# Patient Record
Sex: FEMALE | Race: WHITE | HISPANIC OR LATINO | Employment: OTHER | URBAN - METROPOLITAN AREA
[De-identification: names, ages, dates, MRNs, and addresses within clinical notes are randomized per-mention and may not be internally consistent; named-entity substitution may affect disease eponyms.]

---

## 2019-03-14 DIAGNOSIS — I35.0 NODULAR CALCIFIC AORTIC VALVE STENOSIS: Primary | ICD-10-CM

## 2019-04-08 ENCOUNTER — HOSPITAL ENCOUNTER (OUTPATIENT)
Dept: CARDIOLOGY | Facility: CLINIC | Age: 84
Discharge: HOME | End: 2019-04-08
Attending: INTERNAL MEDICINE
Payer: MEDICARE

## 2019-04-08 ENCOUNTER — HOSPITAL ENCOUNTER (OUTPATIENT)
Dept: RADIOLOGY | Facility: HOSPITAL | Age: 84
Discharge: HOME | End: 2019-04-08
Attending: INTERNAL MEDICINE
Payer: MEDICARE

## 2019-04-08 ENCOUNTER — HOSPITAL ENCOUNTER (OUTPATIENT)
Dept: PULMONOLOGY | Facility: CLINIC | Age: 84
Discharge: HOME | End: 2019-04-08
Payer: MEDICARE

## 2019-04-08 ENCOUNTER — HOSPITAL ENCOUNTER (OUTPATIENT)
Dept: CARDIOLOGY | Facility: CLINIC | Age: 84
Discharge: HOME | End: 2019-04-08
Payer: MEDICARE

## 2019-04-08 ENCOUNTER — OFFICE VISIT (OUTPATIENT)
Dept: CARDIOLOGY | Facility: CLINIC | Age: 84
End: 2019-04-08
Payer: MEDICARE

## 2019-04-08 ENCOUNTER — EDUCATION (OUTPATIENT)
Dept: CARDIOLOGY | Facility: CLINIC | Age: 84
End: 2019-04-08

## 2019-04-08 VITALS
DIASTOLIC BLOOD PRESSURE: 78 MMHG | WEIGHT: 190 LBS | HEART RATE: 85 BPM | HEIGHT: 62 IN | SYSTOLIC BLOOD PRESSURE: 138 MMHG | BODY MASS INDEX: 34.96 KG/M2

## 2019-04-08 VITALS — HEIGHT: 62 IN | BODY MASS INDEX: 36.44 KG/M2 | WEIGHT: 198 LBS

## 2019-04-08 VITALS
WEIGHT: 198.44 LBS | DIASTOLIC BLOOD PRESSURE: 79 MMHG | SYSTOLIC BLOOD PRESSURE: 171 MMHG | HEART RATE: 88 BPM | OXYGEN SATURATION: 97 % | BODY MASS INDEX: 36.52 KG/M2 | HEIGHT: 62 IN

## 2019-04-08 DIAGNOSIS — I35.0 NODULAR CALCIFIC AORTIC VALVE STENOSIS: ICD-10-CM

## 2019-04-08 DIAGNOSIS — I35.0 NODULAR CALCIFIC AORTIC VALVE STENOSIS: Primary | ICD-10-CM

## 2019-04-08 DIAGNOSIS — E03.9 ACQUIRED HYPOTHYROIDISM: ICD-10-CM

## 2019-04-08 DIAGNOSIS — I10 ESSENTIAL HYPERTENSION: ICD-10-CM

## 2019-04-08 LAB
ASCENDING AORTA: 3.06 CM
AV INDEX (PROSTH): 0.13
AV MEAN GRADIENT: 55.91 MMHG
AV PEAK GRADIENT: 76.04 MMHG
AV VALVE AREA: 0.45 CM2
AV VELOCITY RATIO: 0.16
BSA FOR ECHO PROCEDURE: 1.94 M2
CV ECHO LV RWT: 0.51 CM
DOP CALC AO PEAK VEL: 4.36 M/S
DOP CALC AO VTI: 123.13 CM
DOP CALC LVOT AREA: 3.4 CM2
DOP CALC LVOT DIAMETER: 2.08 CM
DOP CALC LVOT PEAK VEL: 0.7 M/S
DOP CALC LVOT STROKE VOLUME: 55.46 CM3
DOP CALCLVOT PEAK VEL VTI: 16.33 CM
E WAVE DECELERATION TIME: 173.16 MSEC
E/A RATIO: 0.79
E/E' RATIO: 30.4
ECHO LV POSTERIOR WALL: 1 CM (ref 0.6–1.1)
FRACTIONAL SHORTENING: 21 % (ref 28–44)
INTERVENTRICULAR SEPTUM: 0.97 CM (ref 0.6–1.1)
LA MAJOR: 4.68 CM
LA MINOR: 4.75 CM
LA WIDTH: 4.07 CM
LEFT ATRIUM SIZE: 4.26 CM
LEFT ATRIUM VOLUME INDEX: 37.2 ML/M2
LEFT ATRIUM VOLUME: 69.48 CM3
LEFT INTERNAL DIMENSION IN SYSTOLE: 3.12 CM (ref 2.1–4)
LEFT VENTRICLE DIASTOLIC VOLUME INDEX: 36.42 ML/M2
LEFT VENTRICLE DIASTOLIC VOLUME: 68.11 ML
LEFT VENTRICLE MASS INDEX: 65.2 G/M2
LEFT VENTRICLE SYSTOLIC VOLUME INDEX: 20.6 ML/M2
LEFT VENTRICLE SYSTOLIC VOLUME: 38.55 ML
LEFT VENTRICULAR INTERNAL DIMENSION IN DIASTOLE: 3.95 CM (ref 3.5–6)
LEFT VENTRICULAR MASS: 121.94 G
LV LATERAL E/E' RATIO: 25.33
LV SEPTAL E/E' RATIO: 38
MV MEAN GRADIENT: 7 MMHG
MV PEAK A VEL: 1.92 M/S
MV PEAK E VEL: 1.52 M/S
MV PEAK GRADIENT: 15 MMHG
PISA TR MAX VEL: 1.81 M/S
PRE FEV1 FVC: 64
PRE FEV1: 1.32
PRE FVC: 2.05
PREDICTED FEV1 FVC: 76
PREDICTED FEV1: 1.74
PREDICTED FVC: 2.36
PULM VEIN S/D RATIO: 1.28
PV PEAK D VEL: 0.32 M/S
PV PEAK S VEL: 0.41 M/S
RA MAJOR: 4.29 CM
RA PRESSURE: 3 MMHG
RA WIDTH: 3.35 CM
RIGHT VENTRICULAR END-DIASTOLIC DIMENSION: 3.31 CM
RV TISSUE DOPPLER FREE WALL SYSTOLIC VELOCITY 1 (APICAL 4 CHAMBER VIEW): 8.58 M/S
SINUS: 2.74 CM
STJ: 2.97 CM
TDI LATERAL: 0.06
TDI SEPTAL: 0.04
TDI: 0.05
TR MAX PG: 13.1 MMHG
TRICUSPID ANNULAR PLANE SYSTOLIC EXCURSION: 1.99 CM
TV REST PULMONARY ARTERY PRESSURE: 16 MMHG

## 2019-04-08 PROCEDURE — 74174 CTA ABD&PLVS W/CONTRAST: CPT | Mod: 26,,, | Performed by: RADIOLOGY

## 2019-04-08 PROCEDURE — 71275 CT ANGIOGRAPHY CHEST: CPT | Mod: TC

## 2019-04-08 PROCEDURE — 71275 CT ANGIOGRAPHY CHEST: CPT | Mod: 26,,, | Performed by: RADIOLOGY

## 2019-04-08 PROCEDURE — 99999 PR PBB SHADOW E&M-EST. PATIENT-LVL III: ICD-10-PCS | Mod: PBBFAC,,,

## 2019-04-08 PROCEDURE — 93306 TRANSTHORACIC ECHO (TTE) COMPLETE (CUPID ONLY): ICD-10-PCS | Mod: S$GLB,,, | Performed by: INTERNAL MEDICINE

## 2019-04-08 PROCEDURE — 71275 CTA CARDIAC TAVR_PARTNERS (XPD): ICD-10-PCS | Mod: 26,,, | Performed by: RADIOLOGY

## 2019-04-08 PROCEDURE — 99999 PR PBB SHADOW E&M-EST. PATIENT-LVL III: CPT | Mod: PBBFAC,,,

## 2019-04-08 PROCEDURE — 93306 TTE W/DOPPLER COMPLETE: CPT | Mod: S$GLB,,, | Performed by: INTERNAL MEDICINE

## 2019-04-08 PROCEDURE — 99204 OFFICE O/P NEW MOD 45 MIN: CPT | Mod: S$GLB,,, | Performed by: INTERNAL MEDICINE

## 2019-04-08 PROCEDURE — 93000 EKG 12-LEAD: ICD-10-PCS | Mod: S$GLB,,, | Performed by: INTERNAL MEDICINE

## 2019-04-08 PROCEDURE — 94618 PULMONARY STRESS TESTING: CPT | Mod: S$GLB,,, | Performed by: INTERNAL MEDICINE

## 2019-04-08 PROCEDURE — 25500020 PHARM REV CODE 255: Performed by: INTERNAL MEDICINE

## 2019-04-08 PROCEDURE — 94618 PULMONARY STRESS TESTING: ICD-10-PCS | Mod: S$GLB,,, | Performed by: INTERNAL MEDICINE

## 2019-04-08 PROCEDURE — 99204 PR OFFICE/OUTPT VISIT, NEW, LEVL IV, 45-59 MIN: ICD-10-PCS | Mod: S$GLB,,, | Performed by: INTERNAL MEDICINE

## 2019-04-08 PROCEDURE — 93000 ELECTROCARDIOGRAM COMPLETE: CPT | Mod: S$GLB,,, | Performed by: INTERNAL MEDICINE

## 2019-04-08 PROCEDURE — 74174 CTA CARDIAC TAVR_PARTNERS (XPD): ICD-10-PCS | Mod: 26,,, | Performed by: RADIOLOGY

## 2019-04-08 RX ORDER — SODIUM CHLORIDE 9 MG/ML
3 INJECTION, SOLUTION INTRAVENOUS CONTINUOUS
Status: CANCELLED | OUTPATIENT
Start: 2019-04-08 | End: 2019-04-08

## 2019-04-08 RX ORDER — AMLODIPINE AND OLMESARTAN MEDOXOMIL 5; 20 MG/1; MG/1
1 TABLET ORAL DAILY
Refills: 3 | COMMUNITY
Start: 2019-02-05 | End: 2020-07-07

## 2019-04-08 RX ORDER — SPIRONOLACTONE 25 MG/1
25 TABLET ORAL DAILY
Refills: 3 | COMMUNITY
Start: 2019-02-05 | End: 2022-07-03

## 2019-04-08 RX ORDER — CLOPIDOGREL BISULFATE 75 MG/1
75 TABLET ORAL DAILY
Qty: 30 TABLET | Refills: 11 | Status: SHIPPED | OUTPATIENT
Start: 2019-04-08 | End: 2020-05-04 | Stop reason: SDUPTHER

## 2019-04-08 RX ORDER — DEXTROSE MONOHYDRATE AND SODIUM CHLORIDE 5; .45 G/100ML; G/100ML
INJECTION, SOLUTION INTRAVENOUS CONTINUOUS
Status: CANCELLED | OUTPATIENT
Start: 2019-04-08

## 2019-04-08 RX ORDER — DIPHENHYDRAMINE HCL 25 MG
50 CAPSULE ORAL ONCE
Status: CANCELLED | OUTPATIENT
Start: 2019-04-08 | End: 2019-04-08

## 2019-04-08 RX ORDER — TIMOLOL MALEATE 6.8 MG/ML
1 SOLUTION OPHTHALMIC 2 TIMES DAILY
Refills: 6 | COMMUNITY
Start: 2019-02-21 | End: 2022-11-27 | Stop reason: DRUGHIGH

## 2019-04-08 RX ORDER — FUROSEMIDE 20 MG/1
TABLET ORAL
Refills: 0 | Status: ON HOLD | COMMUNITY
Start: 2019-03-08 | End: 2019-05-08 | Stop reason: HOSPADM

## 2019-04-08 RX ORDER — LEVOTHYROXINE SODIUM 125 UG/1
125 TABLET ORAL
Refills: 3 | Status: ON HOLD | COMMUNITY
Start: 2019-02-09 | End: 2023-03-30 | Stop reason: HOSPADM

## 2019-04-08 RX ORDER — TRAMADOL HYDROCHLORIDE 50 MG/1
TABLET ORAL
Status: ON HOLD | COMMUNITY
Start: 2018-07-01 | End: 2021-11-18 | Stop reason: HOSPADM

## 2019-04-08 RX ADMIN — IOHEXOL 100 ML: 350 INJECTION, SOLUTION INTRAVENOUS at 01:04

## 2019-04-08 NOTE — H&P (VIEW-ONLY)
"Subjective:    Patient ID:  Pilar Michael is a 86 y.o. female who presents for evaluation of Aortic Stenosis      Referring Physician: Too Dietrich MD    HPI  87 yo pleasant female - breast cancer survivor referred by Dr. Gaston for evaluation of aortic stenosis. She reports NYHA FC III symptoms and a recent hospitalization in March, 2019 for ADHF (3 night stay at [facility]).    She has PMHx of  Infiltrative ductal carcinoma of left breast s/p surgery and left chest wall radiation Tx in 2003 ([facility]), HTN, left arm lymphedema, arthritis of lumbar spine, Thyroid cancer s/p removal 1992 now with residual hypothyroidism, PAD and OA. She get annual follow up with her oncologist (Dr. Foster at [facility]) every June and was told "everything is ok" at last visit.       Pilar Michael is a 86 y.o. female referred by Dr Gaston  for evaluation of severe AS (NYHA Class III sx).    The patient has undergone the following TAVR work-up:   ECHO (Date 4/8/2019): MARIBEL= 0.45 cm2, MG= 56 mmHg, Peak Haresh= 4.4 m/s, EF= 65%.   LHC (Date ): Pending   STS: 6 %   Frailty: 1/4 (handgrip)  Iliacs are > 6.6  on R and > 6.1 on L   LVOT area by CTA is 3.75 cm2 (26.1 mm X 19.6mm) and Avg Diameter is 21.8 per Dr VILLANUEVA  Incidental findings on CT: LAD moderate to severe calcification.  CT Surgery risk assessment: high risk per Dr. Mcginnis  Rhythm issues: LBBB  PFTs: FEV1  77% predicted, DLCO 86% predicted.  Comorbidities: breast and thyroid cancer Hx, Chest wall radiation therapy.      Pilar Michael is a 26 mm Evolut R valve candidate via RTF access.    Review of Systems   Constitution: Negative for malaise/fatigue, weight gain and weight loss.   HENT: Negative for congestion, nosebleeds and sore throat.    Eyes: Negative for blurred vision, double vision, pain and visual disturbance.   Cardiovascular: Negative for chest pain, claudication, cyanosis, dyspnea on exertion, irregular heartbeat, leg swelling, near-syncope, orthopnea, palpitations, paroxysmal " "nocturnal dyspnea and syncope.   Respiratory: Negative for cough, hemoptysis, shortness of breath, snoring, sputum production and wheezing.    Endocrine: Negative for polydipsia and polyuria.   Hematologic/Lymphatic: Negative for bleeding problem. Does not bruise/bleed easily.   Skin: Negative for color change, poor wound healing and rash.   Musculoskeletal: Negative for arthritis, back pain, joint pain, muscle weakness, myalgias and neck pain.   Gastrointestinal: Negative for abdominal pain, anorexia, constipation, diarrhea, heartburn, hematemesis, hematochezia, hemorrhoids, jaundice, melena, nausea and vomiting.   Genitourinary: Negative for flank pain, hematuria, hesitancy, incomplete emptying, menorrhagia and nocturia.   Neurological: Negative for excessive daytime sleepiness, dizziness, focal weakness, headaches, light-headedness, loss of balance, numbness, paresthesias, seizures, sensory change, tremors, vertigo and weakness.   Psychiatric/Behavioral: Negative for altered mental status, depression, hallucinations and memory loss. The patient does not have insomnia and is not nervous/anxious.      History reviewed. No pertinent past medical history.  No current outpatient medications on file prior to visit.     No current facility-administered medications on file prior to visit.      Vitals:    04/08/19 1424   BP: (!) 171/79   BP Location: Right arm   Patient Position: Sitting   BP Method: Large (Automatic)   Pulse: 88   SpO2: 97%   Weight: 90 kg (198 lb 6.6 oz)   Height: 5' 1.81" (1.57 m)     Body mass index is 36.51 kg/m².      Objective:    Physical Exam   Constitutional: She appears well-developed and well-nourished.   HENT:   Head: Normocephalic.   Eyes: Pupils are equal, round, and reactive to light.   Neck: Normal range of motion. Neck supple. Normal carotid pulses, no hepatojugular reflux and no JVD present. No spinous process tenderness and no muscular tenderness present. Carotid bruit is not present. " No neck rigidity. No tracheal deviation, no edema, no erythema and normal range of motion present. No thyromegaly present.   Cardiovascular: Normal rate, regular rhythm, S1 normal, S2 normal and intact distal pulses. PMI is not displaced. Exam reveals no gallop, no distant heart sounds, no friction rub, no midsystolic click, no opening snap and no decreased pulses.   Murmur heard.  Pulses:       Carotid pulses are 2+ on the right side, and 2+ on the left side.       Radial pulses are 2+ on the right side, and 2+ on the left side.        Femoral pulses are 2+ on the right side, and 2+ on the left side.       Popliteal pulses are 2+ on the right side, and 2+ on the left side.        Dorsalis pedis pulses are 2+ on the right side, and 2+ on the left side.        Posterior tibial pulses are 2+ on the right side, and 2+ on the left side.   Crescendo decrescendo mid peaking murmur   Pulmonary/Chest: No accessory muscle usage. No tachypnea. No respiratory distress. She has no decreased breath sounds. She has no wheezes. She has no rhonchi. She has no rales. She exhibits no tenderness. No breast swelling, tenderness, discharge or bleeding.   Abdominal: Normal appearance. She exhibits no distension, no abdominal bruit, no ascites and no mass. There is no hepatosplenomegaly. There is no tenderness. There is no rebound, no guarding and no CVA tenderness. No hernia.   Genitourinary: No breast swelling, tenderness, discharge or bleeding.   Musculoskeletal: She exhibits no edema or tenderness.        Right shoulder: She exhibits normal range of motion, no tenderness, no bony tenderness, no swelling, no effusion, no deformity, no laceration, no pain, normal pulse and normal strength.   Lymphadenopathy:     She has no cervical adenopathy.   Neurological: She is alert. She has normal reflexes. She displays no atrophy and no tremor. No cranial nerve deficit or sensory deficit. She exhibits normal muscle tone. She displays a negative  Romberg sign. She displays no seizure activity. Coordination and gait normal.   Reflex Scores:       Tricep reflexes are 2+ on the right side and 2+ on the left side.       Bicep reflexes are 2+ on the right side and 2+ on the left side.       Brachioradialis reflexes are 2+ on the right side and 2+ on the left side.       Patellar reflexes are 2+ on the right side and 2+ on the left side.       Achilles reflexes are 2+ on the right side and 2+ on the left side.  Skin: Skin is warm, dry and intact. No abrasion, no bruising, no ecchymosis, no lesion and no rash noted. She is not diaphoretic. No cyanosis or erythema. No pallor. Nails show no clubbing.   Psychiatric: She has a normal mood and affect. Her speech is normal and behavior is normal. Judgment and thought content normal. Cognition and memory are normal.         Assessment:   1- Severe Aortic Stenosis - Symptomatic with NYHA FC III symptoms    The patient has undergone the following TAVR work-up:   ECHO (Date 4/8/2019): MARIBEL= 0.45 cm2, MG= 56mmHg, Peak Haresh= 4.4 m/s, EF= 65%.   LHC (Date ): Pending   STS: 6 %   Frailty: 1/4 (handgrip)  Iliacs are > 6.6  on R and > 6.1 on L   LVOT area by CTA is 3.75 cm2 (26.1 mm X 19.6mm) and Avg Diameter is 21.8 per Dr VILLANUEVA  Incidental findings on CT: LAD moderate to severe calcification.  CT Surgery risk assessment: high risk per Dr. Mcginnis  Rhythm issues: LBBB  PFTs: FEV1  77% predicted, DLCO 86% predicted.  Comorbidities: breast and thyroid cancer Hx, Chest wall radiation therapy, porcelain aorta    She is a 26 mm Evolut R valve candidate via RTF access.    Transcatheter Aortic valve replacement Plan:  1. Valve:  26 mm Evolut R  2. TAVR access: RTF  3. Valvuloplasty balloon: 20 mm True  4. Viabahn size if needed:  7 x 50  5. Antithrombotic therapy:  ASA and plavix  6. Pacemaker risk factors:  LBBB  7.   Pathophysiology, natural course of disease, symptoms and available management options included SAVR and TAVR discussed in  detail with patient. She needs coronary angiogram to complete work up for TAVR.     2- Essential HTN - controlled.   3- Hypothyroidism -on synthroid  4- PAD - asymptomatic - Risk factor control  Plan:     - Plan for coronary angiogram (BMS candidate)  - L CFA 6 Fr. JL 5 and JR 4 catheters  - DAPT - PRU on day of procedure  - Keep NPO   - Pre-cath IVF ordered  -The risks, benefits and alternatives of the procedure were explained to the patient.   -The risks of coronary angiography include but are not limited to: bleeding, infection, heart rhythm abnormalities, allergic reactions, kidney injury, stroke and death.   -The risks of moderate sedation include hypotension, respiratory depression, arrhythmias, bronchospasm, and death.   - Informed consent was obtained and the patient is agreeable to proceed with the procedure.    Jacobo Nelson MD  Interventional Cardiology/Structural Fellow    Staff:  I have personally taken the history and examined this patient and agree with the fellow's note as stated above and amended it accordingly :-)

## 2019-04-08 NOTE — PROCEDURES
Pilar Michael is a 86 y.o.  female patient, who presents for a 6 minute walk test ordered by MD Gemini.  The diagnosis is Aortic Valve Disorder.  The patient's BMI is 36.3 kg/m2.  Predicted distance (lower limit of normal) is 150.11 meters.      Test Results:    The test was completed without stopping.    The total time walked was 360 seconds.  During walking, the patient reported:  Dyspnea. The patient used no assistive devices  during testing.     04/08/2019---------Distance: 243.84 meters (800 feet)     O2 Sat % Supplemental Oxygen Heart Rate Blood Pressure Jeanette Scale   Pre-exercise  (Resting) 97 % Room Air 77 bpm 126/83 mmHg 1   During Exercise 96 % Room Air 128 bpm 144/67 mmHg 4   Post-exercise  (Recovery) 96 % Room Air  86 bpm   mmHg       Recovery Time: 80 seconds    Performing nurse/tech: Michael BLAKELY      PREVIOUS STUDY:   The patient has not had a previous study.      CLINICAL INTERPRETATION:  Six minute walk distance is 243.84 meters (800 feet) with somewhat heavy dyspnea.  During exercise, there was no significant desaturation while breathing room air.  Blood pressure remained stable and Heart rate increased significantly with walking.  This may represent a tachycardic response to exercise.  The patient did not report non-pulmonary symptoms during exercise.  No previous study performed.  Based upon age and body mass index, exercise capacity is normal.

## 2019-04-08 NOTE — H&P (VIEW-ONLY)
"Subjective:    Patient ID:  Pilar Michael is a 86 y.o. female who presents for evaluation of Aortic Stenosis      Referring Physician: Too Dietrich MD    HPI  85 yo pleasant female - breast cancer survivor referred by Dr. Gaston for evaluation of aortic stenosis. She reports NYHA FC III symptoms and a recent hospitalization in March, 2019 for ADHF (3 night stay at [facility]).    She has PMHx of  Infiltrative ductal carcinoma of left breast s/p surgery and left chest wall radiation Tx in 2003 ([facility]), HTN, left arm lymphedema, arthritis of lumbar spine, Thyroid cancer s/p removal 1992 now with residual hypothyroidism, PAD and OA. She get annual follow up with her oncologist (Dr. Foster at [facility]) every June and was told "everything is ok" at last visit.       Pilar Michael is a 86 y.o. female referred by Dr Gaston  for evaluation of severe AS (NYHA Class III sx).    The patient has undergone the following TAVR work-up:   ECHO (Date 4/8/2019): MARIBEL= 0.45 cm2, MG= 56 mmHg, Peak Haresh= 4.4 m/s, EF= 65%.   LHC (Date ): Pending   STS: 6 %   Frailty: 1/4 (handgrip)  Iliacs are > 6.6  on R and > 6.1 on L   LVOT area by CTA is 3.75 cm2 (26.1 mm X 19.6mm) and Avg Diameter is 21.8 per Dr VILLANUEVA  Incidental findings on CT: LAD moderate to severe calcification.  CT Surgery risk assessment: high risk per Dr. Mcginnis  Rhythm issues: LBBB  PFTs: FEV1  77% predicted, DLCO 86% predicted.  Comorbidities: breast and thyroid cancer Hx, Chest wall radiation therapy.      Pilar Michael is a 26 mm Evolut R valve candidate via RTF access.    Review of Systems   Constitution: Negative for malaise/fatigue, weight gain and weight loss.   HENT: Negative for congestion, nosebleeds and sore throat.    Eyes: Negative for blurred vision, double vision, pain and visual disturbance.   Cardiovascular: Negative for chest pain, claudication, cyanosis, dyspnea on exertion, irregular heartbeat, leg swelling, near-syncope, orthopnea, palpitations, paroxysmal " "nocturnal dyspnea and syncope.   Respiratory: Negative for cough, hemoptysis, shortness of breath, snoring, sputum production and wheezing.    Endocrine: Negative for polydipsia and polyuria.   Hematologic/Lymphatic: Negative for bleeding problem. Does not bruise/bleed easily.   Skin: Negative for color change, poor wound healing and rash.   Musculoskeletal: Negative for arthritis, back pain, joint pain, muscle weakness, myalgias and neck pain.   Gastrointestinal: Negative for abdominal pain, anorexia, constipation, diarrhea, heartburn, hematemesis, hematochezia, hemorrhoids, jaundice, melena, nausea and vomiting.   Genitourinary: Negative for flank pain, hematuria, hesitancy, incomplete emptying, menorrhagia and nocturia.   Neurological: Negative for excessive daytime sleepiness, dizziness, focal weakness, headaches, light-headedness, loss of balance, numbness, paresthesias, seizures, sensory change, tremors, vertigo and weakness.   Psychiatric/Behavioral: Negative for altered mental status, depression, hallucinations and memory loss. The patient does not have insomnia and is not nervous/anxious.      History reviewed. No pertinent past medical history.  No current outpatient medications on file prior to visit.     No current facility-administered medications on file prior to visit.      Vitals:    04/08/19 1424   BP: (!) 171/79   BP Location: Right arm   Patient Position: Sitting   BP Method: Large (Automatic)   Pulse: 88   SpO2: 97%   Weight: 90 kg (198 lb 6.6 oz)   Height: 5' 1.81" (1.57 m)     Body mass index is 36.51 kg/m².      Objective:    Physical Exam   Constitutional: She appears well-developed and well-nourished.   HENT:   Head: Normocephalic.   Eyes: Pupils are equal, round, and reactive to light.   Neck: Normal range of motion. Neck supple. Normal carotid pulses, no hepatojugular reflux and no JVD present. No spinous process tenderness and no muscular tenderness present. Carotid bruit is not present. " No neck rigidity. No tracheal deviation, no edema, no erythema and normal range of motion present. No thyromegaly present.   Cardiovascular: Normal rate, regular rhythm, S1 normal, S2 normal and intact distal pulses. PMI is not displaced. Exam reveals no gallop, no distant heart sounds, no friction rub, no midsystolic click, no opening snap and no decreased pulses.   Murmur heard.  Pulses:       Carotid pulses are 2+ on the right side, and 2+ on the left side.       Radial pulses are 2+ on the right side, and 2+ on the left side.        Femoral pulses are 2+ on the right side, and 2+ on the left side.       Popliteal pulses are 2+ on the right side, and 2+ on the left side.        Dorsalis pedis pulses are 2+ on the right side, and 2+ on the left side.        Posterior tibial pulses are 2+ on the right side, and 2+ on the left side.   Crescendo decrescendo mid peaking murmur   Pulmonary/Chest: No accessory muscle usage. No tachypnea. No respiratory distress. She has no decreased breath sounds. She has no wheezes. She has no rhonchi. She has no rales. She exhibits no tenderness. No breast swelling, tenderness, discharge or bleeding.   Abdominal: Normal appearance. She exhibits no distension, no abdominal bruit, no ascites and no mass. There is no hepatosplenomegaly. There is no tenderness. There is no rebound, no guarding and no CVA tenderness. No hernia.   Genitourinary: No breast swelling, tenderness, discharge or bleeding.   Musculoskeletal: She exhibits no edema or tenderness.        Right shoulder: She exhibits normal range of motion, no tenderness, no bony tenderness, no swelling, no effusion, no deformity, no laceration, no pain, normal pulse and normal strength.   Lymphadenopathy:     She has no cervical adenopathy.   Neurological: She is alert. She has normal reflexes. She displays no atrophy and no tremor. No cranial nerve deficit or sensory deficit. She exhibits normal muscle tone. She displays a negative  Romberg sign. She displays no seizure activity. Coordination and gait normal.   Reflex Scores:       Tricep reflexes are 2+ on the right side and 2+ on the left side.       Bicep reflexes are 2+ on the right side and 2+ on the left side.       Brachioradialis reflexes are 2+ on the right side and 2+ on the left side.       Patellar reflexes are 2+ on the right side and 2+ on the left side.       Achilles reflexes are 2+ on the right side and 2+ on the left side.  Skin: Skin is warm, dry and intact. No abrasion, no bruising, no ecchymosis, no lesion and no rash noted. She is not diaphoretic. No cyanosis or erythema. No pallor. Nails show no clubbing.   Psychiatric: She has a normal mood and affect. Her speech is normal and behavior is normal. Judgment and thought content normal. Cognition and memory are normal.         Assessment:   1- Severe Aortic Stenosis - Symptomatic with NYHA FC III symptoms    The patient has undergone the following TAVR work-up:   ECHO (Date 4/8/2019): MARIBEL= 0.45 cm2, MG= 56mmHg, Peak Haresh= 4.4 m/s, EF= 65%.   LHC (Date ): Pending   STS: 6 %   Frailty: 1/4 (handgrip)  Iliacs are > 6.6  on R and > 6.1 on L   LVOT area by CTA is 3.75 cm2 (26.1 mm X 19.6mm) and Avg Diameter is 21.8 per Dr VILLANUEVA  Incidental findings on CT: LAD moderate to severe calcification.  CT Surgery risk assessment: high risk per Dr. Mcginnis  Rhythm issues: LBBB  PFTs: FEV1  77% predicted, DLCO 86% predicted.  Comorbidities: breast and thyroid cancer Hx, Chest wall radiation therapy, porcelain aorta    She is a 26 mm Evolut R valve candidate via RTF access.    Transcatheter Aortic valve replacement Plan:  1. Valve:  26 mm Evolut R  2. TAVR access: RTF  3. Valvuloplasty balloon: 20 mm True  4. Viabahn size if needed:  7 x 50  5. Antithrombotic therapy:  ASA and plavix  6. Pacemaker risk factors:  LBBB  7.   Pathophysiology, natural course of disease, symptoms and available management options included SAVR and TAVR discussed in  detail with patient. She needs coronary angiogram to complete work up for TAVR.     2- Essential HTN - controlled.   3- Hypothyroidism -on synthroid  4- PAD - asymptomatic - Risk factor control  Plan:     - Plan for coronary angiogram (BMS candidate)  - L CFA 6 Fr. JL 5 and JR 4 catheters  - DAPT - PRU on day of procedure  - Keep NPO   - Pre-cath IVF ordered  -The risks, benefits and alternatives of the procedure were explained to the patient.   -The risks of coronary angiography include but are not limited to: bleeding, infection, heart rhythm abnormalities, allergic reactions, kidney injury, stroke and death.   -The risks of moderate sedation include hypotension, respiratory depression, arrhythmias, bronchospasm, and death.   - Informed consent was obtained and the patient is agreeable to proceed with the procedure.    Jacobo Nelson MD  Interventional Cardiology/Structural Fellow    Staff:  I have personally taken the history and examined this patient and agree with the fellow's note as stated above and amended it accordingly :-)

## 2019-04-08 NOTE — PROGRESS NOTES
OUTPATIENT CATHETERIZATION INSTRUCTIONS    You have been scheduled for a procedure in the catheterization lab on Tuesday, April 23, 2019.     Please report to the Cardiology Waiting Area on the Third floor of the hospital and check in at 8 AM.   You will then be taken to the SSCU (Short Stay Cardiac Unit) and prepared for your procedure. Please be aware that this is not the time of your procedure but the time you are to arrive. The procedures are scheduled on an hourly basis; however, emergency cases take precedence over all other cases.       You may not have anything to eat or drink after midnight the night before your test. You may take your regular morning medications with water. If there are any medications that you should not take you will be instructed to hold them that morning. If you are diabetic and on Metformin (Glucophage) do not take it the day before, the day of, and for 2 days after your procedure.      The procedure will take 1-2 hours to perform. After the procedure, you will return to SSCU on the third floor of the hospital. You will need to lie still (or keep your arm still) for the next 4 to 6 hours to minimize bleeding from the puncture site. Your family may remain in the room with you during this time.       You may be able to be discharged home that same afternoon if there is someone to drive you home and there were no complications. If you have one of the balloon, stent, or device procedures you may spend the night in the hospital. Your doctor will determine, based on your progress, the date and time of your discharge. The results of your procedure will be discussed with you before you are discharged. Any further testing or procedures will be scheduled for you either before you leave or you will be called with these appointments.       If you should have any questions, concerns, or need to change the date of your procedure, please call FARAZ Monae @ [phone]    Special  Instructions:  Plavix 75m tablets night before procedure and 1 tablet morning of procedure        THE ABOVE INSTRUCTIONS WERE GIVEN TO THE PATIENT VERBALLY AND THEY VERBALIZED UNDERSTANDING.  THEY DO NOT REQUIRE ANY SPECIAL NEEDS AND DO NOT HAVE ANY LEARNING BARRIERS.          Directions for Reporting to Cardiology Waiting Area in the Hospital  If you park in the Parking Garage:  Take elevators to the1st floor of the parking garage.  Continue past the gift shop, coffee shop, and piano.  Take a right and go to the gold elevators. (Elevator B)  Take the elevator to the 3rd floor.  Follow the arrow on the sign on the wall that says Cath Lab Registration/EP Lab Registration.  Follow the long hallway all the way around until you come to a big open area.  This is the registration area.  Check in at Reception Desk.    OR    If family is dropping you off:  Have them drop you off at the front of the Hospital under the green overhang.  Enter through the doors and take a right.  Take the E elevators to the 3rd floor Cardiology Waiting Area.  Check in at the Reception Desk in the waiting room.

## 2019-04-08 NOTE — PROGRESS NOTES
"Subjective:    Patient ID:  Pilar Michael is a 86 y.o. female who presents for evaluation of Aortic Stenosis      Referring Physician: Too Dietrich MD    HPI  87 yo [place] female - breast cancer survivor referred by Dr. Gaston for evaluation of aortic stenosis. She reports NYHA FC III symptoms and a recent hospitalization in March, 2019 for ADHF (3 night stay at [facility]).    She has PMHx of  Infiltrative ductal carcinoma of left breast s/p surgery and left chest wall radiation Tx in 2003 ([facility]), HTN, left arm lymphedema, arthritis of lumbar spine, Thyroid cancer s/p removal 1992 now with residual hypothyroidism, PAD and OA. She get annual follow up with her oncologist (Dr. Foster at [facility]) every June and was told "everything is ok" at last visit.       Pilar Michael is a 86 y.o. female referred by Dr Gaston  for evaluation of severe AS (NYHA Class III sx).    The patient has undergone the following TAVR work-up:   ECHO (Date 4/8/2019): MARIBEL= 0.45 cm2, MG= 56 mmHg, Peak Haresh= 4.4 m/s, EF= 65%.   LHC (Date ): Pending   STS: 6 %   Frailty: 1/4 (handgrip)  Iliacs are > 6.6  on R and > 6.1 on L   LVOT area by CTA is 3.75 cm2 (26.1 mm X 19.6mm) and Avg Diameter is 21.8 per Dr VILLANUEVA  Incidental findings on CT: LAD moderate to severe calcification.  CT Surgery risk assessment: high risk per Dr. Mcginnis  Rhythm issues: LBBB  PFTs: FEV1  77% predicted, DLCO 86% predicted.  Comorbidities: breast and thyroid cancer Hx, Chest wall radiation therapy.      Pilar Michael is a 26 mm Evolut R valve candidate via RTF access.    Review of Systems   Constitution: Negative for malaise/fatigue, weight gain and weight loss.   HENT: Negative for congestion, nosebleeds and sore throat.    Eyes: Negative for blurred vision, double vision, pain and visual disturbance.   Cardiovascular: Negative for chest pain, claudication, cyanosis, dyspnea on exertion, irregular heartbeat, leg swelling, near-syncope, orthopnea, palpitations, paroxysmal " "nocturnal dyspnea and syncope.   Respiratory: Negative for cough, hemoptysis, shortness of breath, snoring, sputum production and wheezing.    Endocrine: Negative for polydipsia and polyuria.   Hematologic/Lymphatic: Negative for bleeding problem. Does not bruise/bleed easily.   Skin: Negative for color change, poor wound healing and rash.   Musculoskeletal: Negative for arthritis, back pain, joint pain, muscle weakness, myalgias and neck pain.   Gastrointestinal: Negative for abdominal pain, anorexia, constipation, diarrhea, heartburn, hematemesis, hematochezia, hemorrhoids, jaundice, melena, nausea and vomiting.   Genitourinary: Negative for flank pain, hematuria, hesitancy, incomplete emptying, menorrhagia and nocturia.   Neurological: Negative for excessive daytime sleepiness, dizziness, focal weakness, headaches, light-headedness, loss of balance, numbness, paresthesias, seizures, sensory change, tremors, vertigo and weakness.   Psychiatric/Behavioral: Negative for altered mental status, depression, hallucinations and memory loss. The patient does not have insomnia and is not nervous/anxious.      History reviewed. No pertinent past medical history.  No current outpatient medications on file prior to visit.     No current facility-administered medications on file prior to visit.      Vitals:    04/08/19 1424   BP: (!) 171/79   BP Location: Right arm   Patient Position: Sitting   BP Method: Large (Automatic)   Pulse: 88   SpO2: 97%   Weight: 90 kg (198 lb 6.6 oz)   Height: 5' 1.81" (1.57 m)     Body mass index is 36.51 kg/m².      Objective:    Physical Exam   Constitutional: She appears well-developed and well-nourished.   HENT:   Head: Normocephalic.   Eyes: Pupils are equal, round, and reactive to light.   Neck: Normal range of motion. Neck supple. Normal carotid pulses, no hepatojugular reflux and no JVD present. No spinous process tenderness and no muscular tenderness present. Carotid bruit is not present. " No neck rigidity. No tracheal deviation, no edema, no erythema and normal range of motion present. No thyromegaly present.   Cardiovascular: Normal rate, regular rhythm, S1 normal, S2 normal and intact distal pulses. PMI is not displaced. Exam reveals no gallop, no distant heart sounds, no friction rub, no midsystolic click, no opening snap and no decreased pulses.   Murmur heard.  Pulses:       Carotid pulses are 2+ on the right side, and 2+ on the left side.       Radial pulses are 2+ on the right side, and 2+ on the left side.        Femoral pulses are 2+ on the right side, and 2+ on the left side.       Popliteal pulses are 2+ on the right side, and 2+ on the left side.        Dorsalis pedis pulses are 2+ on the right side, and 2+ on the left side.        Posterior tibial pulses are 2+ on the right side, and 2+ on the left side.   Crescendo decrescendo mid peaking murmur   Pulmonary/Chest: No accessory muscle usage. No tachypnea. No respiratory distress. She has no decreased breath sounds. She has no wheezes. She has no rhonchi. She has no rales. She exhibits no tenderness. No breast swelling, tenderness, discharge or bleeding.   Abdominal: Normal appearance. She exhibits no distension, no abdominal bruit, no ascites and no mass. There is no hepatosplenomegaly. There is no tenderness. There is no rebound, no guarding and no CVA tenderness. No hernia.   Genitourinary: No breast swelling, tenderness, discharge or bleeding.   Musculoskeletal: She exhibits no edema or tenderness.        Right shoulder: She exhibits normal range of motion, no tenderness, no bony tenderness, no swelling, no effusion, no deformity, no laceration, no pain, normal pulse and normal strength.   Lymphadenopathy:     She has no cervical adenopathy.   Neurological: She is alert. She has normal reflexes. She displays no atrophy and no tremor. No cranial nerve deficit or sensory deficit. She exhibits normal muscle tone. She displays a negative  Romberg sign. She displays no seizure activity. Coordination and gait normal.   Reflex Scores:       Tricep reflexes are 2+ on the right side and 2+ on the left side.       Bicep reflexes are 2+ on the right side and 2+ on the left side.       Brachioradialis reflexes are 2+ on the right side and 2+ on the left side.       Patellar reflexes are 2+ on the right side and 2+ on the left side.       Achilles reflexes are 2+ on the right side and 2+ on the left side.  Skin: Skin is warm, dry and intact. No abrasion, no bruising, no ecchymosis, no lesion and no rash noted. She is not diaphoretic. No cyanosis or erythema. No pallor. Nails show no clubbing.   Psychiatric: She has a normal mood and affect. Her speech is normal and behavior is normal. Judgment and thought content normal. Cognition and memory are normal.         Assessment:   1- Severe Aortic Stenosis - Symptomatic with NYHA FC III symptoms    The patient has undergone the following TAVR work-up:   ECHO (Date 4/8/2019): MARIBEL= 0.45 cm2, MG= 56mmHg, Peak Haresh= 4.4 m/s, EF= 65%.   LHC (Date ): Pending   STS: 6 %   Frailty: 1/4 (handgrip)  Iliacs are > 6.6  on R and > 6.1 on L   LVOT area by CTA is 3.75 cm2 (26.1 mm X 19.6mm) and Avg Diameter is 21.8 per Dr VILLANUEVA  Incidental findings on CT: LAD moderate to severe calcification.  CT Surgery risk assessment: high risk per Dr. Mcginnis  Rhythm issues: LBBB  PFTs: FEV1  77% predicted, DLCO 86% predicted.  Comorbidities: breast and thyroid cancer Hx, Chest wall radiation therapy, porcelain aorta    She is a 26 mm Evolut R valve candidate via RTF access.    Transcatheter Aortic valve replacement Plan:  1. Valve:  26 mm Evolut R  2. TAVR access: RTF  3. Valvuloplasty balloon: 20 mm True  4. Viabahn size if needed:  7 x 50  5. Antithrombotic therapy:  ASA and plavix  6. Pacemaker risk factors:  LBBB  7.   Pathophysiology, natural course of disease, symptoms and available management options included SAVR and TAVR discussed in  detail with patient. She needs coronary angiogram to complete work up for TAVR.     2- Essential HTN - controlled.   3- Hypothyroidism -on synthroid  4- PAD - asymptomatic - Risk factor control  Plan:     - Plan for coronary angiogram (BMS candidate)  - L CFA 6 Fr. JL 5 and JR 4 catheters  - DAPT - PRU on day of procedure  - Keep NPO   - Pre-cath IVF ordered  -The risks, benefits and alternatives of the procedure were explained to the patient.   -The risks of coronary angiography include but are not limited to: bleeding, infection, heart rhythm abnormalities, allergic reactions, kidney injury, stroke and death.   -The risks of moderate sedation include hypotension, respiratory depression, arrhythmias, bronchospasm, and death.   - Informed consent was obtained and the patient is agreeable to proceed with the procedure.    Jacobo Nelson MD  Interventional Cardiology/Structural Fellow    Staff:  I have personally taken the history and examined this patient and agree with the fellow's note as stated above and amended it accordingly :-)

## 2019-04-23 ENCOUNTER — HOSPITAL ENCOUNTER (OUTPATIENT)
Facility: HOSPITAL | Age: 84
Discharge: HOME | End: 2019-04-23
Attending: INTERNAL MEDICINE | Admitting: INTERNAL MEDICINE
Payer: MEDICARE

## 2019-04-23 VITALS
OXYGEN SATURATION: 95 % | BODY MASS INDEX: 36.44 KG/M2 | SYSTOLIC BLOOD PRESSURE: 125 MMHG | WEIGHT: 198 LBS | DIASTOLIC BLOOD PRESSURE: 56 MMHG | TEMPERATURE: 96 F | HEART RATE: 67 BPM | RESPIRATION RATE: 16 BRPM | HEIGHT: 62 IN

## 2019-04-23 DIAGNOSIS — I35.0 NODULAR CALCIFIC AORTIC VALVE STENOSIS: ICD-10-CM

## 2019-04-23 DIAGNOSIS — I25.118 CORONARY ARTERY DISEASE OF NATIVE ARTERY OF NATIVE HEART WITH STABLE ANGINA PECTORIS: Primary | ICD-10-CM

## 2019-04-23 DIAGNOSIS — I50.42 CHRONIC COMBINED SYSTOLIC AND DIASTOLIC HEART FAILURE: ICD-10-CM

## 2019-04-23 LAB
ABO + RH BLD: NORMAL
ANION GAP SERPL CALC-SCNC: 10 MMOL/L (ref 8–16)
BASOPHILS # BLD AUTO: 0.02 K/UL (ref 0–0.2)
BASOPHILS NFR BLD: 0.5 % (ref 0–1.9)
BLD GP AB SCN CELLS X3 SERPL QL: NORMAL
BUN SERPL-MCNC: 26 MG/DL (ref 8–23)
CALCIUM SERPL-MCNC: 10.2 MG/DL (ref 8.7–10.5)
CHLORIDE SERPL-SCNC: 102 MMOL/L (ref 95–110)
CO2 SERPL-SCNC: 22 MMOL/L (ref 23–29)
CREAT SERPL-MCNC: 1 MG/DL (ref 0.5–1.4)
DIFFERENTIAL METHOD: NORMAL
EOSINOPHIL # BLD AUTO: 0.2 K/UL (ref 0–0.5)
EOSINOPHIL NFR BLD: 3.7 % (ref 0–8)
ERYTHROCYTE [DISTWIDTH] IN BLOOD BY AUTOMATED COUNT: 13.2 % (ref 11.5–14.5)
EST. GFR  (AFRICAN AMERICAN): 58.5 ML/MIN/1.73 M^2
EST. GFR  (NON AFRICAN AMERICAN): 50.8 ML/MIN/1.73 M^2
GLUCOSE SERPL-MCNC: 104 MG/DL (ref 70–110)
HCT VFR BLD AUTO: 39.4 % (ref 37–48.5)
HGB BLD-MCNC: 12.9 G/DL (ref 12–16)
IMM GRANULOCYTES # BLD AUTO: 0.01 K/UL (ref 0–0.04)
IMM GRANULOCYTES NFR BLD AUTO: 0.2 % (ref 0–0.5)
LYMPHOCYTES # BLD AUTO: 1.3 K/UL (ref 1–4.8)
LYMPHOCYTES NFR BLD: 30.5 % (ref 18–48)
MCH RBC QN AUTO: 28.4 PG (ref 27–31)
MCHC RBC AUTO-ENTMCNC: 32.7 G/DL (ref 32–36)
MCV RBC AUTO: 87 FL (ref 82–98)
MONOCYTES # BLD AUTO: 0.5 K/UL (ref 0.3–1)
MONOCYTES NFR BLD: 11.8 % (ref 4–15)
NEUTROPHILS # BLD AUTO: 2.3 K/UL (ref 1.8–7.7)
NEUTROPHILS NFR BLD: 53.3 % (ref 38–73)
NRBC BLD-RTO: 0 /100 WBC
PLATELET # BLD AUTO: 199 K/UL (ref 150–350)
PLATELET RESPONSE PLAVIX: 234 PRU (ref 194–418)
PMV BLD AUTO: 10.6 FL (ref 9.2–12.9)
POTASSIUM SERPL-SCNC: 5 MMOL/L (ref 3.5–5.1)
RBC # BLD AUTO: 4.55 M/UL (ref 4–5.4)
SODIUM SERPL-SCNC: 134 MMOL/L (ref 136–145)
WBC # BLD AUTO: 4.33 K/UL (ref 3.9–12.7)

## 2019-04-23 PROCEDURE — 93010 ELECTROCARDIOGRAM REPORT: CPT | Mod: ,,, | Performed by: INTERNAL MEDICINE

## 2019-04-23 PROCEDURE — 92928 PRQ TCAT PLMT NTRAC ST 1 LES: CPT | Mod: RC,,, | Performed by: INTERNAL MEDICINE

## 2019-04-23 PROCEDURE — C1725 CATH, TRANSLUMIN NON-LASER: HCPCS | Performed by: INTERNAL MEDICINE

## 2019-04-23 PROCEDURE — 25500020 PHARM REV CODE 255: Performed by: INTERNAL MEDICINE

## 2019-04-23 PROCEDURE — 99152 MOD SED SAME PHYS/QHP 5/>YRS: CPT | Mod: ,,, | Performed by: INTERNAL MEDICINE

## 2019-04-23 PROCEDURE — 99152 MOD SED SAME PHYS/QHP 5/>YRS: CPT | Performed by: INTERNAL MEDICINE

## 2019-04-23 PROCEDURE — 85576 BLOOD PLATELET AGGREGATION: CPT

## 2019-04-23 PROCEDURE — 93010 EKG 12-LEAD: ICD-10-PCS | Mod: ,,, | Performed by: INTERNAL MEDICINE

## 2019-04-23 PROCEDURE — 63600175 PHARM REV CODE 636 W HCPCS: Performed by: INTERNAL MEDICINE

## 2019-04-23 PROCEDURE — C1769 GUIDE WIRE: HCPCS | Performed by: INTERNAL MEDICINE

## 2019-04-23 PROCEDURE — 92928 PRQ TCAT PLMT NTRAC ST 1 LES: CPT | Mod: RC | Performed by: INTERNAL MEDICINE

## 2019-04-23 PROCEDURE — 27201423 OPTIME MED/SURG SUP & DEVICES STERILE SUPPLY: Performed by: INTERNAL MEDICINE

## 2019-04-23 PROCEDURE — 93454 PR CATH PLACE/CORONARY ANGIO, IMG SUPER/INTERP: ICD-10-PCS | Mod: 26,59,, | Performed by: INTERNAL MEDICINE

## 2019-04-23 PROCEDURE — 25000003 PHARM REV CODE 250: Performed by: INTERNAL MEDICINE

## 2019-04-23 PROCEDURE — 99153 MOD SED SAME PHYS/QHP EA: CPT | Performed by: INTERNAL MEDICINE

## 2019-04-23 PROCEDURE — 85025 COMPLETE CBC W/AUTO DIFF WBC: CPT

## 2019-04-23 PROCEDURE — C1760 CLOSURE DEV, VASC: HCPCS | Performed by: INTERNAL MEDICINE

## 2019-04-23 PROCEDURE — 80048 BASIC METABOLIC PNL TOTAL CA: CPT

## 2019-04-23 PROCEDURE — 93454 CORONARY ARTERY ANGIO S&I: CPT | Mod: 59 | Performed by: INTERNAL MEDICINE

## 2019-04-23 PROCEDURE — 92928 PR STENT: ICD-10-PCS | Mod: RC,,, | Performed by: INTERNAL MEDICINE

## 2019-04-23 PROCEDURE — 86850 RBC ANTIBODY SCREEN: CPT

## 2019-04-23 PROCEDURE — C1894 INTRO/SHEATH, NON-LASER: HCPCS | Performed by: INTERNAL MEDICINE

## 2019-04-23 PROCEDURE — 93454 CORONARY ARTERY ANGIO S&I: CPT | Mod: 26,59,, | Performed by: INTERNAL MEDICINE

## 2019-04-23 PROCEDURE — C1876 STENT, NON-COA/NON-COV W/DEL: HCPCS | Performed by: INTERNAL MEDICINE

## 2019-04-23 PROCEDURE — S0077 INJECTION, CLINDAMYCIN PHOSP: HCPCS | Performed by: INTERNAL MEDICINE

## 2019-04-23 PROCEDURE — 36415 COLL VENOUS BLD VENIPUNCTURE: CPT

## 2019-04-23 PROCEDURE — C1887 CATHETER, GUIDING: HCPCS | Performed by: INTERNAL MEDICINE

## 2019-04-23 PROCEDURE — 93005 ELECTROCARDIOGRAM TRACING: CPT | Mod: 59

## 2019-04-23 PROCEDURE — 99152 PR MOD CONSCIOUS SEDATION, SAME PHYS, 5+ YRS, FIRST 15 MIN: ICD-10-PCS | Mod: ,,, | Performed by: INTERNAL MEDICINE

## 2019-04-23 DEVICE — MULTI-LINK RX VISION CORONARY STENT SYSTEM 3.00 MM X 12 MM
Type: IMPLANTABLE DEVICE | Site: HEART | Status: FUNCTIONAL
Brand: MULTI-LINK VISION

## 2019-04-23 RX ORDER — HEPARIN SODIUM 1000 [USP'U]/ML
INJECTION, SOLUTION INTRAVENOUS; SUBCUTANEOUS
Status: DISCONTINUED | OUTPATIENT
Start: 2019-04-23 | End: 2019-04-23 | Stop reason: HOSPADM

## 2019-04-23 RX ORDER — LIDOCAINE HYDROCHLORIDE 20 MG/ML
INJECTION, SOLUTION EPIDURAL; INFILTRATION; INTRACAUDAL; PERINEURAL
Status: DISCONTINUED | OUTPATIENT
Start: 2019-04-23 | End: 2019-04-23 | Stop reason: HOSPADM

## 2019-04-23 RX ORDER — CLINDAMYCIN PHOSPHATE 900 MG/50ML
INJECTION, SOLUTION INTRAVENOUS CONTINUOUS PRN
Status: DISCONTINUED | OUTPATIENT
Start: 2019-04-23 | End: 2019-04-23 | Stop reason: HOSPADM

## 2019-04-23 RX ORDER — PROTAMINE SULFATE 10 MG/ML
INJECTION, SOLUTION INTRAVENOUS
Status: DISCONTINUED | OUTPATIENT
Start: 2019-04-23 | End: 2019-04-23 | Stop reason: HOSPADM

## 2019-04-23 RX ORDER — FENTANYL CITRATE 50 UG/ML
INJECTION, SOLUTION INTRAMUSCULAR; INTRAVENOUS
Status: DISCONTINUED | OUTPATIENT
Start: 2019-04-23 | End: 2019-04-23 | Stop reason: HOSPADM

## 2019-04-23 RX ORDER — NAPROXEN SODIUM 220 MG/1
81 TABLET, FILM COATED ORAL DAILY
Refills: 0 | COMMUNITY
Start: 2019-04-23 | End: 2021-11-17 | Stop reason: SDUPTHER

## 2019-04-23 RX ORDER — DIPHENHYDRAMINE HCL 50 MG
50 CAPSULE ORAL ONCE
Status: COMPLETED | OUTPATIENT
Start: 2019-04-23 | End: 2019-04-23

## 2019-04-23 RX ORDER — HEPARIN SODIUM 200 [USP'U]/100ML
INJECTION, SOLUTION INTRAVENOUS CONTINUOUS PRN
Status: DISCONTINUED | OUTPATIENT
Start: 2019-04-23 | End: 2019-04-23 | Stop reason: HOSPADM

## 2019-04-23 RX ORDER — SODIUM CHLORIDE 9 MG/ML
3 INJECTION, SOLUTION INTRAVENOUS CONTINUOUS
Status: ACTIVE | OUTPATIENT
Start: 2019-04-23 | End: 2019-04-23

## 2019-04-23 RX ADMIN — SODIUM CHLORIDE 3 ML/KG/HR: 0.9 INJECTION, SOLUTION INTRAVENOUS at 09:04

## 2019-04-23 RX ADMIN — DIPHENHYDRAMINE HYDROCHLORIDE 50 MG: 50 CAPSULE ORAL at 09:04

## 2019-04-23 NOTE — DISCHARGE SUMMARY
[facility]  Discharge Note  Short Stay    Admit Date: 4/23/2019    Discharge Date and Time: 4/23/2019  3:31 PM     Attending Physician: No att. 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       Discharged Condition: good    Hospital Course: Patient was admitted for an outpatient procedure and tolerated the procedure well with no complications.    Pilar Michael is a 86 y.o. female referred by Dr Gaston  for evaluation of severe AS (NYHA Class III sx).     The patient has undergone the following TAVR work-up:   · ECHO (Date 4/8/2019): MARIBEL= 0.45 cm2, MG= 56 mmHg, Peak Haresh= 4.4 m/s, EF= 65%.   · LHC (Date 4.23.19): PCI to pRCA    · STS: 6 %   · Frailty: 1/4 (handgrip)  · Iliacs are > 6.6  on R and > 6.1 on L   · LVOT area by CTA is 3.75 cm2 (26.1 mm X 19.6mm) and Avg Diameter is 21.8 per Dr VILLANUEVA  · Incidental findings on CT: LAD moderate to severe calcification.  · CT Surgery risk assessment: high risk per Dr. Mcginnis  · Rhythm issues: LBBB  · PFTs: FEV1  77% predicted, DLCO 86% predicted.  · Comorbidities: breast and thyroid cancer Hx, Chest wall radiation therapy.        Pilar Michael is a 26 mm Evolut R valve candidate via RTF access.       Consents obtained by Dr Singletary prior to DC      Final Diagnoses: Same as principal problem.    Disposition: Home or Self Care    Follow up/Patient Instructions:    Medications:  Reconciled Home Medications:      Medication List      START taking these medications    aspirin 81 MG Chew  Take 1 tablet (81 mg total) by mouth once daily.        CONTINUE taking these medications    amlodipine-olmesartan 5-20 mg per tablet  Commonly known as:  KARYN  Take 1 tablet by mouth once daily.     clopidogrel 75 mg tablet  Commonly known as:  PLAVIX  Take 1 tablet (75 mg total) by mouth once  daily.     furosemide 20 MG tablet  Commonly known as:  LASIX  PLEASE SEE ATTACHED FOR DETAILED DIRECTIONS     spironolactone 25 MG tablet  Commonly known as:  ALDACTONE  Take 25 mg by mouth once daily.     SYNTHROID 125 MCG tablet  Generic drug:  levothyroxine  Take 125 mcg by mouth once daily.     TIMOPTIC OCUDOSE (PF) 0.5 % Dpet  Generic drug:  timolol maleate (PF)  1 UNIT IN BOTH EYES TWICE A DAY     traMADol 50 mg tablet  Commonly known as:  ULTRAM  TAKE 1 TABLET BY MOUTH EVERY 12 HOURS AS NEEDED FOR SEVERE PAIN          Discharge Procedure Orders   Diet general     Nursing communication   Order Comments: Please notify Dr Singletary of PRU result before DC         Discharge Procedure Orders (must include Diet, Follow-up, Activity):   Discharge Procedure Orders (must include Diet, Follow-up, Activity)   Diet general     Nursing communication   Order Comments: Please notify Dr Singletary of PRU result before DC

## 2019-04-23 NOTE — PLAN OF CARE
Problem: Adult Inpatient Plan of Care  Goal: Plan of Care Review  Pt transferred from cath lab via stretcher.  Vss.  Post op orders and assessment initiated.  Pt aao, tolerating po.  Family called to bs.  Pt in no acute distress and verbalizes no complaints. Call bell within reach.  Will continue to monitor.

## 2019-04-23 NOTE — Clinical Note
60 ml injected throughout the case. 40 mL total wasted during the case. 100 mL total used in the case.

## 2019-04-23 NOTE — Clinical Note
mid   right coronary artery. Angiography performed post intervention of the right coronary arteries.

## 2019-04-23 NOTE — NURSING
Pt is AAOx3 and in no apparent distress.  L groin site c/d/i without redness or swelling.   Provided a copy of discharge instructions.  Teaching performed.  Pt verbalized understanding and denied any questions.  Provided pt with prescription information.PIV d/c catheter tip intact.  2x2 applied and no active bleeding noted.  Pt waiting for wheelchair to escort to garage.  Family at the bedside.

## 2019-05-06 ENCOUNTER — ANESTHESIA EVENT (OUTPATIENT)
Dept: MEDSURG UNIT | Facility: HOSPITAL | Age: 84
DRG: 266 | End: 2019-05-06
Payer: MEDICARE

## 2019-05-06 NOTE — ANESTHESIA PREPROCEDURE EVALUATION
05/06/2019  [facility]  Anesthesia Pre-Operative Evaluation         Patient Name: Pilar Michael  YOB: 1932  MRN: [id number]    SUBJECTIVE:     Pre-operative evaluation for Procedure(s) (LRB):  REPLACEMENT, AORTIC VALVE, TRANSCATHETER (TAVR) (N/A)     05/06/2019    Pilar Michael is a 87 y.o. female w/ a significant PMHx of HTN, diastolic CHF, CAD s/p stent 4/23/19, aortic stenosis who presents for the above procedure.    LDA: None documented.    Prev airway: None documented.    Drips: None documented.      Patient Active Problem List   Diagnosis    Nodular calcific aortic valve stenosis    Acquired hypothyroidism    Essential hypertension       Review of patient's allergies indicates:   Allergen Reactions    Penicillins Swelling    Sulfa (sulfonamide antibiotics) Nausea Only       Current Inpatient Medications:      No current facility-administered medications on file prior to encounter.      Current Outpatient Medications on File Prior to Encounter   Medication Sig Dispense Refill    amlodipine-olmesartan (KARYN) 5-20 mg per tablet Take 1 tablet by mouth once daily.  3    aspirin 81 MG Chew Take 1 tablet (81 mg total) by mouth once daily.  0    clopidogrel (PLAVIX) 75 mg tablet Take 1 tablet (75 mg total) by mouth once daily. 30 tablet 11    furosemide (LASIX) 20 MG tablet PLEASE SEE ATTACHED FOR DETAILED DIRECTIONS  0    spironolactone (ALDACTONE) 25 MG tablet Take 25 mg by mouth once daily.  3    SYNTHROID 125 mcg tablet Take 125 mcg by mouth once daily.  3    TIMOPTIC OCUDOSE, PF, 0.5 % Dpet 1 UNIT IN BOTH EYES TWICE A DAY  6    traMADol (ULTRAM) 50 mg tablet TAKE 1 TABLET BY MOUTH EVERY 12 HOURS AS NEEDED FOR SEVERE PAIN         Past Surgical History:   Procedure Laterality Date    ANGIOGRAM, CORONARY ARTERY N/A 4/23/2019    Performed by Bakari RODRIGUEZ  [facility] CATH LAB    CHOLECYSTECTOMY      HYSTERECTOMY      lymph nodes removal      Stent BMS coronary N/A 4/23/2019    Performed by [facility]letary [facility] at [facility] CATH LAB    THYROIDECTOMY         Social History     Socioeconomic History    Marital status:      Spouse name: Not on file    Number of children: Not on file    Years of education: Not on file    Highest education level: Not on file   Occupational History    Not on file   Social Needs    Financial resource strain: Not on file    Food insecurity:     Worry: Not on file     Inability: Not on file    Transportation needs:     Medical: Not on file     Non-medical: Not on file   Tobacco Use    Smoking status: Never Smoker    Smokeless tobacco: Never Used   Substance and Sexual Activity    Alcohol use: Not Currently    Drug use: Never    Sexual activity: Not on file   Lifestyle    Physical activity:     Days per week: Not on file     Minutes per session: Not on file    Stress: Not on file   Relationships    Social connections:     Talks on phone: Not on file     Gets together: Not on file     Attends Zoroastrian service: Not on file     Active member of club or organization: Not on file     Attends meetings of clubs or organizations: Not on file     Relationship status: Not on file   Other Topics Concern    Not on file   Social History Narrative    Not on file       OBJECTIVE:     Vital Signs Range (Last 24H):  BP: ()/()   Arterial Line BP: ()/()       CBC:   No results for input(s): WBC, RBC, HGB, HCT, PLT, MCV, MCH, MCHC in the last 72 hours.    CMP: No results for input(s): NA, K, CL, CO2, BUN, CREATININE, GLU, MG, PHOS, CALCIUM, ALBUMIN, PROT, ALKPHOS, ALT, AST, BILITOT in the last 72 hours.    INR:  No results for input(s): PT, INR, PROTIME, APTT in the last 72 hours.    Diagnostic Studies:   [facility]  · 88 yo lady with severe AS referred by Dr. Too Gaston for TAVR.  · Coronary angiogram today revealed severe ostial RCA  stenosis which was successfully stented today.  · A STENT VISION RX [id number] stent was successfully placed at 20 JIM  · EBL: 50cc  · Contrast: 20cc  · Complications: none  · PRU: 224  · Ost RCA lesion , 99% stenosed reduced to 0%     EKG:   Normal sinus rhythm  Left bundle branch block  Abnormal ECG  When compared with ECG of 08-APR-2019 09:32,  No significant change was found  Confirmed by Buck Huitron MD ([id number]) on 4/24/2019 1:56:11 PM    2D ECHO:  · Concentric left ventricular remodeling.  · Normal left ventricular systolic function. The estimated ejection fraction is 65%  · Normal right ventricular systolic function.  · Grade I (mild) left ventricular diastolic dysfunction consistent with impaired relaxation.  · Mild left atrial enlargement.  · Severe aortic valve stenosis. Aortic valve area is 0.45 cm2; peak velocity is 4.4 m/s; mean gradient is 56 mmHg.  · Mild mitral regurgitation.  · Mild to moderate calcific mitral stenosis.  · Normal central venous pressure (3 mm Hg).         ASSESSMENT/PLAN:         Anesthesia Evaluation    I have reviewed the Patient Summary Reports.    I have reviewed the Nursing Notes.   I have reviewed the Medications.     Review of Systems  Anesthesia Hx:  Denies Hx of Anesthetic complications  Denies Family Hx of Anesthesia complications.   Denies Personal Hx of Anesthesia complications.   Hematology/Oncology:         -- Anemia:   EENT/Dental:EENT/Dental Normal   Cardiovascular:   Hypertension CAD  CABG/stent  CHF    Pulmonary:  Pulmonary Normal    Renal/:  Renal/ Normal     Hepatic/GI:  Hepatic/GI Normal    Musculoskeletal:  Musculoskeletal Normal    Neurological:  Neurology Normal  Denies CVA. Denies Seizures.    Endocrine:   Hypothyroidism    Psych:  Psychiatric Normal           Physical Exam  General:  Well nourished, Obesity    Airway/Jaw/Neck:  Airway Findings: Mouth Opening: Normal Tongue: Normal  General Airway Assessment: Adult  Mallampati: II  TM Distance: Normal, at  least 6 cm  Jaw/Neck Findings:  Neck ROM: Extension Decreased, Mild      Dental:  Dental Findings: In tact        Mental Status:  Mental Status Findings:  Cooperative, Alert and Oriented         Anesthesia Plan  Type of Anesthesia, risks & benefits discussed:  Anesthesia Type:  general, MAC  Patient's Preference:   Intra-op Monitoring Plan: standard ASA monitors, arterial line and central line  Intra-op Monitoring Plan Comments:   Post Op Pain Control Plan: multimodal analgesia  Post Op Pain Control Plan Comments:   Induction:   IV  Beta Blocker:  Patient is not currently on a Beta-Blocker (No further documentation required).       Informed Consent: Patient understands risks and agrees with Anesthesia plan.  Questions answered. Anesthesia consent signed with patient.  ASA Score: 4     Day of Surgery Review of History & Physical:    H&P update referred to the surgeon.         Ready For Surgery From Anesthesia Perspective.

## 2019-05-07 ENCOUNTER — HOSPITAL ENCOUNTER (INPATIENT)
Facility: HOSPITAL | Age: 84
LOS: 1 days | Discharge: HOME | DRG: 266 | End: 2019-05-08
Attending: INTERNAL MEDICINE | Admitting: INTERNAL MEDICINE
Payer: MEDICARE

## 2019-05-07 ENCOUNTER — ANESTHESIA (OUTPATIENT)
Dept: MEDSURG UNIT | Facility: HOSPITAL | Age: 84
DRG: 266 | End: 2019-05-07
Payer: MEDICARE

## 2019-05-07 DIAGNOSIS — Z95.2 S/P TAVR (TRANSCATHETER AORTIC VALVE REPLACEMENT): ICD-10-CM

## 2019-05-07 DIAGNOSIS — I35.0 NODULAR CALCIFIC AORTIC VALVE STENOSIS: ICD-10-CM

## 2019-05-07 LAB
ABO + RH BLD: NORMAL
ANION GAP SERPL CALC-SCNC: 10 MMOL/L (ref 8–16)
APTT BLDCRRT: 22 SEC (ref 21–32)
BLD GP AB SCN CELLS X3 SERPL QL: NORMAL
BUN SERPL-MCNC: 25 MG/DL (ref 8–23)
CALCIUM SERPL-MCNC: 10.2 MG/DL (ref 8.7–10.5)
CHLORIDE SERPL-SCNC: 99 MMOL/L (ref 95–110)
CO2 SERPL-SCNC: 26 MMOL/L (ref 23–29)
CREAT SERPL-MCNC: 0.9 MG/DL (ref 0.5–1.4)
ERYTHROCYTE [DISTWIDTH] IN BLOOD BY AUTOMATED COUNT: 13.3 % (ref 11.5–14.5)
EST. GFR  (AFRICAN AMERICAN): >60 ML/MIN/1.73 M^2
EST. GFR  (NON AFRICAN AMERICAN): 57.7 ML/MIN/1.73 M^2
GLUCOSE SERPL-MCNC: 93 MG/DL (ref 70–110)
HCT VFR BLD AUTO: 40.9 % (ref 37–48.5)
HGB BLD-MCNC: 13.1 G/DL (ref 12–16)
MCH RBC QN AUTO: 28.2 PG (ref 27–31)
MCHC RBC AUTO-ENTMCNC: 32 G/DL (ref 32–36)
MCV RBC AUTO: 88 FL (ref 82–98)
PLATELET # BLD AUTO: 209 K/UL (ref 150–350)
PMV BLD AUTO: 10.3 FL (ref 9.2–12.9)
POTASSIUM SERPL-SCNC: 4.5 MMOL/L (ref 3.5–5.1)
RBC # BLD AUTO: 4.64 M/UL (ref 4–5.4)
SODIUM SERPL-SCNC: 135 MMOL/L (ref 136–145)
WBC # BLD AUTO: 5.77 K/UL (ref 3.9–12.7)

## 2019-05-07 PROCEDURE — 20000000 HC ICU ROOM

## 2019-05-07 PROCEDURE — 93010 ELECTROCARDIOGRAM REPORT: CPT | Mod: 77,,, | Performed by: INTERNAL MEDICINE

## 2019-05-07 PROCEDURE — 94761 N-INVAS EAR/PLS OXIMETRY MLT: CPT

## 2019-05-07 PROCEDURE — 36620 PR INSERT CATH,ART,PERCUT,SHORTTERM: ICD-10-PCS | Mod: 59,,, | Performed by: ANESTHESIOLOGY

## 2019-05-07 PROCEDURE — 25000003 PHARM REV CODE 250: Performed by: INTERNAL MEDICINE

## 2019-05-07 PROCEDURE — 99900035 HC TECH TIME PER 15 MIN (STAT)

## 2019-05-07 PROCEDURE — 33361 REPLACE AORTIC VALVE PERQ: CPT | Mod: 62,Q0 | Performed by: THORACIC SURGERY (CARDIOTHORACIC VASCULAR SURGERY)

## 2019-05-07 PROCEDURE — 85730 THROMBOPLASTIN TIME PARTIAL: CPT

## 2019-05-07 PROCEDURE — 76937 US GUIDE VASCULAR ACCESS: CPT | Mod: 26,,, | Performed by: ANESTHESIOLOGY

## 2019-05-07 PROCEDURE — 85027 COMPLETE CBC AUTOMATED: CPT

## 2019-05-07 PROCEDURE — 33361 REPLACE AORTIC VALVE PERQ: CPT | Mod: 62,,, | Performed by: INTERNAL MEDICINE

## 2019-05-07 PROCEDURE — 93010 EKG 12-LEAD: ICD-10-PCS | Mod: 77,,, | Performed by: INTERNAL MEDICINE

## 2019-05-07 PROCEDURE — C1760 CLOSURE DEV, VASC: HCPCS | Performed by: INTERNAL MEDICINE

## 2019-05-07 PROCEDURE — 36556 INSERT NON-TUNNEL CV CATH: CPT | Mod: 59,,, | Performed by: ANESTHESIOLOGY

## 2019-05-07 PROCEDURE — 93005 ELECTROCARDIOGRAM TRACING: CPT

## 2019-05-07 PROCEDURE — 27100021 HC MULTIPORT INFUSION MANIFOLD: Performed by: STUDENT IN AN ORGANIZED HEALTH CARE EDUCATION/TRAINING PROGRAM

## 2019-05-07 PROCEDURE — 33361 PR TAVR, PERCUTANEOUS FEMORAL: ICD-10-PCS | Mod: 62,,, | Performed by: INTERNAL MEDICINE

## 2019-05-07 PROCEDURE — D9220A PRA ANESTHESIA: ICD-10-PCS | Mod: ,,, | Performed by: ANESTHESIOLOGY

## 2019-05-07 PROCEDURE — C1769 GUIDE WIRE: HCPCS | Performed by: INTERNAL MEDICINE

## 2019-05-07 PROCEDURE — C1751 CATH, INF, PER/CENT/MIDLINE: HCPCS | Performed by: STUDENT IN AN ORGANIZED HEALTH CARE EDUCATION/TRAINING PROGRAM

## 2019-05-07 PROCEDURE — 93010 EKG 12-LEAD: ICD-10-PCS | Mod: ,,, | Performed by: INTERNAL MEDICINE

## 2019-05-07 PROCEDURE — 86850 RBC ANTIBODY SCREEN: CPT

## 2019-05-07 PROCEDURE — 63600175 PHARM REV CODE 636 W HCPCS: Performed by: STUDENT IN AN ORGANIZED HEALTH CARE EDUCATION/TRAINING PROGRAM

## 2019-05-07 PROCEDURE — 27800903 OPTIME MED/SURG SUP & DEVICES OTHER IMPLANTS: Performed by: INTERNAL MEDICINE

## 2019-05-07 PROCEDURE — C1894 INTRO/SHEATH, NON-LASER: HCPCS | Performed by: INTERNAL MEDICINE

## 2019-05-07 PROCEDURE — 27201423 OPTIME MED/SURG SUP & DEVICES STERILE SUPPLY: Performed by: INTERNAL MEDICINE

## 2019-05-07 PROCEDURE — 27000239 HC STAND-BY BYPASS PUMP

## 2019-05-07 PROCEDURE — 33361 REPLACE AORTIC VALVE PERQ: CPT | Mod: 62,Q0,, | Performed by: THORACIC SURGERY (CARDIOTHORACIC VASCULAR SURGERY)

## 2019-05-07 PROCEDURE — 37000008 HC ANESTHESIA 1ST 15 MINUTES: Performed by: INTERNAL MEDICINE

## 2019-05-07 PROCEDURE — D9220A PRA ANESTHESIA: Mod: ,,, | Performed by: ANESTHESIOLOGY

## 2019-05-07 PROCEDURE — 37000009 HC ANESTHESIA EA ADD 15 MINS: Performed by: INTERNAL MEDICINE

## 2019-05-07 PROCEDURE — 27201037 HC PRESSURE MONITORING SET UP

## 2019-05-07 PROCEDURE — C1725 CATH, TRANSLUMIN NON-LASER: HCPCS | Performed by: INTERNAL MEDICINE

## 2019-05-07 PROCEDURE — 33361 REPLACE AORTIC VALVE PERQ: CPT | Mod: 62,Q0 | Performed by: INTERNAL MEDICINE

## 2019-05-07 PROCEDURE — 25000003 PHARM REV CODE 250: Performed by: STUDENT IN AN ORGANIZED HEALTH CARE EDUCATION/TRAINING PROGRAM

## 2019-05-07 PROCEDURE — 76937 PR  US GUIDE, VASCULAR ACCESS: ICD-10-PCS | Mod: 26,,, | Performed by: ANESTHESIOLOGY

## 2019-05-07 PROCEDURE — 33361 PR TAVR, PERCUTANEOUS FEMORAL: ICD-10-PCS | Mod: 62,Q0,, | Performed by: THORACIC SURGERY (CARDIOTHORACIC VASCULAR SURGERY)

## 2019-05-07 PROCEDURE — 36620 INSERTION CATHETER ARTERY: CPT | Mod: 59,,, | Performed by: ANESTHESIOLOGY

## 2019-05-07 PROCEDURE — 99233 PR SUBSEQUENT HOSPITAL CARE,LEVL III: ICD-10-PCS | Mod: ,,, | Performed by: INTERNAL MEDICINE

## 2019-05-07 PROCEDURE — 36556 PR INSERT NON-TUNNEL CV CATH 5+ YRS OLD: ICD-10-PCS | Mod: 59,,, | Performed by: ANESTHESIOLOGY

## 2019-05-07 PROCEDURE — 99233 SBSQ HOSP IP/OBS HIGH 50: CPT | Mod: ,,, | Performed by: INTERNAL MEDICINE

## 2019-05-07 PROCEDURE — 80048 BASIC METABOLIC PNL TOTAL CA: CPT

## 2019-05-07 PROCEDURE — 93010 ELECTROCARDIOGRAM REPORT: CPT | Mod: ,,, | Performed by: INTERNAL MEDICINE

## 2019-05-07 PROCEDURE — 27200676 HC TRANSDUCER MONITOR KIT DOUBLE: Performed by: STUDENT IN AN ORGANIZED HEALTH CARE EDUCATION/TRAINING PROGRAM

## 2019-05-07 PROCEDURE — 94799 UNLISTED PULMONARY SVC/PX: CPT

## 2019-05-07 DEVICE — VALVE COREVALVE TAV 26MM: Type: IMPLANTABLE DEVICE | Site: HEART | Status: FUNCTIONAL

## 2019-05-07 RX ORDER — LEVOTHYROXINE SODIUM 125 UG/1
125 TABLET ORAL DAILY
Status: DISCONTINUED | OUTPATIENT
Start: 2019-05-07 | End: 2019-05-08 | Stop reason: HOSPADM

## 2019-05-07 RX ORDER — CEFAZOLIN SODIUM 1 G/3ML
INJECTION, POWDER, FOR SOLUTION INTRAMUSCULAR; INTRAVENOUS
Status: DISCONTINUED | OUTPATIENT
Start: 2019-05-07 | End: 2019-05-07

## 2019-05-07 RX ORDER — HEPARIN SODIUM 1000 [USP'U]/ML
INJECTION, SOLUTION INTRAVENOUS; SUBCUTANEOUS
Status: DISCONTINUED | OUTPATIENT
Start: 2019-05-07 | End: 2019-05-07

## 2019-05-07 RX ORDER — PROTAMINE SULFATE 10 MG/ML
INJECTION, SOLUTION INTRAVENOUS
Status: DISCONTINUED | OUTPATIENT
Start: 2019-05-07 | End: 2019-05-07

## 2019-05-07 RX ORDER — POTASSIUM CHLORIDE 20 MEQ/15ML
40 SOLUTION ORAL
Status: DISCONTINUED | OUTPATIENT
Start: 2019-05-07 | End: 2019-05-08 | Stop reason: HOSPADM

## 2019-05-07 RX ORDER — CLOPIDOGREL BISULFATE 75 MG/1
75 TABLET ORAL DAILY
Status: DISCONTINUED | OUTPATIENT
Start: 2019-05-07 | End: 2019-05-08 | Stop reason: HOSPADM

## 2019-05-07 RX ORDER — NAPROXEN SODIUM 220 MG/1
81 TABLET, FILM COATED ORAL DAILY
Status: DISCONTINUED | OUTPATIENT
Start: 2019-05-07 | End: 2019-05-08 | Stop reason: HOSPADM

## 2019-05-07 RX ORDER — DEXTROSE MONOHYDRATE AND SODIUM CHLORIDE 5; .45 G/100ML; G/100ML
INJECTION, SOLUTION INTRAVENOUS CONTINUOUS
Status: DISCONTINUED | OUTPATIENT
Start: 2019-05-07 | End: 2019-05-08 | Stop reason: HOSPADM

## 2019-05-07 RX ORDER — SODIUM,POTASSIUM PHOSPHATES 280-250MG
2 POWDER IN PACKET (EA) ORAL
Status: DISCONTINUED | OUTPATIENT
Start: 2019-05-07 | End: 2019-05-08 | Stop reason: HOSPADM

## 2019-05-07 RX ORDER — ONDANSETRON 2 MG/ML
INJECTION INTRAMUSCULAR; INTRAVENOUS
Status: DISCONTINUED | OUTPATIENT
Start: 2019-05-07 | End: 2019-05-07

## 2019-05-07 RX ORDER — SODIUM CHLORIDE 9 MG/ML
INJECTION, SOLUTION INTRAVENOUS CONTINUOUS
Status: ACTIVE | OUTPATIENT
Start: 2019-05-07 | End: 2019-05-07

## 2019-05-07 RX ORDER — PROPOFOL 10 MG/ML
VIAL (ML) INTRAVENOUS
Status: DISCONTINUED | OUTPATIENT
Start: 2019-05-07 | End: 2019-05-07

## 2019-05-07 RX ORDER — DIPHENHYDRAMINE HCL 25 MG
50 CAPSULE ORAL ONCE
Status: COMPLETED | OUTPATIENT
Start: 2019-05-07 | End: 2019-05-07

## 2019-05-07 RX ORDER — LIDOCAINE HCL/PF 100 MG/5ML
SYRINGE (ML) INTRAVENOUS
Status: DISCONTINUED | OUTPATIENT
Start: 2019-05-07 | End: 2019-05-07

## 2019-05-07 RX ORDER — FENTANYL CITRATE 50 UG/ML
INJECTION, SOLUTION INTRAMUSCULAR; INTRAVENOUS
Status: DISCONTINUED | OUTPATIENT
Start: 2019-05-07 | End: 2019-05-07

## 2019-05-07 RX ORDER — LANOLIN ALCOHOL/MO/W.PET/CERES
800 CREAM (GRAM) TOPICAL
Status: DISCONTINUED | OUTPATIENT
Start: 2019-05-07 | End: 2019-05-08 | Stop reason: HOSPADM

## 2019-05-07 RX ORDER — NOREPINEPHRINE BITARTRATE/D5W 4MG/250ML
0.02 PLASTIC BAG, INJECTION (ML) INTRAVENOUS CONTINUOUS
Status: DISCONTINUED | OUTPATIENT
Start: 2019-05-07 | End: 2019-05-08 | Stop reason: HOSPADM

## 2019-05-07 RX ORDER — GLYCOPYRROLATE 0.2 MG/ML
INJECTION INTRAMUSCULAR; INTRAVENOUS
Status: DISCONTINUED | OUTPATIENT
Start: 2019-05-07 | End: 2019-05-07

## 2019-05-07 RX ORDER — ACETAMINOPHEN 325 MG/1
650 TABLET ORAL EVERY 4 HOURS PRN
Status: DISCONTINUED | OUTPATIENT
Start: 2019-05-07 | End: 2019-05-08 | Stop reason: HOSPADM

## 2019-05-07 RX ADMIN — LIDOCAINE HYDROCHLORIDE 100 MG: 20 INJECTION, SOLUTION INTRAVENOUS at 08:05

## 2019-05-07 RX ADMIN — SODIUM CHLORIDE: 0.9 INJECTION, SOLUTION INTRAVENOUS at 10:05

## 2019-05-07 RX ADMIN — FLUCONAZOLE 200 MG: 2 INJECTION, SOLUTION INTRAVENOUS at 09:05

## 2019-05-07 RX ADMIN — FENTANYL CITRATE 50 MCG: 50 INJECTION, SOLUTION INTRAMUSCULAR; INTRAVENOUS at 08:05

## 2019-05-07 RX ADMIN — ACETAMINOPHEN 650 MG: 325 TABLET ORAL at 04:05

## 2019-05-07 RX ADMIN — FENTANYL CITRATE 50 MCG: 50 INJECTION, SOLUTION INTRAMUSCULAR; INTRAVENOUS at 07:05

## 2019-05-07 RX ADMIN — HEPARIN SODIUM 11000 UNITS: 1000 INJECTION INTRAVENOUS; SUBCUTANEOUS at 08:05

## 2019-05-07 RX ADMIN — DIPHENHYDRAMINE HYDROCHLORIDE 50 MG: 25 CAPSULE ORAL at 06:05

## 2019-05-07 RX ADMIN — PROTAMINE SULFATE 100 MG: 10 INJECTION, SOLUTION INTRAVENOUS at 09:05

## 2019-05-07 RX ADMIN — NOREPINEPHRINE BITARTRATE 0.02 MCG/KG/MIN: 1 INJECTION, SOLUTION, CONCENTRATE INTRAVENOUS at 08:05

## 2019-05-07 RX ADMIN — GLYCOPYRROLATE 0.2 MG: 0.2 INJECTION INTRAMUSCULAR; INTRAVENOUS at 07:05

## 2019-05-07 RX ADMIN — GLYCOPYRROLATE 0.2 MG: 0.2 INJECTION INTRAMUSCULAR; INTRAVENOUS at 08:05

## 2019-05-07 RX ADMIN — CEFAZOLIN 2 G: 330 INJECTION, POWDER, FOR SOLUTION INTRAMUSCULAR; INTRAVENOUS at 08:05

## 2019-05-07 RX ADMIN — PROPOFOL 10 MG: 10 INJECTION, EMULSION INTRAVENOUS at 08:05

## 2019-05-07 RX ADMIN — ONDANSETRON 4 MG: 2 INJECTION INTRAMUSCULAR; INTRAVENOUS at 09:05

## 2019-05-07 NOTE — SUBJECTIVE & OBJECTIVE
Past Medical History:   Diagnosis Date    Arthritis     Cancer     CHF (congestive heart failure)     Coronary artery disease     Hypertension     Thyroid disease        Past Surgical History:   Procedure Laterality Date    ANGIOGRAM, CORONARY ARTERY N/A 4/23/2019    Performed by Bakari Singletary MD at [facility] CATH LAB    CHOLECYSTECTOMY      HYSTERECTOMY      lymph nodes removal      Stent BMS coronary N/A 4/23/2019    Performed by Bakari Singletary MD at [facility] CATH LAB    THYROIDECTOMY         Review of patient's allergies indicates:   Allergen Reactions    Penicillins Swelling    Sulfa (sulfonamide antibiotics) Nausea Only       No current facility-administered medications on file prior to encounter.      Current Outpatient Medications on File Prior to Encounter   Medication Sig    amlodipine-olmesartan (KARYN) 5-20 mg per tablet Take 1 tablet by mouth once daily.    aspirin 81 MG Chew Take 1 tablet (81 mg total) by mouth once daily.    clopidogrel (PLAVIX) 75 mg tablet Take 1 tablet (75 mg total) by mouth once daily.    furosemide (LASIX) 20 MG tablet PLEASE SEE ATTACHED FOR DETAILED DIRECTIONS    spironolactone (ALDACTONE) 25 MG tablet Take 25 mg by mouth once daily.    SYNTHROID 125 mcg tablet Take 125 mcg by mouth once daily.    TIMOPTIC OCUDOSE, PF, 0.5 % Dpet 1 UNIT IN BOTH EYES TWICE A DAY    traMADol (ULTRAM) 50 mg tablet TAKE 1 TABLET BY MOUTH EVERY 12 HOURS AS NEEDED FOR SEVERE PAIN     Family History     None        Tobacco Use    Smoking status: Never Smoker    Smokeless tobacco: Never Used   Substance and Sexual Activity    Alcohol use: Not Currently    Drug use: Never    Sexual activity: Not on file     Review of Systems   Constitution: Positive for malaise/fatigue.   HENT: Negative.    Eyes: Negative.    Cardiovascular: Negative.    Respiratory: Positive for shortness of breath.    Endocrine: Negative.    Hematologic/Lymphatic: Negative.    Skin: Negative.    Musculoskeletal:  Positive for joint pain.   Gastrointestinal: Negative.    Genitourinary: Negative.    Neurological: Negative.    Psychiatric/Behavioral: Negative.      Objective:     Vital Signs (Most Recent):  Temp: 97.1 °F (36.2 °C) (05/07/19 0700)  Pulse: 62 (05/07/19 1300)  Resp: 20 (05/07/19 1300)  BP: (!) 155/70 (05/07/19 1200)  SpO2: 99 % (05/07/19 1300) Vital Signs (24h Range):  Temp:  [97.1 °F (36.2 °C)] 97.1 °F (36.2 °C)  Pulse:  [58-71] 62  Resp:  [16-20] 20  SpO2:  [97 %-100 %] 99 %  BP: (141-155)/(68-71) 155/70  Arterial Line BP: (122-150)/(51-57) 122/51       Weight: 93.1 kg (205 lb 4 oz)  Body mass index is 37.54 kg/m².    SpO2: 99 %  O2 Device (Oxygen Therapy): nasal cannula    Physical Exam   Constitutional: She is oriented to person, place, and time. She appears well-developed and well-nourished. No distress.   HENT:   Head: Normocephalic and atraumatic.   Neck: No JVD present.   Right neck TVP   Cardiovascular: Normal rate, regular rhythm, normal heart sounds and intact distal pulses. Exam reveals no gallop and no friction rub.   No murmur heard.  Pulmonary/Chest: Effort normal and breath sounds normal. No respiratory distress. She has no wheezes. She has no rales.   Abdominal: Soft. Bowel sounds are normal. She exhibits no distension. There is no tenderness.   Musculoskeletal: She exhibits no edema.   Neurological: She is alert and oriented to person, place, and time.   Skin: She is not diaphoretic.       Significant Labs:     Recent Results (from the past 24 hour(s))   APTT    Collection Time: 05/07/19  6:28 AM   Result Value Ref Range    aPTT 22.0 21.0 - 32.0 sec   Basic metabolic panel    Collection Time: 05/07/19  6:28 AM   Result Value Ref Range    Sodium 135 (L) 136 - 145 mmol/L    Potassium 4.5 3.5 - 5.1 mmol/L    Chloride 99 95 - 110 mmol/L    CO2 26 23 - 29 mmol/L    Glucose 93 70 - 110 mg/dL    BUN, Bld 25 (H) 8 - 23 mg/dL    Creatinine 0.9 0.5 - 1.4 mg/dL    Calcium 10.2 8.7 - 10.5 mg/dL    Anion Gap  10 8 - 16 mmol/L    eGFR if African American >60.0 >60 mL/min/1.73 m^2    eGFR if non  57.7 (A) >60 mL/min/1.73 m^2   CBC Without Differential    Collection Time: 05/07/19  6:28 AM   Result Value Ref Range    WBC 5.77 3.90 - 12.70 K/uL    RBC 4.64 4.00 - 5.40 M/uL    Hemoglobin 13.1 12.0 - 16.0 g/dL    Hematocrit 40.9 37.0 - 48.5 %    Mean Corpuscular Volume 88 82 - 98 fL    Mean Corpuscular Hemoglobin 28.2 27.0 - 31.0 pg    Mean Corpuscular Hemoglobin Conc 32.0 32.0 - 36.0 g/dL    RDW 13.3 11.5 - 14.5 %    Platelets 209 150 - 350 K/uL    MPV 10.3 9.2 - 12.9 fL   Type & Screen    Collection Time: 05/07/19  6:28 AM   Result Value Ref Range    Group & Rh O POS     Indirect Jer NEG          Significant Imaging:   I have reviewed all pertinent imaging studies from the last 24 hours

## 2019-05-07 NOTE — HPI
87 year old female with PMH severe AS s/p TAVR (5/7/19), CAD s/p ostial RCA stent placement (4/19), IDBCA s/p resection and radiation (2003), thyroid cancer s/p resection (1992), HTN, PAD consulted to EP post-TAVR. The patient developed symptoms just before eh gras in 3/19, feeling fatigued and short of breath with little exertion. She had NYHA III symptoms and was hospitalized for these symptoms at [facility] which lead to her diagnosis of severe AS. She has never experienced syncope in her lifetime. She denies symptoms of angina, orthopnea or PND. At this time, her only complaint is of musculoskeletal hip pain.

## 2019-05-07 NOTE — ASSESSMENT & PLAN NOTE
S/p TAVR, TVP in place, threshold 0.4 mV  Baseline  ms,  ms  Post-op  ms,  ms  Will repeat EKG in AM and monitor for heart block

## 2019-05-07 NOTE — ANESTHESIA POSTPROCEDURE EVALUATION
Anesthesia Post Evaluation    Patient: Pilar Michael    Procedure(s) Performed: Procedure(s) (LRB):  REPLACEMENT, AORTIC VALVE, TRANSCATHETER (TAVR) (N/A)    Final Anesthesia Type: general (natural airway)  Patient location during evaluation: ICU  Patient participation: Yes- Able to Participate  Level of consciousness: awake and alert  Post-procedure vital signs: reviewed and stable  Pain management: adequate  Airway patency: patent  PONV status at discharge: No PONV  Anesthetic complications: no      Cardiovascular status: hemodynamically stable  Respiratory status: spontaneous ventilation  Hydration status: euvolemic  Follow-up not needed.          Vitals Value Taken Time   /68 5/7/2019 11:30 AM   Temp 36.2 °C (97.1 °F) 5/7/2019  7:00 AM   Pulse 61 5/7/2019 11:31 AM   Resp 18 5/7/2019 10:50 AM   SpO2 100 % 5/7/2019 11:31 AM   Vitals shown include unvalidated device data.      No case tracking events are documented in the log.      Pain/Geri Score: No data recorded

## 2019-05-07 NOTE — ANESTHESIA PROCEDURE NOTES
Arterial    Diagnosis: aortic stenosis    Patient location during procedure: done in OR  Procedure start time: 5/7/2019 7:30 AM  Timeout: 5/7/2019 7:30 AM  Procedure end time: 5/7/2019 7:35 AM  Staffing  Anesthesiologist: Hao Mahmood MD  Resident/CRNA: Urban Sharma MD  Anesthesiologist was present at the time of the procedure.  Preanesthetic Checklist  Completed: patient identified, site marked, surgical consent, pre-op evaluation, timeout performed, IV checked, risks and benefits discussed, monitors and equipment checked and anesthesia consent givenArterial  Skin Prep: chlorhexidine gluconate  Local Infiltration: lidocaine  Orientation: right  Location: radial  Catheter Size: 20 G  Catheter placement by Ultrasound guidance. Heme positive aspiration all ports.  Vessel Caliber: small, patent, compressibility normal  Vascular Doppler:  not done  Needle advanced into vessel with real time Ultrasound guidance.  Guidewire confirmed in vessel.  Sterile sheath used.  Image recorded and saved.Insertion Attempts: 1  Assessment  Dressing: secured with tape and tegaderm

## 2019-05-07 NOTE — PLAN OF CARE
Problem: Adult Inpatient Plan of Care  Goal: Plan of Care Review  Outcome: Ongoing (interventions implemented as appropriate)    No acute events throughout day. See vital signs and assessments in flowsheets. See below for updates on today's progress.     Pulmonary: Patient weaned off NC to room air. Oxygenating well. No c/o SOB.     Cardiovascular: NSR. TVP. No c/o chest pain. Radial pulses palpable. Doppler pedal pulses. LUE lymphedema.    Neurological: AAO x 4. PERRLA. Afebrile. Drowsy post-procedure. Up in chair by 1700.    Gastrointestinal: No BM. Cardiac diet. Active bowel sounds in all quadrants.    Genitourinary: Purewick. Adequate UO.    Skin/Bath: partial bath given   Date of last CHG bath given: 5/7/2019    Infusions: 1L NS infusion.    Patient progressing towards goals as tolerated, plan of care communicated and reviewed with Pilar Michael and family. All concerns addressed. Will continue to monitor.

## 2019-05-07 NOTE — TRANSFER OF CARE
"Anesthesia Transfer of Care Note    Patient: Pilar Michael    Procedure(s) Performed: Procedure(s) (LRB):  REPLACEMENT, AORTIC VALVE, TRANSCATHETER (TAVR) (N/A)    Patient location: ICU    Anesthesia Type: general    Transport from OR: Transported from OR on 6-10 L/min O2 by face mask with adequate spontaneous ventilation. Continuous ECG monitoring in transport. Continuous SpO2 monitoring in transport. Continuos invasive BP monitoring in transport    Post pain: adequate analgesia    Post assessment: no apparent anesthetic complications    Post vital signs: stable    Level of consciousness: awake    Nausea/Vomiting: no nausea/vomiting    Complications: none    Transfer of care protocol was followed      Last vitals:   Visit Vitals  BP (!) 154/69 (BP Location: Right arm, Patient Position: Lying)   Pulse 69   Temp 36.2 °C (97.1 °F) (Oral)   Resp 20   Ht 5' 2" (1.575 m)   Wt 89.8 kg (198 lb)   SpO2 97%   Breastfeeding? No   BMI 36.21 kg/m²     "

## 2019-05-07 NOTE — CONSULTS
[facility]  Cardiac Electrophysiology  Consult Note    Admission Date: 5/7/2019  Code Status: No Order   Attending Provider: Bakari Singletary MD  Consulting Provider: Cristi Santamaria MD  Principal Problem:<principal problem not specified>    Inpatient consult to Electrophysiology  Consult performed by: Cristi Santamaria MD  Consult ordered by: Jacobo Nelson MD        Subjective:     Chief Complaint:  TAVR    HPI:   87 year old female with PMH severe AS s/p TAVR (5/7/19), CAD s/p ostial RCA stent placement (4/19), IDBCA s/p resection and radiation (2003), thyroid cancer s/p resection (1992), HTN, PAD consulted to EP post-TAVR. The patient developed symptoms just before ehdi gras in 3/19, feeling fatigued and short of breath with little exertion. She had NYHA III symptoms and was hospitalized for these symptoms at [facility] which lead to her diagnosis of severe AS. She has never experienced syncope in her lifetime. She denies symptoms of angina, orthopnea or PND. At this time, her only complaint is of musculoskeletal hip pain.      Past Medical History:   Diagnosis Date    Arthritis     Cancer     CHF (congestive heart failure)     Coronary artery disease     Hypertension     Thyroid disease        Past Surgical History:   Procedure Laterality Date    ANGIOGRAM, CORONARY ARTERY N/A 4/23/2019    Performed by Bakari Singletary MD at [facility] CATH LAB    CHOLECYSTECTOMY      HYSTERECTOMY      lymph nodes removal      Stent BMS coronary N/A 4/23/2019    Performed by Bakari Singletary MD at [facility] CATH LAB    THYROIDECTOMY         Review of patient's allergies indicates:   Allergen Reactions    Penicillins Swelling    Sulfa (sulfonamide antibiotics) Nausea Only       No current facility-administered medications on file prior to encounter.      Current Outpatient Medications on File Prior to Encounter   Medication Sig    amlodipine-olmesartan (KARYN) 5-20 mg per tablet Take 1 tablet by mouth once  daily.    aspirin 81 MG Chew Take 1 tablet (81 mg total) by mouth once daily.    clopidogrel (PLAVIX) 75 mg tablet Take 1 tablet (75 mg total) by mouth once daily.    furosemide (LASIX) 20 MG tablet PLEASE SEE ATTACHED FOR DETAILED DIRECTIONS    spironolactone (ALDACTONE) 25 MG tablet Take 25 mg by mouth once daily.    SYNTHROID 125 mcg tablet Take 125 mcg by mouth once daily.    TIMOPTIC OCUDOSE, PF, 0.5 % Dpet 1 UNIT IN BOTH EYES TWICE A DAY    traMADol (ULTRAM) 50 mg tablet TAKE 1 TABLET BY MOUTH EVERY 12 HOURS AS NEEDED FOR SEVERE PAIN     Family History     None        Tobacco Use    Smoking status: Never Smoker    Smokeless tobacco: Never Used   Substance and Sexual Activity    Alcohol use: Not Currently    Drug use: Never    Sexual activity: Not on file     Review of Systems   Constitution: Positive for malaise/fatigue.   HENT: Negative.    Eyes: Negative.    Cardiovascular: Negative.    Respiratory: Positive for shortness of breath.    Endocrine: Negative.    Hematologic/Lymphatic: Negative.    Skin: Negative.    Musculoskeletal: Positive for joint pain.   Gastrointestinal: Negative.    Genitourinary: Negative.    Neurological: Negative.    Psychiatric/Behavioral: Negative.      Objective:     Vital Signs (Most Recent):  Temp: 97.1 °F (36.2 °C) (05/07/19 0700)  Pulse: 62 (05/07/19 1300)  Resp: 20 (05/07/19 1300)  BP: (!) 155/70 (05/07/19 1200)  SpO2: 99 % (05/07/19 1300) Vital Signs (24h Range):  Temp:  [97.1 °F (36.2 °C)] 97.1 °F (36.2 °C)  Pulse:  [58-71] 62  Resp:  [16-20] 20  SpO2:  [97 %-100 %] 99 %  BP: (141-155)/(68-71) 155/70  Arterial Line BP: (122-150)/(51-57) 122/51       Weight: 93.1 kg (205 lb 4 oz)  Body mass index is 37.54 kg/m².    SpO2: 99 %  O2 Device (Oxygen Therapy): nasal cannula    Physical Exam   Constitutional: She is oriented to person, place, and time. She appears well-developed and well-nourished. No distress.   HENT:   Head: Normocephalic and atraumatic.   Neck: No JVD  present.   Right neck TVP   Cardiovascular: Normal rate, regular rhythm, normal heart sounds and intact distal pulses. Exam reveals no gallop and no friction rub.   No murmur heard.  Pulmonary/Chest: Effort normal and breath sounds normal. No respiratory distress. She has no wheezes. She has no rales.   Abdominal: Soft. Bowel sounds are normal. She exhibits no distension. There is no tenderness.   Musculoskeletal: She exhibits no edema.   Neurological: She is alert and oriented to person, place, and time.   Skin: She is not diaphoretic.       Significant Labs:     Recent Results (from the past 24 hour(s))   APTT    Collection Time: 05/07/19  6:28 AM   Result Value Ref Range    aPTT 22.0 21.0 - 32.0 sec   Basic metabolic panel    Collection Time: 05/07/19  6:28 AM   Result Value Ref Range    Sodium 135 (L) 136 - 145 mmol/L    Potassium 4.5 3.5 - 5.1 mmol/L    Chloride 99 95 - 110 mmol/L    CO2 26 23 - 29 mmol/L    Glucose 93 70 - 110 mg/dL    BUN, Bld 25 (H) 8 - 23 mg/dL    Creatinine 0.9 0.5 - 1.4 mg/dL    Calcium 10.2 8.7 - 10.5 mg/dL    Anion Gap 10 8 - 16 mmol/L    eGFR if African American >60.0 >60 mL/min/1.73 m^2    eGFR if non  57.7 (A) >60 mL/min/1.73 m^2   CBC Without Differential    Collection Time: 05/07/19  6:28 AM   Result Value Ref Range    WBC 5.77 3.90 - 12.70 K/uL    RBC 4.64 4.00 - 5.40 M/uL    Hemoglobin 13.1 12.0 - 16.0 g/dL    Hematocrit 40.9 37.0 - 48.5 %    Mean Corpuscular Volume 88 82 - 98 fL    Mean Corpuscular Hemoglobin 28.2 27.0 - 31.0 pg    Mean Corpuscular Hemoglobin Conc 32.0 32.0 - 36.0 g/dL    RDW 13.3 11.5 - 14.5 %    Platelets 209 150 - 350 K/uL    MPV 10.3 9.2 - 12.9 fL   Type & Screen    Collection Time: 05/07/19  6:28 AM   Result Value Ref Range    Group & Rh O POS     Indirect Jer NEG          Significant Imaging:   I have reviewed all pertinent imaging studies from the last 24 hours                Assessment and Plan:     S/P TAVR (transcatheter aortic valve  replacement)  S/p TAVR, TVP in place, threshold 0.4 mV  Baseline  ms,  ms  Post-op  ms,  ms  Will repeat EKG in AM and monitor for heart block        Thank you for your consult. I will follow-up with patient. Please contact us if you have any additional questions.    Cristi Santamaria MD  Cardiac Electrophysiology  [facility]

## 2019-05-07 NOTE — INTERVAL H&P NOTE
The patient has been examined and the H&P has been reviewed:    Pilar Michael is a 26 mm Evolut R valve candidate via RTF access      Anesthesia/Surgery risks, benefits and alternative options discussed and understood by patient/family.          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

## 2019-05-07 NOTE — NURSING
[facility]L/min [facility]2 by simple face mask. O2 sat 100%. HR 60s to 70s. A-line rezeroed with pressures matching BP cuff. Bilateral groin sites without drainage, swelling, redness, or hematoma. All pulses present. Fluconazole infusing. Purewick placed. Patient oriented to room. Call bell within reach. Team notified of patient's arrival.

## 2019-05-07 NOTE — ANESTHESIA PROCEDURE NOTES
Central Line    Diagnosis: aortic stenosis  Patient location during procedure: done in OR  Procedure start time: 5/7/2019 7:35 AM  Timeout: 5/7/2019 7:35 AM  Procedure end time: 5/7/2019 7:45 AM  Staffing  Anesthesiologist: Hao Mahmood MD  Resident/CRNA: Urban Sharma MD  Performed: resident/CRNA   Anesthesiologist was present at the time of the procedure.  Preanesthetic Checklist  Completed: patient identified, site marked, surgical consent, pre-op evaluation, timeout performed, IV checked, risks and benefits discussed, monitors and equipment checked and anesthesia consent given  Indication   Indication: hemodynamic monitoring, vascular access     Anesthesia   local infiltration    Central Line   Skin Prep: skin prepped with ChloraPrep, skin prep agent completely dried prior to procedure  maximum sterile barriers used during central venous catheter insertion  hand hygiene performed prior to central venous catheter insertion  Location: right, internal jugular.   Catheter type: introducer  Catheter Size: 6 Fr  Ultrasound: vascular probe with ultrasound  Vessel Caliber: medium, patent  Vascular Doppler:  not done, compressibility normal  Needle advanced into vessel with real time Ultrasound guidance.  Guidewire confirmed in vessel.  Image recorded and saved.   Manometry: Venous cannualation confirmed by visual estimation of blood vessel pressure using manometry.  Insertion Attempts: 1   Securement:line sutured, sterile dressing applied, blood return through all ports and chlorhexidine patch    Post-Procedure     Guidewire Guidewire removed intact.

## 2019-05-08 VITALS
RESPIRATION RATE: 20 BRPM | SYSTOLIC BLOOD PRESSURE: 138 MMHG | BODY MASS INDEX: 37.77 KG/M2 | WEIGHT: 205.25 LBS | TEMPERATURE: 99 F | OXYGEN SATURATION: 98 % | HEIGHT: 62 IN | HEART RATE: 72 BPM | DIASTOLIC BLOOD PRESSURE: 63 MMHG

## 2019-05-08 DIAGNOSIS — Z95.2 S/P TAVR (TRANSCATHETER AORTIC VALVE REPLACEMENT): Primary | ICD-10-CM

## 2019-05-08 PROBLEM — I25.10 CORONARY ARTERY DISEASE INVOLVING NATIVE CORONARY ARTERY OF NATIVE HEART WITHOUT ANGINA PECTORIS: Status: ACTIVE | Noted: 2019-05-08

## 2019-05-08 PROBLEM — I50.43 ACUTE ON CHRONIC COMBINED SYSTOLIC AND DIASTOLIC CHF (CONGESTIVE HEART FAILURE): Status: ACTIVE | Noted: 2019-05-08

## 2019-05-08 LAB
ANION GAP SERPL CALC-SCNC: 7 MMOL/L (ref 8–16)
BASOPHILS # BLD AUTO: 0.01 K/UL (ref 0–0.2)
BASOPHILS NFR BLD: 0.2 % (ref 0–1.9)
BUN SERPL-MCNC: 21 MG/DL (ref 8–23)
CALCIUM SERPL-MCNC: 8.4 MG/DL (ref 8.7–10.5)
CHLORIDE SERPL-SCNC: 102 MMOL/L (ref 95–110)
CO2 SERPL-SCNC: 23 MMOL/L (ref 23–29)
CREAT SERPL-MCNC: 0.8 MG/DL (ref 0.5–1.4)
DIFFERENTIAL METHOD: ABNORMAL
EOSINOPHIL # BLD AUTO: 0.1 K/UL (ref 0–0.5)
EOSINOPHIL NFR BLD: 2.6 % (ref 0–8)
ERYTHROCYTE [DISTWIDTH] IN BLOOD BY AUTOMATED COUNT: 13.2 % (ref 11.5–14.5)
EST. GFR  (AFRICAN AMERICAN): >60 ML/MIN/1.73 M^2
EST. GFR  (NON AFRICAN AMERICAN): >60 ML/MIN/1.73 M^2
GLUCOSE SERPL-MCNC: 92 MG/DL (ref 70–110)
HCT VFR BLD AUTO: 33.1 % (ref 37–48.5)
HGB BLD-MCNC: 10.9 G/DL (ref 12–16)
IMM GRANULOCYTES # BLD AUTO: 0.02 K/UL (ref 0–0.04)
IMM GRANULOCYTES NFR BLD AUTO: 0.5 % (ref 0–0.5)
LYMPHOCYTES # BLD AUTO: 0.9 K/UL (ref 1–4.8)
LYMPHOCYTES NFR BLD: 20 % (ref 18–48)
MCH RBC QN AUTO: 28.4 PG (ref 27–31)
MCHC RBC AUTO-ENTMCNC: 32.9 G/DL (ref 32–36)
MCV RBC AUTO: 86 FL (ref 82–98)
MONOCYTES # BLD AUTO: 0.5 K/UL (ref 0.3–1)
MONOCYTES NFR BLD: 10.8 % (ref 4–15)
NEUTROPHILS # BLD AUTO: 2.8 K/UL (ref 1.8–7.7)
NEUTROPHILS NFR BLD: 65.9 % (ref 38–73)
NRBC BLD-RTO: 0 /100 WBC
PLATELET # BLD AUTO: 143 K/UL (ref 150–350)
PMV BLD AUTO: 10.5 FL (ref 9.2–12.9)
POC ACTIVATED CLOTTING TIME K: 125 SEC (ref 74–137)
POC ACTIVATED CLOTTING TIME K: 290 SEC (ref 74–137)
POTASSIUM SERPL-SCNC: 4.2 MMOL/L (ref 3.5–5.1)
RBC # BLD AUTO: 3.84 M/UL (ref 4–5.4)
SAMPLE: ABNORMAL
SAMPLE: NORMAL
SODIUM SERPL-SCNC: 132 MMOL/L (ref 136–145)
WBC # BLD AUTO: 4.24 K/UL (ref 3.9–12.7)

## 2019-05-08 PROCEDURE — 93005 ELECTROCARDIOGRAM TRACING: CPT

## 2019-05-08 PROCEDURE — 93010 ELECTROCARDIOGRAM REPORT: CPT | Mod: ,,, | Performed by: INTERNAL MEDICINE

## 2019-05-08 PROCEDURE — 93010 EKG 12-LEAD: ICD-10-PCS | Mod: ,,, | Performed by: INTERNAL MEDICINE

## 2019-05-08 PROCEDURE — 99232 SBSQ HOSP IP/OBS MODERATE 35: CPT | Mod: ,,, | Performed by: INTERNAL MEDICINE

## 2019-05-08 PROCEDURE — 97161 PT EVAL LOW COMPLEX 20 MIN: CPT

## 2019-05-08 PROCEDURE — 99232 PR SUBSEQUENT HOSPITAL CARE,LEVL II: ICD-10-PCS | Mod: ,,, | Performed by: INTERNAL MEDICINE

## 2019-05-08 PROCEDURE — 63600175 PHARM REV CODE 636 W HCPCS: Performed by: INTERNAL MEDICINE

## 2019-05-08 PROCEDURE — 80048 BASIC METABOLIC PNL TOTAL CA: CPT

## 2019-05-08 PROCEDURE — 94799 UNLISTED PULMONARY SVC/PX: CPT

## 2019-05-08 PROCEDURE — 85025 COMPLETE CBC W/AUTO DIFF WBC: CPT

## 2019-05-08 PROCEDURE — 25000003 PHARM REV CODE 250: Performed by: INTERNAL MEDICINE

## 2019-05-08 PROCEDURE — 94761 N-INVAS EAR/PLS OXIMETRY MLT: CPT

## 2019-05-08 RX ORDER — FUROSEMIDE 10 MG/ML
20 INJECTION INTRAMUSCULAR; INTRAVENOUS ONCE
Status: COMPLETED | OUTPATIENT
Start: 2019-05-08 | End: 2019-05-08

## 2019-05-08 RX ORDER — FLUCONAZOLE 2 MG/ML
INJECTION, SOLUTION INTRAVENOUS
Status: DISCONTINUED | OUTPATIENT
Start: 2019-05-07 | End: 2019-05-08

## 2019-05-08 RX ADMIN — CLOPIDOGREL 75 MG: 75 TABLET, FILM COATED ORAL at 08:05

## 2019-05-08 RX ADMIN — LEVOTHYROXINE SODIUM 125 MCG: 125 TABLET ORAL at 08:05

## 2019-05-08 RX ADMIN — FUROSEMIDE 20 MG: 10 INJECTION, SOLUTION INTRAMUSCULAR; INTRAVENOUS at 09:05

## 2019-05-08 RX ADMIN — ASPIRIN 81 MG CHEWABLE TABLET 81 MG: 81 TABLET CHEWABLE at 08:05

## 2019-05-08 NOTE — ASSESSMENT & PLAN NOTE
S/p TAVR, TVP in place, threshold 0.4 mV  Baseline   ms,  ms  Post-op   ms,  ms  Today    ms,  ms  No evidence of advancing heart block, therefore no EPS/PPM is indicated at this time. Can discontinue TVP.

## 2019-05-08 NOTE — PROGRESS NOTES
[facility]  Cardiac Electrophysiology  Progress Note    Admission Date: 5/7/2019  Code Status: No Order   Attending Physician: Bakari Singletary MD   Expected Discharge Date: 5/8/2019  Principal Problem:Nodular calcific aortic valve stenosis    Subjective:     Interval History: No acute events overnight. Patient feels her dyspnea is already improved post-TAVR. Denies chest pain, palpitations or peripheral edema. She reports no new symptoms or concerns at this time.    Review of Systems   Constitution: Negative.   HENT: Negative.    Eyes: Negative.    Cardiovascular: Negative.    Respiratory: Negative.    Endocrine: Negative.    Hematologic/Lymphatic: Negative.    Skin: Negative.    Musculoskeletal: Negative.    Gastrointestinal: Negative.    Genitourinary: Negative.    Neurological: Negative.    Psychiatric/Behavioral: Negative.      Objective:     Vital Signs (Most Recent):  Temp: 98.7 °F (37.1 °C) (05/08/19 0700)  Pulse: 72 (05/08/19 1000)  Resp: 20 (05/08/19 1000)  BP: 138/63 (05/08/19 1000)  SpO2: 98 % (05/08/19 1000) Vital Signs (24h Range):  Temp:  [97.6 °F (36.4 °C)-98.7 °F (37.1 °C)] 98.7 °F (37.1 °C)  Pulse:  [58-84] 72  Resp:  [16-20] 20  SpO2:  [91 %-100 %] 98 %  BP: (117-157)/(57-70) 138/63  Arterial Line BP: (112-158)/(44-78) 128/58     Weight: 93.1 kg (205 lb 4 oz)  Body mass index is 37.54 kg/m².     SpO2: 98 %  O2 Device (Oxygen Therapy): room air    Physical Exam   Constitutional: She is oriented to person, place, and time. She appears well-developed and well-nourished. No distress.   HENT:   Head: Normocephalic and atraumatic.   Neck: No JVD present.   Right neck TVP   Cardiovascular: Normal rate, regular rhythm and intact distal pulses. Exam reveals no gallop and no friction rub.   No murmur heard.  Pulmonary/Chest: Effort normal and breath sounds normal. No respiratory distress. She has no wheezes. She has no rales.   Abdominal: Soft. Bowel sounds are normal. She exhibits no  distension. There is no tenderness.   Musculoskeletal: She exhibits no edema.   Neurological: She is alert and oriented to person, place, and time.   Skin: She is not diaphoretic.       Significant Labs:     Recent Results (from the past 24 hour(s))   CBC auto differential    Collection Time: 05/08/19  3:28 AM   Result Value Ref Range    WBC 4.24 3.90 - 12.70 K/uL    RBC 3.84 (L) 4.00 - 5.40 M/uL    Hemoglobin 10.9 (L) 12.0 - 16.0 g/dL    Hematocrit 33.1 (L) 37.0 - 48.5 %    Mean Corpuscular Volume 86 82 - 98 fL    Mean Corpuscular Hemoglobin 28.4 27.0 - 31.0 pg    Mean Corpuscular Hemoglobin Conc 32.9 32.0 - 36.0 g/dL    RDW 13.2 11.5 - 14.5 %    Platelets 143 (L) 150 - 350 K/uL    MPV 10.5 9.2 - 12.9 fL    Immature Granulocytes 0.5 0.0 - 0.5 %    Gran # (ANC) 2.8 1.8 - 7.7 K/uL    Immature Grans (Abs) 0.02 0.00 - 0.04 K/uL    Lymph # 0.9 (L) 1.0 - 4.8 K/uL    Mono # 0.5 0.3 - 1.0 K/uL    Eos # 0.1 0.0 - 0.5 K/uL    Baso # 0.01 0.00 - 0.20 K/uL    nRBC 0 0 /100 WBC    Gran% 65.9 38.0 - 73.0 %    Lymph% 20.0 18.0 - 48.0 %    Mono% 10.8 4.0 - 15.0 %    Eosinophil% 2.6 0.0 - 8.0 %    Basophil% 0.2 0.0 - 1.9 %    Differential Method Automated    Basic metabolic panel    Collection Time: 05/08/19  3:28 AM   Result Value Ref Range    Sodium 132 (L) 136 - 145 mmol/L    Potassium 4.2 3.5 - 5.1 mmol/L    Chloride 102 95 - 110 mmol/L    CO2 23 23 - 29 mmol/L    Glucose 92 70 - 110 mg/dL    BUN, Bld 21 8 - 23 mg/dL    Creatinine 0.8 0.5 - 1.4 mg/dL    Calcium 8.4 (L) 8.7 - 10.5 mg/dL    Anion Gap 7 (L) 8 - 16 mmol/L    eGFR if African American >60.0 >60 mL/min/1.73 m^2    eGFR if non African American >60.0 >60 mL/min/1.73 m^2         Significant Imaging:     I have reviewed all pertinent imaging studies from the last 24 hours      Assessment and Plan:     S/P TAVR (transcatheter aortic valve replacement)  S/p TAVR, TVP in place, threshold 0.4 mV  Baseline   ms,  ms  Post-op   ms,  ms  Today    ms,  ms  No evidence of advancing heart block, therefore no EPS/PPM is indicated at this time. Can discontinue TVP.        Cristi Santamaria MD  Cardiac Electrophysiology  [facility]

## 2019-05-08 NOTE — DISCHARGE SUMMARY
Patient discharged to home with family at bedside. AVS reviewed and confirmed with Pilar Michael and family. All questions and concerns addressed. Invasive line removed before discharge, family and patient informed of home care instructions. Patient walked in halls with PT without complication. TAVR card given to patient, copy made and placed in patient's records.     Education and care plan completed.

## 2019-05-08 NOTE — DISCHARGE SUMMARY
"Discharge Summary  Interventional Cardiology      Admit Date: 5/7/2019    Discharge Date:  5/8/2019    Attending Physician: Bakari Singletary MD    Principal Diagnoses: Severe Aortic Stenosis with NYHA FC III symptoms  Indication for Admission: REPLACEMENT, AORTIC VALVE, TRANSCATHETER (TAVR) (N/A)   Patient Active Problem List   Diagnosis    Nodular calcific aortic valve stenosis    Acquired hypothyroidism    Essential hypertension    S/P TAVR (transcatheter aortic valve replacement)   -    LBBB - old with  m/s  -   Diastolic Heart Failure - Acute on chronic    Physical Exam   Constitutional: She is oriented to person, place, and time. She appears well-developed and well-nourished.   HENT:   Head: Normocephalic and atraumatic.   Eyes: Pupils are equal, round, and reactive to light. Conjunctivae and EOM are normal.   Neck: Full passive range of motion without pain. Neck supple. JVD present. No thyroid mass present.   Cardiovascular: Normal rate, regular rhythm and intact distal pulses. Exam reveals no gallop and no friction rub.   Murmur heard.  Pulmonary/Chest: Effort normal and breath sounds normal. No respiratory distress. She has no wheezes. She has no rales. She exhibits no tenderness.   Musculoskeletal: Normal range of motion. She exhibits no edema or tenderness.   Neurological: She is alert and oriented to person, place, and time. No cranial nerve deficit.   Skin: Skin is warm and intact. No erythema.     /66 (BP Location: Right arm, Patient Position: Lying)   Pulse 81   Temp 98.7 °F (37.1 °C) (Oral)   Resp 19   Ht 5' 2" (1.575 m)   Wt 93.1 kg (205 lb 4 oz)   SpO2 (!) 94%   Breastfeeding? No   BMI 37.54 kg/m²       Discharged Condition: Good    Hospital Course:     Referring Physician: Too Dietrich MD     85 yo pleasant female - breast cancer survivor referred by Dr. Gaston for evaluation of aortic stenosis. She reports NYHA FC III symptoms and a recent hospitalization in March, 2019 " "for ADHF (3 night stay at [facility]).     She has PMHx of  Infiltrative ductal carcinoma of left breast s/p surgery and left chest wall radiation Tx in 2003 ([facility]), HTN, left arm lymphedema, arthritis of lumbar spine, Thyroid cancer s/p removal 1992 now with residual hypothyroidism, PAD and OA. She get annual follow up with her oncologist (Dr. Foster at [facility]) every June and was told "everything is ok" at last visit.         Pilar Michael is a 86 y.o. female referred by Dr Gaston  for evaluation of severe AS (NYHA Class III sx).     The patient has undergone the following TAVR work-up:   · ECHO (Date 4/8/2019): MARIBEL= 0.45 cm2, MG= 56 mmHg, Peak Haresh= 4.4 m/s, EF= 65%.   · LHC (Date ): oRCA PCI with  BMS 4/23/19  · STS: 6 %   · Frailty: 1/4 (handgrip)  · Iliacs are > 6.6  on R and > 6.1 on L   · LVOT area by CTA is 3.75 cm2 (26.1 mm X 19.6mm) and Avg Diameter is 21.8 per Dr VILLANUEVA  · Incidental findings on CT: LAD moderate to severe calcification.  · CT Surgery risk assessment: high risk per Dr. Mcginnis  · Rhythm issues: LBBB  · PFTs: FEV1  77% predicted, DLCO 86% predicted.  · Comorbidities: breast and thyroid cancer Hx, Chest wall radiation therapy.      She underwent successful implantation of 26 mm Evolut R valve candidate via RTF access. Overnight telemetry did not show any prolongation of her QRS from baseline and cleared by EP for discharge.  Found to have Fluid overload - acute on ch CHF so given IV Lasix with improvement.       Outpatient Plan:  - DaPT with ASA and Plavix for at least next 6 months  - Follow up in Valve Clinic in 1 month and a year with echo  - Lifelong Antibiotic PPx pre procedure prone to bacteremia.    Diet: Cardiac diet    Activity: Ad michelle    Disposition: Home or Self Care    Discharge Medications:      Medication List      CONTINUE taking these medications    amlodipine-olmesartan 5-20 mg per tablet  Commonly known as:  KARYN     aspirin 81 MG Chew  Take 1 tablet (81 mg total) by mouth once daily.   "   clopidogrel 75 mg tablet  Commonly known as:  PLAVIX  Take 1 tablet (75 mg total) by mouth once daily.     spironolactone 25 MG tablet  Commonly known as:  ALDACTONE     SYNTHROID 125 MCG tablet  Generic drug:  levothyroxine        STOP taking these medications    furosemide 20 MG tablet  Commonly known as:  LASIX  Take Lasix if notice a weight gain of > 3 lbs within 48 hrs.         ASK your doctor about these medications    TIMOPTIC OCUDOSE (PF) 0.5 % Dpet  Generic drug:  timolol maleate (PF)     traMADol 50 mg tablet  Commonly known as:  ULTRAM          Follow Up: Follow up in Valve Clinic in 1 month and a year with echo

## 2019-05-08 NOTE — ASSESSMENT & PLAN NOTE
She underwent successful implantation of 26 mm Evolut R valve candidate via RTF access. Overnight telemetry did not show any prolongation of her QRS from baseline and cleared by EP for discharge.  - ASA and plavix for 6 months

## 2019-05-08 NOTE — PLAN OF CARE
Problem: Physical Therapy Goal  Goal: Physical Therapy Goal  Outcome: Outcome(s) achieved Date Met: 05/08/19  Eval completed. Pt safe with functional mobility.

## 2019-05-08 NOTE — PLAN OF CARE
Problem: Adult Inpatient Plan of Care  Goal: Plan of Care Review  Outcome: Ongoing (interventions implemented as appropriate)    No acute events throughout day. See vital signs and assessments in flowsheets. See below for updates on today's progress.     Pulmonary: sats WNL RA    Cardiovascular: SR w/ LBBB ; BP stable; afebrile     Neurological: AAO x4    Gastrointestinal: no BM; regular diet     Genitourinary: purewick in place     Endocrine: WNL    Skin/Bath: groin sites CDI  Date of last CHG bath given: 5/7    Infusions: cordis    Patient progressing towards goals as tolerated, plan of care communicated and reviewed with Pilar Michael and family. All concerns addressed. Will continue to monitor.

## 2019-05-08 NOTE — SUBJECTIVE & OBJECTIVE
Interval History: Did well overnight. IV lasix given due to ADHF    Objective:     Vital Signs (Most Recent):  Temp: 98.7 °F (37.1 °C) (05/08/19 0700)  Pulse: 81 (05/08/19 0746)  Resp: 19 (05/08/19 0700)  BP: 127/66 (05/07/19 2000)  SpO2: (!) 94 % (05/08/19 0746) Vital Signs (24h Range):  Temp:  [97.6 °F (36.4 °C)-98.7 °F (37.1 °C)] 98.7 °F (37.1 °C)  Pulse:  [58-81] 81  Resp:  [16-20] 19  SpO2:  [91 %-100 %] 94 %  BP: (117-157)/(57-71) 127/66  Arterial Line BP: (112-158)/(44-78) 115/48     Weight: 93.1 kg (205 lb 4 oz)  Body mass index is 37.54 kg/m².    SpO2: (!) 94 %  O2 Device (Oxygen Therapy): room air      Intake/Output Summary (Last 24 hours) at 5/8/2019 0901  Last data filed at 5/8/2019 0700  Gross per 24 hour   Intake 1532.08 ml   Output 2350 ml   Net -817.92 ml       Lines/Drains/Airways     Central Venous Catheter Line                 Introducer 05/07/19 0735 1 day          Drain            Female External Urinary Catheter 05/07/19 0945 less than 1 day          Arterial Line                 Arterial Line 05/07/19 0730 Right Radial 1 day          Peripheral Intravenous Line                 Peripheral IV - Single Lumen 05/07/19 0656 18 G Right Antecubital 1 day                Physical Exam   Constitutional: She appears well-developed and well-nourished.   HENT:   Head: Normocephalic and atraumatic.   Right Ear: External ear normal.   Left Ear: External ear normal.   Eyes: Pupils are equal, round, and reactive to light. Conjunctivae and EOM are normal.   Neck: Normal range of motion. Neck supple. No JVD present. No thyromegaly present.   Cardiovascular: Normal rate, regular rhythm, S1 normal, S2 normal and intact distal pulses. Exam reveals no gallop, no S3 and no friction rub.   Murmur heard.  Pulses:       Radial pulses are 2+ on the right side, and 2+ on the left side.        Femoral pulses are 2+ on the right side, and 2+ on the left side.       Dorsalis pedis pulses are 2+ on the right side, and 2+ on  the left side.        Posterior tibial pulses are 2+ on the right side, and 2+ on the left side.   Pulmonary/Chest: Effort normal. No stridor. She has no wheezes. She has no rales. She exhibits no tenderness.   Abdominal: Soft. Bowel sounds are normal. She exhibits no distension. There is no tenderness. There is no rebound and no guarding.   Musculoskeletal: Normal range of motion. She exhibits no edema or tenderness.   Psychiatric: She has a normal mood and affect.       Significant Labs: All pertinent lab results from the last 24 hours have been reviewed.

## 2019-05-08 NOTE — PROGRESS NOTES
Dr. Robledo at bedside. Removed patient's right IJ TVP while patient in bedside chair. Cordis still intact with 10cc infusing. MD ordered to removed central line when patient returns to bed and to walk with PT/OT after breakfast. WCTM and update team appropriately.

## 2019-05-08 NOTE — PLAN OF CARE
Extended Emergency Contact Information  Primary Emergency Contact: Avril Nickerson  Mobile Phone: [phone]  Relation: Daughter  Preferred language: English    Kayce Chew MD  [address] [facility] INTERNAL MEDICINE *    Payor: [place] [employer] MANAGED MCARE / Plan: [facility] MEDICARE ADVANTAGE / Product Type: Medicare Advantage /       [id number] - [place] - [address] [facility]  [address]  [place] [address]  Phone: [phone] Fax: [phone]       05/08/19 1408   Discharge Assessment   Assessment information obtained from? Medical Record   Prior to hospitilization cognitive status: Alert/Oriented   Prior to hospitalization functional status: Independent   Current cognitive status: Alert/Oriented   Current Functional Status: Independent   Lives With child(alejandro), adult   Able to Return to Prior Arrangements yes   Is patient able to care for self after discharge? Yes   Readmission Within the Last 30 Days no previous admission in last 30 days   Patient currently being followed by outpatient case management? No   Patient currently receives any other outside agency services? No   Equipment Currently Used at Home shower chair   Do you have any problems affording any of your prescribed medications? No   Is the patient taking medications as prescribed? yes   Does the patient have transportation home? Yes   Transportation Anticipated family or friend will provide   Does the patient receive services at the Coumadin Clinic? No   Discharge Plan A Home   Discharge Plan B Home   DME Needed Upon Discharge  none   Patient/Family in Agreement with Plan unable to assess

## 2019-05-08 NOTE — PROGRESS NOTES
[facility]  Interventional Cardiology  Progress Note    Patient Name: Pilar Michael  MRN: [id number]  Admission Date: 5/7/2019  Hospital Length of Stay: 1 days  Code Status: No Order   Attending Physician: Bakari Singletary MD   Primary Care Physician: Kayce Chew MD  Principal Problem:Nodular calcific aortic valve stenosis    Subjective:     Interval History: Did well overnight. IV lasix given due to ADHF    Objective:     Vital Signs (Most Recent):  Temp: 98.7 °F (37.1 °C) (05/08/19 0700)  Pulse: 81 (05/08/19 0746)  Resp: 19 (05/08/19 0700)  BP: 127/66 (05/07/19 2000)  SpO2: (!) 94 % (05/08/19 0746) Vital Signs (24h Range):  Temp:  [97.6 °F (36.4 °C)-98.7 °F (37.1 °C)] 98.7 °F (37.1 °C)  Pulse:  [58-81] 81  Resp:  [16-20] 19  SpO2:  [91 %-100 %] 94 %  BP: (117-157)/(57-71) 127/66  Arterial Line BP: (112-158)/(44-78) 115/48     Weight: 93.1 kg (205 lb 4 oz)  Body mass index is 37.54 kg/m².    SpO2: (!) 94 %  O2 Device (Oxygen Therapy): room air      Intake/Output Summary (Last 24 hours) at 5/8/2019 0901  Last data filed at 5/8/2019 0700  Gross per 24 hour   Intake 1532.08 ml   Output 2350 ml   Net -817.92 ml       Lines/Drains/Airways     Central Venous Catheter Line                 Introducer 05/07/19 0735 1 day          Drain            Female External Urinary Catheter 05/07/19 0945 less than 1 day          Arterial Line                 Arterial Line 05/07/19 0730 Right Radial 1 day          Peripheral Intravenous Line                 Peripheral IV - Single Lumen 05/07/19 0656 18 G Right Antecubital 1 day                Physical Exam   Constitutional: She appears well-developed and well-nourished.   HENT:   Head: Normocephalic and atraumatic.   Right Ear: External ear normal.   Left Ear: External ear normal.   Eyes: Pupils are equal, round, and reactive to light. Conjunctivae and EOM are normal.   Neck: Normal range of motion. Neck supple. No JVD present. No thyromegaly present.    Cardiovascular: Normal rate, regular rhythm, S1 normal, S2 normal and intact distal pulses. Exam reveals no gallop, no S3 and no friction rub.   Murmur heard.  Pulses:       Radial pulses are 2+ on the right side, and 2+ on the left side.        Femoral pulses are 2+ on the right side, and 2+ on the left side.       Dorsalis pedis pulses are 2+ on the right side, and 2+ on the left side.        Posterior tibial pulses are 2+ on the right side, and 2+ on the left side.   Pulmonary/Chest: Effort normal. No stridor. She has no wheezes. She has no rales. She exhibits no tenderness.   Abdominal: Soft. Bowel sounds are normal. She exhibits no distension. There is no tenderness. There is no rebound and no guarding.   Musculoskeletal: Normal range of motion. She exhibits no edema or tenderness.   Psychiatric: She has a normal mood and affect.       Significant Labs: All pertinent lab results from the last 24 hours have been reviewed.      Assessment and Plan:     Patient is a 87 y.o. female presenting with:    Coronary artery disease involving native coronary artery of native heart without angina pectoris  S/p oRCA PCI with BMS on 4/23/19. On DaPT for 6 months. - tolerating well.     Acute on chronic combined systolic and diastolic CHF (congestive heart failure)  IV lasix 20 mg given. Lasix 20 mg po home PRN weight gain > 3 lbs.     S/P TAVR (transcatheter aortic valve replacement)  She underwent successful implantation of 26 mm Evolut R valve candidate via RTF access. Overnight telemetry did not show any prolongation of her QRS from baseline and cleared by EP for discharge.  - ASA and plavix for 6 months        Essential hypertension  Controlled on current meds - no change    Acquired hypothyroidism  Continue home synthroid dose.         VTE Risk Mitigation (From admission, onward)    None          Jacobo Nelson MD  Interventional Cardiology  [facility]

## 2019-05-08 NOTE — SUBJECTIVE & OBJECTIVE
Interval History: No acute events overnight. Patient feels her dyspnea is already improved post-TAVR. Denies chest pain, palpitations or peripheral edema. She reports no new symptoms or concerns at this time.    Review of Systems   Constitution: Negative.   HENT: Negative.    Eyes: Negative.    Cardiovascular: Negative.    Respiratory: Negative.    Endocrine: Negative.    Hematologic/Lymphatic: Negative.    Skin: Negative.    Musculoskeletal: Negative.    Gastrointestinal: Negative.    Genitourinary: Negative.    Neurological: Negative.    Psychiatric/Behavioral: Negative.      Objective:     Vital Signs (Most Recent):  Temp: 98.7 °F (37.1 °C) (05/08/19 0700)  Pulse: 72 (05/08/19 1000)  Resp: 20 (05/08/19 1000)  BP: 138/63 (05/08/19 1000)  SpO2: 98 % (05/08/19 1000) Vital Signs (24h Range):  Temp:  [97.6 °F (36.4 °C)-98.7 °F (37.1 °C)] 98.7 °F (37.1 °C)  Pulse:  [58-84] 72  Resp:  [16-20] 20  SpO2:  [91 %-100 %] 98 %  BP: (117-157)/(57-70) 138/63  Arterial Line BP: (112-158)/(44-78) 128/58     Weight: 93.1 kg (205 lb 4 oz)  Body mass index is 37.54 kg/m².     SpO2: 98 %  O2 Device (Oxygen Therapy): room air    Physical Exam   Constitutional: She is oriented to person, place, and time. She appears well-developed and well-nourished. No distress.   HENT:   Head: Normocephalic and atraumatic.   Neck: No JVD present.   Right neck TVP   Cardiovascular: Normal rate, regular rhythm and intact distal pulses. Exam reveals no gallop and no friction rub.   No murmur heard.  Pulmonary/Chest: Effort normal and breath sounds normal. No respiratory distress. She has no wheezes. She has no rales.   Abdominal: Soft. Bowel sounds are normal. She exhibits no distension. There is no tenderness.   Musculoskeletal: She exhibits no edema.   Neurological: She is alert and oriented to person, place, and time.   Skin: She is not diaphoretic.       Significant Labs:     Recent Results (from the past 24 hour(s))   CBC auto differential     Collection Time: 05/08/19  3:28 AM   Result Value Ref Range    WBC 4.24 3.90 - 12.70 K/uL    RBC 3.84 (L) 4.00 - 5.40 M/uL    Hemoglobin 10.9 (L) 12.0 - 16.0 g/dL    Hematocrit 33.1 (L) 37.0 - 48.5 %    Mean Corpuscular Volume 86 82 - 98 fL    Mean Corpuscular Hemoglobin 28.4 27.0 - 31.0 pg    Mean Corpuscular Hemoglobin Conc 32.9 32.0 - 36.0 g/dL    RDW 13.2 11.5 - 14.5 %    Platelets 143 (L) 150 - 350 K/uL    MPV 10.5 9.2 - 12.9 fL    Immature Granulocytes 0.5 0.0 - 0.5 %    Gran # (ANC) 2.8 1.8 - 7.7 K/uL    Immature Grans (Abs) 0.02 0.00 - 0.04 K/uL    Lymph # 0.9 (L) 1.0 - 4.8 K/uL    Mono # 0.5 0.3 - 1.0 K/uL    Eos # 0.1 0.0 - 0.5 K/uL    Baso # 0.01 0.00 - 0.20 K/uL    nRBC 0 0 /100 WBC    Gran% 65.9 38.0 - 73.0 %    Lymph% 20.0 18.0 - 48.0 %    Mono% 10.8 4.0 - 15.0 %    Eosinophil% 2.6 0.0 - 8.0 %    Basophil% 0.2 0.0 - 1.9 %    Differential Method Automated    Basic metabolic panel    Collection Time: 05/08/19  3:28 AM   Result Value Ref Range    Sodium 132 (L) 136 - 145 mmol/L    Potassium 4.2 3.5 - 5.1 mmol/L    Chloride 102 95 - 110 mmol/L    CO2 23 23 - 29 mmol/L    Glucose 92 70 - 110 mg/dL    BUN, Bld 21 8 - 23 mg/dL    Creatinine 0.8 0.5 - 1.4 mg/dL    Calcium 8.4 (L) 8.7 - 10.5 mg/dL    Anion Gap 7 (L) 8 - 16 mmol/L    eGFR if African American >60.0 >60 mL/min/1.73 m^2    eGFR if non African American >60.0 >60 mL/min/1.73 m^2         Significant Imaging:     I have reviewed all pertinent imaging studies from the last 24 hours

## 2019-05-08 NOTE — PT/OT/SLP EVAL
Physical Therapy Evaluation/Discharge    Patient Name:  Pilar Michael   MRN:  [id number]    Recommendations:     Discharge Recommendations:  home   Discharge Equipment Recommendations: none   Barriers to discharge: None    Assessment:     Pilar Michael is a 87 y.o. female admitted with a medical diagnosis of Nodular calcific aortic valve stenosis.  She presents with the following impairments/functional limitations:  impaired endurance. PT evaluation complete and no goals established. Pt is functioning at high level and does not required acute care physical therapy services. Education provided to ambulate in hallway 3x/day with RN in order to maintain strength and endurance. Pt verbalized understanding. Please re-consult if functional mobility changes. Anticipate d/c to home; no needs.     Rehab Prognosis: Good       Recent Surgery: Procedure(s) (LRB):  REPLACEMENT, AORTIC VALVE, TRANSCATHETER (TAVR) (N/A) 1 Day Post-Op    Plan:     · D/c from acute PT    Subjective     Chief Complaint: none reported   Patient/Family Comments/goals: to get better and return home   Pain/Comfort:  · Pain Rating 1: 0/10  · Pain Rating Post-Intervention 1: 0/10    Patients cultural, spiritual, Sikhism conflicts given the current situation: no    Living Environment:  Pt lives with daughter in a [facility] with 3 STEPHEN and B HR  Prior to admission, patients level of function was mod indep with ambulation and ADLs.  Equipment used at home: shower chair.  DME owned (not currently used): none.  Upon discharge, patient will have assistance from daughter.    Objective:     Communicated with RN prior to session and daughter present in room.  Patient found HOB elevated with telemetry, pulse ox (continuous), blood pressure cuff  upon PT entry to room.    General Precautions: Standard,     Orthopedic Precautions:N/A   Braces: N/A     Exams:  · Cognitive Exam:    · AAOx4  · Follows multistep commands  · Communication clear/fluent   · RLE ROM:  WFL  · RLE Strength: WFL  · LLE ROM: WFL  · LLE Strength: WFL    Functional Mobility:  · Bed Mobility:     · Scooting: independence  · Supine to Sit: independence  · Transfers:     · Sit to Stand:  independence with no AD from EOB x 2 trials   · Gait: 150ft with supervision; no LOB with horizontal head turns, change of speed, or change in direction; slight SOB (improved from before procedure)       Therapeutic Activities and Exercises:  Educated pt on PT role/POC  Educated pt on importance of OOB activity and daily ambulation   Pt verbalized understanding    Daughter donned briefs on pt before ambulating in hallway 2nd to urinary incontinence     T/f to chair to increase tolerance to OOB activity and daily ambulation     AM-PAC 6 CLICK MOBILITY  Total Score:24     Patient left up in chair with all lines intact, call button in reach, RN notified and daughter present.    GOALS:   Multidisciplinary Problems     Physical Therapy Goals     Not on file          Multidisciplinary Problems (Resolved)        Problem: Physical Therapy Goal    Goal Priority Disciplines Outcome Goal Variances Interventions   Physical Therapy Goal   (Resolved)     PT, PT/OT Outcome(s) achieved                     History:     Past Medical History:   Diagnosis Date    Arthritis     Cancer     CHF (congestive heart failure)     Coronary artery disease     Hypertension     Thyroid disease        Past Surgical History:   Procedure Laterality Date    ANGIOGRAM, CORONARY ARTERY N/A 4/23/2019    Performed by Bakari [facility]letary MD at [facility] CATH LAB    CHOLECYSTECTOMY      HYSTERECTOMY      lymph nodes removal      Stent BMS coronary N/A 4/23/2019    Performed by Bakari [facility]letary MD at [facility] CATH LAB    THYROIDECTOMY         Time Tracking:     PT Received On: 05/08/19  PT Start Time: 1022     PT Stop Time: 1037  PT Total Time (min): 15 min     Billable Minutes: Evaluation 15      Alida Jones, PT, DPT  5/8/2019  [phone]

## 2019-05-10 NOTE — OP NOTE
DATE OF PROCEDURE:  5/7/2019     PREOPERATIVE DIAGNOSIS:  Severe aortic stenosis     POSTOPERATIVE DIAGNOSIS:  Severe aortic stenosis.     PROCEDURE:  right transfemoral transcatheter aortic valve replacement with a 26   mm evolut valve.     SURGEON:  Kevin Mcginnis M.D.     CO-SURGEON:  ESPERANZA zamudio     ANESTHESIA:  Moderate sedation and local anesthetic.     INDICATIONS FOR PROCEDURE:  A 88 yo patient high risk for surgical AVR. The patient was evaluated for transcatheter valve by the valve team and found to be an acceptable transfemoral candidate. The patient understood the risks of the procedure and was able to give informed consent.     OPERATIVE FINDINGS:  Severe aortic stenosis, severely calcified valve,   well-seated prosthesis post-implant with no paravalvular leak.     BLOOD LOSS:  100 mL     PROCEDURE IN DETAIL:  The patient was taken electively to the Cath Lab, placed   supine upon the table.  Moderate sedation and local anesthetic was used. The patient was prepped and draped.  Both femoral arteries were accessed with a micropuncture technique.  We performed fluoroscopy, heparinized the patient, upsized to delivery sheaths. Catheters were positioned in the root of the aorta and we obtained our implant angle. We crossed the aortic valve with a soft wire and exchanged this for a stiff wire over the catheter.     We then performed balloon valvuloplasty under rapid pacing. The patient tolerated this well hemodynamically.     The valve was brought in the field sterilely.  We crossed the aortic arch with carotid occlusion, positioned at the aortic valve annulus and delivered it under rapid pacing. The valve was well seated with no paravalvular leak.  Cannulas were removed.  Groins were closed percutaneously.      Excellent hemostasis was achieved after administration of protamine. Dr zamudio and QUIQUE performed the procedure together as cosurgeons and were present for the procedure.

## 2019-06-14 ENCOUNTER — HOSPITAL ENCOUNTER (OUTPATIENT)
Dept: CARDIOLOGY | Facility: CLINIC | Age: 84
Discharge: HOME | End: 2019-06-14
Payer: MEDICARE

## 2019-06-14 ENCOUNTER — OFFICE VISIT (OUTPATIENT)
Dept: CARDIOLOGY | Facility: CLINIC | Age: 84
End: 2019-06-14
Payer: MEDICARE

## 2019-06-14 VITALS
BODY MASS INDEX: 36.44 KG/M2 | OXYGEN SATURATION: 96 % | HEIGHT: 62 IN | HEIGHT: 62 IN | DIASTOLIC BLOOD PRESSURE: 74 MMHG | SYSTOLIC BLOOD PRESSURE: 134 MMHG | WEIGHT: 198 LBS | SYSTOLIC BLOOD PRESSURE: 165 MMHG | BODY MASS INDEX: 36.6 KG/M2 | DIASTOLIC BLOOD PRESSURE: 68 MMHG | WEIGHT: 198.88 LBS | HEART RATE: 70 BPM | HEART RATE: 80 BPM

## 2019-06-14 DIAGNOSIS — Z95.2 S/P TAVR (TRANSCATHETER AORTIC VALVE REPLACEMENT): ICD-10-CM

## 2019-06-14 LAB
ASCENDING AORTA: 2.72 CM
AV INDEX (PROSTH): 0.53
AV MEAN GRADIENT: 7.67 MMHG
AV PEAK GRADIENT: 14.44 MMHG
AV VALVE AREA: 1.63 CM2
AV VELOCITY RATIO: 0.48
BSA FOR ECHO PROCEDURE: 1.98 M2
CV ECHO LV RWT: 0.46 CM
DOP CALC AO PEAK VEL: 1.9 M/S
DOP CALC AO VTI: 40.82 CM
DOP CALC LVOT AREA: 3.08 CM2
DOP CALC LVOT DIAMETER: 1.98 CM
DOP CALC LVOT PEAK VEL: 0.9 M/S
DOP CALC LVOT STROKE VOLUME: 66.51 CM3
DOP CALCLVOT PEAK VEL VTI: 21.61 CM
E WAVE DECELERATION TIME: 277.56 MSEC
E/A RATIO: 0.7
E/E' RATIO: 21.83
ECHO LV POSTERIOR WALL: 0.92 CM (ref 0.6–1.1)
FRACTIONAL SHORTENING: 28 % (ref 28–44)
INTERVENTRICULAR SEPTUM: 0.78 CM (ref 0.6–1.1)
LA MAJOR: 5.03 CM
LA MINOR: 5.08 CM
LA WIDTH: 3.53 CM
LEFT ATRIUM SIZE: 4.44 CM
LEFT ATRIUM VOLUME INDEX: 35.4 ML/M2
LEFT ATRIUM VOLUME: 67.34 CM3
LEFT INTERNAL DIMENSION IN SYSTOLE: 2.87 CM (ref 2.1–4)
LEFT VENTRICLE DIASTOLIC VOLUME INDEX: 36.64 ML/M2
LEFT VENTRICLE DIASTOLIC VOLUME: 69.73 ML
LEFT VENTRICLE MASS INDEX: 53.1 G/M2
LEFT VENTRICLE SYSTOLIC VOLUME INDEX: 16.5 ML/M2
LEFT VENTRICLE SYSTOLIC VOLUME: 31.4 ML
LEFT VENTRICULAR INTERNAL DIMENSION IN DIASTOLE: 3.99 CM (ref 3.5–6)
LEFT VENTRICULAR MASS: 101.02 G
LV LATERAL E/E' RATIO: 21.83
LV SEPTAL E/E' RATIO: 21.83
MV MEAN GRADIENT: 7 MMHG
MV PEAK A VEL: 1.88 M/S
MV PEAK E VEL: 1.31 M/S
MV PEAK GRADIENT: 18 MMHG
RA MAJOR: 4.74 CM
RA PRESSURE: 3 MMHG
RA WIDTH: 3.35 CM
RIGHT VENTRICULAR END-DIASTOLIC DIMENSION: 3.07 CM
SINUS: 2 CM
STJ: 1.53 CM
TDI LATERAL: 0.06
TDI SEPTAL: 0.06
TDI: 0.06
TRICUSPID ANNULAR PLANE SYSTOLIC EXCURSION: 1.97 CM

## 2019-06-14 PROCEDURE — 99999 PR PBB SHADOW E&M-EST. PATIENT-LVL III: ICD-10-PCS | Mod: PBBFAC,,, | Performed by: INTERNAL MEDICINE

## 2019-06-14 PROCEDURE — 93306 TTE W/DOPPLER COMPLETE: CPT | Mod: S$GLB,,, | Performed by: INTERNAL MEDICINE

## 2019-06-14 PROCEDURE — 93306 TRANSTHORACIC ECHO (TTE) COMPLETE (CUPID ONLY): ICD-10-PCS | Mod: S$GLB,,, | Performed by: INTERNAL MEDICINE

## 2019-06-14 PROCEDURE — 99213 PR OFFICE/OUTPT VISIT, EST, LEVL III, 20-29 MIN: ICD-10-PCS | Mod: S$GLB,,, | Performed by: INTERNAL MEDICINE

## 2019-06-14 PROCEDURE — 99213 OFFICE O/P EST LOW 20 MIN: CPT | Mod: S$GLB,,, | Performed by: INTERNAL MEDICINE

## 2019-06-14 PROCEDURE — 99999 PR PBB SHADOW E&M-EST. PATIENT-LVL III: CPT | Mod: PBBFAC,,, | Performed by: INTERNAL MEDICINE

## 2019-06-14 NOTE — PROGRESS NOTES
Subjective:    Patient ID:  Pilar Michael is a 87 y.o. female who presents for follow-up after TAVR.    HPI    Pilar Michael is a 87 y.o. female here for 1 month follow up post TAVR. She underwent TAVR with 26 mm EvolutR valve via TF access. She did not require a PPM. She did great post procedure. She is currently on ASA and plavix. She reports NYHA class I and CCS class 0 symptoms.       Review of Systems   Constitution: Negative for fever.   HENT: Negative for congestion and hoarse voice.    Eyes: Negative for blurred vision and double vision.   Cardiovascular: Negative for chest pain, claudication, cyanosis, dyspnea on exertion, irregular heartbeat, leg swelling, near-syncope, orthopnea, palpitations and paroxysmal nocturnal dyspnea.   Respiratory: Negative for cough, hemoptysis and shortness of breath.    Endocrine: Negative for cold intolerance and heat intolerance.   Hematologic/Lymphatic: Negative for bleeding problem. Does not bruise/bleed easily.   Skin: Negative for dry skin and nail changes.   Musculoskeletal: Negative for back pain and falls.   Gastrointestinal: Negative for abdominal pain and anorexia.   Neurological: Negative for brief paralysis, dizziness and weakness.        Objective:    Physical Exam   Constitutional: She is oriented to person, place, and time. She appears well-developed and well-nourished.   Eyes: Pupils are equal, round, and reactive to light.   Neck: No JVD present. No thyromegaly present.   Cardiovascular: Normal rate, regular rhythm, normal heart sounds and intact distal pulses.   Pulmonary/Chest: No respiratory distress. She has no wheezes. She exhibits no tenderness.   Abdominal: She exhibits no distension. There is no tenderness. There is no rebound.   Musculoskeletal: She exhibits no edema or tenderness.   Neurological: She is alert and oriented to person, place, and time.   Skin: Skin is warm and dry.   Psychiatric: She has a normal mood and affect.          Assessment:       1. S/P TAVR (transcatheter aortic valve replacement)         Plan:         S/P TAVR (transcatheter aortic valve replacement)  Doing well, valve function looks good on echo. No PVL. Continue DAPT, follow up in 1 year    NYHA class I, CCS Class 0 symptoms    Fabio Pearce MD [facility]  Interventional Cardiology  Structural/Valvular heart disease  [phone]

## 2019-08-19 ENCOUNTER — TELEPHONE (OUTPATIENT)
Dept: FAMILY MEDICINE | Facility: CLINIC | Age: 84
End: 2019-08-19

## 2019-08-19 NOTE — TELEPHONE ENCOUNTER
Spoke to pts daughter they are going to be switching to another pcp at . They have not scheduled yet with anyone. [facility]  sent a request for a PT luisa will have dr yao sign while dr jensen is on leave.

## 2019-10-04 ENCOUNTER — TELEPHONE (OUTPATIENT)
Dept: ORTHOPEDICS | Facility: CLINIC | Age: 84
End: 2019-10-04

## 2019-10-04 NOTE — TELEPHONE ENCOUNTER
----- Message from Brandie Arriaga sent at 10/4/2019  3:03 PM CDT -----  Contact: Verenice Vines  (niece)  [phone]    Patient was treated at Urgent Care on 10/3 and 10/1 and was referred to Orthopedics. She had  xrays and has a disc they will bring to the appointment. Patient is having severe pain in her right hip and back and needs to be seen as soon as possible. Patient is taking Tramadol ACL 50 mg tablets 2 every 6 hours, but it is not helping and she does not have enough for the weekend.

## 2019-10-04 NOTE — TELEPHONE ENCOUNTER
----- Message from Brnadie Arriaga sent at 10/4/2019  3:27 PM CDT -----  Contact: Verenice   [phone]    Patient's niece is returning a missed call. She asked for you to please call her again.

## 2020-04-02 DIAGNOSIS — Z95.2 S/P TAVR (TRANSCATHETER AORTIC VALVE REPLACEMENT): Primary | ICD-10-CM

## 2020-05-04 RX ORDER — CLOPIDOGREL BISULFATE 75 MG/1
75 TABLET ORAL DAILY
Qty: 30 TABLET | Refills: 11 | Status: SHIPPED | OUTPATIENT
Start: 2020-05-04 | End: 2021-03-01

## 2020-06-16 ENCOUNTER — OFFICE VISIT (OUTPATIENT)
Dept: URGENT CARE | Facility: CLINIC | Age: 85
End: 2020-06-16
Payer: MEDICARE

## 2020-06-16 VITALS
HEART RATE: 69 BPM | OXYGEN SATURATION: 97 % | BODY MASS INDEX: 36.8 KG/M2 | TEMPERATURE: 99 F | RESPIRATION RATE: 16 BRPM | HEIGHT: 62 IN | WEIGHT: 200 LBS

## 2020-06-16 DIAGNOSIS — L30.9 DERMATITIS: ICD-10-CM

## 2020-06-16 DIAGNOSIS — Z01.89 PATIENT REQUEST FOR DIAGNOSTIC TESTING: Primary | ICD-10-CM

## 2020-06-16 PROCEDURE — U0003 INFECTIOUS AGENT DETECTION BY NUCLEIC ACID (DNA OR RNA); SEVERE ACUTE RESPIRATORY SYNDROME CORONAVIRUS 2 (SARS-COV-2) (CORONAVIRUS DISEASE [COVID-19]), AMPLIFIED PROBE TECHNIQUE, MAKING USE OF HIGH THROUGHPUT TECHNOLOGIES AS DESCRIBED BY CMS-2020-01-R: HCPCS

## 2020-06-16 PROCEDURE — 99203 OFFICE O/P NEW LOW 30 MIN: CPT | Mod: S$GLB,,, | Performed by: NURSE PRACTITIONER

## 2020-06-16 PROCEDURE — 99203 PR OFFICE/OUTPT VISIT, NEW, LEVL III, 30-44 MIN: ICD-10-PCS | Mod: S$GLB,,, | Performed by: NURSE PRACTITIONER

## 2020-06-16 RX ORDER — FUROSEMIDE 20 MG/1
20 TABLET ORAL DAILY
COMMUNITY
End: 2021-11-29 | Stop reason: SDUPTHER

## 2020-06-17 ENCOUNTER — OFFICE VISIT (OUTPATIENT)
Dept: DERMATOLOGY | Facility: CLINIC | Age: 85
End: 2020-06-17
Payer: MEDICARE

## 2020-06-17 DIAGNOSIS — L29.9 PRURITUS: ICD-10-CM

## 2020-06-17 DIAGNOSIS — B86 SCABIES: Primary | ICD-10-CM

## 2020-06-17 PROCEDURE — 1101F PR PT FALLS ASSESS DOC 0-1 FALLS W/OUT INJ PAST YR: ICD-10-PCS | Mod: CPTII,S$GLB,, | Performed by: DERMATOLOGY

## 2020-06-17 PROCEDURE — 99202 OFFICE O/P NEW SF 15 MIN: CPT | Mod: S$GLB,,, | Performed by: DERMATOLOGY

## 2020-06-17 PROCEDURE — 99202 PR OFFICE/OUTPT VISIT, NEW, LEVL II, 15-29 MIN: ICD-10-PCS | Mod: S$GLB,,, | Performed by: DERMATOLOGY

## 2020-06-17 PROCEDURE — 1159F MED LIST DOCD IN RCRD: CPT | Mod: S$GLB,,, | Performed by: DERMATOLOGY

## 2020-06-17 PROCEDURE — 1101F PT FALLS ASSESS-DOCD LE1/YR: CPT | Mod: CPTII,S$GLB,, | Performed by: DERMATOLOGY

## 2020-06-17 PROCEDURE — 1126F PR PAIN SEVERITY QUANTIFIED, NO PAIN PRESENT: ICD-10-PCS | Mod: S$GLB,,, | Performed by: DERMATOLOGY

## 2020-06-17 PROCEDURE — 1159F PR MEDICATION LIST DOCUMENTED IN MEDICAL RECORD: ICD-10-PCS | Mod: S$GLB,,, | Performed by: DERMATOLOGY

## 2020-06-17 PROCEDURE — 99999 PR PBB SHADOW E&M-EST. PATIENT-LVL III: ICD-10-PCS | Mod: PBBFAC,,,

## 2020-06-17 PROCEDURE — 99999 PR PBB SHADOW E&M-EST. PATIENT-LVL III: CPT | Mod: PBBFAC,,,

## 2020-06-17 PROCEDURE — 1126F AMNT PAIN NOTED NONE PRSNT: CPT | Mod: S$GLB,,, | Performed by: DERMATOLOGY

## 2020-06-17 RX ORDER — PERMETHRIN 50 MG/G
CREAM TOPICAL
Qty: 120 G | Refills: 1 | Status: SHIPPED | OUTPATIENT
Start: 2020-06-17 | End: 2021-11-17

## 2020-06-17 RX ORDER — TRIAMCINOLONE ACETONIDE 1 MG/G
CREAM TOPICAL
Qty: 454 G | Refills: 0 | Status: SHIPPED | OUTPATIENT
Start: 2020-06-17 | End: 2021-11-17

## 2020-06-17 NOTE — PROGRESS NOTES
"Subjective:       Patient ID: Pilar Michael is a 88 y.o. female.    Vitals:  height is 5' 2" (1.575 m) and weight is 90.7 kg (200 lb). Her oral temperature is 98.7 °F (37.1 °C). Her pulse is 69. Her respiration is 16 and oxygen saturation is 97%.     Chief Complaint: Rash    Was treated at [facility] 1 week ago.  Denies any trouble breathing, speaking or swallowing.     Rash  This is a new problem. Episode onset: 1.5 weeks. The problem has been gradually worsening since onset. The rash is diffuse. The rash is characterized by redness and itchiness. She was exposed to nothing. Pertinent negatives include no cough, fever or sore throat. Past treatments include topical steroids (steroid shot). The treatment provided no relief.       Constitution: Negative for chills and fever.   HENT: Negative for facial swelling and sore throat.    Neck: Negative for painful lymph nodes.   Eyes: Negative for eye itching and eyelid swelling.   Respiratory: Negative for cough.    Musculoskeletal: Negative for joint pain and joint swelling.   Skin: Positive for rash and erythema. Negative for color change, pale, wound, abrasion, laceration, lesion, skin thickening/induration, puncture wound, bruising, abscess, avulsion and hives.   Allergic/Immunologic: Negative for environmental allergies, immunocompromised state and hives.   Hematologic/Lymphatic: Negative for swollen lymph nodes.       Objective:         Physical Exam   HENT:   Head: Normocephalic and atraumatic.   Right Ear: External ear normal.   Left Ear: External ear normal.   Eyes: Pupils are equal, round, and reactive to light. Conjunctivae are normal.   Neck: Normal range of motion. Neck supple.   Cardiovascular: Normal rate, regular rhythm, normal heart sounds and normal pulses.   Pulmonary/Chest: Effort normal and breath sounds normal.   Abdominal: Normal appearance.   Neurological: She is alert.   Skin: Skin is rash and maculopapular. Lesions:  erythema     "   Nursing note and vitals reviewed.                    Assessment:       1. Patient request for diagnostic testing    2. Dermatitis        Plan:         Patient request for diagnostic testing  -     COVID-19 Routine Screening    Dermatitis  -     Ambulatory referral/consult to Dermatology    -  Aveeno OTC Oatmeal Bath and Moisturizer  -  OTC Claritin or Zyrtec for itching  -  Follow-up with Dermatology as discussed  -  Will call with COVID Results      Patient Instructions       General Referral to [facility]   You were referred to [facility] for Follow-up Care.    Please call [phone] to schedule your appointment.    Please go to the Emergency Department for any concerns or worsening of condition.  Please follow up with your primary care doctor or specialist in the next 48-72hrs as needed.    If you  smoke, please stop smoking.    Instructions for Patients with Confirmed or Suspected COVID-19    If you are awaiting your test result, you will either be called or it will be released to the patient portal.  If you have any questions about your test, please visit [email or web address] or call our COVID-19 information line at [phone].      Preventing the Spread of Coronavirus Disease 2019 (COVID-19) in Homes and Residential Communities -- Patients     Prevention steps for people with confirmed or suspected COVID-19 (including persons under investigation) who do not need to be hospitalized and people with confirmed COVID-19 who were hospitalized and determined to be medically stable to go home.    Stay home except to get medical care.    Separate yourself from other people and animals in your home.    Call ahead before visiting your doctor.    Wear a face mask.    Cover your coughs and sneezes.    Clean your hands often.    Avoid sharing personal household items.    Clean all high-touch surfaces every day.    Monitor your symptoms. Seek prompt medical attention if your  illness is worsening (e.g., difficulty breathing). Before seeking care, call your healthcare provider.    If you have a medical emergency and must call 911, notify the dispatcher that you have or are being evaluated for COVID-19. If possible, put on a face mask before emergency medical services arrive.    Use the following symptom-based strategy to return to normal activity following a suspected or confirmed case of COVID-19. Continue isolation until:   o At least 3 days (72 hours) have passed since recovery defined as resolution of fever without the use of fever-reducing medications and improvement in respiratory symptoms (e.g. cough, shortness of breath), and   o At least 10 days have passed since symptoms first appeared.     Precautions for household members, intimate partners and caregivers in a non-healthcare setting of a patient with symptomatic laboratory-confirmed COVID-19 or a patient under investigation.     Household members, intimate partners and caregivers in a non-healthcare setting may have close contact with a person with symptomatic, laboratory-confirmed COVID-19 or a person under investigation. Close contacts should monitor their health; they should call their healthcare provider right away if they develop symptoms suggestive of COVID-19 (e.g., fever, cough, shortness of breath). Close contacts should also follow these recommendations:      Make sure that you understand and can help the patient follow their healthcare providers instructions for medication(s) and care. You should help the patient with basic needs in the home and provide support for getting groceries, prescriptions, and other personal needs.    Monitor the patients symptoms. If the patient is getting sicker, call his or her healthcare provider and tell them that the patient has laboratory-confirmed COVID-19. This will help the healthcare providers office take steps to keep people in the office or waiting room from getting  infected. Ask the healthcare provider to call the local or [facility] health department for additional guidance. If the patient has a medical emergency and you need to call 911, notify the dispatch personnel that the patient has or is being evaluated for COVID-19.    Household members should stay in another room or be  from the patient as much as possible. Household members should use a separate bedroom and bathroom, if available.    Prohibit visitors who do not have an essential need to be in the home.    Household members should care for any pets. Do not handle pets or other animals while sick.    Make sure that shared spaces in the home have good air flow, such as by an air conditioner.    Perform hand hygiene frequently. Wash your hands often with soap and water for at least 20 seconds or use an alcohol-based hand  that contains 60 to 95% alcohol, covering all surfaces of your hands and rubbing them together until they feel dry. Soap and water are preferred if hands are visibly dirty.    Avoid touching your eyes, nose and mouth with unwashed hands.    The patient should wear a face mask when you are around other people. If the patient is not able to wear a face mask (for example, because it causes trouble breathing), you, as the caregiver, should wear a mask when you are in the same room as the patient.    Wear a disposable face mask and gloves when you touch or have contact with the patients blood, stool or body fluids, such as saliva, sputum, nasal mucus, vomit and urine.   o Throw out disposable face masks and gloves after using them. Do not reuse.   o When removing personal protective equipment, first remove and dispose of gloves. Then, immediately clean your hands with soap and water or alcohol-based hand . Next, remove and dispose of face mask, and immediately clean your hands again with soap and water or alcohol-based hand .    Avoid sharing household items with  the patient. You should not share dishes, drinking glasses, cups, eating utensils, towels, bedding or other items. After the patient uses these items, you should wash them thoroughly (see below Wash laundry thoroughly).    Clean all high-touch surfaces, such as counters, tabletops, doorknobs, bathroom fixtures, toilets, phones, keyboards, tablets and bedside tables, every day. Also, clean any surfaces that may have blood, stool or body fluids on them.   o Use a household cleaning spray or wipe, according to the label instructions. Labels contain instructions for safe and effective use of the cleaning product including precautions you should take when applying the product, such as wearing gloves and making sure you have good ventilation during use of the product.    Wash laundry thoroughly.   o Immediately remove and wash clothes or bedding that have blood, stool or body fluids on them.  o Wear disposable gloves while handling soiled items and keep soiled items away from your body. Clean your hands (with soap and water or an alcohol-based hand ) immediately after removing your gloves.   o Read and follow directions on labels of laundry or clothing items and detergent. In general, using a normal laundry detergent according to washing machine instructions and dry thoroughly using the warmest temperatures recommended on the clothing label.    Place all used disposable gloves, face masks and other contaminated items in a lined container before disposing of them with other household waste. Clean your hands (with soap and water or an alcohol-based hand ) immediately after handling these items. Soap and water should be used preferentially if hands are visibly dirty.   Discuss any additional questions with your state or local health department  Nonspecific Dermatitis  Dermatitis is a skin rash caused by something that touches the skin and makes it irritated and inflamed.  Your skin may be red,  swollen, dry, and may be cracked. Blisters may form and ooze. The rash will itch.  Dermatitis can form on the face and neck, backs of hands, forearms, genitals, and lower legs. Dermatitis is not passed from person to person.  Talk with your health care provider about what may have caused the rash. Common things that cause skin allergies are metal in jewelry, plants like poison ivy or poison oak, and certain skin care products. You will need to avoid the source of your rash in the future to prevent it from coming back. In some cases, the cause of the dermatitis may not be found.  Treatment is done to relieve itching and prevent the rash from coming back. The rash should go away in a few days to a few weeks.  Home care  The health care provider may prescribe medications to relieve swelling and itching. Follow all instructions when using these medications.  · Avoid anything that heats up your skin, such as hot showers or baths, or direct sunlight. This can make itching worse.  · Stay away from whatever you think caused the rash.  · Bathe in warm, not hot, water. Apply a moisturizing lotion after bathing to prevent dry skin.  · Avoid skin irritants such as wool or silk clothing, grease, oils, harsh soaps, and detergents.  · Apply cold compresses to soothe your sores to help relieve your symptoms. Do this for 30 minutes 3 to 4 times a day. You can make a cold compress by soaking a cloth in cold water. Squeeze out excess water. You can add colloidal oatmeal to the water to help reduce itching. For severe itching in a small area, apply an ice pack wrapped in a thin towel. Do this for 20 minutes 3 to 4 times a day.  · You can also help relieve large areas of itching by taking a lukewarm bath with colloidal oatmeal added to the water.  · Use hydrocortisone cream for redness and irritation, unless another medicine was prescribed. You can also use benzocaine anesthetic cream or spray.  · Use oral diphenhydramine to help reduce  itching. This is an antihistamine you can buy at drug and grocery stores. It can make you sleepy, so use lower doses during the daytime. Or you can use loratadine. This is an antihistamine that will not make you sleepy. Dont use diphenhydramine if you have glaucoma or have trouble urinating because of an enlarged prostate.  · Wash your hands or use an antibacterial gel often to prevent the spread of the rash.  Follow-up care  Follow up with your health care provider. Make an appointment with your health care provider if your symptoms do not get better in the next 1 to 2 weeks.  When to seek medical advice  Call your health care provider right away if any of these occur:  · Spreading of the rash to other parts of your body  · Severe swelling of your face, eyelids, mouth, throat or tongue  · Trouble urinating due to swelling in the genital area  · Fever of 100.4°F (38°C) or higher  · Redness or swelling that gets worse  · Pain that gets worse  · Foul-smelling fluid leaking from the skin  · Yellow-brown crusts on the open blisters  · Joint pain   Date Last Reviewed: 7/23/2014  © 6947-0230 [employer]. [address] [place] [address]. All rights reserved. This information is not intended as a substitute for professional medical care. Always follow your healthcare professional's instructions.        Self-Care for Skin Rashes     Pat your skin dry. Do not rub.     When your skin reacts to a substance your body is sensitive to, it can cause a rash. You can treat most rashes at home by keeping the skin clean and dry. Many rashes may get better on their own within 2 to 3 days. You may need medical attention if your rash itches, drains, or hurts, particularly if the rash is getting worse.  What can cause a skin rash?  · Sun poisoning, caused by too much exposure to the sun  · An irritant or allergic reaction to a certain type of food, plant, or chemical, such as  shellfish, poison ivy, and or cleaning  products  · An infection caused by a fungus (ringworm), virus (chickenpox), or bacteria (strep)  · Bites or infestation caused by insects or pests, such as ticks, lice, or mites  · Dry skin, which is often seen during the winter months and in older people  How can I control itching and skin damage?  · Take soothing lukewarm baths in a colloidal oatmeal product. You can buy this at the [employer].  · Do your best not to scratch. Clip fingernails short, especially in young children, to reduce skin damage if scratching does occur.  · Use moisturizing skin lotion instead of scratching your dry skin.  · Use sunscreen whenever going out into direct sun.  · Use only mild cleansing agents whenever possible.  · Wash with mild, nonirritating soap and warm water.  · Wear clothing that breathes, such as cotton shirts or canvas shoes.  · If fluid is seeping from the rash, cover it loosely with clean gauze to absorb the discharge.  · Many rashes are contagious. Prevent the rash from spreading to others by washing your hands often before or after touching others with any skin rash.  Use medicine  · Antihistamines such as diphenhydramine can help control itching. But use with caution because they can make you drowsy.  · Using over-the-counter hydrocortisone cream on small rashes may help reduce swelling and itching  · Most over-the-counter antifungal medicines can treat athletes foot and many other fungal infections of the skin.  Check with your healthcare provider  Call your healthcare provider if:  · You were told that you have a fungal infection on your skin to make sure you have the correct type of medicine.  · You have questions or concerns about medicines or their side effects.     Call 911  Call 911 if either of these occur:  · Your tongue or lips start to swell  · You have difficulty breathing      Call your healthcare provider  Call your healthcare provider if any of these occur:  · Temperature of more than 101.0°F  (38.3°C), or as directed  · Sore throat, a cough, or unusual fatigue  · Red, oozy, or painful rash gets worse. These are signs of infection.  · Rash covers your face, genitals, or most of your body  · Crusty sores or red rings that begin to spread  · You were exposed to someone who has a contagious rash, such as scabies or lice.  · Red bulls-eye rash with a white center (a sign of Lyme disease)  · You were told that you have resistant bacteria (MRSA) on your skin.   Date Last Reviewed: 5/12/2015  © 6810-0697 [employer]. [address] [place] [address], [place] [address]. All rights reserved. This information is not intended as a substitute for professional medical care. Always follow your healthcare professional's instructions.

## 2020-06-17 NOTE — PATIENT INSTRUCTIONS
General Referral to [facility]   You were referred to [facility] for Follow-up Care.    Please call [phone] to schedule your appointment.    Please go to the Emergency Department for any concerns or worsening of condition.  Please follow up with your primary care doctor or specialist in the next 48-72hrs as needed.    If you  smoke, please stop smoking.    Instructions for Patients with Confirmed or Suspected COVID-19    If you are awaiting your test result, you will either be called or it will be released to the patient portal.  If you have any questions about your test, please visit [email or web address] or call our COVID-19 information line at [phone].      Preventing the Spread of Coronavirus Disease 2019 (COVID-19) in Homes and Residential Communities -- Patients     Prevention steps for people with confirmed or suspected COVID-19 (including persons under investigation) who do not need to be hospitalized and people with confirmed COVID-19 who were hospitalized and determined to be medically stable to go home.    Stay home except to get medical care.    Separate yourself from other people and animals in your home.    Call ahead before visiting your doctor.    Wear a face mask.    Cover your coughs and sneezes.    Clean your hands often.    Avoid sharing personal household items.    Clean all high-touch surfaces every day.    Monitor your symptoms. Seek prompt medical attention if your illness is worsening (e.g., difficulty breathing). Before seeking care, call your healthcare provider.    If you have a medical emergency and must call 911, notify the dispatcher that you have or are being evaluated for COVID-19. If possible, put on a face mask before emergency medical services arrive.    Use the following symptom-based strategy to return to normal activity following a suspected or confirmed case of COVID-19. Continue isolation until:   o At least 3 days (72  hours) have passed since recovery defined as resolution of fever without the use of fever-reducing medications and improvement in respiratory symptoms (e.g. cough, shortness of breath), and   o At least 10 days have passed since symptoms first appeared.     Precautions for household members, intimate partners and caregivers in a non-healthcare setting of a patient with symptomatic laboratory-confirmed COVID-19 or a patient under investigation.     Household members, intimate partners and caregivers in a non-healthcare setting may have close contact with a person with symptomatic, laboratory-confirmed COVID-19 or a person under investigation. Close contacts should monitor their health; they should call their healthcare provider right away if they develop symptoms suggestive of COVID-19 (e.g., fever, cough, shortness of breath). Close contacts should also follow these recommendations:      Make sure that you understand and can help the patient follow their healthcare providers instructions for medication(s) and care. You should help the patient with basic needs in the home and provide support for getting groceries, prescriptions, and other personal needs.    Monitor the patients symptoms. If the patient is getting sicker, call his or her healthcare provider and tell them that the patient has laboratory-confirmed COVID-19. This will help the healthcare providers office take steps to keep people in the office or waiting room from getting infected. Ask the healthcare provider to call the local or state health department for additional guidance. If the patient has a medical emergency and you need to call 911, notify the dispatch personnel that the patient has or is being evaluated for COVID-19.    Household members should stay in another room or be  from the patient as much as possible. Household members should use a separate bedroom and bathroom, if available.    Prohibit visitors who do not have an  essential need to be in the home.    Household members should care for any pets. Do not handle pets or other animals while sick.    Make sure that shared spaces in the home have good air flow, such as by an air conditioner.    Perform hand hygiene frequently. Wash your hands often with soap and water for at least 20 seconds or use an alcohol-based hand  that contains 60 to 95% alcohol, covering all surfaces of your hands and rubbing them together until they feel dry. Soap and water are preferred if hands are visibly dirty.    Avoid touching your eyes, nose and mouth with unwashed hands.    The patient should wear a face mask when you are around other people. If the patient is not able to wear a face mask (for example, because it causes trouble breathing), you, as the caregiver, should wear a mask when you are in the same room as the patient.    Wear a disposable face mask and gloves when you touch or have contact with the patients blood, stool or body fluids, such as saliva, sputum, nasal mucus, vomit and urine.   o Throw out disposable face masks and gloves after using them. Do not reuse.   o When removing personal protective equipment, first remove and dispose of gloves. Then, immediately clean your hands with soap and water or alcohol-based hand . Next, remove and dispose of face mask, and immediately clean your hands again with soap and water or alcohol-based hand .    Avoid sharing household items with the patient. You should not share dishes, drinking glasses, cups, eating utensils, towels, bedding or other items. After the patient uses these items, you should wash them thoroughly (see below Wash laundry thoroughly).    Clean all high-touch surfaces, such as counters, tabletops, doorknobs, bathroom fixtures, toilets, phones, keyboards, tablets and bedside tables, every day. Also, clean any surfaces that may have blood, stool or body fluids on them.   o Use a household  cleaning spray or wipe, according to the label instructions. Labels contain instructions for safe and effective use of the cleaning product including precautions you should take when applying the product, such as wearing gloves and making sure you have good ventilation during use of the product.    Wash laundry thoroughly.   o Immediately remove and wash clothes or bedding that have blood, stool or body fluids on them.  o Wear disposable gloves while handling soiled items and keep soiled items away from your body. Clean your hands (with soap and water or an alcohol-based hand ) immediately after removing your gloves.   o Read and follow directions on labels of laundry or clothing items and detergent. In general, using a normal laundry detergent according to washing machine instructions and dry thoroughly using the warmest temperatures recommended on the clothing label.    Place all used disposable gloves, face masks and other contaminated items in a lined container before disposing of them with other household waste. Clean your hands (with soap and water or an alcohol-based hand ) immediately after handling these items. Soap and water should be used preferentially if hands are visibly dirty.   Discuss any additional questions with your state or local health department  Nonspecific Dermatitis  Dermatitis is a skin rash caused by something that touches the skin and makes it irritated and inflamed.  Your skin may be red, swollen, dry, and may be cracked. Blisters may form and ooze. The rash will itch.  Dermatitis can form on the face and neck, backs of hands, forearms, genitals, and lower legs. Dermatitis is not passed from person to person.  Talk with your health care provider about what may have caused the rash. Common things that cause skin allergies are metal in jewelry, plants like poison ivy or poison oak, and certain skin care products. You will need to avoid the source of your rash in the  future to prevent it from coming back. In some cases, the cause of the dermatitis may not be found.  Treatment is done to relieve itching and prevent the rash from coming back. The rash should go away in a few days to a few weeks.  Home care  The health care provider may prescribe medications to relieve swelling and itching. Follow all instructions when using these medications.  · Avoid anything that heats up your skin, such as hot showers or baths, or direct sunlight. This can make itching worse.  · Stay away from whatever you think caused the rash.  · Bathe in warm, not hot, water. Apply a moisturizing lotion after bathing to prevent dry skin.  · Avoid skin irritants such as wool or silk clothing, grease, oils, harsh soaps, and detergents.  · Apply cold compresses to soothe your sores to help relieve your symptoms. Do this for 30 minutes 3 to 4 times a day. You can make a cold compress by soaking a cloth in cold water. Squeeze out excess water. You can add colloidal oatmeal to the water to help reduce itching. For severe itching in a small area, apply an ice pack wrapped in a thin towel. Do this for 20 minutes 3 to 4 times a day.  · You can also help relieve large areas of itching by taking a lukewarm bath with colloidal oatmeal added to the water.  · Use hydrocortisone cream for redness and irritation, unless another medicine was prescribed. You can also use benzocaine anesthetic cream or spray.  · Use oral diphenhydramine to help reduce itching. This is an antihistamine you can buy at drug and grocery stores. It can make you sleepy, so use lower doses during the daytime. Or you can use loratadine. This is an antihistamine that will not make you sleepy. Dont use diphenhydramine if you have glaucoma or have trouble urinating because of an enlarged prostate.  · Wash your hands or use an antibacterial gel often to prevent the spread of the rash.  Follow-up care  Follow up with your health care provider. Make an  appointment with your health care provider if your symptoms do not get better in the next 1 to 2 weeks.  When to seek medical advice  Call your health care provider right away if any of these occur:  · Spreading of the rash to other parts of your body  · Severe swelling of your face, eyelids, mouth, throat or tongue  · Trouble urinating due to swelling in the genital area  · Fever of 100.4°F (38°C) or higher  · Redness or swelling that gets worse  · Pain that gets worse  · Foul-smelling fluid leaking from the skin  · Yellow-brown crusts on the open blisters  · Joint pain   Date Last Reviewed: 7/23/2014 © 2000-2017 [employer]. [address] [place] [address], [place] [address]. All rights reserved. This information is not intended as a substitute for professional medical care. Always follow your healthcare professional's instructions.        Self-Care for Skin Rashes     Pat your skin dry. Do not rub.     When your skin reacts to a substance your body is sensitive to, it can cause a rash. You can treat most rashes at home by keeping the skin clean and dry. Many rashes may get better on their own within 2 to 3 days. You may need medical attention if your rash itches, drains, or hurts, particularly if the rash is getting worse.  What can cause a skin rash?  · Sun poisoning, caused by too much exposure to the sun  · An irritant or allergic reaction to a certain type of food, plant, or chemical, such as  shellfish, poison ivy, and or cleaning products  · An infection caused by a fungus (ringworm), virus (chickenpox), or bacteria (strep)  · Bites or infestation caused by insects or pests, such as ticks, lice, or mites  · Dry skin, which is often seen during the winter months and in older people  How can I control itching and skin damage?  · Take soothing lukewarm baths in a colloidal oatmeal product. You can buy this at the [employer].  · Do your best not to scratch. Clip fingernails short, especially in young  children, to reduce skin damage if scratching does occur.  · Use moisturizing skin lotion instead of scratching your dry skin.  · Use sunscreen whenever going out into direct sun.  · Use only mild cleansing agents whenever possible.  · Wash with mild, nonirritating soap and warm water.  · Wear clothing that breathes, such as cotton shirts or canvas shoes.  · If fluid is seeping from the rash, cover it loosely with clean gauze to absorb the discharge.  · Many rashes are contagious. Prevent the rash from spreading to others by washing your hands often before or after touching others with any skin rash.  Use medicine  · Antihistamines such as diphenhydramine can help control itching. But use with caution because they can make you drowsy.  · Using over-the-counter hydrocortisone cream on small rashes may help reduce swelling and itching  · Most over-the-counter antifungal medicines can treat athletes foot and many other fungal infections of the skin.  Check with your healthcare provider  Call your healthcare provider if:  · You were told that you have a fungal infection on your skin to make sure you have the correct type of medicine.  · You have questions or concerns about medicines or their side effects.     Call 911  Call 911 if either of these occur:  · Your tongue or lips start to swell  · You have difficulty breathing      Call your healthcare provider  Call your healthcare provider if any of these occur:  · Temperature of more than 101.0°F (38.3°C), or as directed  · Sore throat, a cough, or unusual fatigue  · Red, oozy, or painful rash gets worse. These are signs of infection.  · Rash covers your face, genitals, or most of your body  · Crusty sores or red rings that begin to spread  · You were exposed to someone who has a contagious rash, such as scabies or lice.  · Red bulls-eye rash with a white center (a sign of Lyme disease)  · You were told that you have resistant bacteria (MRSA) on your skin.   Date Last  Reviewed: 5/12/2015  © 2239-9199 [employer]. [address] [place] [address], [place] [address]. All rights reserved. This information is not intended as a substitute for professional medical care. Always follow your healthcare professional's instructions.

## 2020-06-17 NOTE — PROGRESS NOTES
Subjective:       Patient ID:   Chief Complaint   Patient presents with    Rash     HPI  Pilar Michael is a 88 y.o. female who presents as a new patient for evaluation of a new rash. Pt reports about 1.5-2 weeks ago she started to developed these red, itchy, raised bumps on her left neck. The bumps then spread to her right neck, chest, arms, stomach and inner thighs. Pt reports the bumps are extremely itchy. Daughter is with patient today who lives with patient. Daughter also complains of similar lesions. Daughter reports her mother had the lesions first and then a few days later she also developed them. They report the only new furniture would be two outdoor cloth-covered rocking chairs & a mattress foam pad, both of which were bought brand new and delivered directly from the manufacture. Pt denies any other new furniture. Denies anyone also living the house or coming inside the house in months. They report one neighbor that sits outside 6 feet away in the yard. Pt denies any previous treatments. Denies any previous history of similar lesions. Denies any known history of bedbugs or scabies. Daughter and patient report they spoke with a [employer] employee who advised patient on what to look for and they do not see any evidence of bed bugs. Denies any new animals. Denies any changes in detergent, soaps, clothing. Denies any household animals.     Review of Systems skin as above. + pruritus. + new rash. Denies any recent illnesses, fevers, chills, night sweats. Endorses some easy bruising but denies any bleeding.     Objective:    Physical Exam   Constitutional: She appears well-developed and well-nourished. She is obese.    Neurological: She is alert and oriented to person, place, and time.   Psychiatric: She has a normal mood and affect.   Skin:   Areas Examined (abnormalities noted in diagram):   Scalp / Hair Palpated and Inspected  Head / Face Inspection Performed  Neck Inspection Performed  Chest / Axilla  Inspection Performed  Abdomen Inspection Performed  Genitals / Buttocks / Groin Inspection Performed  Back Inspection Performed  RUE Inspected  LUE Inspection Performed  RLE Inspected  LLE Inspection Performed              Diagram Legend     Erythematous scaling macule/papule c/w actinic keratosis       Vascular papule c/w angioma      Pigmented verrucoid papule/plaque c/w seborrheic keratosis      Yellow umbilicated papule c/w sebaceous hyperplasia      Irregularly shaped tan macule c/w lentigo     1-2 mm smooth white papules consistent with Milia      Movable subcutaneous cyst with punctum c/w epidermal inclusion cyst      Subcutaneous movable cyst c/w pilar cyst      Firm pink to brown papule c/w dermatofibroma      Pedunculated fleshy papule(s) c/w skin tag(s)      Evenly pigmented macule c/w junctional nevus     Mildly variegated pigmented, slightly irregular-bordered macule c/w mildly atypical nevus      Flesh colored to evenly pigmented papule c/w intradermal nevus       Pink pearly papule/plaque c/w basal cell carcinoma      Erythematous hyperkeratotic cursted plaque c/w SCC      Surgical scar with no sign of skin cancer recurrence      Open and closed comedones      Inflammatory papules and pustules      Verrucoid papule consistent consistent with wart     Erythematous eczematous patches and plaques     Dystrophic onycholytic nail with subungual debris c/w onychomycosis     Umbilicated papule    Erythematous-base heme-crusted tan verrucoid plaque consistent with inflamed seborrheic keratosis     Erythematous Silvery Scaling Plaque c/w Psoriasis     See annotation      Assessment / Plan:        Scabies:  -Mineral oil scrapping x 2 negative  -Discussed lesions, distribution and timeline consistent with scabies. However, discussed could be consistent with other arthropod bites, scabies, bed bugs.  -Recommend treating for scabies with permethrin cream, apply neck down to full body. Repeat in 1 week. Recommend  other family members be treated as well.  -Recommend treating house for scabies as discussed.   -     permethrin (ELIMITE) 5 % cream; Apply to full body, neck down. Repeat in 1 week.  Dispense: 120 g; Refill: 1    Pruritus:  -Recommend TAC cream, apply twice daily to itchy areas  -     triamcinolone acetonide 0.1% (KENALOG) 0.1 % cream; Apply twice daily to affected itchy areas  Dispense: 454 g; Refill: 0    Follow up if symptoms worsen or fail to improve.     Danuta Gonzalez MD  [facility] PGY2

## 2020-06-18 LAB — SARS-COV-2 RNA RESP QL NAA+PROBE: NOT DETECTED

## 2020-07-07 ENCOUNTER — OFFICE VISIT (OUTPATIENT)
Dept: CARDIOLOGY | Facility: CLINIC | Age: 85
End: 2020-07-07
Payer: MEDICARE

## 2020-07-07 ENCOUNTER — HOSPITAL ENCOUNTER (OUTPATIENT)
Dept: CARDIOLOGY | Facility: HOSPITAL | Age: 85
Discharge: HOME | End: 2020-07-07
Attending: INTERNAL MEDICINE
Payer: MEDICARE

## 2020-07-07 VITALS
OXYGEN SATURATION: 97 % | BODY MASS INDEX: 36.8 KG/M2 | DIASTOLIC BLOOD PRESSURE: 82 MMHG | WEIGHT: 200 LBS | DIASTOLIC BLOOD PRESSURE: 92 MMHG | BODY MASS INDEX: 37.21 KG/M2 | SYSTOLIC BLOOD PRESSURE: 120 MMHG | HEART RATE: 60 BPM | HEART RATE: 80 BPM | SYSTOLIC BLOOD PRESSURE: 211 MMHG | HEIGHT: 62 IN | HEIGHT: 62 IN | WEIGHT: 202.19 LBS

## 2020-07-07 DIAGNOSIS — R35.0 FREQUENCY OF URINATION: ICD-10-CM

## 2020-07-07 DIAGNOSIS — E03.9 ACQUIRED HYPOTHYROIDISM: ICD-10-CM

## 2020-07-07 DIAGNOSIS — N39.0 URINARY TRACT INFECTION WITHOUT HEMATURIA, SITE UNSPECIFIED: ICD-10-CM

## 2020-07-07 DIAGNOSIS — I10 ESSENTIAL HYPERTENSION: ICD-10-CM

## 2020-07-07 DIAGNOSIS — I50.43 ACUTE ON CHRONIC COMBINED SYSTOLIC AND DIASTOLIC CHF (CONGESTIVE HEART FAILURE): ICD-10-CM

## 2020-07-07 DIAGNOSIS — Z95.2 S/P TAVR (TRANSCATHETER AORTIC VALVE REPLACEMENT): Primary | ICD-10-CM

## 2020-07-07 DIAGNOSIS — Z95.0 PRESENCE OF PERMANENT CARDIAC PACEMAKER: ICD-10-CM

## 2020-07-07 DIAGNOSIS — I35.0 NODULAR CALCIFIC AORTIC VALVE STENOSIS: ICD-10-CM

## 2020-07-07 DIAGNOSIS — I25.10 CORONARY ARTERY DISEASE INVOLVING NATIVE CORONARY ARTERY OF NATIVE HEART WITHOUT ANGINA PECTORIS: ICD-10-CM

## 2020-07-07 DIAGNOSIS — Z95.2 S/P TAVR (TRANSCATHETER AORTIC VALVE REPLACEMENT): ICD-10-CM

## 2020-07-07 LAB
ASCENDING AORTA: 1.99 CM
AV INDEX (PROSTH): 0.9
AV MEAN GRADIENT: 1 MMHG
AV PEAK GRADIENT: 2 MMHG
AV VALVE AREA: 2.91 CM2
AV VELOCITY RATIO: 0.85
BSA FOR ECHO PROCEDURE: 1.99 M2
CV ECHO LV RWT: 0.39 CM
DOP CALC AO PEAK VEL: 0.68 M/S
DOP CALC AO VTI: 14.75 CM
DOP CALC LVOT AREA: 3.2 CM2
DOP CALC LVOT DIAMETER: 2.03 CM
DOP CALC LVOT PEAK VEL: 0.58 M/S
DOP CALC LVOT STROKE VOLUME: 42.99 CM3
DOP CALCLVOT PEAK VEL VTI: 13.29 CM
ECHO LV POSTERIOR WALL: 0.85 CM (ref 0.6–1.1)
FRACTIONAL SHORTENING: 38 % (ref 28–44)
INTERVENTRICULAR SEPTUM: 0.8 CM (ref 0.6–1.1)
LA MAJOR: 4.38 CM
LA MINOR: 4.25 CM
LA WIDTH: 4.27 CM
LEFT ATRIUM SIZE: 3.63 CM
LEFT ATRIUM VOLUME INDEX: 29.7 ML/M2
LEFT ATRIUM VOLUME: 56.84 CM3
LEFT INTERNAL DIMENSION IN SYSTOLE: 2.71 CM (ref 2.1–4)
LEFT VENTRICLE DIASTOLIC VOLUME INDEX: 44.65 ML/M2
LEFT VENTRICLE DIASTOLIC VOLUME: 85.35 ML
LEFT VENTRICLE MASS INDEX: 59 G/M2
LEFT VENTRICLE SYSTOLIC VOLUME INDEX: 14.3 ML/M2
LEFT VENTRICLE SYSTOLIC VOLUME: 27.33 ML
LEFT VENTRICULAR INTERNAL DIMENSION IN DIASTOLE: 4.35 CM (ref 3.5–6)
LEFT VENTRICULAR MASS: 111.83 G
MV MEAN GRADIENT: 7 MMHG
MV STENOSIS PRESSURE HALF TIME: 89 MS
MV VALVE AREA P 1/2 METHOD: 2.47 CM2
PISA TR MAX VEL: 4.66 M/S
RA MAJOR: 4.06 CM
RA PRESSURE: 8 MMHG
RA WIDTH: 4.01 CM
RIGHT VENTRICULAR END-DIASTOLIC DIMENSION: 3.54 CM
RV TISSUE DOPPLER FREE WALL SYSTOLIC VELOCITY 1 (APICAL 4 CHAMBER VIEW): 5.7 CM/S
SINUS: 2.07 CM
STJ: 1.78 CM
TDI LATERAL: 0.07 M/S
TDI SEPTAL: 0.05 M/S
TDI: 0.06 M/S
TR MAX PG: 87 MMHG
TV REST PULMONARY ARTERY PRESSURE: 95 MMHG

## 2020-07-07 PROCEDURE — 1126F AMNT PAIN NOTED NONE PRSNT: CPT | Mod: S$GLB,,, | Performed by: INTERNAL MEDICINE

## 2020-07-07 PROCEDURE — 99999 PR PBB SHADOW E&M-EST. PATIENT-LVL III: ICD-10-PCS | Mod: PBBFAC,,,

## 2020-07-07 PROCEDURE — 1159F PR MEDICATION LIST DOCUMENTED IN MEDICAL RECORD: ICD-10-PCS | Mod: S$GLB,,, | Performed by: INTERNAL MEDICINE

## 2020-07-07 PROCEDURE — 93306 TTE W/DOPPLER COMPLETE: CPT | Mod: 26,,, | Performed by: INTERNAL MEDICINE

## 2020-07-07 PROCEDURE — 99214 PR OFFICE/OUTPT VISIT, EST, LEVL IV, 30-39 MIN: ICD-10-PCS | Mod: S$GLB,,, | Performed by: INTERNAL MEDICINE

## 2020-07-07 PROCEDURE — 1126F PR PAIN SEVERITY QUANTIFIED, NO PAIN PRESENT: ICD-10-PCS | Mod: S$GLB,,, | Performed by: INTERNAL MEDICINE

## 2020-07-07 PROCEDURE — 1159F MED LIST DOCD IN RCRD: CPT | Mod: S$GLB,,, | Performed by: INTERNAL MEDICINE

## 2020-07-07 PROCEDURE — 1101F PT FALLS ASSESS-DOCD LE1/YR: CPT | Mod: CPTII,S$GLB,, | Performed by: INTERNAL MEDICINE

## 2020-07-07 PROCEDURE — 93306 ECHO (CUPID ONLY): ICD-10-PCS | Mod: 26,,, | Performed by: INTERNAL MEDICINE

## 2020-07-07 PROCEDURE — 93306 TTE W/DOPPLER COMPLETE: CPT

## 2020-07-07 PROCEDURE — 99999 PR PBB SHADOW E&M-EST. PATIENT-LVL III: CPT | Mod: PBBFAC,,,

## 2020-07-07 PROCEDURE — 99214 OFFICE O/P EST MOD 30 MIN: CPT | Mod: S$GLB,,, | Performed by: INTERNAL MEDICINE

## 2020-07-07 PROCEDURE — 1101F PR PT FALLS ASSESS DOC 0-1 FALLS W/OUT INJ PAST YR: ICD-10-PCS | Mod: CPTII,S$GLB,, | Performed by: INTERNAL MEDICINE

## 2020-07-07 NOTE — ASSESSMENT & PLAN NOTE
-HTNsive at clinic visit today however had not taken morning medications  -reports at last Cardiology appointment her amlodipine-olmesartan was discontinued  -advised to take morning medications and recheck pressure one hour later  -to ED if does not improve   -advised urgent follow up with Primary Cardiologist for further optimization

## 2020-07-07 NOTE — ASSESSMENT & PLAN NOTE
-recently had diagnosed UTI at OSH with course of macrobid  -has ongoing symptoms  -repeat UA pending, will follow

## 2020-07-07 NOTE — PROGRESS NOTES
Subjective:    Patient ID:  Pilar Michael is a 88 y.o. female who presents for follow-up of severe AS s/p TAVR.    Referring Provider: Dr. Chi    NYHA: II , CCS: 0    HPI Ms. 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 She was recently seen for a rash and was evaluated by Dermatology who felt lesions where consistent with scabies and has completed treatment course. She also was diagnosed with UTI and completed macrobid course.     She presents today for one year post TAVR follow up and is accompanied by her daughter. She used a wheelchair to get from garage to clinic however was able to ambulate throughout clinic with cane. She denied shortness of breath, RUANO, chest pain, or palpitations. She does have occasional dependent edema that resolves with diuretic use. She has had a decline in physical function since hospitalization in December 2019 for PPM insertion. She has home health and daughter is planning on discussing addition of physical therapy with Dr. Chi. She also had complaints of ongoing UTI symptoms. She was also HTNsive at clinic visit today however had not taken morning medications, she did report that at last Cardiology appointment her amlodipine-olmesartan was discontinued.     TTE 07/07/2020:  · Normal left ventricular systolic function. The estimated ejection fraction is 63%.  · Indeterminate left ventricular diastolic function.  · Septal wall has abnormal motion.  · Low normal right ventricular systolic function.  · Mild-to-moderate mitral regurgitation.  · The mean diastolic gradient across the mitral valve is 7 mmHg at a heart rate of bpm.  · Mild mitral stenosis.  · Mild to moderate tricuspid regurgitation.  · There is a transcutaneously-placed aortic bioprosthesis present. There is no aortic aortic insufficiency present. Prosthetic  aortic valve is normal.  · Intermediate central venous pressure (8 mmHg).  · The estimated PA systolic pressure is 95 mmHg ( at least 70 but possibly as high as 95 mmHg.  · Pulmonary hypertension     Past Medical History:   Diagnosis Date    Arthritis     Cancer     CHF (congestive heart failure)     Coronary artery disease     Hypertension     Thyroid disease      Past Surgical History:   Procedure Laterality Date    CARDIAC CATHETERIZATION      CARDIAC VALVE SURGERY      CHOLECYSTECTOMY      CORONARY ANGIOGRAPHY N/A 4/23/2019    Procedure: ANGIOGRAM, CORONARY ARTERY;  Surgeon: Bakari Singletary MD;  Location: [facility] CATH LAB;  Service: Cardiology;  Laterality: N/A;    HYSTERECTOMY      lymph nodes removal      THYROIDECTOMY      TRANSCATHETER AORTIC VALVE REPLACEMENT (TAVR) N/A 5/7/2019    Procedure: REPLACEMENT, AORTIC VALVE, TRANSCATHETER (TAVR);  Surgeon: Bakari Singletary MD;  Location: [facility] CATH LAB;  Service: Cardiology;  Laterality: N/A;       Current Outpatient Medications:     amlodipine-olmesartan (KARYN) 5-20 mg per tablet, Take 1 tablet by mouth once daily., Disp: , Rfl: 3    aspirin 81 MG Chew, Take 1 tablet (81 mg total) by mouth once daily., Disp: , Rfl: 0    clopidogreL (PLAVIX) 75 mg tablet, Take 1 tablet (75 mg total) by mouth once daily., Disp: 30 tablet, Rfl: 11    furosemide (LASIX) 20 MG tablet, Take 20 mg by mouth 2 (two) times daily., Disp: , Rfl:     permethrin (ELIMITE) 5 % cream, Apply to full body, neck down. Repeat in 1 week., Disp: 120 g, Rfl: 1    spironolactone (ALDACTONE) 25 MG tablet, Take 25 mg by mouth once daily. 50mg every other day and 25mg on the other days, Disp: , Rfl: 3    SYNTHROID 125 mcg tablet, Take 125 mcg by mouth once daily., Disp: , Rfl: 3    TIMOPTIC OCUDOSE, PF, 0.5 % Dpet, 1 UNIT IN BOTH EYES TWICE A DAY, Disp: , Rfl: 6    traMADol (ULTRAM) 50 mg tablet, TAKE 1 TABLET BY MOUTH EVERY 12 HOURS AS NEEDED FOR SEVERE PAIN, Disp: , Rfl:      triamcinolone acetonide 0.1% (KENALOG) 0.1 % cream, Apply twice daily to affected itchy areas, Disp: 454 g, Rfl: 0    Vitals:    07/07/20 1128   BP: (!) 211/92   Pulse: 60     Results for OFE SILVA (MRN [id number]) as of 7/7/2020 13:24   7/7/2020 09:32   WBC 4.43   RBC 5.12   Hemoglobin 14.2   Hematocrit 46.5   Potassium 5.5 (H)   BUN, Bld 34 (H)   Creatinine 1.2     Review of Systems   Constitution: Positive for malaise/fatigue. Negative for chills, decreased appetite, diaphoresis, fever, night sweats, weight gain and weight loss.   HENT: Negative for congestion and sore throat.    Cardiovascular: Positive for leg swelling. Negative for chest pain, dyspnea on exertion, irregular heartbeat, near-syncope, orthopnea, palpitations, paroxysmal nocturnal dyspnea and syncope.   Respiratory: Negative for cough and shortness of breath.    Musculoskeletal: Positive for arthritis.   Gastrointestinal: Negative for bloating, abdominal pain, nausea and vomiting.   Genitourinary: Positive for bladder incontinence, frequency and urgency.        Objective:    Physical Exam   Constitutional: She is oriented to person, place, and time. She appears well-developed and well-nourished. No distress.   HENT:   Head: Normocephalic and atraumatic.   Mouth/Throat: No oropharyngeal exudate.   Eyes: Conjunctivae and EOM are normal. No scleral icterus.   Neck: Normal range of motion. Neck supple. No JVD present.   Cardiovascular: Normal rate, regular rhythm and intact distal pulses.   Murmur heard.  Pulmonary/Chest: Effort normal and breath sounds normal. No respiratory distress. She has no wheezes.   Abdominal: Soft. Bowel sounds are normal. She exhibits no distension. There is no abdominal tenderness.   Musculoskeletal: Normal range of motion.         General: Edema (1+ non pitting pretibial ) present.   Neurological: She is alert and oriented to person, place, and time.   Skin: Skin is warm and dry. No rash noted. She is not  diaphoretic. No erythema.   Psychiatric: She has a normal mood and affect. Her behavior is normal. Judgment normal.   Nursing note and vitals reviewed.        Assessment:       1. S/P TAVR (transcatheter aortic valve replacement)    2. Nodular calcific aortic valve stenosis    3. Presence of permanent cardiac pacemaker    4. Acute on chronic combined systolic and diastolic CHF (congestive heart failure)    5. Coronary artery disease involving native coronary artery of native heart without angina pectoris    6. Essential hypertension    7. Acquired hypothyroidism    8. Urinary tract infection without hematuria, site unspecified    9. Frequency of urination         Plan:       S/P TAVR (transcatheter aortic valve replacement)  -s/p 26 mm Evolut R valve via RTF 05/07/2019  -NYHA II, CCS 0  -on ASA and plavix   -CAD s/p oRCA PCI with BMS 4/23/19  -TTE reviewed above and with staff, no AI  -SBEP for life  -RTC PRN concerns  -continue close follow up with primary Cardiologist     Nodular calcific aortic valve stenosis  -see TAVR    Presence of permanent cardiac pacemaker  -per family placed due to symptomatic bradycardia with HRs in 20  -Biotronik Edora 8 DR MENDES placed in 12/2019  -further monitoring and management per Primary Cardiologist     Coronary artery disease involving native coronary artery of native heart without angina pectoris  -CAD s/p oRCA PCI with BMS 4/23/19  -on ASA and plavix  -further monitoring and management per Primary Cardiologist     Acute on chronic combined systolic and diastolic CHF (congestive heart failure)  -chronic  -compensated on exam today  -educated on low sodium diet and compliance with home medications  -further monitoring and management per Primary Cardiologist     Essential hypertension  -HTNsive at clinic visit today however had not taken morning medications  -reports at last Cardiology appointment her amlodipine-olmesartan was discontinued  -advised to take morning medications and  recheck pressure one hour later  -to ED if does not improve   -advised urgent follow up with Primary Cardiologist for further optimization     Frequency of urination  -recently had diagnosed UTI at OSH with course of macrobid  -has ongoing symptoms  -repeat UA pending, will follow           Zoe Jensen, DNP, AG-ACNP, BC  Department of Interventional Cardiology   [facility]

## 2020-07-07 NOTE — ASSESSMENT & PLAN NOTE
-chronic  -compensated on exam today  -educated on low sodium diet and compliance with home medications  -further monitoring and management per Primary Cardiologist

## 2020-07-07 NOTE — ASSESSMENT & PLAN NOTE
-s/p 26 mm Evolut R valve via RTF 05/07/2019  -NYHA II, CCS 0  -on ASA and plavix   -CAD s/p oRCA PCI with BMS 4/23/19  -TTE reviewed above and with staff, no AI  -SBEP for life  -RTC PRN concerns  -continue close follow up with primary Cardiologist

## 2020-07-07 NOTE — ASSESSMENT & PLAN NOTE
-CAD s/p oRCA PCI with BMS 4/23/19  -on ASA and plavix  -further monitoring and management per Primary Cardiologist

## 2020-07-07 NOTE — ASSESSMENT & PLAN NOTE
-per family placed due to symptomatic bradycardia with HRs in 20  -Biotronik Edora 8 DR MENDES placed in 12/2019  -further monitoring and management per Primary Cardiologist

## 2020-07-08 DIAGNOSIS — N39.0 URINARY TRACT INFECTION WITHOUT HEMATURIA, SITE UNSPECIFIED: Primary | ICD-10-CM

## 2020-07-08 RX ORDER — CIPROFLOXACIN 500 MG/1
500 TABLET ORAL 2 TIMES DAILY
Qty: 14 TABLET | Refills: 0 | Status: SHIPPED | OUTPATIENT
Start: 2020-07-08 | End: 2020-07-10

## 2020-07-08 NOTE — PROGRESS NOTES
UA 7/7/20 with evidence of ongoing UTI. Called and discussed results with Ms. Michael and her daughter Ms. Nickerson. Ms. Michael has allergies to sulfa and PCN. She recalls lip and/or oral swelling with PCN. She has unknown history of toleration to cephalosporins per chart review and patient recollection thus will avoid at current. Cipro e-scribed to pharmacy of choice with verbal understanding of use by patient and her daughter. Urine CX pending, will follow and tailor if appropriate.      Zoe Jensen, DNP, AG-ACNP, BC  Department of Interventional Cardiology   [facility]

## 2020-07-10 RX ORDER — CEFPODOXIME PROXETIL 200 MG/1
200 TABLET, FILM COATED ORAL EVERY 12 HOURS
Qty: 28 TABLET | Refills: 0 | Status: SHIPPED | OUTPATIENT
Start: 2020-07-10 | End: 2020-07-24

## 2020-07-11 ENCOUNTER — DOCUMENTATION ONLY (OUTPATIENT)
Dept: CARDIOLOGY | Facility: CLINIC | Age: 85
End: 2020-07-11

## 2020-07-11 NOTE — PROGRESS NOTES
Urine culture resulted. Discontinue cipro. Initiated cepodoxime course. Educated on need to monitor for allergic reaction.    Contains abnormal data Urine culture  Order: [id number]  Status:  Final result   Visible to patient:  No (not released) Next appt:  None  Specimen Information: Urine        Component 4d ago   Urine Culture, Routine Abnormal   ESCHERICHIA COLI   >100,000 cfu/ml     Resulting Agency [facility]   Susceptibility     Escherichia coli     CULTURE, URINE     Amox/K Clav'ate <=8/4 mcg/mL Sensitive     Amp/Sulbactam <=8/4 mcg/mL Sensitive     Ampicillin <=8 mcg/mL Sensitive     Cefazolin <=2 mcg/mL Sensitive     Cefepime <=2 mcg/mL Sensitive     Ceftriaxone <=1 mcg/mL Sensitive     Ciprofloxacin >2 mcg/mL Resistant     Ertapenem <=0.5 mcg/mL Sensitive     Gentamicin <=4 mcg/mL Sensitive     Levofloxacin >4 mcg/mL Resistant     Meropenem <=1 mcg/mL Sensitive     Nitrofurantoin <=32 mcg/mL Sensitive     Piperacillin/Tazo <=16 mcg/mL Sensitive     Tetracycline <=4 mcg/mL Sensitive     Tobramycin <=4 mcg/mL Sensitive     Trimeth/Sulfa <=2/38 mcg/mL Sensitive            Linear View      Narrative  Performed by: [facility]  Specimen Source->Urine      Specimen Collected: 07/07/20 12:40 Last Resulted: 07/10/20 08:51 Lab Flowsheet Order Details View Encounter Lab and Collection Details Routing Result History            All Reviewers List          Zoe Jensen, DARCY, AG-ACNP, BC  Department of Interventional Cardiology  [facility]

## 2021-01-15 ENCOUNTER — IMMUNIZATION (OUTPATIENT)
Dept: INTERNAL MEDICINE | Facility: CLINIC | Age: 86
End: 2021-01-15
Payer: MEDICARE

## 2021-01-15 DIAGNOSIS — Z23 NEED FOR VACCINATION: Primary | ICD-10-CM

## 2021-01-15 PROCEDURE — 91300 COVID-19, MRNA, LNP-S, PF, 30 MCG/0.3 ML DOSE VACCINE: CPT | Mod: PBBFAC | Performed by: FAMILY MEDICINE

## 2021-02-06 ENCOUNTER — IMMUNIZATION (OUTPATIENT)
Dept: INTERNAL MEDICINE | Facility: CLINIC | Age: 86
End: 2021-02-06
Payer: MEDICARE

## 2021-02-06 DIAGNOSIS — Z23 NEED FOR VACCINATION: Primary | ICD-10-CM

## 2021-02-06 PROCEDURE — 0002A COVID-19, MRNA, LNP-S, PF, 30 MCG/0.3 ML DOSE VACCINE: CPT | Mod: PBBFAC | Performed by: FAMILY MEDICINE

## 2021-02-06 PROCEDURE — 91300 COVID-19, MRNA, LNP-S, PF, 30 MCG/0.3 ML DOSE VACCINE: CPT | Mod: PBBFAC | Performed by: FAMILY MEDICINE

## 2021-11-17 ENCOUNTER — HOSPITAL ENCOUNTER (OUTPATIENT)
Facility: HOSPITAL | Age: 86
Discharge: HOME HEALTH | End: 2021-11-18
Attending: EMERGENCY MEDICINE | Admitting: FAMILY MEDICINE
Payer: MEDICARE

## 2021-11-17 DIAGNOSIS — R53.83 FATIGUE: ICD-10-CM

## 2021-11-17 DIAGNOSIS — J18.9 PNEUMONIA: Primary | ICD-10-CM

## 2021-11-17 DIAGNOSIS — R05.9 COUGH: ICD-10-CM

## 2021-11-17 LAB
ALBUMIN SERPL BCP-MCNC: 3.4 G/DL (ref 3.5–5.2)
ALP SERPL-CCNC: 93 U/L (ref 55–135)
ALT SERPL W/O P-5'-P-CCNC: 11 U/L (ref 10–44)
ANION GAP SERPL CALC-SCNC: 9 MMOL/L (ref 8–16)
AST SERPL-CCNC: 19 U/L (ref 10–40)
BACTERIA #/AREA URNS HPF: ABNORMAL /HPF
BASOPHILS # BLD AUTO: 0.01 K/UL (ref 0–0.2)
BASOPHILS NFR BLD: 0.1 % (ref 0–1.9)
BILIRUB SERPL-MCNC: 1.1 MG/DL (ref 0.1–1)
BILIRUB UR QL STRIP: NEGATIVE
BNP SERPL-MCNC: 381 PG/ML (ref 0–99)
BUN SERPL-MCNC: 18 MG/DL (ref 8–23)
CALCIUM SERPL-MCNC: 8.7 MG/DL (ref 8.7–10.5)
CHLORIDE SERPL-SCNC: 102 MMOL/L (ref 95–110)
CLARITY UR: CLEAR
CO2 SERPL-SCNC: 23 MMOL/L (ref 23–29)
COLOR UR: ABNORMAL
CREAT SERPL-MCNC: 0.8 MG/DL (ref 0.5–1.4)
CTP QC/QA: YES
DIFFERENTIAL METHOD: ABNORMAL
EOSINOPHIL # BLD AUTO: 0 K/UL (ref 0–0.5)
EOSINOPHIL NFR BLD: 0.6 % (ref 0–8)
ERYTHROCYTE [DISTWIDTH] IN BLOOD BY AUTOMATED COUNT: 13.7 % (ref 11.5–14.5)
EST. GFR  (AFRICAN AMERICAN): >60 ML/MIN/1.73 M^2
EST. GFR  (NON AFRICAN AMERICAN): >60 ML/MIN/1.73 M^2
GLUCOSE SERPL-MCNC: 121 MG/DL (ref 70–110)
GLUCOSE UR QL STRIP: NEGATIVE
HCT VFR BLD AUTO: 37.8 % (ref 37–48.5)
HGB BLD-MCNC: 12.4 G/DL (ref 12–16)
HGB UR QL STRIP: NEGATIVE
IMM GRANULOCYTES # BLD AUTO: 0.03 K/UL (ref 0–0.04)
IMM GRANULOCYTES NFR BLD AUTO: 0.4 % (ref 0–0.5)
KETONES UR QL STRIP: NEGATIVE
LACTATE SERPL-SCNC: 1.3 MMOL/L (ref 0.5–2.2)
LACTATE SERPL-SCNC: 1.4 MMOL/L (ref 0.5–2.2)
LACTATE SERPL-SCNC: 1.4 MMOL/L (ref 0.5–2.2)
LEUKOCYTE ESTERASE UR QL STRIP: ABNORMAL
LYMPHOCYTES # BLD AUTO: 0.7 K/UL (ref 1–4.8)
LYMPHOCYTES NFR BLD: 10.1 % (ref 18–48)
MCH RBC QN AUTO: 28.1 PG (ref 27–31)
MCHC RBC AUTO-ENTMCNC: 32.8 G/DL (ref 32–36)
MCV RBC AUTO: 86 FL (ref 82–98)
MICROSCOPIC COMMENT: ABNORMAL
MONOCYTES # BLD AUTO: 0.7 K/UL (ref 0.3–1)
MONOCYTES NFR BLD: 9.9 % (ref 4–15)
NEUTROPHILS # BLD AUTO: 5.4 K/UL (ref 1.8–7.7)
NEUTROPHILS NFR BLD: 78.9 % (ref 38–73)
NITRITE UR QL STRIP: POSITIVE
NRBC BLD-RTO: 0 /100 WBC
PH UR STRIP: 7 [PH] (ref 5–8)
PLATELET # BLD AUTO: 149 K/UL (ref 150–450)
PMV BLD AUTO: 11.6 FL (ref 9.2–12.9)
POCT GLUCOSE: 132 MG/DL (ref 70–110)
POTASSIUM SERPL-SCNC: 4.3 MMOL/L (ref 3.5–5.1)
PROT SERPL-MCNC: 6.9 G/DL (ref 6–8.4)
PROT UR QL STRIP: NEGATIVE
RBC # BLD AUTO: 4.41 M/UL (ref 4–5.4)
RBC #/AREA URNS HPF: 1 /HPF (ref 0–4)
SARS-COV-2 RDRP RESP QL NAA+PROBE: NEGATIVE
SODIUM SERPL-SCNC: 134 MMOL/L (ref 136–145)
SP GR UR STRIP: 1 (ref 1–1.03)
SQUAMOUS #/AREA URNS HPF: 1 /HPF
TROPONIN I SERPL DL<=0.01 NG/ML-MCNC: 0.01 NG/ML (ref 0–0.03)
TSH SERPL DL<=0.005 MIU/L-ACNC: 1.21 UIU/ML (ref 0.4–4)
URN SPEC COLLECT METH UR: ABNORMAL
UROBILINOGEN UR STRIP-ACNC: NEGATIVE EU/DL
WBC # BLD AUTO: 6.8 K/UL (ref 3.9–12.7)
WBC #/AREA URNS HPF: 40 /HPF (ref 0–5)

## 2021-11-17 PROCEDURE — 87077 CULTURE AEROBIC IDENTIFY: CPT | Performed by: NURSE PRACTITIONER

## 2021-11-17 PROCEDURE — 63600175 PHARM REV CODE 636 W HCPCS: Performed by: NURSE PRACTITIONER

## 2021-11-17 PROCEDURE — 84443 ASSAY THYROID STIM HORMONE: CPT | Performed by: NURSE PRACTITIONER

## 2021-11-17 PROCEDURE — 87040 BLOOD CULTURE FOR BACTERIA: CPT | Mod: 59 | Performed by: NURSE PRACTITIONER

## 2021-11-17 PROCEDURE — 87186 SC STD MICRODIL/AGAR DIL: CPT | Performed by: NURSE PRACTITIONER

## 2021-11-17 PROCEDURE — 83880 ASSAY OF NATRIURETIC PEPTIDE: CPT | Performed by: NURSE PRACTITIONER

## 2021-11-17 PROCEDURE — 83605 ASSAY OF LACTIC ACID: CPT | Performed by: NURSE PRACTITIONER

## 2021-11-17 PROCEDURE — 84484 ASSAY OF TROPONIN QUANT: CPT | Performed by: NURSE PRACTITIONER

## 2021-11-17 PROCEDURE — 99285 EMERGENCY DEPT VISIT HI MDM: CPT | Mod: 25

## 2021-11-17 PROCEDURE — 85025 COMPLETE CBC W/AUTO DIFF WBC: CPT | Performed by: NURSE PRACTITIONER

## 2021-11-17 PROCEDURE — 81000 URINALYSIS NONAUTO W/SCOPE: CPT | Performed by: NURSE PRACTITIONER

## 2021-11-17 PROCEDURE — 87088 URINE BACTERIA CULTURE: CPT | Performed by: NURSE PRACTITIONER

## 2021-11-17 PROCEDURE — 80053 COMPREHEN METABOLIC PANEL: CPT | Performed by: NURSE PRACTITIONER

## 2021-11-17 PROCEDURE — 25000003 PHARM REV CODE 250: Performed by: NURSE PRACTITIONER

## 2021-11-17 PROCEDURE — U0002 COVID-19 LAB TEST NON-CDC: HCPCS | Performed by: NURSE PRACTITIONER

## 2021-11-17 PROCEDURE — 87086 URINE CULTURE/COLONY COUNT: CPT | Performed by: NURSE PRACTITIONER

## 2021-11-17 PROCEDURE — 36415 COLL VENOUS BLD VENIPUNCTURE: CPT | Performed by: FAMILY MEDICINE

## 2021-11-17 PROCEDURE — 93010 ELECTROCARDIOGRAM REPORT: CPT | Mod: ,,, | Performed by: INTERNAL MEDICINE

## 2021-11-17 PROCEDURE — 83605 ASSAY OF LACTIC ACID: CPT | Mod: 91 | Performed by: FAMILY MEDICINE

## 2021-11-17 PROCEDURE — 96375 TX/PRO/DX INJ NEW DRUG ADDON: CPT

## 2021-11-17 PROCEDURE — 93005 ELECTROCARDIOGRAM TRACING: CPT

## 2021-11-17 PROCEDURE — 96366 THER/PROPH/DIAG IV INF ADDON: CPT

## 2021-11-17 PROCEDURE — 87040 BLOOD CULTURE FOR BACTERIA: CPT | Performed by: EMERGENCY MEDICINE

## 2021-11-17 PROCEDURE — 82962 GLUCOSE BLOOD TEST: CPT

## 2021-11-17 PROCEDURE — 93010 EKG 12-LEAD: ICD-10-PCS | Mod: ,,, | Performed by: INTERNAL MEDICINE

## 2021-11-17 PROCEDURE — G0378 HOSPITAL OBSERVATION PER HR: HCPCS

## 2021-11-17 PROCEDURE — 25000003 PHARM REV CODE 250: Performed by: FAMILY MEDICINE

## 2021-11-17 PROCEDURE — 96365 THER/PROPH/DIAG IV INF INIT: CPT

## 2021-11-17 RX ORDER — VALSARTAN 160 MG/1
160 TABLET ORAL DAILY
COMMUNITY
Start: 2021-11-13 | End: 2021-11-17

## 2021-11-17 RX ORDER — SODIUM CHLORIDE 0.9 % (FLUSH) 0.9 %
10 SYRINGE (ML) INJECTION EVERY 12 HOURS PRN
Status: DISCONTINUED | OUTPATIENT
Start: 2021-11-17 | End: 2021-11-18 | Stop reason: HOSPADM

## 2021-11-17 RX ORDER — LEVOFLOXACIN 5 MG/ML
750 INJECTION, SOLUTION INTRAVENOUS
Status: DISCONTINUED | OUTPATIENT
Start: 2021-11-18 | End: 2021-11-18

## 2021-11-17 RX ORDER — GUAIFENESIN 100 MG/5ML
200 SOLUTION ORAL EVERY 4 HOURS PRN
Status: DISCONTINUED | OUTPATIENT
Start: 2021-11-17 | End: 2021-11-18 | Stop reason: HOSPADM

## 2021-11-17 RX ORDER — BENZONATATE 100 MG/1
100 CAPSULE ORAL 3 TIMES DAILY PRN
Status: DISCONTINUED | OUTPATIENT
Start: 2021-11-17 | End: 2021-11-18 | Stop reason: HOSPADM

## 2021-11-17 RX ORDER — NAPROXEN SODIUM 220 MG/1
81 TABLET, FILM COATED ORAL DAILY
Status: DISCONTINUED | OUTPATIENT
Start: 2021-11-18 | End: 2021-11-18 | Stop reason: HOSPADM

## 2021-11-17 RX ORDER — AMLODIPINE BESYLATE 5 MG/1
5 TABLET ORAL DAILY
Status: ON HOLD | COMMUNITY
Start: 2021-10-21 | End: 2022-07-08 | Stop reason: HOSPADM

## 2021-11-17 RX ORDER — LEVOFLOXACIN 5 MG/ML
750 INJECTION, SOLUTION INTRAVENOUS ONCE
Status: COMPLETED | OUTPATIENT
Start: 2021-11-17 | End: 2021-11-17

## 2021-11-17 RX ORDER — ONDANSETRON 8 MG/1
8 TABLET, ORALLY DISINTEGRATING ORAL EVERY 8 HOURS PRN
Status: DISCONTINUED | OUTPATIENT
Start: 2021-11-17 | End: 2021-11-18 | Stop reason: HOSPADM

## 2021-11-17 RX ORDER — ENOXAPARIN SODIUM 100 MG/ML
40 INJECTION SUBCUTANEOUS EVERY 24 HOURS
Status: DISCONTINUED | OUTPATIENT
Start: 2021-11-17 | End: 2021-11-18 | Stop reason: HOSPADM

## 2021-11-17 RX ORDER — ATORVASTATIN CALCIUM 20 MG/1
20 TABLET, FILM COATED ORAL NIGHTLY
Status: ON HOLD | COMMUNITY
Start: 2021-10-22 | End: 2022-07-22 | Stop reason: SDUPTHER

## 2021-11-17 RX ORDER — NAPROXEN SODIUM 220 MG/1
81 TABLET, FILM COATED ORAL DAILY
COMMUNITY
End: 2022-11-27 | Stop reason: ALTCHOICE

## 2021-11-17 RX ORDER — SPIRONOLACTONE 25 MG/1
25 TABLET ORAL DAILY
Status: DISCONTINUED | OUTPATIENT
Start: 2021-11-18 | End: 2021-11-18 | Stop reason: HOSPADM

## 2021-11-17 RX ORDER — FUROSEMIDE 10 MG/ML
40 INJECTION INTRAMUSCULAR; INTRAVENOUS
Status: COMPLETED | OUTPATIENT
Start: 2021-11-17 | End: 2021-11-17

## 2021-11-17 RX ORDER — ACETAMINOPHEN 500 MG
1000 TABLET ORAL
Status: COMPLETED | OUTPATIENT
Start: 2021-11-17 | End: 2021-11-17

## 2021-11-17 RX ORDER — FUROSEMIDE 20 MG/1
20 TABLET ORAL 2 TIMES DAILY
Status: DISCONTINUED | OUTPATIENT
Start: 2021-11-18 | End: 2021-11-18 | Stop reason: HOSPADM

## 2021-11-17 RX ORDER — AMLODIPINE BESYLATE 5 MG/1
10 TABLET ORAL DAILY
Status: DISCONTINUED | OUTPATIENT
Start: 2021-11-17 | End: 2021-11-18 | Stop reason: HOSPADM

## 2021-11-17 RX ORDER — CLOPIDOGREL BISULFATE 75 MG/1
75 TABLET ORAL DAILY
Status: DISCONTINUED | OUTPATIENT
Start: 2021-11-18 | End: 2021-11-18 | Stop reason: HOSPADM

## 2021-11-17 RX ADMIN — GUAIFENESIN 200 MG: 200 SOLUTION ORAL at 09:11

## 2021-11-17 RX ADMIN — LEVOFLOXACIN 750 MG: 750 INJECTION, SOLUTION INTRAVENOUS at 12:11

## 2021-11-17 RX ADMIN — AMLODIPINE BESYLATE 10 MG: 5 TABLET ORAL at 04:11

## 2021-11-17 RX ADMIN — ACETAMINOPHEN 1000 MG: 500 TABLET ORAL at 12:11

## 2021-11-17 RX ADMIN — FUROSEMIDE 40 MG: 40 INJECTION, SOLUTION INTRAMUSCULAR; INTRAVENOUS at 03:11

## 2021-11-18 VITALS
OXYGEN SATURATION: 95 % | SYSTOLIC BLOOD PRESSURE: 144 MMHG | WEIGHT: 196.63 LBS | HEART RATE: 65 BPM | BODY MASS INDEX: 36.18 KG/M2 | DIASTOLIC BLOOD PRESSURE: 64 MMHG | TEMPERATURE: 97 F | HEIGHT: 62 IN | RESPIRATION RATE: 18 BRPM

## 2021-11-18 PROBLEM — N30.00 ACUTE CYSTITIS: Status: ACTIVE | Noted: 2021-11-18

## 2021-11-18 LAB
ANION GAP SERPL CALC-SCNC: 9 MMOL/L (ref 8–16)
BASOPHILS # BLD AUTO: 0.01 K/UL (ref 0–0.2)
BASOPHILS NFR BLD: 0.2 % (ref 0–1.9)
BUN SERPL-MCNC: 16 MG/DL (ref 8–23)
CALCIUM SERPL-MCNC: 8.6 MG/DL (ref 8.7–10.5)
CHLORIDE SERPL-SCNC: 100 MMOL/L (ref 95–110)
CO2 SERPL-SCNC: 24 MMOL/L (ref 23–29)
CREAT SERPL-MCNC: 0.7 MG/DL (ref 0.5–1.4)
DIFFERENTIAL METHOD: ABNORMAL
EOSINOPHIL # BLD AUTO: 0.1 K/UL (ref 0–0.5)
EOSINOPHIL NFR BLD: 0.9 % (ref 0–8)
ERYTHROCYTE [DISTWIDTH] IN BLOOD BY AUTOMATED COUNT: 13.6 % (ref 11.5–14.5)
EST. GFR  (AFRICAN AMERICAN): >60 ML/MIN/1.73 M^2
EST. GFR  (NON AFRICAN AMERICAN): >60 ML/MIN/1.73 M^2
GLUCOSE SERPL-MCNC: 128 MG/DL (ref 70–110)
HCT VFR BLD AUTO: 40 % (ref 37–48.5)
HGB BLD-MCNC: 13.1 G/DL (ref 12–16)
IMM GRANULOCYTES # BLD AUTO: 0.03 K/UL (ref 0–0.04)
IMM GRANULOCYTES NFR BLD AUTO: 0.5 % (ref 0–0.5)
LYMPHOCYTES # BLD AUTO: 0.9 K/UL (ref 1–4.8)
LYMPHOCYTES NFR BLD: 13.6 % (ref 18–48)
MAGNESIUM SERPL-MCNC: 1.7 MG/DL (ref 1.6–2.6)
MCH RBC QN AUTO: 28.3 PG (ref 27–31)
MCHC RBC AUTO-ENTMCNC: 32.8 G/DL (ref 32–36)
MCV RBC AUTO: 86 FL (ref 82–98)
MONOCYTES # BLD AUTO: 0.8 K/UL (ref 0.3–1)
MONOCYTES NFR BLD: 11.5 % (ref 4–15)
NEUTROPHILS # BLD AUTO: 4.8 K/UL (ref 1.8–7.7)
NEUTROPHILS NFR BLD: 73.3 % (ref 38–73)
NRBC BLD-RTO: 0 /100 WBC
PHOSPHATE SERPL-MCNC: 2.7 MG/DL (ref 2.7–4.5)
PLATELET # BLD AUTO: 132 K/UL (ref 150–450)
PMV BLD AUTO: 11.5 FL (ref 9.2–12.9)
POCT GLUCOSE: 108 MG/DL (ref 70–110)
POTASSIUM SERPL-SCNC: 3.8 MMOL/L (ref 3.5–5.1)
RBC # BLD AUTO: 4.63 M/UL (ref 4–5.4)
SODIUM SERPL-SCNC: 133 MMOL/L (ref 136–145)
WBC # BLD AUTO: 6.53 K/UL (ref 3.9–12.7)

## 2021-11-18 PROCEDURE — 25000003 PHARM REV CODE 250: Performed by: FAMILY MEDICINE

## 2021-11-18 PROCEDURE — 96372 THER/PROPH/DIAG INJ SC/IM: CPT | Mod: 59

## 2021-11-18 PROCEDURE — 94640 AIRWAY INHALATION TREATMENT: CPT

## 2021-11-18 PROCEDURE — 36415 COLL VENOUS BLD VENIPUNCTURE: CPT | Performed by: FAMILY MEDICINE

## 2021-11-18 PROCEDURE — 84100 ASSAY OF PHOSPHORUS: CPT | Performed by: FAMILY MEDICINE

## 2021-11-18 PROCEDURE — 83735 ASSAY OF MAGNESIUM: CPT | Performed by: FAMILY MEDICINE

## 2021-11-18 PROCEDURE — 80048 BASIC METABOLIC PNL TOTAL CA: CPT | Performed by: FAMILY MEDICINE

## 2021-11-18 PROCEDURE — 96375 TX/PRO/DX INJ NEW DRUG ADDON: CPT

## 2021-11-18 PROCEDURE — 63600175 PHARM REV CODE 636 W HCPCS: Performed by: FAMILY MEDICINE

## 2021-11-18 PROCEDURE — 25000003 PHARM REV CODE 250: Performed by: NURSE PRACTITIONER

## 2021-11-18 PROCEDURE — 25000242 PHARM REV CODE 250 ALT 637 W/ HCPCS: Performed by: NURSE PRACTITIONER

## 2021-11-18 PROCEDURE — G0378 HOSPITAL OBSERVATION PER HR: HCPCS

## 2021-11-18 PROCEDURE — 85025 COMPLETE CBC W/AUTO DIFF WBC: CPT | Performed by: FAMILY MEDICINE

## 2021-11-18 RX ORDER — LEVOFLOXACIN 750 MG/1
750 TABLET ORAL DAILY
Status: DISCONTINUED | OUTPATIENT
Start: 2021-11-18 | End: 2021-11-18 | Stop reason: HOSPADM

## 2021-11-18 RX ORDER — BENZONATATE 100 MG/1
100 CAPSULE ORAL 3 TIMES DAILY PRN
Qty: 20 CAPSULE | Refills: 0 | Status: SHIPPED | OUTPATIENT
Start: 2021-11-18 | End: 2021-11-28

## 2021-11-18 RX ORDER — LEVOFLOXACIN 750 MG/1
750 TABLET ORAL DAILY
Qty: 3 TABLET | Refills: 0 | Status: SHIPPED | OUTPATIENT
Start: 2021-11-19 | End: 2021-11-20 | Stop reason: ALTCHOICE

## 2021-11-18 RX ORDER — IPRATROPIUM BROMIDE AND ALBUTEROL SULFATE 2.5; .5 MG/3ML; MG/3ML
3 SOLUTION RESPIRATORY (INHALATION) EVERY 6 HOURS PRN
Status: DISCONTINUED | OUTPATIENT
Start: 2021-11-18 | End: 2021-11-18 | Stop reason: HOSPADM

## 2021-11-18 RX ORDER — IPRATROPIUM BROMIDE AND ALBUTEROL SULFATE 2.5; .5 MG/3ML; MG/3ML
3 SOLUTION RESPIRATORY (INHALATION) EVERY 6 HOURS PRN
Status: DISCONTINUED | OUTPATIENT
Start: 2021-11-18 | End: 2021-11-18

## 2021-11-18 RX ORDER — IPRATROPIUM BROMIDE AND ALBUTEROL SULFATE 2.5; .5 MG/3ML; MG/3ML
3 SOLUTION RESPIRATORY (INHALATION) EVERY 6 HOURS PRN
Qty: 180 ML | Refills: 0 | Status: SHIPPED | OUTPATIENT
Start: 2021-11-18 | End: 2021-11-29 | Stop reason: SDUPTHER

## 2021-11-18 RX ADMIN — AMLODIPINE BESYLATE 10 MG: 5 TABLET ORAL at 09:11

## 2021-11-18 RX ADMIN — GUAIFENESIN 200 MG: 200 SOLUTION ORAL at 04:11

## 2021-11-18 RX ADMIN — CLOPIDOGREL 75 MG: 75 TABLET, FILM COATED ORAL at 09:11

## 2021-11-18 RX ADMIN — FUROSEMIDE 20 MG: 20 TABLET ORAL at 09:11

## 2021-11-18 RX ADMIN — IPRATROPIUM BROMIDE AND ALBUTEROL SULFATE 3 ML: 2.5; .5 SOLUTION RESPIRATORY (INHALATION) at 11:11

## 2021-11-18 RX ADMIN — LEVOTHYROXINE SODIUM 125 MCG: 75 TABLET ORAL at 05:11

## 2021-11-18 RX ADMIN — LEVOFLOXACIN 750 MG: 750 TABLET, FILM COATED ORAL at 01:11

## 2021-11-18 RX ADMIN — ENOXAPARIN SODIUM 40 MG: 40 INJECTION, SOLUTION INTRAVENOUS; SUBCUTANEOUS at 02:11

## 2021-11-18 RX ADMIN — SPIRONOLACTONE 25 MG: 25 TABLET ORAL at 09:11

## 2021-11-18 RX ADMIN — IPRATROPIUM BROMIDE AND ALBUTEROL SULFATE 3 ML: 2.5; .5 SOLUTION RESPIRATORY (INHALATION) at 05:11

## 2021-11-18 RX ADMIN — VANCOMYCIN HYDROCHLORIDE 1750 MG: 10 INJECTION, POWDER, LYOPHILIZED, FOR SOLUTION INTRAVENOUS at 02:11

## 2021-11-18 RX ADMIN — ASPIRIN 81 MG: 81 TABLET, CHEWABLE ORAL at 09:11

## 2021-11-18 RX ADMIN — GUAIFENESIN 200 MG: 200 SOLUTION ORAL at 02:11

## 2021-11-19 LAB — BACTERIA UR CULT: ABNORMAL

## 2021-11-20 ENCOUNTER — TELEPHONE (OUTPATIENT)
Dept: HEPATOLOGY | Facility: HOSPITAL | Age: 86
End: 2021-11-20
Payer: MEDICARE

## 2021-11-20 DIAGNOSIS — N30.00 ACUTE CYSTITIS WITHOUT HEMATURIA: Primary | ICD-10-CM

## 2021-11-20 PROCEDURE — G0180 MD CERTIFICATION HHA PATIENT: HCPCS | Mod: ,,, | Performed by: FAMILY MEDICINE

## 2021-11-20 PROCEDURE — G0180 PR HOME HEALTH MD CERTIFICATION: ICD-10-PCS | Mod: ,,, | Performed by: FAMILY MEDICINE

## 2021-11-20 RX ORDER — NITROFURANTOIN 25; 75 MG/1; MG/1
100 CAPSULE ORAL 2 TIMES DAILY
Qty: 20 CAPSULE | Refills: 0 | Status: SHIPPED | OUTPATIENT
Start: 2021-11-20 | End: 2021-11-30

## 2021-11-22 LAB
BACTERIA BLD CULT: NORMAL

## 2021-11-29 ENCOUNTER — OFFICE VISIT (OUTPATIENT)
Dept: PRIMARY CARE CLINIC | Facility: CLINIC | Age: 86
End: 2021-11-29
Payer: MEDICARE

## 2021-11-29 VITALS
WEIGHT: 206.38 LBS | TEMPERATURE: 98 F | DIASTOLIC BLOOD PRESSURE: 64 MMHG | BODY MASS INDEX: 37.98 KG/M2 | HEIGHT: 62 IN | SYSTOLIC BLOOD PRESSURE: 132 MMHG | OXYGEN SATURATION: 94 % | HEART RATE: 59 BPM

## 2021-11-29 DIAGNOSIS — I50.43 ACUTE ON CHRONIC COMBINED SYSTOLIC AND DIASTOLIC CHF (CONGESTIVE HEART FAILURE): ICD-10-CM

## 2021-11-29 DIAGNOSIS — N30.00 ACUTE CYSTITIS WITHOUT HEMATURIA: ICD-10-CM

## 2021-11-29 DIAGNOSIS — J18.9 PNEUMONIA OF BOTH LOWER LOBES DUE TO INFECTIOUS ORGANISM: Primary | ICD-10-CM

## 2021-11-29 DIAGNOSIS — I10 ESSENTIAL HYPERTENSION: ICD-10-CM

## 2021-11-29 DIAGNOSIS — E03.9 ACQUIRED HYPOTHYROIDISM: ICD-10-CM

## 2021-11-29 PROBLEM — R35.0 FREQUENCY OF URINATION: Status: RESOLVED | Noted: 2020-07-07 | Resolved: 2021-11-29

## 2021-11-29 PROCEDURE — 99214 OFFICE O/P EST MOD 30 MIN: CPT | Mod: S$GLB,,, | Performed by: HOSPITALIST

## 2021-11-29 PROCEDURE — 99999 PR PBB SHADOW E&M-EST. PATIENT-LVL III: CPT | Mod: PBBFAC,,, | Performed by: HOSPITALIST

## 2021-11-29 PROCEDURE — 99999 PR PBB SHADOW E&M-EST. PATIENT-LVL III: ICD-10-PCS | Mod: PBBFAC,,, | Performed by: HOSPITALIST

## 2021-11-29 PROCEDURE — 99214 PR OFFICE/OUTPT VISIT, EST, LEVL IV, 30-39 MIN: ICD-10-PCS | Mod: S$GLB,,, | Performed by: HOSPITALIST

## 2021-11-29 RX ORDER — FUROSEMIDE 20 MG/1
20 TABLET ORAL DAILY
Qty: 90 TABLET | Refills: 3 | Status: SHIPPED | OUTPATIENT
Start: 2021-11-29 | End: 2022-07-03

## 2021-11-29 RX ORDER — CLOPIDOGREL BISULFATE 75 MG/1
75 TABLET ORAL DAILY
Qty: 90 TABLET | Refills: 3 | Status: ON HOLD | OUTPATIENT
Start: 2021-11-29 | End: 2023-03-30 | Stop reason: HOSPADM

## 2021-11-29 RX ORDER — IPRATROPIUM BROMIDE AND ALBUTEROL SULFATE 2.5; .5 MG/3ML; MG/3ML
3 SOLUTION RESPIRATORY (INHALATION) EVERY 6 HOURS PRN
Qty: 180 ML | Refills: 1 | Status: SHIPPED | OUTPATIENT
Start: 2021-11-29 | End: 2022-07-03

## 2021-12-06 ENCOUNTER — EXTERNAL HOME HEALTH (OUTPATIENT)
Dept: HOME HEALTH SERVICES | Facility: HOSPITAL | Age: 86
End: 2021-12-06
Payer: MEDICARE

## 2022-07-03 ENCOUNTER — HOSPITAL ENCOUNTER (INPATIENT)
Facility: HOSPITAL | Age: 87
LOS: 7 days | Discharge: NURSING, REHAB OR LONG-TERM CARE | DRG: 291 | End: 2022-07-11
Attending: EMERGENCY MEDICINE | Admitting: INTERNAL MEDICINE
Payer: MEDICARE

## 2022-07-03 DIAGNOSIS — E03.9 ACQUIRED HYPOTHYROIDISM: ICD-10-CM

## 2022-07-03 DIAGNOSIS — I50.43 ACUTE ON CHRONIC COMBINED SYSTOLIC AND DIASTOLIC CHF (CONGESTIVE HEART FAILURE): ICD-10-CM

## 2022-07-03 DIAGNOSIS — E78.5 HYPERLIPIDEMIA, UNSPECIFIED HYPERLIPIDEMIA TYPE: ICD-10-CM

## 2022-07-03 DIAGNOSIS — Z78.9 ADEQUATE NUTRITION: ICD-10-CM

## 2022-07-03 DIAGNOSIS — N39.0 URINARY TRACT INFECTION WITHOUT HEMATURIA, SITE UNSPECIFIED: ICD-10-CM

## 2022-07-03 DIAGNOSIS — R06.02 SHORTNESS OF BREATH: ICD-10-CM

## 2022-07-03 DIAGNOSIS — D64.9 NORMOCYTIC ANEMIA: ICD-10-CM

## 2022-07-03 DIAGNOSIS — I50.9 CONGESTIVE HEART FAILURE, UNSPECIFIED HF CHRONICITY, UNSPECIFIED HEART FAILURE TYPE: Primary | ICD-10-CM

## 2022-07-03 DIAGNOSIS — I50.43 ACUTE ON CHRONIC COMBINED SYSTOLIC (CONGESTIVE) AND DIASTOLIC (CONGESTIVE) HEART FAILURE: ICD-10-CM

## 2022-07-03 DIAGNOSIS — I10 ESSENTIAL HYPERTENSION: ICD-10-CM

## 2022-07-03 PROBLEM — E87.29 INCREASED ANION GAP METABOLIC ACIDOSIS: Status: ACTIVE | Noted: 2022-07-03

## 2022-07-03 LAB
ALBUMIN SERPL BCP-MCNC: 2.8 G/DL (ref 3.5–5.2)
ALP SERPL-CCNC: 115 U/L (ref 55–135)
ALT SERPL W/O P-5'-P-CCNC: 13 U/L (ref 10–44)
ANION GAP SERPL CALC-SCNC: 16 MMOL/L (ref 8–16)
AST SERPL-CCNC: 26 U/L (ref 10–40)
BACTERIA #/AREA URNS HPF: ABNORMAL /HPF
BASOPHILS # BLD AUTO: 0.05 K/UL (ref 0–0.2)
BASOPHILS NFR BLD: 0.3 % (ref 0–1.9)
BILIRUB SERPL-MCNC: 1.3 MG/DL (ref 0.1–1)
BILIRUB UR QL STRIP: NEGATIVE
BNP SERPL-MCNC: 1347 PG/ML (ref 0–99)
BUN SERPL-MCNC: 34 MG/DL (ref 8–23)
CALCIUM SERPL-MCNC: 8.7 MG/DL (ref 8.7–10.5)
CHLORIDE SERPL-SCNC: 102 MMOL/L (ref 95–110)
CK SERPL-CCNC: 11 U/L (ref 20–180)
CLARITY UR: ABNORMAL
CO2 SERPL-SCNC: 22 MMOL/L (ref 23–29)
COLOR UR: YELLOW
CREAT SERPL-MCNC: 1.1 MG/DL (ref 0.5–1.4)
CTP QC/QA: YES
DIFFERENTIAL METHOD: ABNORMAL
EOSINOPHIL # BLD AUTO: 0 K/UL (ref 0–0.5)
EOSINOPHIL NFR BLD: 0.2 % (ref 0–8)
ERYTHROCYTE [DISTWIDTH] IN BLOOD BY AUTOMATED COUNT: 17.1 % (ref 11.5–14.5)
EST. GFR  (AFRICAN AMERICAN): 51 ML/MIN/1.73 M^2
EST. GFR  (NON AFRICAN AMERICAN): 44 ML/MIN/1.73 M^2
GLUCOSE SERPL-MCNC: 156 MG/DL (ref 70–110)
GLUCOSE UR QL STRIP: NEGATIVE
HCT VFR BLD AUTO: 34.3 % (ref 37–48.5)
HGB BLD-MCNC: 10.9 G/DL (ref 12–16)
HGB UR QL STRIP: ABNORMAL
HYALINE CASTS #/AREA URNS LPF: 0 /LPF
IMM GRANULOCYTES # BLD AUTO: 0.11 K/UL (ref 0–0.04)
IMM GRANULOCYTES NFR BLD AUTO: 0.7 % (ref 0–0.5)
KETONES UR QL STRIP: NEGATIVE
LEUKOCYTE ESTERASE UR QL STRIP: ABNORMAL
LYMPHOCYTES # BLD AUTO: 0.6 K/UL (ref 1–4.8)
LYMPHOCYTES NFR BLD: 4.1 % (ref 18–48)
MAGNESIUM SERPL-MCNC: 1.8 MG/DL (ref 1.6–2.6)
MCH RBC QN AUTO: 27.1 PG (ref 27–31)
MCHC RBC AUTO-ENTMCNC: 31.8 G/DL (ref 32–36)
MCV RBC AUTO: 85 FL (ref 82–98)
MICROSCOPIC COMMENT: ABNORMAL
MONOCYTES # BLD AUTO: 0.5 K/UL (ref 0.3–1)
MONOCYTES NFR BLD: 3.3 % (ref 4–15)
NEUTROPHILS # BLD AUTO: 13.5 K/UL (ref 1.8–7.7)
NEUTROPHILS NFR BLD: 91.4 % (ref 38–73)
NITRITE UR QL STRIP: POSITIVE
NRBC BLD-RTO: 0 /100 WBC
PH UR STRIP: 6 [PH] (ref 5–8)
PHOSPHATE SERPL-MCNC: 3.8 MG/DL (ref 2.7–4.5)
PLATELET # BLD AUTO: 201 K/UL (ref 150–450)
PMV BLD AUTO: 12.4 FL (ref 9.2–12.9)
POTASSIUM SERPL-SCNC: 4.1 MMOL/L (ref 3.5–5.1)
PROT SERPL-MCNC: 7.6 G/DL (ref 6–8.4)
PROT UR QL STRIP: ABNORMAL
RBC # BLD AUTO: 4.02 M/UL (ref 4–5.4)
RBC #/AREA URNS HPF: 2 /HPF (ref 0–4)
SARS-COV-2 RDRP RESP QL NAA+PROBE: NEGATIVE
SODIUM SERPL-SCNC: 140 MMOL/L (ref 136–145)
SP GR UR STRIP: 1.01 (ref 1–1.03)
SQUAMOUS #/AREA URNS HPF: 1 /HPF
T4 FREE SERPL-MCNC: 1.14 NG/DL (ref 0.71–1.51)
TROPONIN I SERPL DL<=0.01 NG/ML-MCNC: 0.04 NG/ML (ref 0–0.03)
TSH SERPL DL<=0.005 MIU/L-ACNC: 4.64 UIU/ML (ref 0.4–4)
URN SPEC COLLECT METH UR: ABNORMAL
UROBILINOGEN UR STRIP-ACNC: NEGATIVE EU/DL
WBC # BLD AUTO: 14.73 K/UL (ref 3.9–12.7)
WBC #/AREA URNS HPF: 25 /HPF (ref 0–5)
YEAST URNS QL MICRO: ABNORMAL

## 2022-07-03 PROCEDURE — 63600175 PHARM REV CODE 636 W HCPCS: Performed by: EMERGENCY MEDICINE

## 2022-07-03 PROCEDURE — G0378 HOSPITAL OBSERVATION PER HR: HCPCS

## 2022-07-03 PROCEDURE — U0002 COVID-19 LAB TEST NON-CDC: HCPCS | Performed by: EMERGENCY MEDICINE

## 2022-07-03 PROCEDURE — 94761 N-INVAS EAR/PLS OXIMETRY MLT: CPT

## 2022-07-03 PROCEDURE — 96374 THER/PROPH/DIAG INJ IV PUSH: CPT

## 2022-07-03 PROCEDURE — 84443 ASSAY THYROID STIM HORMONE: CPT | Performed by: EMERGENCY MEDICINE

## 2022-07-03 PROCEDURE — 80053 COMPREHEN METABOLIC PANEL: CPT | Performed by: EMERGENCY MEDICINE

## 2022-07-03 PROCEDURE — 83880 ASSAY OF NATRIURETIC PEPTIDE: CPT | Performed by: EMERGENCY MEDICINE

## 2022-07-03 PROCEDURE — 93005 ELECTROCARDIOGRAM TRACING: CPT

## 2022-07-03 PROCEDURE — 84100 ASSAY OF PHOSPHORUS: CPT | Performed by: EMERGENCY MEDICINE

## 2022-07-03 PROCEDURE — 81000 URINALYSIS NONAUTO W/SCOPE: CPT | Performed by: EMERGENCY MEDICINE

## 2022-07-03 PROCEDURE — 93010 EKG 12-LEAD: ICD-10-PCS | Mod: ,,, | Performed by: INTERNAL MEDICINE

## 2022-07-03 PROCEDURE — 85025 COMPLETE CBC W/AUTO DIFF WBC: CPT | Performed by: EMERGENCY MEDICINE

## 2022-07-03 PROCEDURE — 63600175 PHARM REV CODE 636 W HCPCS: Performed by: STUDENT IN AN ORGANIZED HEALTH CARE EDUCATION/TRAINING PROGRAM

## 2022-07-03 PROCEDURE — 93010 ELECTROCARDIOGRAM REPORT: CPT | Mod: ,,, | Performed by: INTERNAL MEDICINE

## 2022-07-03 PROCEDURE — 84484 ASSAY OF TROPONIN QUANT: CPT | Performed by: EMERGENCY MEDICINE

## 2022-07-03 PROCEDURE — 83735 ASSAY OF MAGNESIUM: CPT | Performed by: EMERGENCY MEDICINE

## 2022-07-03 PROCEDURE — 82550 ASSAY OF CK (CPK): CPT | Performed by: EMERGENCY MEDICINE

## 2022-07-03 PROCEDURE — 25000003 PHARM REV CODE 250

## 2022-07-03 PROCEDURE — 84439 ASSAY OF FREE THYROXINE: CPT | Performed by: EMERGENCY MEDICINE

## 2022-07-03 PROCEDURE — 87086 URINE CULTURE/COLONY COUNT: CPT | Performed by: EMERGENCY MEDICINE

## 2022-07-03 PROCEDURE — 99285 EMERGENCY DEPT VISIT HI MDM: CPT | Mod: 25

## 2022-07-03 PROCEDURE — 27000221 HC OXYGEN, UP TO 24 HOURS

## 2022-07-03 RX ORDER — BUMETANIDE 1 MG/1
1 TABLET ORAL 2 TIMES DAILY
Status: ON HOLD | COMMUNITY
Start: 2022-06-20 | End: 2022-07-08 | Stop reason: SDUPTHER

## 2022-07-03 RX ORDER — TIMOLOL MALEATE 5 MG/ML
1 SOLUTION/ DROPS OPHTHALMIC 2 TIMES DAILY
Status: DISCONTINUED | OUTPATIENT
Start: 2022-07-03 | End: 2022-07-11 | Stop reason: HOSPADM

## 2022-07-03 RX ORDER — SODIUM CHLORIDE 0.9 % (FLUSH) 0.9 %
10 SYRINGE (ML) INJECTION
Status: DISCONTINUED | OUTPATIENT
Start: 2022-07-03 | End: 2022-07-11 | Stop reason: HOSPADM

## 2022-07-03 RX ORDER — CLOPIDOGREL BISULFATE 75 MG/1
75 TABLET ORAL DAILY
Status: DISCONTINUED | OUTPATIENT
Start: 2022-07-04 | End: 2022-07-11 | Stop reason: HOSPADM

## 2022-07-03 RX ORDER — FUROSEMIDE 10 MG/ML
40 INJECTION INTRAMUSCULAR; INTRAVENOUS
Status: COMPLETED | OUTPATIENT
Start: 2022-07-03 | End: 2022-07-03

## 2022-07-03 RX ORDER — LOSARTAN POTASSIUM 50 MG/1
50 TABLET ORAL DAILY
COMMUNITY
End: 2022-07-03

## 2022-07-03 RX ORDER — METHENAMINE HIPPURATE 1000 MG/1
1 TABLET ORAL 2 TIMES DAILY WITH MEALS
Status: ON HOLD | COMMUNITY
Start: 2022-06-21 | End: 2022-07-08 | Stop reason: HOSPADM

## 2022-07-03 RX ORDER — TRAZODONE HYDROCHLORIDE 50 MG/1
50 TABLET ORAL NIGHTLY
COMMUNITY
Start: 2022-06-21 | End: 2022-07-08

## 2022-07-03 RX ORDER — CLINDAMYCIN HYDROCHLORIDE 300 MG/1
CAPSULE ORAL
COMMUNITY
Start: 2022-03-07 | End: 2022-07-03

## 2022-07-03 RX ORDER — NITROFURANTOIN 25; 75 MG/1; MG/1
100 CAPSULE ORAL 2 TIMES DAILY
COMMUNITY
Start: 2022-06-07 | End: 2022-07-03

## 2022-07-03 RX ORDER — FUROSEMIDE 10 MG/ML
80 INJECTION INTRAMUSCULAR; INTRAVENOUS 2 TIMES DAILY
Status: DISCONTINUED | OUTPATIENT
Start: 2022-07-03 | End: 2022-07-06

## 2022-07-03 RX ORDER — CIPROFLOXACIN 500 MG/1
500 TABLET ORAL 2 TIMES DAILY
COMMUNITY
Start: 2022-06-24 | End: 2022-07-03

## 2022-07-03 RX ORDER — CIPROFLOXACIN 250 MG/1
TABLET, FILM COATED ORAL
COMMUNITY
Start: 2022-04-26 | End: 2022-07-03

## 2022-07-03 RX ORDER — AMOXICILLIN AND CLAVULANATE POTASSIUM 875; 125 MG/1; MG/1
TABLET, FILM COATED ORAL
COMMUNITY
Start: 2022-05-09 | End: 2022-07-03

## 2022-07-03 RX ORDER — OXYCODONE AND ACETAMINOPHEN 5; 325 MG/1; MG/1
1 TABLET ORAL EVERY 6 HOURS PRN
COMMUNITY
Start: 2022-01-12 | End: 2022-07-03

## 2022-07-03 RX ORDER — NAPROXEN SODIUM 220 MG/1
81 TABLET, FILM COATED ORAL DAILY
Status: DISCONTINUED | OUTPATIENT
Start: 2022-07-04 | End: 2022-07-11 | Stop reason: HOSPADM

## 2022-07-03 RX ORDER — LISINOPRIL 20 MG/1
20 TABLET ORAL DAILY
Status: DISCONTINUED | OUTPATIENT
Start: 2022-07-04 | End: 2022-07-11 | Stop reason: HOSPADM

## 2022-07-03 RX ORDER — CLINDAMYCIN HYDROCHLORIDE 150 MG/1
150 CAPSULE ORAL 3 TIMES DAILY
COMMUNITY
Start: 2022-01-12 | End: 2022-07-03

## 2022-07-03 RX ORDER — LISINOPRIL 20 MG/1
20 TABLET ORAL DAILY
Status: ON HOLD | COMMUNITY
Start: 2022-05-09 | End: 2023-03-30 | Stop reason: HOSPADM

## 2022-07-03 RX ORDER — SULFAMETHOXAZOLE AND TRIMETHOPRIM 800; 160 MG/1; MG/1
1 TABLET ORAL 2 TIMES DAILY
COMMUNITY
Start: 2022-06-10 | End: 2022-07-03

## 2022-07-03 RX ADMIN — TIMOLOL MALEATE 1 DROP: 5 SOLUTION OPHTHALMIC at 11:07

## 2022-07-03 RX ADMIN — FUROSEMIDE 80 MG: 40 INJECTION, SOLUTION INTRAMUSCULAR; INTRAVENOUS at 05:07

## 2022-07-03 RX ADMIN — FUROSEMIDE 40 MG: 40 INJECTION, SOLUTION INTRAMUSCULAR; INTRAVENOUS at 02:07

## 2022-07-03 NOTE — ED PROVIDER NOTES
Encounter Date: 7/3/2022       History     Chief Complaint   Patient presents with    Shortness of Breath     SOB that started this am. +edema bilateral lower legs, ems reported O2 saturations was in 70's. Placed on NC and increased to 92% tachypnic. EMS administered 2 nitro and stated that she complained of mild chest pain. Patient denies CP or having any chest pain this am.      Patient is a 89-year-old female with a history of congestive heart failure presenting with shortness of breath that started this a.m..  EMS reports an oxygen saturation of 71% upon their arrival.  Patient's niece has that her hand has been spiking temperatures every night for about the past 5 nights up to 101. Patient denies any pain.  No vomiting.  She has also been noted with a congested cough.  Patient was noted to be less responsive this morning than normal.  She has recently finished a course of Cipro for urinary tract infection.          Review of patient's allergies indicates:   Allergen Reactions    Penicillins Swelling    Sulfa (sulfonamide antibiotics) Nausea Only     Past Medical History:   Diagnosis Date    Arthritis     Cancer     CHF (congestive heart failure)     Coronary artery disease     Hypertension     Thyroid disease      Past Surgical History:   Procedure Laterality Date    CARDIAC CATHETERIZATION      CARDIAC VALVE SURGERY      CHOLECYSTECTOMY      CORONARY ANGIOGRAPHY N/A 4/23/2019    Procedure: ANGIOGRAM, CORONARY ARTERY;  Surgeon: Bakari Singletary MD;  Location: [facility] CATH LAB;  Service: Cardiology;  Laterality: N/A;    HYSTERECTOMY      lymph nodes removal      THYROIDECTOMY      TRANSCATHETER AORTIC VALVE REPLACEMENT (TAVR) N/A 5/7/2019    Procedure: REPLACEMENT, AORTIC VALVE, TRANSCATHETER (TAVR);  Surgeon: Bakari Singletary MD;  Location: [facility] CATH LAB;  Service: Cardiology;  Laterality: N/A;     No family history on file.  Social History     Tobacco Use    Smoking status: Never Smoker     Smokeless tobacco: Never Used   Substance Use Topics    Alcohol use: Not Currently    Drug use: Never     Review of Systems   Constitutional: Positive for fever.   HENT: Positive for congestion.    Respiratory: Positive for cough and shortness of breath.    Cardiovascular: Negative for chest pain.   Gastrointestinal: Negative for vomiting.       Physical Exam     Initial Vitals [07/03/22 1226]   BP Pulse Resp Temp SpO2   129/70 (!) 59 (!) 26 -- 96 %      MAP       --         Physical Exam    Nursing note and vitals reviewed.  Constitutional: No distress.   HENT:   Head: Atraumatic.   Eyes:   Mild scleral icterus.   Neck: Neck supple.   Cardiovascular: Normal rate, regular rhythm and normal heart sounds.   Pulmonary/Chest:   Diminished breath sounds bilaterally.  (Poor inspiratory effort.)   Abdominal: Abdomen is soft. She exhibits no distension. There is no abdominal tenderness.   Musculoskeletal:         General: Edema present. Normal range of motion.      Cervical back: Neck supple.     Neurological: She is alert.   Skin: Skin is warm and dry.         ED Course   Procedures  Labs Reviewed   CBC W/ AUTO DIFFERENTIAL - Abnormal; Notable for the following components:       Result Value    WBC 14.73 (*)     Hemoglobin 10.9 (*)     Hematocrit 34.3 (*)     MCHC 31.8 (*)     RDW 17.1 (*)     Immature Granulocytes 0.7 (*)     Gran # (ANC) 13.5 (*)     Immature Grans (Abs) 0.11 (*)     Lymph # 0.6 (*)     Gran % 91.4 (*)     Lymph % 4.1 (*)     Mono % 3.3 (*)     All other components within normal limits   COMPREHENSIVE METABOLIC PANEL - Abnormal; Notable for the following components:    CO2 22 (*)     Glucose 156 (*)     BUN 34 (*)     Albumin 2.8 (*)     Total Bilirubin 1.3 (*)     eGFR if  51 (*)     eGFR if non  44 (*)     All other components within normal limits   CK - Abnormal; Notable for the following components:    CPK 11 (*)     All other components within normal limits    TROPONIN I - Abnormal; Notable for the following components:    Troponin I 0.038 (*)     All other components within normal limits   B-TYPE NATRIURETIC PEPTIDE - Abnormal; Notable for the following components:    BNP 1,347 (*)     All other components within normal limits   URINALYSIS - Abnormal; Notable for the following components:    Appearance, UA Hazy (*)     Protein, UA 1+ (*)     Occult Blood UA Trace (*)     Nitrite, UA Positive (*)     Leukocytes, UA 3+ (*)     All other components within normal limits   URINALYSIS MICROSCOPIC - Abnormal; Notable for the following components:    WBC, UA 25 (*)     Bacteria Moderate (*)     Yeast, UA Few (*)     All other components within normal limits   CULTURE, URINE   CULTURE, URINE   MAGNESIUM   TSH   SARS-COV-2 RDRP GENE     EKG Readings: (Independently Interpreted)   Heart Rate: 60. Axis: Left Axis Deviation. Clinical Impression: Paced Rhythm       Imaging Results          X-Ray Chest 1 View (Final result)  Result time 07/03/22 13:53:43    Final result by Yuriy Peoplse Jr., MD (07/03/22 13:53:43)                 Impression:      Volume overload/CHF and pulmonary edema      Electronically signed by: Yuriy Brandon Jr  Date:    07/03/2022  Time:    13:53             Narrative:    EXAMINATION:  XR CHEST 1 VIEW    CLINICAL HISTORY:  Shortness of breath;    TECHNIQUE:  Single frontal view of the chest was performed.    COMPARISON:  11/17/2021    FINDINGS:  Cardiomediastinal silhouetteremains enlarged.  There is a multi lead right-sided pacemaker present as well as a large caliber wall stent overlying the mediastinum, possibly involving the aortic root.    There has been interval development of bilateral perihilar interstitial and alveolar opacity.    Bibasilar effusions are present left greater than right.                              X-Rays:   Independently Interpreted Readings:   Chest X-Ray: Increased vascular markings consistent with CHF are present.      Medications   furosemide injection 40 mg (40 mg Intravenous Given 7/3/22 1421)     Medical Decision Making:   Initial Assessment:   89-year-old female presenting with shortness of breath.  She was noted by family members this morning to be less responsive than normal.  She just recently finished a course of Cipro for treatment of a UTI.  Differential Diagnosis:   Pneumonia, pleural effusion, pulmonary embolism, congestive heart failure, urinary tract infection, sepsis.  Clinical Tests:   Lab Tests: Ordered and Reviewed  Radiological Study: Ordered and Reviewed  ED Management:  Urinalysis shows signs of infection.  BNP is elevated at 1347. Troponin is also mildly elevated at 0.038.  Intravenous Lasix has been ordered.  Patient will be admitted by [facility] for further diuresis and treatment of the UTI.                      Clinical Impression:   Final diagnoses:  [R06.02] Shortness of breath  [I50.9] Congestive heart failure, unspecified HF chronicity, unspecified heart failure type (Primary)  [N39.0] Urinary tract infection without hematuria, site unspecified          ED Disposition Condition    Observation               Davidson Daniels MD  07/03/22 2298

## 2022-07-03 NOTE — ED NOTES
Patient arrives EMS from home - daughter called EMS for evaluation of SOB - EMS states she was 71% oxygen saturation on room air on arrival with audible wheezes - patient improved to 91% on 4 liters - did not tolerate breathing treatment well, but has no further wheezes noted on arrival to room - patient denies pain - no obvious SOB noted - edema (Chronic from mastectomy) to left arm with mild swelling noted to bilateral feet

## 2022-07-03 NOTE — PHARMACY MED REC
"  [facility]           Pharmacy  Admission Medication History     The home medication history was taken by Lydia Sorto.      Medication history obtained from Medications listed below were obtained from: Patient/family    Based on information gathered for medication list, you may go to "Admission" then "Reconcile Home Medications" tabs to review and/or act upon those items.      The home medication list has been updated by the Pharmacy department.    Please read ALL comments highlighted in yellow.    Please address this information as you see fit.     Feel free to contact us if you have any questions or require assistance.    The medications listed below were removed from the home medication list.  Please reorder if appropriate:     Patient reports NOT TAKING the following medication(s):  o cipro 250mg  o cipro 500mg  o Clindamycin 150mg  o Clindamycin 300mg  o macrobid 100mg  o augmentin 875-125mg  o Percocet 5-325mg  o Bactrim ds        No current facility-administered medications on file prior to encounter.     Current Outpatient Medications on File Prior to Encounter   Medication Sig Dispense Refill    amLODIPine (NORVASC) 5 MG tablet Take 5 mg by mouth once daily.      aspirin 81 MG Chew Take 81 mg by mouth once daily.      atorvastatin (LIPITOR) 20 MG tablet Take 20 mg by mouth every evening.      bumetanide (BUMEX) 1 MG tablet Take 1 mg by mouth 2 (two) times daily.      clopidogreL (PLAVIX) 75 mg tablet Take 1 tablet (75 mg total) by mouth once daily. 90 tablet 3    lisinopriL (PRINIVIL,ZESTRIL) 20 MG tablet Take 20 mg by mouth once daily.      losartan (COZAAR) 50 MG tablet Take 50 mg by mouth once daily.      methenamine (HIPREX) 1 gram Tab Take 1 g by mouth 2 (two) times daily with meals.      SYNTHROID 125 mcg tablet Take 125 mcg by mouth once daily.  3    TIMOPTIC OCUDOSE, PF, 0.5 % Dpet Place 1 drop into both eyes 2 (two) times a day.  6    albuterol-ipratropium " (DUO-NEB) 2.5 mg-0.5 mg/3 mL nebulizer solution Take 3 mLs by nebulization every 6 (six) hours as needed for Wheezing. Rescue (Patient not taking: Reported on 7/3/2022) 180 mL 1    traZODone (DESYREL) 50 MG tablet Take 50 mg by mouth nightly.      [DISCONTINUED] amoxicillin-clavulanate 875-125mg (AUGMENTIN) 875-125 mg per tablet SMARTSI Tablet(s) By Mouth Every 12 Hours      [DISCONTINUED] ciprofloxacin HCl (CIPRO) 250 MG tablet SMARTSI Tablet(s) By Mouth Every 12 Hours      [DISCONTINUED] ciprofloxacin HCl (CIPRO) 500 MG tablet Take 500 mg by mouth 2 (two) times daily.      [DISCONTINUED] clindamycin (CLEOCIN) 150 MG capsule Take 150 mg by mouth 3 (three) times daily.      [DISCONTINUED] clindamycin (CLEOCIN) 300 MG capsule SMARTSI Capsule(s) By Mouth      [DISCONTINUED] furosemide (LASIX) 20 MG tablet Take 1 tablet (20 mg total) by mouth once daily. 90 tablet 3    [DISCONTINUED] nitrofurantoin, macrocrystal-monohydrate, (MACROBID) 100 MG capsule Take 100 mg by mouth 2 (two) times daily.      [DISCONTINUED] oxyCODONE-acetaminophen (PERCOCET) 5-325 mg per tablet Take 1 tablet by mouth every 6 (six) hours as needed.      [DISCONTINUED] spironolactone (ALDACTONE) 25 MG tablet Take 25 mg by mouth once daily.  3    [DISCONTINUED] sulfamethoxazole-trimethoprim 800-160mg (BACTRIM DS) 800-160 mg Tab Take 1 tablet by mouth 2 (two) times daily.         Please address this information as you see fit.  Feel free to contact us if you have any questions or require assistance.    Lydia Sorto  [phone]                .

## 2022-07-03 NOTE — H&P
[facility] Hospital Medicine H&P Note     Admitting Team: [facility] Hospitalist Team A  Attending Physician: Edgard   Resident:  Marianna  Intern: Tomás      Date of Admit: 7/3/2022    Chief Complaint     SOB for one day    Subjective:      History of Present Illness:  Pilar Michael is a 89 y.o. female with  has a past medical history of Arthritis, Cancer, CHF (congestive heart failure), Coronary artery disease, Hypertension, and Thyroid disease., TAVR. The patient presented to [facility] [place] on 7/3/2022 with a primary complaint of SOB since this AM. EMS reported an O2 saturation of 71% upon arrival that improved to to 92% with NC with the patient remaining tachypneic. EMS also gave 2 nitro tablets for mild chest pain that the patient complained of at that time. She denied any other chest pain this morning or at the time of patient interview. She endorses subjective nightly fevers of around 101F for the past few days and a congested cough but denies any chills, nausea, vomiting, diarrhea. Patient also endorses some swelling in her bilateral lower extremities. Patient states that she has not changed or missed doses of any of her medications    Of note the patient was recently treated for multiple UTI's over the last year with the most recent requiring a course of ciprofloxacin which was completed about 2-3 weeks ago.    In the ED, she was noted to have some vascular congestion on CXR, elevated BNP of 1347, and a UA positive for nitrites and WBC's. Lasix was started and patient was admitted to [facility] internal medicine for further diuresis and treatment/workup of UTI.               Past Medical History:  Past Medical History:   Diagnosis Date    Arthritis     Cancer     CHF (congestive heart failure)     Coronary artery disease     Hypertension     Thyroid disease        Past Surgical History:  Past Surgical History:   Procedure Laterality Date    CARDIAC CATHETERIZATION      CARDIAC VALVE SURGERY      CHOLECYSTECTOMY      CORONARY  ANGIOGRAPHY N/A 4/23/2019    Procedure: ANGIOGRAM, CORONARY ARTERY;  Surgeon: Bakari Singletary MD;  Location: [facility] CATH LAB;  Service: Cardiology;  Laterality: N/A;    HYSTERECTOMY      lymph nodes removal      THYROIDECTOMY      TRANSCATHETER AORTIC VALVE REPLACEMENT (TAVR) N/A 5/7/2019    Procedure: REPLACEMENT, AORTIC VALVE, TRANSCATHETER (TAVR);  Surgeon: Bakari Singletary MD;  Location: [facility] CATH LAB;  Service: Cardiology;  Laterality: N/A;       Allergies:  Review of patient's allergies indicates:   Allergen Reactions    Penicillins Swelling    Sulfa (sulfonamide antibiotics) Nausea Only       Home Medications:  Prior to Admission medications    Medication Sig Start Date End Date Taking? Authorizing Provider   amLODIPine (NORVASC) 5 MG tablet Take 5 mg by mouth once daily. 10/21/21  Yes Historical Provider   aspirin 81 MG Chew Take 81 mg by mouth once daily.   Yes Historical Provider   atorvastatin (LIPITOR) 20 MG tablet Take 20 mg by mouth every evening. 10/22/21  Yes Historical Provider   bumetanide (BUMEX) 1 MG tablet Take 1 mg by mouth 2 (two) times daily. 6/20/22  Yes Historical Provider   clopidogreL (PLAVIX) 75 mg tablet Take 1 tablet (75 mg total) by mouth once daily. 11/29/21  Yes Pippa Acharya MD   lisinopriL (PRINIVIL,ZESTRIL) 20 MG tablet Take 20 mg by mouth once daily. 5/9/22  Yes Historical Provider   methenamine (HIPREX) 1 gram Tab Take 1 g by mouth 2 (two) times daily with meals. 6/21/22  Yes Historical Provider   SYNTHROID 125 mcg tablet Take 125 mcg by mouth once daily. 2/9/19  Yes Historical Provider   TIMOPTIC OCUDOSE, PF, 0.5 % Dpet Place 1 drop into both eyes 2 (two) times a day. 2/21/19  Yes Historical Provider   losartan (COZAAR) 50 MG tablet Take 50 mg by mouth once daily.  7/3/22 Yes Historical Provider   traZODone (DESYREL) 50 MG tablet Take 50 mg by mouth nightly. 6/21/22   Historical Provider   albuterol-ipratropium (DUO-NEB) 2.5 mg-0.5 mg/3 mL nebulizer solution Take 3  mLs by nebulization every 6 (six) hours as needed for Wheezing. Rescue  Patient not taking: Reported on 7/3/2022 11/29/21 7/3/22  Pippa Acharya MD   amoxicillin-clavulanate 875-125mg (AUGMENTIN) 875-125 mg per tablet SMARTSI Tablet(s) By Mouth Every 12 Hours 5/9/22 7/3/22  Historical Provider   ciprofloxacin HCl (CIPRO) 250 MG tablet SMARTSI Tablet(s) By Mouth Every 12 Hours 4/26/22 7/3/22  Historical Provider   ciprofloxacin HCl (CIPRO) 500 MG tablet Take 500 mg by mouth 2 (two) times daily. 6/24/22 7/3/22  Historical Provider   clindamycin (CLEOCIN) 150 MG capsule Take 150 mg by mouth 3 (three) times daily. 1/12/22 7/3/22  Historical Provider   clindamycin (CLEOCIN) 300 MG capsule SMARTSI Capsule(s) By Mouth 3/7/22 7/3/22  Historical Provider   furosemide (LASIX) 20 MG tablet Take 1 tablet (20 mg total) by mouth once daily. 11/29/21 7/3/22  Pippa Acharya MD   nitrofurantoin, macrocrystal-monohydrate, (MACROBID) 100 MG capsule Take 100 mg by mouth 2 (two) times daily. 6/7/22 7/3/22  Historical Provider   oxyCODONE-acetaminophen (PERCOCET) 5-325 mg per tablet Take 1 tablet by mouth every 6 (six) hours as needed. 1/12/22 7/3/22  Historical Provider   spironolactone (ALDACTONE) 25 MG tablet Take 25 mg by mouth once daily. 2/5/19 7/3/22  Historical Provider   sulfamethoxazole-trimethoprim 800-160mg (BACTRIM DS) 800-160 mg Tab Take 1 tablet by mouth 2 (two) times daily. 6/10/22 7/3/22  Historical Provider       Family History:  No family history on file.    Social History:  Social History     Tobacco Use    Smoking status: Never Smoker    Smokeless tobacco: Never Used   Substance Use Topics    Alcohol use: Not Currently    Drug use: Never       Review of Systems:    Review of Systems   Constitutional: Positive for fever. Negative for chills, diaphoresis, malaise/fatigue and weight loss.   HENT: Negative.    Eyes: Negative.    Respiratory: Positive for cough and shortness of breath. Negative for  hemoptysis, sputum production and wheezing.    Cardiovascular: Positive for orthopnea and leg swelling. Negative for chest pain, palpitations and claudication.   Gastrointestinal: Negative for abdominal pain, constipation, diarrhea, heartburn, nausea and vomiting.   Genitourinary: Negative for dysuria, flank pain, frequency, hematuria and urgency.   Musculoskeletal: Negative.    Skin: Negative for itching and rash.   Neurological: Negative.    Endo/Heme/Allergies: Negative.    Psychiatric/Behavioral: Negative.         Health Maintaince :   Primary Care Physician: [facility]     Immunizations:   Immunization History   Administered Date(s) Administered    COVID-19, MRNA, LN-S, PF (Pfizer) (Purple Cap) 01/15/2021, 2021          Cancer Screening:  Unknown     Objective:   Last 24 Hour Vital Signs:  BP  Min: 129/70  Max: 161/77  Pulse  Av.5  Min: 59  Max: 60  Resp  Av.5  Min: 23  Max: 26  SpO2  Av %  Min: 94 %  Max: 96 %  There is no height or weight on file to calculate BMI.  No intake/output data recorded.    Physical Examination:  Physical Exam     General: A&Ox3, resting comfortably in bed, NC in place  HEENT: PERRL, EOMI, moist mucus membranes w/ no erythema noted, no facial asymmetry noted  Neck: Trachea midline, no masses, no lymphadenopathy, no JVD  Cardiovascular: Regular rate and rhythm, normal S1 and S2, no murmurs, rubs or gallops  Pulm: Decreased breath sounds bilaterally  Abdomen: Soft, non-tender, normal BS, non-distended; no organomegaly  Skin: No rashes or erythema noted, no ecchymosis  Extremities: atraumatic, no cyanosis or edema  Pulses: 2+ and symmetric  Neurological: Alert and oriented x 3.  Equal strength in upper and lower extremities bilaterally. Normal sensation. No facial droop. Normal speech.    Psychiatric: normal mood and affect, thought content normal      Laboratory:  CBC:   Lab Results   Component Value Date    WBC 14.73 (H) 2022    HGB  10.9 (L) 07/03/2022    HCT 34.3 (L) 07/03/2022     07/03/2022    MCV 85 07/03/2022    RDW 17.1 (H) 07/03/2022       BMP:   Lab Results   Component Value Date     07/03/2022    K 4.1 07/03/2022     07/03/2022    CO2 22 (L) 07/03/2022    BUN 34 (H) 07/03/2022    CREATININE 1.1 07/03/2022     (H) 07/03/2022    CALCIUM 8.7 07/03/2022    MG 1.8 07/03/2022    PHOS 2.7 11/18/2021     LFTs:   Lab Results   Component Value Date    PROT 7.6 07/03/2022    ALBUMIN 2.8 (L) 07/03/2022    BILITOT 1.3 (H) 07/03/2022    AST 26 07/03/2022    ALKPHOS 115 07/03/2022    ALT 13 07/03/2022     Coags:   Lab Results   Component Value Date    INR 1.0 04/08/2019     FLP: No results found for: CHOL, HDL, LDLCALC, TRIG, CHOLHDL  DM:   Lab Results   Component Value Date    HGBA1C 5.9 (H) 04/06/2022    CREATININE 1.1 07/03/2022     Thyroid:   Lab Results   Component Value Date    TSH 4.640 (H) 07/03/2022    FREET4 1.14 07/03/2022     Anemia: No results found for: IRON, TIBC, FERRITIN, [id number], FOLATE  Cardiac:   Lab Results   Component Value Date    TROPONINI 0.038 (H) 07/03/2022    BNP 1,347 (H) 07/03/2022     Urinalysis:   Lab Results   Component Value Date    LABURIN ESCHERICHIA COLI  >100,000 cfu/ml   (A) 11/17/2021    COLORU Yellow 07/03/2022    SPECGRAV 1.015 07/03/2022    NITRITE Positive (A) 07/03/2022    KETONESU Negative 07/03/2022    UROBILINOGEN Negative 07/03/2022    WBCUA 25 (H) 07/03/2022       Trended Lab Data:  Recent Labs   Lab 07/03/22  1330   WBC 14.73*   HGB 10.9*   HCT 34.3*      MCV 85   RDW 17.1*      K 4.1      CO2 22*   BUN 34*   CREATININE 1.1   *   PROT 7.6   ALBUMIN 2.8*   BILITOT 1.3*   AST 26   ALKPHOS 115   ALT 13       Trended Cardiac Data:  Recent Labs   Lab 07/03/22  1330   TROPONINI 0.038*   BNP 1,347*       Microbiology:  Microbiology Results (last 7 days)     Procedure Component Value Units Date/Time    Urine culture [[id number]] Collected: 07/03/22 1539     Order Status: No result Specimen: Urine from Random void Updated: 07/03/22 1428    Urine culture [[id number]]     Order Status: Completed Specimen: Urine            Other Results:    EKG :  Results for orders placed or performed during the hospital encounter of 11/17/21   EKG 12-lead    Collection Time: 11/17/21 12:16 PM    Narrative    Test Reason : R53.83,    Vent. Rate : 060 BPM     Atrial Rate : 085 BPM     P-R Int : 000 ms          QRS Dur : 140 ms      QT Int : 464 ms       P-R-T Axes : 075 -69 097 degrees     QTc Int : 464 ms    Ventricular-paced rhythm Abnormal ECG  When compared with ECG of 08-MAY-2019 05:42,  ventricular pacing noted   Confirmed by Janet Montilla MD ([id number]) on 11/22/2021 12:52:42 PM    Referred By: ABDULAZIZ   SELF           Confirmed By:Janet Montilla MD        Radiology:  Imaging Results          X-Ray Chest 1 View (Final result)  Result time 07/03/22 13:53:43    Final result by Yuriy Peoples Jr., MD (07/03/22 13:53:43)                 Impression:      Volume overload/CHF and pulmonary edema      Electronically signed by: Yuriy Brandon Jr  Date:    07/03/2022  Time:    13:53             Narrative:    EXAMINATION:  XR CHEST 1 VIEW    CLINICAL HISTORY:  Shortness of breath;    TECHNIQUE:  Single frontal view of the chest was performed.    COMPARISON:  11/17/2021    FINDINGS:  Cardiomediastinal silhouetteremains enlarged.  There is a multi lead right-sided pacemaker present as well as a large caliber wall stent overlying the mediastinum, possibly involving the aortic root.    There has been interval development of bilateral perihilar interstitial and alveolar opacity.    Bibasilar effusions are present left greater than right.                                Assessment:     Pilar Michael is a 89 y.o. female with:  Patient Active Problem List    Diagnosis Date Noted    Normocytic anemia 07/03/2022    Increased anion gap metabolic acidosis 07/03/2022     Hyperlipidemia 07/03/2022    Acute cystitis 11/18/2021    Pneumonia 11/17/2021    Presence of permanent cardiac pacemaker 07/07/2020    Acute on chronic combined systolic and diastolic CHF (congestive heart failure) 05/08/2019    Coronary artery disease involving native coronary artery of native heart without angina pectoris 05/08/2019    S/P TAVR (transcatheter aortic valve replacement) 05/07/2019    Acquired hypothyroidism 04/08/2019    Essential hypertension 04/08/2019    Severe aortic stenosis s/p TAVR         Pt is a 89yoF who presents to [facility] for increasing SOB, congestion, cough, and LE edema for one day. Pt sating in 70's with EMS, improved to 90's with NC. History of CHF, on home lasix. Vitals stable in the ED with labs significant for elevated BNP and UA suspicious for UTI. IV Lasix started in ED with patient admitted to medicine for further diuresis and treatment of UTI.         Plan:     Acute on chronic combined systolic and diastolic CHF (congestive heart failure)  -BNP 1347   -CXR: Prominent vascular congestion  -Lasix IV x one dose in the ED w/ inadequate diuresis   -Additional 80mg dose ordered for further diuresis   -Repeat Echo ordered       Uncomplicated UTI  -UA positive for WBCs and nitrites  -Pt asymptomatic   -Ucx pending  -Will exam previous micro date to determine optimal antimicrobial regime     Coronary artery disease involving native coronary artery of native heart without angina pectoris S/P TAVR (transcatheter aortic valve replacement)  -Continue Plavix, Aspirin     Essential hypertension  -Resume home medication- Lisinopril    Acquired hypothyroidism  -Continue Synthroid       Anticoagulants   Medication Route Frequency      Full Code    Diet: Cardiac  Code: Full  PPx: SCD's   Dispo: Diuresis on the floor with obs      Volodymyr England MD  [facility] Neurology HO-1  [facility] Internal Medicine Service  [facility] Hospitalist Medicine Team     [facility] Medicine Hospitalist Pager numbers:   [facility]  Hospitalist Medicine Team A (Edgard/Larisa): [phone]  [facility] Hospitalist Medicine Team B (Crystal/Hay):  [phone]

## 2022-07-04 LAB
ALBUMIN SERPL BCP-MCNC: 2.5 G/DL (ref 3.5–5.2)
ALP SERPL-CCNC: 95 U/L (ref 55–135)
ALT SERPL W/O P-5'-P-CCNC: 8 U/L (ref 10–44)
ANION GAP SERPL CALC-SCNC: 13 MMOL/L (ref 8–16)
ASCENDING AORTA: 3.21 CM
AST SERPL-CCNC: 36 U/L (ref 10–40)
AV INDEX (PROSTH): 0.49
AV MEAN GRADIENT: 5 MMHG
AV PEAK GRADIENT: 8 MMHG
AV VALVE AREA: 2.6 CM2
AV VELOCITY RATIO: 0.43
BASOPHILS # BLD AUTO: 0.03 K/UL (ref 0–0.2)
BASOPHILS NFR BLD: 0.4 % (ref 0–1.9)
BILIRUB SERPL-MCNC: 1 MG/DL (ref 0.1–1)
BSA FOR ECHO PROCEDURE: 1.93 M2
BUN SERPL-MCNC: 33 MG/DL (ref 8–23)
CALCIUM SERPL-MCNC: 8.7 MG/DL (ref 8.7–10.5)
CHLORIDE SERPL-SCNC: 102 MMOL/L (ref 95–110)
CO2 SERPL-SCNC: 24 MMOL/L (ref 23–29)
CREAT SERPL-MCNC: 1 MG/DL (ref 0.5–1.4)
CV ECHO LV RWT: 0.41 CM
DIFFERENTIAL METHOD: ABNORMAL
DOP CALC AO PEAK VEL: 1.41 M/S
DOP CALC AO VTI: 29.66 CM
DOP CALC LVOT AREA: 5.3 CM2
DOP CALC LVOT DIAMETER: 2.6 CM
DOP CALC LVOT PEAK VEL: 0.6 M/S
DOP CALC LVOT STROKE VOLUME: 77.1 CM3
DOP CALC MV VTI: 61.4 CM
DOP CALCLVOT PEAK VEL VTI: 14.53 CM
E WAVE DECELERATION TIME: 178.45 MSEC
E/A RATIO: 1.49
E/E' RATIO: 31.38 M/S
ECHO LV POSTERIOR WALL: 0.91 CM (ref 0.6–1.1)
EJECTION FRACTION: 65 %
EOSINOPHIL # BLD AUTO: 0 K/UL (ref 0–0.5)
EOSINOPHIL NFR BLD: 0.1 % (ref 0–8)
ERYTHROCYTE [DISTWIDTH] IN BLOOD BY AUTOMATED COUNT: 17 % (ref 11.5–14.5)
EST. GFR  (AFRICAN AMERICAN): 57 ML/MIN/1.73 M^2
EST. GFR  (NON AFRICAN AMERICAN): 50 ML/MIN/1.73 M^2
FRACTIONAL SHORTENING: 34 % (ref 28–44)
GLUCOSE SERPL-MCNC: 102 MG/DL (ref 70–110)
HCT VFR BLD AUTO: 32.9 % (ref 37–48.5)
HGB BLD-MCNC: 10.2 G/DL (ref 12–16)
IMM GRANULOCYTES # BLD AUTO: 0.05 K/UL (ref 0–0.04)
IMM GRANULOCYTES NFR BLD AUTO: 0.6 % (ref 0–0.5)
INTERVENTRICULAR SEPTUM: 0.95 CM (ref 0.6–1.1)
IVRT: 60.89 MSEC
LA MAJOR: 5.32 CM
LA MINOR: 5.3 CM
LA WIDTH: 4.18 CM
LEFT ATRIUM SIZE: 5.28 CM
LEFT ATRIUM VOLUME INDEX MOD: 31.6 ML/M2
LEFT ATRIUM VOLUME INDEX: 53.6 ML/M2
LEFT ATRIUM VOLUME MOD: 58.72 CM3
LEFT ATRIUM VOLUME: 99.61 CM3
LEFT INTERNAL DIMENSION IN SYSTOLE: 2.97 CM (ref 2.1–4)
LEFT VENTRICLE DIASTOLIC VOLUME INDEX: 48.96 ML/M2
LEFT VENTRICLE DIASTOLIC VOLUME: 91.06 ML
LEFT VENTRICLE MASS INDEX: 74 G/M2
LEFT VENTRICLE SYSTOLIC VOLUME INDEX: 18.4 ML/M2
LEFT VENTRICLE SYSTOLIC VOLUME: 34.25 ML
LEFT VENTRICULAR INTERNAL DIMENSION IN DIASTOLE: 4.47 CM (ref 3.5–6)
LEFT VENTRICULAR MASS: 137.32 G
LV LATERAL E/E' RATIO: 29.14 M/S
LV SEPTAL E/E' RATIO: 34 M/S
LYMPHOCYTES # BLD AUTO: 1.2 K/UL (ref 1–4.8)
LYMPHOCYTES NFR BLD: 14.3 % (ref 18–48)
MAGNESIUM SERPL-MCNC: 1.8 MG/DL (ref 1.6–2.6)
MCH RBC QN AUTO: 26.6 PG (ref 27–31)
MCHC RBC AUTO-ENTMCNC: 31 G/DL (ref 32–36)
MCV RBC AUTO: 86 FL (ref 82–98)
MONOCYTES # BLD AUTO: 0.7 K/UL (ref 0.3–1)
MONOCYTES NFR BLD: 8.3 % (ref 4–15)
MV MEAN GRADIENT: 1 MMHG
MV PEAK A VEL: 1.37 M/S
MV PEAK E VEL: 2.04 M/S
MV PEAK GRADIENT: 17 MMHG
MV STENOSIS PRESSURE HALF TIME: 51.75 MS
MV VALVE AREA BY CONTINUITY EQUATION: 1.26 CM2
MV VALVE AREA P 1/2 METHOD: 4.25 CM2
NEUTROPHILS # BLD AUTO: 6.1 K/UL (ref 1.8–7.7)
NEUTROPHILS NFR BLD: 76.3 % (ref 38–73)
NRBC BLD-RTO: 0 /100 WBC
PISA TR MAX VEL: 2.3 M/S
PLATELET # BLD AUTO: 179 K/UL (ref 150–450)
PMV BLD AUTO: 12.5 FL (ref 9.2–12.9)
POTASSIUM SERPL-SCNC: 4.3 MMOL/L (ref 3.5–5.1)
PROT SERPL-MCNC: 7.5 G/DL (ref 6–8.4)
RA MAJOR: 4.75 CM
RA PRESSURE: 15 MMHG
RA WIDTH: 3.79 CM
RBC # BLD AUTO: 3.84 M/UL (ref 4–5.4)
RV TISSUE DOPPLER FREE WALL SYSTOLIC VELOCITY 1 (APICAL 4 CHAMBER VIEW): 5.95 CM/S
SODIUM SERPL-SCNC: 139 MMOL/L (ref 136–145)
STJ: 2.31 CM
TDI LATERAL: 0.07 M/S
TDI SEPTAL: 0.06 M/S
TDI: 0.07 M/S
TR MAX PG: 21 MMHG
TRICUSPID ANNULAR PLANE SYSTOLIC EXCURSION: 1.75 CM
TROPONIN I SERPL DL<=0.01 NG/ML-MCNC: 0.12 NG/ML (ref 0–0.03)
TROPONIN I SERPL DL<=0.01 NG/ML-MCNC: 0.16 NG/ML (ref 0–0.03)
TV REST PULMONARY ARTERY PRESSURE: 36 MMHG
WBC # BLD AUTO: 8.03 K/UL (ref 3.9–12.7)

## 2022-07-04 PROCEDURE — 94760 N-INVAS EAR/PLS OXIMETRY 1: CPT

## 2022-07-04 PROCEDURE — 94761 N-INVAS EAR/PLS OXIMETRY MLT: CPT

## 2022-07-04 PROCEDURE — 25000003 PHARM REV CODE 250: Performed by: STUDENT IN AN ORGANIZED HEALTH CARE EDUCATION/TRAINING PROGRAM

## 2022-07-04 PROCEDURE — 99900035 HC TECH TIME PER 15 MIN (STAT)

## 2022-07-04 PROCEDURE — 63600175 PHARM REV CODE 636 W HCPCS: Performed by: STUDENT IN AN ORGANIZED HEALTH CARE EDUCATION/TRAINING PROGRAM

## 2022-07-04 PROCEDURE — 84484 ASSAY OF TROPONIN QUANT: CPT | Performed by: STUDENT IN AN ORGANIZED HEALTH CARE EDUCATION/TRAINING PROGRAM

## 2022-07-04 PROCEDURE — 27000221 HC OXYGEN, UP TO 24 HOURS

## 2022-07-04 PROCEDURE — 87040 BLOOD CULTURE FOR BACTERIA: CPT | Performed by: STUDENT IN AN ORGANIZED HEALTH CARE EDUCATION/TRAINING PROGRAM

## 2022-07-04 PROCEDURE — 36415 COLL VENOUS BLD VENIPUNCTURE: CPT | Performed by: STUDENT IN AN ORGANIZED HEALTH CARE EDUCATION/TRAINING PROGRAM

## 2022-07-04 PROCEDURE — 85025 COMPLETE CBC W/AUTO DIFF WBC: CPT | Performed by: STUDENT IN AN ORGANIZED HEALTH CARE EDUCATION/TRAINING PROGRAM

## 2022-07-04 PROCEDURE — 83735 ASSAY OF MAGNESIUM: CPT | Performed by: STUDENT IN AN ORGANIZED HEALTH CARE EDUCATION/TRAINING PROGRAM

## 2022-07-04 PROCEDURE — 11000001 HC ACUTE MED/SURG PRIVATE ROOM

## 2022-07-04 PROCEDURE — 80053 COMPREHEN METABOLIC PANEL: CPT | Performed by: STUDENT IN AN ORGANIZED HEALTH CARE EDUCATION/TRAINING PROGRAM

## 2022-07-04 RX ORDER — OXYCODONE HYDROCHLORIDE 5 MG/1
5 TABLET ORAL EVERY 6 HOURS PRN
Status: CANCELLED | OUTPATIENT
Start: 2022-07-04

## 2022-07-04 RX ORDER — MEROPENEM AND SODIUM CHLORIDE 1 G/50ML
1 INJECTION, SOLUTION INTRAVENOUS
Status: DISCONTINUED | OUTPATIENT
Start: 2022-07-04 | End: 2022-07-04

## 2022-07-04 RX ORDER — AMLODIPINE BESYLATE 5 MG/1
10 TABLET ORAL DAILY
Status: DISCONTINUED | OUTPATIENT
Start: 2022-07-04 | End: 2022-07-11 | Stop reason: HOSPADM

## 2022-07-04 RX ORDER — ONDANSETRON 8 MG/1
8 TABLET, ORALLY DISINTEGRATING ORAL ONCE
Status: COMPLETED | OUTPATIENT
Start: 2022-07-04 | End: 2022-07-04

## 2022-07-04 RX ADMIN — LISINOPRIL 20 MG: 20 TABLET ORAL at 09:07

## 2022-07-04 RX ADMIN — CLOPIDOGREL 75 MG: 75 TABLET, FILM COATED ORAL at 09:07

## 2022-07-04 RX ADMIN — FUROSEMIDE 80 MG: 40 INJECTION, SOLUTION INTRAMUSCULAR; INTRAVENOUS at 09:07

## 2022-07-04 RX ADMIN — MEROPENEM 1 G: 1 INJECTION, POWDER, FOR SOLUTION INTRAVENOUS at 11:07

## 2022-07-04 RX ADMIN — ONDANSETRON 8 MG: 8 TABLET, ORALLY DISINTEGRATING ORAL at 12:07

## 2022-07-04 RX ADMIN — ASPIRIN 81 MG: 81 TABLET, CHEWABLE ORAL at 09:07

## 2022-07-04 RX ADMIN — LEVOTHYROXINE SODIUM 125 MCG: 75 TABLET ORAL at 09:07

## 2022-07-04 RX ADMIN — TIMOLOL MALEATE 1 DROP: 5 SOLUTION OPHTHALMIC at 09:07

## 2022-07-04 RX ADMIN — FUROSEMIDE 80 MG: 40 INJECTION, SOLUTION INTRAMUSCULAR; INTRAVENOUS at 05:07

## 2022-07-04 RX ADMIN — TIMOLOL MALEATE 1 DROP: 5 SOLUTION OPHTHALMIC at 08:07

## 2022-07-04 RX ADMIN — AMLODIPINE BESYLATE 10 MG: 5 TABLET ORAL at 12:07

## 2022-07-04 NOTE — PROGRESS NOTES
"[facility] Medicine Progress Note    Primary Team: [facility] Hospitalist Team A  Attending Physician: Alicia Rene MD  Resident: Marianna  Intern: [facility] Day: 0     Chief Complaint: Shortness of Breath       Subjective:      Patient seen and examined by me this morning. Patient feeling improved compared to previous with increased UOP after diuretic dosing. Daughter at bedside states her urine has smelled foul at home and she is still not at her baseline.     Review of Systems   Constitutional: Negative for chills, fever and malaise/fatigue.   Respiratory: Positive for shortness of breath. Negative for cough and sputum production.    Cardiovascular: Positive for leg swelling. Negative for chest pain and orthopnea.   Gastrointestinal: Negative for abdominal pain, diarrhea, nausea and vomiting.   All other systems reviewed and are negative.     Past Medical History:   Diagnosis Date    Arthritis     Cancer     CHF (congestive heart failure)     Coronary artery disease     Hypertension     Thyroid disease               Objective:   Last 24 Hour Vital Signs:  BP  Min: 129/70  Max: 174/74  Temp  Av.4 °F (36.9 °C)  Min: 98.1 °F (36.7 °C)  Max: 98.6 °F (37 °C)  Pulse  Av.8  Min: 59  Max: 71  Resp  Av.8  Min: 16  Max: 26  SpO2  Av.3 %  Min: 92 %  Max: 97 %  Height  Av' 2" (157.5 cm)  Min: 5' 2" (157.5 cm)  Max: 5' 2" (157.5 cm)  Weight  Av.4 kg (188 lb 4.4 oz)  Min: 85.4 kg (188 lb 4.4 oz)  Max: 85.4 kg (188 lb 4.4 oz)    Intake/Output Summary (Last 24 hours) at 2022 0708  Last data filed at 2022 0600  Gross per 24 hour   Intake 240 ml   Output 1250 ml   Net -1010 ml      Physical Examination:  Physical Exam  Vitals and nursing note reviewed.   Constitutional:       General: She is not in acute distress.     Appearance: Normal appearance.   HENT:      Head: Normocephalic and atraumatic.      Mouth/Throat:      Mouth: Mucous membranes are moist.      Pharynx: Oropharynx is " clear. No oropharyngeal exudate.   Eyes:      General: No scleral icterus.     Extraocular Movements: Extraocular movements intact.      Pupils: Pupils are equal, round, and reactive to light.   Cardiovascular:      Rate and Rhythm: Normal rate and regular rhythm.      Pulses: Normal pulses.      Heart sounds: Normal heart sounds. No murmur heard.  Pulmonary:      Effort: Pulmonary effort is normal. No respiratory distress.      Breath sounds: Normal breath sounds.   Chest:      Chest wall: No tenderness.   Abdominal:      General: Bowel sounds are normal.      Palpations: Abdomen is soft.      Tenderness: There is no abdominal tenderness. There is no guarding or rebound.   Musculoskeletal:         General: Normal range of motion.      Cervical back: Normal range of motion and neck supple. No tenderness.      Right lower leg: Edema present.      Left lower leg: Edema present.   Skin:     Capillary Refill: Capillary refill takes less than 2 seconds.        Laboratory:  Recent Labs   Lab 07/03/22  1330 07/04/22  0237   WBC 14.73* 8.03   HGB 10.9* 10.2*   HCT 34.3* 32.9*    179   MCV 85 86   RDW 17.1* 17.0*    139   K 4.1 4.3    102   CO2 22* 24   BUN 34* 33*   CREATININE 1.1 1.0   * 102   PROT 7.6 7.5   ALBUMIN 2.8* 2.5*   BILITOT 1.3* 1.0   AST 26 36   ALKPHOS 115 95   ALT 13 8*     Urinalysis  Recent Labs   Lab 07/03/22  1329   COLORU Yellow   SPECGRAV 1.015   PHUR 6.0   PROTEINUA 1+*   BACTERIA Moderate*   NITRITE Positive*   LEUKOCYTESUR 3+*   UROBILINOGEN Negative   HYALINECASTS 0       Microbiology Results (last 7 days)     Procedure Component Value Units Date/Time    Urine culture [[id number]]     Order Status: Completed Specimen: Urine     Urine culture [[id number]] Collected: 07/03/22 1329    Order Status: Canceled Specimen: Urine from Random void          I have personally reviewed the above laboratory studies.     Imaging:  EKG (my interpretation): ventricular pacing at 60  bpm    X-Ray Chest 1 View   Final Result      Volume overload/CHF and pulmonary edema         Electronically signed by: Yuriy Brandon Jr   Date:    07/03/2022   Time:    13:53        Current Medications:     Scheduled:   aspirin  81 mg Oral Daily    clopidogreL  75 mg Oral Daily    furosemide (LASIX) injection  80 mg Intravenous BID    levothyroxine  125 mcg Oral Daily    lisinopriL  20 mg Oral Daily    timolol maleate 0.5%  1 drop Both Eyes BID         Infusions:       PRN:  sars-cov-2 (covid-19), sodium chloride 0.9%  Antibiotics and Day Number of Therapy:  Antibiotics (From admission, onward)            None             Assessment:     Pilar Michael is a 89 y.o. female with a PMHx of HFpEF, left breast cancer s/p total mastectomy, HTN, HLD, symptomatic bradycardiay s/p PCM, severe aortic stenosis s/p TAVR who presents with acute hypoxic respiratory failure with vascular congestion on CXR and elevated BNP from baseline consistent with heart failure exacerbation.    Plan:     Acute hypoxemic respiratory failure due to Acute on Chronic HFpEF  PMHx HFpEF, valvular heart disease p/w dyspnea and increasing oxygen requirement with evidence of volume overload on physical exam in the setting of an elevated BNP(1347). Decompensation likely due to dietary indiscretion.   -Last Echo in 3/2022 demonstrated EF 55-60% with mitral stenosis. Will obtain repeat transthoracic echo   -Continue diuresis with Lasix 80 mg IV BID   -Strict intake & output with fluid restriction to <2L daily    Recurrent Urinary Tract Infection  Patient with recurrent UTIs, last urine culture shows Pseudomonas only sensitive to imipenem, tobramycin, gentamicin.  Was incompletely treated with ciprofloxacin last month.  Only symptom is foul-smelling urine.  -urine culture pending  -consider initial dose of meropenem versus gentamicin given penicillin allergy    Severe aortic stenosis s/p TAVR  PMHx severe AS a TAVR in 2019    Symptomatic  bradycardia s/p pacemaker.  Pacemaker placed in 2019 for complete heart block and symptomatic bradycardia, currently ventricularly paced at 60 beats per minute    Hypertension  -Continue lisinopril 20 mg PO daily    Hyperlipidemia  -lipid panel with LDL at goal in 4/2022 on lipitor 20 mg PO daily    Hypothyroidism  -synthroid 125 mcg PO Daily    Diet: Diet Cardiac   DVT Prophylaxis: SCD  Code: Full Code     Dispo: pending improvement of oxygenation status    Yuriy Cabral M.D.  [facility] Internal Medicine PGY-3    [facility] Medicine Hospitalist Pager numbers:   [facility] Hospitalist Medicine Team A (Edgard/Larisa): [phone]  [facility] Hospitalist Medicine Team B (Crystal/Hay):  [phone]

## 2022-07-04 NOTE — PROGRESS NOTES
07/03/22 1954   Admission   Initial VN Admission Questions Complete   Communication Issues? None   Shift   Virtual Nurse - Patient Verbalized Approval Of Camera Use;VN Rounding   Safety/Activity   Patient Rounds bed in low position;visualized patient;call light in patient/parent reach   Safety Promotion/Fall Prevention side rails raised x 2   Positioning   Head of Bed (HOB) Positioning HOB elevated     Chart review complete.

## 2022-07-04 NOTE — NURSING
"Pt arrived to floor just before change of shift. Pt transferred and oriented to room. Vitals, height and weight taken. Moisture associated dermatitis and skin breakdown noted in rosalva area as well as what Pt calls "blisters" on her buttocks (see LDAs).   "

## 2022-07-04 NOTE — PROGRESS NOTES
Pharmacist Renal Dose Adjustment Note    Pilar Michael is a 89 y.o. female being treated with the medication meropenem    Patient Data:    Vital Signs (Most Recent):  Temp: 98.6 °F (37 °C) (07/04/22 0402)  Pulse: 60 (07/04/22 0402)  Resp: 16 (07/04/22 0402)  BP: (!) 174/74 (07/04/22 0402)  SpO2: 96 % (07/04/22 0731)   Vital Signs (72h Range):  Temp:  [98.1 °F (36.7 °C)-98.6 °F (37 °C)]   Pulse:  [59-71]   Resp:  [16-26]   BP: (129-174)/(67-82)   SpO2:  [92 %-97 %]      Recent Labs   Lab 07/03/22  1330 07/04/22  0237   CREATININE 1.1 1.0     Serum creatinine: 1 mg/dL 07/04/22 0237  Estimated creatinine clearance: 37.9 mL/min    Medication meropenem dose: 1gm frequency q8h will be changed to medication meropenem dose 1gm frequency q12h    Pharmacist's Name: Lata Jarvis  Pharmacist's Extension: [phone]

## 2022-07-05 LAB
ALBUMIN SERPL BCP-MCNC: 2.5 G/DL (ref 3.5–5.2)
ALP SERPL-CCNC: 104 U/L (ref 55–135)
ALT SERPL W/O P-5'-P-CCNC: 9 U/L (ref 10–44)
ANION GAP SERPL CALC-SCNC: 14 MMOL/L (ref 8–16)
AST SERPL-CCNC: 25 U/L (ref 10–40)
BACTERIA UR CULT: NORMAL
BASOPHILS # BLD AUTO: 0.03 K/UL (ref 0–0.2)
BASOPHILS NFR BLD: 0.4 % (ref 0–1.9)
BILIRUB SERPL-MCNC: 1 MG/DL (ref 0.1–1)
BUN SERPL-MCNC: 31 MG/DL (ref 8–23)
CALCIUM SERPL-MCNC: 8.9 MG/DL (ref 8.7–10.5)
CHLORIDE SERPL-SCNC: 99 MMOL/L (ref 95–110)
CO2 SERPL-SCNC: 28 MMOL/L (ref 23–29)
CREAT SERPL-MCNC: 0.9 MG/DL (ref 0.5–1.4)
DIFFERENTIAL METHOD: ABNORMAL
EOSINOPHIL # BLD AUTO: 0 K/UL (ref 0–0.5)
EOSINOPHIL NFR BLD: 0.5 % (ref 0–8)
ERYTHROCYTE [DISTWIDTH] IN BLOOD BY AUTOMATED COUNT: 16.9 % (ref 11.5–14.5)
EST. GFR  (AFRICAN AMERICAN): >60 ML/MIN/1.73 M^2
EST. GFR  (NON AFRICAN AMERICAN): 56 ML/MIN/1.73 M^2
GLUCOSE SERPL-MCNC: 102 MG/DL (ref 70–110)
HCT VFR BLD AUTO: 32.8 % (ref 37–48.5)
HGB BLD-MCNC: 10.5 G/DL (ref 12–16)
IMM GRANULOCYTES # BLD AUTO: 0.08 K/UL (ref 0–0.04)
IMM GRANULOCYTES NFR BLD AUTO: 0.9 % (ref 0–0.5)
LYMPHOCYTES # BLD AUTO: 1 K/UL (ref 1–4.8)
LYMPHOCYTES NFR BLD: 11.4 % (ref 18–48)
MAGNESIUM SERPL-MCNC: 1.7 MG/DL (ref 1.6–2.6)
MCH RBC QN AUTO: 26.9 PG (ref 27–31)
MCHC RBC AUTO-ENTMCNC: 32 G/DL (ref 32–36)
MCV RBC AUTO: 84 FL (ref 82–98)
MONOCYTES # BLD AUTO: 0.6 K/UL (ref 0.3–1)
MONOCYTES NFR BLD: 7 % (ref 4–15)
NEUTROPHILS # BLD AUTO: 6.7 K/UL (ref 1.8–7.7)
NEUTROPHILS NFR BLD: 79.8 % (ref 38–73)
NRBC BLD-RTO: 0 /100 WBC
PLATELET # BLD AUTO: 157 K/UL (ref 150–450)
PMV BLD AUTO: 12.5 FL (ref 9.2–12.9)
POTASSIUM SERPL-SCNC: 3.2 MMOL/L (ref 3.5–5.1)
PROT SERPL-MCNC: 7.5 G/DL (ref 6–8.4)
RBC # BLD AUTO: 3.91 M/UL (ref 4–5.4)
SODIUM SERPL-SCNC: 141 MMOL/L (ref 136–145)
WBC # BLD AUTO: 8.43 K/UL (ref 3.9–12.7)

## 2022-07-05 PROCEDURE — 99223 1ST HOSP IP/OBS HIGH 75: CPT | Mod: GC,,, | Performed by: INTERNAL MEDICINE

## 2022-07-05 PROCEDURE — 97116 GAIT TRAINING THERAPY: CPT

## 2022-07-05 PROCEDURE — 97535 SELF CARE MNGMENT TRAINING: CPT

## 2022-07-05 PROCEDURE — 36415 COLL VENOUS BLD VENIPUNCTURE: CPT | Performed by: STUDENT IN AN ORGANIZED HEALTH CARE EDUCATION/TRAINING PROGRAM

## 2022-07-05 PROCEDURE — 97530 THERAPEUTIC ACTIVITIES: CPT

## 2022-07-05 PROCEDURE — 99223 PR INITIAL HOSPITAL CARE,LEVL III: ICD-10-PCS | Mod: GC,,, | Performed by: INTERNAL MEDICINE

## 2022-07-05 PROCEDURE — 27000221 HC OXYGEN, UP TO 24 HOURS

## 2022-07-05 PROCEDURE — 25000003 PHARM REV CODE 250: Performed by: STUDENT IN AN ORGANIZED HEALTH CARE EDUCATION/TRAINING PROGRAM

## 2022-07-05 PROCEDURE — 97165 OT EVAL LOW COMPLEX 30 MIN: CPT

## 2022-07-05 PROCEDURE — 97161 PT EVAL LOW COMPLEX 20 MIN: CPT

## 2022-07-05 PROCEDURE — 99900035 HC TECH TIME PER 15 MIN (STAT)

## 2022-07-05 PROCEDURE — 11000001 HC ACUTE MED/SURG PRIVATE ROOM

## 2022-07-05 PROCEDURE — 94760 N-INVAS EAR/PLS OXIMETRY 1: CPT

## 2022-07-05 PROCEDURE — 83735 ASSAY OF MAGNESIUM: CPT | Performed by: STUDENT IN AN ORGANIZED HEALTH CARE EDUCATION/TRAINING PROGRAM

## 2022-07-05 PROCEDURE — 63600175 PHARM REV CODE 636 W HCPCS: Performed by: STUDENT IN AN ORGANIZED HEALTH CARE EDUCATION/TRAINING PROGRAM

## 2022-07-05 PROCEDURE — 85025 COMPLETE CBC W/AUTO DIFF WBC: CPT | Performed by: STUDENT IN AN ORGANIZED HEALTH CARE EDUCATION/TRAINING PROGRAM

## 2022-07-05 PROCEDURE — 80053 COMPREHEN METABOLIC PANEL: CPT | Performed by: STUDENT IN AN ORGANIZED HEALTH CARE EDUCATION/TRAINING PROGRAM

## 2022-07-05 PROCEDURE — 94761 N-INVAS EAR/PLS OXIMETRY MLT: CPT

## 2022-07-05 RX ORDER — MAGNESIUM SULFATE HEPTAHYDRATE 40 MG/ML
2 INJECTION, SOLUTION INTRAVENOUS ONCE
Status: COMPLETED | OUTPATIENT
Start: 2022-07-05 | End: 2022-07-05

## 2022-07-05 RX ORDER — POTASSIUM CHLORIDE 20 MEQ/1
20 TABLET, EXTENDED RELEASE ORAL
Status: COMPLETED | OUTPATIENT
Start: 2022-07-05 | End: 2022-07-05

## 2022-07-05 RX ADMIN — LISINOPRIL 20 MG: 20 TABLET ORAL at 08:07

## 2022-07-05 RX ADMIN — FUROSEMIDE 80 MG: 40 INJECTION, SOLUTION INTRAMUSCULAR; INTRAVENOUS at 07:07

## 2022-07-05 RX ADMIN — ASPIRIN 81 MG: 81 TABLET, CHEWABLE ORAL at 08:07

## 2022-07-05 RX ADMIN — MAGNESIUM SULFATE IN WATER 2 G: 40 INJECTION, SOLUTION INTRAVENOUS at 08:07

## 2022-07-05 RX ADMIN — TIMOLOL MALEATE 1 DROP: 5 SOLUTION OPHTHALMIC at 08:07

## 2022-07-05 RX ADMIN — MEROPENEM 1 G: 1 INJECTION, POWDER, FOR SOLUTION INTRAVENOUS at 11:07

## 2022-07-05 RX ADMIN — FUROSEMIDE 80 MG: 40 INJECTION, SOLUTION INTRAMUSCULAR; INTRAVENOUS at 09:07

## 2022-07-05 RX ADMIN — POTASSIUM CHLORIDE 20 MEQ: 1500 TABLET, EXTENDED RELEASE ORAL at 08:07

## 2022-07-05 RX ADMIN — CLOPIDOGREL 75 MG: 75 TABLET, FILM COATED ORAL at 08:07

## 2022-07-05 RX ADMIN — TIMOLOL MALEATE 1 DROP: 5 SOLUTION OPHTHALMIC at 09:07

## 2022-07-05 RX ADMIN — LEVOTHYROXINE SODIUM 125 MCG: 75 TABLET ORAL at 08:07

## 2022-07-05 RX ADMIN — POTASSIUM CHLORIDE 20 MEQ: 1500 TABLET, EXTENDED RELEASE ORAL at 06:07

## 2022-07-05 RX ADMIN — AMLODIPINE BESYLATE 10 MG: 5 TABLET ORAL at 08:07

## 2022-07-05 RX ADMIN — POTASSIUM CHLORIDE 20 MEQ: 1500 TABLET, EXTENDED RELEASE ORAL at 11:07

## 2022-07-05 NOTE — PLAN OF CARE
Problem: Physical Therapy  Goal: Physical Therapy Goal  Description: Goals to be met by: 22     Patient will increase functional independence with mobility by performin. Supine to sit with Stand-by Assistance  2. Sit to supine with Stand-by Assistance  3. Sit to stand transfer with Stand-by Assistance  4. Bed to chair transfer with Stand-by Assistance using Rolling Walker  5. Gait  x 50 feet with Contact Guard Assistance using Rolling Walker.     Outcome: Ongoing, Progressing     PT Eval completed, note to follow. Pt demonstrating decreased activity tolerance and endurance. Pt requiring ModA for bed mobility and CGA-Dashawn for standing and ambulating 4 ft forward/backward/laterally with RW. Pt requires bed height elevated for sit>stand. Pt's family reporting recent decline in function and increasing level of assistance needed. Recommending SNF placement upon d/c.

## 2022-07-05 NOTE — PT/OT/SLP EVAL
Physical Therapy Evaluation    Patient Name:  Pilar Michael   MRN:  [id number]    Recommendations:     Discharge Recommendations:  nursing facility, skilled   Discharge Equipment Recommendations:  (TBD)   Barriers to discharge: increased assist needed    Assessment:     Pilar Michael is a 89 y.o. female admitted with a medical diagnosis of Acute on chronic combined systolic and diastolic CHF (congestive heart failure).  She presents with the following impairments/functional limitations:  weakness, gait instability, impaired balance, impaired endurance, impaired self care skills, impaired functional mobilty, decreased coordination, decreased safety awareness, decreased lower extremity function, decreased upper extremity function, decreased ROM, impaired skin, edema, impaired cognition .Pt demonstrating decreased activity tolerance and endurance. Pt requiring ModA for bed mobility and CGA-Dashawn for standing and ambulating 4 ft forward/backward/laterally with RW. Pt requires bed height elevated for sit>stand. Pt's family reporting recent decline in function and increasing level of assistance needed. Recommending SNF placement upon d/c.     Rehab Prognosis: Good; patient would benefit from acute skilled PT services to address these deficits and reach maximum level of function.    Recent Surgery: * No surgery found *      Plan:     During this hospitalization, patient to be seen 5 x/week to address the identified rehab impairments via gait training, therapeutic activities, therapeutic exercises, neuromuscular re-education and progress toward the following goals:    · Plan of Care Expires:  08/05/22    Subjective     Chief Complaint: Fatigue  Patient/Family Comments/goals: Improve function  Pain/Comfort:  · Pain Rating 1: 0/10    Patients cultural, spiritual, Taoism conflicts given the current situation: no    Living Environment:  Pt lives with her daughter in a H, 3-4 steps to enter, B rails that are able to be  reached simultaneously, t/s combo with grab bars   Prior to admission, patients level of function was needing increased assist for all ADL's, transfers, and functional mobility per pt's niece.  Equipment used at home: wheelchair, bedside commode, shower chair, walker, rolling, rollator.  DME owned (not currently used): none.  Upon discharge, patient will have assistance from daughter who works from home and niece but reports limited physical ability to assist pt.    Objective:     Communicated with nsg prior to session.  Patient found HOB elevated with    upon PT entry to room.    General Precautions: Standard, fall   Orthopedic Precautions:N/A   Braces: N/A  Respiratory Status: Room air    Exams:  · Cognitive Exam:  Patient is oriented to Person, Place and somewhat situation, impaired insight into deficits and imparied memory   · Edema L UE lymphedema and pitting edema B LE  · B LE ROM: WFL except hip flexion  · B LE MMT: grossly 3- to 3+/5     Balance:    -       fair sitting; fair -/poor standing   Postural examination/scapula alignment:    -       Rounded shoulders  -       Forward head  -       Abnormal trunk flexion  -       Kyphosis  Skin integrity: Visible skin intact    Functional Mobility:  · Bed Mobility:     · Scooting: contact guard assistance  · Supine to Sit: moderate assistance  · Sit to Supine: moderate assistance  · Transfers:     · Sit to Stand:  contact guard assistance and minimum assistance with rolling walker and bed height elevated; VC's for hand placement  · Gait: Pt ambulating 4 ft forward/backward/laterally with RW and CGA-Dashawn; pt demo forward flexed trunk posture and needing assist for RW management at times    Therapeutic Activities and Exercises:   Pt educated on role of PT/POC and agreeable to participate in therapy session with increased encouragement and educated on importance of mobility.  Pt requires increased time, effort, and assist for all mobility.  See above for details on  mobility.  VC's for upright posture throughout.   Discussed possible d/c recs with pt's family present.     AM-PAC 6 CLICK MOBILITY  Total Score:14     Patient left supine with all lines intact, call button in reach, nsg notified and PCT present.    GOALS:   Multidisciplinary Problems     Physical Therapy Goals        Problem: Physical Therapy    Goal Priority Disciplines Outcome Goal Variances Interventions   Physical Therapy Goal     PT, PT/OT Ongoing, Progressing     Description: Goals to be met by: 22     Patient will increase functional independence with mobility by performin. Supine to sit with Stand-by Assistance  2. Sit to supine with Stand-by Assistance  3. Sit to stand transfer with Stand-by Assistance  4. Bed to chair transfer with Stand-by Assistance using Rolling Walker  5. Gait  x 50 feet with Contact Guard Assistance using Rolling Walker.                      History:     Past Medical History:   Diagnosis Date    Arthritis     Cancer     CHF (congestive heart failure)     Coronary artery disease     Hypertension     Thyroid disease        Past Surgical History:   Procedure Laterality Date    CARDIAC CATHETERIZATION      CARDIAC VALVE SURGERY      CHOLECYSTECTOMY      CORONARY ANGIOGRAPHY N/A 2019    Procedure: ANGIOGRAM, CORONARY ARTERY;  Surgeon: Bakari Singletary MD;  Location: [facility] CATH LAB;  Service: Cardiology;  Laterality: N/A;    HYSTERECTOMY      lymph nodes removal      THYROIDECTOMY      TRANSCATHETER AORTIC VALVE REPLACEMENT (TAVR) N/A 2019    Procedure: REPLACEMENT, AORTIC VALVE, TRANSCATHETER (TAVR);  Surgeon: Bakari Singletary MD;  Location: [facility] CATH LAB;  Service: Cardiology;  Laterality: N/A;       Time Tracking:     PT Received On: 22  PT Start Time: 1417     PT Stop Time: 1446  PT Total Time (min): 29 min     Billable Minutes: Evaluation 8 and Gait Training 8 (cotx with OT)      2022

## 2022-07-05 NOTE — PLAN OF CARE
[facility] - Telemetry  Initial Discharge Assessment       Primary Care Provider: Joseph Noel MD    Admission Diagnosis: Shortness of breath [R06.02]  Acute on chronic combined systolic (congestive) and diastolic (congestive) heart failure [I50.43]  Urinary tract infection without hematuria, site unspecified [N39.0]  Congestive heart failure, unspecified HF chronicity, unspecified heart failure type [I50.9]    Admission Date: 7/3/2022  Expected Discharge Date: 7/7/2022    Consult: cards & PT/OT    Payor: [employer] MANAGED MCARE / Plan: [employer] MEDICARE ADVANTAGE / Product Type: Medicare Advantage /     Extended Emergency Contact Information  Primary Emergency Contact: Katiana Nickerson  Address: [address] [address]  Mobile Phone: [phone]  Relation: Daughter  Preferred language: English  Secondary Emergency Contact: MichaelMarkJack           [place], [place]  Mobile Phone: [phone]  Relation: Son    Discharge Plan A: (P) Home Health  Discharge Plan B: (P) Skilled Nursing Facility      [id number] [place] - [address] AT [facility]  [address] [address]  Phone: [phone] Fax: [phone]      Initial Assessment (most recent)       Adult Discharge Assessment - 07/05/22 1150          Discharge Assessment    Assessment Type Discharge Planning Assessment (P)      Confirmed/corrected address, phone number and insurance Yes (P)      Confirmed Demographics Correct on Facesheet (P)      Source of Information family;patient (P)    Jack zurita ([phone]), & niElvi armendariz ([phone])    Communicated CHARLIE with patient/caregiver Date not available/Unable to determine (P)      Lives With child(alejandro), adult (P)    daughterKatiana ([phone])    Do you expect to return to your current living situation? Yes (P)      Do you have help at home or someone to help you manage your care at home?  Yes (P)      Prior to hospitilization cognitive status: Alert/Oriented (P)      Current cognitive status: Not Oriented to Time (P)      Walking or Climbing Stairs Difficulty ambulation difficulty, requires equipment;ambulation difficulty, assistance 1 person;transferring difficulty, assistance 1 person;stair climbing difficulty, dependent (P)      Dressing/Bathing Difficulty bathing difficulty, requires equipment;bathing difficulty, assistance 1 person;dressing difficulty, assistance 1 person (P)      Equipment Currently Used at Home walker, rolling;rollator;shower chair;grab bar;other (see comments) (P)    BP machine & pox    Readmission within 30 days? No (P)      Patient currently being followed by outpatient case management? No (P)      Do you currently have service(s) that help you manage your care at home? No (P)      Do you take prescription medications? Yes (P)      Do you have prescription coverage? Yes (P)      Do you have any problems affording any of your prescribed medications? No (P)      Is the patient taking medications as prescribed? yes (P)      How do you get to doctors appointments? family or friend will provide (P)      Are you on dialysis? No (P)      Do you take coumadin? No (P)      Discharge Plan A Home Health (P)      Discharge Plan B Skilled Nursing Facility (P)      DME Needed Upon Discharge  other (see comments) (P)    tbd    Discharge Plan discussed with: Adult children;Caregiver (P)    Jack zurita ([phone]), & rubinaElvi ([phone])                    1150  Patient resting quietly in bed with Jack zurita ([phone]), & martinakalaElvi ([phone]), at the bedside when CM rounded. Dr Rene & U B rounded while CM present. Patient was admitted with CHF and a UTI & is being followed by cards and PT/OT.    Patient lives with her adult daughter, Katiana Conrad, has equipment to assist with ADLs, but has needed additional assistance from family  members recently. Elvi stated that the patient is currently receiving HH services with [facility] & that it has become increasingly difficulty to transport the patient to/from MD appointments. Awaiting PT/OT recommendations.     Elvi stated that the patient's PCP is Dr. Joseph Cedillo with [facility] but that Katiana is considering changing to an [facility] PCP. CM to discuss above with Katiana.     CM updated patient's whiteboard with CM name & contact information.     9510  LEELEE was informed by [facility] that they do not have a HH company. Katiana stated that the patient is currently receiving HH services from [facility]. Voicemail message left for Sonal ([phone]) w/[facility] informing of the patient's hospitalization.    Katiana questioned if the patient would benefit from palliative care services. LEELEE informed [facility] of above.       Will continue to follow.

## 2022-07-05 NOTE — PLAN OF CARE
Problem: Occupational Therapy  Goal: Occupational Therapy Goal  Description: Goals to be met by: 8/5/22     Patient will increase functional independence with ADLs by performing:    LE Dressing with Minimal Assistance and Assistive Devices as needed.  Grooming while seated with Modified [place].  Toileting from bedside commode with Minimal Assistance for hygiene and clothing management.   Stand pivot transfers with Stand-by Assistance.  Toilet transfer to bedside commode with Stand-by Assistance.  Increased functional strength to [facility] for self care skills and functional mobility.  Upper extremity exercise program x10 reps per handout, with independence.    Outcome: Ongoing, Progressing       Pt would benefit from cont OT services in order to maximize functional independence. Recommending SNF placement at d/c as pt's family reports recent decline in function  over the past few weeks; currently requires increased assist for all ADLs and functional mobility.

## 2022-07-05 NOTE — PROGRESS NOTES
"[facility] Medicine Progress Note    Primary Team: \[facility]\"" Hospitalist Team A  Attending Physician: Alicia Rene MD  Resident: Marianna  Intern: [facility] Day: 1     Chief Complaint: Shortness of Breath       Subjective:      Patient seen and examined by me this morning. Patient feeling still feeling slightly SOB but improved from previous. On rounds yesterday, patient seen with altered mental status and additional fatigue. Patient improved from previous but still reporting fatigue.    Review of Systems   Constitutional: Negative for chills, fever and malaise/fatigue.   Respiratory: Positive for shortness of breath. Negative for cough and sputum production.    Cardiovascular: Positive for leg swelling. Negative for chest pain and orthopnea.   Gastrointestinal: Negative for abdominal pain, diarrhea, nausea and vomiting.   All other systems reviewed and are negative.     Past Medical History:   Diagnosis Date    Arthritis     Cancer     CHF (congestive heart failure)     Coronary artery disease     Hypertension     Thyroid disease               Objective:   Last 24 Hour Vital Signs:  BP  Min: 133/62  Max: 202/87  Temp  Av.9 °F (36.6 °C)  Min: 96.5 °F (35.8 °C)  Max: 98.6 °F (37 °C)  Pulse  Av.7  Min: 59  Max: 67  Resp  Av  Min: 18  Max: 18  SpO2  Av.6 %  Min: 93 %  Max: 98 %  Height  Av' 2" (157.5 cm)  Min: 5' 2" (157.5 cm)  Max: 5' 2" (157.5 cm)  Weight  Av.3 kg (188 lb)  Min: 85.3 kg (188 lb)  Max: 85.3 kg (188 lb)    Intake/Output Summary (Last 24 hours) at 2022 0901  Last data filed at 2022 0600  Gross per 24 hour   Intake 300 ml   Output 2150 ml   Net -1850 ml      Physical Examination:  Physical Exam  Vitals and nursing note reviewed.   Constitutional:       General: She is not in acute distress.     Appearance: Normal appearance.   HENT:      Head: Normocephalic and atraumatic.      Mouth/Throat:      Mouth: Mucous membranes are moist.      Pharynx: Oropharynx " is clear. No oropharyngeal exudate.   Eyes:      General: No scleral icterus.     Extraocular Movements: Extraocular movements intact.      Pupils: Pupils are equal, round, and reactive to light.   Cardiovascular:      Rate and Rhythm: Normal rate and regular rhythm.      Pulses: Normal pulses.      Heart sounds: Normal heart sounds. No murmur heard.  Pulmonary:      Effort: Pulmonary effort is normal. No respiratory distress.      Breath sounds: Normal breath sounds.   Chest:      Chest wall: No tenderness.   Abdominal:      General: Bowel sounds are normal.      Palpations: Abdomen is soft.      Tenderness: There is no abdominal tenderness. There is no guarding or rebound.   Musculoskeletal:         General: Normal range of motion.      Cervical back: Normal range of motion and neck supple. No tenderness.      Right lower leg: Edema present.      Left lower leg: Edema present.   Skin:     Capillary Refill: Capillary refill takes less than 2 seconds.        Laboratory:  Recent Labs   Lab 07/03/22  1330 07/04/22  0237 07/05/22  0347   WBC 14.73* 8.03 8.43   HGB 10.9* 10.2* 10.5*   HCT 34.3* 32.9* 32.8*    179 157   MCV 85 86 84   RDW 17.1* 17.0* 16.9*    139 141   K 4.1 4.3 3.2*    102 99   CO2 22* 24 28   BUN 34* 33* 31*   CREATININE 1.1 1.0 0.9   * 102 102   PROT 7.6 7.5 7.5   ALBUMIN 2.8* 2.5* 2.5*   BILITOT 1.3* 1.0 1.0   AST 26 36 25   ALKPHOS 115 95 104   ALT 13 8* 9*     Urinalysis  No results for input(s): COLORU, CLARITYU, SPECGRAV, PHUR, PROTEINUA, GLUCOSEU, BILIRUBINCON, BLOODU, WBCU, RBCU, BACTERIA, MUCUS, NITRITE, LEUKOCYTESUR, UROBILINOGEN, HYALINECASTS in the last 24 hours.    Microbiology Results (last 7 days)     Procedure Component Value Units Date/Time    Urine culture [[id number]] Collected: 07/03/22 1329    Order Status: No result Specimen: Kidney, left Updated: 07/05/22 0519    Blood culture [[id number]] Collected: 07/04/22 1239    Order Status: Completed Specimen: Blood  Updated: 07/05/22 0115     Blood Culture, Routine No Growth to date    Blood culture [[id number]] Collected: 07/04/22 1334    Order Status: Completed Specimen: Blood Updated: 07/05/22 0115     Blood Culture, Routine No Growth to date    Narrative:      Collection has been rescheduled by [email or web address] at 07/04/2022 12:40 Reason:   Unable to collect the other set   Collection has been rescheduled by [email or web address] at 07/04/2022 12:40 Reason:   Unable to collect the other set     Urine culture [[id number]]     Order Status: Completed Specimen: Urine     Urine culture [[id number]] Collected: 07/03/22 1329    Order Status: Canceled Specimen: Urine from Random void          I have personally reviewed the above laboratory studies.     Imaging:  EKG (my interpretation): ventricular pacing at 60 bpm    X-Ray Chest 1 View   Final Result      Volume overload/CHF and pulmonary edema         Electronically signed by: Yuriy Brandon Jr   Date:    07/03/2022   Time:    13:53        Current Medications:     Scheduled:   amLODIPine  10 mg Oral Daily    aspirin  81 mg Oral Daily    clopidogreL  75 mg Oral Daily    furosemide (LASIX) injection  80 mg Intravenous BID    levothyroxine  125 mcg Oral Daily    lisinopriL  20 mg Oral Daily    magnesium sulfate IVPB  2 g Intravenous Once    meropenem (MERREM) IVPB  1 g Intravenous Q12H    potassium chloride  20 mEq Oral Q2H    timolol maleate 0.5%  1 drop Both Eyes BID         Infusions:       PRN:  sars-cov-2 (covid-19), sodium chloride 0.9%  Antibiotics and Day Number of Therapy:  Antibiotics (From admission, onward)            Start     Stop Route Frequency Ordered    07/04/22 1145  meropenem 1 g in sodium chloride 0.9 % 100 mL IVPB (ready to mix system)         -- IV Every 12 hours (non-standard times) 07/04/22 1045             Assessment:     Pilar Michael is a 89 y.o. female with a PMHx of HFpEF, left breast cancer s/p total mastectomy, HTN, HLD, symptomatic bradycardiay s/p PCM, severe  aortic stenosis s/p TAVR who presents with acute hypoxic respiratory failure with vascular congestion on CXR and elevated BNP from baseline consistent with heart failure exacerbation.    Plan:     Acute hypoxemic respiratory failure due to Acute on Chronic HFpEF  PMHx HFpEF, valvular heart disease p/w dyspnea and increasing oxygen requirement with evidence of volume overload on physical exam in the setting of an elevated BNP(1347). Decompensation likely due to dietary indiscretion.   -Last Echo in 3/2022 demonstrated EF 55-60% with mitral stenosis. Will obtain repeat transthoracic echo   -Continue diuresis with Lasix 80 mg IV BID, likely transition to oral regimen today  -Strict intake & output with fluid restriction to <2L daily    Recurrent Urinary Tract Infection  Patient with recurrent UTIs, last urine culture shows Pseudomonas only sensitive to imipenem, tobramycin, gentamicin.  Was incompletely treated with ciprofloxacin last month.  Only symptom is foul-smelling urine.  -urine culture pending  -Meropenem 1g IV BID    Severe aortic stenosis s/p TAVR  PMHx severe AS a TAVR in 2019    Symptomatic bradycardia s/p pacemaker.  Pacemaker placed in 2019 for complete heart block and symptomatic bradycardia, currently ventricularly paced at 60 beats per minute    Hypertension  -Continue lisinopril 20 mg PO daily    Hyperlipidemia  -lipid panel with LDL at goal in 4/2022 on lipitor 20 mg PO daily    Hypothyroidism  -synthroid 125 mcg PO Daily    Diet: Diet Cardiac   DVT Prophylaxis: SCD  Code: Full Code     Dispo: pending improvement of oxygenation status & mental status    Yuriy Cabral M.D.  [facility] Internal Medicine PGY-3    [facility] Medicine Hospitalist Pager numbers:   [facility] Hospitalist Medicine Team A (Edgard/Larisa): [phone]  [facility] Hospitalist Medicine Team B (Crystal/Hay):  [phone]

## 2022-07-05 NOTE — PT/OT/SLP EVAL
Occupational Therapy   Evaluation    Name: Pilar Michael  MRN: [id number]  Admitting Diagnosis:  Acute on chronic combined systolic and diastolic CHF (congestive heart failure)  Recent Surgery: * No surgery found *      Recommendations:     Discharge Recommendations: nursing facility, skilled  Discharge Equipment Recommendations:   (TBD)  Barriers to discharge:   (increased physical assist required)    Assessment:     Pilar Michael is a 89 y.o. female with a medical diagnosis of Acute on chronic combined systolic and diastolic CHF (congestive heart failure).  She presents with The primary encounter diagnosis was Congestive heart failure, unspecified HF chronicity, unspecified heart failure type. Diagnoses of Shortness of breath, Urinary tract infection without hematuria, site unspecified, Acute on chronic combined systolic (congestive) and diastolic (congestive) heart failure, Acute on chronic combined systolic and diastolic CHF (congestive heart failure), Acquired hypothyroidism, Essential hypertension, Hyperlipidemia, unspecified hyperlipidemia type, Normocytic anemia, and Adequate nutrition were also pertinent to this visit.  . Performance deficits affecting function: weakness, gait instability, impaired balance, impaired endurance, impaired self care skills, impaired functional mobilty, impaired cognition, edema, impaired skin, decreased coordination, decreased upper extremity function, decreased ROM, decreased lower extremity function.      Pt would benefit from cont OT services in order to maximize functional independence. Recommending SNF placement at d/c as pt's family reports recent decline in function  over the past few weeks; currently requires increased assist for all ADLs and functional mobility.     Rehab Prognosis: Good; patient would benefit from acute skilled OT services to address these deficits and reach maximum level of function.       Plan:     Patient to be seen 5 x/week to address the  Rx sent   "above listed problems via self-care/home management, therapeutic activities, therapeutic exercises  · Plan of Care Expires: 08/05/22  · Plan of Care Reviewed with: patient, son    Subjective     Chief Complaint: Pt continuously states,"I'm so tired"   Patient/Family Comments/goals: increase physical function     Occupational Profile:  Living Environment: with dghtr in [facility], 3-4 steps to enter, B rails that are able to be reached simultaneously, t/s combo with grab bars   Previous level of function: family present reports that pt has required increased assist for all ADLs, sitting and standing, functional mobility.   Equipment Used at Home:  wheelchair, shower chair, bedside commode, walker, rolling, rollator  Assistance upon Discharge: from family, however, dghtr is working from home and Niece available is limited in physical abilities to assist pt     Pain/Comfort:  · Pain Rating 1: 0/10    Patients cultural, spiritual, Judaism conflicts given the current situation:      Objective:     Communicated with: nsg prior to session.   General Precautions: Standard, fall   Orthopedic Precautions:N/A   Braces: N/A      Occupational Performance:    Bed Mobility:    · Patient completed Scooting/Bridging with contact guard assistance  · Patient completed Supine to Sit with moderate assistance  · Patient completed Sit to Supine with moderate assistance    Functional Mobility/Transfers:  · Patient completed Sit <> Stand Transfer with contact guard assistance and minimum assistance  with  rolling walker with elevated bed height  · Functional Mobility: CGA/Min A with RW     Activities of Daily Living:  · Lower Body Dressing: total assistance    · Toileting: total assistance      Cognitive/Visual Perceptual:  Oriented   Impaired insight to deficits   Impaired memory       Physical Exam:  Balance:    -       fair sitting; fair -/poor standing   Postural examination/scapula alignment:    -       Rounded shoulders  -       Forward " head  -       Abnormal trunk flexion  -       Kyphosis  Skin integrity: Visible skin intact  Edema:  lymphedema LUE; swelling BLEs   Upper Extremity Range of Motion:   RUE WFL for pt's needs, but limited end shoulder flexion; LUE significantly limited at shoulder range- painful with movement, limited distal range 2/2 lymphedema   Upper Extremity Strength:  RUE grossly 3+/5 at shoulder and 4-/5 distally; LUE unable to assess at shoulder and 3+/5 distally    Strength:  WFL R; 3+/5 L     AMPAC 6 Click ADL:  AMPAC Total Score: 14    Treatment & Education:  Pt requires increased encouragement and education on impt of participation   Bed mobility with increased time and assist   Assisted with dynamic seated balance WOB; forward flexed over EOB and LOB posterior with axs   Bed height elevated for standing CGA/Min A with RW; cues for hand placement for transition   Cues for upright posture with RW and standing; assisted with RW management for mobility   Returned to supine   Discussed with pt's family present   Education:    Patient left supine with all lines intact, call button in reach, nsg notified and PCT  present    GOALS:   Multidisciplinary Problems     Occupational Therapy Goals        Problem: Occupational Therapy    Goal Priority Disciplines Outcome Interventions   Occupational Therapy Goal     OT, PT/OT Ongoing, Progressing    Description: Goals to be met by: 8/5/22     Patient will increase functional independence with ADLs by performing:    LE Dressing with Minimal Assistance and Assistive Devices as needed.  Grooming while seated with Modified [place].  Toileting from bedside commode with Minimal Assistance for hygiene and clothing management.   Stand pivot transfers with Stand-by Assistance.  Toilet transfer to bedside commode with Stand-by Assistance.  Increased functional strength to WFL for self care skills and functional mobility.  Upper extremity exercise program x10 reps per handout, with  independence.                     History:     Past Medical History:   Diagnosis Date    Arthritis     Cancer     CHF (congestive heart failure)     Coronary artery disease     Hypertension     Thyroid disease        Past Surgical History:   Procedure Laterality Date    CARDIAC CATHETERIZATION      CARDIAC VALVE SURGERY      CHOLECYSTECTOMY      CORONARY ANGIOGRAPHY N/A 4/23/2019    Procedure: ANGIOGRAM, CORONARY ARTERY;  Surgeon: Bakari Singletary MD;  Location: [facility] CATH LAB;  Service: Cardiology;  Laterality: N/A;    HYSTERECTOMY      lymph nodes removal      THYROIDECTOMY      TRANSCATHETER AORTIC VALVE REPLACEMENT (TAVR) N/A 5/7/2019    Procedure: REPLACEMENT, AORTIC VALVE, TRANSCATHETER (TAVR);  Surgeon: Bakari Singletary MD;  Location: [facility] CATH LAB;  Service: Cardiology;  Laterality: N/A;       Time Tracking:     OT Date of Treatment: 07/05/22  OT Start Time: 1417  OT Stop Time: 1446  OT Total Time (min): 29 min    Billable Minutes:Evaluation 8  Therapeutic Activity 13    7/5/2022

## 2022-07-05 NOTE — CONSULTS
" U Cardiology Consult Note     Cardiology Attending: Dr. Rosario  Cardiology Fellow: Hao Ye MD     Date of Admit: 7/3/2022  Date of Consult: 7/5/2022    Reason for Consultation:     "valvular heart disease and elevated troponin"    History of Present Illness:      Pilar Michael is a 89 y.o. 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  She is currently admitted to complicated UTI.  History provided by patient and son, Jack.  She has had multiple UTIs in recent past and developed acute onset shortness of breath the day prior to admission. On arrival she appeared volume overloaded, CXR with pulmonary edema, BNP 1347, troponin 0.03.  EKG shows ventricularly paced rhythm with AV dissociation, rate 60bpm, no ST or T wave changes.  She denies chest pain or palpitations.  On 2L O2.  She was treated with iv lasix 80 bid for 2 days and NET negative 2.6L.  She appears more comfortable now.  Cardiology consulted for elevated troponin and history of aortic stenosis.    Past Medical History:     Past Medical History:   Diagnosis Date    Arthritis     Cancer     CHF (congestive heart failure)     Coronary artery disease     Hypertension     Thyroid disease      Surgical History:     Past Surgical History:   Procedure Laterality Date    CARDIAC CATHETERIZATION      CARDIAC VALVE SURGERY      CHOLECYSTECTOMY      CORONARY ANGIOGRAPHY N/A 4/23/2019    Procedure: ANGIOGRAM, CORONARY ARTERY;  Surgeon: Bakari Singletary MD;  Location: [facility] CATH LAB;  Service: Cardiology;  Laterality: N/A;    HYSTERECTOMY      lymph nodes removal      THYROIDECTOMY      TRANSCATHETER AORTIC VALVE REPLACEMENT (TAVR) N/A 5/7/2019    Procedure: REPLACEMENT, AORTIC VALVE, TRANSCATHETER (TAVR);  Surgeon: " Bakari Singletary MD;  Location: [facility] CATH LAB;  Service: Cardiology;  Laterality: N/A;     Allergies:     Review of patient's allergies indicates:   Allergen Reactions    Penicillins Swelling    Sulfa (sulfonamide antibiotics) Nausea Only     Family History:   None pertinent    Social History:     Social History     Tobacco Use    Smoking status: Never Smoker    Smokeless tobacco: Never Used   Substance Use Topics    Alcohol use: Not Currently    Drug use: Never     Review of Systems:     Review of Systems   Constitutional: Negative for chills, fever and weight loss.   Eyes: Negative for blurred vision, photophobia and redness.   Respiratory: Positive for shortness of breath. Negative for cough, hemoptysis, sputum production and wheezing.    Cardiovascular: Positive for leg swelling. Negative for chest pain, palpitations, orthopnea, claudication and PND.   Gastrointestinal: Negative for abdominal pain, constipation, diarrhea, nausea and vomiting.   Genitourinary: Positive for dysuria, frequency and urgency. Negative for flank pain and hematuria.   Musculoskeletal: Positive for back pain. Negative for falls and joint pain.   Neurological: Negative for dizziness, seizures, weakness and headaches.   All other systems reviewed and are negative.    Medications:     Home Medications:  Prior to Admission medications    Medication Sig Start Date End Date Taking? Authorizing Provider   amLODIPine (NORVASC) 5 MG tablet Take 5 mg by mouth once daily. 10/21/21  Yes Historical Provider   aspirin 81 MG Chew Take 81 mg by mouth once daily.   Yes Historical Provider   atorvastatin (LIPITOR) 20 MG tablet Take 20 mg by mouth every evening. 10/22/21  Yes Historical Provider   bumetanide (BUMEX) 1 MG tablet Take 1 mg by mouth 2 (two) times daily. 6/20/22  Yes Historical Provider   clopidogreL (PLAVIX) 75 mg tablet Take 1 tablet (75 mg total) by mouth once daily. 11/29/21  Yes Pippa Acharya MD   lisinopriL (PRINIVIL,ZESTRIL)  "20 MG tablet Take 20 mg by mouth once daily. 5/9/22  Yes Historical Provider   methenamine (HIPREX) 1 gram Tab Take 1 g by mouth 2 (two) times daily with meals. 6/21/22  Yes Historical Provider   SYNTHROID 125 mcg tablet Take 125 mcg by mouth once daily. 2/9/19  Yes Historical Provider   TIMOPTIC OCUDOSE, PF, 0.5 % Dpet Place 1 drop into both eyes 2 (two) times a day. 2/21/19  Yes Historical Provider   losartan (COZAAR) 50 MG tablet Take 50 mg by mouth once daily.  7/3/22 Yes Historical Provider   traZODone (DESYREL) 50 MG tablet Take 50 mg by mouth nightly. 6/21/22   Historical Provider   albuterol-ipratropium (DUO-NEB) 2.5 mg-0.5 mg/3 mL nebulizer solution Take 3 mLs by nebulization every 6 (six) hours as needed for Wheezing. Rescue  Patient not taking: Reported on 7/3/2022 11/29/21 7/3/22  Pippa Acharya MD       Current/Inpatient Medications:  Infusions:    Scheduled:   amLODIPine  10 mg Oral Daily    aspirin  81 mg Oral Daily    clopidogreL  75 mg Oral Daily    furosemide (LASIX) injection  80 mg Intravenous BID    levothyroxine  125 mcg Oral Daily    lisinopriL  20 mg Oral Daily    magnesium sulfate IVPB  2 g Intravenous Once    meropenem (MERREM) IVPB  1 g Intravenous Q12H    potassium chloride  20 mEq Oral Q2H    timolol maleate 0.5%  1 drop Both Eyes BID     PRN:  sars-cov-2 (covid-19), sodium chloride 0.9%    Objective:   Vitals: BP (!) 161/76   Pulse 60   Temp 97.9 °F (36.6 °C)   Resp 18   Ht 5' 2" (1.575 m)   Wt 85.3 kg (188 lb)   SpO2 98%   BMI 34.39 kg/m²     Intake/Output Summary (Last 24 hours) at 7/5/2022 0901  Last data filed at 7/5/2022 0600  Gross per 24 hour   Intake 300 ml   Output 2150 ml   Net -1850 ml       Physical Exam  Constitutional:       General: She is not in acute distress.     Appearance: She is obese. She is not ill-appearing.   HENT:      Head: Normocephalic and atraumatic.      Mouth/Throat:      Mouth: Mucous membranes are moist.      Pharynx: Oropharynx is " clear. No oropharyngeal exudate or posterior oropharyngeal erythema.   Eyes:      General: No scleral icterus.     Extraocular Movements: Extraocular movements intact.      Conjunctiva/sclera: Conjunctivae normal.      Pupils: Pupils are equal, round, and reactive to light.   Cardiovascular:      Rate and Rhythm: Normal rate and regular rhythm.      Heart sounds: Murmur (III/VI systolic ejection murmur with radiation to neck) heard.   Pulmonary:      Effort: Pulmonary effort is normal.      Breath sounds: Rales present. No wheezing or rhonchi.   Abdominal:      General: Bowel sounds are normal. There is no distension.      Palpations: Abdomen is soft.      Tenderness: There is no abdominal tenderness.   Musculoskeletal:         General: Swelling (lymphedea LUE) present. Normal range of motion.      Right lower leg: Edema (1+) present.      Left lower leg: Edema (1+) present.   Skin:     General: Skin is warm and dry.      Capillary Refill: Capillary refill takes less than 2 seconds.      Findings: No rash.   Neurological:      General: No focal deficit present.      Mental Status: She is alert and oriented to person, place, and time.        Labs:   I have reviewed the following labs below:    Recent Labs   Lab 07/03/22  1330 07/04/22  0237 07/04/22  0743 07/04/22  1239 07/05/22  0347   WBC 14.73* 8.03  --   --  8.43   HGB 10.9* 10.2*  --   --  10.5*   HCT 34.3* 32.9*  --   --  32.8*    179  --   --  157    139  --   --  141   K 4.1 4.3  --   --  3.2*    102  --   --  99   CO2 22* 24  --   --  28   BUN 34* 33*  --   --  31*   CREATININE 1.1 1.0  --   --  0.9   * 102  --   --  102   CALCIUM 8.7 8.7  --   --  8.9   MG 1.8 1.8  --   --  1.7   PHOS 3.8  --   --   --   --    PROT 7.6 7.5  --   --  7.5   ALBUMIN 2.8* 2.5*  --   --  2.5*   BILITOT 1.3* 1.0  --   --  1.0   AST 26 36  --   --  25   ALT 13 8*  --   --  9*   ALKPHOS 115 95  --   --  104   TROPONINI 0.038*  --  0.156* 0.115*  --    BNP  1,347*  --   --   --   --      Recent Labs   Lab 07/03/22  1330 07/04/22  0743 07/04/22  1239   CPK 11*  --   --    TROPONINI 0.038*   < > 0.115*    < > = values in this interval not displayed.     Cardiovascular Studies/Imaging:   I have reviewed the following studies below:    ECG   AV dissociation, V-paced 60bpm, wide QRS, no ST or T wave abnormalities      TTE (7/3/22):   · The left ventricle is normal in size with mild concentric hypertrophy and normal systolic function.  · The estimated ejection fraction is 65%.  · Grade II left ventricular diastolic dysfunction.  · Mild mitral regurgitation and 5 mmHg diastolic gradient across the MV related to degenerative MV disease.  · There is a 26 mm Evolut CoreValve transcutaneously-placed aortic bioprosthesis present. There is no aortic insufficiency present. Prosthetic aortic valve is normal.  · The aortic valve mean gradient is 5 mmHg with a dimensionless index of 0.49.  · Mild tricuspid regurgitation.  · Mild pulmonic regurgitation.  · Normal right ventricular size with normal right ventricular systolic function.  · There is a moderate left pleural effusion.        LHC/Cors (4/2019):  · Coronary angiogram today revealed severe ostial RCA stenosis which was successfully stented today.  · A STENT VISION [id number] stent was successfully placed at 20 JIM        Imaging:   X-Ray Chest 1 View    Result Date: 7/3/2022  EXAMINATION: XR CHEST 1 VIEW CLINICAL HISTORY: Shortness of breath; TECHNIQUE: Single frontal view of the chest was performed. COMPARISON: 11/17/2021 FINDINGS: Cardiomediastinal silhouetteremains enlarged.  There is a multi lead right-sided pacemaker present as well as a large caliber wall stent overlying the mediastinum, possibly involving the aortic root. There has been interval development of bilateral perihilar interstitial and alveolar opacity. Bibasilar effusions are present left greater than right.     Volume overload/CHF and pulmonary edema  Electronically signed by: Yuriy Brandon Jr Date:    07/03/2022 Time:    13:53    Assessment:     89 year old female with severe AS s/p TAVR 2019, symptomatic bradycardia s/p PPM 2019, CAD (POWER to ostial RCA 2019), hypothyroidism and hypertension who is currently admitted for UTI and volume overload due to HFpEF. Diuresing appropriately and nearing baseline.  Denies chest pain.    Plan/Recommendations:     - slight troponin elevation is likely secondary to increased supply demand inequity in setting of hypertension and sepsis, recommend no further intervention  -patient nearing euvolemia, would transition to home oral diuretics tomorrow.  - continue amlodipine and norvasc for hypertension, consider beta blocker as next additional agent for concomitant CAD  - continue aspirin, plavix and statin  - atrial PPM lead is dislodged, she is ventricularly paced at 60bpm, recommend no further intervention    Case discussed with staff physician Dr. Trung Ye MD  [facility] Cardiology Fellow, PGY-IV  07/05/2022 9:01 AM  [facility]

## 2022-07-06 PROBLEM — R53.81 DEBILITY: Status: ACTIVE | Noted: 2022-07-06

## 2022-07-06 LAB
ALBUMIN SERPL BCP-MCNC: 2.3 G/DL (ref 3.5–5.2)
ALP SERPL-CCNC: 88 U/L (ref 55–135)
ALT SERPL W/O P-5'-P-CCNC: 9 U/L (ref 10–44)
ANION GAP SERPL CALC-SCNC: 13 MMOL/L (ref 8–16)
AST SERPL-CCNC: 21 U/L (ref 10–40)
BASOPHILS # BLD AUTO: 0.02 K/UL (ref 0–0.2)
BASOPHILS NFR BLD: 0.3 % (ref 0–1.9)
BILIRUB SERPL-MCNC: 0.8 MG/DL (ref 0.1–1)
BUN SERPL-MCNC: 29 MG/DL (ref 8–23)
CALCIUM SERPL-MCNC: 8.3 MG/DL (ref 8.7–10.5)
CHLORIDE SERPL-SCNC: 100 MMOL/L (ref 95–110)
CO2 SERPL-SCNC: 25 MMOL/L (ref 23–29)
CREAT SERPL-MCNC: 0.9 MG/DL (ref 0.5–1.4)
DIFFERENTIAL METHOD: ABNORMAL
EOSINOPHIL # BLD AUTO: 0 K/UL (ref 0–0.5)
EOSINOPHIL NFR BLD: 0.3 % (ref 0–8)
ERYTHROCYTE [DISTWIDTH] IN BLOOD BY AUTOMATED COUNT: 17.1 % (ref 11.5–14.5)
EST. GFR  (AFRICAN AMERICAN): >60 ML/MIN/1.73 M^2
EST. GFR  (NON AFRICAN AMERICAN): 56 ML/MIN/1.73 M^2
GLUCOSE SERPL-MCNC: 103 MG/DL (ref 70–110)
HCT VFR BLD AUTO: 30.4 % (ref 37–48.5)
HGB BLD-MCNC: 9.5 G/DL (ref 12–16)
IMM GRANULOCYTES # BLD AUTO: 0.05 K/UL (ref 0–0.04)
IMM GRANULOCYTES NFR BLD AUTO: 0.7 % (ref 0–0.5)
LYMPHOCYTES # BLD AUTO: 0.8 K/UL (ref 1–4.8)
LYMPHOCYTES NFR BLD: 10 % (ref 18–48)
MAGNESIUM SERPL-MCNC: 1.8 MG/DL (ref 1.6–2.6)
MCH RBC QN AUTO: 27.1 PG (ref 27–31)
MCHC RBC AUTO-ENTMCNC: 31.3 G/DL (ref 32–36)
MCV RBC AUTO: 87 FL (ref 82–98)
MONOCYTES # BLD AUTO: 0.5 K/UL (ref 0.3–1)
MONOCYTES NFR BLD: 6.1 % (ref 4–15)
NEUTROPHILS # BLD AUTO: 6.4 K/UL (ref 1.8–7.7)
NEUTROPHILS NFR BLD: 82.6 % (ref 38–73)
NRBC BLD-RTO: 0 /100 WBC
PLATELET # BLD AUTO: 157 K/UL (ref 150–450)
PMV BLD AUTO: 11.4 FL (ref 9.2–12.9)
POTASSIUM SERPL-SCNC: 4.2 MMOL/L (ref 3.5–5.1)
PROT SERPL-MCNC: 6.6 G/DL (ref 6–8.4)
RBC # BLD AUTO: 3.5 M/UL (ref 4–5.4)
SODIUM SERPL-SCNC: 138 MMOL/L (ref 136–145)
WBC # BLD AUTO: 7.69 K/UL (ref 3.9–12.7)

## 2022-07-06 PROCEDURE — 80053 COMPREHEN METABOLIC PANEL: CPT | Performed by: STUDENT IN AN ORGANIZED HEALTH CARE EDUCATION/TRAINING PROGRAM

## 2022-07-06 PROCEDURE — 85025 COMPLETE CBC W/AUTO DIFF WBC: CPT | Performed by: STUDENT IN AN ORGANIZED HEALTH CARE EDUCATION/TRAINING PROGRAM

## 2022-07-06 PROCEDURE — A4216 STERILE WATER/SALINE, 10 ML: HCPCS | Performed by: STUDENT IN AN ORGANIZED HEALTH CARE EDUCATION/TRAINING PROGRAM

## 2022-07-06 PROCEDURE — 94761 N-INVAS EAR/PLS OXIMETRY MLT: CPT

## 2022-07-06 PROCEDURE — 83735 ASSAY OF MAGNESIUM: CPT | Performed by: STUDENT IN AN ORGANIZED HEALTH CARE EDUCATION/TRAINING PROGRAM

## 2022-07-06 PROCEDURE — 97110 THERAPEUTIC EXERCISES: CPT | Mod: CQ

## 2022-07-06 PROCEDURE — 97535 SELF CARE MNGMENT TRAINING: CPT | Mod: CO

## 2022-07-06 PROCEDURE — 27000221 HC OXYGEN, UP TO 24 HOURS

## 2022-07-06 PROCEDURE — 36415 COLL VENOUS BLD VENIPUNCTURE: CPT | Performed by: STUDENT IN AN ORGANIZED HEALTH CARE EDUCATION/TRAINING PROGRAM

## 2022-07-06 PROCEDURE — 25000003 PHARM REV CODE 250: Performed by: STUDENT IN AN ORGANIZED HEALTH CARE EDUCATION/TRAINING PROGRAM

## 2022-07-06 PROCEDURE — 11000001 HC ACUTE MED/SURG PRIVATE ROOM

## 2022-07-06 PROCEDURE — 99900035 HC TECH TIME PER 15 MIN (STAT)

## 2022-07-06 PROCEDURE — 97116 GAIT TRAINING THERAPY: CPT | Mod: CQ

## 2022-07-06 PROCEDURE — 97530 THERAPEUTIC ACTIVITIES: CPT | Mod: CO

## 2022-07-06 PROCEDURE — 63600175 PHARM REV CODE 636 W HCPCS: Performed by: STUDENT IN AN ORGANIZED HEALTH CARE EDUCATION/TRAINING PROGRAM

## 2022-07-06 RX ORDER — BUMETANIDE 1 MG/1
1 TABLET ORAL 2 TIMES DAILY
Status: DISCONTINUED | OUTPATIENT
Start: 2022-07-06 | End: 2022-07-06

## 2022-07-06 RX ADMIN — SODIUM CHLORIDE 10 ML: 9 INJECTION, SOLUTION INTRAMUSCULAR; INTRAVENOUS; SUBCUTANEOUS at 11:07

## 2022-07-06 RX ADMIN — CLOPIDOGREL 75 MG: 75 TABLET, FILM COATED ORAL at 09:07

## 2022-07-06 RX ADMIN — MEROPENEM 1 G: 1 INJECTION, POWDER, FOR SOLUTION INTRAVENOUS at 11:07

## 2022-07-06 RX ADMIN — ASPIRIN 81 MG: 81 TABLET, CHEWABLE ORAL at 09:07

## 2022-07-06 RX ADMIN — TRAZODONE HYDROCHLORIDE 25 MG: 50 TABLET ORAL at 12:07

## 2022-07-06 RX ADMIN — AMLODIPINE BESYLATE 10 MG: 5 TABLET ORAL at 09:07

## 2022-07-06 RX ADMIN — TIMOLOL MALEATE 1 DROP: 5 SOLUTION OPHTHALMIC at 09:07

## 2022-07-06 RX ADMIN — BUMETANIDE 1 MG: 1 TABLET ORAL at 11:07

## 2022-07-06 RX ADMIN — LISINOPRIL 20 MG: 20 TABLET ORAL at 09:07

## 2022-07-06 RX ADMIN — LEVOTHYROXINE SODIUM 125 MCG: 75 TABLET ORAL at 09:07

## 2022-07-06 NOTE — PLAN OF CARE
Problem: Occupational Therapy  Goal: Occupational Therapy Goal  Description: Goals to be met by: 8/5/22     Patient will increase functional independence with ADLs by performing:    LE Dressing with Minimal Assistance and Assistive Devices as needed.  Grooming while seated with Modified [place].  Toileting from bedside commode with Minimal Assistance for hygiene and clothing management.   Stand pivot transfers with Stand-by Assistance.  Toilet transfer to bedside commode with Stand-by Assistance.  Increased functional strength to WFL for self care skills and functional mobility.  Upper extremity exercise program x10 reps per handout, with independence.    Outcome: Ongoing, Progressing   Pilar Michael is a 89 y.o. female with a medical diagnosis of Acute on chronic combined systolic and diastolic CHF (congestive heart failure).  Performance deficits affecting function are weakness, impaired endurance, impaired self care skills, impaired functional mobilty, gait instability, impaired balance, decreased upper extremity function, decreased lower extremity function, decreased ROM, impaired coordination, impaired skin, edema, impaired cardiopulmonary response to activity. Pt found in bed, agreeable to therapy.  Pt required SBA with bed mobility and stood with CGA x 2 trials.  Pt with complaints of weakness and fatigue only.  She is progressing towards goals. Continue OT services to address functional goals, progressing as able.

## 2022-07-06 NOTE — PT/OT/SLP PROGRESS
Occupational Therapy   Treatment    Name: Pilar Michael  MRN: [id number]  Admitting Diagnosis:  Acute on chronic combined systolic and diastolic CHF (congestive heart failure)       Recommendations:     Discharge Recommendations: other (see comments) (post acute placement)  Discharge Equipment Recommendations:  none  Barriers to discharge:  Other (Comment) (increased level of assistance)    Assessment:     Pilar Michael is a 89 y.o. female with a medical diagnosis of Acute on chronic combined systolic and diastolic CHF (congestive heart failure).  Performance deficits affecting function are weakness, impaired endurance, impaired self care skills, impaired functional mobilty, gait instability, impaired balance, decreased upper extremity function, decreased lower extremity function, decreased ROM, impaired coordination, impaired skin, edema, impaired cardiopulmonary response to activity. Pt found in bed, agreeable to therapy.  Pt required SBA with bed mobility and stood with CGA x 2 trials.  Pt with complaints of weakness and fatigue only.  She is progressing towards goals. Continue OT services to address functional goals, progressing as able.      Rehab Prognosis:  Good; patient would benefit from acute skilled OT services to address these deficits and reach maximum level of function.       Plan:     Patient to be seen 5 x/week to address the above listed problems via self-care/home management, therapeutic activities, therapeutic exercises  · Plan of Care Expires: 08/05/22  · Plan of Care Reviewed with: patient, family    Subjective     Pain/Comfort:  · Pain Rating 1: 0/10  · Pain Rating Post-Intervention 1: 0/10    Objective:     Communicated with: RN prior to session.  Patient found HOB elevated with peripheral IV, oxygen, telemetry upon OT entry to room.    General Precautions: Standard, fall   Orthopedic Precautions:N/A   Braces: N/A  Respiratory Status: Nasal cannula     Occupational Performance:     Bed  Mobility:    · Patient completed Rolling/Turning to Right with stand by assistance  · Patient completed Scooting/Bridging with total assistance x 2, supine to HOB  · Patient completed Supine to Sit with stand by assistance, increased effort and time  · Patient completed Sit to Supine with stand by assistance, increased effort    Functional Mobility/Transfers:  · Patient completed Sit <> Stand Transfer with contact guard assistance  with  rolling walker and vcs for hand placement x 3 trials   · Functional Mobility: Pt took a few steps forward and laterally with CGA using RW x 2 trials.  VCs for upright posture.    Activities of Daily Living:  · Grooming: stand by assistance seated EOB  · Lower Body Dressing: total assistance don socks      AMPAC 6 Click ADL: 15    Treatment & Education:  Pt sat EOB with Supervision while performing G.H task.     Patient left HOB elevated with all lines intact, call button in reach and family presentEducation:      GOALS:   Multidisciplinary Problems     Occupational Therapy Goals        Problem: Occupational Therapy    Goal Priority Disciplines Outcome Interventions   Occupational Therapy Goal     OT, PT/OT Ongoing, Progressing    Description: Goals to be met by: 8/5/22     Patient will increase functional independence with ADLs by performing:    LE Dressing with Minimal Assistance and Assistive Devices as needed.  Grooming while seated with Modified [place].  Toileting from bedside commode with Minimal Assistance for hygiene and clothing management.   Stand pivot transfers with Stand-by Assistance.  Toilet transfer to bedside commode with Stand-by Assistance.  Increased functional strength to WFL for self care skills and functional mobility.  Upper extremity exercise program x10 reps per handout, with independence.                     Time Tracking:     OT Date of Treatment: 07/06/22  OT Start Time: 1510  OT Stop Time: 1536  OT Total Time (min): 26 min    Billable Minutes:Self  Care/Home Management 12  Therapeutic Activity 14            7/6/2022

## 2022-07-06 NOTE — PLAN OF CARE
Problem: Physical Therapy  Goal: Physical Therapy Goal  Description: Goals to be met by: 22     Patient will increase functional independence with mobility by performin. Supine to sit with Stand-by Assistance  2. Sit to supine with Stand-by Assistance  3. Sit to stand transfer with Stand-by Assistance  4. Bed to chair transfer with Stand-by Assistance using Rolling Walker  5. Gait  x 50 feet with Contact Guard Assistance using Rolling Walker.     Outcome: Ongoing, Progressing

## 2022-07-06 NOTE — PROGRESS NOTES
[facility] Medicine Progress Note    Primary Team: [facility] Hospitalist Team A  Attending Physician: Alicia Rene MD  Resident: Marianna  Intern: [facility] Day: 2     Chief Complaint: Shortness of Breath       Subjective:      Patient seen and examined by me this morning. Still feeling more fatigue than normal. No other complaints. States she wants to go home.    Review of Systems   Constitutional: Negative for chills, fever and malaise/fatigue.   Respiratory: Positive for shortness of breath. Negative for cough and sputum production.    Cardiovascular: Positive for leg swelling. Negative for chest pain and orthopnea.   Gastrointestinal: Negative for abdominal pain, diarrhea, nausea and vomiting.   All other systems reviewed and are negative.     Past Medical History:   Diagnosis Date    Arthritis     Cancer     CHF (congestive heart failure)     Coronary artery disease     Hypertension     Thyroid disease               Objective:   Last 24 Hour Vital Signs:  BP  Min: 113/53  Max: 147/65  Temp  Av.1 °F (36.7 °C)  Min: 96.6 °F (35.9 °C)  Max: 99.4 °F (37.4 °C)  Pulse  Av.5  Min: 59  Max: 61  Resp  Av.3  Min: 14  Max: 20  SpO2  Av.9 %  Min: 95 %  Max: 100 %    Intake/Output Summary (Last 24 hours) at 2022 0818  Last data filed at 2022 0500  Gross per 24 hour   Intake 120 ml   Output 1000 ml   Net -880 ml      Physical Examination:  Physical Exam  Vitals and nursing note reviewed.   Constitutional:       General: She is not in acute distress.     Appearance: Normal appearance.   HENT:      Head: Normocephalic and atraumatic.      Mouth/Throat:      Mouth: Mucous membranes are moist.      Pharynx: Oropharynx is clear. No oropharyngeal exudate.   Eyes:      General: No scleral icterus.     Extraocular Movements: Extraocular movements intact.      Pupils: Pupils are equal, round, and reactive to light.   Cardiovascular:      Rate and Rhythm: Normal rate and regular rhythm.       Pulses: Normal pulses.      Heart sounds: Normal heart sounds. No murmur heard.  Pulmonary:      Effort: Pulmonary effort is normal. No respiratory distress.      Breath sounds: Normal breath sounds.   Chest:      Chest wall: No tenderness.   Abdominal:      General: Bowel sounds are normal.      Palpations: Abdomen is soft.      Tenderness: There is no abdominal tenderness. There is no guarding or rebound.   Musculoskeletal:         General: Normal range of motion.      Cervical back: Normal range of motion and neck supple. No tenderness.      Right lower leg: Edema present.      Left lower leg: Edema present.   Skin:     Capillary Refill: Capillary refill takes less than 2 seconds.        Laboratory:  Recent Labs   Lab 07/03/22  1330 07/04/22  0237 07/05/22  0347 07/06/22  0402   WBC 14.73* 8.03 8.43 7.69   HGB 10.9* 10.2* 10.5* 9.5*   HCT 34.3* 32.9* 32.8* 30.4*    179 157 157   MCV 85 86 84 87   RDW 17.1* 17.0* 16.9* 17.1*    139 141 138   K 4.1 4.3 3.2* 4.2    102 99 100   CO2 22* 24 28 25   BUN 34* 33* 31* 29*   CREATININE 1.1 1.0 0.9 0.9   * 102 102 103   PROT 7.6 7.5 7.5 6.6   ALBUMIN 2.8* 2.5* 2.5* 2.3*   BILITOT 1.3* 1.0 1.0 0.8   AST 26 36 25 21   ALKPHOS 115 95 104 88   ALT 13 8* 9* 9*     Urinalysis  No results for input(s): COLORU, CLARITYU, SPECGRAV, PHUR, PROTEINUA, GLUCOSEU, BILIRUBINCON, BLOODU, WBCU, RBCU, BACTERIA, MUCUS, NITRITE, LEUKOCYTESUR, UROBILINOGEN, HYALINECASTS in the last 24 hours.    Microbiology Results (last 7 days)     Procedure Component Value Units Date/Time    Blood culture [[id number]] Collected: 07/04/22 1239    Order Status: Completed Specimen: Blood Updated: 07/05/22 1812     Blood Culture, Routine No Growth to date      No Growth to date    Blood culture [[id number]] Collected: 07/04/22 1334    Order Status: Completed Specimen: Blood Updated: 07/05/22 1812     Blood Culture, Routine No Growth to date      No Growth to date    Narrative:       Collection has been rescheduled by [facility] at 07/04/2022 12:40 Reason:   Unable to collect the other set   Collection has been rescheduled by [email or web address] at 07/04/2022 12:40 Reason:   Unable to collect the other set     Urine Culture High Risk [[id number]]     Order Status: No result Specimen: Urine     Urine culture [[id number]] Collected: 07/03/22 1329    Order Status: Completed Specimen: Kidney, left Updated: 07/05/22 1134     Urine Culture, Routine Cultured cancelled    Urine culture [[id number]]     Order Status: Completed Specimen: Urine     Urine culture [[id number]] Collected: 07/03/22 1329    Order Status: Canceled Specimen: Urine from Random void          I have personally reviewed the above laboratory studies.     Imaging:  EKG (my interpretation): ventricular pacing at 60 bpm    X-Ray Chest 1 View   Final Result      Volume overload/CHF and pulmonary edema         Electronically signed by: Yuriy Brandon Jr   Date:    07/03/2022   Time:    13:53        Current Medications:     Scheduled:   amLODIPine  10 mg Oral Daily    aspirin  81 mg Oral Daily    clopidogreL  75 mg Oral Daily    furosemide (LASIX) injection  80 mg Intravenous BID    levothyroxine  125 mcg Oral Daily    lisinopriL  20 mg Oral Daily    meropenem (MERREM) IVPB  1 g Intravenous Q12H    timolol maleate 0.5%  1 drop Both Eyes BID         Infusions:       PRN:  sars-cov-2 (covid-19), sodium chloride 0.9%  Antibiotics and Day Number of Therapy:  Antibiotics (From admission, onward)            Start     Stop Route Frequency Ordered    07/04/22 1145  meropenem 1 g in sodium chloride 0.9 % 100 mL IVPB (ready to mix system)         -- IV Every 12 hours (non-standard times) 07/04/22 1045             Assessment:     Pilar Michael is a 89 y.o. female with a PMHx of HFpEF, left breast cancer s/p total mastectomy, HTN, HLD, symptomatic bradycardiay s/p PCM, severe aortic stenosis s/p TAVR who presents with acute hypoxic respiratory failure with  vascular congestion on CXR and elevated BNP from baseline consistent with heart failure exacerbation. Extensive previous history of chronic UTI with admit UA suspicious for current UTI. Meropenem started.    Plan:     Acute hypoxemic respiratory failure due to Acute on Chronic HFpEF  PMHx HFpEF, valvular heart disease p/w dyspnea and increasing oxygen requirement with evidence of volume overload on physical exam in the setting of an elevated BNP(1347). Decompensation likely due to dietary indiscretion.   -Last Echo in 3/2022 demonstrated EF 55-60% with mitral stenosis. Will obtain repeat transthoracic echo   -Continue diuresis with Lasix 80 mg IV BID   -Strict intake & output with fluid restriction to <2L daily    Recurrent Urinary Tract Infection  Patient with recurrent UTIs, last urine culture shows Pseudomonas only sensitive to imipenem, tobramycin, gentamicin.  Was incompletely treated with ciprofloxacin last month.  Only symptom is foul-smelling urine.  -urine culture pending  -Meropenem initiated on 07/04  -patient improving mental status and fatigue    Severe aortic stenosis s/p TAVR  PMHx severe AS a TAVR in 2019    Symptomatic bradycardia s/p pacemaker.  Pacemaker placed in 2019 for complete heart block and symptomatic bradycardia, currently ventricularly paced at 60 beats per minute    Hypertension  -Continue lisinopril 20 mg PO daily    Hyperlipidemia  -lipid panel with LDL at goal in 4/2022 on lipitor 20 mg PO daily    Hypothyroidism  -synthroid 125 mcg PO Daily    Diet: Diet Cardiac   DVT Prophylaxis: SCD  Code: Full Code     Dispo: pending improvement of oxygenation status, mental status, fatigue and completion of abx    Volodymyr England M.D.  [facility] Neurology PGY-1    [facility] Medicine Hospitalist Pager numbers:   [facility] Hospitalist Medicine Team A (Edgard/Larisa): [phone]  [facility] Hospitalist Medicine Team B (Crystal/Hay):  [phone]

## 2022-07-06 NOTE — PT/OT/SLP PROGRESS
Physical Therapy Treatment    Patient Name:  Pilar Michael   MRN:  [id number]    Recommendations:     Discharge Recommendations:  nursing facility, skilled   Discharge Equipment Recommendations:  (TBD)   Barriers to discharge: decreased mobility,strength and endurance    Assessment:     Pilar Michael is a 89 y.o. female admitted with a medical diagnosis of Acute on chronic combined systolic and diastolic CHF (congestive heart failure).  She presents with the following impairments/functional limitations:  weakness, impaired endurance, impaired functional mobilty, gait instability, impaired balance, decreased upper extremity function, decreased lower extremity function, decreased ROM, impaired coordination, impaired cardiopulmonary response to activity, impaired joint extensibility,pt with good participation and requires assistance with all mobility at this time and will benefit from SNF upon discharge.    Rehab Prognosis: Fair; patient would benefit from acute skilled PT services to address these deficits and reach maximum level of function.    Recent Surgery: * No surgery found *      Plan:     During this hospitalization, patient to be seen 5 x/week to address the identified rehab impairments via gait training, therapeutic activities, therapeutic exercises, neuromuscular re-education and progress toward the following goals:    · Plan of Care Expires:  08/05/22    Subjective     Chief Complaint: n/a  Patient/Family Comments/goals: pt agreeable to rx.  Pain/Comfort:  · Pain Rating 1: 0/10      Objective:     Communicated with nsg prior to session.  Patient found supine with oxygen, peripheral IV upon PT entry to room.     General Precautions: Standard, fall   Orthopedic Precautions:N/A   Braces: N/A  Respiratory Status: Nasal cannula, flow 2 L/min     Functional Mobility:  · Bed Mobility:     · Supine to Sit: moderate assistance  · Sit to Supine: moderate assistance  · Transfers:     · Sit to Stand:  minimum  assistance with rolling walker  · Gait: amb 3-4 sidesteps to HOB and ~5' up/back with RW and Min A  · Balance: fair standing balance with RW      AM-PAC 6 CLICK MOBILITY  Turning over in bed (including adjusting bedclothes, sheets and blankets)?: 2  Sitting down on and standing up from a chair with arms (e.g., wheelchair, bedside commode, etc.): 3  Moving from lying on back to sitting on the side of the bed?: 2  Moving to and from a bed to a chair (including a wheelchair)?: 3  Need to walk in hospital room?: 3  Climbing 3-5 steps with a railing?: 1  Basic Mobility Total Score: 14       Therapeutic Activities and Exercises: le supine ex's x 10-12 reps inc: ap,ha,abd/add,slr.       Patient left right sidelying with all lines intact, call button in reach and family present..    GOALS:   Multidisciplinary Problems     Physical Therapy Goals        Problem: Physical Therapy    Goal Priority Disciplines Outcome Goal Variances Interventions   Physical Therapy Goal     PT, PT/OT Ongoing, Progressing     Description: Goals to be met by: 22     Patient will increase functional independence with mobility by performin. Supine to sit with Stand-by Assistance  2. Sit to supine with Stand-by Assistance  3. Sit to stand transfer with Stand-by Assistance  4. Bed to chair transfer with Stand-by Assistance using Rolling Walker  5. Gait  x 50 feet with Contact Guard Assistance using Rolling Walker.                      Time Tracking:     PT Received On: 22  PT Start Time: 1004     PT Stop Time: 1030  PT Total Time (min): 26 min     Billable Minutes: Gait Training 15 and Therapeutic Exercise 11    Treatment Type: Treatment  PT/PTA: PTA     PTA Visit Number: 1     2022

## 2022-07-06 NOTE — PLAN OF CARE
1230  Patient resting quietly in bed with son-in-law, Chito, at the bedside when CM rounded. CM informed both of PT/OT recs for SNF placement following discharge. Bill stated that they were also told the patient would need 2 weeks of IV meropenem following discharge. Bill stated that they would like the patient to be admitted to [facility] & do not want the patient admitted to [facility]. CM informed both that additional referrals would need to be sent. Both in agreement to have referrals sent to other facilities in the [facility] area.    1345  SNF referrals sent via [facility].       Will continue to follow.

## 2022-07-06 NOTE — PLAN OF CARE
Plan of care reviewed with pt and family. Pt voiced understanding. Pt shows no acute distress. NSR on monitor. Bed alarm on for pt safety. Family at bedside.

## 2022-07-07 LAB
ALBUMIN SERPL BCP-MCNC: 2.3 G/DL (ref 3.5–5.2)
ALP SERPL-CCNC: 89 U/L (ref 55–135)
ALT SERPL W/O P-5'-P-CCNC: 8 U/L (ref 10–44)
ANION GAP SERPL CALC-SCNC: 7 MMOL/L (ref 8–16)
AST SERPL-CCNC: 21 U/L (ref 10–40)
BASOPHILS # BLD AUTO: 0.02 K/UL (ref 0–0.2)
BASOPHILS NFR BLD: 0.2 % (ref 0–1.9)
BILIRUB SERPL-MCNC: 0.8 MG/DL (ref 0.1–1)
BUN SERPL-MCNC: 30 MG/DL (ref 8–23)
CALCIUM SERPL-MCNC: 8.6 MG/DL (ref 8.7–10.5)
CHLORIDE SERPL-SCNC: 100 MMOL/L (ref 95–110)
CO2 SERPL-SCNC: 32 MMOL/L (ref 23–29)
CREAT SERPL-MCNC: 0.8 MG/DL (ref 0.5–1.4)
DIFFERENTIAL METHOD: ABNORMAL
EOSINOPHIL # BLD AUTO: 0.1 K/UL (ref 0–0.5)
EOSINOPHIL NFR BLD: 0.9 % (ref 0–8)
ERYTHROCYTE [DISTWIDTH] IN BLOOD BY AUTOMATED COUNT: 16.8 % (ref 11.5–14.5)
EST. GFR  (AFRICAN AMERICAN): >60 ML/MIN/1.73 M^2
EST. GFR  (NON AFRICAN AMERICAN): >60 ML/MIN/1.73 M^2
GLUCOSE SERPL-MCNC: 98 MG/DL (ref 70–110)
HCT VFR BLD AUTO: 32.5 % (ref 37–48.5)
HGB BLD-MCNC: 10 G/DL (ref 12–16)
IMM GRANULOCYTES # BLD AUTO: 0.07 K/UL (ref 0–0.04)
IMM GRANULOCYTES NFR BLD AUTO: 0.8 % (ref 0–0.5)
LYMPHOCYTES # BLD AUTO: 0.8 K/UL (ref 1–4.8)
LYMPHOCYTES NFR BLD: 9 % (ref 18–48)
MAGNESIUM SERPL-MCNC: 1.8 MG/DL (ref 1.6–2.6)
MCH RBC QN AUTO: 26.8 PG (ref 27–31)
MCHC RBC AUTO-ENTMCNC: 30.8 G/DL (ref 32–36)
MCV RBC AUTO: 87 FL (ref 82–98)
MONOCYTES # BLD AUTO: 0.7 K/UL (ref 0.3–1)
MONOCYTES NFR BLD: 7.9 % (ref 4–15)
NEUTROPHILS # BLD AUTO: 6.9 K/UL (ref 1.8–7.7)
NEUTROPHILS NFR BLD: 81.2 % (ref 38–73)
NRBC BLD-RTO: 0 /100 WBC
PLATELET # BLD AUTO: 181 K/UL (ref 150–450)
PMV BLD AUTO: 11.6 FL (ref 9.2–12.9)
POTASSIUM SERPL-SCNC: 4.3 MMOL/L (ref 3.5–5.1)
PROT SERPL-MCNC: 7.2 G/DL (ref 6–8.4)
RBC # BLD AUTO: 3.73 M/UL (ref 4–5.4)
SODIUM SERPL-SCNC: 139 MMOL/L (ref 136–145)
WBC # BLD AUTO: 8.45 K/UL (ref 3.9–12.7)

## 2022-07-07 PROCEDURE — 63600175 PHARM REV CODE 636 W HCPCS: Performed by: STUDENT IN AN ORGANIZED HEALTH CARE EDUCATION/TRAINING PROGRAM

## 2022-07-07 PROCEDURE — 80053 COMPREHEN METABOLIC PANEL: CPT | Performed by: STUDENT IN AN ORGANIZED HEALTH CARE EDUCATION/TRAINING PROGRAM

## 2022-07-07 PROCEDURE — 36415 COLL VENOUS BLD VENIPUNCTURE: CPT | Performed by: STUDENT IN AN ORGANIZED HEALTH CARE EDUCATION/TRAINING PROGRAM

## 2022-07-07 PROCEDURE — 97535 SELF CARE MNGMENT TRAINING: CPT

## 2022-07-07 PROCEDURE — 99900035 HC TECH TIME PER 15 MIN (STAT)

## 2022-07-07 PROCEDURE — 97530 THERAPEUTIC ACTIVITIES: CPT

## 2022-07-07 PROCEDURE — 97110 THERAPEUTIC EXERCISES: CPT | Mod: CQ

## 2022-07-07 PROCEDURE — 87086 URINE CULTURE/COLONY COUNT: CPT

## 2022-07-07 PROCEDURE — 36410 VNPNXR 3YR/> PHY/QHP DX/THER: CPT

## 2022-07-07 PROCEDURE — 85025 COMPLETE CBC W/AUTO DIFF WBC: CPT | Performed by: STUDENT IN AN ORGANIZED HEALTH CARE EDUCATION/TRAINING PROGRAM

## 2022-07-07 PROCEDURE — C1751 CATH, INF, PER/CENT/MIDLINE: HCPCS

## 2022-07-07 PROCEDURE — 51701 INSERT BLADDER CATHETER: CPT

## 2022-07-07 PROCEDURE — 25000003 PHARM REV CODE 250: Performed by: STUDENT IN AN ORGANIZED HEALTH CARE EDUCATION/TRAINING PROGRAM

## 2022-07-07 PROCEDURE — 27000221 HC OXYGEN, UP TO 24 HOURS

## 2022-07-07 PROCEDURE — 11000001 HC ACUTE MED/SURG PRIVATE ROOM

## 2022-07-07 PROCEDURE — 83735 ASSAY OF MAGNESIUM: CPT | Performed by: STUDENT IN AN ORGANIZED HEALTH CARE EDUCATION/TRAINING PROGRAM

## 2022-07-07 PROCEDURE — 97116 GAIT TRAINING THERAPY: CPT | Mod: CQ

## 2022-07-07 RX ADMIN — AMLODIPINE BESYLATE 10 MG: 5 TABLET ORAL at 08:07

## 2022-07-07 RX ADMIN — TIMOLOL MALEATE 1 DROP: 5 SOLUTION OPHTHALMIC at 08:07

## 2022-07-07 RX ADMIN — ASPIRIN 81 MG: 81 TABLET, CHEWABLE ORAL at 08:07

## 2022-07-07 RX ADMIN — LISINOPRIL 20 MG: 20 TABLET ORAL at 08:07

## 2022-07-07 RX ADMIN — TIMOLOL MALEATE 1 DROP: 5 SOLUTION OPHTHALMIC at 09:07

## 2022-07-07 RX ADMIN — LEVOTHYROXINE SODIUM 125 MCG: 75 TABLET ORAL at 08:07

## 2022-07-07 RX ADMIN — CLOPIDOGREL 75 MG: 75 TABLET, FILM COATED ORAL at 08:07

## 2022-07-07 RX ADMIN — MEROPENEM 1 G: 1 INJECTION, POWDER, FOR SOLUTION INTRAVENOUS at 11:07

## 2022-07-07 NOTE — PLAN OF CARE
"0830  CM completed LOCET & faxed PASRR to the state. Awaiting 142.    No SNF acceptance yet. Updated notes sent via [facility].     1115  CM was informed by [facility] that they should have a bed available for the pt tomorrow.  Patient resting quietly in bed with son, Jack Michael ([phone]), & niece, Elvi Nicholson [phone]), at the bedside when CM rounded. CM informed all present of the discharge plan. All verbalized understanding & agreement.     1345  PASRR/142 uploaded into [facility].     1420  CM was informed by [facility] that they will have a bed available for the patient's admission tomorrow & that she will submit for insurance authorization today. Message sent to Dr Cabral informing of above & requesting an order for midline placement.     CM informed the patient's daughter, Ktaiana Conrad ([phone]), of the patient's discharge status & provided Katiana with [facility]'s contact information. Orders for "ambulatory referral to [facility]" entered.       Will continue to follow.    "

## 2022-07-07 NOTE — PLAN OF CARE
Problem: Occupational Therapy  Goal: Occupational Therapy Goal  Description: Goals to be met by: 8/5/22     Patient will increase functional independence with ADLs by performing:    LE Dressing with Minimal Assistance and Assistive Devices as needed.  Grooming while seated with Modified [place].  Toileting from bedside commode with Minimal Assistance for hygiene and clothing management.   Stand pivot transfers with Stand-by Assistance.  Toilet transfer to bedside commode with Stand-by Assistance.  Increased functional strength to [facility] for self care skills and functional mobility.  Upper extremity exercise program x10 reps per handout, with independence.    Outcome: Ongoing, Progressing     Pt progressing towards goals. Cont OT POC

## 2022-07-07 NOTE — PT/OT/SLP PROGRESS
Occupational Therapy   Treatment    Name: Pilar Michael  MRN: [id number]  Admitting Diagnosis:  Acute on chronic combined systolic and diastolic CHF (congestive heart failure)       Recommendations:     Discharge Recommendations: nursing facility, skilled  Discharge Equipment Recommendations:  none  Barriers to discharge:   (increased physical assist required)    Assessment:     Pilar Michael is a 89 y.o. female with a medical diagnosis of Acute on chronic combined systolic and diastolic CHF (congestive heart failure).  She presents with The primary encounter diagnosis was Congestive heart failure, unspecified HF chronicity, unspecified heart failure type. Diagnoses of Shortness of breath, Urinary tract infection without hematuria, site unspecified, Acute on chronic combined systolic (congestive) and diastolic (congestive) heart failure, Acute on chronic combined systolic and diastolic CHF (congestive heart failure), Acquired hypothyroidism, Essential hypertension, Hyperlipidemia, unspecified hyperlipidemia type, Normocytic anemia, and Adequate nutrition were also pertinent to this visit.  . Performance deficits affecting function are weakness, gait instability, impaired balance, impaired endurance, impaired self care skills, impaired functional mobilty, edema, impaired skin, decreased safety awareness, decreased lower extremity function, decreased upper extremity function, decreased ROM, impaired coordination, impaired cardiopulmonary response to activity.     Pt progressing towards goals. Cont OT POC     Rehab Prognosis:  Good; patient would benefit from acute skilled OT services to address these deficits and reach maximum level of function.       Plan:     Patient to be seen 5 x/week to address the above listed problems via self-care/home management, therapeutic activities, therapeutic exercises  · Plan of Care Expires: 08/05/22  · Plan of Care Reviewed with: patient, family    Subjective      Pain/Comfort:  · Pain Rating 1: 0/10    Objective:     Communicated with: nsg prior to session.  General Precautions: Standard, fall   Orthopedic Precautions:N/A   Braces: N/A    Occupational Performance:     Bed Mobility:    · Patient completed Rolling/Turning to Left with  moderate assistance  · Patient completed Scooting/Bridging with moderate assistance and maximal assistance  · Patient completed Supine to Sit with moderate assistance     Functional Mobility/Transfers:  · Patient completed Sit <> Stand Transfer with contact guard assistance  with  rolling walker   · Patient completed Bed <> Chair Transfer using Step Transfer technique with contact guard assistance with rolling walker  · Functional Mobility: CGA with RW; cues for posture     Activities of Daily Living:  · Feeding:  set up /supervision   · Grooming: contact guard assistance and minimum assistance at sink x 2 trials       AMPA 6 Click ADL: 15    Treatment & Education:  Requires assist for bed mobility   1x 10 RUE shoulder flex/ext; AAROM required in available range LUE   Increased assist for seated scooting to EOB  Stood x 4 trials during session with RW; cues for scooting to edge of surface and hand placement ; rocking tech performed   Stood at sink x 2 trials during session; assist required for balance during performance of G&H Axs; seated break required between axs; initial stand, pt performed functional mobility to sink   Set up with lunch tray; set up /supervision for eating            Patient left up in chair with all lines intact, call button in reach, chair alarm on, nsg notified and family  presentEducation:      GOALS:   Multidisciplinary Problems     Occupational Therapy Goals        Problem: Occupational Therapy    Goal Priority Disciplines Outcome Interventions   Occupational Therapy Goal     OT, PT/OT Ongoing, Progressing    Description: Goals to be met by: 8/5/22     Patient will increase functional independence with ADLs by  performing:    LE Dressing with Minimal Assistance and Assistive Devices as needed.  Grooming while seated with Modified [place].  Toileting from bedside commode with Minimal Assistance for hygiene and clothing management.   Stand pivot transfers with Stand-by Assistance.  Toilet transfer to bedside commode with Stand-by Assistance.  Increased functional strength to WFL for self care skills and functional mobility.  Upper extremity exercise program x10 reps per handout, with independence.                     Time Tracking:     OT Date of Treatment: 07/07/22  OT Start Time: 1152  OT Stop Time: 1230  OT Total Time (min): 38 min    Billable Minutes:Self Care/Home Management 23  Therapeutic Activity 15    OT/LAVONNE: OT     LAVONNE Visit Number: 0    7/7/2022

## 2022-07-07 NOTE — PROGRESS NOTES
[facility] Medicine Progress Note    Primary Team: [facility] Hospitalist Team A  Attending Physician: Alicia Rene MD  Resident: Marianna  Intern: [facility] Day:  4    Chief Complaint: Shortness of Breath       Subjective:      Patient seen and examined by me this morning. Continues feeling more fatigue than normal. No other complaints. Son at bedside. Expressed concern about decreased diet intake. Boost plus ordered.    Review of Systems   Constitutional: Positive for malaise/fatigue. Negative for chills and fever.   HENT: Negative for hearing loss and sore throat.    Eyes: Negative for blurred vision and double vision.   Respiratory: Positive for shortness of breath. Negative for cough and sputum production.    Cardiovascular: Positive for leg swelling. Negative for chest pain and orthopnea.   Gastrointestinal: Negative for abdominal pain, diarrhea, nausea and vomiting.   Genitourinary: Negative for dysuria, frequency and urgency.   Musculoskeletal: Negative for falls, joint pain and myalgias.   Neurological: Negative for dizziness and headaches.   Psychiatric/Behavioral: Negative for depression. The patient is not nervous/anxious.    All other systems reviewed and are negative.     Past Medical History:   Diagnosis Date    Arthritis     Cancer     CHF (congestive heart failure)     Coronary artery disease     Hypertension     Thyroid disease               Objective:   Last 24 Hour Vital Signs:  BP  Min: 107/46  Max: 148/65  Temp  Av.4 °F (36.9 °C)  Min: 97.5 °F (36.4 °C)  Max: 99.4 °F (37.4 °C)  Pulse  Av.1  Min: 59  Max: 63  Resp  Av.2  Min: 16  Max: 21  SpO2  Av.8 %  Min: 96 %  Max: 99 %    Intake/Output Summary (Last 24 hours) at 2022 0657  Last data filed at 2022 0513  Gross per 24 hour   Intake 368 ml   Output 500 ml   Net -132 ml      Physical Examination:  Physical Exam  Vitals and nursing note reviewed.   Constitutional:       General: She is not in acute distress.      Appearance: Normal appearance.   HENT:      Head: Normocephalic and atraumatic.      Mouth/Throat:      Mouth: Mucous membranes are moist.      Pharynx: Oropharynx is clear. No oropharyngeal exudate.   Eyes:      General: No scleral icterus.     Extraocular Movements: Extraocular movements intact.      Pupils: Pupils are equal, round, and reactive to light.   Cardiovascular:      Rate and Rhythm: Normal rate and regular rhythm.      Pulses: Normal pulses.      Heart sounds: Normal heart sounds. No murmur heard.  Pulmonary:      Effort: Pulmonary effort is normal. No respiratory distress.      Breath sounds: Normal breath sounds.   Chest:      Chest wall: No tenderness.   Abdominal:      General: Bowel sounds are normal.      Palpations: Abdomen is soft.      Tenderness: There is no abdominal tenderness. There is no guarding or rebound.   Musculoskeletal:         General: Normal range of motion.      Left upper arm: Edema (present on admission) present.      Left forearm: Edema (present on admission) present.      Cervical back: Normal range of motion and neck supple. No tenderness.      Right lower leg: Edema present.      Left lower leg: Edema present.   Skin:     Capillary Refill: Capillary refill takes less than 2 seconds.        Laboratory:  Recent Labs   Lab 07/03/22  1330 07/04/22  0237 07/05/22  0347 07/06/22  0402 07/07/22  0328   WBC 14.73* 8.03 8.43 7.69 8.45   HGB 10.9* 10.2* 10.5* 9.5* 10.0*   HCT 34.3* 32.9* 32.8* 30.4* 32.5*    179 157 157 181   MCV 85 86 84 87 87   RDW 17.1* 17.0* 16.9* 17.1* 16.8*    139 141 138 139   K 4.1 4.3 3.2* 4.2 4.3    102 99 100 100   CO2 22* 24 28 25 32*   BUN 34* 33* 31* 29* 30*   CREATININE 1.1 1.0 0.9 0.9 0.8   * 102 102 103 98   PROT 7.6 7.5 7.5 6.6 7.2   ALBUMIN 2.8* 2.5* 2.5* 2.3* 2.3*   BILITOT 1.3* 1.0 1.0 0.8 0.8   AST 26 36 25 21 21   ALKPHOS 115 95 104 88 89   ALT 13 8* 9* 9* 8*     Urinalysis  No results for input(s): KRISTY,  CLARITYU, SPECGRAV, PHUR, PROTEINUA, GLUCOSEU, BILIRUBINCON, BLOODU, WBCU, RBCU, BACTERIA, MUCUS, NITRITE, LEUKOCYTESUR, UROBILINOGEN, HYALINECASTS in the last 24 hours.    Microbiology Results (last 7 days)     Procedure Component Value Units Date/Time    Blood culture [[id number]] Collected: 07/04/22 1239    Order Status: Completed Specimen: Blood Updated: 07/06/22 1812     Blood Culture, Routine No Growth to date      No Growth to date      No Growth to date    Blood culture [[id number]] Collected: 07/04/22 1334    Order Status: Completed Specimen: Blood Updated: 07/06/22 1812     Blood Culture, Routine No Growth to date      No Growth to date      No Growth to date    Narrative:      Collection has been rescheduled by [email or web address] at 07/04/2022 12:40 Reason:   Unable to collect the other set   Collection has been rescheduled by [email or web address] at 07/04/2022 12:40 Reason:   Unable to collect the other set     Urine Culture High Risk [[id number]]     Order Status: No result Specimen: Urine     Urine culture [[id number]] Collected: 07/03/22 1329    Order Status: Completed Specimen: Kidney, left Updated: 07/05/22 1134     Urine Culture, Routine Cultured cancelled    Urine culture [[id number]]     Order Status: Completed Specimen: Urine     Urine culture [[id number]] Collected: 07/03/22 1329    Order Status: Canceled Specimen: Urine from Random void          I have personally reviewed the above laboratory studies.     Imaging:  EKG (my interpretation): ventricular pacing at 60 bpm    X-Ray Chest 1 View   Final Result      Volume overload/CHF and pulmonary edema         Electronically signed by: Yuriy Brandon Jr   Date:    07/03/2022   Time:    13:53        Current Medications:     Scheduled:   amLODIPine  10 mg Oral Daily    aspirin  81 mg Oral Daily    clopidogreL  75 mg Oral Daily    levothyroxine  125 mcg Oral Daily    lisinopriL  20 mg Oral Daily    meropenem (MERREM) IVPB  1 g Intravenous Q12H    timolol maleate 0.5%  1 drop  Both Eyes BID         Infusions:       PRN:  sars-cov-2 (covid-19), sodium chloride 0.9%  Antibiotics and Day Number of Therapy:  Antibiotics (From admission, onward)            Start     Stop Route Frequency Ordered    07/04/22 1145  meropenem 1 g in sodium chloride 0.9 % 100 mL IVPB (ready to mix system)         -- IV Every 12 hours (non-standard times) 07/04/22 1045             Assessment:     Pilar Michael is a 89 y.o. female with a PMHx of HFpEF, left breast cancer s/p total mastectomy, HTN, HLD, symptomatic bradycardiay s/p PCM, severe aortic stenosis s/p TAVR who presents with acute hypoxic respiratory failure with vascular congestion on CXR and elevated BNP from baseline consistent with heart failure exacerbation. Extensive previous history of chronic UTI with admit UA suspicious for current UTI. Meropenem started. SNF placement for PT/OT and continued IV abx.    Plan:     Acute hypoxemic respiratory failure due to Acute on Chronic HFpEF  PMHx HFpEF, valvular heart disease p/w dyspnea and increasing oxygen requirement with evidence of volume overload on physical exam in the setting of an elevated BNP(1347). Decompensation likely due to dietary indiscretion.   -Last Echo in 3/2022 demonstrated EF 55-60% with mitral stenosis. Will obtain repeat transthoracic echo   -Continue diuresis with Lasix 80 mg IV BID   -Strict intake & output with fluid restriction to <2L daily    Recurrent Urinary Tract Infection  Patient with recurrent UTIs, last urine culture shows Pseudomonas only sensitive to imipenem, tobramycin, gentamicin.  Was incompletely treated with ciprofloxacin last month.  Only symptom is foul-smelling urine.  -urine culture pending  -Meropenem initiated on 07/04  -patient improving mental status and fatigue  -continue IV abx in SNF    Severe aortic stenosis s/p TAVR  PMHx severe AS a TAVR in 2019    Symptomatic bradycardia s/p pacemaker.  Pacemaker placed in 2019 for complete heart block and  symptomatic bradycardia, currently ventricularly paced at 60 beats per minute    Hypertension  -Continue lisinopril 20 mg PO daily    Hyperlipidemia  -lipid panel with LDL at goal in 4/2022 on lipitor 20 mg PO daily    Hypothyroidism  -synthroid 125 mcg PO Daily    Diet: Diet Cardiac   DVT Prophylaxis: SCD  Code: Full Code     Dispo: awaiting SNF placement for continued IV abx and PT/OT    Volodymyr England M.D.  [facility] PGY-1    [facility] Hospitalist Pager numbers:   [facility] Hospitalist Medicine Team A (Edgard/Larisa): [phone]  [facility] Hospitalist Medicine Team B (Crystal/Hay):  [phone]

## 2022-07-07 NOTE — PT/OT/SLP PROGRESS
Physical Therapy Treatment    Patient Name:  Pilar Michael   MRN:  [id number]    Recommendations:     Discharge Recommendations:  nursing facility, skilled   Discharge Equipment Recommendations: none   Barriers to discharge: decreased mobility,strength and endurance    Assessment:     Pilar Michael is a 89 y.o. female admitted with a medical diagnosis of Acute on chronic combined systolic and diastolic CHF (congestive heart failure).  She presents with the following impairments/functional limitations:  weakness, impaired endurance, impaired functional mobilty, gait instability, impaired balance, decreased upper extremity function, decreased lower extremity function, decreased ROM, impaired coordination, impaired cardiopulmonary response to activity,pt with good participation and requires assistance with all mobility at this time,pt will benefit from SNF upon discharge to address above functional limitations.    Rehab Prognosis: Fair; patient would benefit from acute skilled PT services to address these deficits and reach maximum level of function.    Recent Surgery: * No surgery found *      Plan:     During this hospitalization, patient to be seen 5 x/week to address the identified rehab impairments via gait training, therapeutic activities, therapeutic exercises, neuromuscular re-education and progress toward the following goals:    · Plan of Care Expires:  08/05/22    Subjective     Chief Complaint: n/a  Patient/Family Comments/goals: pt likes being up in chair.  Pain/Comfort:  · Pain Rating 1: 0/10      Objective:     Communicated with nsg prior to session.  Patient found up in chair with oxygen, peripheral IV, telemetry upon PT entry to room.     General Precautions: Standard, fall   Orthopedic Precautions:N/A   Braces: N/A  Respiratory Status: Nasal cannula, flow 2 L/min     Functional Mobility:  · Transfers:     · Sit to Stand:  minimum assistance and moderate assistance with rolling walker  · Gait: amb  ~7' X 2 with RW and Min A X 1 seated rest with O2 donned  · Balance: fair standing balance with RW      AM-PAC 6 CLICK MOBILITY  Turning over in bed (including adjusting bedclothes, sheets and blankets)?: 2  Sitting down on and standing up from a chair with arms (e.g., wheelchair, bedside commode, etc.): 3  Moving from lying on back to sitting on the side of the bed?: 2  Moving to and from a bed to a chair (including a wheelchair)?: 2  Need to walk in hospital room?: 2  Climbing 3-5 steps with a railing?: 1  Basic Mobility Total Score: 12       Therapeutic Activities and Exercises: le seated ex's x 10-12 reps inc: ap,laq,hip flex with rest periods.       Patient left up in chair with all lines intact, call button in reach, nsg notified and family present..    GOALS: see general POC  Multidisciplinary Problems     Physical Therapy Goals        Problem: Physical Therapy    Goal Priority Disciplines Outcome Goal Variances Interventions   Physical Therapy Goal     PT, PT/OT Ongoing, Progressing     Description: Goals to be met by: 22     Patient will increase functional independence with mobility by performin. Supine to sit with Stand-by Assistance  2. Sit to supine with Stand-by Assistance  3. Sit to stand transfer with Stand-by Assistance  4. Bed to chair transfer with Stand-by Assistance using Rolling Walker  5. Gait  x 50 feet with Contact Guard Assistance using Rolling Walker.                      Time Tracking:     PT Received On: 22  PT Start Time: 1328     PT Stop Time: 1358  PT Total Time (min): 30 min     Billable Minutes: Gait Training 17 and Therapeutic Exercise 13    Treatment Type: Treatment  PT/PTA: PTA     PTA Visit Number: 2     2022

## 2022-07-08 LAB
ALBUMIN SERPL BCP-MCNC: 2.3 G/DL (ref 3.5–5.2)
ALP SERPL-CCNC: 84 U/L (ref 55–135)
ALT SERPL W/O P-5'-P-CCNC: 11 U/L (ref 10–44)
ANION GAP SERPL CALC-SCNC: 12 MMOL/L (ref 8–16)
AST SERPL-CCNC: 21 U/L (ref 10–40)
BACTERIA UR CULT: NO GROWTH
BASOPHILS # BLD AUTO: 0.01 K/UL (ref 0–0.2)
BASOPHILS NFR BLD: 0.2 % (ref 0–1.9)
BILIRUB SERPL-MCNC: 0.8 MG/DL (ref 0.1–1)
BUN SERPL-MCNC: 29 MG/DL (ref 8–23)
CALCIUM SERPL-MCNC: 8.6 MG/DL (ref 8.7–10.5)
CHLORIDE SERPL-SCNC: 99 MMOL/L (ref 95–110)
CO2 SERPL-SCNC: 27 MMOL/L (ref 23–29)
CREAT SERPL-MCNC: 0.8 MG/DL (ref 0.5–1.4)
DIFFERENTIAL METHOD: ABNORMAL
EOSINOPHIL # BLD AUTO: 0.1 K/UL (ref 0–0.5)
EOSINOPHIL NFR BLD: 1.8 % (ref 0–8)
ERYTHROCYTE [DISTWIDTH] IN BLOOD BY AUTOMATED COUNT: 16.8 % (ref 11.5–14.5)
EST. GFR  (AFRICAN AMERICAN): >60 ML/MIN/1.73 M^2
EST. GFR  (NON AFRICAN AMERICAN): >60 ML/MIN/1.73 M^2
GLUCOSE SERPL-MCNC: 86 MG/DL (ref 70–110)
HCT VFR BLD AUTO: 32.2 % (ref 37–48.5)
HGB BLD-MCNC: 9.9 G/DL (ref 12–16)
IMM GRANULOCYTES # BLD AUTO: 0.05 K/UL (ref 0–0.04)
IMM GRANULOCYTES NFR BLD AUTO: 0.8 % (ref 0–0.5)
LYMPHOCYTES # BLD AUTO: 0.8 K/UL (ref 1–4.8)
LYMPHOCYTES NFR BLD: 12.5 % (ref 18–48)
MAGNESIUM SERPL-MCNC: 1.9 MG/DL (ref 1.6–2.6)
MCH RBC QN AUTO: 26.8 PG (ref 27–31)
MCHC RBC AUTO-ENTMCNC: 30.7 G/DL (ref 32–36)
MCV RBC AUTO: 87 FL (ref 82–98)
MONOCYTES # BLD AUTO: 0.6 K/UL (ref 0.3–1)
MONOCYTES NFR BLD: 8.4 % (ref 4–15)
NEUTROPHILS # BLD AUTO: 5 K/UL (ref 1.8–7.7)
NEUTROPHILS NFR BLD: 76.3 % (ref 38–73)
NRBC BLD-RTO: 0 /100 WBC
PLATELET # BLD AUTO: 184 K/UL (ref 150–450)
PMV BLD AUTO: 11 FL (ref 9.2–12.9)
POTASSIUM SERPL-SCNC: 3.9 MMOL/L (ref 3.5–5.1)
PROT SERPL-MCNC: 7 G/DL (ref 6–8.4)
RBC # BLD AUTO: 3.7 M/UL (ref 4–5.4)
SODIUM SERPL-SCNC: 138 MMOL/L (ref 136–145)
WBC # BLD AUTO: 6.56 K/UL (ref 3.9–12.7)

## 2022-07-08 PROCEDURE — 83735 ASSAY OF MAGNESIUM: CPT | Performed by: STUDENT IN AN ORGANIZED HEALTH CARE EDUCATION/TRAINING PROGRAM

## 2022-07-08 PROCEDURE — 97116 GAIT TRAINING THERAPY: CPT | Mod: CQ

## 2022-07-08 PROCEDURE — 27000221 HC OXYGEN, UP TO 24 HOURS

## 2022-07-08 PROCEDURE — 80053 COMPREHEN METABOLIC PANEL: CPT | Performed by: STUDENT IN AN ORGANIZED HEALTH CARE EDUCATION/TRAINING PROGRAM

## 2022-07-08 PROCEDURE — 99900035 HC TECH TIME PER 15 MIN (STAT)

## 2022-07-08 PROCEDURE — 63600175 PHARM REV CODE 636 W HCPCS: Performed by: STUDENT IN AN ORGANIZED HEALTH CARE EDUCATION/TRAINING PROGRAM

## 2022-07-08 PROCEDURE — 94761 N-INVAS EAR/PLS OXIMETRY MLT: CPT

## 2022-07-08 PROCEDURE — 11000001 HC ACUTE MED/SURG PRIVATE ROOM

## 2022-07-08 PROCEDURE — 25000003 PHARM REV CODE 250: Performed by: STUDENT IN AN ORGANIZED HEALTH CARE EDUCATION/TRAINING PROGRAM

## 2022-07-08 PROCEDURE — 97535 SELF CARE MNGMENT TRAINING: CPT

## 2022-07-08 PROCEDURE — 36415 COLL VENOUS BLD VENIPUNCTURE: CPT | Performed by: STUDENT IN AN ORGANIZED HEALTH CARE EDUCATION/TRAINING PROGRAM

## 2022-07-08 PROCEDURE — 97110 THERAPEUTIC EXERCISES: CPT | Mod: CQ

## 2022-07-08 PROCEDURE — 85025 COMPLETE CBC W/AUTO DIFF WBC: CPT | Performed by: STUDENT IN AN ORGANIZED HEALTH CARE EDUCATION/TRAINING PROGRAM

## 2022-07-08 RX ORDER — AMLODIPINE BESYLATE 10 MG/1
10 TABLET ORAL DAILY
Qty: 30 TABLET | Refills: 11 | Status: SHIPPED | OUTPATIENT
Start: 2022-07-09 | End: 2022-07-22

## 2022-07-08 RX ORDER — BUMETANIDE 1 MG/1
1 TABLET ORAL DAILY
Qty: 30 TABLET | Refills: 2 | Status: ON HOLD | OUTPATIENT
Start: 2022-07-08 | End: 2022-08-08 | Stop reason: SDUPTHER

## 2022-07-08 RX ADMIN — LEVOTHYROXINE SODIUM 125 MCG: 75 TABLET ORAL at 08:07

## 2022-07-08 RX ADMIN — CLOPIDOGREL 75 MG: 75 TABLET, FILM COATED ORAL at 08:07

## 2022-07-08 RX ADMIN — AMLODIPINE BESYLATE 10 MG: 5 TABLET ORAL at 08:07

## 2022-07-08 RX ADMIN — MEROPENEM 1 G: 1 INJECTION, POWDER, FOR SOLUTION INTRAVENOUS at 11:07

## 2022-07-08 RX ADMIN — LISINOPRIL 20 MG: 20 TABLET ORAL at 08:07

## 2022-07-08 RX ADMIN — TIMOLOL MALEATE 1 DROP: 5 SOLUTION OPHTHALMIC at 08:07

## 2022-07-08 RX ADMIN — TIMOLOL MALEATE 1 DROP: 5 SOLUTION OPHTHALMIC at 09:07

## 2022-07-08 RX ADMIN — MEROPENEM 1 G: 1 INJECTION, POWDER, FOR SOLUTION INTRAVENOUS at 12:07

## 2022-07-08 RX ADMIN — ASPIRIN 81 MG: 81 TABLET, CHEWABLE ORAL at 08:07

## 2022-07-08 NOTE — PT/OT/SLP PROGRESS
Physical Therapy Treatment    Patient Name:  Pilar Michael   MRN:  [id number]    Recommendations:     Discharge Recommendations:  nursing facility, skilled   Discharge Equipment Recommendations: none   Barriers to discharge: decreased mobility,strength and endurance    Assessment:     Pilar Michael is a 89 y.o. female admitted with a medical diagnosis of Acute on chronic combined systolic and diastolic CHF (congestive heart failure).  She presents with the following impairments/functional limitations:  weakness, impaired endurance, impaired functional mobilty, gait instability, impaired balance, decreased upper extremity function, decreased lower extremity function, decreased ROM, impaired coordination, impaired cardiopulmonary response to activity,pt with good participation and requires assistance with all mobility at thi time with minimal gait,pt will benefit from SNF upon discharge to address above functional limitations.    Rehab Prognosis: Fair; patient would benefit from acute skilled PT services to address these deficits and reach maximum level of function.    Recent Surgery: * No surgery found *      Plan:     During this hospitalization, patient to be seen 5 x/week to address the identified rehab impairments via gait training, therapeutic activities, therapeutic exercises, neuromuscular re-education and progress toward the following goals:    · Plan of Care Expires:  08/05/22    Subjective     Chief Complaint: n/a  Patient/Family Comments/goals: pt states she is tired.  Pain/Comfort:  · Pain Rating 1: 0/10      Objective:     Communicated with nsg prior to session.  Patient found supine with bed alarm, oxygen, peripheral IV, PureWick, telemetry upon PT entry to room.     General Precautions: Standard, fall   Orthopedic Precautions:N/A   Braces: N/A  Respiratory Status: Nasal cannula, flow 2 L/min     Functional Mobility:  · Bed Mobility:     · Supine to Sit: minimum assistance and moderate  assistance  · Transfers:     · Sit to Stand:  minimum assistance with rolling walker  · Bed to Chair: minimum assistance and moderate assistance with  rolling walker  using  Step Transfer  · Gait: amb up/back ~6' X 2 with RW and Min A with v/c'swith O2 donned  · Balance: fair standing balance with RW      AM-PAC 6 CLICK MOBILITY  Turning over in bed (including adjusting bedclothes, sheets and blankets)?: 2  Sitting down on and standing up from a chair with arms (e.g., wheelchair, bedside commode, etc.): 3  Moving from lying on back to sitting on the side of the bed?: 2  Moving to and from a bed to a chair (including a wheelchair)?: 3  Need to walk in hospital room?: 2  Climbing 3-5 steps with a railing?: 1  Basic Mobility Total Score: 13       Therapeutic Activities and Exercises: le supine ex's X 10-12 reps inc: ap,hs,abd/add,slr with CGA.       Patient left up in chair with all lines intact, call button in reach, chair alarm on, nsg notified and son present..    GOALS: see general POC  Multidisciplinary Problems     Physical Therapy Goals        Problem: Physical Therapy    Goal Priority Disciplines Outcome Goal Variances Interventions   Physical Therapy Goal     PT, PT/OT Ongoing, Progressing     Description: Goals to be met by: 22     Patient will increase functional independence with mobility by performin. Supine to sit with Stand-by Assistance  2. Sit to supine with Stand-by Assistance  3. Sit to stand transfer with Stand-by Assistance  4. Bed to chair transfer with Stand-by Assistance using Rolling Walker  5. Gait  x 50 feet with Contact Guard Assistance using Rolling Walker.                      Time Tracking:     PT Received On: 22  PT Start Time: 1021     PT Stop Time: 1047  PT Total Time (min): 26 min     Billable Minutes: Gait Training 15 and Therapeutic Exercise 11    Treatment Type: Treatment  PT/PTA: PTA     PTA Visit Number: 3     2022

## 2022-07-08 NOTE — PLAN OF CARE
Recommendation:   1. Continue current cardiac diet as tolerated.   2. Continue to provide Boost Plus TID.   3. Addition of [place] BID to promote wound healing.   4. Monitor weight/labs.   5. RD to follow and monitor intake    Goals:   Pt intake >/= 50% EEN/EPN by RD follow up    Nutrition Goal Status: new  Communication of RD Recs: other (comment) (POC)    Problem: Oral Intake Inadequate (Heart Failure)  Goal: Optimal Nutrition Intake  Outcome: Ongoing, Progressing     Problem: Impaired Wound Healing  Goal: Optimal Wound Healing  Outcome: Ongoing, Progressing

## 2022-07-08 NOTE — PT/OT/SLP PROGRESS
Occupational Therapy   Treatment    Name: Pilar Michael  MRN: [id number]  Admitting Diagnosis:  Acute on chronic combined systolic and diastolic CHF (congestive heart failure)       Recommendations:     Discharge Recommendations: nursing facility, skilled  Discharge Equipment Recommendations:   (per facility)  Barriers to discharge:   (increased physical assist required)    Assessment:     Pilar Michael is a 89 y.o. female with a medical diagnosis of Acute on chronic combined systolic and diastolic CHF (congestive heart failure).  She presents with The primary encounter diagnosis was Congestive heart failure, unspecified HF chronicity, unspecified heart failure type. Diagnoses of Shortness of breath, Urinary tract infection without hematuria, site unspecified, Acute on chronic combined systolic (congestive) and diastolic (congestive) heart failure, Acute on chronic combined systolic and diastolic CHF (congestive heart failure), Acquired hypothyroidism, Essential hypertension, Hyperlipidemia, unspecified hyperlipidemia type, Normocytic anemia, and Adequate nutrition were also pertinent to this visit.  . Performance deficits affecting function are weakness, gait instability, impaired balance, impaired endurance, impaired self care skills, impaired functional mobilty, impaired cognition, decreased coordination, edema, impaired skin, decreased safety awareness, impaired fine motor, decreased lower extremity function, decreased upper extremity function, decreased ROM, impaired cardiopulmonary response to activity, impaired coordination.     Pt progressing well towards goals. Cont OT POC     Rehab Prognosis:  Good; patient would benefit from acute skilled OT services to address these deficits and reach maximum level of function.       Plan:     Patient to be seen 5 x/week to address the above listed problems via self-care/home management, therapeutic activities, therapeutic exercises  · Plan of Care Expires:  08/05/22  · Plan of Care Reviewed with: patient, son    Subjective     Pain/Comfort:  · Pain Rating 1: 0/10    Objective:     Communicated with: nsg prior to session.   General Precautions: Standard, fall   Orthopedic Precautions:N/A   Braces: N/A       Occupational Performance:     Bed Mobility:    · UIC     Functional Mobility/Transfers:  · Patient completed Sit <> Stand Transfer with contact guard assistance and minimum assistance  with  rolling walker   · Functional Mobility: CGA/Min A with RW limited distance     Activities of Daily Living:  · Grooming: contact guard assistance at sink x 3       AMPAC 6 Click ADL: 15    Treatment & Education:  Pt found UIC  Reports sacral pain and wanting to reposition   Stood from b/s chair x 3 trials with RW  Stood at sink x 3 for G&H axs; seated breaks required as pt only able to tolerate ~ 2 min in stance each trial   Functional mobility performed ~ 10 feet during session       Patient left up in chair with all lines intact, call button in reach, chair alarm on and nsg notifiedEducation:      GOALS:   Multidisciplinary Problems     Occupational Therapy Goals        Problem: Occupational Therapy    Goal Priority Disciplines Outcome Interventions   Occupational Therapy Goal     OT, PT/OT Ongoing, Progressing    Description: Goals to be met by: 8/5/22     Patient will increase functional independence with ADLs by performing:    LE Dressing with Minimal Assistance and Assistive Devices as needed.  Grooming while seated with Modified [place].  Toileting from bedside commode with Minimal Assistance for hygiene and clothing management.   Stand pivot transfers with Stand-by Assistance.  Toilet transfer to bedside commode with Stand-by Assistance.  Increased functional strength to WFL for self care skills and functional mobility.  Upper extremity exercise program x10 reps per handout, with independence.                     Time Tracking:     OT Date of Treatment: 07/08/22  OT  Start Time: 1113  OT Stop Time: 1137  OT Total Time (min): 24 min    Billable Minutes:Self Care/Home Management 24    OT/LAVONNE: OT     LAVONNE Visit Number: 0    7/8/2022

## 2022-07-08 NOTE — PLAN OF CARE
POC reviewed with patient and daughter. Aox4, respirations are even, unlabored and diminished in anterior and lateral fields on 2L O2 NC. RUE midline in place with sterile dressing changed performed due to bleeding at site. Pressure dressing applied to site to help stop any other bleeding that may occur. Midline was flushed with blood return noted and IV meropenem infused without any difficulty. LUE lymphedema with a weak, palpable pulse noted. Excoriation noted to perineum and bruising to bilateral LE. Patient denies any pain or SOB. Resting quietly in room with daughter at bedside. Frequent shift in weight is encouraged and assisted.    Problem: Adult Inpatient Plan of Care  Goal: Plan of Care Review  Outcome: Ongoing, Progressing

## 2022-07-08 NOTE — PLAN OF CARE
1140  CM was informed by Lorraine ([id number]) w/[facility] that insurance authorization form [facility] has not been received yet.     1255  Patient resting quietly in recliner with son, Jack Michael ([phone]), at the bedside when CM rounded. CM informed Jack of above.    1615  CM was informed by Lorraine w/[facility] that she has not received insurance authorization from [facility] yet but will hold the bed for the patient. Voicemail message left for the patient's daughter, Katiana Conrad ([phone]), informing of above.      Will continue to follow.

## 2022-07-08 NOTE — PLAN OF CARE
Problem: Occupational Therapy  Goal: Occupational Therapy Goal  Description: Goals to be met by: 8/5/22     Patient will increase functional independence with ADLs by performing:    LE Dressing with Minimal Assistance and Assistive Devices as needed.  Grooming while seated with Modified [place].  Toileting from bedside commode with Minimal Assistance for hygiene and clothing management.   Stand pivot transfers with Stand-by Assistance.  Toilet transfer to bedside commode with Stand-by Assistance.  Increased functional strength to [facility] for self care skills and functional mobility.  Upper extremity exercise program x10 reps per handout, with independence.    Outcome: Ongoing, Progressing     Pt progressing well towards goals. Cont OT POC

## 2022-07-08 NOTE — PROGRESS NOTES
[facility] Medicine Progress Note    Primary Team: [facility] Hospitalist Team A  Attending Physician: Alicia Rene MD  Resident: Marianna  Intern: [facility] Day:  4    Chief Complaint: Shortness of Breath       Subjective:      Patient seen and examined by me this morning.Yesterday afternoon and evening with better mental status and PO intake of food/water. This AM, pt with continued improvement of mental status and diet intake.    Review of Systems   Constitutional: Positive for malaise/fatigue. Negative for chills and fever.   HENT: Negative for hearing loss and sore throat.    Eyes: Negative for blurred vision and double vision.   Respiratory: Negative for cough and sputum production.    Cardiovascular: Positive for leg swelling. Negative for chest pain and orthopnea.   Gastrointestinal: Negative for abdominal pain, diarrhea, nausea and vomiting.   Genitourinary: Negative for dysuria, frequency and urgency.   Musculoskeletal: Negative for falls, joint pain and myalgias.   Neurological: Negative for dizziness and headaches.   Psychiatric/Behavioral: Negative for depression. The patient is not nervous/anxious.    All other systems reviewed and are negative.     Past Medical History:   Diagnosis Date    Arthritis     Cancer     CHF (congestive heart failure)     Coronary artery disease     Hypertension     Thyroid disease               Objective:   Last 24 Hour Vital Signs:  BP  Min: 99/48  Max: 146/63  Temp  Av.3 °F (36.8 °C)  Min: 96.9 °F (36.1 °C)  Max: 98.9 °F (37.2 °C)  Pulse  Av.9  Min: 59  Max: 60  Resp  Av.5  Min: 16  Max: 18  SpO2  Av %  Min: 97 %  Max: 99 %    Intake/Output Summary (Last 24 hours) at 2022 0656  Last data filed at 2022 0110  Gross per 24 hour   Intake 425 ml   Output 400 ml   Net 25 ml      Physical Examination:  Physical Exam  Vitals and nursing note reviewed.   Constitutional:       General: She is not in acute distress.     Appearance: Normal  appearance.   HENT:      Head: Normocephalic and atraumatic.      Mouth/Throat:      Mouth: Mucous membranes are moist.      Pharynx: Oropharynx is clear. No oropharyngeal exudate.   Eyes:      General: No scleral icterus.     Extraocular Movements: Extraocular movements intact.      Pupils: Pupils are equal, round, and reactive to light.   Cardiovascular:      Rate and Rhythm: Normal rate and regular rhythm.      Pulses: Normal pulses.      Heart sounds: Normal heart sounds. No murmur heard.  Pulmonary:      Effort: Pulmonary effort is normal. No respiratory distress.      Breath sounds: Normal breath sounds.   Chest:      Chest wall: No tenderness.   Abdominal:      General: Bowel sounds are normal.      Palpations: Abdomen is soft.      Tenderness: There is no abdominal tenderness. There is no guarding or rebound.   Musculoskeletal:         General: Normal range of motion.      Left upper arm: Edema (present on admission) present.      Left forearm: Edema (present on admission) present.      Cervical back: Normal range of motion and neck supple. No tenderness.      Right lower leg: Edema present.      Left lower leg: Edema present.   Skin:     Capillary Refill: Capillary refill takes less than 2 seconds.        Laboratory:  Recent Labs   Lab 07/04/22  0237 07/05/22  0347 07/06/22  0402 07/07/22  0328 07/08/22  0329   WBC 8.03 8.43 7.69 8.45 6.56   HGB 10.2* 10.5* 9.5* 10.0* 9.9*   HCT 32.9* 32.8* 30.4* 32.5* 32.2*    157 157 181 184   MCV 86 84 87 87 87   RDW 17.0* 16.9* 17.1* 16.8* 16.8*    141 138 139 138   K 4.3 3.2* 4.2 4.3 3.9    99 100 100 99   CO2 24 28 25 32* 27   BUN 33* 31* 29* 30* 29*   CREATININE 1.0 0.9 0.9 0.8 0.8    102 103 98 86   PROT 7.5 7.5 6.6 7.2 7.0   ALBUMIN 2.5* 2.5* 2.3* 2.3* 2.3*   BILITOT 1.0 1.0 0.8 0.8 0.8   AST 36 25 21 21 21   ALKPHOS 95 104 88 89 84   ALT 8* 9* 9* 8* 11     Urinalysis  No results for input(s): COLORU, CLARITYU, SPECGRAV, PHUR, PROTEINUA,  GLUCOSEU, BILIRUBINCON, BLOODU, WBCU, RBCU, BACTERIA, MUCUS, NITRITE, LEUKOCYTESUR, UROBILINOGEN, HYALINECASTS in the last 24 hours.    Microbiology Results (last 7 days)     Procedure Component Value Units Date/Time    Blood culture [[id number]] Collected: 07/04/22 1239    Order Status: Completed Specimen: Blood Updated: 07/07/22 1812     Blood Culture, Routine No Growth to date      No Growth to date      No Growth to date      No Growth to date    Blood culture [[id number]] Collected: 07/04/22 1334    Order Status: Completed Specimen: Blood Updated: 07/07/22 1812     Blood Culture, Routine No Growth to date      No Growth to date      No Growth to date      No Growth to date    Narrative:      Collection has been rescheduled by [email or web address] at 07/04/2022 12:40 Reason:   Unable to collect the other set   Collection has been rescheduled by [email or web address] at 07/04/2022 12:40 Reason:   Unable to collect the other set     Urine Culture High Risk [[id number]] Collected: 07/07/22 1117    Order Status: Sent Specimen: Urine, Clean Catch Updated: 07/07/22 1753    Urine culture [[id number]] Collected: 07/03/22 1329    Order Status: Completed Specimen: Kidney, left Updated: 07/05/22 1134     Urine Culture, Routine Cultured cancelled    Urine culture [[id number]]     Order Status: Completed Specimen: Urine     Urine culture [[id number]] Collected: 07/03/22 1329    Order Status: Canceled Specimen: Urine from Random void          I have personally reviewed the above laboratory studies.     Imaging:  EKG (my interpretation): ventricular pacing at 60 bpm    X-Ray Chest 1 View   Final Result      Volume overload/CHF and pulmonary edema         Electronically signed by: Yuriy Brandon Jr   Date:    07/03/2022   Time:    13:53        Current Medications:     Scheduled:   amLODIPine  10 mg Oral Daily    aspirin  81 mg Oral Daily    clopidogreL  75 mg Oral Daily    levothyroxine  125 mcg Oral Daily    lisinopriL  20 mg Oral Daily    meropenem  (MERREM) IVPB  1 g Intravenous Q12H    timolol maleate 0.5%  1 drop Both Eyes BID         Infusions:       PRN:  sars-cov-2 (covid-19), sodium chloride 0.9%  Antibiotics and Day Number of Therapy:  Antibiotics (From admission, onward)            Start     Stop Route Frequency Ordered    07/04/22 1145  meropenem 1 g in sodium chloride 0.9 % 100 mL IVPB (ready to mix system)         -- IV Every 12 hours (non-standard times) 07/04/22 1045             Assessment:     Pilar Michael is a 89 y.o. female with a PMHx of HFpEF, left breast cancer s/p total mastectomy, HTN, HLD, symptomatic bradycardiay s/p PCM, severe aortic stenosis s/p TAVR who presents with acute hypoxic respiratory failure with vascular congestion on CXR and elevated BNP from baseline consistent with heart failure exacerbation. Extensive previous history of chronic UTI with admit UA suspicious for current UTI. Meropenem started. SNF placement for PT/OT and continued IV abx. Pt accepted to [facility] with ambulatory transfer scheduled for today.    Plan:     Acute hypoxemic respiratory failure due to Acute on Chronic HFpEF  PMHx HFpEF, valvular heart disease p/w dyspnea and increasing oxygen requirement with evidence of volume overload on physical exam in the setting of an elevated BNP(1347). Decompensation likely due to dietary indiscretion.   -Last Echo in 3/2022 demonstrated EF 55-60% with mitral stenosis. Will obtain repeat transthoracic echo   -Diuresis with Lasix 80 mg IV BID, last dose on 07/05/22 PM  -Switched to PO bumex 1mg BID on 07/06/22, discontinued that afternoon  -Strict intake & output with fluid restriction to <2L daily    Recurrent Urinary Tract Infection  Patient with recurrent UTIs, last urine culture shows Pseudomonas only sensitive to imipenem, tobramycin, gentamicin.  Was incompletely treated with ciprofloxacin last month.  Only symptom is foul-smelling urine.  -urine culture pending (1st sample cancelled due to lab error,  second cx in progress)  -Meropenem initiated on 07/04  -patient improving mental status and fatigue  -continue IV abx in SNF    Severe aortic stenosis s/p TAVR  PMHx severe AS a TAVR in 2019    Symptomatic bradycardia s/p pacemaker.  Pacemaker placed in 2019 for complete heart block and symptomatic bradycardia, currently ventricularly paced at 60 beats per minute    Hypertension  -Continue lisinopril 20 mg PO daily    Hyperlipidemia  -lipid panel with LDL at goal in 4/2022 on lipitor 20 mg PO daily    Hypothyroidism  -synthroid 125 mcg PO Daily    Diet: Diet Cardiac   DVT Prophylaxis: SCD  Code: Full Code     Dispo: SNF placement today    Volodymyr England M.D.  [facility] Neurology PGY-1    [facility] Medicine Hospitalist Pager numbers:   [facility] Hospitalist Medicine Team A (Edgard/Larisa): [phone]  [facility] Hospitalist Medicine Team B (Crystal/Hay):  [phone]

## 2022-07-08 NOTE — CONSULTS
[facility] - Telemetry  Adult Nutrition  Consult Note    SUMMARY     Recommendations    Recommendation:   1. Continue current cardiac diet as tolerated.   2. Continue to provide Boost Plus TID.   3. Addition of Darwin BID to promote wound healing.   4. Monitor weight/labs.   5. RD to follow and monitor intake    Goals:   Pt intake >/= 50% EEN/EPN by RD follow up    Nutrition Goal Status: new  Communication of RD Recs: other (comment) (POC)    Assessment and Plan    * Acute on chronic combined systolic and diastolic CHF (congestive heart failure)  Contributing Nutrition Diagnosis  Inadequate oral intake    Related to (etiology):   CHF    Signs and Symptoms (as evidenced by):   Pt admitted with CHF, decreased appetite, and AMS.      Interventions:  Collaboration with other providers  Commercial Beverage- Boost Plus TID    Nutrition Diagnosis Status:   New         Malnutrition Assessment     Orbital Region (Subcutaneous Fat Loss): well nourished   Jack Region (Muscle Loss): well nourished  Clavicle Bone Region (Muscle Loss): well nourished  Clavicle and Acromion Bone Region (Muscle Loss): well nourished  Scapular Bone Region (Muscle Loss): well nourished   Edema (Fluid Accumulation): 3-->moderate   Subcutaneous Fat Loss (Final Summary): well nourished  Muscle Loss Evaluation (Final Summary): well nourished  Fluid Accumulation Evaluation: moderate         Reason for Assessment    Reason For Assessment: consult (decreased PO intake in deconditioned patient , outpatient recommendations)  Diagnosis: other (see comments) (acute on chronic combined systolic and diastolic CHF)  Relevant Medical History: cancer, CHF, CAD, HTN, thyroid disease, thyroidectomy, cholecystectomy, TAVR, cardiac catheterization  Interdisciplinary Rounds: did not attend  General Information Comments: Pt with son in the room. Reports poor appetite, consuming 25% of cardiac diet. Not receiving Boost, RD updated dining software. Denies N/V or abdominal  "pain. Endorses constipation, LBM 7/2. Per chart pt with improving mental status and PO intake. PIV, Midline cath. Patel Score: 14 incontinence associated dermatitis left groin, right groin, and perineum, blisters right buttocks. NFPE completed today 7/8: Pt appears well nourished with significant BUE edema and BLE edema.  Nutrition Discharge Planning: d/c on cardiac diet with Boost Plus or similar BID    Nutrition Risk Screen    Nutrition Risk Screen: no indicators present    Nutrition/Diet History    Food Preferences: no cultural or spiritual food preferences identified  Spiritual, Cultural Beliefs, Synagogue Practices, Values that Affect Care: no  Food Allergies: NKFA  Factors Affecting Nutritional Intake: decreased appetite    Anthropometrics    Temp: 98 °F (36.7 °C)  Height Method: Stated  Height: 5' 2" (157.5 cm)  Height (inches): 62 in  Weight Method: Bed Scale  Weight: 85.3 kg (188 lb)  Weight (lb): 188 lb  Ideal Body Weight (IBW), Female: 110 lb  % Ideal Body Weight, Female (lb): 170.91 %  BMI (Calculated): 34.4  BMI Grade: 30 - 34.9- obesity - grade I       Lab/Procedures/Meds    Pertinent Labs Reviewed: reviewed  Pertinent Labs Comments: BUN 29, Ca 8.6, Alb 2.3, A1C 5.9(4/6/22)  Pertinent Medications Reviewed: reviewed  Pertinent Medications Comments: amlodipine, aspirin, clopidogrel, levothyroxine, lisinopril, meropenem, timolol    Estimated/Assessed Needs    Weight Used For Calorie Calculations: 85.3 kg (188 lb 0.8 oz)  Energy Calorie Requirements (kcal): 1594  Energy Need Method: Ben Wheeler-St Jeor (x 1.3)  Protein Requirements: 85 (1.0 gm/kg)  Weight Used For Protein Calculations: 85.3 kg (188 lb 0.8 oz)     Estimated Fluid Requirement Method: RDA Method (or PER MD)  RDA Method (mL): 1594         Nutrition Prescription Ordered    Current Diet Order: Cardiac  Oral Nutrition Supplement: Boost Plus TID    Evaluation of Received Nutrient/Fluid Intake    I/O: 425/400  Energy Calories Required: not meeting " needs  Protein Required: not meeting needs  Fluid Required: not meeting needs  Comments: LBM 7/2  % Intake of Estimated Energy Needs: 0 - 25 %  % Meal Intake: 0 - 25 %    Nutrition Risk    Level of Risk/Frequency of Follow-up:  (2x/week)       Monitor and Evaluation    Food and Nutrient Intake: energy intake, food and beverage intake  Food and Nutrient Adminstration: diet order  Knowledge/Beliefs/Attitudes: food and nutrition knowledge/skill  Physical Activity and Function: nutrition-related ADLs and IADLs  Anthropometric Measurements: weight, weight change, body mass index  Biochemical Data, Medical Tests and Procedures: electrolyte and renal panel, gastrointestinal profile, glucose/endocrine profile, inflammatory profile, lipid profile  Nutrition-Focused Physical Findings: overall appearance       Nutrition Follow-Up    RD Follow-up?: Yes

## 2022-07-08 NOTE — ASSESSMENT & PLAN NOTE
Contributing Nutrition Diagnosis  Inadequate oral intake    Related to (etiology):   CHF    Signs and Symptoms (as evidenced by):   Pt admitted with CHF, decreased appetite, and AMS.      Interventions:  Collaboration with other providers  Commercial Beverage- Boost Plus TID  Modified Beverage- Darwin BID    Nutrition Diagnosis Status:   Continues

## 2022-07-09 LAB
ALBUMIN SERPL BCP-MCNC: 2.3 G/DL (ref 3.5–5.2)
ALP SERPL-CCNC: 82 U/L (ref 55–135)
ALT SERPL W/O P-5'-P-CCNC: 11 U/L (ref 10–44)
ANION GAP SERPL CALC-SCNC: 11 MMOL/L (ref 8–16)
AST SERPL-CCNC: 25 U/L (ref 10–40)
BACTERIA BLD CULT: NORMAL
BACTERIA BLD CULT: NORMAL
BASOPHILS # BLD AUTO: 0.02 K/UL (ref 0–0.2)
BASOPHILS NFR BLD: 0.4 % (ref 0–1.9)
BILIRUB SERPL-MCNC: 0.9 MG/DL (ref 0.1–1)
BUN SERPL-MCNC: 24 MG/DL (ref 8–23)
CALCIUM SERPL-MCNC: 8.9 MG/DL (ref 8.7–10.5)
CHLORIDE SERPL-SCNC: 100 MMOL/L (ref 95–110)
CO2 SERPL-SCNC: 28 MMOL/L (ref 23–29)
CREAT SERPL-MCNC: 0.7 MG/DL (ref 0.5–1.4)
DIFFERENTIAL METHOD: ABNORMAL
EOSINOPHIL # BLD AUTO: 0.1 K/UL (ref 0–0.5)
EOSINOPHIL NFR BLD: 1.2 % (ref 0–8)
ERYTHROCYTE [DISTWIDTH] IN BLOOD BY AUTOMATED COUNT: 16.9 % (ref 11.5–14.5)
EST. GFR  (AFRICAN AMERICAN): >60 ML/MIN/1.73 M^2
EST. GFR  (NON AFRICAN AMERICAN): >60 ML/MIN/1.73 M^2
GLUCOSE SERPL-MCNC: 93 MG/DL (ref 70–110)
HCT VFR BLD AUTO: 33.5 % (ref 37–48.5)
HGB BLD-MCNC: 10.5 G/DL (ref 12–16)
IMM GRANULOCYTES # BLD AUTO: 0.03 K/UL (ref 0–0.04)
IMM GRANULOCYTES NFR BLD AUTO: 0.5 % (ref 0–0.5)
LYMPHOCYTES # BLD AUTO: 0.7 K/UL (ref 1–4.8)
LYMPHOCYTES NFR BLD: 12.1 % (ref 18–48)
MAGNESIUM SERPL-MCNC: 2 MG/DL (ref 1.6–2.6)
MCH RBC QN AUTO: 26.6 PG (ref 27–31)
MCHC RBC AUTO-ENTMCNC: 31.3 G/DL (ref 32–36)
MCV RBC AUTO: 85 FL (ref 82–98)
MONOCYTES # BLD AUTO: 0.4 K/UL (ref 0.3–1)
MONOCYTES NFR BLD: 6.8 % (ref 4–15)
NEUTROPHILS # BLD AUTO: 4.5 K/UL (ref 1.8–7.7)
NEUTROPHILS NFR BLD: 79 % (ref 38–73)
NRBC BLD-RTO: 0 /100 WBC
PLATELET # BLD AUTO: 201 K/UL (ref 150–450)
PMV BLD AUTO: 11.3 FL (ref 9.2–12.9)
POTASSIUM SERPL-SCNC: 4 MMOL/L (ref 3.5–5.1)
PROT SERPL-MCNC: 7.2 G/DL (ref 6–8.4)
RBC # BLD AUTO: 3.94 M/UL (ref 4–5.4)
SODIUM SERPL-SCNC: 139 MMOL/L (ref 136–145)
WBC # BLD AUTO: 5.71 K/UL (ref 3.9–12.7)

## 2022-07-09 PROCEDURE — 94761 N-INVAS EAR/PLS OXIMETRY MLT: CPT

## 2022-07-09 PROCEDURE — 25000003 PHARM REV CODE 250: Performed by: STUDENT IN AN ORGANIZED HEALTH CARE EDUCATION/TRAINING PROGRAM

## 2022-07-09 PROCEDURE — 11000001 HC ACUTE MED/SURG PRIVATE ROOM

## 2022-07-09 PROCEDURE — 36415 COLL VENOUS BLD VENIPUNCTURE: CPT | Performed by: STUDENT IN AN ORGANIZED HEALTH CARE EDUCATION/TRAINING PROGRAM

## 2022-07-09 PROCEDURE — 85025 COMPLETE CBC W/AUTO DIFF WBC: CPT | Performed by: STUDENT IN AN ORGANIZED HEALTH CARE EDUCATION/TRAINING PROGRAM

## 2022-07-09 PROCEDURE — 27000221 HC OXYGEN, UP TO 24 HOURS

## 2022-07-09 PROCEDURE — 63600175 PHARM REV CODE 636 W HCPCS: Performed by: STUDENT IN AN ORGANIZED HEALTH CARE EDUCATION/TRAINING PROGRAM

## 2022-07-09 PROCEDURE — 99900035 HC TECH TIME PER 15 MIN (STAT)

## 2022-07-09 PROCEDURE — 80053 COMPREHEN METABOLIC PANEL: CPT | Performed by: STUDENT IN AN ORGANIZED HEALTH CARE EDUCATION/TRAINING PROGRAM

## 2022-07-09 PROCEDURE — 83735 ASSAY OF MAGNESIUM: CPT | Performed by: STUDENT IN AN ORGANIZED HEALTH CARE EDUCATION/TRAINING PROGRAM

## 2022-07-09 PROCEDURE — 97530 THERAPEUTIC ACTIVITIES: CPT | Mod: CQ

## 2022-07-09 RX ORDER — QUETIAPINE FUMARATE 25 MG/1
25 TABLET, FILM COATED ORAL NIGHTLY
Status: DISCONTINUED | OUTPATIENT
Start: 2022-07-09 | End: 2022-07-10

## 2022-07-09 RX ORDER — BUMETANIDE 1 MG/1
1 TABLET ORAL DAILY
Status: DISCONTINUED | OUTPATIENT
Start: 2022-07-09 | End: 2022-07-11 | Stop reason: HOSPADM

## 2022-07-09 RX ADMIN — QUETIAPINE FUMARATE 25 MG: 25 TABLET ORAL at 08:07

## 2022-07-09 RX ADMIN — AMLODIPINE BESYLATE 10 MG: 5 TABLET ORAL at 09:07

## 2022-07-09 RX ADMIN — TIMOLOL MALEATE 1 DROP: 5 SOLUTION OPHTHALMIC at 09:07

## 2022-07-09 RX ADMIN — LEVOTHYROXINE SODIUM 125 MCG: 75 TABLET ORAL at 09:07

## 2022-07-09 RX ADMIN — MEROPENEM 1 G: 1 INJECTION, POWDER, FOR SOLUTION INTRAVENOUS at 11:07

## 2022-07-09 RX ADMIN — MEROPENEM 1 G: 1 INJECTION, POWDER, FOR SOLUTION INTRAVENOUS at 12:07

## 2022-07-09 RX ADMIN — LISINOPRIL 20 MG: 20 TABLET ORAL at 09:07

## 2022-07-09 RX ADMIN — TIMOLOL MALEATE 1 DROP: 5 SOLUTION OPHTHALMIC at 08:07

## 2022-07-09 RX ADMIN — ASPIRIN 81 MG: 81 TABLET, CHEWABLE ORAL at 09:07

## 2022-07-09 RX ADMIN — BUMETANIDE 1 MG: 1 TABLET ORAL at 12:07

## 2022-07-09 RX ADMIN — CLOPIDOGREL 75 MG: 75 TABLET, FILM COATED ORAL at 09:07

## 2022-07-09 NOTE — PLAN OF CARE
"Plan of care reviewed with the patient. Scheduled medicines given and the patient tolerated well. Fall and safety precautions taken and the standard interventions are in place. Patient is on Telemetry with NSR, no true "red alarms" noted in the shift. No acute distress reported either in the shift. Informed  about the BP: 113/47.  Patient is on 2L Oxygen through nasal Cannula saturating @ 96%. Advised the patient to call for the assistance. Continued monitoring the patient.  "

## 2022-07-09 NOTE — PROGRESS NOTES
"[facility] Medicine Progress Note    Primary Team: [facility] Hospitalist Team A  Attending Physician: Alicia Rene MD  Resident: Do Christine Wahl  Intern: [facility] Day: 5    Chief Complaint: Shortness of Breath       Subjective:   Patient did not sleep well lastnight and has been slightly anxious. LL edema tenderness and swelling still present. Patient has continued improvement in mental status. Denies dysuria, n/v, d/c.     Patient's daughter was in the room. Advised her to notify staff if needed sleep aids. Declined at this time.     Past Medical History:   Diagnosis Date    Arthritis     Cancer     CHF (congestive heart failure)     Coronary artery disease     Hypertension     Thyroid disease            Objective:   Last 24 Hour Vital Signs:  BP  Min: 98/44  Max: 126/58  Temp  Av.5 °F (36.4 °C)  Min: 96.1 °F (35.6 °C)  Max: 98.2 °F (36.8 °C)  Pulse  Av.5  Min: 58  Max: 60  Resp  Av  Min: 18  Max: 18  SpO2  Av.3 %  Min: 96 %  Max: 99 %  Height  Av' 2" (157.5 cm)  Min: 5' 2" (157.5 cm)  Max: 5' 2" (157.5 cm)  Weight  Av.3 kg (188 lb)  Min: 85.3 kg (188 lb)  Max: 85.3 kg (188 lb)    Intake/Output Summary (Last 24 hours) at 2022 0714  Last data filed at 2022 1737  Gross per 24 hour   Intake 720 ml   Output 300 ml   Net 420 ml      Physical Examination:  Physical Exam  Vitals and nursing note reviewed.   Constitutional:       General: She is not in acute distress.     Appearance: Normal appearance.   HENT:      Head: Normocephalic and atraumatic.      Mouth/Throat:      Mouth: Mucous membranes are moist.      Pharynx: Oropharynx is clear. No oropharyngeal exudate.   Eyes:      General: No scleral icterus.     Extraocular Movements: Extraocular movements intact.      Pupils: Pupils are equal, round, and reactive to light.   Cardiovascular:      Rate and Rhythm: Normal rate and regular rhythm.      Pulses: Normal pulses.      Heart sounds: Normal heart sounds. No " murmur heard.  Pulmonary:      Effort: Pulmonary effort is normal. No respiratory distress.      Breath sounds: Normal breath sounds.   Chest:      Chest wall: No tenderness.   Abdominal:      General: Bowel sounds are normal.      Palpations: Abdomen is soft.      Tenderness: There is no abdominal tenderness. There is no guarding or rebound.   Musculoskeletal:         General: Normal range of motion.      Left upper arm: Edema (present on admission) present.      Left forearm: Edema (present on admission) present.      Cervical back: Normal range of motion and neck supple. No tenderness.      Right lower leg: Edema present.      Left lower leg: Edema present.   Skin:     Capillary Refill: Capillary refill takes less than 2 seconds.        Laboratory:  Recent Labs   Lab 07/05/22  0347 07/06/22  0402 07/07/22  0328 07/08/22  0329 07/09/22  0330   WBC 8.43 7.69 8.45 6.56 5.71   HGB 10.5* 9.5* 10.0* 9.9* 10.5*   HCT 32.8* 30.4* 32.5* 32.2* 33.5*    157 181 184 201   MCV 84 87 87 87 85   RDW 16.9* 17.1* 16.8* 16.8* 16.9*    138 139 138 139   K 3.2* 4.2 4.3 3.9 4.0   CL 99 100 100 99 100   CO2 28 25 32* 27 28   BUN 31* 29* 30* 29* 24*   CREATININE 0.9 0.9 0.8 0.8 0.7    103 98 86 93   PROT 7.5 6.6 7.2 7.0 7.2   ALBUMIN 2.5* 2.3* 2.3* 2.3* 2.3*   BILITOT 1.0 0.8 0.8 0.8 0.9   AST 25 21 21 21 25   ALKPHOS 104 88 89 84 82   ALT 9* 9* 8* 11 11       Microbiology Results (last 7 days)     Procedure Component Value Units Date/Time    Urine Culture High Risk [[id number]] Collected: 07/07/22 1117    Order Status: Completed Specimen: Urine, Clean Catch Updated: 07/08/22 2127     Urine Culture, Routine No growth    Narrative:      Indicated criteria for high risk culture:->Other  Other (specify):->.    Blood culture [[id number]] Collected: 07/04/22 1239    Order Status: Completed Specimen: Blood Updated: 07/08/22 1812     Blood Culture, Routine No Growth to date      No Growth to date      No Growth to date       No Growth to date      No Growth to date    Blood culture [[id number]] Collected: 07/04/22 1334    Order Status: Completed Specimen: Blood Updated: 07/08/22 1812     Blood Culture, Routine No Growth to date      No Growth to date      No Growth to date      No Growth to date      No Growth to date    Narrative:      Collection has been rescheduled by [email or web address] at 07/04/2022 12:40 Reason:   Unable to collect the other set   Collection has been rescheduled by [email or web address] at 07/04/2022 12:40 Reason:   Unable to collect the other set     Urine culture [[id number]] Collected: 07/03/22 1329    Order Status: Completed Specimen: Kidney, left Updated: 07/05/22 1134     Urine Culture, Routine Cultured cancelled    Urine culture [[id number]]     Order Status: Completed Specimen: Urine     Urine culture [[id number]] Collected: 07/03/22 1329    Order Status: Canceled Specimen: Urine from Random void          Imaging:  EKG (my interpretation    Current Medications:  Scheduled:   amLODIPine  10 mg Oral Daily    aspirin  81 mg Oral Daily    clopidogreL  75 mg Oral Daily    levothyroxine  125 mcg Oral Daily    lisinopriL  20 mg Oral Daily    meropenem (MERREM) IVPB  1 g Intravenous Q12H    timolol maleate 0.5%  1 drop Both Eyes BID     PRN:  sars-cov-2 (covid-19), sodium chloride 0.9%  Antibiotics and Day Number of Therapy:  Antibiotics (From admission, onward)            Start     Stop Route Frequency Ordered    07/04/22 1145  meropenem 1 g in sodium chloride 0.9 % 100 mL IVPB (ready to mix system)         -- IV Every 12 hours (non-standard times) 07/04/22 1045             Assessment:   Pilar Michael is a 89 y.o. female with a PMH of HFpEF, left breast cancer s/p total mastectomy, HTN, HLD, symptomatic bradycardiay s/p PCM, severe aortic stenosis s/p TAVR who presents with acute hypoxic respiratory failure with vascular congestion on CXR and elevated BNP from baseline consistent with heart failure exacerbation. Extensive previous  history of chronic UTI with admit UA suspicious for current UTI. Meropenem started. SNF placement for PT/OT and continued IV abx. Pt accepted to [facility] but pending due to insurance authorization.    Plan:   Acute Hypoxemic Respiratory Failure likely 2/2 Acute on Chronic HFpEF  PMH of HFpEF, valvular heart disease p/w dyspnea and increasing oxygen requirement with evidence of volume overload on physical exam in the setting of an elevated BNP(1347). Decompensation likely due to dietary indiscretion.   -Last Echo in 3/2022 demonstrated EF 55-60% with MV dysfuction. Repeat IP showed EF 65% with grade II LVD dysfunction with mitral regurgitation related to MV degenerative disease  -Diuresis with Lasix 80 mg IV BID, switched to PO Bumex 1mg BID, discontinued, and have discontinued diuresis  -Strict intake & output with fluid restriction to <2L daily    Recurrent Urinary Tract Infection  Patient with recurrent UTIs, last urine culture shows Pseudomonas only sensitive to imipenem, tobramycin, gentamicin.  Was incompletely treated with ciprofloxacin last month.  Only symptom is foul-smelling urine.  -urine culture NGTD  -Meropenem initiated on 07/04 and will continue at SNF  -Improved mental status and fatigue    Severe aortic stenosis s/p TAVR  PMHx severe AS a TAVR in 2019  -No charge on echo    Symptomatic Bradycardia s/p Pacemaker  Pacemaker placed in 2019 for complete heart block and symptomatic bradycardia, currently ventricularly paced at 60 beats per minute    Hypertension  Bps 90-120s/44-50s  -Continue home lisinopril 20 mg PO daily    Hyperlipidemia  Lipid panel with LDL at goal in 4/2022  -continue lipitor 20 mg PO daily    Hypothyroidism  -synthroid 125 mcg PO Daily    Diet: Diet Cardiac   DVT Prophylaxis: SCD  Code: Full Code   Dispo: SNF placement Monday    Codie Tolliver MD  [facility] Internal Medicine, PGY-1    [facility] Medicine Hospitalist Pager numbers:   [facility] Hospitalist Medicine Team A  (Edgard/Larisa): [phone]  [facility] Hospitalist Medicine Team B (Crystal/Hay):  [phone]

## 2022-07-09 NOTE — PT/OT/SLP PROGRESS
Physical Therapy Treatment    Patient Name:  Pilar Michael   MRN:  [id number]    Recommendations:     Discharge Recommendations:  nursing facility, skilled   Discharge Equipment Recommendations: none   Barriers to discharge: Decreased mobility, strength, and endurance.     Assessment:     Pilar Michael is a 89 y.o. female admitted with a medical diagnosis of Acute on chronic combined systolic and diastolic CHF (congestive heart failure).  She presents with the following impairments/functional limitations:  weakness, gait instability, decreased upper extremity function, decreased lower extremity function, impaired endurance, impaired balance, impaired cardiopulmonary response to activity, decreased coordination, impaired functional mobilty.    Rehab Prognosis: Good; patient would benefit from acute skilled PT services to address these deficits and reach maximum level of function.    Recent Surgery: * No surgery found *      Plan:     During this hospitalization, patient to be seen 5 x/week to address the identified rehab impairments via gait training, therapeutic activities, therapeutic exercises, neuromuscular re-education and progress toward the following goals:    · Plan of Care Expires:  08/05/22    Subjective     Chief Complaint: Pt with no complaints or concerns at this time.   Patient/Family Comments/goals: Pt agreeable to PT treatment.   Pain/Comfort:  · Pain Rating 1: 0/10      Objective:     Patient found HOB elevated with oxygen, peripheral IV, PureWick, telemetry upon PT entry to room.     General Precautions: Standard, fall   Orthopedic Precautions:N/A   Braces: N/A       Functional Mobility:  · Bed Mobility:     · Supine to Sit: moderate assistance  · Sit to Supine: stand by assistance  · Transfers:     · Sit to Stand:  contact guard assistance with rolling walker  · Gait: Pt able to perform lateral side stepping along the EOB with RW, CGA requiring verbal cueing on proper AD management as well as  LE step sequencing.   · Balance: Pt with fair sitting and standing balance.       AM-PAC 6 CLICK MOBILITY  Turning over in bed (including adjusting bedclothes, sheets and blankets)?: 2  Sitting down on and standing up from a chair with arms (e.g., wheelchair, bedside commode, etc.): 3  Moving from lying on back to sitting on the side of the bed?: 2  Moving to and from a bed to a chair (including a wheelchair)?: 2  Need to walk in hospital room?: 2  Climbing 3-5 steps with a railing?: 1  Basic Mobility Total Score: 12       Therapeutic Activities and Exercises:       Patient left HOB elevated with all lines intact, call button in reach and family member present..    GOALS:   Multidisciplinary Problems     Physical Therapy Goals        Problem: Physical Therapy    Goal Priority Disciplines Outcome Goal Variances Interventions   Physical Therapy Goal     PT, PT/OT Ongoing, Progressing     Description: Goals to be met by: 22     Patient will increase functional independence with mobility by performin. Supine to sit with Stand-by Assistance  2. Sit to supine with Stand-by Assistance  3. Sit to stand transfer with Stand-by Assistance  4. Bed to chair transfer with Stand-by Assistance using Rolling Walker  5. Gait  x 50 feet with Contact Guard Assistance using Rolling Walker.                      Time Tracking:     PT Received On: 22  PT Start Time: 1116     PT Stop Time: 1133  PT Total Time (min): 17 min     Billable Minutes: Therapeutic Activity 17    Treatment Type: Treatment  PT/PTA: PTA     PTA Visit Number: 4     2022

## 2022-07-10 LAB — MAGNESIUM SERPL-MCNC: 1.9 MG/DL (ref 1.6–2.6)

## 2022-07-10 PROCEDURE — 63600175 PHARM REV CODE 636 W HCPCS: Performed by: STUDENT IN AN ORGANIZED HEALTH CARE EDUCATION/TRAINING PROGRAM

## 2022-07-10 PROCEDURE — 25000003 PHARM REV CODE 250: Performed by: STUDENT IN AN ORGANIZED HEALTH CARE EDUCATION/TRAINING PROGRAM

## 2022-07-10 PROCEDURE — 11000001 HC ACUTE MED/SURG PRIVATE ROOM

## 2022-07-10 PROCEDURE — 99900035 HC TECH TIME PER 15 MIN (STAT)

## 2022-07-10 PROCEDURE — 83735 ASSAY OF MAGNESIUM: CPT | Performed by: STUDENT IN AN ORGANIZED HEALTH CARE EDUCATION/TRAINING PROGRAM

## 2022-07-10 PROCEDURE — 94761 N-INVAS EAR/PLS OXIMETRY MLT: CPT

## 2022-07-10 PROCEDURE — 27000221 HC OXYGEN, UP TO 24 HOURS

## 2022-07-10 PROCEDURE — 36415 COLL VENOUS BLD VENIPUNCTURE: CPT | Performed by: STUDENT IN AN ORGANIZED HEALTH CARE EDUCATION/TRAINING PROGRAM

## 2022-07-10 RX ORDER — MEGESTROL ACETATE 40 MG/1
40 TABLET ORAL DAILY
Status: DISCONTINUED | OUTPATIENT
Start: 2022-07-10 | End: 2022-07-11 | Stop reason: HOSPADM

## 2022-07-10 RX ADMIN — ASPIRIN 81 MG: 81 TABLET, CHEWABLE ORAL at 09:07

## 2022-07-10 RX ADMIN — MEROPENEM 1 G: 1 INJECTION, POWDER, FOR SOLUTION INTRAVENOUS at 11:07

## 2022-07-10 RX ADMIN — CLOPIDOGREL 75 MG: 75 TABLET, FILM COATED ORAL at 09:07

## 2022-07-10 RX ADMIN — TIMOLOL MALEATE 1 DROP: 5 SOLUTION OPHTHALMIC at 09:07

## 2022-07-10 RX ADMIN — TIMOLOL MALEATE 1 DROP: 5 SOLUTION OPHTHALMIC at 10:07

## 2022-07-10 RX ADMIN — MEROPENEM 1 G: 1 INJECTION, POWDER, FOR SOLUTION INTRAVENOUS at 12:07

## 2022-07-10 RX ADMIN — AMLODIPINE BESYLATE 10 MG: 5 TABLET ORAL at 09:07

## 2022-07-10 RX ADMIN — LISINOPRIL 20 MG: 20 TABLET ORAL at 09:07

## 2022-07-10 RX ADMIN — BUMETANIDE 1 MG: 1 TABLET ORAL at 09:07

## 2022-07-10 RX ADMIN — LEVOTHYROXINE SODIUM 125 MCG: 75 TABLET ORAL at 09:07

## 2022-07-10 NOTE — PROGRESS NOTES
[facility] Medicine Progress Note    Primary Team: [facility] Hospitalist Team A  Attending Physician: Alicia Rene MD  Resident: Do Christine Wahl  Intern: [facility] Day: 6    Chief Complaint: Shortness of Breath       Subjective:   Patient slept throughout the night and is still sleeping. Patient has continued improvement in mental status. However, she has not being eating that much. Denies dysuria, n/v, d/c.     Patient's son was in the room.      Past Medical History:   Diagnosis Date    Arthritis     Cancer     CHF (congestive heart failure)     Coronary artery disease     Hypertension     Thyroid disease            Objective:   Last 24 Hour Vital Signs:  BP  Min: 104/45  Max: 115/49  Temp  Av °F (36.7 °C)  Min: 97.5 °F (36.4 °C)  Max: 99.2 °F (37.3 °C)  Pulse  Av.6  Min: 59  Max: 81  Resp  Av  Min: 18  Max: 18  SpO2  Av %  Min: 96 %  Max: 100 %    Intake/Output Summary (Last 24 hours) at 7/10/2022 0753  Last data filed at 7/10/2022 0550  Gross per 24 hour   Intake --   Output 1050 ml   Net -1050 ml      Physical Examination:  Physical Exam  Vitals and nursing note reviewed.   Constitutional:       General: She is not in acute distress.     Appearance: Normal appearance.   HENT:      Head: Normocephalic and atraumatic.      Mouth/Throat:      Mouth: Mucous membranes are moist.      Pharynx: Oropharynx is clear. No oropharyngeal exudate.   Eyes:      General: No scleral icterus.     Extraocular Movements: Extraocular movements intact.      Pupils: Pupils are equal, round, and reactive to light.   Cardiovascular:      Rate and Rhythm: Normal rate and regular rhythm.      Pulses: Normal pulses.      Heart sounds: Normal heart sounds. No murmur heard.  Pulmonary:      Effort: Pulmonary effort is normal. No respiratory distress.      Breath sounds: Normal breath sounds.   Chest:      Chest wall: No tenderness.   Abdominal:      General: Bowel sounds are normal.      Palpations:  Abdomen is soft.      Tenderness: There is no abdominal tenderness. There is no guarding or rebound.   Musculoskeletal:         General: Normal range of motion.      Left upper arm: Edema (present on admission) present.      Left forearm: Edema (present on admission) present.      Cervical back: Normal range of motion and neck supple. No tenderness.      Right lower leg: Edema present.      Left lower leg: Edema present.   Skin:     Capillary Refill: Capillary refill takes less than 2 seconds.        Laboratory:  Recent Labs   Lab 07/05/22  0347 07/06/22  0402 07/07/22  0328 07/08/22  0329 07/09/22  0330   WBC 8.43 7.69 8.45 6.56 5.71   HGB 10.5* 9.5* 10.0* 9.9* 10.5*   HCT 32.8* 30.4* 32.5* 32.2* 33.5*    157 181 184 201   MCV 84 87 87 87 85   RDW 16.9* 17.1* 16.8* 16.8* 16.9*    138 139 138 139   K 3.2* 4.2 4.3 3.9 4.0   CL 99 100 100 99 100   CO2 28 25 32* 27 28   BUN 31* 29* 30* 29* 24*   CREATININE 0.9 0.9 0.8 0.8 0.7    103 98 86 93   PROT 7.5 6.6 7.2 7.0 7.2   ALBUMIN 2.5* 2.3* 2.3* 2.3* 2.3*   BILITOT 1.0 0.8 0.8 0.8 0.9   AST 25 21 21 21 25   ALKPHOS 104 88 89 84 82   ALT 9* 9* 8* 11 11     Microbiology:  Blood cultures x2: NGTD  UCx: NGTD     Imaging: no new    Current Medications:  Scheduled:   amLODIPine  10 mg Oral Daily    aspirin  81 mg Oral Daily    bumetanide  1 mg Oral Daily    clopidogreL  75 mg Oral Daily    levothyroxine  125 mcg Oral Daily    lisinopriL  20 mg Oral Daily    meropenem (MERREM) IVPB  1 g Intravenous Q12H    QUEtiapine  25 mg Oral QHS    timolol maleate 0.5%  1 drop Both Eyes BID     PRN:  sars-cov-2 (covid-19), sodium chloride 0.9%    Antibiotics and Day Number of Therapy:  Antibiotics (From admission, onward)            Start     Stop Route Frequency Ordered    07/04/22 1145  meropenem 1 g in sodium chloride 0.9 % 100 mL IVPB (ready to mix system)         -- IV Every 12 hours (non-standard times) 07/04/22 1045             Assessment:   Pilar EUGENE  Alec is a 89 y.o. female with a PMH of HFpEF, left breast cancer s/p total mastectomy, HTN, HLD, symptomatic bradycardiay s/p PCM, severe aortic stenosis s/p TAVR who presents with acute hypoxic respiratory failure with vascular congestion on CXR and elevated BNP from baseline consistent with heart failure exacerbation. Extensive previous history of chronic UTI with admit UA suspicious for current UTI. Meropenem started. SNF placement for PT/OT and continued IV abx. Pt accepted to [facility] but pending due to insurance authorization.    Plan:   Acute Hypoxemic Respiratory Failure likely 2/2 Acute on Chronic HFpEF - Improving  PMH of HFpEF, valvular heart disease p/w dyspnea and increasing oxygen requirement with evidence of volume overload on physical exam in the setting of an elevated BNP(1347). Decompensation likely due to dietary indiscretion.   -Last Echo in 3/2022 demonstrated EF 55-60% with MV dysfuction. 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  Was incompletely treated with ciprofloxacin last month.  Only symptom is foul-smelling urine.  -urine culture NGTD  -Meropenem initiated on 07/04 and will continue at [facility]  -Improved mental status and fatigue    Severe aortic stenosis s/p TAVR  PMHx severe AS a TAVR in 2019  -No charge on echo    Symptomatic Bradycardia s/p Pacemaker  Pacemaker placed in 2019 for complete heart block and symptomatic bradycardia, currently ventricularly paced at 60 beats per minute    Insomnia  Patient on home trazadone 25mg. Reports melatonin does not work.  -Seroquel  administered PRN    Hypertension  Bps 90-120s/44-50s  -Continue home lisinopril 20 mg PO daily    Hyperlipidemia  Lipid panel with LDL at goal in 4/2022  -continue lipitor 20 mg PO daily    Hypothyroidism  -synthroid 125 mcg PO Daily    Diet: Diet Cardiac   DVT Prophylaxis: SCD  Code: Full Code   Dispo: SNF placement Monday    Codie Tolliver MD  [facility] Internal Medicine, PGY-1    [facility] Medicine Hospitalist Pager numbers:   [facility] Hospitalist Medicine Team A (Edgard/Larisa): [phone]  [facility] Hospitalist Medicine Team B (Crystal/Hay):  [phone]

## 2022-07-10 NOTE — PLAN OF CARE
"Plan of care reviewed with the patient. Scheduled medicines given and the patient tolerated well. Fall and safety precautions taken and the standard interventions are in place. On Telemetry monitoring with Sinus Bradycardia, no true "red alarms" noted in the shift. No acute distress reported either in the shift. Advised the patient to call for the assistance. Continued monitoring the patient.  "

## 2022-07-10 NOTE — PLAN OF CARE
Problem: Adult Inpatient Plan of Care  Goal: Plan of Care Review  7/10/2022 1315 by Judy C Jolissaint, RN  Outcome: Ongoing, Progressing

## 2022-07-11 ENCOUNTER — HOSPITAL ENCOUNTER (INPATIENT)
Facility: HOSPITAL | Age: 87
LOS: 7 days | Discharge: ANOTHER HOSPITAL | DRG: 291 | End: 2022-07-18
Attending: HOSPITALIST | Admitting: HOSPITALIST
Payer: MEDICARE

## 2022-07-11 VITALS
HEIGHT: 62 IN | SYSTOLIC BLOOD PRESSURE: 160 MMHG | DIASTOLIC BLOOD PRESSURE: 72 MMHG | RESPIRATION RATE: 18 BRPM | HEART RATE: 60 BPM | BODY MASS INDEX: 35.94 KG/M2 | TEMPERATURE: 98 F | OXYGEN SATURATION: 96 % | WEIGHT: 195.31 LBS

## 2022-07-11 DIAGNOSIS — N39.0 URINARY TRACT INFECTION WITHOUT HEMATURIA: ICD-10-CM

## 2022-07-11 LAB
ALBUMIN SERPL BCP-MCNC: 2.2 G/DL (ref 3.5–5.2)
ALP SERPL-CCNC: 92 U/L (ref 55–135)
ALT SERPL W/O P-5'-P-CCNC: 13 U/L (ref 10–44)
ANION GAP SERPL CALC-SCNC: 9 MMOL/L (ref 8–16)
AST SERPL-CCNC: 22 U/L (ref 10–40)
BILIRUB SERPL-MCNC: 0.8 MG/DL (ref 0.1–1)
BUN SERPL-MCNC: 19 MG/DL (ref 8–23)
CALCIUM SERPL-MCNC: 8.4 MG/DL (ref 8.7–10.5)
CHLORIDE SERPL-SCNC: 100 MMOL/L (ref 95–110)
CO2 SERPL-SCNC: 29 MMOL/L (ref 23–29)
CREAT SERPL-MCNC: 0.7 MG/DL (ref 0.5–1.4)
EST. GFR  (AFRICAN AMERICAN): >60 ML/MIN/1.73 M^2
EST. GFR  (NON AFRICAN AMERICAN): >60 ML/MIN/1.73 M^2
GLUCOSE SERPL-MCNC: 99 MG/DL (ref 70–110)
MAGNESIUM SERPL-MCNC: 1.8 MG/DL (ref 1.6–2.6)
POTASSIUM SERPL-SCNC: 4 MMOL/L (ref 3.5–5.1)
PROT SERPL-MCNC: 6.7 G/DL (ref 6–8.4)
SODIUM SERPL-SCNC: 138 MMOL/L (ref 136–145)

## 2022-07-11 PROCEDURE — 25000003 PHARM REV CODE 250: Performed by: HOSPITALIST

## 2022-07-11 PROCEDURE — 83735 ASSAY OF MAGNESIUM: CPT | Performed by: STUDENT IN AN ORGANIZED HEALTH CARE EDUCATION/TRAINING PROGRAM

## 2022-07-11 PROCEDURE — 25000003 PHARM REV CODE 250: Performed by: STUDENT IN AN ORGANIZED HEALTH CARE EDUCATION/TRAINING PROGRAM

## 2022-07-11 PROCEDURE — 97530 THERAPEUTIC ACTIVITIES: CPT | Mod: CO

## 2022-07-11 PROCEDURE — 27000221 HC OXYGEN, UP TO 24 HOURS

## 2022-07-11 PROCEDURE — 80053 COMPREHEN METABOLIC PANEL: CPT | Performed by: STUDENT IN AN ORGANIZED HEALTH CARE EDUCATION/TRAINING PROGRAM

## 2022-07-11 PROCEDURE — 36415 COLL VENOUS BLD VENIPUNCTURE: CPT | Performed by: STUDENT IN AN ORGANIZED HEALTH CARE EDUCATION/TRAINING PROGRAM

## 2022-07-11 PROCEDURE — 97110 THERAPEUTIC EXERCISES: CPT | Mod: CQ

## 2022-07-11 PROCEDURE — 97530 THERAPEUTIC ACTIVITIES: CPT | Mod: CQ

## 2022-07-11 PROCEDURE — 94761 N-INVAS EAR/PLS OXIMETRY MLT: CPT

## 2022-07-11 PROCEDURE — 99900035 HC TECH TIME PER 15 MIN (STAT)

## 2022-07-11 PROCEDURE — 11000004 HC SNF PRIVATE

## 2022-07-11 PROCEDURE — 63600175 PHARM REV CODE 636 W HCPCS: Performed by: STUDENT IN AN ORGANIZED HEALTH CARE EDUCATION/TRAINING PROGRAM

## 2022-07-11 PROCEDURE — 97535 SELF CARE MNGMENT TRAINING: CPT | Mod: CO

## 2022-07-11 RX ORDER — CLOPIDOGREL BISULFATE 75 MG/1
75 TABLET ORAL DAILY
Status: DISCONTINUED | OUTPATIENT
Start: 2022-07-12 | End: 2022-07-18 | Stop reason: HOSPADM

## 2022-07-11 RX ORDER — ACETAMINOPHEN 325 MG/1
650 TABLET ORAL EVERY 6 HOURS PRN
Status: DISCONTINUED | OUTPATIENT
Start: 2022-07-11 | End: 2022-07-18 | Stop reason: HOSPADM

## 2022-07-11 RX ORDER — TALC
6 POWDER (GRAM) TOPICAL NIGHTLY PRN
Status: DISCONTINUED | OUTPATIENT
Start: 2022-07-11 | End: 2022-07-18 | Stop reason: HOSPADM

## 2022-07-11 RX ORDER — MEGESTROL ACETATE 20 MG/1
40 TABLET ORAL DAILY
Status: DISCONTINUED | OUTPATIENT
Start: 2022-07-12 | End: 2022-07-18 | Stop reason: HOSPADM

## 2022-07-11 RX ORDER — ATORVASTATIN CALCIUM 20 MG/1
20 TABLET, FILM COATED ORAL NIGHTLY
Status: DISCONTINUED | OUTPATIENT
Start: 2022-07-11 | End: 2022-07-18 | Stop reason: HOSPADM

## 2022-07-11 RX ORDER — TIMOLOL MALEATE 5 MG/ML
1 SOLUTION/ DROPS OPHTHALMIC 2 TIMES DAILY
Status: DISCONTINUED | OUTPATIENT
Start: 2022-07-11 | End: 2022-07-18 | Stop reason: HOSPADM

## 2022-07-11 RX ORDER — MEROPENEM AND SODIUM CHLORIDE 1 G/50ML
1 INJECTION, SOLUTION INTRAVENOUS
Status: DISCONTINUED | OUTPATIENT
Start: 2022-07-12 | End: 2022-07-14

## 2022-07-11 RX ORDER — MEGESTROL ACETATE 40 MG/1
40 TABLET ORAL DAILY
Qty: 30 TABLET | Refills: 11 | Status: SHIPPED | OUTPATIENT
Start: 2022-07-12 | End: 2022-08-26 | Stop reason: SDUPTHER

## 2022-07-11 RX ORDER — CALCIUM CARBONATE 200(500)MG
500 TABLET,CHEWABLE ORAL 2 TIMES DAILY PRN
Status: DISCONTINUED | OUTPATIENT
Start: 2022-07-11 | End: 2022-07-18 | Stop reason: HOSPADM

## 2022-07-11 RX ORDER — AMLODIPINE BESYLATE 10 MG/1
10 TABLET ORAL DAILY
Status: DISCONTINUED | OUTPATIENT
Start: 2022-07-12 | End: 2022-07-14

## 2022-07-11 RX ORDER — BUMETANIDE 1 MG/1
1 TABLET ORAL DAILY
Status: DISCONTINUED | OUTPATIENT
Start: 2022-07-12 | End: 2022-07-18 | Stop reason: HOSPADM

## 2022-07-11 RX ORDER — LISINOPRIL 20 MG/1
20 TABLET ORAL DAILY
Status: DISCONTINUED | OUTPATIENT
Start: 2022-07-12 | End: 2022-07-14

## 2022-07-11 RX ORDER — AMOXICILLIN 250 MG
1 CAPSULE ORAL 2 TIMES DAILY
Status: DISCONTINUED | OUTPATIENT
Start: 2022-07-11 | End: 2022-07-18 | Stop reason: HOSPADM

## 2022-07-11 RX ORDER — NAPROXEN SODIUM 220 MG/1
81 TABLET, FILM COATED ORAL DAILY
Status: DISCONTINUED | OUTPATIENT
Start: 2022-07-12 | End: 2022-07-18 | Stop reason: HOSPADM

## 2022-07-11 RX ADMIN — ATORVASTATIN CALCIUM 20 MG: 20 TABLET, FILM COATED ORAL at 10:07

## 2022-07-11 RX ADMIN — SENNOSIDES AND DOCUSATE SODIUM 1 TABLET: 50; 8.6 TABLET ORAL at 10:07

## 2022-07-11 RX ADMIN — TIMOLOL MALEATE 1 DROP: 5 SOLUTION OPHTHALMIC at 10:07

## 2022-07-11 RX ADMIN — MEGESTROL ACETATE 40 MG: 40 TABLET ORAL at 09:07

## 2022-07-11 RX ADMIN — ASPIRIN 81 MG: 81 TABLET, CHEWABLE ORAL at 09:07

## 2022-07-11 RX ADMIN — TIMOLOL MALEATE 1 DROP: 5 SOLUTION OPHTHALMIC at 09:07

## 2022-07-11 RX ADMIN — CLOPIDOGREL 75 MG: 75 TABLET, FILM COATED ORAL at 09:07

## 2022-07-11 RX ADMIN — AMLODIPINE BESYLATE 10 MG: 5 TABLET ORAL at 10:07

## 2022-07-11 RX ADMIN — BUMETANIDE 1 MG: 1 TABLET ORAL at 09:07

## 2022-07-11 RX ADMIN — LEVOTHYROXINE SODIUM 125 MCG: 75 TABLET ORAL at 09:07

## 2022-07-11 RX ADMIN — MEROPENEM 1 G: 1 INJECTION, POWDER, FOR SOLUTION INTRAVENOUS at 11:07

## 2022-07-11 RX ADMIN — LISINOPRIL 20 MG: 20 TABLET ORAL at 10:07

## 2022-07-11 NOTE — DISCHARGE SUMMARY
[facility] Medicine Discharge Summary    Primary Team: [facility] Hospitalist Team A  Attending Physician: Eric Sol MD  Resident: /Vish    Date of Admit: 7/3/2022  Date of Discharge: 7/11/2022    Discharge to: Skilled nursing facility  Condition: Stable    Discharge Diagnoses     Present on Admission:   Acute on chronic combined systolic and diastolic CHF (congestive heart failure)   Severe aortic stenosis s/p TAVR   Presence of permanent cardiac pacemaker   Coronary artery disease involving native coronary artery of native heart without angina pectoris   Essential hypertension   Acquired hypothyroidism   Normocytic anemia   Increased anion gap metabolic acidosis   Hyperlipidemia   Acute on chronic combined systolic (congestive) and diastolic (congestive) heart failure   Shortness of breath   Urinary tract infection without hematuria   Debility      Consultants and Procedures     Consultants:    Consults (From admission, onward)        Status Ordering Provider     Inpatient consult to Registered Dietitian/Nutritionist  Once        Provider:  (Not yet assigned)    Acknowledged BHARATI LUX     Inpatient consult to Midline team  Once        Provider:  (Not yet assigned)    Completed MARY LEON     Inpatient consult to Registered Dietitian/Nutritionist  Once        Provider:  (Not yet assigned)    Completed MARY LEON     Inpatient consult to Social Work  Once        Provider:  (Not yet assigned)    Acknowledged MARY LEON     Inpatient consult to Cardiology-[facility]  Once        Provider:  Janet Rosario MD    Completed MARY LEON     IP consult to case management  Once        Provider:  (Not yet assigned)    Acknowledged ERIC SOL           Procedures:   Midline placement    Brief History of Present Illness & Summary of Hospital Course      Pilar Michael is a 89 y.o. female with  has a past medical history of Arthritis, Cancer, CHF (congestive heart  failure), Coronary artery disease, Hypertension, and Thyroid disease., TAVR. The patient presented to [facility] on 7/3/2022 with a primary complaint of SOB since this AM. EMS reported an O2 saturation of 71% upon arrival that improved to to 92% with NC with the patient remaining tachypneic. She endorsed subjective nightly fevers of around 101F for the past few days prior to admit and a congested cough. Of note the patient was recently treated for multiple UTI's over the last year with the most recent requiring a course of ciprofloxacin which was completed about 2-3 weeks ago.In the ED, she was noted to have some vascular congestion on CXR, elevated BNP of 1347, and a UA positive for nitrites and WBC's. Lasix was started and patient was admitted to [facility] internal medicine for further diuresis and treatment/workup of UTI.    Once admitted, patient was continued on diuresis and meropenem was started and managed on the floor. SNF placement accepted at [facility] for outpatient antibiotics and PT/OT. On the day of discharge, patient was afebrile with stable vital signs and will be discharged in stable condition with close follow up.     For the full HPI please refer to the History & Physical from this admission.    Hospital Course By Problem with Pertinent Findings     Acute hypoxemic respiratory failure due to Acute on Chronic HFpEF  PMHx HFpEF, valvular heart disease p/w dyspnea and increasing oxygen requirement with evidence of volume overload on physical exam in the setting of an elevated BNP(1347). Decompensation likely due to dietary indiscretion. Last Echo in 3/2022 demonstrated EF 55-60% with mitral stenosis. Repeat TTE EF 65% with grade II LVD dysfunction with MR related to MV degenerative disease.  -Continue Bumex 1mg daily   -Strict intake & output with fluid restriction to <2L daily     Recurrent Urinary Tract Infection  Patient with recurrent UTIs, last urine culture shows Pseudomonas only sensitive to imipenem,  tobramycin, gentamicin.  Was inadequately treated with ciprofloxacin last month.  Only symptom is foul-smelling urine.  -Meropenem initiated on 07/04 for 10 day course    Severe aortic stenosis s/p TAVR  PMHx severe AS a TAVR in 2019     Symptomatic bradycardia s/p pacemaker.  Pacemaker placed in 2019 for complete heart block and symptomatic bradycardia, currently ventricularly paced at 60 beats per minute     Hypertension  Continue lisinopril 20 mg PO daily     Hyperlipidemia  Lipid panel with LDL at goal in 4/2022 on lipitor 20 mg PO daily     Hypothyroidism  Synthroid 125 mcg PO Daily    Discharge Medications        Medication List      START taking these medications    megestroL 40 MG Tab  Commonly known as: MEGACE  Take 1 tablet (40 mg total) by mouth once daily.  Start taking on: July 12, 2022     MEROPENEM 1 G/100 ML NS (READY TO MIX SYSTEM)  Inject 100 mLs (1 g total) into the vein every 12 (twelve) hours. for 9 days        CHANGE how you take these medications    amLODIPine 10 MG tablet  Commonly known as: NORVASC  Take 1 tablet (10 mg total) by mouth once daily.  What changed:   · medication strength  · how much to take     bumetanide 1 MG tablet  Commonly known as: BUMEX  Take 1 tablet (1 mg total) by mouth once daily.  What changed: when to take this        CONTINUE taking these medications    aspirin 81 MG Chew     atorvastatin 20 MG tablet  Commonly known as: LIPITOR     clopidogreL 75 mg tablet  Commonly known as: PLAVIX  Take 1 tablet (75 mg total) by mouth once daily.     lisinopriL 20 MG tablet  Commonly known as: PRINIVIL,ZESTRIL     SYNTHROID 125 MCG tablet  Generic drug: levothyroxine     TIMOPTIC OCUDOSE (PF) 0.5 % Dpet  Generic drug: timoloL maleate (PF)        STOP taking these medications    albuterol-ipratropium 2.5 mg-0.5 mg/3 mL nebulizer solution  Commonly known as: DUO-NEB     furosemide 20 MG tablet  Commonly known as: LASIX     losartan 50 MG tablet  Commonly known as: COZAAR      methenamine 1 gram Tab  Commonly known as: HIPREX     spironolactone 25 MG tablet  Commonly known as: ALDACTONE     traZODone 50 MG tablet  Commonly known as: DESYREL        ASK your doctor about these medications    sars-cov-2 (covid-19) 30 mcg/0.3 ml injection  Commonly known as: Pfizer COVID-19  Inject 0.3 mLs into the muscle once as needed (prior to discharge).  Ask about: Should I take this medication?           Where to Get Your Medications      These medications were sent to [facility]  [address], [place] [address]    Hours: Mon-Fri, 8a-5:30p Phone: [phone]   · amLODIPine 10 MG tablet  · bumetanide 1 MG tablet  · megestroL 40 MG Tab  · MEROPENEM 1 G/100 ML NS (READY TO MIX SYSTEM)  · sars-cov-2 (covid-19) 30 mcg/0.3 ml injection          Discharge Information:   Diet:  Diet Dysphagia Soft (IDDSI Level 6) [facility]    Physical Activity:  As tolerated             Instructions:  1. Take all medications as prescribed  2. Keep all follow-up appointments  3. Return to the hospital or call your primary care physicians if any worsening symptoms such as fever, chest pain, shortness of breath, return of symptoms, or any other concerns.    Follow-Up Appointments:  PCP  Cards      Less than 30 minutes were spent coordinating discharge for this patient.     Maria M Love MD  [facility] IM/Peds PGY3/HO2

## 2022-07-11 NOTE — PLAN OF CARE
"Plan of care reviewed with the patient. Scheduled medicines given and the patient tolerated well. Fall and safety precautions taken and the standard interventions are in place. On Telemetry monitoring with NSR and 1st degree AV block, no true "red alarms" noted in the shift. No acute distress reported in the shift. Advised the patient to call for the assistance. Continued monitoring the patient.  "

## 2022-07-11 NOTE — PROGRESS NOTES
[facility] Medicine Progress Note    Primary Team: [facility] Hospitalist Team A  Attending Physician: Alicia Rene MD  Resident: Do Christine Wahl  Intern: [facility] Day: 7    Chief Complaint: Shortness of Breath       Subjective:   Patient slept throughout the night and is still sleeping. Patient has continued improvement in mental status. However, she has not being eating that much. Denies dysuria, n/v, d/c.     Patient's son was in the room.      Past Medical History:   Diagnosis Date    Arthritis     Cancer     CHF (congestive heart failure)     Coronary artery disease     Hypertension     Thyroid disease            Objective:   Last 24 Hour Vital Signs:  BP  Min: 112/49  Max: 134/59  Temp  Av.7 °F (36.5 °C)  Min: 96 °F (35.6 °C)  Max: 99.5 °F (37.5 °C)  Pulse  Av.5  Min: 58  Max: 61  Resp  Av  Min: 16  Max: 20  SpO2  Av.9 %  Min: 92 %  Max: 99 %  Weight  Av.6 kg (195 lb 5.2 oz)  Min: 88.6 kg (195 lb 5.2 oz)  Max: 88.6 kg (195 lb 5.2 oz)    Intake/Output Summary (Last 24 hours) at 2022 0747  Last data filed at 2022 0700  Gross per 24 hour   Intake 0 ml   Output 1350 ml   Net -1350 ml      Physical Examination:  Physical Exam  Vitals and nursing note reviewed.   Constitutional:       General: She is not in acute distress.     Appearance: Normal appearance.   HENT:      Head: Normocephalic and atraumatic.      Mouth/Throat:      Mouth: Mucous membranes are moist.      Pharynx: Oropharynx is clear. No oropharyngeal exudate.   Eyes:      General: No scleral icterus.     Extraocular Movements: Extraocular movements intact.      Pupils: Pupils are equal, round, and reactive to light.   Cardiovascular:      Rate and Rhythm: Normal rate and regular rhythm.      Pulses: Normal pulses.      Heart sounds: Normal heart sounds. No murmur heard.  Pulmonary:      Effort: Pulmonary effort is normal. No respiratory distress.      Breath sounds: Normal breath sounds.   Chest:       Chest wall: No tenderness.   Abdominal:      General: Bowel sounds are normal.      Palpations: Abdomen is soft.      Tenderness: There is no abdominal tenderness. There is no guarding or rebound.   Musculoskeletal:         General: Normal range of motion.      Left upper arm: Edema (present on admission) present.      Left forearm: Edema (present on admission) present.      Cervical back: Normal range of motion and neck supple. No tenderness.      Right lower leg: Edema present.      Left lower leg: Edema present.   Skin:     Capillary Refill: Capillary refill takes less than 2 seconds.        Laboratory:  Recent Labs   Lab 07/05/22  0347 07/06/22  0402 07/07/22  0328 07/08/22  0329 07/09/22  0330 07/11/22  0543   WBC 8.43 7.69 8.45 6.56 5.71  --    HGB 10.5* 9.5* 10.0* 9.9* 10.5*  --    HCT 32.8* 30.4* 32.5* 32.2* 33.5*  --     157 181 184 201  --    MCV 84 87 87 87 85  --    RDW 16.9* 17.1* 16.8* 16.8* 16.9*  --     138 139 138 139 138   K 3.2* 4.2 4.3 3.9 4.0 4.0   CL 99 100 100 99 100 100   CO2 28 25 32* 27 28 29   BUN 31* 29* 30* 29* 24* 19   CREATININE 0.9 0.9 0.8 0.8 0.7 0.7    103 98 86 93 99   PROT 7.5 6.6 7.2 7.0 7.2 6.7   ALBUMIN 2.5* 2.3* 2.3* 2.3* 2.3* 2.2*   BILITOT 1.0 0.8 0.8 0.8 0.9 0.8   AST 25 21 21 21 25 22   ALKPHOS 104 88 89 84 82 92   ALT 9* 9* 8* 11 11 13     Microbiology:  Blood cultures x2: NGTD  UCx: NGTD     Imaging: no new    Current Medications:  Scheduled:   amLODIPine  10 mg Oral Daily    aspirin  81 mg Oral Daily    bumetanide  1 mg Oral Daily    clopidogreL  75 mg Oral Daily    levothyroxine  125 mcg Oral Daily    lisinopriL  20 mg Oral Daily    megestroL  40 mg Oral Daily    meropenem (MERREM) IVPB  1 g Intravenous Q12H    timolol maleate 0.5%  1 drop Both Eyes BID     PRN:  QUEtiapine, sars-cov-2 (covid-19), sodium chloride 0.9%    Antibiotics and Day Number of Therapy:  Antibiotics (From admission, onward)            Start     Stop Route  Frequency Ordered    07/04/22 1145  meropenem 1 g in sodium chloride 0.9 % 100 mL IVPB (ready to mix system)         -- IV Every 12 hours (non-standard times) 07/04/22 1045             Assessment:   Pilar Michael is a 89 y.o. female with a PMH of HFpEF, left breast cancer s/p total mastectomy, HTN, HLD, symptomatic bradycardiay s/p PCM, severe aortic stenosis s/p TAVR who presents with acute hypoxic respiratory failure with vascular congestion on CXR and elevated BNP from baseline consistent with heart failure exacerbation. Extensive previous history of chronic UTI with admit UA suspicious for current UTI. Meropenem started. SNF placement for PT/OT and continued IV abx. Pt accepted to [facility] but pending due to insurance authorization.    Plan:   Acute Hypoxemic Respiratory Failure likely 2/2 Acute on Chronic HFpEF - Improving  PMH of HFpEF, valvular heart disease p/w dyspnea and increasing oxygen requirement with evidence of volume overload on physical exam in the setting of an elevated BNP(1347). Decompensation likely due to dietary indiscretion.   -Last Echo in 3/2022 demonstrated EF 55-60% with MV dysfuction. Repeat IP showed EF 65% with grade II LVD dysfunction with mitral regurgitation related to MV degenerative disease  - Continue Bumix 1mg daily    -Strict intake & output with fluid restriction to <2L daily    Recurrent Urinary Tract Infection - Improving  - Patient with UTI noted in [place] 6/24 was treated with Cipro outpatient.   - Patient has urine culture demonstrating cipro resistant E.Coli 11/2021 but pan sensitive to other therapies.   - Its possible this is not failed outpatient treatment just inappropriate therapy.  - Patient initiated on Binu 7/4/2022 with decreased in foul smell.   - Urine culture from ED NGTD   -Meropenem initiated on 07/04 and will continue at [facility] for total of 10 days.   -Improved mental status and fatigue     Severe aortic stenosis s/p TAVR  PMHx severe AS a TAVR in  2019  -No charge on echo    Symptomatic Bradycardia s/p Pacemaker  Pacemaker placed in 2019 for complete heart block and symptomatic bradycardia, currently ventricularly paced at 60 beats per minute  - No concerns at this time will continue to monitor     Insomnia  Patient on home trazadone 25mg. Reports melatonin does not work.  -Seroquel administered PRN    Hypertension  Bps 90-120s/44-50s  -Continue home lisinopril 20 mg PO daily    Hyperlipidemia  Lipid panel with LDL at goal in 4/2022  -continue lipitor 20 mg PO daily    Hypothyroidism  -synthroid 125 mcg PO Daily    Diet: Diet Dysphagia Soft (IDDSI Level 6) [facility]   DVT Prophylaxis: SCD  Code: Full Code   Dispo: SNF placement Monday    Miguel Wahl MD  [employer] IM/EM PGY 4  [employer] Internal Medicine     [facility] Hospitalist Pager numbers:   [facility] Hospitalist Medicine Team A (Edgard/Larisa): [phone]  \[facility]\"" Hospitalist Medicine Team B (Crystal/Hay):  [phone]

## 2022-07-11 NOTE — PT/OT/SLP PROGRESS
Physical Therapy Treatment    Patient Name:  Pilar Michael   MRN:  [id number]    Recommendations:     Discharge Recommendations:  nursing facility, skilled   Discharge Equipment Recommendations: none   Barriers to discharge: decreased mobility,strength and endurance    Assessment:     Pilar Michael is a 89 y.o. female admitted with a medical diagnosis of Acute on chronic combined systolic and diastolic CHF (congestive heart failure).  She presents with the following impairments/functional limitations:  weakness, impaired endurance, impaired functional mobilty, gait instability, impaired balance, decreased upper extremity function, decreased lower extremity function, decreased ROM, impaired coordination, impaired skin, impaired cardiopulmonary response to activity,pt with increased fatigue secondary to medication,pt requires assistance with all mobility at this time,pt will benefit from SNF upon discharge.    Rehab Prognosis: Fair; patient would benefit from acute skilled PT services to address these deficits and reach maximum level of function.    Recent Surgery: * No surgery found *      Plan:     During this hospitalization, patient to be seen 5 x/week to address the identified rehab impairments via gait training, therapeutic activities, therapeutic exercises, neuromuscular re-education and progress toward the following goals:    · Plan of Care Expires:  08/05/22    Subjective     Chief Complaint: n/a  Patient/Family Comments/goals: pt states she is so tired.  Pain/Comfort:  · Pain Rating 1: 0/10      Objective:     Communicated with nsg prior to session.  Patient found supine with oxygen, PureWick, peripheral IV, telemetry upon PT entry to room.     General Precautions: Standard, fall   Orthopedic Precautions:N/A   Braces: N/A  Respiratory Status: Nasal cannula, flow 2 L/min     Functional Mobility:  · Bed Mobility:     · Supine to Sit: moderate assistance and maximal assistance  · Sit to Supine: moderate  assistance  · Balance: poor sitting balance      AM-PAC 6 CLICK MOBILITY  Turning over in bed (including adjusting bedclothes, sheets and blankets)?: 2  Sitting down on and standing up from a chair with arms (e.g., wheelchair, bedside commode, etc.): 2  Moving from lying on back to sitting on the side of the bed?: 2  Moving to and from a bed to a chair (including a wheelchair)?: 2  Need to walk in hospital room?: 2  Climbing 3-5 steps with a railing?: 1  Basic Mobility Total Score: 11       Therapeutic Activities and Exercises: pt sat EOB ~7 mins with SBA and v/c's to stay alert,le supine ex's X 10-12 reps inc: ap,hs,abd/add,slr with Min A.       Patient left supine with all lines intact, call button in reach and son present..    GOALS: see general POC  Multidisciplinary Problems     Physical Therapy Goals        Problem: Physical Therapy    Goal Priority Disciplines Outcome Goal Variances Interventions   Physical Therapy Goal     PT, PT/OT Ongoing, Progressing     Description: Goals to be met by: 22     Patient will increase functional independence with mobility by performin. Supine to sit with Stand-by Assistance  2. Sit to supine with Stand-by Assistance  3. Sit to stand transfer with Stand-by Assistance  4. Bed to chair transfer with Stand-by Assistance using Rolling Walker  5. Gait  x 50 feet with Contact Guard Assistance using Rolling Walker.                      Time Tracking:     PT Received On: 22  PT Start Time: 1013     PT Stop Time: 1037  PT Total Time (min): 24 min     Billable Minutes: Therapeutic Activity 14 and Therapeutic Exercise 9    Treatment Type: Treatment  PT/PTA: PTA     PTA Visit Number: 5     2022

## 2022-07-11 NOTE — PT/OT/SLP PROGRESS
Occupational Therapy   Treatment    Name: Pilar Michael  MRN: [id number]  Admitting Diagnosis:  Acute on chronic combined systolic and diastolic CHF (congestive heart failure)       Recommendations:     Discharge Recommendations: nursing facility, skilled  Discharge Equipment Recommendations:  none  Barriers to discharge:  Other (Comment) (Increased level of assistance)    Assessment:     Pilar Michael is a 89 y.o. female with a medical diagnosis of Acute on chronic combined systolic and diastolic CHF (congestive heart failure).  Performance deficits affecting function are weakness, impaired endurance, impaired self care skills, impaired functional mobilty, gait instability, impaired balance, decreased upper extremity function, decreased lower extremity function, impaired skin, edema, impaired fine motor, impaired coordination. Pt found in bed, agreeable to therapy despite feeling drowsy.  Pt required Min A for bed mobility and transferred to chair with Min A using RW.  Pt appears to be self limiting, requiring encouragement throughout session to increase participation.  Pt progressing towards goals.  Continue OT services to address functional goals, progressing as able.      Rehab Prognosis:  Good; patient would benefit from acute skilled OT services to address these deficits and reach maximum level of function.       Plan:     Patient to be seen 5 x/week to address the above listed problems via self-care/home management, therapeutic activities, therapeutic exercises  · Plan of Care Expires: 08/05/22  · Plan of Care Reviewed with: patient    Subjective     Pain/Comfort:  · Pain Rating 1: 0/10  · Pain Rating Post-Intervention 1: 0/10    Objective:     Communicated with: RN prior to session.  Patient found HOB elevated with PureWick, peripheral IV, telemetry, oxygen upon OT entry to room.    General Precautions: Standard, fall   Orthopedic Precautions:N/A   Braces: N/A  Respiratory Status: Nasal cannula      Occupational Performance:     Bed Mobility:    · Patient completed Rolling/Turning to Left with  minimum assistance and with side rail  · Patient completed Scooting/Bridging with maximal assistance initially to scoot from middle dip in bed than SBA once closer to edge.   · Patient completed Supine to Sit with minimum assistance, with side rail and HOB elevated, increased time and effort, vc's for effective technique    Functional Mobility/Transfers:  · Patient completed Sit <> Stand Transfer with contact guard assistance  with  rolling walker and vcs for hand placement, once from EOB, once from chair.    · Patient completed bed>chair transfer with minimum assistance using RW  · Functional Mobility: Pt took a couple of steps during transfer with Min A using RW.    Activities of Daily Living:  · Grooming: minimum assistance chair level.  Pt required assist to open toothpaste.       [facility] 6 Click ADL: 15    Patient left up in chair with all lines intact, call button in reach, chair alarm on, RN notified and son presentEducation:      GOALS:   Multidisciplinary Problems     Occupational Therapy Goals        Problem: Occupational Therapy    Goal Priority Disciplines Outcome Interventions   Occupational Therapy Goal     OT, PT/OT Ongoing, Progressing    Description: Goals to be met by: 8/5/22     Patient will increase functional independence with ADLs by performing:    LE Dressing with Minimal Assistance and Assistive Devices as needed.  Grooming while seated with Modified [place].  Toileting from bedside commode with Minimal Assistance for hygiene and clothing management.   Stand pivot transfers with Stand-by Assistance.  Toilet transfer to bedside commode with Stand-by Assistance.  Increased functional strength to WFL for self care skills and functional mobility.  Upper extremity exercise program x10 reps per handout, with independence.                     Time Tracking:     OT Date of Treatment: 07/11/22  OT  Start Time: 1118  OT Stop Time: 1142  OT Total Time (min): 24 min    Billable Minutes:Self Care/Home Management 10  Therapeutic Activity 14            7/11/2022

## 2022-07-11 NOTE — PLAN OF CARE
Problem: Occupational Therapy  Goal: Occupational Therapy Goal  Description: Goals to be met by: 8/5/22     Patient will increase functional independence with ADLs by performing:    LE Dressing with Minimal Assistance and Assistive Devices as needed.  Grooming while seated with Modified [place].  Toileting from bedside commode with Minimal Assistance for hygiene and clothing management.   Stand pivot transfers with Stand-by Assistance.  Toilet transfer to bedside commode with Stand-by Assistance.  Increased functional strength to WFL for self care skills and functional mobility.  Upper extremity exercise program x10 reps per handout, with independence.    Outcome: Ongoing, Progressing   Pilar Michael is a 89 y.o. female with a medical diagnosis of Acute on chronic combined systolic and diastolic CHF (congestive heart failure).  Performance deficits affecting function are weakness, impaired endurance, impaired self care skills, impaired functional mobilty, gait instability, impaired balance, decreased upper extremity function, decreased lower extremity function, impaired skin, edema, impaired fine motor, impaired coordination. Pt found in bed, agreeable to therapy despite feeling drowsy.  Pt required Min A for bed mobility and transferred to chair with Min A using RW.  Pt appears to be self limiting, requiring encouragement throughout session to increase participation.  Pt progressing towards goals.  Continue OT services to address functional goals, progressing as able.

## 2022-07-11 NOTE — PLAN OF CARE
Recommendation:   1. Continue current cardiac diet as tolerated with texture/consistency modifications per SLP/MD.   2. Continue to provide Boost Plus TID.   3. Continue to provide [place] BID to promote wound healing.   4. Consider the addition of appetite stimulants.   5.  Monitor weight/labs.   6. RD to follow and monitor intake    Goals:   Pt intake >/= 50% EEN/EPN by RD follow up    Nutrition Goal Status: progressing towards goal  Communication of RD Recs: other (comment) (POC)      Problem: Oral Intake Inadequate (Heart Failure)  Goal: Optimal Nutrition Intake  Outcome: Ongoing, Progressing     Problem: Impaired Wound Healing  Goal: Optimal Wound Healing  Outcome: Ongoing, Progressing

## 2022-07-11 NOTE — PLAN OF CARE
[facility]    FACILITY TRANSFER ORDERS      Patient Name: Pilar Michael  YOB: 1932    PCP: Joseph Noel MD   PCP Address: [address] [place] [address] [facility]  PCP Phone Number: [phone]  PCP Fax: [phone]    Encounter Date: 07/11/2022    Admit to: Internal medicine     Vital Signs:  Routine    Diagnoses:   Active Hospital Problems    Diagnosis  POA    *Acute on chronic combined systolic and diastolic CHF (congestive heart failure) [I50.43]  Yes    Debility [R53.81]  Yes    Acute on chronic combined systolic (congestive) and diastolic (congestive) heart failure [I50.43]  Yes    Shortness of breath [R06.02]  Yes    Urinary tract infection without hematuria [N39.0]  Yes    Normocytic anemia [D64.9]  Yes    Increased anion gap metabolic acidosis [E87.2]  Yes    Hyperlipidemia [E78.5]  Yes    Presence of permanent cardiac pacemaker [Z95.0]  Yes    Coronary artery disease involving native coronary artery of native heart without angina pectoris [I25.10]  Yes    Essential hypertension [I10]  Yes    Acquired hypothyroidism [E03.9]  Yes    Severe aortic stenosis s/p TAVR [I35.0]  Yes      Resolved Hospital Problems   No resolved problems to display.       Allergies:  Review of patient's allergies indicates:   Allergen Reactions    Penicillins Swelling    Sulfa (sulfonamide antibiotics) Nausea Only       Diet: regular diet    Activities: Activity as tolerated per PT/OT recommendations.     Goals of Care Treatment Preferences:  Code Status: Full Code      Nursing:     Labs: Per Facility     CONSULTS:    Physical Therapy to evaluate and treat.  and Occupational Therapy to evaluate and treat.    MISCELLANEOUS CARE:  Routine Skin for Bedridden Patients: Apply moisture barrier cream to all skin folds and wet areas in perineal area daily and after baths and all bowel movements.    WOUND CARE ORDERS  None    Medications: Review discharge medications with  patient and family and provide education.      Current Discharge Medication List      START taking these medications    Details   megestroL (MEGACE) 40 MG Tab Take 1 tablet (40 mg total) by mouth once daily.  Qty: 30 tablet, Refills: 11      MEROPENEM 1 G/100 ML NS, READY TO MIX SYSTEM, Inject 100 mLs (1 g total) into the vein every 12 (twelve) hours. for 9 days  Qty: 1800 mL, Refills: 0      sars-cov-2, covid-19, (PFIZER COVID-19) 30 mcg/0.3 ml injection Inject 0.3 mLs into the muscle once as needed (prior to discharge).  Qty: 0.3 mL, Refills: 0         CONTINUE these medications which have CHANGED    Details   amLODIPine (NORVASC) 10 MG tablet Take 1 tablet (10 mg total) by mouth once daily.  Qty: 30 tablet, Refills: 11    Comments: .      bumetanide (BUMEX) 1 MG tablet Take 1 tablet (1 mg total) by mouth once daily.  Qty: 30 tablet, Refills: 2         CONTINUE these medications which have NOT CHANGED    Details   aspirin 81 MG Chew Take 81 mg by mouth once daily.      atorvastatin (LIPITOR) 20 MG tablet Take 20 mg by mouth every evening.      clopidogreL (PLAVIX) 75 mg tablet Take 1 tablet (75 mg total) by mouth once daily.  Qty: 90 tablet, Refills: 3      lisinopriL (PRINIVIL,ZESTRIL) 20 MG tablet Take 20 mg by mouth once daily.      SYNTHROID 125 mcg tablet Take 125 mcg by mouth once daily.  Refills: 3      TIMOPTIC OCUDOSE, PF, 0.5 % Dpet Place 1 drop into both eyes 2 (two) times a day.  Refills: 6         STOP taking these medications       losartan (COZAAR) 50 MG tablet Comments:   Reason for Stopping:         methenamine (HIPREX) 1 gram Tab Comments:   Reason for Stopping:         albuterol-ipratropium (DUO-NEB) 2.5 mg-0.5 mg/3 mL nebulizer solution Comments:   Reason for Stopping:         furosemide (LASIX) 20 MG tablet Comments:   Reason for Stopping:         spironolactone (ALDACTONE) 25 MG tablet Comments:   Reason for Stopping:         traZODone (DESYREL) 50 MG tablet Comments:   Reason for Stopping:                   Immunizations Administered as of 7/11/2022     Name Date Dose VIS Date Route Exp Date    COVID-19, MRNA, LN-S, PF (Pfizer) (Purple Cap)  Incomplete 0.3 mL 9/22/2021 Intramuscular --    : [employer]     COVID-19, MRNA, LN-S, PF (Pfizer) (Purple Cap) 2/6/2021  8:14 AM 0.3 mL 12/12/2020 Intramuscular 6/30/2021    Site: Right deltoid     Given By: Sarah Vincent LPN     : Pf[employer] Inc     Lot: [id number]     COVID-19, MRNA, LN-S, PF (Pfizer) (Purple Cap) 1/15/2021  8:15 AM 0.3 mL 12/12/2020 Intramuscular 4/30/2021    Site: Right deltoid     Given By: Brandie Daniels LPN     : Pf[employer] Inc     Lot: [id number]           This patient has had both covid vaccinations    Some patients may experience side effects after vaccination.  These may include fever, headache, muscle or joint aches.  Most symptoms resolve with 24-48 hours and do not require urgent medical evaluation unless they persist for more than 72 hours or symptoms are concerning for an unrelated medical condition.          _________________________________  Miguel Wahl MD  07/11/2022

## 2022-07-11 NOTE — CONSULTS
[facility] - Telemetry  Adult Nutrition  Consult Note    SUMMARY     Recommendations    Recommendation:   1. Continue current cardiac diet as tolerated with texture/consistency modifications per SLP/MD.   2. Continue to provide Boost Plus TID.   3. Continue to provide Darwin BID to promote wound healing.   4. Consider the addition of appetite stimulants.   5.  Monitor weight/labs.   6. RD to follow and monitor intake    Goals:   Pt intake >/= 50% EEN/EPN by RD follow up    Nutrition Goal Status: progressing towards goal  Communication of RD Recs: other (comment) (POC)    Assessment and Plan    * Acute on chronic combined systolic and diastolic CHF (congestive heart failure)  Contributing Nutrition Diagnosis  Inadequate oral intake    Related to (etiology):   CHF    Signs and Symptoms (as evidenced by):   Pt admitted with CHF, decreased appetite, and AMS.      Interventions:  Collaboration with other providers  Commercial Beverage- Boost Plus TID  Modified Beverage- Darwin BID    Nutrition Diagnosis Status:   Continues           Malnutrition Assessment      Orbital Region (Subcutaneous Fat Loss): well nourished   Confucianist Region (Muscle Loss): well nourished  Clavicle Bone Region (Muscle Loss): well nourished  Clavicle and Acromion Bone Region (Muscle Loss): well nourished  Scapular Bone Region (Muscle Loss): well nourished   Edema (Fluid Accumulation): 3-->moderate   Subcutaneous Fat Loss (Final Summary): well nourished  Muscle Loss Evaluation (Final Summary): well nourished  Fluid Accumulation Evaluation: moderate         Reason for Assessment    Reason For Assessment: RD follow-up, consult (Patient with decreased appetite for few months, family concerned, requesting nutrition to re-evaulate)  Diagnosis: other (see comments) (acute on chronic combined systolic and diastolic CHF)  Relevant Medical History: cancer, CHF, CAD, HTN, thyroid disease, thyroidectomy, cholecystectomy, TAVR, cardiac  "catheterization  Interdisciplinary Rounds: did not attend  General Information Comments: Pt with son in the room. Pt remains to be lethargic with eyes closed during majority of conversation. Per chart pt with improvement of mental status, pending SNF placement/insurance auth. Son requested RD to communicate meal preferences with SNF/rehab RD, communicated via secure chat. Receiving dysphagia soft diet with Boost Plus TID, and Darwin BID. Consuming 1-2 Boost per day. Discussed the importance of consuming Boost 3x/day and Darwin BID while consuming minimal amounts of meals. Denies N/V. LBM 7/8. Patel Score: 17 incontinence associated dermatitis left groin, right groin, and perineum, blisters right buttocks. NFPE completed 7/8: Pt appears well nourished with significant BUE edema and BLE edema.  Nutrition Discharge Planning: d/c on cardiac diet with Boost Plus or similar TID, and Darwin BID, with texture/consistency modifications per SLP/MD    Nutrition Risk Screen    Nutrition Risk Screen: no indicators present    Nutrition/Diet History    Food Preferences: no cultural or spiritual food preferences identified  Spiritual, Cultural Beliefs, Sikhism Practices, Values that Affect Care: no  Food Allergies: NKFA  Factors Affecting Nutritional Intake: decreased appetite    Anthropometrics    Temp: 97.8 °F (36.6 °C)  Height Method: Stated  Height: 5' 2" (157.5 cm)  Height (inches): 62 in  Weight Method: Bed Scale  Weight: 88.6 kg (195 lb 5.2 oz)  Weight (lb): 195.33 lb  Ideal Body Weight (IBW), Female: 110 lb  % Ideal Body Weight, Female (lb): 170.91 %  BMI (Calculated): 35.7  BMI Grade: 35 - 39.9 - obesity - grade II       Lab/Procedures/Meds    Pertinent Labs Reviewed: reviewed  Pertinent Labs Comments: Ca 8.4, Alb 2.2  Pertinent Medications Reviewed: reviewed  Pertinent Medications Comments: amlodipine, aspirin, bumetanide, clopidogrel, levothyroxine, lisinopril, megestrol, meropenem, timolol      Estimated/Assessed " Needs    Weight Used For Calorie Calculations: 85.3 kg (188 lb 0.8 oz)  Energy Calorie Requirements (kcal): 1594  Energy Need Method: [place]-St Jeor (x 1.3)  Protein Requirements: 85 (1.0 gm/kg)  Weight Used For Protein Calculations: 85.3 kg (188 lb 0.8 oz)     Estimated Fluid Requirement Method: RDA Method (or PER MD)  RDA Method (mL): 1594         Nutrition Prescription Ordered    Current Diet Order: Dysphagia Soft  Oral Nutrition Supplement: Boost Plus TID, Darwin BID    Evaluation of Received Nutrient/Fluid Intake    I/O: -950  Energy Calories Required: not meeting needs  Protein Required: not meeting needs  Fluid Required: not meeting needs  Comments: LBM 7/8  % Intake of Estimated Energy Needs: 25 - 50 %  % Meal Intake: 0 - 25 %    Nutrition Risk    Level of Risk/Frequency of Follow-up:  (2x/week)       Monitor and Evaluation    Food and Nutrient Intake: energy intake, food and beverage intake  Food and Nutrient Adminstration: diet order  Knowledge/Beliefs/Attitudes: food and nutrition knowledge/skill  Physical Activity and Function: nutrition-related ADLs and IADLs  Anthropometric Measurements: weight, weight change, body mass index  Biochemical Data, Medical Tests and Procedures: electrolyte and renal panel, gastrointestinal profile, glucose/endocrine profile, inflammatory profile, lipid profile  Nutrition-Focused Physical Findings: overall appearance       Nutrition Follow-Up    RD Follow-up?: Yes

## 2022-07-11 NOTE — PLAN OF CARE
"0805  Voicemail message left for Loren ([phone]) [facility] requesting a return call to discuss insurance authorization requested on 7/7/2022 for the patient's admission to [facility]. Awaiting return call.     0935  Message sent to Tina w/[facility] questioning insurance authorization status. Awaiting response.     1005  CM received a return [facility]. [facility] stated that [facility]. Loren stated that she sent a message to [facility] informing that the patient is medically stable to dc today.     1210  CM received a call from Janes [facility].  provided Janes with information about during of IV abx needed, patient prior mobility status, & current mobility status. Janes provided this CM with auth #[id number] for the patient's admission to [facility] today. Message sent to Tina w/[facility] & Dr. Miguel Wahl informing of above. Awaiting "facility transfer" orders.  Voicemail message left for the pt's daughter, Katiana Conrad ([phone]), via phone informing of the patient's discharge status. Documentation manually faxed to Janes (f) [phone] [facility] as requested.     1230  "Facility [facility]" [facility] CM informed nurse Negar ([phone]) of the pt's discharge status.     1245  Patient awake & alert sitting in the recliner with son Jack Michael ([phone]) at the bedside when CM rounded. Both verbalized understanding & agreement  with to plan to discharge to [facility] today. Pt will need  van transportation w/O2 at time of discharge. Awaiting room assignment & phone number for nurse Negar to call report. Transportation packet left at the nurse's station.    1430  Voicemail message left for Lorraine ([phone]) questioning bed assignment & phone # to call report. Awaiting response.    1510  LEELEE was informed by Lorraine that the patient will be accepted for admission today & requested that report be called to (p [phone]). WC " van transportation w/O2 scheduled with requested pickup at 1700. CM informed Dr. Wahl of above.     Message sent to nurse Bills & virtual nurse Loren informing that the patient is cleared to dc, informing of transportation scheduled, & requesting that nurse Bills call report.       Will continue to follow.

## 2022-07-12 ENCOUNTER — PATIENT OUTREACH (OUTPATIENT)
Dept: ADMINISTRATIVE | Facility: CLINIC | Age: 87
End: 2022-07-12
Payer: MEDICARE

## 2022-07-12 PROCEDURE — 97110 THERAPEUTIC EXERCISES: CPT

## 2022-07-12 PROCEDURE — 97162 PT EVAL MOD COMPLEX 30 MIN: CPT

## 2022-07-12 PROCEDURE — 97165 OT EVAL LOW COMPLEX 30 MIN: CPT

## 2022-07-12 PROCEDURE — 25000003 PHARM REV CODE 250: Performed by: HOSPITALIST

## 2022-07-12 PROCEDURE — 63600175 PHARM REV CODE 636 W HCPCS: Performed by: HOSPITALIST

## 2022-07-12 PROCEDURE — 97535 SELF CARE MNGMENT TRAINING: CPT

## 2022-07-12 PROCEDURE — 11000004 HC SNF PRIVATE

## 2022-07-12 PROCEDURE — 97116 GAIT TRAINING THERAPY: CPT

## 2022-07-12 PROCEDURE — 92610 EVALUATE SWALLOWING FUNCTION: CPT

## 2022-07-12 RX ADMIN — CLOPIDOGREL 75 MG: 75 TABLET, FILM COATED ORAL at 09:07

## 2022-07-12 RX ADMIN — LEVOTHYROXINE SODIUM 125 MCG: 25 TABLET ORAL at 05:07

## 2022-07-12 RX ADMIN — SENNOSIDES AND DOCUSATE SODIUM 1 TABLET: 50; 8.6 TABLET ORAL at 09:07

## 2022-07-12 RX ADMIN — ACETAMINOPHEN 650 MG: 325 TABLET ORAL at 04:07

## 2022-07-12 RX ADMIN — MEROPENEM AND SODIUM CHLORIDE 1 G: 1 INJECTION, SOLUTION INTRAVENOUS at 12:07

## 2022-07-12 RX ADMIN — TIMOLOL MALEATE 1 DROP: 5 SOLUTION OPHTHALMIC at 09:07

## 2022-07-12 RX ADMIN — ASPIRIN 81 MG CHEWABLE TABLET 81 MG: 81 TABLET CHEWABLE at 09:07

## 2022-07-12 RX ADMIN — MEGESTROL ACETATE 40 MG: 20 TABLET ORAL at 09:07

## 2022-07-12 RX ADMIN — BUMETANIDE 1 MG: 1 TABLET ORAL at 09:07

## 2022-07-12 RX ADMIN — ATORVASTATIN CALCIUM 20 MG: 20 TABLET, FILM COATED ORAL at 09:07

## 2022-07-12 NOTE — PT/OT/SLP EVAL
Occupational Therapy   Evaluation    Name: Pilar Michael  MRN: [id number]  Admit Date: 7/11/2022  Admitting Diagnosis:  UTI , Hematuria, Debility  Recent Surgeries: N/A    General Precautions: Standard, fall   Orthopedic Precautions:N/A   Braces: N/A     Recommendations:     Discharge Recommendations: home health OT  Level of Assistance Recommended: 24 hours light assistance  Discharge Equipment Recommendations:   (Pt has a rollator, RW, BSC,shower chair, grab bars and W/C)  Barriers to discharge:  Decreased caregiver support, Inaccessible home environment    Assessment:     Pilar Michael is a 89 y.o. female with a medical diagnosis of  UTI , Hematuria, Debility.  She remains limited in performance of self-care , functional mobility and ADLs and currently not performing tasks at [facility] . Currently presenting with performance deficits including weakness, impaired endurance, impaired self care skills, impaired functional mobilty, gait instability, impaired balance, decreased coordination, decreased upper extremity function, decreased lower extremity function, decreased safety awareness, pain, decreased ROM, impaired cardiopulmonary response to activity, edema.    Pt tolerated Tx without incident but requires assist to perform self care tasks, functional mobility and functional transfers .  She would continue to benefit from OT intervention to further her functional (I)ce and safety.    Rehab Potential is good    Activity Tolerance: Good    Plan:     Patient to be seen 6 x/week to address the above listed problems via self-care/home management, therapeutic activities, therapeutic exercises  · Plan of Care Expires: 08/12/22  · Plan of Care Reviewed with: patient, daughter    Subjective     Chief Complaint: Pt reporting that her rectum hurts.  Patient/Family Comments/goals:  `Pt wants to return to [facility]      Occupational Profile:  Living Environment: with dghtr in [facility], 3-4 steps to enter, B rails that are able to be  reached simultaneously, t/s combo with grab bars   Previous level of function: family present reports that pt has required increased assist for all ADLs, sitting and standing, functional mobility.   Equipment Used at Home:  wheelchair, shower chair, bedside commode, walker, rolling, rollator  Assistance upon Discharge: from family, however, dghtr is working from home and Niece available is limited in physical abilities to assist .    Pain/Comfort:  · Pain Rating 1: 4/10  · Location - Orientation 1: generalized  · Location 1:  (Rectum)  · Pain Addressed 1: Pre-medicate for activity, Reposition, Distraction  · Pain Rating Post-Intervention 1: 4/10    Patients cultural, spiritual, Confucianism conflicts given the current situation: no    Objective:     Communicated with: nurse prior to session.  Patient found supine with PureWick, oxygen, PICC line upon OT entry to room.    Occupational Performance:    Eating   Assistance Needed Set-up / clean-up   Physical Assistance Level No physical assistance   CARE Score - Eating 5   Oral Hygiene   Assistance Needed Set-up / clean-up   Physical Assistance Level No physical assistance  (grooming performed seated sinkside.)   CARE Score - Oral Hygiene 5   Toileting Hygiene   Assistance Needed Physical assistance   Physical Assistance Level 76% or more  ((A) to clean rear, to steady when standing with RW  and to pull pants and brief up in back.)   CARE Score - Toileting Hygiene 2   Shower/Bathe Self   Assistance Needed Physical assistance   Physical Assistance Level 51%-75%  (with Pt sponge bathing seateed sinkside.)   CARE Score - Shower/Bathe Self 2   Upper Body Dressing   Assistance Needed Incidental touching   Physical Assistance Level 26%-50%  (with (A) to pull shirt over head and down in back.)   CARE Score - Upper Body Dressing 3   Lower Body Dressing   Assistance Needed Physical assistance   Physical Assistance Level 76% or more  (donning pants and brief seated EOB.)   CARE  Score - Lower Body Dressing 2   Putting On/Taking Off Footwear   Assistance Needed Physical assistance   Physical Assistance Level 76% or more  (donning / doffing socks and slip on shoes.)   CARE Score - Putting On/Taking Off Footwear 2   Roll Left and Right   Assistance Needed Verbal cues   Physical Assistance Level   (SBA using bed rails as assist with HOB flat.)   CARE Score - Roll Left and Right 4   Sit to Lying   Assistance Needed Physical assistance   Physical Assistance Level 26%-50%  (with HOB flat)   CARE Score - Sit to Lying 3   Lying to Sitting on Side of Bed   Assistance Needed Physical assistance   Physical Assistance Level 26%-50%   CARE Score - Lying to Sitting on Side of Bed 3   Sit to Stand   Assistance Needed Incidental touching   Physical Assistance Level 26%-50%  (to transition from sitting to standing with RW.)   CARE Score - Sit to Stand 3   Chair/Bed-to-Chair Transfer   Assistance Needed Physical assistance   Physical Assistance Level 26%-50%  (to transition from sitting to standing with RW.)   CARE Score - Chair/Bed-to-Chair Transfer 3   Toilet Transfer   Assistance Needed Physical assistance   Physical Assistance Level 26%-50%  (to transition from sitting to standing with RW.)   CARE Score - Toilet Transfer 3       Cognitive/Visual Perceptual:  Cognitive/Psychosocial Skills:     -       Oriented to: Person, Place and Situation   -       Follows Commands/attention:Follows two-step commands  -       Communication: clear/fluent  -       Memory: No Deficits noted  -       Safety awareness/insight to disability: intact   -       Mood/Affect/Coping skills/emotional control: Appropriate to situation  Visual/Perceptual:      -Intact .    Physical Exam:  Balance: -  Pt demonstrated Fair functional sitting balance as noted when performing self care tasks.  Poor Plus to Fair Minus   functional standing  with RW and  Min to CGA required for safety.     Postural examination/scapula alignment:    -        Rounded shoulders  -       Posterior pelvic tilt  Skin integrity: Visible skin intact  Edema:  Severe (L) UE secondary to Lymphedema    Sensation:    -       Intact  Motor Planning: -       WFLs  Dominant hand: -       Right  Upper Extremity Range of Motion:     -       Right Upper Extremity: WFL except Shld flexion and Abduction with both 0-90 degs AROM and 0-110 A/AROM  All other aspects WFLs.  -       Left Upper Extremity: WFL except Shld flexion and Abduction  with both 0-60 degs AROM and 0-80  A/AROM  with Pain reporterd at end range.   Upper Extremity Strength:    -       Right Upper Extremity: WFL except Shld Flexion and Abduction with both grossly 3-/5 all orther aspects grosslt 4-/5    -       Left Upper Extremity: WFL except Not tested secondary to severe edema.   Strength:    -       Right Upper Extremity: WFL  -       Left Upper Extremity: WFL  Fine Motor Coordination:    -       Intact    AMPAC 6 Click ADL:  AMPAC Total Score: 14    Treatment & Education:  Pt edu on role of OT, POC, safety when performing self care tasks , benefit of performing OOB activity, and safety when performing functional transfers and mobility.  - White board updated  - Self care tasks completed-- as noted above     Education:    Patient left up in chair with all lines intact and call button in reach    GOALS:   Multidisciplinary Problems     Occupational Therapy Goals        Problem: Occupational Therapy    Goal Priority Disciplines Outcome Interventions   Occupational Therapy Goal     OT, PT/OT Ongoing, Progressing    Description: Goals to be met in: 30 days     Patient will increase functional independence with ADLs by performing:    Feeding with Modified [place].  UE Dressing with Set-up Assistance.  LE Dressing with Minimal Assistance.  Grooming while seated at sink with Modified [place].  Toileting from toilet or BSC with Minimal Assistance for hygiene and clothing management.   Bathing from  sitting at sink  with Minimal Assistance.  Rolling to Bilateral with Modified [place].   Supine to sit with Stand-by Assistance.  Step transfer with Contact Guard Assistance with RW as needed.  Upper extremity with assistance as needed.  Caregiver will be educated on level of assist required to safely perform self care tasks and functional transfers..                      History:     Past Medical History:   Diagnosis Date    Arthritis     Cancer     CHF (congestive heart failure)     Coronary artery disease     Hypertension     Thyroid disease        Past Surgical History:   Procedure Laterality Date    CARDIAC CATHETERIZATION      CARDIAC VALVE SURGERY      CHOLECYSTECTOMY      CORONARY ANGIOGRAPHY N/A 4/23/2019    Procedure: ANGIOGRAM, CORONARY ARTERY;  Surgeon: Bakari Singletary MD;  Location: [facility] CATH LAB;  Service: Cardiology;  Laterality: N/A;    HYSTERECTOMY      lymph nodes removal      THYROIDECTOMY      TRANSCATHETER AORTIC VALVE REPLACEMENT (TAVR) N/A 5/7/2019    Procedure: REPLACEMENT, AORTIC VALVE, TRANSCATHETER (TAVR);  Surgeon: Bakari Singletary MD;  Location: [facility] CATH LAB;  Service: Cardiology;  Laterality: N/A;       Time Tracking:     OT Date of Treatment: 07/12/22  OT Start Time: 0745  OT Stop Time: 0845  OT Total Time (min): 60 min    Billable Minutes:Evaluation 20  Self Care/Home Management 40    7/12/2022

## 2022-07-12 NOTE — PLAN OF CARE
Problem: Occupational Therapy  Goal: Occupational Therapy Goal  Description: Goals to be met in: 30 days     Patient will increase functional independence with ADLs by performing:    Feeding with Modified [place].  UE Dressing with Set-up Assistance.  LE Dressing with Minimal Assistance.  Grooming while seated at sink with Modified [place].  Toileting from toilet or BSC with Minimal Assistance for hygiene and clothing management.   Bathing from  sitting at sink with Minimal Assistance.  Rolling to Bilateral with Modified [place].   Supine to sit with Stand-by Assistance.  Step transfer with Contact Guard Assistance with RW as needed.  Upper extremity with assistance as needed.  Caregiver will be educated on level of assist required to safely perform self care tasks and functional transfers..     Outcome: Ongoing, Progressing

## 2022-07-12 NOTE — PT/OT/SLP EVAL
Physical Therapy Evaluation    Patient Name:  Pilar Michael   MRN:  [id number]  Admit Date: 7/11/2022  Admitting Diagnosis: UTI , Hematuria, Debility  Recent Surgeries: N/A    General Precautions: Standard, fall   Orthopedic Precautions:N/A   Braces: N/A     Recommendations:     Discharge Recommendations:  home health PT   Level of Assistance Recommended: 24 hours light assistance  Discharge Equipment Recommendations: bedside commode, grab bar, rollator, shower chair, wheelchair   Barriers to discharge: Decreased caregiver support, Inaccessible home environment    Assessment:     Pilar Michael is a 89 y.o. female admitted with a medical diagnosis of UTI, hematuria, and debility. Performance deficits affecting function weakness, impaired endurance, impaired self care skills, impaired functional mobilty, gait instability, impaired balance, impaired cognition, decreased upper extremity function, decreased lower extremity function, decreased ROM, impaired skin, edema, impaired cardiopulmonary response to activity. Patient requires SBA with rolling and wc mobility, CGA with STS, bed<>chair, and gait, and ModA with supine<>sit. She requires skilled physical therapy services to address deficits and return patient to PLOF with no limitations.    Rehab Potential is good     Activity Tolerance: Good    Plan:     Patient to be seen 6 x/week to address the above listed problems via gait training, therapeutic activities, therapeutic exercises, neuromuscular re-education, wheelchair management/training     Plan of Care Expires: 08/11/22  Plan of Care Reviewed with: patient, daughter, son    Subjective     Chief Complaint: I am very tired and weak  Patient/Family Comments/goals: Return to PLOF  Pain/Comfort:  Pain Rating 1: 0/10  Pain Rating Post-Intervention 1: 0/10    Living Environment:   Pt lives with her daughter in a 1  with 4 STEPHEN in front with BHR and 1 step at the back of house with no HR. She has a tub shower combo  with grab bars.     Prior to admission, patients level of function was needing increased assist for all ADL's, transfers, and functional mobility per pt's niece. Equipment used at home: rollator, shower chair, bedside commode, wheelchair, grab bar.  DME owned (not currently used): none.  Upon discharge, patient will have assistance from daughter who works from home and niece but reports limited physical ability to assist pt.    Patients cultural, spiritual, Hinduism conflicts given the current situation: no    Objective:     Communicated with nursing staff prior to session. Patient found up in chair with PureWick, oxygen (2L via nasal canula)  upon PT entry to room.    Exams:  · Cognitive Exam: Patient is oriented to Person, Place, Situation and and disoriented to time.  · Gross Motor Coordination: Patient performed heel to shin test indicating intact coordination.  · Postural Exam: Patient presented with the following abnormalities:     -       Forward head   -       Abnormal trunk flexion   -       Kyphosis  · Sensation:     -       Intact  · Skin Integrity/Edema:      -       Skin integrity: Visible skin intact and Dry   -       Edema: L UE lymphedema and pitting edema B LE  · AROM/Strength:              -      RLE ROM: WNL              -      RLE Strength: HF 3+/5 > KE 3+/5 > KF 3/5 > DF 4-/5              -      LLE ROM: WNL              -      LLE Strength: HF 3+/5 > KE 3+/5 > KF 3/5 > DF 4-/5   · 2L oxygen via nasal canula     Functional Mobility:   07/12/22 0918   Prior Functioning: Everyday Activities   Self Care Needed some help   Indoor Mobility (Ambulation) Needed some help   Stairs Needed some help   Functional Cognition Independent   Prior Device Use Walker   Roll Left and Right   Assistance Needed Verbal cues   Physical Assistance Level   (SBA using bed rails as assist with HOB flat)   Comment Per OT eval   CARE Score - Roll Left and Right 4   Roll Left and Right Discharge Goal   Discharge Goal 6    Sit to Lying   Assistance Needed Physical assistance   Physical Assistance Level 26%-50%  (With HOB flat)   Comment Per OT eval   CARE Score - Sit to Lying 3   Lying to Sitting on Side of Bed   Assistance Needed Physical assistance   Physical Assistance Level 26%-50%   Comment Per OT eval   CARE Score - Lying to Sitting on Side of Bed 3   Sit to Stand   Assistance Needed Incidental touching   Comment CGA with RW   CARE Score - Sit to Stand 4   Chair/Bed-to-Chair Transfer   Assistance Needed Incidental touching   Comment CGA with RW   CARE Score - Chair/Bed-to-Chair Transfer 4   Car Transfer   Reason if not Attempted Environmental limitations   CARE Score - Car Transfer 10   Walk 10 Feet   Comment 2 trials to complete 10 feet with RW and CGA   Reason if not Attempted Safety concerns   CARE Score - Walk 10 Feet 88   Walk 50 Feet with Two Turns   Reason if not Attempted Safety concerns   CARE Score - Walk 50 Feet with Two Turns 88   Walk 150 Feet   Reason if not Attempted Safety concerns   CARE Score - Walk 150 Feet 88   Walking 10 Feet on Uneven Surfaces   Reason if not Attempted Safety concerns   CARE Score - Walking 10 Feet on Uneven Surfaces 88   1 Step (Curb)   Reason if not Attempted Safety concerns   CARE Score - 1 Step (Curb) 88   4 Steps   Reason if not Attempted Safety concerns   CARE Score - 4 Steps 88   12 Steps   Reason if not Attempted Safety concerns   CARE Score - 12 Steps 88   Picking Up Object   Reason if not Attempted Safety concerns   CARE Score - Picking Up Object 88   Uses a Wheelchair/Scooter?   Uses a Wheelchair/Scooter? Yes   Wheel 50 Feet with Two Turns   Comment x10 feet with BUEs and SBA. She required verbal and manual cues for hand placement on wc rim and propelling forward   Reason if not Attempted Safety concerns   CARE Score - Wheel 50 Feet with Two Turns 88   Type of Wheelchair/Scooter Manual   Wheel 150 Feet   Reason if not Attempted Safety concerns   CARE Score - Wheel 150 Feet 88    Type of Wheelchair/Scooter Manual     Transfers:     · Sit to Stand x multiple trials: contact guard assistance with rolling walker  · Bed to Chair: contact guard assistance with rolling walker using Stand Pivot    Gait: Patient ambulated 2 trials to complete 10 feet with RW and CGA. She demonstrated decreased annie, increased time in double stance, decreased step length and step to gait pattern.    Wheelchair Propulsion: Pt propelled Standard wheelchair x 10 feet on Level tile with Bilateral upper extremity with Stand-by Assistance. She required verbal and manual cues for hand placement on wc rim and propelling forward    Therapeutic Activities and Exercises:   Lower extremity bike: x10 minutes with slight resistance.She required 1 rest break during exercise due to BLE fatigue.    Education:   Patient provided with daily orientation and goals of this PT session.   Patient educated to transfer to bedside chair/bedside commode/bathroom with RN/PCT present.    Patient educated on energy conservation, Fall risk, home safety, plan of care, posture and transfer training by explanation.    Patient Verbalized Understanding.   Time provided for therapeutic counseling and discussion of current health disposition. All questions answered to satisfaction, within scope of PT practice; voiced no other concerns. White board updated in patient's room, RN notified of session.    AM-PAC 6 CLICK MOBILITY  Total Score:13     Patient left up in chair with call button in reach and son present.    GOALS:   Multidisciplinary Problems     Physical Therapy Goals        Problem: Physical Therapy    Goal Priority Disciplines Outcome Goal Variances Interventions   Physical Therapy Goal     PT, PT/OT Ongoing, Progressing     Description: Goals to be met in 30 days (2022):     Patient will increase functional independence with mobility by performin. Supine to sit with [place].  2. Sit to supine with [place].  3.  Rolling to Left and Right with [place].  4. Sit to stand transfer with Supervision.  5. Bed to chair transfer with Supervision using Rolling Walker.  6. Gait  x 50 feet with Supervision using Rolling Walker.   7. Wheelchair propulsion x 50 feet with Supervision using bilateral upper extremities.  8. Ascend/descend 4 stairs with bilateral Handrails Moderate Assistance using No Assistive Device.   9. Ascend/Descend 4 inch curb step with Moderate Assistance using Rolling Walker.  10. Lower extremity exercise program x 20 reps per handout, with supervision.  11. Retrieve object from ground with reacher, RW, and ModA.                     History:     Past Medical History:   Diagnosis Date    Arthritis     Cancer     CHF (congestive heart failure)     Coronary artery disease     Hypertension     Thyroid disease        Past Surgical History:   Procedure Laterality Date    CARDIAC CATHETERIZATION      CARDIAC VALVE SURGERY      CHOLECYSTECTOMY      CORONARY ANGIOGRAPHY N/A 4/23/2019    Procedure: ANGIOGRAM, CORONARY ARTERY;  Surgeon: Bakari Singletary MD;  Location: [facility] CATH LAB;  Service: Cardiology;  Laterality: N/A;    HYSTERECTOMY      lymph nodes removal      THYROIDECTOMY      TRANSCATHETER AORTIC VALVE REPLACEMENT (TAVR) N/A 5/7/2019    Procedure: REPLACEMENT, AORTIC VALVE, TRANSCATHETER (TAVR);  Surgeon: Bakari Singletary MD;  Location: [facility] CATH LAB;  Service: Cardiology;  Laterality: N/A;       Time Tracking:     PT Received On: 07/12/22  PT Start Time: 0845     PT Stop Time: 0932  PT Total Time (min): 47 min     Billable Minutes: Evaluation 20, Gait Training 15 and Therapeutic Exercise 12      07/12/2022

## 2022-07-12 NOTE — NURSING
Admitted to room 330,oriented to room ,call bell system ,TV ,and surroundings.Verbalized understanding; daughter at bedside.Will continue to monitor.

## 2022-07-12 NOTE — PT/OT/SLP EVAL
"Speech Language Pathology  Bedside Swallow Evaluation/Discharge    Pilar Michael   MRN: [id number]   Admitting Diagnosis: <principal problem not specified>    Diet recommendations: Solid Diet Level: Regular  Liquid Diet Level: Thin 1 bite/sip at a time, Alternating bites/sips, Avoid talking while eating, Feed only when awake/alert, Frequent oral care, HOB to 90 degrees, Monitor for s/s of aspiration, Small bites/sips and Standard aspiration precautions    SLP Treatment Date: 07/12/22  Speech Start Time: 1554     Speech Stop Time: 1610     Speech Total (min): 16 min       TREATMENT BILLABLE MINUTES:  Eval Swallow and Oral Function 8 and Self Care/Home Management Training 8    Diagnosis: <principal problem not specified>      Past Medical History:   Diagnosis Date    Arthritis     Cancer     CHF (congestive heart failure)     Coronary artery disease     Hypertension     Thyroid disease      Past Surgical History:   Procedure Laterality Date    CARDIAC CATHETERIZATION      CARDIAC VALVE SURGERY      CHOLECYSTECTOMY      CORONARY ANGIOGRAPHY N/A 4/23/2019    Procedure: ANGIOGRAM, CORONARY ARTERY;  Surgeon: Bakari Singletary MD;  Location: [facility] CATH LAB;  Service: Cardiology;  Laterality: N/A;    HYSTERECTOMY      lymph nodes removal      THYROIDECTOMY      TRANSCATHETER AORTIC VALVE REPLACEMENT (TAVR) N/A 5/7/2019    Procedure: REPLACEMENT, AORTIC VALVE, TRANSCATHETER (TAVR);  Surgeon: Bakari Singletary MD;  Location: [facility] CATH LAB;  Service: Cardiology;  Laterality: N/A;       Has the patient been evaluated by SLP for swallowing? : Yes  Keep patient NPO?: No General Precautions: Standard, aspiration, fall          Prior diet: currently receiving dental soft diet/thin liquids    Subjective:  "I just got back in bed." pt tired from therapy, but agreeable to participate in bedside swallow study.          Objective:        Oral Musculature Evaluation  Oral Musculature: WFL  Dentition: present and " adequate  Secretion Management: adequate  Mucosal Quality: adequate  Mandibular Strength and Mobility: WFL  Oral Labial Strength and Mobility: WFL  Lingual Strength and Mobility: WFL  Velar Elevation: WFL  Buccal Strength and Mobility: WFL  Volitional Cough: adequate  Volitional Swallow: elicited  Voice Prior to PO Intake: dry, clear     Bedside Swallow Eval:  Consistencies Assessed: Thin liquids cup sip x 1, straw sips x 4  Puree pudding 1/2 tsp x 1, full tsp x 1  Solids 1/8 cracker x 1  Oral Phase: WFL  Pharyngeal Phase: no overt clinical signs/symptoms of aspiration  no overt clinical signs/symptoms of pharyngeal dysphagia    Additional Treatment:  Education provided to pt and son regarding role of SLP, purpose of swallowing assessment, decreased appetite and dislike of foods offered contributing to decreased PO intake, mechanical soft vs dental soft vs regular consistency diet, recommendations to advance to regular consistency diet, aspiration precautions, and recommendations for consultation with dietician.  Pt and son expressed understanding and agreement.  No further skilled SLP services warranted at this time.     Assessment:  Pilar Michael is a 89 y.o. female with a medical diagnosis of <principal problem not specified> and presents with swallowing abilities functional for regular consistencies and thin liquids.           Discharge recommendations: Discharge Facility/Level of Care Needs:  (no SLP f/u)     Goals:   Multidisciplinary Problems     SLP Goals     Not on file                 Plan:   Plan of Care reviewed with: patient, son  SLP Follow-up?: No                07/12/2022

## 2022-07-12 NOTE — PLAN OF CARE
SW met with patient and son, Jack, in room for Discharge Planning Assessment. Pt lives with daughter, Katiana, who works from home and states s/he was previously using a Walker/rollator for ambulation. Patient will have transportation assistance at discharge from daughter who will help patient after d/c.  SW is following this Pt for DC planning needs. There are no identified needs at this time.     Patient's preferred pharmacy is [facility]  Case Management Department  [facility]  [email or web address]  Initial Discharge Assessment       Primary Care Provider: Joseph Noel MD    Admission Diagnosis: Urinary tract infection without hematuria [N39.0]    Admission Date: 7/11/2022  Expected Discharge Date:     Discharge Barriers Identified: None    Payor: [facility] MCARE / Plan: [employer] MEDICARE ADVANTAGE / Product Type: Medicare Advantage /     Extended Emergency Contact Information  Primary Emergency Contact: Katiana Nickerson  Address: [address] [place] [address] [place] [address]  Mobile Phone: [phone]  Relation: Daughter  Preferred language: English  Secondary Emergency Contact: Jack Michael           [place], [place]  Mobile Phone: [phone]  Relation: Son    Discharge Plan A: Home with family, Home Health  Discharge Plan B: Home with family, Home Health      [id number] [place] - [address] [facility]  [address] [address]  Phone: [phone] Fax: [phone]      Initial Assessment (most recent)     Adult Discharge Assessment - 07/12/22 1603        Discharge Assessment    Assessment Type Discharge Planning Assessment     Confirmed/corrected address, phone number and insurance Yes     Confirmed Demographics Correct on Facesheet     Source of Information family;health record     Communicated CHARLIE with patient/caregiver Date not  available/Unable to determine     Lives With child(alejandro), adult     Do you expect to return to your current living situation? Yes     Do you have help at home or someone to help you manage your care at home? Yes     Who are your caregiver(s) and their phone number(s)? Kristie Saab [phone]     Prior to hospitilization cognitive status: Alert/Oriented     Current cognitive status: Coma/Sedated/Intubated     Equipment Currently Used at Home walker, rolling;shower chair;rollator;bedside commode;grab bar;raised toilet     Readmission within 30 days? No     Patient currently being followed by outpatient case management? No     Do you currently have service(s) that help you manage your care at home? No     Do you have prescription coverage? Yes     Is the patient taking medications as prescribed? yes     Who is going to help you get home at discharge? Kristie Saab [phone]     How do you get to doctors appointments? family or friend will provide     Are you on dialysis? No     Do you take coumadin? No     Discharge Plan A Home with family;Home Health     Discharge Plan B Home with family;Home Health     DME Needed Upon Discharge  other (see comments)   TBD    Discharge Plan discussed with: Patient;Adult children     Discharge Barriers Identified None        Relationship/Environment    Name(s) of Who Lives With Patient Kristie Saab [phone]

## 2022-07-12 NOTE — PLAN OF CARE
Problem: Adult Inpatient Plan of Care  Goal: Plan of Care Review  Outcome: Ongoing, Progressing  Goal: Patient-Specific Goal (Individualized)  Outcome: Ongoing, Progressing  Goal: Absence of Hospital-Acquired Illness or Injury  Outcome: Ongoing, Progressing  Goal: Optimal Comfort and Wellbeing  Outcome: Ongoing, Progressing     Problem: Fluid Imbalance (Pneumonia)  Goal: Fluid Balance  Outcome: Ongoing, Progressing     Problem: Infection (Pneumonia)  Goal: Resolution of Infection Signs and Symptoms  Outcome: Ongoing, Progressing     Problem: Respiratory Compromise (Pneumonia)  Goal: Effective Oxygenation and Ventilation  Outcome: Ongoing, Progressing     Problem: Infection  Goal: Absence of Infection Signs and Symptoms  Outcome: Ongoing, Progressing     Problem: Impaired Wound Healing  Goal: Optimal Wound Healing  Outcome: Ongoing, Progressing     Problem: Skin Injury Risk Increased  Goal: Skin Health and Integrity  Outcome: Ongoing, Progressing

## 2022-07-12 NOTE — PLAN OF CARE
Problem: Adult Inpatient Plan of Care  Goal: Plan of Care Review  Outcome: Ongoing, Progressing  Goal: Patient-Specific Goal (Individualized)  Outcome: Ongoing, Progressing  Goal: Absence of Hospital-Acquired Illness or Injury  Outcome: Ongoing, Progressing  Goal: Optimal Comfort and Wellbeing  Outcome: Ongoing, Progressing  Goal: Readiness for Transition of Care  Outcome: Ongoing, Progressing     Problem: Fluid Imbalance (Pneumonia)  Goal: Fluid Balance  Outcome: Ongoing, Progressing     Problem: Infection (Pneumonia)  Goal: Resolution of Infection Signs and Symptoms  Outcome: Ongoing, Progressing     Problem: Respiratory Compromise (Pneumonia)  Goal: Effective Oxygenation and Ventilation  Outcome: Ongoing, Progressing     Problem: Infection  Goal: Absence of Infection Signs and Symptoms  Outcome: Ongoing, Progressing     Problem: Impaired Wound Healing  Goal: Optimal Wound Healing  Outcome: Ongoing, Progressing     Problem: Skin Injury Risk Increased  Goal: Skin Health and Integrity  Outcome: Ongoing, Progressing     Problem: Fall Injury Risk  Goal: Absence of Fall and Fall-Related Injury  Outcome: Ongoing, Progressing

## 2022-07-12 NOTE — PLAN OF CARE
[facility] - Telemetry  Discharge Final Note    Primary Care Provider: Joseph Noel MD    Expected Discharge Date: 7/11/2022    Final Discharge Note (most recent)       Final Note - 07/12/22 0723          Final Note    Assessment Type Final Discharge Note (P)      Anticipated Discharge Disposition Skilled Nursing Facility (P)    [facility]       Post-Acute Status    Post-Acute Authorization Placement (P)      Post-Acute Placement Status Set-up Complete/Auth obtained (P)      Discharge Delays PFC Arranged Transportation (P)                      Important Message from Medicare

## 2022-07-12 NOTE — NURSING
1947 pt rolled out via transport agent. Appeared to be in no distress.  [facility]  Midline remained in place.

## 2022-07-12 NOTE — PROGRESS NOTES
"                                                        [facility]                                  Skilled Nursing Facility                   Progress Note     Admit Date: 7/11/2022  CHARLIE   Principal Problem:  <principal problem not specified>   HPI obtained from patient interview and chart review     Chief Complaint: Establish Care/ Initial Visit     HPI:   Pilar Michael is a 89 y.o. female with has a past medical history of Arthritis, Cancer, CHF (congestive heart failure), Coronary artery disease, Hypertension, and Thyroid disease,TAVR in 2019 and followed by pacemaker implant with dislodgement of atrial lead noted and ventricular paced rhythm. The patient presented to [facility] [place] 7/3/2022 with a primary complaint of SOB. SpO2 71% upon arrival that improved to to 92% with NC with patient remaining tachypneic. She endorsed subjective nightly fevers of around 101F for the past few days prior to admit and a congested cough. Patient treated for multiple UTI's over the last year with the most recent requiring a course of ciprofloxacin about 2-3 weeks prior to hospitalization.     UA positive for nitrites and WBC's. Blood cx x 2 and U cx finalized negative. Prior urine culture shows Pseudomonas only sensitive to imipenem, tobramycin, gentamicin.  Was inadequately treated with ciprofloxacin last month. 10 day course Meropenem was started 7/4/22 for UTI.     Vascular congestion on initial CXR. Elevated BNP of 1347, elevated troponin 0.156, CPK 11. Lasix started. Once admitted, patient was continued on mild diuresis. Cardiology noted "troponin elevation is not of concern and most likely demand; CXR with more infiltrate on the left and a moderate left pleural effusion and agree consider pneumonia along with mild failure". TTE on 7/3/22 EF 65%, grade II left ventricular diastolic dysfunction, mild mitral tricuspid and pulmonic regurgitation, aortic bioprosthesis present, normal. No aortic " insufficiency.    Patient will be treated at [facility] for outpatient antibiotics and PT and OT to improve functional status and ability to perform ADLs.     Past Medical History: Patient has a past medical history of Arthritis, Cancer, CHF (congestive heart failure), Coronary artery disease, Hypertension, and Thyroid disease.    Past Surgical History: Patient has a past surgical history that includes Hysterectomy; Thyroidectomy; lymph nodes removal; Cholecystectomy; Coronary angiography (N/A, 4/23/2019); Transcatheter aortic valve replacement (TAVR) (N/A, 5/7/2019); Cardiac valuve replacement; and Cardiac catheterization.    Social History: Patient reports that she has never smoked. She has never used smokeless tobacco. She reports previous alcohol use. She reports that she does not use drugs.    Family History: No pertinent family history.    Allergies: Patient is allergic to penicillins and sulfa (sulfonamide antibiotics).    ROS  Constitutional: Negative for fever   Eyes: Negative for blurred vision, double vision   Respiratory: Negative for cough, shortness of breath   Cardiovascular: Negative for chest pain, palpitations, positive for leg swelling.   Gastrointestinal: Negative for abdominal pain, constipation, diarrhea, nausea, vomiting.   Genitourinary: Negative for dysuria, frequency, positive for bladder incontinence  Musculoskeletal:  + generalized weakness. Negative for back pain and myalgias.   Skin: Negative for itching and rash.   Neurological: Negative for dizziness, headaches.   Psychiatric/Behavioral: Negative for depression. The patient is not nervous/anxious.      24 hour Vital Sign Range   Temp:  [97.7 °F (36.5 °C)-98.9 °F (37.2 °C)]   Pulse:  [58-60]   Resp:  [16-18]   BP: (107-160)/(46-72)   SpO2:  [96 %-99 %]     PEx  Constitutional: Patient appears debilitated.  No distress noted. She is obese. She is not ill-appearing.   HENT:   Head: Normocephalic and atraumatic.   Eyes: Pupils are equal,  round  Neck: Normal range of motion. Neck supple.   Cardiovascular: Normal rate, regular rhythm. Murmur (III/VI systolic ejection murmur with radiation to neck). Swelling (lymphedea LUE) present. Normal range of motion.   BLE Edema (1+) present.   Pulmonary/Chest: Effort normal and breath sounds are clear upper lobes bilaterally, Rales present left lower lobe.  Abdominal: Soft. Bowel sounds are normal.   Musculoskeletal: Normal range of motion.   Neurological: Alert and oriented to person, place, and time.   Psychiatric: Normal mood and affect. Behavior is normal.   Skin: Skin is warm and dry. Wounds to bilateral groin and perineum and blisters to buttox     Admission Labs Pending as of 7/12/22 at 10:35 AM. 7/9 CBC Diff and 7/11 CMP results all reviewed. No acute abnormalities.     Assessment and Plan:    Acute hypoxemic respiratory failure due to Acute on Chronic HFpEF  PMHx HFpEF, valvular heart disease p/w dyspnea and increasing oxygen requirement with evidence of volume overload on physical exam in the setting of an elevated BNP(1347). Echo in 3/2022 demonstrated EF 55-60% with mitral stenosis. Repeat TTE EF 65% with grade II LVD dysfunction with MR related to MV degenerative disease.  -Continue Bumex 1mg daily   -Strict intake & output with fluid restriction to <2L daily     Recurrent Urinary Tract Infection  Patient with recurrent UTIs, prior urine culture shows Pseudomonas only sensitive to imipenem, tobramycin, gentamicin.  Was inadequately treated with ciprofloxacin last month.  -Meropenem initiated on 07/04 for 10 day course to end on 7/14/22    Wounds to Groin, Perineum, and buttox 2/2 urinary incontinence  -maintain pur whick as able to keep area dry  -7/12: Initiated zinc oxide cream prn and wound care consult     Dysphasia, history of  -soft dysphasia diet in hospital  -7/12 initiated soft texture diet and SLP consult    Severe aortic stenosis s/p TAVR  PMHx severe AS a TAVR in 2019     Symptomatic  bradycardia s/p pacemaker.  Pacemaker placed in 2019 for complete heart block and symptomatic bradycardia, currently ventricularly paced at 60 beats per minute     Hypertension  Continue lisinopril 20 mg PO daily     Hyperlipidemia  Lipid panel with LDL at goal in 4/2022 on lipitor 20 mg PO daily     Hypothyroidism  Synthroid 125 mcg PO Daily    Diet Dysphagia Soft (IDDSI Level 6) [facility]    Physical Activity As tolerated    Anticipate disposition:  Home with home health      Follow-up needed during SNF stay- Infectious Disease (consulted)    Follow-up needed after discharge from SNF: PCP, Cardiology    No future appointments.      I certify that SNF services are required to be given on an inpatient basis because Pilar Michael needs for skilled nursing care and/or skilled rehabilitation are required on a daily basis and such services can only practically be provided in a skilled nursing facility setting and are for an ongoing condition for which she received inpatient care in the hospital.     Total time of the visit 115 minutes.   Non physical exam/ non charting time: 61 minutes   Description of non physical exam/non charting time: counseling patient on clinical conditions and therapies provided regarding UTI, wounds, heart failure, edema, diet.  Extensive chart review completed including all consultation notes.  All pertinent laboratory and radiographical images reviewed.        Benita Monge NP  Department of Hospital Medicine   [facility]     DOS: 7/12/2022       Patient note was created using MModal Dictation.  Any errors in syntax or even information may not have been identified and edited on initial review prior to signing this note.

## 2022-07-12 NOTE — PLAN OF CARE
Problem: Physical Therapy  Goal: Physical Therapy Goal  Description: Goals to be met in 30 days (2022):     Patient will increase functional independence with mobility by performin. Supine to sit with Winston.  2. Sit to supine with Winston.  3. Rolling to Left and Right with Winston.  4. Sit to stand transfer with Supervision.  5. Bed to chair transfer with Supervision using Rolling Walker.  6. Gait  x 50 feet with Supervision using Rolling Walker.   7. Wheelchair propulsion x 50 feet with Supervision using bilateral upper extremities.  8. Ascend/descend 4 stairs with bilateral Handrails Moderate Assistance using No Assistive Device.   9. Ascend/Descend 4 inch curb step with Moderate Assistance using Rolling Walker.  10. Lower extremity exercise program x 20 reps per handout, with supervision.  11. Retrieve object from ground with CHARIS singh, and Aly.    Outcome: Ongoing, Progressing

## 2022-07-13 PROCEDURE — 94761 N-INVAS EAR/PLS OXIMETRY MLT: CPT

## 2022-07-13 PROCEDURE — 99900035 HC TECH TIME PER 15 MIN (STAT)

## 2022-07-13 PROCEDURE — 97116 GAIT TRAINING THERAPY: CPT | Mod: CQ

## 2022-07-13 PROCEDURE — 25000003 PHARM REV CODE 250: Performed by: FAMILY MEDICINE

## 2022-07-13 PROCEDURE — 25000003 PHARM REV CODE 250: Performed by: HOSPITALIST

## 2022-07-13 PROCEDURE — 63600175 PHARM REV CODE 636 W HCPCS: Performed by: HOSPITALIST

## 2022-07-13 PROCEDURE — 27000221 HC OXYGEN, UP TO 24 HOURS

## 2022-07-13 PROCEDURE — 11000004 HC SNF PRIVATE

## 2022-07-13 PROCEDURE — 97110 THERAPEUTIC EXERCISES: CPT | Mod: CO

## 2022-07-13 PROCEDURE — 97542 WHEELCHAIR MNGMENT TRAINING: CPT | Mod: CQ

## 2022-07-13 PROCEDURE — 97530 THERAPEUTIC ACTIVITIES: CPT | Mod: CQ

## 2022-07-13 RX ORDER — POLYETHYLENE GLYCOL 3350 17 G/17G
17 POWDER, FOR SOLUTION ORAL 2 TIMES DAILY
Status: DISCONTINUED | OUTPATIENT
Start: 2022-07-13 | End: 2022-07-18 | Stop reason: HOSPADM

## 2022-07-13 RX ADMIN — AMLODIPINE BESYLATE 10 MG: 10 TABLET ORAL at 08:07

## 2022-07-13 RX ADMIN — MEROPENEM AND SODIUM CHLORIDE 1 G: 1 INJECTION, SOLUTION INTRAVENOUS at 12:07

## 2022-07-13 RX ADMIN — ASPIRIN 81 MG CHEWABLE TABLET 81 MG: 81 TABLET CHEWABLE at 08:07

## 2022-07-13 RX ADMIN — SENNOSIDES AND DOCUSATE SODIUM 1 TABLET: 50; 8.6 TABLET ORAL at 09:07

## 2022-07-13 RX ADMIN — CLOPIDOGREL 75 MG: 75 TABLET, FILM COATED ORAL at 08:07

## 2022-07-13 RX ADMIN — LISINOPRIL 20 MG: 20 TABLET ORAL at 08:07

## 2022-07-13 RX ADMIN — BUMETANIDE 1 MG: 1 TABLET ORAL at 08:07

## 2022-07-13 RX ADMIN — MEGESTROL ACETATE 40 MG: 20 TABLET ORAL at 08:07

## 2022-07-13 RX ADMIN — LEVOTHYROXINE SODIUM 125 MCG: 25 TABLET ORAL at 05:07

## 2022-07-13 RX ADMIN — ATORVASTATIN CALCIUM 20 MG: 20 TABLET, FILM COATED ORAL at 09:07

## 2022-07-13 RX ADMIN — Medication 6 MG: at 09:07

## 2022-07-13 RX ADMIN — POLYETHYLENE GLYCOL 3350 17 G: 17 POWDER, FOR SOLUTION ORAL at 09:07

## 2022-07-13 RX ADMIN — TIMOLOL MALEATE 1 DROP: 5 SOLUTION OPHTHALMIC at 08:07

## 2022-07-13 NOTE — CONSULTS
[facility] - Skilled Nursing  Adult Nutrition  Consult Note    SUMMARY     Recommendations    1. Continue cardiac diet.   2. Add Boost Glucose Control TID.    Goals: 1. Pt's intake meals >50% by RD follow up.  Nutrition Goal Status: new  Communication of RD Recs: reviewed with physician    Assessment and Plan  Acute on chronic combined systolic and diastolic CHF (congestive heart failure)  Contributing Nutrition Diagnosis  Inadequate oral intake     Related to (etiology):   CHF     Signs and Symptoms (as evidenced by):   Pt admitted with CHF, decreased appetite prior to hospital stay.       Interventions:  Collaboration with other providers  [employer]- Boost Glucose Control TID     Nutrition Diagnosis Status:   Improving    Malnutrition Assessment 7/12/22                 Orbital Region (Subcutaneous Fat Loss): well nourished  Upper Arm Region (Subcutaneous Fat Loss): well nourished (severe lymphedema noted to L arm)  Thoracic and Lumbar Region: well nourished   Sikh Region (Muscle Loss): well nourished  Clavicle Bone Region (Muscle Loss): well nourished  Clavicle and Acromion Bone Region (Muscle Loss): well nourished  Scapular Bone Region (Muscle Loss): well nourished  Dorsal Hand (Muscle Loss): well nourished  Patellar Region (Muscle Loss): well nourished  Anterior Thigh Region (Muscle Loss): well nourished  Posterior Calf Region (Muscle Loss): well nourished   Edema (Fluid Accumulation): 2-->mild (mild edema to ankles; severe lymphedema to L arm)             Reason for Assessment    Reason For Assessment: consult  Diagnosis:  (UTI)  Relevant Medical History: arthritis, cancer, CHF, CAD, HTN  General Information Comments: Pt with 50% intake meals over last week and ate 100% dinner last night. Pt is on appetite stimulant; per son, pt was eating poorly prior to and during her hospital stay, and she recently started megestrol. UBW around 195 lbs, wt appears stable. SLP recommends regular texture diet  "with thin liquids per swallow evaluation today, but pt prefers softer foods per son. Completed NFPE today: pt appears well nourished, severe lymphedema noted to L arm but it is chronic per pt.  Nutrition Discharge Planning: general healthy diet with nutrition supplements as needed    Nutrition Risk Screen    Nutrition Risk Screen: no indicators present    Nutrition/Diet History    Patient Reported Diet/Restrictions/Preferences: general  Typical Food/Fluid Intake: pt prefers softer meals; daughter makes her meals at home  Spiritual, Cultural Beliefs, Episcopal Practices, Values that Affect Care: no    Anthropometrics    Temp: 98.2 °F (36.8 °C)  Height: 5' 1" (154.9 cm)  Height (inches): 61 in  Weight Method: Bed Scale  Weight: 84.3 kg (185 lb 13.6 oz)  Weight (lb): 185.85 lb  Ideal Body Weight (IBW), Female: 105 lb  % Ideal Body Weight, Female (lb): 177 %  BMI (Calculated): 35.1       Lab/Procedures/Meds    Pertinent Labs Reviewed: reviewed  Pertinent Labs Comments: Alb 2.2, A1c 5.9% (4/6/22)  Pertinent Medications Reviewed: reviewed  Pertinent Medications Comments: megace, senna, colace    Estimated/Assessed Needs    Weight Used For Calorie Calculations: 84.3 kg (185 lb 13.6 oz)  Energy Calorie Requirements (kcal): 1320 kcal  Energy Need Method: [place]  Protein Requirements: 76-92g  Weight Used For Protein Calculations: 84.3 kg (185 lb 13.6 oz)        RDA Method (mL): 1320         Nutrition Prescription Ordered    Current Diet Order: cardiac healthy    Evaluation of Received Nutrient/Fluid Intake    Comments: last BM 7/08  % Intake of Estimated Energy Needs: 75 - 100 %  % Meal Intake: 50 - 75 %    Nutrition Risk    Level of Risk/Frequency of Follow-up: high       Monitor and Evaluation    Food and Nutrient Intake: food and beverage intake  Food and Nutrient Adminstration: diet order  Anthropometric Measurements: weight, weight change  Biochemical Data, Medical Tests and Procedures: electrolyte and renal " panel, gastrointestinal profile, inflammatory profile, glucose/endocrine profile, lipid profile  Nutrition-Focused Physical Findings: overall appearance, extremities, muscles and bones, head and eyes, skin       Nutrition Follow-Up    RD Follow-up?: Yes

## 2022-07-13 NOTE — PROGRESS NOTES
"                                                        [facility]                                  Skilled Nursing Facility                   Progress Note     Admit Date: 7/11/2022  CHARLIE TBD  Principal Problem: Acute hypoxemic respiratory failure due to Acute on Chronic HFpEF Recurrent Urinary Tract Infection   HPI obtained from patient interview and chart review     Chief Complaint: Re-evaluate and treat shortness of breath, edema, UTI, constipation, hypertension, wounds     HPI:   Pilar Michael is a 89 y.o. female with has a past medical history of Arthritis, Cancer, CHF (congestive heart failure), Coronary artery disease, Hypertension, and Thyroid disease,TAVR in 2019 and followed by pacemaker implant with dislodgement of atrial lead noted and ventricular paced rhythm. The patient presented to [facility] on 7/3/2022 with a primary complaint of SOB. SpO2 71% upon arrival that improved to to 92% with NC with patient remaining tachypneic. She endorsed subjective nightly fevers of around 101F for the past few days prior to admit and a congested cough. Patient treated for multiple UTI's over the last year with the most recent requiring a course of ciprofloxacin about 2-3 weeks prior to hospitalization.     UA positive for nitrites and WBC's. Blood cx x 2 and U cx finalized negative. Prior urine culture shows Pseudomonas only sensitive to imipenem, tobramycin, gentamicin.  Was inadequately treated with ciprofloxacin last month. 10 day course Meropenem was started 7/4/22 for UTI.     Vascular congestion on initial CXR. Elevated BNP of 1347, elevated troponin 0.156, CPK 11. Lasix started. Once admitted, patient was continued on mild diuresis. Cardiology noted "troponin elevation is not of concern and most likely demand; CXR with more infiltrate on the left and a moderate left pleural effusion and agree consider pneumonia along with mild failure". TTE on 7/3/22 EF 65%, grade II left ventricular " diastolic dysfunction, mild mitral tricuspid and pulmonic regurgitation, aortic bioprosthesis present, normal. No aortic insufficiency.    Patient will be treated at [facility] for outpatient antibiotics and PT and OT to improve functional status and ability to perform ADLs.        Interval History  Patient denies shortness of breath on 2L NC. Significant RUE lymphedema. BLE edema stable/improving. Continue with fluid restriction and 1mg PO bumex daily. Last bowel movement 7/8/22. Patient denies abdominal discomfort, nausea, or vomiting and reports adequate appetite. Obtain KUB and start BID miralax in addition to BID senna-docusate. Patient denies dysuria or frequency. Urinary incontinence managed by pur-wick while in bed to protect skin and promote wound healing. Urine dark, tea-colored. No new labs today. Patient progessing with PT/OT. Continuing to follow and treat all acute and chronic conditions.    Past Medical History: Patient has a past medical history of Arthritis, Cancer, CHF (congestive heart failure), Coronary artery disease, Hypertension, and Thyroid disease.    Past Surgical History: Patient has a past surgical history that includes Hysterectomy; Thyroidectomy; lymph nodes removal; Cholecystectomy; Coronary angiography (N/A, 4/23/2019); Transcatheter aortic valve replacement (TAVR) (N/A, 5/7/2019); Cardiac valuve replacement; and Cardiac catheterization.    Social History: Patient reports that she has never smoked. She has never used smokeless tobacco. She reports previous alcohol use. She reports that she does not use drugs.    Family History: No pertinent family history.    Allergies: Patient is allergic to penicillins and sulfa (sulfonamide antibiotics).    ROS  Constitutional: Negative for fever   Eyes: Negative for blurred vision, double vision   Respiratory: Negative for cough, shortness of breath   Cardiovascular: Negative for chest pain, palpitations, positive for leg swelling.    Gastrointestinal: Negative for abdominal pain, constipation, diarrhea, nausea, vomiting.   Genitourinary: Negative for dysuria, frequency, positive for bladder incontinence  Musculoskeletal:  + generalized weakness. Negative for back pain and myalgias.   Skin: Negative for itching and rash.   Neurological: Negative for dizziness, headaches.   Psychiatric/Behavioral: Negative for depression. The patient is not nervous/anxious.      PEx  24 hour Vital Sign Range   Temp:  [97.9 °F (36.6 °C)-98.2 °F (36.8 °C)]   Pulse:  [60]   Resp:  [16-18]   BP: (122-134)/(56-63)   SpO2:  [95 %-99 %]    Constitutional: Patient appears debilitated.  No distress noted. She is obese. She is not ill-appearing.   HENT:   Head: Normocephalic and atraumatic.   Eyes: Pupils are equal, round  Neck: Normal range of motion. Neck supple.   Cardiovascular: Normal rate, regular rhythm. Murmur (III/VI systolic ejection murmur with radiation to neck). Swelling (lymphedea LUE) present. Normal range of motion.   BLE Edema (1+) present.   Pulmonary/Chest: Effort normal and breath sounds are clear upper lobes bilaterally, Rales present left lower lobe.  Abdominal: Soft. Bowel sounds are normal.   Musculoskeletal: Normal range of motion.   Neurological: Alert and oriented to person, place, and time.   Psychiatric: Normal mood and affect. Behavior is normal.   Skin: Skin is warm and dry. Wounds to bilateral groin and perineum and blisters to buttox     Admission Labs Pending as of 7/12/22 at 10:35 AM. 7/9 CBC Diff and 7/11 CMP results all reviewed. No acute abnormalities.     Assessment and Plan:    Acute hypoxemic respiratory failure due to Acute on Chronic HFpEF  PMHx HFpEF, valvular heart disease p/w dyspnea and increasing oxygen requirement with evidence of volume overload on physical exam in the setting of an elevated BNP(1347). Echo in 3/2022 demonstrated EF 55-60% with mitral stenosis. Repeat TTE EF 65% with grade II LVD dysfunction with MR  related to MV degenerative disease.  -Continue Bumex 1mg daily   -Strict intake & output with fluid restriction to <2L daily  -Titrate O2 to keep SpO2 > 92% (currently on 2L NC)    Constipation  7/13: New, unstable  --initiate order for KUB to evaluate for fecal impaction   -initiate order for BID miralax starting this evening after KUB completed  -continue BID senna-docusate     Recurrent Urinary Tract Infection  Patient with recurrent UTIs, prior urine culture shows Pseudomonas only sensitive to imipenem, tobramycin, gentamicin.  Was inadequately treated with ciprofloxacin last month.  -Meropenem initiated on 07/04 for 10 day course to end on 7/14/22    Wounds to Groin, Perineum, and buttox 2/2 urinary incontinence  -maintain pur whick as able to keep area dry  -7/12: Initiated zinc oxide cream prn and wound care consult    Dysphasia, history of  -soft dysphasia diet in hospital  -7/12 initiated soft texture diet and SLP consult    Severe aortic stenosis s/p TAVR  PMHx severe AS a TAVR in 2019     Symptomatic bradycardia s/p pacemaker.  Pacemaker placed in 2019 for complete heart block and symptomatic bradycardia, currently ventricularly paced at 60 beats per minute     Hypertension  Continue lisinopril 20 mg PO daily     Hyperlipidemia  Lipid panel with LDL at goal in 4/2022 on lipitor 20 mg PO daily     Hypothyroidism  Synthroid 125 mcg PO Daily    Diet Dysphagia Soft (IDDSI Level 6) [facility]    Physical Activity As tolerated    Anticipate disposition:  Home with home health      Follow-up needed during SNF stay- Infectious Disease (consulted)    Follow-up needed after discharge from SNF: PCP, Cardiology    No future appointments.      I certify that SNF services are required to be given on an inpatient basis because Pilar Michael needs for skilled nursing care and/or skilled rehabilitation are required on a daily basis and such services can only practically be provided in a skilled nursing facility  setting and are for an ongoing condition for which she received inpatient care in the hospital.       Benita Monge NP  Department of Hospital Medicine   [facility]     DOS: 7/13/2022       Patient note was created using MModal Dictation.  Any errors in syntax or even information may not have been identified and edited on initial review prior to signing this note.

## 2022-07-13 NOTE — PT/OT/SLP PROGRESS
"Occupational Therapy   Treatment    Name: Pilar Michael  MRN: [id number]  Admit Date: 7/11/2022  Admitting Diagnosis:    General Precautions: Standard, fall   Orthopedic Precautions:N/A   Braces:       Recommendations:     Discharge Recommendations: home health OT  Level of Assistance Recommended at Discharge: 24 hours light assistance for ADL's and homemaking tasks  Discharge Equipment Recommendations:   (Pt has a rollator, RW, BSC,shower chair, grab bars and W/C)  Barriers to discharge:  Decreased caregiver support, Inaccessible home environment    Assessment:     Pilar Michael is a 89 y.o. female with a medical diagnosis of  UTI , Hematuria, Debility. Performance deficits affecting function are weakness, impaired endurance, impaired self care skills, impaired functional mobilty, gait instability, impaired balance, decreased coordination, decreased upper extremity function, decreased lower extremity function, decreased safety awareness, pain, decreased ROM, impaired cardiopulmonary response to activity, edema. Pt. participated fair with session on this day. Pt is progressing well with session on this day still continues to requires cues with aspects of safety . Session limited 2* to increased fatigue stating "Zeina been sitting up all morning im exhausted"     Rehab Potential is fair    Activity tolerance:  Fair    Plan:     Patient to be seen 6 x/week to address the above listed problems via self-care/home management, therapeutic activities, therapeutic exercises    · Plan of Care Expires: 08/12/22  · Plan of Care Reviewed with: patient, son    Subjective     Communicated with: camryn prior to session. "hi".    Pain/Comfort:  · Pain Rating 1: 0/10 (tired)  · Pain Rating Post-Intervention 1: 0/10 (tired)    Patient's cultural, spiritual, Tenriism conflicts given the current situation:  · no    Objective:     Patient found up in chair with son upon OT entry to room.    Bed Mobility:    · Not tested    Functional " Mobility/Transfers:  · Patient completed Sit <> Stand Transfer with minimum assistance  with  rolling walker   · Functional Mobility: not tested      [facility] 6 Click ADL: 14      Treatment & Education:  Pt. Educated on safety with ADL tasks as well as functional mobility and need for staff assist.     Pt. LUEAROM using 1lb free weight, Pt. Completed 2 sets of 15  reps of SH overhead, chest press, SH hor abd/add, forward rows and biceps curls. Pt. Tolerated  bicep curls only with RUE.    Pt.  Performed seated table slides 2x10 reps  in lateral and circular planes  for shoulder ROM, Endurance, and usage for all ADL tasks.      Pt. With standing act on this day with task. Pt. With CGA/SBA for balance aspects with task with  AD at raised counter Pt with fine motor task with discrimination of various shapes and sizes x 1 min and 40 secs with standing bal and min cues throught out. Pt. Not able to complete finish task but seated rest break required.     Pt. With standing and therex performed to increase ROM, endurance selfcare task and fxl mobility for independence         Patient left up in chair with all lines intact, call button in reach and son presentEducation:      GOALS:   Multidisciplinary Problems     Occupational Therapy Goals        Problem: Occupational Therapy    Goal Priority Disciplines Outcome Interventions   Occupational Therapy Goal     OT, PT/OT Ongoing, Progressing    Description: Goals to be met in: 30 days     Patient will increase functional independence with ADLs by performing:    Feeding with Modified [place].  UE Dressing with Set-up Assistance.  LE Dressing with Minimal Assistance.  Grooming while seated at sink with Modified [place].  Toileting from toilet or BSC with Minimal Assistance for hygiene and clothing management.   Bathing from  sitting at sink with Minimal Assistance.  Rolling to Bilateral with Modified [place].   Supine to sit with Stand-by Assistance.  Step transfer  with Contact Guard Assistance with RW as needed.  Upper extremity with assistance as needed.  Caregiver will be educated on level of assist required to safely perform self care tasks and functional transfers..                      Time Tracking:     OT Date of Treatment: 07/13/22  OT Start Time: 0138    OT Stop Time: 0209  OT Total Time (min): 31 min    Billable Minutes:Therapeutic Exercise 31    7/13/2022  OT and YAN have discussed the above patients goals and status in collaboration with Plan of Care.

## 2022-07-13 NOTE — PLAN OF CARE
Problem: Adult Inpatient Plan of Care  Goal: Plan of Care Review  Outcome: Ongoing, Progressing   Recommendations     1. Continue cardiac diet.   2. Add Boost Glucose Control TID.     Goals: 1. Pt's intake meals >50% by RD follow up.  Nutrition Goal Status: new  Communication of RD Recs: reviewed with physician     Assessment and Plan  Acute on chronic combined systolic and diastolic CHF (congestive heart failure)  Contributing Nutrition Diagnosis  Inadequate oral intake     Related to (etiology):   CHF     Signs and Symptoms (as evidenced by):   Pt admitted with CHF, decreased appetite prior to hospital stay.       Interventions:  Collaboration with other providers  [employer]- Boost Glucose Control TID     Nutrition Diagnosis Status:   Improving

## 2022-07-13 NOTE — PT/OT/SLP PROGRESS
Physical Therapy Treatment    Patient Name:  Pilar Michael   MRN:  [id number]  Admit Date: 7/11/2022  Admitting Diagnosis: UTI , Hematuria, Debility  Recent Surgeries: N/A    General Precautions: Standard, aspiration, fall   Orthopedic Precautions:N/A   Braces: N/A     Recommendations:     Discharge Recommendations:  home health PT   Level of Assistance Recommended at Discharge: 24 hours light assistance  Discharge Equipment Recommendations: bedside commode, grab bar, rollator, shower chair, wheelchair   Barriers to discharge: Decreased caregiver support, Inaccessible home environment    Assessment:     Pilar Michael is a 89 y.o. female admitted with a medical diagnosis of UTI, hematuria, and debility.     Pt was agreeable and tolerated therapy session well. Pt continues to be limited by fatigued and pain, but agreeable to therapy session. Pt tolerated ambulated total ~12ft with RW and CGA, 1 seated rest break. Pt is progressing and continues to benefit from therapy to achieve highest level of independence prior to discharge.     Performance deficits affecting function:  weakness, impaired endurance, impaired self care skills, impaired functional mobilty, gait instability, impaired balance, impaired cognition, decreased upper extremity function, decreased lower extremity function, decreased ROM, impaired skin, edema, impaired cardiopulmonary response to activity .    Rehab Potential is good    Activity Tolerance: Good    Plan:     Patient to be seen 6 x/week to address the above listed problems via gait training, therapeutic activities, therapeutic exercises, neuromuscular re-education, wheelchair management/training    · Plan of Care Expires: 08/11/22  · Plan of Care Reviewed with: patient, daughter, son    Subjective     Pt report being very tired 1st attempt, pt appear more awake late AM.     Pain/Comfort:  · Pain Rating 1: 4/10  · Location - Side 1: Right  · Location - Orientation 1: generalized  · Location  1: leg  · Pain Addressed 1: Reposition, Distraction  · Pain Rating Post-Intervention 1: 4/10    Patient's cultural, spiritual, Orthodoxy conflicts given the current situation:  · no    Objective:     Patient found HOB elevated with PureWick, oxygen upon PT entry to room.     Therapeutic Activities and Exercises:   · Increased time spent assisting pt to lola pants  · Patient educated on role of therapy, goals of session, and benefits of out of bed mobility.   · Instructed on use of call button and importance of calling nursing staff for assistance with mobility   · Questions/concerns addressed within PTA scope of practice   Pt verbalized understanding.   Whiteboard Updated      Functional Mobility:  · Bed Mobility:     · Rolling Left:  minimum assistance with bed rails   · Rolling Right: minimum assistance with bed rails   · Scooting to EOB: moderate assistance  · Supine to Sit: moderate assistance with HOB elevated   · Verbal cues for sequencing.   · Transfers:     · Sit to Stand:  moderate assistance with rolling walker  · Verbal cues for hand placement for safety  · Gait: pt ambulated ~4ft + 8ft with RW and CGA and +1 wheelchair follow and oxygen towed. occasionally unsteady step-to gait with decrease step length, decrease annie, and flexed posture  · Distance limited by pain and fatigued  · 1 seated rest break.   · Verbal/tactile cues to keep RW close.   · Wheelchair Propulsion:  Pt propelled Standard wheelchair x 10 feet on Level tile with  Bilateral upper extremity with Minimal Assistance.   · Increased difficulty using LUE d/e swelling and unable for BLE to reach the ground.     AM-PAC 6 CLICK MOBILITY  13    Patient left up in wheelchair with call button in reach and pty present.    GOALS:   Multidisciplinary Problems     Physical Therapy Goals        Problem: Physical Therapy    Goal Priority Disciplines Outcome Goal Variances Interventions   Physical Therapy Goal     PT, PT/OT Ongoing, Progressing      Description: Goals to be met in 30 days (2022):     Patient will increase functional independence with mobility by performin. Supine to sit with [place].  2. Sit to supine with [place].  3. Rolling to Left and Right with [place].  4. Sit to stand transfer with Supervision.  5. Bed to chair transfer with Supervision using Rolling Walker.  6. Gait  x 50 feet with Supervision using Rolling Walker.   7. Wheelchair propulsion x 50 feet with Supervision using bilateral upper extremities.  8. Ascend/descend 4 stairs with bilateral Handrails Moderate Assistance using No Assistive Device.   9. Ascend/Descend 4 inch curb step with Moderate Assistance using Rolling Walker.  10. Lower extremity exercise program x 20 reps per handout, with supervision.  11. Retrieve object from ground with reacher, CHARIS, and Aly.                     Time Tracking:     PT Received On: 22  PT Start Time: 1039  PT Stop Time: 1123  PT Total Time (min): 44 min    Billable Minutes: Gait Training 16, Therapeutic Activity 18 and Train/Wheelchair Management 10    Treatment Type: Treatment  PT/PTA: PTA     PTA Visit Number: 1     2022

## 2022-07-14 LAB
ANION GAP SERPL CALC-SCNC: 8 MMOL/L (ref 8–16)
BASOPHILS # BLD AUTO: 0.01 K/UL (ref 0–0.2)
BASOPHILS NFR BLD: 0.2 % (ref 0–1.9)
BUN SERPL-MCNC: 19 MG/DL (ref 8–23)
CALCIUM SERPL-MCNC: 8.5 MG/DL (ref 8.7–10.5)
CHLORIDE SERPL-SCNC: 101 MMOL/L (ref 95–110)
CO2 SERPL-SCNC: 28 MMOL/L (ref 23–29)
CREAT SERPL-MCNC: 0.6 MG/DL (ref 0.5–1.4)
DIFFERENTIAL METHOD: ABNORMAL
EOSINOPHIL # BLD AUTO: 0.1 K/UL (ref 0–0.5)
EOSINOPHIL NFR BLD: 1.9 % (ref 0–8)
ERYTHROCYTE [DISTWIDTH] IN BLOOD BY AUTOMATED COUNT: 16.8 % (ref 11.5–14.5)
EST. GFR  (AFRICAN AMERICAN): >60 ML/MIN/1.73 M^2
EST. GFR  (NON AFRICAN AMERICAN): >60 ML/MIN/1.73 M^2
GLUCOSE SERPL-MCNC: 93 MG/DL (ref 70–110)
HCT VFR BLD AUTO: 33 % (ref 37–48.5)
HGB BLD-MCNC: 10.2 G/DL (ref 12–16)
IMM GRANULOCYTES # BLD AUTO: 0.02 K/UL (ref 0–0.04)
IMM GRANULOCYTES NFR BLD AUTO: 0.5 % (ref 0–0.5)
LYMPHOCYTES # BLD AUTO: 0.6 K/UL (ref 1–4.8)
LYMPHOCYTES NFR BLD: 13.8 % (ref 18–48)
MAGNESIUM SERPL-MCNC: 1.6 MG/DL (ref 1.6–2.6)
MCH RBC QN AUTO: 26.6 PG (ref 27–31)
MCHC RBC AUTO-ENTMCNC: 30.9 G/DL (ref 32–36)
MCV RBC AUTO: 86 FL (ref 82–98)
MONOCYTES # BLD AUTO: 0.4 K/UL (ref 0.3–1)
MONOCYTES NFR BLD: 9.1 % (ref 4–15)
NEUTROPHILS # BLD AUTO: 3.2 K/UL (ref 1.8–7.7)
NEUTROPHILS NFR BLD: 74.5 % (ref 38–73)
NRBC BLD-RTO: 0 /100 WBC
PHOSPHATE SERPL-MCNC: 2.8 MG/DL (ref 2.7–4.5)
PLATELET # BLD AUTO: 160 K/UL (ref 150–450)
PMV BLD AUTO: 11.6 FL (ref 9.2–12.9)
POTASSIUM SERPL-SCNC: 4 MMOL/L (ref 3.5–5.1)
RBC # BLD AUTO: 3.83 M/UL (ref 4–5.4)
SODIUM SERPL-SCNC: 137 MMOL/L (ref 136–145)
WBC # BLD AUTO: 4.27 K/UL (ref 3.9–12.7)

## 2022-07-14 PROCEDURE — 83735 ASSAY OF MAGNESIUM: CPT | Performed by: HOSPITALIST

## 2022-07-14 PROCEDURE — 80048 BASIC METABOLIC PNL TOTAL CA: CPT | Performed by: HOSPITALIST

## 2022-07-14 PROCEDURE — 97116 GAIT TRAINING THERAPY: CPT | Mod: CQ

## 2022-07-14 PROCEDURE — 25000003 PHARM REV CODE 250: Performed by: NURSE PRACTITIONER

## 2022-07-14 PROCEDURE — 97110 THERAPEUTIC EXERCISES: CPT | Mod: CO

## 2022-07-14 PROCEDURE — 97530 THERAPEUTIC ACTIVITIES: CPT | Mod: CQ

## 2022-07-14 PROCEDURE — 36415 COLL VENOUS BLD VENIPUNCTURE: CPT | Performed by: HOSPITALIST

## 2022-07-14 PROCEDURE — 25000003 PHARM REV CODE 250: Performed by: HOSPITALIST

## 2022-07-14 PROCEDURE — 11000004 HC SNF PRIVATE

## 2022-07-14 PROCEDURE — 97535 SELF CARE MNGMENT TRAINING: CPT | Mod: CO

## 2022-07-14 PROCEDURE — 84100 ASSAY OF PHOSPHORUS: CPT | Performed by: HOSPITALIST

## 2022-07-14 PROCEDURE — 63600175 PHARM REV CODE 636 W HCPCS: Performed by: HOSPITALIST

## 2022-07-14 PROCEDURE — 85025 COMPLETE CBC W/AUTO DIFF WBC: CPT | Performed by: HOSPITALIST

## 2022-07-14 PROCEDURE — 25000003 PHARM REV CODE 250: Performed by: FAMILY MEDICINE

## 2022-07-14 RX ORDER — LISINOPRIL 10 MG/1
10 TABLET ORAL DAILY
Status: DISCONTINUED | OUTPATIENT
Start: 2022-07-15 | End: 2022-07-18 | Stop reason: HOSPADM

## 2022-07-14 RX ORDER — SYRING-NEEDL,DISP,INSUL,0.3 ML 29 G X1/2"
296 SYRINGE, EMPTY DISPOSABLE MISCELLANEOUS ONCE
Status: COMPLETED | OUTPATIENT
Start: 2022-07-14 | End: 2022-07-14

## 2022-07-14 RX ADMIN — LEVOTHYROXINE SODIUM 125 MCG: 25 TABLET ORAL at 05:07

## 2022-07-14 RX ADMIN — ASPIRIN 81 MG CHEWABLE TABLET 81 MG: 81 TABLET CHEWABLE at 09:07

## 2022-07-14 RX ADMIN — MEROPENEM AND SODIUM CHLORIDE 1 G: 1 INJECTION, SOLUTION INTRAVENOUS at 12:07

## 2022-07-14 RX ADMIN — MEGESTROL ACETATE 40 MG: 20 TABLET ORAL at 09:07

## 2022-07-14 RX ADMIN — Medication 6 MG: at 08:07

## 2022-07-14 RX ADMIN — MAGNESIUM CITRATE 296 ML: 1.75 LIQUID ORAL at 05:07

## 2022-07-14 RX ADMIN — ATORVASTATIN CALCIUM 20 MG: 20 TABLET, FILM COATED ORAL at 08:07

## 2022-07-14 RX ADMIN — TIMOLOL MALEATE 1 DROP: 5 SOLUTION OPHTHALMIC at 08:07

## 2022-07-14 RX ADMIN — SENNOSIDES AND DOCUSATE SODIUM 1 TABLET: 50; 8.6 TABLET ORAL at 08:07

## 2022-07-14 RX ADMIN — POLYETHYLENE GLYCOL 3350 17 G: 17 POWDER, FOR SOLUTION ORAL at 09:07

## 2022-07-14 RX ADMIN — BUMETANIDE 1 MG: 1 TABLET ORAL at 09:07

## 2022-07-14 RX ADMIN — TIMOLOL MALEATE 1 DROP: 5 SOLUTION OPHTHALMIC at 09:07

## 2022-07-14 RX ADMIN — CLOPIDOGREL 75 MG: 75 TABLET, FILM COATED ORAL at 09:07

## 2022-07-14 RX ADMIN — LISINOPRIL 20 MG: 20 TABLET ORAL at 09:07

## 2022-07-14 RX ADMIN — ACETAMINOPHEN 650 MG: 325 TABLET ORAL at 08:07

## 2022-07-14 RX ADMIN — POLYETHYLENE GLYCOL 3350 17 G: 17 POWDER, FOR SOLUTION ORAL at 08:07

## 2022-07-14 RX ADMIN — SENNOSIDES AND DOCUSATE SODIUM 1 TABLET: 50; 8.6 TABLET ORAL at 09:07

## 2022-07-14 NOTE — CLINICAL REVIEW
Clinical Pharmacy Chart Review Note      Admit Date: 7/11/2022   LOS: 3 days       Pilar Michael is a 89 y.o. female admitted to SNF for PT/OT after hospitalization for acute hypoxemic respiratory failure due to acute on chronic HFpEF .    Review of patient's allergies indicates:   Allergen Reactions    Penicillins Swelling    Sulfa (sulfonamide antibiotics) Nausea Only     Patient Active Problem List    Diagnosis Date Noted    Debility 07/06/2022    Acute on chronic combined systolic (congestive) and diastolic (congestive) heart failure     Shortness of breath     Urinary tract infection without hematuria     Normocytic anemia 07/03/2022    Increased anion gap metabolic acidosis 07/03/2022    Hyperlipidemia 07/03/2022    Acute cystitis 11/18/2021    Pneumonia 11/17/2021    Presence of permanent cardiac pacemaker 07/07/2020    Acute on chronic combined systolic and diastolic CHF (congestive heart failure) 05/08/2019    Coronary artery disease involving native coronary artery of native heart without angina pectoris 05/08/2019    S/P TAVR (transcatheter aortic valve replacement) 05/07/2019    Acquired hypothyroidism 04/08/2019    Essential hypertension 04/08/2019    Severe aortic stenosis s/p TAVR        Scheduled Meds:    amLODIPine  10 mg Oral Daily    aspirin  81 mg Oral Daily    atorvastatin  20 mg Oral QHS    bumetanide  1 mg Oral Daily    clopidogreL  75 mg Oral Daily    levothyroxine  125 mcg Oral Before breakfast    lisinopriL  20 mg Oral Daily    megestroL  40 mg Oral Daily    meropenem (MERREM) IVPB  1 g Intravenous Q12H    polyethylene glycol  17 g Oral BID    senna-docusate 8.6-50 mg  1 tablet Oral BID    timolol maleate 0.5%  1 drop Both Eyes BID     Continuous Infusions:   PRN Meds: acetaminophen, calcium carbonate, sars-cov-2 (covid-19), melatonin, zinc oxide-cod liver oil    OBJECTIVE:     Vital Signs (Last 24H)  Temp:  [96.7 °F (35.9 °C)-98 °F (36.7 °C)]   Pulse:   [57-60]   Resp:  [16-18]   BP: ()/(48-56)   SpO2:  [97 %-99 %]     Laboratory:  CBC:   Recent Labs   Lab 07/08/22 0329 07/09/22  0330 07/14/22  0626   WBC 6.56 5.71 4.27   RBC 3.70* 3.94* 3.83*   HGB 9.9* 10.5* 10.2*   HCT 32.2* 33.5* 33.0*    201 160   MCV 87 85 86   MCH 26.8* 26.6* 26.6*   MCHC 30.7* 31.3* 30.9*     BMP:   Recent Labs   Lab 07/09/22  0330 07/10/22  0353 07/11/22  0543 07/14/22  0626   GLU 93  --  99 93     --  138 137   K 4.0  --  4.0 4.0     --  100 101   CO2 28  --  29 28   BUN 24*  --  19 19   CREATININE 0.7  --  0.7 0.6   CALCIUM 8.9  --  8.4* 8.5*   MG 2.0 1.9 1.8 1.6     CMP:   Recent Labs   Lab 07/08/22 0329 07/09/22 0330 07/11/22  0543 07/14/22  0626   GLU 86 93 99 93   CALCIUM 8.6* 8.9 8.4* 8.5*   ALBUMIN 2.3* 2.3* 2.2*  --    PROT 7.0 7.2 6.7  --     139 138 137   K 3.9 4.0 4.0 4.0   CO2 27 28 29 28   CL 99 100 100 101   BUN 29* 24* 19 19   CREATININE 0.8 0.7 0.7 0.6   ALKPHOS 84 82 92  --    ALT 11 11 13  --    AST 21 25 22  --    BILITOT 0.8 0.9 0.8  --      LFTs:   Recent Labs   Lab 07/08/22 0329 07/09/22  0330 07/11/22  0543   ALT 11 11 13   AST 21 25 22   ALKPHOS 84 82 92   BILITOT 0.8 0.9 0.8   PROT 7.0 7.2 6.7   ALBUMIN 2.3* 2.3* 2.2*     Lab Results   Component Value Date    TSH 4.640 (H) 07/03/2022    FREET4 1.14 07/03/2022         ASSESSMENT/PLAN:     I have reviewed the medications in compliance with CMS Regulation F756 of the [place]. Based on information gathered, the following items may need to be addressed:    **Patient is taking megace for appetite stimulant. This medication is a Beers drug and is not recommended in older adults.  Beers Criteria: Avoid use in older adults due to minimal effects on weight and increased risk of thrombotic events, potentially fatal        Medications reviewed by PharmD, please re-consult if needed.      Smiley Mcnair, Pharm. D.  Clinical Pharmacist  [facility]

## 2022-07-14 NOTE — PT/OT/SLP PROGRESS
"Occupational Therapy   Treatment    Name: Pilar Michael  MRN: [id number]  Admit Date: 7/11/2022  Admitting Diagnosis:  UTI , Hematuria, Debility  General Precautions: Standard, fall   Orthopedic Precautions:N/A   Braces:       Recommendations:     Discharge Recommendations: home health OT  Level of Assistance Recommended at Discharge: 24 hours light assistance for ADL's and homemaking tasks  Discharge Equipment Recommendations:   (Pt has a rollator, RW, BSC,shower chair, grab bars and W/C)  Barriers to discharge:  Decreased caregiver support, Inaccessible home environment    Assessment:     Pilar Michael is a 89 y.o. female with a medical diagnosis of UTI , Hematuria, Debility  Performance deficits affecting function areweakness, impaired endurance, impaired self care skills, impaired functional mobilty, gait instability, impaired balance, decreased coordination, decreased upper extremity function, decreased lower extremity function, decreased safety awareness, pain, decreased ROM, impaired cardiopulmonary response to activity, edema. Pt. participated fair with session on this day. Pt is progressing well with session on this day still continues to requires cues with aspects of safety  .     Rehab Potential is fair    Activity tolerance:  Fair    Plan:     Patient to be seen 6 x/week to address the above listed problems via self-care/home management, therapeutic activities, therapeutic exercises    · Plan of Care Expires: 08/12/22  · Plan of Care Reviewed with: patient, family    Subjective     Communicated with: camryn prior to session. " I think I have to use the bathroom ".    Pain/Comfort:  Pain Rating 1: 4/10  Location - Side 1: Bilateral  Location - Orientation 1: generalized  Location 1:  (bottom)  Pain Addressed 1: Reposition, Distraction  Pain Rating Post-Intervention 1: 4/10    Patient's cultural, spiritual, Restorationist conflicts given the current situation:  no    Objective:     Patient found up in chair " in gym with PTA with oxygen upon OT entry to room.    Bed Mobility:    · Not tested    Functional Mobility/Transfers:  · Patient completed Sit <> Stand Transfer with contact guard assistance  with  grab bars(s)   · Patient completed Toilet Transfer Step Transfer technique with contact guard assistance with  grab bars  · Functional Mobility: not tested    Activities of Daily Living:  · Grooming: set up assiatance with grooming aspecstwhile seated at sink level .  · Bathing: moderate assistance with cleaning of rosalva areas instance and BLE feet  · Upper Body Dressing: minimum assistance to doff/lola pullover shirt while seated  · Lower Body Dressing: maximal assistance to lola/doff pullover pants over hips while instance  · Toileting: maximal assistance for hygiene and clothing management at 3n1    [facility] 6 Click ADL: 14      Treatment & Education:  Pt. Educated on safety with ADL tasks as well as functional mobility and need for staff assist.    Pt. AROM using 1lb free weight Pt. Completed 2 sets of 10 reps of SH overhead, chest press, SH hor abd/add, forward rows and biceps curls.       Patient left up in chair with all lines intact, call button in reach and family member presentEducation:      GOALS:   Multidisciplinary Problems     Occupational Therapy Goals        Problem: Occupational Therapy    Goal Priority Disciplines Outcome Interventions   Occupational Therapy Goal     OT, PT/OT Ongoing, Progressing    Description: Goals to be met in: 30 days     Patient will increase functional independence with ADLs by performing:    Feeding with Modified [place].  UE Dressing with Set-up Assistance.  LE Dressing with Minimal Assistance.  Grooming while seated at sink with Modified [place].  Toileting from toilet or BSC with Minimal Assistance for hygiene and clothing management.   Bathing from  sitting at sink with Minimal Assistance.  Rolling to Bilateral with Modified [place].   Supine to sit with Stand-by  Assistance.  Step transfer with Contact Guard Assistance with RW as needed.  Upper extremity with assistance as needed.  Caregiver will be educated on level of assist required to safely perform self care tasks and functional transfers..                      Time Tracking:     OT Date of Treatment: 07/14/22  OT Start Time: 0112    OT Stop Time: 0157  OT Total Time (min): 45 min    Billable Minutes:Self Care/Home Management 36  Therapeutic Exercise 9    7/14/2022  OT and YAN have discussed the above patients goals and status in collaboration with Plan of Care.

## 2022-07-14 NOTE — PT/OT/SLP PROGRESS
Physical Therapy Treatment    Patient Name:  Pilar Michael   MRN:  [id number]  Admit Date: 7/11/2022  Admitting Diagnosis: UTI , Hematuria, Debility  Recent Surgeries: N/A    General Precautions: Standard, aspiration, fall   Orthopedic Precautions:N/A   Braces: N/A     Recommendations:     Discharge Recommendations:  home health PT   Level of Assistance Recommended at Discharge: 24 hours light assistance  Discharge Equipment Recommendations: bedside commode, grab bar, rollator, shower chair, wheelchair   Barriers to discharge: Decreased caregiver support, Inaccessible home environment    Assessment:     Pilar Michael is a 89 y.o. female admitted with a medical diagnosis of UTI, hematuria, and debility.     Pt continues to be limited by fatigued, but agreeable to therapy session. Pt required slightly less assistance with bed mobility and transfer, but limited by fatigue with gait training . Pt is progressing and continues to benefit from therapy to achieve highest level of independence prior to discharge.     Performance deficits affecting function:  weakness, impaired endurance, impaired self care skills, impaired functional mobilty, gait instability, impaired balance, impaired cognition, decreased upper extremity function, decreased lower extremity function, decreased ROM, impaired skin, edema, impaired cardiopulmonary response to activity .    Rehab Potential is good    Activity Tolerance: Good    Plan:     Patient to be seen 6 x/week to address the above listed problems via gait training, therapeutic activities, therapeutic exercises, neuromuscular re-education, wheelchair management/training    · Plan of Care Expires: 08/11/22  · Plan of Care Reviewed with: patient, daughter, son    Subjective     Pt reports very fatigued, but willing to work with therapy      Pain/Comfort:  Pain Rating 1:  (none stated)  Pain Rating Post-Intervention 1:  (none stated)    Patient's cultural, spiritual, Judaism conflicts  given the current situation:  no    Objective:     Patient found HOB elevated with oxygen, peripheral IV upon PT entry to room. Pt's family present for therapy.     Therapeutic Activities and Exercises:   · Pt required mild encouragement to participate in therapy.   · Increased time spent assisting pt to lola pants  Seated BLE therex x10 reps: heel/toes raises  · Patient educated on role of therapy, goals of session, and benefits of out of bed mobility.   · Instructed on use of call button and importance of calling nursing staff for assistance with mobility   · Questions/concerns addressed within PTA scope of practice   Pt verbalized understanding.   Whiteboard Updated    Functional Mobility:  · Bed Mobility:     · Scooting to EOB: minimun assistance   · Increase time to complete   · Supine to Sit: minimun assistance with HOB elevated and trunk management   · Verbal cues for sequencing.   · Transfers:     · Sit to Stand:  Deep-modA with rolling walker  · 4 trials. 2x from EOB (Deep) and 2x from wheelchair (modA)  · Verbal cues for hand placement for safety  · Bed to Wheelchair: minimum assistance with noAD using Stand pivot.  · Gait: pt ambulated ~8ft + 4ft with RW and CGA and +1 wheelchair/IV pole follow and oxygen towed. occasionally unsteady step-to gait with decrease step length, decrease annie, and flexed posture  · Distance limited by fatigued  · 1 seated rest break.   · Verbal/tactile cues to keep RW close and sequencing     AM-PAC 6 CLICK MOBILITY  13    Patient left up in wheelchair with LAVONNE present for hand-off.    GOALS:   Multidisciplinary Problems     Physical Therapy Goals        Problem: Physical Therapy    Goal Priority Disciplines Outcome Goal Variances Interventions   Physical Therapy Goal     PT, PT/OT Ongoing, Progressing     Description: Goals to be met in 30 days (2022):     Patient will increase functional independence with mobility by performin. Supine to sit with  [place].  2. Sit to supine with [place].  3. Rolling to Left and Right with [place].  4. Sit to stand transfer with Supervision.  5. Bed to chair transfer with Supervision using Rolling Walker.  6. Gait  x 50 feet with Supervision using Rolling Walker.   7. Wheelchair propulsion x 50 feet with Supervision using bilateral upper extremities.  8. Ascend/descend 4 stairs with bilateral Handrails Moderate Assistance using No Assistive Device.   9. Ascend/Descend 4 inch curb step with Moderate Assistance using Rolling Walker.  10. Lower extremity exercise program x 20 reps per handout, with supervision.  11. Retrieve object from ground with reacher, CHARIS, and ModA.                     Time Tracking:     PT Received On: 07/14/22  AM: 15 mins (limited by pt wanted to eat lunch)  PM: 15 mins   PT Total Time (min): 30 mins    Billable Minutes: Gait Training 12 and Therapeutic Activity 18    Treatment Type: Treatment  PT/PTA: PTA     PTA Visit Number: 2     07/14/2022

## 2022-07-14 NOTE — PLAN OF CARE
Problem: Occupational Therapy  Goal: Occupational Therapy Goal  Description: Goals to be met in: 30 days     Patient will increase functional independence with ADLs by performing:    Feeding with Modified [place].  UE Dressing with Set-up Assistance.  LE Dressing with Minimal Assistance.  Grooming while seated at sink with Modified [place].  Toileting from toilet or BSC with Minimal Assistance for hygiene and clothing management.   Bathing from  sitting at sink with Minimal Assistance.  Rolling to Bilateral with Modified [place].   Supine to sit with Stand-by Assistance.  Step transfer with Contact Guard Assistance with RW as needed.  Upper extremity with assistance as needed.  Caregiver will be educated on level of assist required to safely perform self care tasks and functional transfers..     Outcome: Ongoing, Progressing

## 2022-07-14 NOTE — PROGRESS NOTES
"                                                        [facility]                                  Skilled Nursing Facility                   Progress Note     Admit Date: 7/11/2022  CHARLIE TBD  Principal Problem: Acute hypoxemic respiratory failure due to Acute on Chronic HFpEF Recurrent Urinary Tract Infection   HPI obtained from patient interview and chart review     Chief Complaint: Re-evaluate and treat: constipation, hypertension, lab review    HPI:   Pilar Michael is a 89 y.o. female with has a past medical history of Arthritis, Cancer, CHF (congestive heart failure), Coronary artery disease, Hypertension, and Thyroid disease,TAVR in 2019 and followed by pacemaker implant with dislodgement of atrial lead noted and ventricular paced rhythm. The patient presented to [facility] on 7/3/2022 with a primary complaint of SOB. SpO2 71% upon arrival that improved to to 92% with NC with patient remaining tachypneic. She endorsed subjective nightly fevers of around 101F for the past few days prior to admit and a congested cough. Patient treated for multiple UTI's over the last year with the most recent requiring a course of ciprofloxacin about 2-3 weeks prior to hospitalization.     UA positive for nitrites and WBC's. Blood cx x 2 and U cx finalized negative. Prior urine culture shows Pseudomonas only sensitive to imipenem, tobramycin, gentamicin.  Was inadequately treated with ciprofloxacin last month. 10 day course Meropenem was started 7/4/22 for UTI.     Vascular congestion on initial CXR. Elevated BNP of 1347, elevated troponin 0.156, CPK 11. Lasix started. Once admitted, patient was continued on mild diuresis. Cardiology noted "troponin elevation is not of concern and most likely demand; CXR with more infiltrate on the left and a moderate left pleural effusion and agree consider pneumonia along with mild failure". TTE on 7/3/22 EF 65%, grade II left ventricular diastolic dysfunction, mild mitral " tricuspid and pulmonic regurgitation, aortic bioprosthesis present, normal. No aortic insufficiency.    Patient will be treated at [facility] for outpatient antibiotics and PT and OT to improve functional status and ability to perform ADLs.        Interval History  Patient lying in bed. Denies sob, chest pain, abdominal pain, nausea. Tolerating 2LNC. Labs reviewed no critical results. KUB reviewed and interpreted by this NP no concern for fecal impaction, ileus, constipation. Reports adequate appetite. Patient progessing with PT/OT. She is contact guard assist with sit to stand and ambulating. Today she ambulated 12ft with RW with one seated rest break. Blood pressure over controlled in 89 year old. Will discontinue Amlodipine due to concern of hypotension resulting in a fall and has side effects of swelling and constipation. Initiate order to discontinue Amlodipine. Initiate order to decrease Lisinopril 20mg daily to 10mg daily.        Past Medical History: Patient has a past medical history of Arthritis, Cancer, CHF (congestive heart failure), Coronary artery disease, Hypertension, and Thyroid disease.    Past Surgical History: Patient has a past surgical history that includes Hysterectomy; Thyroidectomy; lymph nodes removal; Cholecystectomy; Coronary angiography (N/A, 4/23/2019); Transcatheter aortic valve replacement (TAVR) (N/A, 5/7/2019); Cardiac valuve replacement; and Cardiac catheterization.    Social History: Patient reports that she has never smoked. She has never used smokeless tobacco. She reports previous alcohol use. She reports that she does not use drugs.    Family History: No pertinent family history.    Allergies: Patient is allergic to penicillins and sulfa (sulfonamide antibiotics).    ROS  Constitutional: Negative for fever   Eyes: Negative for blurred vision, double vision   Respiratory: Negative for cough, shortness of breath   Cardiovascular: Negative for chest pain, palpitations, positive  for leg swelling.   Gastrointestinal: Negative for abdominal pain, constipation, diarrhea, nausea, vomiting.   Genitourinary: Negative for dysuria, frequency, positive for bladder incontinence  Musculoskeletal:  + generalized weakness. Negative for back pain and myalgias.   Skin: Negative for itching and rash.   Neurological: Negative for dizziness, headaches.   Psychiatric/Behavioral: Negative for depression. The patient is not nervous/anxious.      PEx  24 hour Vital Sign Range   Temp:  [96.7 °F (35.9 °C)-98 °F (36.7 °C)]   Pulse:  [57-60]   Resp:  [16-18]   BP: ()/(48-56)   SpO2:  [97 %-99 %]      Constitutional: Patient appears debilitated.  No distress noted. She is obese. She is not ill-appearing.   HENT:   Head: Normocephalic and atraumatic.   Eyes: Pupils are equal, round  Neck: Normal range of motion. Neck supple.   Cardiovascular: Normal rate, regular rhythm. Murmur (III/VI systolic ejection murmur with radiation to neck). Swelling (lymphedea LUE) present. Normal range of motion.   BLE Edema (1+) present.   Pulmonary/Chest: Effort normal and breath sounds are clear upper lobes bilaterally, Rales present left lower lobe.  Abdominal: Soft. Bowel sounds are normal.   Musculoskeletal: Normal range of motion.   Neurological: Alert and oriented to person, place, and time.   Psychiatric: Normal mood and affect. Behavior is normal.   Skin: Skin is warm and dry. Wounds to bilateral groin and perineum and blisters to buttox     Admission Labs Pending as of 7/12/22 at 10:35 AM. 7/9 CBC Diff and 7/11 CMP results all reviewed. No acute abnormalities.       Assessment and Plan:    Acute hypoxemic respiratory failure due to Acute on Chronic HFpEF  PMHx HFpEF, valvular heart disease p/w dyspnea and increasing oxygen requirement with evidence of volume overload on physical exam in the setting of an elevated BNP(1347). Echo in 3/2022 demonstrated EF 55-60% with mitral stenosis. Repeat TTE EF 65% with grade II LVD  dysfunction with MR related to MV degenerative disease.  -Continue Bumex 1mg daily   -Strict intake & output with fluid restriction to <2L daily  -Titrate O2 to keep SpO2 > 92% (currently on 2L NC)    Constipation  7/13: New, unstable  --initiate order for KUB to evaluate for fecal impaction   -initiate order for BID miralax starting this evening after KUB completed  -continue BID senna-docusate  7/14/22  KUB reviewed and interpreted by this NP no concern for fecal impaction, ileus, constipation.   Continue laxatives      Recurrent Urinary Tract Infection  Patient with recurrent UTIs, prior urine culture shows Pseudomonas only sensitive to imipenem, tobramycin, gentamicin.  Was inadequately treated with ciprofloxacin last month.  -Meropenem initiated on 07/04 for 10 day course to end on 7/14/22    Wounds to Groin, Perineum, and buttox 2/2 urinary incontinence  -maintain pur whick as able to keep area dry  -7/12: Initiated zinc oxide cream prn and wound care consult    Dysphasia, history of  -soft dysphasia diet in hospital  -7/12 initiated soft texture diet and SLP consult    Severe aortic stenosis s/p TAVR  PMHx severe AS a TAVR in 2019     Symptomatic bradycardia s/p pacemaker.  Pacemaker placed in 2019 for complete heart block and symptomatic bradycardia, currently ventricularly paced at 60 beats per minute     Essential Hypertension  Blood pressure over controlled in 89 year old.   Will discontinue Amlodipine due to concern of hypotension resulting in a fall and has side effects of swelling and constipation.   Initiate order to discontinue Amlodipine.  Initiate order to decrease Lisinopril 20mg daily to 10mg daily.         Hyperlipidemia  Lipid panel with LDL at goal in 4/2022 on lipitor 20 mg PO daily     Hypothyroidism  Synthroid 125 mcg PO Daily    Diet Dysphagia Soft (IDDSI Level 6) [facility]    Physical Activity As tolerated    Anticipate disposition:  Home with home health      Follow-up needed  during SNF stay- Infectious Disease (consulted)    Follow-up needed after discharge from SNF: PCP, Cardiology    No future appointments.      I certify that SNF services are required to be given on an inpatient basis because Pilar Michael needs for skilled nursing care and/or skilled rehabilitation are required on a daily basis and such services can only practically be provided in a skilled nursing facility setting and are for an ongoing condition for which she received inpatient care in the hospital.       Jesica Barrett NP  Department of Hospital Medicine   [facility]     DOS: 7/14/2022       Patient note was created using MModal Dictation.  Any errors in syntax or even information may not have been identified and edited on initial review prior to signing this note.

## 2022-07-15 PROCEDURE — 97110 THERAPEUTIC EXERCISES: CPT

## 2022-07-15 PROCEDURE — 99306 PR NURSING FACILITY CARE, INIT, HIGH SEVERITY: ICD-10-PCS | Mod: ,,, | Performed by: HOSPITALIST

## 2022-07-15 PROCEDURE — 97535 SELF CARE MNGMENT TRAINING: CPT

## 2022-07-15 PROCEDURE — 94761 N-INVAS EAR/PLS OXIMETRY MLT: CPT

## 2022-07-15 PROCEDURE — 27000221 HC OXYGEN, UP TO 24 HOURS

## 2022-07-15 PROCEDURE — 25000003 PHARM REV CODE 250: Performed by: NURSE PRACTITIONER

## 2022-07-15 PROCEDURE — 99306 1ST NF CARE HIGH MDM 50: CPT | Mod: ,,, | Performed by: HOSPITALIST

## 2022-07-15 PROCEDURE — 11000004 HC SNF PRIVATE

## 2022-07-15 PROCEDURE — 97530 THERAPEUTIC ACTIVITIES: CPT | Mod: CQ

## 2022-07-15 PROCEDURE — 97110 THERAPEUTIC EXERCISES: CPT | Mod: CQ

## 2022-07-15 PROCEDURE — 25000003 PHARM REV CODE 250: Performed by: FAMILY MEDICINE

## 2022-07-15 PROCEDURE — 99900035 HC TECH TIME PER 15 MIN (STAT)

## 2022-07-15 PROCEDURE — 25000003 PHARM REV CODE 250: Performed by: HOSPITALIST

## 2022-07-15 PROCEDURE — 97116 GAIT TRAINING THERAPY: CPT | Mod: CQ

## 2022-07-15 RX ADMIN — LEVOTHYROXINE SODIUM 125 MCG: 25 TABLET ORAL at 05:07

## 2022-07-15 RX ADMIN — SENNOSIDES AND DOCUSATE SODIUM 1 TABLET: 50; 8.6 TABLET ORAL at 10:07

## 2022-07-15 RX ADMIN — POLYETHYLENE GLYCOL 3350 17 G: 17 POWDER, FOR SOLUTION ORAL at 10:07

## 2022-07-15 RX ADMIN — ACETAMINOPHEN 650 MG: 325 TABLET ORAL at 10:07

## 2022-07-15 RX ADMIN — Medication 6 MG: at 08:07

## 2022-07-15 RX ADMIN — MEGESTROL ACETATE 40 MG: 20 TABLET ORAL at 10:07

## 2022-07-15 RX ADMIN — TIMOLOL MALEATE 1 DROP: 5 SOLUTION OPHTHALMIC at 08:07

## 2022-07-15 RX ADMIN — SENNOSIDES AND DOCUSATE SODIUM 1 TABLET: 50; 8.6 TABLET ORAL at 08:07

## 2022-07-15 RX ADMIN — LISINOPRIL 10 MG: 10 TABLET ORAL at 11:07

## 2022-07-15 RX ADMIN — ATORVASTATIN CALCIUM 20 MG: 20 TABLET, FILM COATED ORAL at 08:07

## 2022-07-15 RX ADMIN — POLYETHYLENE GLYCOL 3350 17 G: 17 POWDER, FOR SOLUTION ORAL at 08:07

## 2022-07-15 RX ADMIN — ASPIRIN 81 MG CHEWABLE TABLET 81 MG: 81 TABLET CHEWABLE at 10:07

## 2022-07-15 RX ADMIN — BUMETANIDE 1 MG: 1 TABLET ORAL at 11:07

## 2022-07-15 RX ADMIN — TIMOLOL MALEATE 1 DROP: 5 SOLUTION OPHTHALMIC at 10:07

## 2022-07-15 RX ADMIN — CLOPIDOGREL 75 MG: 75 TABLET, FILM COATED ORAL at 10:07

## 2022-07-15 NOTE — PT/OT/SLP PROGRESS
Occupational Therapy   Treatment    Name: Pilar Michael  MRN: [id number]  Admit Date: 7/11/2022  Admitting Diagnosis:  UTI without Hematuria.  General Precautions: Standard, aspiration, fall   Orthopedic Precautions:N/A   Braces: N/A     Recmmendations:     Discharge Recommendations: home health OT  Level of Assistance Recommended at Discharge: 24 hours physical assistance for all ADL's and home management tasks  Discharge Equipment Recommendations:   (Pt has a rollator, RW, BSC,shower chair, grab bars and W/C)  Barriers to discharge:  Decreased caregiver support, Inaccessible home environment    Assessment:     Pilar Michael is a 89 y.o. female with a medical diagnosis of UTI without Hematuria.  She remains limited in performance of self-care , functional mobility and ADLs and currently not performing tasks at [facility] . Currently presenting with performance deficits includingweakness, impaired endurance, impaired self care skills, gait instability, impaired balance, decreased coordination, impaired cognition, decreased upper extremity function, decreased lower extremity function, decreased safety awareness, pain, decreased ROM, impaired fine motor, edema, impaired cardiopulmonary response to activity.    Pt tolerated Tx without incident and is making slow progress but continues to require assist to perform self care tasks, functional mobility and functional transfers .  She would continue to benefit from OT intervention to further her functional (I)ce and safety.    Rehab Potential is good    Activity tolerance:  Fair    Plan:     Patient to be seen 6 x/week to address the above listed problems via self-care/home management, therapeutic activities, therapeutic exercises    · Plan of Care Expires: 08/12/22  · Plan of Care Reviewed with: patient, family    Subjective     Communicated with: nurse prior to session.  .    Pain/Comfort:  · Pain Rating 1: 0/10  · Pain Rating Post-Intervention 1: 0/10    Patient's  cultural, spiritual, Rastafarian conflicts given the current situation:  · no    Objective:     Patient found up in chair with oxygen upon OT entry to room.    Bed Mobility:    · Pt seated in W/C at onset of therapy session.    Functional Mobility/Transfers:  · Patient completed Sit <> Stand Transfer with moderate assistance  with  rolling walker   · Patient completed Bed <> Chair Transfer using Stand Pivot technique with moderate assistance with rolling walker      Activities of Daily Living:  · Lower Body Dressing: maximal assistance with (A) to don /doff pants and socks.  Sock aide used to don socks with dressing stick to doff. Pt threading feet into pant legs but needing (A) to steady when standing and when pulling pants over her bottom.    [facility] 6 Click ADL: 14    OT Exercises: UE Ergometer Performed 6 minutes on UBE with min resistance.  UE exercises performed to increase functional endurance and strength in order increase independence when performing self care tasks, functional ambulation, W/C propulsion, and functional standing activities .    Pt worked on functional standing activity consisting of standing with RW while reaching in all planes , crossing of midline and reaching to varying heights to facilitate (B) wt shifting and stability in standing in prep for performance of self care tasks and functional ADLS in standing.  Pt tolerated up to 2 Min. in standing with CGA and RW to steady.    Treatment & Education:  Pt edu on POC, safety when performing self care tasks , benefit of performing OOB activity, and safety when performing functional transfers and mobility.  - White board updated  - Self care tasks completed-- as noted above       Patient left up in chair with all lines intact, call button in reach and son  presentEducation:      GOALS:   Multidisciplinary Problems     Occupational Therapy Goals        Problem: Occupational Therapy    Goal Priority Disciplines Outcome Interventions   Occupational  Therapy Goal     OT, PT/OT Ongoing, Progressing    Description: Goals to be met in: 30 days     Patient will increase functional independence with ADLs by performing:    Feeding with Modified [place].  UE Dressing with Set-up Assistance.  LE Dressing with Minimal Assistance.  Grooming while seated at sink with Modified [place].  Toileting from toilet or BSC with Minimal Assistance for hygiene and clothing management.   Bathing from  sitting at sink with Minimal Assistance.  Rolling to Bilateral with Modified [place].   Supine to sit with Stand-by Assistance.  Step transfer with Contact Guard Assistance with RW as needed.  Upper extremity with assistance as needed.  Caregiver will be educated on level of assist required to safely perform self care tasks and functional transfers..                      Time Tracking:     OT Date of Treatment: 07/15/22  OT Start Time: 1340    OT Stop Time: 1413  OT Total Time (min): 33 min    Billable Minutes:Self Care/Home Management 20  Therapeutic Exercise 13    7/15/2022

## 2022-07-15 NOTE — H&P
"   Hospital Medicine  Skilled Nursing Facility   History and Physical Exam      Date of Service: 07/15/2022      Patient Name: Pilar Michael  MRN: [id number]  Admission Date: 7/11/2022  Attending Physician: Kyree Priest MD  Primary Care Provider: Joseph Noel MD  Code Status: Full Code      Principal problem:  Acute on chronic combined systolic and diastolic CHF (congestive heart failure)      Chief Complaint:   Chief Complaint   Patient presents with    Shortness of Breath     Admitted to [facility] for rehabilitation following hospital stay for CHF exacerbation and UTI.           HPI:   "Pilar Michael is a 89 y.o. female with has a past medical history of Arthritis, Cancer, CHF (congestive heart failure), Coronary artery disease, Hypertension, and Thyroid disease,TAVR in 2019 and followed by pacemaker implant with dislodgement of atrial lead noted and ventricular paced rhythm. The patient presented to [facility] [place] on 7/3/2022 with a primary complaint of SOB. SpO2 71% upon arrival that improved to to 92% with NC with patient remaining tachypneic. She endorsed subjective nightly fevers of around 101F for the past few days prior to admit and a congested cough. Patient treated for multiple UTI's over the last year with the most recent requiring a course of ciprofloxacin about 2-3 weeks prior to hospitalization.      UA positive for nitrites and WBC's. Blood cx x 2 and U cx finalized negative. Prior urine culture shows Pseudomonas only sensitive to imipenem, tobramycin, gentamicin.  Was inadequately treated with ciprofloxacin last month. 10 day course Meropenem was started 7/4/22 for UTI.      Vascular congestion on initial CXR. Elevated BNP of 1347, elevated troponin 0.156, CPK 11. Lasix started. Once admitted, patient was continued on mild diuresis. Cardiology noted "troponin elevation is not of concern and most likely demand; CXR with more infiltrate on the left and a moderate left pleural effusion and agree " "consider pneumonia along with mild failure". TTE on 7/3/22 EF 65%, grade II left ventricular diastolic dysfunction, mild mitral tricuspid and pulmonic regurgitation, aortic bioprosthesis present, normal. No aortic insufficiency." per Beinta Monge NP on 7/12/22.     She is doing okay. Son is at bedside. She is working well with therapy. She is ready to go home and can't want for her daughter to get there for the evening. She is urinating well and denies any dysuria. She is eating okay. Denies any nausea or vomiting.     Patient admitted with skilled services with PT and OT to improve functional status and ability to perform ADLs.       Past Medical History:   Past Medical History:   Diagnosis Date    Arthritis     Cancer     CHF (congestive heart failure)     Coronary artery disease     Hypertension     Thyroid disease        Past Surgical History:   Past Surgical History:   Procedure Laterality Date    CARDIAC CATHETERIZATION      CARDIAC VALVE SURGERY      CHOLECYSTECTOMY      CORONARY ANGIOGRAPHY N/A 4/23/2019    Procedure: ANGIOGRAM, CORONARY ARTERY;  Surgeon: Bakari Singletary MD;  Location: [facility] CATH LAB;  Service: Cardiology;  Laterality: N/A;    HYSTERECTOMY      lymph nodes removal      THYROIDECTOMY      TRANSCATHETER AORTIC VALVE REPLACEMENT (TAVR) N/A 5/7/2019    Procedure: REPLACEMENT, AORTIC VALVE, TRANSCATHETER (TAVR);  Surgeon: Bakari Singletary MD;  Location: [facility] CATH LAB;  Service: Cardiology;  Laterality: N/A;       Social History:   Tobacco Use    Smoking status: Never Smoker    Smokeless tobacco: Never Used   Substance and Sexual Activity    Alcohol use: Not Currently    Drug use: Never    Sexual activity: Not on file       Family History:   Family History    No significant history in parents         No current facility-administered medications on file prior to encounter.     Current Outpatient Medications on File Prior to Encounter   Medication Sig    amLODIPine " (NORVASC) 10 MG tablet Take 1 tablet (10 mg total) by mouth once daily.    aspirin 81 MG Chew Take 81 mg by mouth once daily.    atorvastatin (LIPITOR) 20 MG tablet Take 20 mg by mouth every evening.    bumetanide (BUMEX) 1 MG tablet Take 1 tablet (1 mg total) by mouth once daily.    clopidogreL (PLAVIX) 75 mg tablet Take 1 tablet (75 mg total) by mouth once daily.    lisinopriL (PRINIVIL,ZESTRIL) 20 MG tablet Take 20 mg by mouth once daily.    megestroL (MEGACE) 40 MG Tab Take 1 tablet (40 mg total) by mouth once daily.    MEROPENEM 1 G/100 ML NS, READY TO MIX SYSTEM, Inject 100 mLs (1 g total) into the vein every 12 (twelve) hours. for 9 days    SYNTHROID 125 mcg tablet Take 125 mcg by mouth once daily.    TIMOPTIC OCUDOSE, PF, 0.5 % Dpet Place 1 drop into both eyes 2 (two) times a day.    [DISCONTINUED] albuterol-ipratropium (DUO-NEB) 2.5 mg-0.5 mg/3 mL nebulizer solution Take 3 mLs by nebulization every 6 (six) hours as needed for Wheezing. Rescue (Patient not taking: Reported on 7/3/2022)    [DISCONTINUED] losartan (COZAAR) 50 MG tablet Take 50 mg by mouth once daily.    [DISCONTINUED] traZODone (DESYREL) 50 MG tablet Take 50 mg by mouth nightly.       Allergies:   Review of patient's allergies indicates:   Allergen Reactions    Penicillins Swelling    Sulfa (sulfonamide antibiotics) Nausea Only       ROS:  Review of Systems   Constitutional: Negative for appetite change, chills and fever.   HENT: Negative for congestion and sore throat.    Eyes: Negative for redness and visual disturbance.   Respiratory: Negative for cough and shortness of breath.    Cardiovascular: Positive for leg swelling. Negative for chest pain and palpitations.   Gastrointestinal: Negative for abdominal pain, nausea and vomiting.   Genitourinary: Negative for difficulty urinating and dysuria.   Musculoskeletal: Negative for arthralgias and back pain.   Skin: Negative for pallor and rash.   Allergic/Immunologic: Negative for  environmental allergies and food allergies.   Neurological: Positive for weakness. Negative for dizziness and light-headedness.   Hematological: Does not bruise/bleed easily.   Psychiatric/Behavioral: Negative for sleep disturbance. The patient is nervous/anxious.          Objective:  Temp:  [98.4 °F (36.9 °C)-98.9 °F (37.2 °C)]   Pulse:  [60]   Resp:  [18]   BP: (125-175)/(60-71)   SpO2:  [95 %-98 %]     Body mass index is 35.12 kg/m².      Physical Exam  Vitals and nursing note reviewed.   Constitutional:       General: She is not in acute distress.     Appearance: She is well-developed.   HENT:      Head: Normocephalic and atraumatic.      Right Ear: External ear normal.      Left Ear: External ear normal.      Nose: No nasal deformity or mucosal edema.      Mouth/Throat:      Mouth: Mucous membranes are moist.      Pharynx: Uvula midline. No oropharyngeal exudate.   Eyes:      General: No scleral icterus.     Conjunctiva/sclera: Conjunctivae normal.      Pupils: Pupils are equal, round, and reactive to light.   Neck:      Trachea: Phonation normal.   Cardiovascular:      Rate and Rhythm: Normal rate and regular rhythm.      Heart sounds: S1 normal and S2 normal. Murmur heard.    Systolic murmur is present.  Pulmonary:      Effort: Pulmonary effort is normal. No respiratory distress.      Breath sounds: Normal breath sounds. No wheezing or rales.   Chest:   Breasts:      Right: No supraclavicular adenopathy.      Left: No supraclavicular adenopathy.       Abdominal:      General: Bowel sounds are normal. There is no distension.      Palpations: Abdomen is soft.      Tenderness: There is no abdominal tenderness. There is no guarding or rebound.   Musculoskeletal:         General: Swelling (in LUE) present. No tenderness.      Cervical back: Neck supple. No muscular tenderness.      Right lower leg: No edema.      Left lower leg: No edema.   Lymphadenopathy:      Cervical:      Right cervical: No superficial  cervical adenopathy.     Left cervical: No superficial cervical adenopathy.      Upper Body:      Right upper body: No supraclavicular adenopathy.      Left upper body: No supraclavicular adenopathy.   Skin:     General: Skin is warm and dry.      Findings: No bruising or rash.   Neurological:      Mental Status: She is alert and oriented to person, place, and time.      Motor: No tremor or seizure activity.   Psychiatric:         Mood and Affect: Mood normal.         Behavior: Behavior normal. Behavior is cooperative.         Thought Content: Thought content normal.         Significant Labs:   A1C:   Recent Labs   Lab 04/06/22  0548   HGBA1C 5.9*     TSH:   Recent Labs   Lab 07/03/22  1330   TSH 4.640*     CBC:  Recent Labs   Lab 07/14/22  0626   WBC 4.27   HGB 10.2*   HCT 33.0*      MCV 86     BMP:  Recent Labs   Lab 07/14/22  0626   GLU 93      K 4.0      CO2 28   BUN 19   CREATININE 0.6   CALCIUM 8.5*   MG 1.6   PHOS 2.8       Significant Imaging: I have reviewed all pertinent imaging results/findings completed during prior hospitalization.      Assessment and Plan:  Active Diagnoses:    Diagnosis Date Noted POA    PRINCIPAL PROBLEM:  Acute on chronic combined systolic and diastolic CHF (congestive heart failure) [I50.43] 05/08/2019 Yes    Debility [R53.81] 07/06/2022 Yes    Urinary tract infection without hematuria [N39.0]  Yes    Hyperlipidemia [E78.5] 07/03/2022 Yes    Normocytic anemia [D64.9] 07/03/2022 Yes    Presence of permanent cardiac pacemaker [Z95.0] 07/07/2020 Yes    Coronary artery disease involving native coronary artery of native heart without angina pectoris [I25.10] 05/08/2019 Yes    Acquired hypothyroidism [E03.9] 04/08/2019 Yes    Essential hypertension [I10] 04/08/2019 Yes    Severe aortic stenosis s/p TAVR [I35.0]  Yes      Problems Resolved During this Admission:       Acute hypoxemic respiratory failure due to Acute on Chronic HFpEF  PMHx HFpEF, valvular heart  disease p/w dyspnea and increasing oxygen requirement with evidence of volume overload on physical exam in the setting of an elevated BNP(1347). Echo in 3/2022 demonstrated EF 55-60% with mitral stenosis. Repeat TTE EF 65% with grade II LVD dysfunction with MR related to MV degenerative disease.  -Continue Bumex 1mg daily   -Strict intake & output with fluid restriction to <2L daily     Recurrent Urinary Tract Infection  Patient with recurrent UTIs, prior urine culture shows Pseudomonas only sensitive to imipenem, tobramycin, gentamicin.  Was inadequately treated with ciprofloxacin last month.  - Completed 10 day course of Meropenem on 7/14/22     Wounds to Groin, Perineum, and buttox 2/2 urinary incontinence  -maintain purewic as able to keep area dry  -zinc oxide cream prn and wound care consult     Oropharyngeal Dysphasia  - Continue soft texture diet and SLP consult  - Appreciate SLP assistance.      Severe aortic stenosis s/p TAVR  PMHx severe AS a TAVR in 2019     Symptomatic bradycardia s/p pacemaker.  Pacemaker placed in 2019 for complete heart block and symptomatic bradycardia, currently ventricularly paced at 60 beats per minute     Hypertension  Continue lisinopril 20 mg PO daily     Hyperlipidemia  Lipid panel with LDL at goal in 4/2022 on lipitor 20 mg PO daily     Hypothyroidism  Synthroid 125 mcg PO Daily    Debility   - Continue with PT/OT for gait training and strengthening and restoration of ADL's   - Encourage mobility, OOB in chair, and early ambulation as appropriate  - Fall precautions   - Monitor for bowel and bladder dysfunction  - Monitor for and prevent skin breakdown and pressure ulcers  - Continue DVT prophylaxis with ambulation    Anticipated Disposition:  Home with home health      No future appointments.    I certify that SNF services are required to be given on an inpatient basis because Pilar Michael needs for skilled nursing care and/or skilled rehabilitation are required on a  daily basis and such services can only practically be provided in a skilled nursing facility setting and are for an ongoing condition for which she received inpatient care in the hospital.       Kyree Priest MD  Department of Hospital Medicine   [facility] - Skilled Nursing

## 2022-07-15 NOTE — PLAN OF CARE
Problem: Occupational Therapy  Goal: Occupational Therapy Goal  Description: Goals to be met in: 30 days     Patient will increase functional independence with ADLs by performing:    Feeding with Modified [place].  UE Dressing with Set-up Assistance.  LE Dressing with Minimal Assistance.  Grooming while seated at sink with Modified [place].  Toileting from toilet or BSC with Minimal Assistance for hygiene and clothing management.   Bathing from  sitting at sink with Minimal Assistance.  Rolling to Bilateral with Modified [place].   Supine to sit with Stand-by Assistance.  Step transfer with Contact Guard Assistance with RW as needed.  Upper extremity with assistance as needed.  Caregiver will be educated on level of assist required to safely perform self care tasks and functional transfers..     Outcome: Ongoing, Progressing

## 2022-07-15 NOTE — PT/OT/SLP PROGRESS
Physical Therapy Treatment    Patient Name:  Pilar Michael   MRN:  [id number]  Admit Date: 7/11/2022  Admitting Diagnosis: UTI , Hematuria, Debility  Recent Surgeries: N/A    General Precautions: Standard, aspiration, fall   Orthopedic Precautions:N/A   Braces:  N/A     Recommendations:     Discharge Recommendations:  home health PT   Level of Assistance Recommended at Discharge: 24 hours light assistance  Discharge Equipment Recommendations: bedside commode, grab bar, rollator, shower chair, wheelchair   Barriers to discharge: Decreased caregiver support, Inaccessible home environment    Assessment:     Pilar Michael is a 89 y.o. female admitted with a medical diagnosis of UTI, hematuria, and debility.     Pt required mild encouragement to participate and tolerated therapy session fairly well. Pt continues to be limited by fatigued and required multiple rest breaks. Pt demonstrated better sit>stand today requiring Deep-CGA from wheelchair. Pt continues to benefit from therapy to improve functional endurance and strength and mobility.     Performance deficits affecting function:  weakness, impaired endurance, impaired self care skills, impaired functional mobilty, gait instability, impaired balance, impaired cognition, decreased upper extremity function, decreased lower extremity function, decreased ROM, impaired skin, edema, impaired cardiopulmonary response to activity .    Rehab Potential is good    Activity Tolerance: Good    Plan:     Patient to be seen 6 x/week to address the above listed problems via gait training, therapeutic activities, therapeutic exercises, neuromuscular re-education, wheelchair management/training    · Plan of Care Expires: 08/11/22  · Plan of Care Reviewed with: patient, daughter, son    Subjective     Pt agreeable to therapy with mild encouragement       Pain/Comfort:  Pain Rating 1: 0/10  Pain Rating Post-Intervention 1: 0/10    Patient's cultural, spiritual, Sikhism conflicts  given the current situation:  no    Objective:     Patient found HOB elevated with oxygen upon PT entry to room. Pt's family present for therapy.     Therapeutic Activities and Exercises:   · Pt required mild encouragement to participate in therapy.   Mini elliptical x10 mins   · light resistance and 2 breaks d/t fatigued  · Patient educated on role of therapy, goals of session, and benefits of out of bed mobility.   · Instructed on use of call button and importance of calling nursing staff for assistance with mobility   · Questions/concerns addressed within PTA scope of practice   Pt verbalized understanding.   Whiteboard Updated    Functional Mobility:  · Bed Mobility:     · Scooting to EOB: minimun assistance   · Increase time to complete   · Supine to Sit: minimun assistance with HOB elevated and trunk management   · Verbal cues for sequencing.   · Transfers:     · Sit to Stand:  Deep-CGA with rolling walker  · Verbal cues for hand placement for safety  · Bed to Wheelchair: minimum assistance with noAD using Stand pivot.  · Gait: pt ambulated ~6ft + 4ft with RW and CGA and wheelchair follow and oxygen towed. occasionally unsteady step-to gait with decrease step length and flexed posture  · Distance limited by fatigued  · 1 seated rest break.   · Verbal/tactile cues to keep RW close and sequencing     AM-PAC 6 CLICK MOBILITY  13    Patient left up in wheelchair with call button in reach and family present.    GOALS:   Multidisciplinary Problems     Physical Therapy Goals        Problem: Physical Therapy    Goal Priority Disciplines Outcome Goal Variances Interventions   Physical Therapy Goal     PT, PT/OT Ongoing, Progressing     Description: Goals to be met in 30 days (2022):     Patient will increase functional independence with mobility by performin. Supine to sit with [place].  2. Sit to supine with [place].  3. Rolling to Left and Right with [place].  4. Sit to stand transfer  with Supervision.  5. Bed to chair transfer with Supervision using Rolling Walker.  6. Gait  x 50 feet with Supervision using Rolling Walker.   7. Wheelchair propulsion x 50 feet with Supervision using bilateral upper extremities.  8. Ascend/descend 4 stairs with bilateral Handrails Moderate Assistance using No Assistive Device.   9. Ascend/Descend 4 inch curb step with Moderate Assistance using Rolling Walker.  10. Lower extremity exercise program x 20 reps per handout, with supervision.  11. Retrieve object from ground with reacher, RW, and ModA.                     Time Tracking:     PT Received On: 07/15/22   PT Start Time: 1130  PT Stop Time:  1209  PT Total Time (min): 39 mins    Billable Minutes: Gait Training 15, Therapeutic Activity 14 and Therapeutic Exercise 10    Treatment Type: Treatment  PT/PTA: PTA     PTA Visit Number: 3     07/15/2022

## 2022-07-15 NOTE — PLAN OF CARE
Interdisciplinary team, Brissa Bland, RN Nurse Manager, Caroline Xiong, RN Charge Nurse, Nathaly Ford, RN MDS Coordinator, Humaira Hay PT, Rehab Supervisor, and Cheryl Gutiérrez, Dietician, spoke to patient and patient's son and niece for care plan conference, weekly status update, and therapy progress update. Tentative discharge date set for 8/1/22.

## 2022-07-16 PROCEDURE — 11000004 HC SNF PRIVATE

## 2022-07-16 PROCEDURE — 97110 THERAPEUTIC EXERCISES: CPT | Mod: CQ

## 2022-07-16 PROCEDURE — 97116 GAIT TRAINING THERAPY: CPT | Mod: CQ

## 2022-07-16 PROCEDURE — 25000003 PHARM REV CODE 250: Performed by: FAMILY MEDICINE

## 2022-07-16 PROCEDURE — 25000003 PHARM REV CODE 250: Performed by: HOSPITALIST

## 2022-07-16 PROCEDURE — 25000003 PHARM REV CODE 250: Performed by: NURSE PRACTITIONER

## 2022-07-16 RX ADMIN — CLOPIDOGREL 75 MG: 75 TABLET, FILM COATED ORAL at 11:07

## 2022-07-16 RX ADMIN — TIMOLOL MALEATE 1 DROP: 5 SOLUTION OPHTHALMIC at 09:07

## 2022-07-16 RX ADMIN — SENNOSIDES AND DOCUSATE SODIUM 1 TABLET: 50; 8.6 TABLET ORAL at 09:07

## 2022-07-16 RX ADMIN — LEVOTHYROXINE SODIUM 125 MCG: 25 TABLET ORAL at 05:07

## 2022-07-16 RX ADMIN — BUMETANIDE 1 MG: 1 TABLET ORAL at 11:07

## 2022-07-16 RX ADMIN — ATORVASTATIN CALCIUM 20 MG: 20 TABLET, FILM COATED ORAL at 09:07

## 2022-07-16 RX ADMIN — Medication 6 MG: at 09:07

## 2022-07-16 RX ADMIN — ASPIRIN 81 MG CHEWABLE TABLET 81 MG: 81 TABLET CHEWABLE at 11:07

## 2022-07-16 RX ADMIN — MEGESTROL ACETATE 40 MG: 20 TABLET ORAL at 11:07

## 2022-07-16 RX ADMIN — LISINOPRIL 10 MG: 10 TABLET ORAL at 11:07

## 2022-07-16 RX ADMIN — ACETAMINOPHEN 650 MG: 325 TABLET ORAL at 09:07

## 2022-07-16 RX ADMIN — TIMOLOL MALEATE 1 DROP: 5 SOLUTION OPHTHALMIC at 11:07

## 2022-07-16 NOTE — PT/OT/SLP PROGRESS
"Physical Therapy Treatment    Patient Name:  Pilar Michael   MRN:  [id number]  Admit Date: 7/11/2022  Admitting Diagnosis: Acute on chronic combined systolic and diastolic CHF (congestive heart failure)  Recent Surgeries:     General Precautions: Standard, aspiration, fall   Orthopedic Precautions:N/A   Braces: N/A     Recommendations:     Discharge Recommendations:  home health PT   Level of Assistance Recommended at Discharge: 24 hours light assistance  Discharge Equipment Recommendations: bedside commode, grab bar, rollator, shower chair, wheelchair   Barriers to discharge: Decreased caregiver support, Inaccessible home environment    Assessment:     Pilar Michael is a 89 y.o. female admitted with a medical diagnosis of Acute on chronic combined systolic and diastolic CHF (congestive heart failure) .     Pt was agreeable to therapy, but limited by increased fatigued and back pain. Pt demonstrated good transfer and gait requiring occasional verbal cues for safety. Pt continues to benefit from therapy to improve functional endurance, strength, and mobility.     Performance deficits affecting function:  weakness, impaired endurance, impaired self care skills, impaired functional mobilty, gait instability, impaired balance, impaired cognition, decreased upper extremity function, decreased lower extremity function, decreased ROM, impaired skin, edema, impaired cardiopulmonary response to activity .    Rehab Potential is fair    Activity Tolerance: Fair    Plan:     Patient to be seen 6 x/week to address the above listed problems via gait training, therapeutic activities, therapeutic exercises, neuromuscular re-education, wheelchair management/training    · Plan of Care Expires: 08/11/22  · Plan of Care Reviewed with: patient, daughter, son    Subjective     "I bathed this morning" and 'm very tired" .     Pain/Comfort:  · Pain Rating 1:  (did not rate)  · Location - Orientation 1: generalized  · Location 1: " back  · Pain Addressed 1: Reposition, Distraction  · Pain Rating Post-Intervention 1:  (did not rate)    Patient's cultural, spiritual, Latter day conflicts given the current situation:  · no    Objective:     Patient found up in wheelchair with  (no active lines) upon PT entry to room. Pt's daughter present.     Therapeutic Activities and Exercises:    Seated BLE therex x15 reps: marching    Mini elliptical x5 mins  Patient educated on role of therapy, goals of session, and benefits of out of bed mobility.   Instructed on use of call button and importance of calling nursing staff for assistance with mobility   Questions/concerns addressed within PTA scope of practice  Pt verbalized understanding.  Whiteboard Update      Functional Mobility:  · Bed Mobility:     · Attempted, but not completed d/t back pain  · Transfers:     · Sit to Stand:  contact guard assistance with rolling walker  · Increase time to compete   · Verbal cues for hand placement for saftey  · Wheelchair <> mat: contact guard assistance with  no AD  using  Stand Pivot  · Gait: pt ambulated ~8ft with RW and CGA and w/c following. Occasionally unsteady gait with flexed posture, decrease step length   · Distance limited by increase fatigued.   · Verbal encouragement to ambulate more.     AM-PAC 6 CLICK MOBILITY  13    Patient left up in wheeelchair with call button in reach.    GOALS:   Multidisciplinary Problems     Physical Therapy Goals        Problem: Physical Therapy    Goal Priority Disciplines Outcome Goal Variances Interventions   Physical Therapy Goal     PT, PT/OT Ongoing, Progressing     Description: Goals to be met in 30 days (2022):     Patient will increase functional independence with mobility by performin. Supine to sit with [place].  2. Sit to supine with [place].  3. Rolling to Left and Right with [place].  4. Sit to stand transfer with Supervision.  5. Bed to chair transfer with Supervision using Rolling  Walker.  6. Gait  x 50 feet with Supervision using Rolling Walker.   7. Wheelchair propulsion x 50 feet with Supervision using bilateral upper extremities.  8. Ascend/descend 4 stairs with bilateral Handrails Moderate Assistance using No Assistive Device.   9. Ascend/Descend 4 inch curb step with Moderate Assistance using Rolling Walker.  10. Lower extremity exercise program x 20 reps per handout, with supervision.  11. Retrieve object from ground with reacher, RW, and ModA.                     Time Tracking:     PT Received On: 07/16/22  PT Start Time: 1038  PT Stop Time: 1106  PT Total Time (min): 28 min    Billable Minutes: Gait Training 10 and Therapeutic Exercise 18    Treatment Type: Treatment  PT/PTA: PTA     PTA Visit Number: 4 07/16/2022

## 2022-07-17 PROCEDURE — 25000003 PHARM REV CODE 250: Performed by: HOSPITALIST

## 2022-07-17 PROCEDURE — 25000003 PHARM REV CODE 250: Performed by: NURSE PRACTITIONER

## 2022-07-17 PROCEDURE — 11000004 HC SNF PRIVATE

## 2022-07-17 RX ORDER — HYDRALAZINE HYDROCHLORIDE 25 MG/1
25 TABLET, FILM COATED ORAL EVERY 8 HOURS PRN
Status: DISCONTINUED | OUTPATIENT
Start: 2022-07-17 | End: 2022-07-18 | Stop reason: HOSPADM

## 2022-07-17 RX ADMIN — MEGESTROL ACETATE 40 MG: 20 TABLET ORAL at 10:07

## 2022-07-17 RX ADMIN — LEVOTHYROXINE SODIUM 125 MCG: 25 TABLET ORAL at 06:07

## 2022-07-17 RX ADMIN — LISINOPRIL 10 MG: 10 TABLET ORAL at 11:07

## 2022-07-17 RX ADMIN — ATORVASTATIN CALCIUM 20 MG: 20 TABLET, FILM COATED ORAL at 10:07

## 2022-07-17 RX ADMIN — TIMOLOL MALEATE 1 DROP: 5 SOLUTION OPHTHALMIC at 10:07

## 2022-07-17 RX ADMIN — ACETAMINOPHEN 650 MG: 325 TABLET ORAL at 10:07

## 2022-07-17 RX ADMIN — ASPIRIN 81 MG CHEWABLE TABLET 81 MG: 81 TABLET CHEWABLE at 10:07

## 2022-07-17 RX ADMIN — BUMETANIDE 1 MG: 1 TABLET ORAL at 11:07

## 2022-07-17 RX ADMIN — HYDRALAZINE HYDROCHLORIDE 25 MG: 25 TABLET, FILM COATED ORAL at 10:07

## 2022-07-17 RX ADMIN — CLOPIDOGREL 75 MG: 75 TABLET, FILM COATED ORAL at 10:07

## 2022-07-17 NOTE — NURSING
Pt was offered to return back to bed with staff assist before end of staff shift, refused. Prefers to await until daughter arrives.

## 2022-07-17 NOTE — PT/OT/SLP PROGRESS
Occupational Therapy      Patient Name:  Pilar Michael   MRN:  [id number]    Patient not seen today secondary to declining 1st attempt due to watching MASS and second attempt at 13:50 due to not feeling well . Will follow-up with OT POC on Monday 7/17/2022

## 2022-07-18 ENCOUNTER — HOSPITAL ENCOUNTER (INPATIENT)
Facility: HOSPITAL | Age: 87
LOS: 4 days | Discharge: NURSING, REHAB OR LONG-TERM CARE | DRG: 291 | End: 2022-07-22
Attending: EMERGENCY MEDICINE | Admitting: INTERNAL MEDICINE
Payer: MEDICARE

## 2022-07-18 VITALS
WEIGHT: 187.81 LBS | HEART RATE: 60 BPM | OXYGEN SATURATION: 97 % | SYSTOLIC BLOOD PRESSURE: 156 MMHG | DIASTOLIC BLOOD PRESSURE: 65 MMHG | TEMPERATURE: 98 F | RESPIRATION RATE: 30 BRPM | BODY MASS INDEX: 35.46 KG/M2 | HEIGHT: 61 IN

## 2022-07-18 DIAGNOSIS — I50.9 ACUTE EXACERBATION OF CHF (CONGESTIVE HEART FAILURE): ICD-10-CM

## 2022-07-18 DIAGNOSIS — I50.9 HEART FAILURE: ICD-10-CM

## 2022-07-18 DIAGNOSIS — R06.02 SOB (SHORTNESS OF BREATH): ICD-10-CM

## 2022-07-18 LAB
ALBUMIN SERPL BCP-MCNC: 2.7 G/DL (ref 3.5–5.2)
ALLENS TEST: ABNORMAL
ALLENS TEST: ABNORMAL
ALP SERPL-CCNC: 106 U/L (ref 55–135)
ALT SERPL W/O P-5'-P-CCNC: 11 U/L (ref 10–44)
ANION GAP SERPL CALC-SCNC: 10 MMOL/L (ref 8–16)
ANION GAP SERPL CALC-SCNC: 11 MMOL/L (ref 8–16)
AST SERPL-CCNC: 24 U/L (ref 10–40)
BACTERIA #/AREA URNS AUTO: ABNORMAL /HPF
BASOPHILS # BLD AUTO: 0.01 K/UL (ref 0–0.2)
BASOPHILS # BLD AUTO: 0.01 K/UL (ref 0–0.2)
BASOPHILS # BLD AUTO: 0.02 K/UL (ref 0–0.2)
BASOPHILS NFR BLD: 0.2 % (ref 0–1.9)
BASOPHILS NFR BLD: 0.3 % (ref 0–1.9)
BASOPHILS NFR BLD: 0.4 % (ref 0–1.9)
BILIRUB SERPL-MCNC: 1.1 MG/DL (ref 0.1–1)
BILIRUB UR QL STRIP: NEGATIVE
BNP SERPL-MCNC: 1253 PG/ML (ref 0–99)
BUN SERPL-MCNC: 15 MG/DL (ref 8–23)
BUN SERPL-MCNC: 17 MG/DL (ref 8–23)
BUN SERPL-MCNC: 22 MG/DL (ref 6–30)
CALCIUM SERPL-MCNC: 9.2 MG/DL (ref 8.7–10.5)
CALCIUM SERPL-MCNC: 9.4 MG/DL (ref 8.7–10.5)
CHLORIDE SERPL-SCNC: 101 MMOL/L (ref 95–110)
CHLORIDE SERPL-SCNC: 102 MMOL/L (ref 95–110)
CHLORIDE SERPL-SCNC: 99 MMOL/L (ref 95–110)
CLARITY UR REFRACT.AUTO: ABNORMAL
CO2 SERPL-SCNC: 23 MMOL/L (ref 23–29)
CO2 SERPL-SCNC: 25 MMOL/L (ref 23–29)
COLOR UR AUTO: YELLOW
CREAT SERPL-MCNC: 0.7 MG/DL (ref 0.5–1.4)
CTP QC/QA: YES
DELSYS: ABNORMAL
DELSYS: ABNORMAL
DIFFERENTIAL METHOD: ABNORMAL
EOSINOPHIL # BLD AUTO: 0 K/UL (ref 0–0.5)
EOSINOPHIL # BLD AUTO: 0.1 K/UL (ref 0–0.5)
EOSINOPHIL # BLD AUTO: 0.1 K/UL (ref 0–0.5)
EOSINOPHIL NFR BLD: 0 % (ref 0–8)
EOSINOPHIL NFR BLD: 1.1 % (ref 0–8)
EOSINOPHIL NFR BLD: 1.9 % (ref 0–8)
EP: 5
ERYTHROCYTE [DISTWIDTH] IN BLOOD BY AUTOMATED COUNT: 16.6 % (ref 11.5–14.5)
ERYTHROCYTE [DISTWIDTH] IN BLOOD BY AUTOMATED COUNT: 16.8 % (ref 11.5–14.5)
ERYTHROCYTE [DISTWIDTH] IN BLOOD BY AUTOMATED COUNT: 16.8 % (ref 11.5–14.5)
ERYTHROCYTE [SEDIMENTATION RATE] IN BLOOD BY WESTERGREN METHOD: 12 MM/H
EST. GFR  (AFRICAN AMERICAN): >60 ML/MIN/1.73 M^2
EST. GFR  (AFRICAN AMERICAN): >60 ML/MIN/1.73 M^2
EST. GFR  (NON AFRICAN AMERICAN): >60 ML/MIN/1.73 M^2
EST. GFR  (NON AFRICAN AMERICAN): >60 ML/MIN/1.73 M^2
FIO2: 50
GLUCOSE SERPL-MCNC: 152 MG/DL (ref 70–110)
GLUCOSE SERPL-MCNC: 235 MG/DL (ref 70–110)
GLUCOSE SERPL-MCNC: 95 MG/DL (ref 70–110)
GLUCOSE UR QL STRIP: NEGATIVE
HCO3 UR-SCNC: 27.5 MMOL/L (ref 24–28)
HCO3 UR-SCNC: 30 MMOL/L (ref 24–28)
HCT VFR BLD AUTO: 32.7 % (ref 37–48.5)
HCT VFR BLD AUTO: 33 % (ref 37–48.5)
HCT VFR BLD AUTO: 37.6 % (ref 37–48.5)
HCT VFR BLD CALC: 38 %PCV (ref 36–54)
HGB BLD-MCNC: 10 G/DL (ref 12–16)
HGB BLD-MCNC: 10.7 G/DL (ref 12–16)
HGB BLD-MCNC: 11.3 G/DL (ref 12–16)
HGB UR QL STRIP: NEGATIVE
HYALINE CASTS UR QL AUTO: 1 /LPF
IMM GRANULOCYTES # BLD AUTO: 0.01 K/UL (ref 0–0.04)
IMM GRANULOCYTES # BLD AUTO: 0.01 K/UL (ref 0–0.04)
IMM GRANULOCYTES # BLD AUTO: 0.04 K/UL (ref 0–0.04)
IMM GRANULOCYTES NFR BLD AUTO: 0.2 % (ref 0–0.5)
IMM GRANULOCYTES NFR BLD AUTO: 0.3 % (ref 0–0.5)
IMM GRANULOCYTES NFR BLD AUTO: 0.8 % (ref 0–0.5)
IP: 12
KETONES UR QL STRIP: NEGATIVE
LACTATE SERPL-SCNC: 1.3 MMOL/L (ref 0.5–2.2)
LEUKOCYTE ESTERASE UR QL STRIP: ABNORMAL
LYMPHOCYTES # BLD AUTO: 0.6 K/UL (ref 1–4.8)
LYMPHOCYTES # BLD AUTO: 0.6 K/UL (ref 1–4.8)
LYMPHOCYTES # BLD AUTO: 1.4 K/UL (ref 1–4.8)
LYMPHOCYTES NFR BLD: 11 % (ref 18–48)
LYMPHOCYTES NFR BLD: 19 % (ref 18–48)
LYMPHOCYTES NFR BLD: 25.5 % (ref 18–48)
MAGNESIUM SERPL-MCNC: 1.7 MG/DL (ref 1.6–2.6)
MCH RBC QN AUTO: 26.4 PG (ref 27–31)
MCH RBC QN AUTO: 26.6 PG (ref 27–31)
MCH RBC QN AUTO: 26.6 PG (ref 27–31)
MCHC RBC AUTO-ENTMCNC: 30.1 G/DL (ref 32–36)
MCHC RBC AUTO-ENTMCNC: 30.6 G/DL (ref 32–36)
MCHC RBC AUTO-ENTMCNC: 32.4 G/DL (ref 32–36)
MCV RBC AUTO: 82 FL (ref 82–98)
MCV RBC AUTO: 87 FL (ref 82–98)
MCV RBC AUTO: 88 FL (ref 82–98)
MICROSCOPIC COMMENT: ABNORMAL
MODE: ABNORMAL
MONOCYTES # BLD AUTO: 0.3 K/UL (ref 0.3–1)
MONOCYTES NFR BLD: 5.8 % (ref 4–15)
MONOCYTES NFR BLD: 6.2 % (ref 4–15)
MONOCYTES NFR BLD: 9.3 % (ref 4–15)
NEUTROPHILS # BLD AUTO: 2.2 K/UL (ref 1.8–7.7)
NEUTROPHILS # BLD AUTO: 3.5 K/UL (ref 1.8–7.7)
NEUTROPHILS # BLD AUTO: 4.2 K/UL (ref 1.8–7.7)
NEUTROPHILS NFR BLD: 66 % (ref 38–73)
NEUTROPHILS NFR BLD: 69.2 % (ref 38–73)
NEUTROPHILS NFR BLD: 82.8 % (ref 38–73)
NITRITE UR QL STRIP: NEGATIVE
NRBC BLD-RTO: 0 /100 WBC
PCO2 BLDA: 39.2 MMHG (ref 35–45)
PCO2 BLDA: 55.7 MMHG (ref 35–45)
PH SMN: 7.34 [PH] (ref 7.35–7.45)
PH SMN: 7.46 [PH] (ref 7.35–7.45)
PH UR STRIP: 7 [PH] (ref 5–8)
PHOSPHATE SERPL-MCNC: 3.3 MG/DL (ref 2.7–4.5)
PLATELET # BLD AUTO: 165 K/UL (ref 150–450)
PLATELET # BLD AUTO: 175 K/UL (ref 150–450)
PLATELET # BLD AUTO: 226 K/UL (ref 150–450)
PMV BLD AUTO: 10.5 FL (ref 9.2–12.9)
PMV BLD AUTO: 11.6 FL (ref 9.2–12.9)
PMV BLD AUTO: 12.1 FL (ref 9.2–12.9)
PO2 BLDA: 43 MMHG (ref 40–60)
PO2 BLDA: 44 MMHG (ref 40–60)
POC BE: 4 MMOL/L
POC BE: 4 MMOL/L
POC IONIZED CALCIUM: 1.2 MMOL/L (ref 1.06–1.42)
POC SATURATED O2: 74 % (ref 95–100)
POC SATURATED O2: 82 % (ref 95–100)
POC TCO2 (MEASURED): 30 MMOL/L (ref 23–29)
POC TCO2: 29 MMOL/L (ref 24–29)
POC TCO2: 32 MMOL/L (ref 24–29)
POTASSIUM BLD-SCNC: 4.8 MMOL/L (ref 3.5–5.1)
POTASSIUM SERPL-SCNC: 4 MMOL/L (ref 3.5–5.1)
POTASSIUM SERPL-SCNC: 4.2 MMOL/L (ref 3.5–5.1)
PROT SERPL-MCNC: 8.2 G/DL (ref 6–8.4)
PROT UR QL STRIP: ABNORMAL
RBC # BLD AUTO: 3.76 M/UL (ref 4–5.4)
RBC # BLD AUTO: 4.03 M/UL (ref 4–5.4)
RBC # BLD AUTO: 4.28 M/UL (ref 4–5.4)
RBC #/AREA URNS AUTO: 4 /HPF (ref 0–4)
SAMPLE: ABNORMAL
SARS-COV-2 RDRP RESP QL NAA+PROBE: NEGATIVE
SITE: ABNORMAL
SITE: ABNORMAL
SODIUM BLD-SCNC: 137 MMOL/L (ref 136–145)
SODIUM SERPL-SCNC: 135 MMOL/L (ref 136–145)
SODIUM SERPL-SCNC: 137 MMOL/L (ref 136–145)
SP GR UR STRIP: 1.01 (ref 1–1.03)
SPONT RATE: 22
SQUAMOUS #/AREA URNS AUTO: 9 /HPF
TROPONIN I SERPL DL<=0.01 NG/ML-MCNC: 0.05 NG/ML (ref 0–0.03)
TROPONIN I SERPL DL<=0.01 NG/ML-MCNC: 0.06 NG/ML (ref 0–0.03)
URN SPEC COLLECT METH UR: ABNORMAL
WBC # BLD AUTO: 3.21 K/UL (ref 3.9–12.7)
WBC # BLD AUTO: 5.01 K/UL (ref 3.9–12.7)
WBC # BLD AUTO: 5.3 K/UL (ref 3.9–12.7)
WBC #/AREA URNS AUTO: 23 /HPF (ref 0–5)

## 2022-07-18 PROCEDURE — 63600175 PHARM REV CODE 636 W HCPCS: Performed by: INTERNAL MEDICINE

## 2022-07-18 PROCEDURE — 81001 URINALYSIS AUTO W/SCOPE: CPT | Performed by: EMERGENCY MEDICINE

## 2022-07-18 PROCEDURE — 94761 N-INVAS EAR/PLS OXIMETRY MLT: CPT

## 2022-07-18 PROCEDURE — 27000190 HC CPAP FULL FACE MASK W/VALVE

## 2022-07-18 PROCEDURE — 99223 1ST HOSP IP/OBS HIGH 75: CPT | Mod: GC,,, | Performed by: INTERNAL MEDICINE

## 2022-07-18 PROCEDURE — 84484 ASSAY OF TROPONIN QUANT: CPT | Performed by: EMERGENCY MEDICINE

## 2022-07-18 PROCEDURE — 83735 ASSAY OF MAGNESIUM: CPT | Performed by: HOSPITALIST

## 2022-07-18 PROCEDURE — 87040 BLOOD CULTURE FOR BACTERIA: CPT | Mod: 59 | Performed by: EMERGENCY MEDICINE

## 2022-07-18 PROCEDURE — 83605 ASSAY OF LACTIC ACID: CPT | Performed by: STUDENT IN AN ORGANIZED HEALTH CARE EDUCATION/TRAINING PROGRAM

## 2022-07-18 PROCEDURE — 99285 EMERGENCY DEPT VISIT HI MDM: CPT | Mod: CS,,, | Performed by: EMERGENCY MEDICINE

## 2022-07-18 PROCEDURE — 96375 TX/PRO/DX INJ NEW DRUG ADDON: CPT

## 2022-07-18 PROCEDURE — 96365 THER/PROPH/DIAG IV INF INIT: CPT

## 2022-07-18 PROCEDURE — 63600175 PHARM REV CODE 636 W HCPCS: Performed by: STUDENT IN AN ORGANIZED HEALTH CARE EDUCATION/TRAINING PROGRAM

## 2022-07-18 PROCEDURE — 99900035 HC TECH TIME PER 15 MIN (STAT)

## 2022-07-18 PROCEDURE — 25000003 PHARM REV CODE 250: Performed by: EMERGENCY MEDICINE

## 2022-07-18 PROCEDURE — 93005 ELECTROCARDIOGRAM TRACING: CPT

## 2022-07-18 PROCEDURE — 84100 ASSAY OF PHOSPHORUS: CPT | Performed by: HOSPITALIST

## 2022-07-18 PROCEDURE — 63600175 PHARM REV CODE 636 W HCPCS: Performed by: EMERGENCY MEDICINE

## 2022-07-18 PROCEDURE — 36415 COLL VENOUS BLD VENIPUNCTURE: CPT | Performed by: HOSPITALIST

## 2022-07-18 PROCEDURE — U0002 COVID-19 LAB TEST NON-CDC: HCPCS | Performed by: EMERGENCY MEDICINE

## 2022-07-18 PROCEDURE — 99223 PR INITIAL HOSPITAL CARE,LEVL III: ICD-10-PCS | Mod: GC,,, | Performed by: INTERNAL MEDICINE

## 2022-07-18 PROCEDURE — 94660 CPAP INITIATION&MGMT: CPT

## 2022-07-18 PROCEDURE — 85025 COMPLETE CBC W/AUTO DIFF WBC: CPT | Mod: 91 | Performed by: EMERGENCY MEDICINE

## 2022-07-18 PROCEDURE — 12000002 HC ACUTE/MED SURGE SEMI-PRIVATE ROOM

## 2022-07-18 PROCEDURE — 87086 URINE CULTURE/COLONY COUNT: CPT | Performed by: EMERGENCY MEDICINE

## 2022-07-18 PROCEDURE — 99285 EMERGENCY DEPT VISIT HI MDM: CPT | Mod: 25

## 2022-07-18 PROCEDURE — 80048 BASIC METABOLIC PNL TOTAL CA: CPT | Mod: XB | Performed by: HOSPITALIST

## 2022-07-18 PROCEDURE — 85025 COMPLETE CBC W/AUTO DIFF WBC: CPT | Performed by: STUDENT IN AN ORGANIZED HEALTH CARE EDUCATION/TRAINING PROGRAM

## 2022-07-18 PROCEDURE — 82803 BLOOD GASES ANY COMBINATION: CPT

## 2022-07-18 PROCEDURE — 93010 EKG 12-LEAD: ICD-10-PCS | Mod: ,,, | Performed by: INTERNAL MEDICINE

## 2022-07-18 PROCEDURE — 85025 COMPLETE CBC W/AUTO DIFF WBC: CPT | Mod: 91 | Performed by: HOSPITALIST

## 2022-07-18 PROCEDURE — 84484 ASSAY OF TROPONIN QUANT: CPT | Mod: 91 | Performed by: STUDENT IN AN ORGANIZED HEALTH CARE EDUCATION/TRAINING PROGRAM

## 2022-07-18 PROCEDURE — 83880 ASSAY OF NATRIURETIC PEPTIDE: CPT | Performed by: EMERGENCY MEDICINE

## 2022-07-18 PROCEDURE — 27000221 HC OXYGEN, UP TO 24 HOURS

## 2022-07-18 PROCEDURE — 99285 PR EMERGENCY DEPT VISIT,LEVEL V: ICD-10-PCS | Mod: CS,,, | Performed by: EMERGENCY MEDICINE

## 2022-07-18 PROCEDURE — 80053 COMPREHEN METABOLIC PANEL: CPT | Performed by: EMERGENCY MEDICINE

## 2022-07-18 PROCEDURE — 93010 ELECTROCARDIOGRAM REPORT: CPT | Mod: ,,, | Performed by: INTERNAL MEDICINE

## 2022-07-18 PROCEDURE — 25000003 PHARM REV CODE 250: Performed by: HOSPITALIST

## 2022-07-18 RX ORDER — IPRATROPIUM BROMIDE AND ALBUTEROL SULFATE 2.5; .5 MG/3ML; MG/3ML
3 SOLUTION RESPIRATORY (INHALATION)
Status: COMPLETED | OUTPATIENT
Start: 2022-07-18 | End: 2022-07-19

## 2022-07-18 RX ORDER — SODIUM CHLORIDE 0.9 % (FLUSH) 0.9 %
10 SYRINGE (ML) INJECTION
Status: DISCONTINUED | OUTPATIENT
Start: 2022-07-18 | End: 2022-07-20

## 2022-07-18 RX ORDER — MIDAZOLAM HYDROCHLORIDE 1 MG/ML
0.5 INJECTION INTRAMUSCULAR; INTRAVENOUS ONCE
Status: COMPLETED | OUTPATIENT
Start: 2022-07-18 | End: 2022-07-18

## 2022-07-18 RX ORDER — FUROSEMIDE 10 MG/ML
80 INJECTION INTRAMUSCULAR; INTRAVENOUS
Status: COMPLETED | OUTPATIENT
Start: 2022-07-18 | End: 2022-07-18

## 2022-07-18 RX ORDER — NITROGLYCERIN 20 MG/100ML
5 INJECTION INTRAVENOUS CONTINUOUS
Status: DISCONTINUED | OUTPATIENT
Start: 2022-07-18 | End: 2022-07-19

## 2022-07-18 RX ORDER — ENOXAPARIN SODIUM 100 MG/ML
40 INJECTION SUBCUTANEOUS EVERY 24 HOURS
Status: DISCONTINUED | OUTPATIENT
Start: 2022-07-18 | End: 2022-07-22 | Stop reason: HOSPADM

## 2022-07-18 RX ADMIN — FUROSEMIDE 80 MG: 10 INJECTION, SOLUTION INTRAMUSCULAR; INTRAVENOUS at 07:07

## 2022-07-18 RX ADMIN — FUROSEMIDE 20 MG/HR: 10 INJECTION, SOLUTION INTRAMUSCULAR; INTRAVENOUS at 11:07

## 2022-07-18 RX ADMIN — FUROSEMIDE 40 MG/HR: 10 INJECTION, SOLUTION INTRAMUSCULAR; INTRAVENOUS at 11:07

## 2022-07-18 RX ADMIN — NITROGLYCERIN 5 MCG/MIN: 20 INJECTION INTRAVENOUS at 08:07

## 2022-07-18 RX ADMIN — LEVOTHYROXINE SODIUM 125 MCG: 25 TABLET ORAL at 05:07

## 2022-07-18 RX ADMIN — ENOXAPARIN SODIUM 40 MG: 100 INJECTION SUBCUTANEOUS at 04:07

## 2022-07-18 RX ADMIN — MIDAZOLAM 0.5 MG: 1 INJECTION INTRAMUSCULAR; INTRAVENOUS at 02:07

## 2022-07-18 NOTE — H&P
[facility] [place]  H&P  Cardiology      Pilar Michael  YOB: 1932  Medical Record Number:  [id number]  Attending Physician:  Sara Manzo MD   Date of Admission: 7/18/2022       Hospital Day:  0  Current Principal Problem:  <principal problem not specified>      History     Cc:  Dyspnea    HPI  87 year old female with PMH severe AS s/p TAVR (5/7/19), CAD s/p ostial RCA stent placement (4/19), IDBCA s/p resection and radiation (2003), thyroid cancer s/p resection (1992), HTN, PAD presents to the hospital secondary to dyspnea.  Patient was at rehab and found to be short of breath that have been progressively worsening over the morning.  She was recently admitted to the hospital secondary to acute on chronic diastolic heart failure.  She was diuresed with Lasix which was eventually switched to Bumex.  She was discharged on Bumex 1 mg b.i.d. and this was decreased to 1 mg daily per her outpatient cardiologist.  She presents to the ED hypoxic with O2 sat in the 70s.  She was placed on BiPAP with improvement in her O2 saturations.  She is hemodynamically stable.  Chest x-ray notable for large left pleural effusion pulmonary edema pulmonary vascular congestion.  BNP elevated.  Tropes mildly elevated.  CBC appears at baseline with microcytic anemia.  CMP grossly unremarkable.  Lactate 1.3.  Given patient's acute on chronic diastolic heart failure requiring BiPAP, she will be admitted to CCU for diuresis.      Medications - Outpatient  Prior to Admission medications    Medication Sig Start Date End Date Taking? Authorizing Provider   amLODIPine (NORVASC) 10 MG tablet Take 1 tablet (10 mg total) by mouth once daily. 7/9/22 7/9/23  Yuriy Cabral MD   aspirin 81 MG Chew Take 81 mg by mouth once daily.    Historical Provider   atorvastatin (LIPITOR) 20 MG tablet Take 20 mg by mouth every evening. 10/22/21   Historical Provider   bumetanide (BUMEX) 1 MG tablet Take 1 tablet (1 mg  total) by mouth once daily. 7/8/22 10/6/22  Yuriy Cabral MD   clopidogreL (PLAVIX) 75 mg tablet Take 1 tablet (75 mg total) by mouth once daily. 11/29/21   Pippa Acharya MD   lisinopriL (PRINIVIL,ZESTRIL) 20 MG tablet Take 20 mg by mouth once daily. 5/9/22   Historical Provider   megestroL (MEGACE) 40 MG Tab Take 1 tablet (40 mg total) by mouth once daily. 7/12/22 7/12/23  Miguel Wahl MD   MEROPENEM 1 G/100 ML NS, READY TO MIX SYSTEM, Inject 100 mLs (1 g total) into the vein every 12 (twelve) hours. for 9 days 7/8/22 7/17/22  Yuriy Cabral MD   SYNTHROID 125 mcg tablet Take 125 mcg by mouth once daily. 2/9/19   Historical Provider   TIMOPTIC OCUDOSE, PF, 0.5 % Dpet Place 1 drop into both eyes 2 (two) times a day. 2/21/19   Historical Provider   albuterol-ipratropium (DUO-NEB) 2.5 mg-0.5 mg/3 mL nebulizer solution Take 3 mLs by nebulization every 6 (six) hours as needed for Wheezing. Rescue  Patient not taking: Reported on 7/3/2022 11/29/21 7/3/22  Pippa Acharya MD   losartan (COZAAR) 50 MG tablet Take 50 mg by mouth once daily.  7/3/22  Historical Provider   traZODone (DESYREL) 50 MG tablet Take 50 mg by mouth nightly. 6/21/22 7/8/22  Historical Provider         Medications - Current  Scheduled Meds:   albuterol-ipratropium  3 mL Nebulization ED 1 Time    enoxaparin  40 mg Subcutaneous Daily     Continuous Infusions:   furosemide (LASIX) 10 mg/mL infusion (non-titrating) 40 mg/hr (07/18/22 1128)    nitroGLYCERIN 5 mcg/min (07/18/22 0850)     PRN Meds:.sodium chloride 0.9%      Allergies  Review of patient's allergies indicates:   Allergen Reactions    Penicillins Swelling    Sulfa (sulfonamide antibiotics) Nausea Only         Past Medical History  Past Medical History:   Diagnosis Date    Arthritis     Cancer     CHF (congestive heart failure)     Coronary artery disease     Hypertension     Thyroid disease          Past Surgical History  Past Surgical History:   Procedure  Laterality Date    CARDIAC CATHETERIZATION      CARDIAC VALVE SURGERY      CHOLECYSTECTOMY      CORONARY ANGIOGRAPHY N/A 4/23/2019    Procedure: ANGIOGRAM, CORONARY ARTERY;  Surgeon: Bakari Singletary MD;  Location: [facility] CATH LAB;  Service: Cardiology;  Laterality: N/A;    HYSTERECTOMY      lymph nodes removal      THYROIDECTOMY      TRANSCATHETER AORTIC VALVE REPLACEMENT (TAVR) N/A 5/7/2019    Procedure: REPLACEMENT, AORTIC VALVE, TRANSCATHETER (TAVR);  Surgeon: Bakari Singletary MD;  Location: [facility] CATH LAB;  Service: Cardiology;  Laterality: N/A;         Social History  Social History     Socioeconomic History    Marital status:    Tobacco Use    Smoking status: Never Smoker    Smokeless tobacco: Never Used   Substance and Sexual Activity    Alcohol use: Not Currently    Drug use: Never         ROS   Admits Denies   Constitutional  Chills, diaphoresis, malaise   Eyes  Visual changes   ENMT  Dysphagia, Epistaxis, nasal congestion, hearing loss   Cardiovascular  Chest pain, palpitations, edema   Respiratory Dyspnea Cough, wheezing   Gastrointestinal  Nausea, vomiting, constipation, diarrhea, anorexia.     Genitourinary  Dysuria, incontinence   Musculoskeletal  Myalgias, joint pain, joint swelling   Integumentary  Rash, inflammation, burning   Neruo-Psychiatric  Anxiety, insomnia.  Changes in speech, strength, sensation.     Endocrine     Hematologic  Abnormal bruising, bleeding   Immunologic  Inflammation, pain at IV sites.  Pruritis.         Physical Examination         Vital Signs  Vitals  Temp: 98.8 °F (37.1 °C)  Temp src: Axillary  Pulse: 60  Resp: (!) 32  SpO2: 100 %  Pulse Oximetry Type: Continuous  Oxygen Concentration (%): 50  O2 Device (Oxygen Therapy): BiPAP  BP: 139/66  MAP (mmHg): 95          24 Hour VS Range    Temp:  [97.6 °F (36.4 °C)-98.8 °F (37.1 °C)]   Pulse:  [59-61]   Resp:  [17-32]   BP: (108-210)/(53-89)   SpO2:  [84 %-100 %]     Intake/Output Summary (Last 24 hours) at  7/18/2022 1213  Last data filed at 7/18/2022 0645  Gross per 24 hour   Intake --   Output 300 ml   Net -300 ml         General:  Lying in bed with BiPAP on.  Head: NCAT  Eyes: conjunctivae and lids normal, no scleral icterus, EOMI.  ENMT:  no gingival bleeding, normal oral mucosa without pallor or cyanosis.   Neck:  JVP elevated.  Trachea non-displaced.     Chest:  Normal respiratory effort.  Chest clear to auscultation.  No wheezes, rales, or rhonchi.  Heart:  Normal PMI, S1 S2 normal quality, splitting.  No murmurs rubs or gallops.  Peripheral pulses 2+.  Abdomen:  Non-distended, normal bowel sounds, non-tender.  No hepato-jugular reflux.  Extremities:  No edema. Normal capillary refill.    Skin:  Warm and dry.  No cyanosis or pallor.  No ulcers, stasis.  IV sites without tenderness or inflammation.    Neurological / Psychiatric:  Oriented to person, time, and place.  No facial asymmetry, drift.  Fluent without dysarthria.  Mood euthymic, affect normal.         Data       Recent Labs   Lab 07/14/22  0626 07/18/22  0536 07/18/22  0746 07/18/22  0747 07/18/22  1126   WBC 4.27 3.21*  --  5.30 5.01   HGB 10.2* 10.0*  --  11.3* 10.7*   HCT 33.0* 32.7* 38 37.6 33.0*    165  --  226 175        No results for input(s): PROTIME, INR in the last 168 hours.     Recent Labs   Lab 07/14/22  0626 07/18/22  0536 07/18/22  0932    135* 137   K 4.0 4.0 4.2    101 102   CO2 28 23 25   BUN 19 15 17   CREATININE 0.6 0.7 0.7   ANIONGAP 8 11 10   CALCIUM 8.5* 9.2 9.4        Recent Labs   Lab 07/18/22  0932   PROT 8.2   ALBUMIN 2.7*   BILITOT 1.1*   ALKPHOS 106   AST 24   ALT 11        Recent Labs   Lab 07/18/22  0747   TROPONINI 0.051*        BNP (pg/mL)   Date Value   07/18/2022 1,253 (H)   07/03/2022 1,347 (H)   11/17/2021 381 (H)     Echo 7/4/22:  · The left ventricle is normal in size with mild concentric hypertrophy and normal systolic function.  · The estimated ejection fraction is 65%.  · Grade II left  ventricular diastolic dysfunction.  · Mild mitral regurgitation and 5 mmHg diastolic gradient across the MV related to degenerative MV disease.  · There is a 26 mm Evolut CoreValve transcutaneously-placed aortic bioprosthesis present. There is no aortic insufficiency present. Prosthetic aortic valve is normal.  · The aortic valve mean gradient is 5 mmHg with a dimensionless index of 0.49.  · Mild tricuspid regurgitation.  · Mild pulmonic regurgitation.  · Normal right ventricular size with normal right ventricular systolic function.  · There is a moderate left pleural effusion.     [facility] 4/8/19:  · 86 yo lady with severe AS referred by Dr. Too Gastno for TAVR.  · Coronary angiogram today revealed severe ostial RCA stenosis which was successfully stented today.  · A STENT VISION RX [id number] stent was successfully placed at 20 JIM  · EBL: 50cc  · Contrast: 20cc  · Complications: none  · PRU: 224  · Ost RCA lesion , 99% stenosed reduced to 0%..        Assessment & Plan     Acute on chronic diastolic heart failure  -diuresis with Lasix drip and a Lasix bolus  -monitor urine output  -monitor electrolytes.  Maintain potassium greater than 4 and magnesium greater than 2  -repeat CMP in a.m.    Acute hypoxemic respiratory failure  Secondary to above.  -continue diuresis  -wean off BiPAP as tolerated    Large left pleural effusion  -diuresis as stated above    History of severe aortic stenosis status post TAVR  Recent echo reviewed.  Prostatic aortic valve functioning well.    Coronary disease status post  ostial RCA PCI  -will continue medical management    Hypertension  -continue home medications      Signed:  Miguel Hale M.D.  Page # [phone]  Cardiovascular Fellow  [facility]

## 2022-07-18 NOTE — PROGRESS NOTES
Incoming charge nurse called Dr on call Dr Beltran but no response, ambulance called per charge nurse. Soon after this nurse called [facility] and report given to [place] nurse Allan.

## 2022-07-18 NOTE — PROGRESS NOTES
[facility] ambulance personnel arrived, pt's report and printed summary provided.pt's daughter Katiana at pt's bedside.

## 2022-07-18 NOTE — ED PROVIDER NOTES
----- Message from Aguilar Key sent at 6/26/2020  7:41 AM CDT -----  Contact: self  Type:  Sooner Apoointment Request    Caller is requesting a sooner appointment.  Caller declined first available appointment listed below.  Caller will not accept being placed on the waitlist and is requesting a message be sent to doctor.  Name of Caller:Karyna Geller  When is the first available appointment?10/01  Symptoms:n/a  Would the patient rather a call back or a response via [facility]? Call back  Best Call Back Number:[phone]  Additional Information: pt being seen for regular check up       CC: Shortness of Breath (Arrives from [facility] rehab facility where she was staying after CHF exacerbation and receiving abx for UTI. This AM pt c/o SOB, difficulty breathing and cough. Pt denies c/p, n/v. Per staff. On RA 76%, 6LNC 89% and 15L non re-breather 96%. )      History provided by:   Patient   Family daughter    HPI: Pilar Michael is a 89 y.o. year old female past medical history of CHF, aortic stenosis status post TAVR, cardiac pacemaker, hypertension, hypothyroidism, hyperlipidemia, who presents to the ED for shortness of breath that started this morning.    Patient is usually not on home oxygen however she was placed on nasal cannula secondary to hypoxia at the nursing home this morning, also coughing in the room    History limited as the patient is dyspneic and able to say 1 or 2 words at a time  Patient denies being in pain specifically she denies any chest pain, abdominal pain, back pain    Daughter reports she was coughing yesterday, started feeling SOB today      Patient recently discharge July 11, 2022 from the hospital to rehab after she was admitted for CHF exacerbation and UTI      Past Medical History:   Diagnosis Date    Arthritis     Cancer     CHF (congestive heart failure)     Coronary artery disease     Hypertension     Thyroid disease      Past Surgical History:   Procedure Laterality Date    CARDIAC CATHETERIZATION      CARDIAC VALVE SURGERY      CHOLECYSTECTOMY      CORONARY ANGIOGRAPHY N/A 4/23/2019    Procedure: ANGIOGRAM, CORONARY ARTERY;  Surgeon: Bakari Singletary MD;  Location: [facility] CATH LAB;  Service: Cardiology;  Laterality: N/A;    HYSTERECTOMY      lymph nodes removal      THYROIDECTOMY      TRANSCATHETER AORTIC VALVE REPLACEMENT (TAVR) N/A 5/7/2019    Procedure: REPLACEMENT, AORTIC VALVE, TRANSCATHETER (TAVR);  Surgeon: Bakari Singletary MD;  Location: [facility] CATH LAB;  Service: Cardiology;  Laterality: N/A;     No family history on file.  Current  Facility-Administered Medications on File Prior to Encounter   Medication Dose Route Frequency Provider Last Rate Last Admin    [DISCONTINUED] acetaminophen tablet 650 mg  650 mg Oral Q6H PRN Kyree Priest MD   650 mg at 07/17/22 2250    [DISCONTINUED] aspirin chewable tablet 81 mg  81 mg Oral Daily Kyree Priset MD   81 mg at 07/17/22 1056    [DISCONTINUED] atorvastatin tablet 20 mg  20 mg Oral QHS Kyree Priest MD   20 mg at 07/17/22 2251    [DISCONTINUED] bumetanide tablet 1 mg  1 mg Oral Daily Kyree Priest MD   1 mg at 07/17/22 1115    [DISCONTINUED] calcium carbonate 200 mg calcium (500 mg) chewable tablet 500 mg  500 mg Oral BID PRN Kyree Priest MD        [DISCONTINUED] clopidogreL tablet 75 mg  75 mg Oral Daily Kyree Priest MD   75 mg at 07/17/22 1055    [DISCONTINUED] COVID-19 vac, annette(Pfizer)(PF) (Pfizer COVID-19) 30 mcg/0.3 mL injection 0.3 mL  0.3 mL Intramuscular vaccine x 1 dose Kyree Priest MD        [DISCONTINUED] hydrALAZINE tablet 25 mg  25 mg Oral Q8H PRN Kristy Beltran MD   25 mg at 07/17/22 2256    [DISCONTINUED] levothyroxine tablet 125 mcg  125 mcg Oral Before breakfast Kyree Priest MD   125 mcg at 07/18/22 0553    [DISCONTINUED] lisinopriL tablet 10 mg  10 mg Oral Daily Jesica Barrett NP   10 mg at 07/17/22 1114    [DISCONTINUED] megestroL tablet 40 mg  40 mg Oral Daily Kyree Priest MD   40 mg at 07/17/22 1055    [DISCONTINUED] melatonin tablet 6 mg  6 mg Oral Nightly PRN Kyree Priest MD   6 mg at 07/16/22 2109    [DISCONTINUED] polyethylene glycol packet 17 g  17 g Oral BID Benita Mariposa, NP   17 g at 07/15/22 2038    [DISCONTINUED] senna-docusate 8.6-50 mg per tablet 1 tablet  1 tablet Oral BID Kyree Priest MD   1 tablet at 07/16/22 2107    [DISCONTINUED] timolol maleate 0.5% ophthalmic solution 1 drop  1 drop Both Eyes BID Kyree Priest MD   1 drop at 07/17/22 2187    [DISCONTINUED] zinc  oxide-cod liver oil 40 % paste   Topical (Top) PRN Benita Monge NP         Current Outpatient Medications on File Prior to Encounter   Medication Sig Dispense Refill    amLODIPine (NORVASC) 10 MG tablet Take 1 tablet (10 mg total) by mouth once daily. 30 tablet 11    aspirin 81 MG Chew Take 81 mg by mouth once daily.      atorvastatin (LIPITOR) 20 MG tablet Take 20 mg by mouth every evening.      bumetanide (BUMEX) 1 MG tablet Take 1 tablet (1 mg total) by mouth once daily. 30 tablet 2    clopidogreL (PLAVIX) 75 mg tablet Take 1 tablet (75 mg total) by mouth once daily. 90 tablet 3    lisinopriL (PRINIVIL,ZESTRIL) 20 MG tablet Take 20 mg by mouth once daily.      megestroL (MEGACE) 40 MG Tab Take 1 tablet (40 mg total) by mouth once daily. 30 tablet 11    [] MEROPENEM 1 G/100 ML NS, READY TO MIX SYSTEM, Inject 100 mLs (1 g total) into the vein every 12 (twelve) hours. for 9 days 1800 mL 0    SYNTHROID 125 mcg tablet Take 125 mcg by mouth once daily.  3    TIMOPTIC OCUDOSE, PF, 0.5 % Dpet Place 1 drop into both eyes 2 (two) times a day.  6    [DISCONTINUED] albuterol-ipratropium (DUO-NEB) 2.5 mg-0.5 mg/3 mL nebulizer solution Take 3 mLs by nebulization every 6 (six) hours as needed for Wheezing. Rescue (Patient not taking: Reported on 7/3/2022) 180 mL 1    [DISCONTINUED] losartan (COZAAR) 50 MG tablet Take 50 mg by mouth once daily.      [DISCONTINUED] traZODone (DESYREL) 50 MG tablet Take 50 mg by mouth nightly.       Penicillins and Sulfa (sulfonamide antibiotics)  Social History     Socioeconomic History    Marital status:    Tobacco Use    Smoking status: Never Smoker    Smokeless tobacco: Never Used   Substance and Sexual Activity    Alcohol use: Not Currently    Drug use: Never       ROS:     Constitutional : neg for fever, neg for weakness  HENT neg for head injury, neg for sore throat  Eyes: neg for visual changes, neg for eye pain  Resp pos for SOB, neg for cough  Cardiac   neg for chest pain, neg for palpitations  GI neg for abd pain, neg for nausea, neg for vomiting   neg for urinary changes  Neuro neg for focal weakness or numbness  Skin neg for skin rash  MSK: neg for myalgia, neg for arthralgia  ALL: Penicillins and Sulfa (sulfonamide antibiotics)    PHYSICAL EXAM:  Vitals:    07/18/22 2032   BP: (!) 143/61   Pulse: 60   Resp:    Temp:      VS: triage VS reviewed    general:  Uncomfortable, dyspneic  HENT: neck symmetric, trachea midline  Eyes: PERRL,no conjunctival injection and symmetrical eyelids  CV: RRR, no  murmurs, no rubs, no gallops, +1 LE edema  Resp:  Tachypneic, able to speak 1-2 words at a time secondary to shortness of breath, bilateral wheezing  ABD:  soft, ND, no masses, + normal BS, NT  Renal: No CVAT  Neuro: AAO x 3, 5/5 strength x 4 extremities, sensation intact, face symmetric, speech normal  MSK: NC/AT, extremities w/out deformity   Skin: warm, dry            DATA & INTERVENTIONS:    LABS reviewed:  Labs Reviewed   CBC W/ AUTO DIFFERENTIAL - Abnormal; Notable for the following components:       Result Value    Hemoglobin 11.3 (*)     MCH 26.4 (*)     MCHC 30.1 (*)     RDW 16.8 (*)     Immature Granulocytes 0.8 (*)     All other components within normal limits   B-TYPE NATRIURETIC PEPTIDE - Abnormal; Notable for the following components:    BNP 1,253 (*)     All other components within normal limits   URINALYSIS, REFLEX TO URINE CULTURE - Abnormal; Notable for the following components:    Appearance, UA Hazy (*)     Protein, UA 1+ (*)     Leukocytes, UA 1+ (*)     All other components within normal limits    Narrative:     Specimen Source->Urine   TROPONIN I - Abnormal; Notable for the following components:    Troponin I 0.051 (*)     All other components within normal limits   COMPREHENSIVE METABOLIC PANEL - Abnormal; Notable for the following components:    Glucose 152 (*)     Albumin 2.7 (*)     Total Bilirubin 1.1 (*)     All other components within  normal limits   CBC W/ AUTO DIFFERENTIAL - Abnormal; Notable for the following components:    Hemoglobin 10.7 (*)     Hematocrit 33.0 (*)     MCH 26.6 (*)     RDW 16.6 (*)     Lymph # 0.6 (*)     Gran % 82.8 (*)     Lymph % 11.0 (*)     All other components within normal limits   TROPONIN I - Abnormal; Notable for the following components:    Troponin I 0.057 (*)     All other components within normal limits   URINALYSIS MICROSCOPIC - Abnormal; Notable for the following components:    WBC, UA 23 (*)     Bacteria Few (*)     All other components within normal limits    Narrative:     Specimen Source->Urine   ISTAT PROCEDURE - Abnormal; Notable for the following components:    POC Glucose 235 (*)     POC TCO2 (MEASURED) 30 (*)     All other components within normal limits   ISTAT PROCEDURE - Abnormal; Notable for the following components:    POC PH 7.339 (*)     POC PCO2 55.7 (*)     POC HCO3 30.0 (*)     POC SATURATED O2 74 (*)     POC TCO2 32 (*)     All other components within normal limits   ISTAT PROCEDURE - Abnormal; Notable for the following components:    POC PH 7.455 (*)     POC SATURATED O2 82 (*)     All other components within normal limits   CULTURE, BLOOD   CULTURE, BLOOD   CULTURE, URINE   LACTIC ACID, PLASMA   SARS-COV-2 RNA AMPLIFICATION, QUAL   SARS-COV-2 RDRP GENE   ISTAT CHEM8       RADIOLOGY reviewed:  Imaging Results          X-Ray Chest AP Portable (Final result)  Result time 07/18/22 08:21:39    Final result by Bakari Narayanan MD (07/18/22 08:21:39)                 Impression:      As above.      Electronically signed by: Bakari Narayanan  Date:    07/18/2022  Time:    08:21             Narrative:    EXAMINATION:  XR CHEST AP PORTABLE    CLINICAL HISTORY:  sob;    TECHNIQUE:  Single frontal view of the chest was performed.    COMPARISON:  Chest radiograph 07/03/2022, 13:44 hours    FINDINGS:  Monitoring leads overlie the chest.  Right subclavian approach pacer.  Status post TAVR.    Grossly  unchanged cardiomediastinal contours, allowing for differences in technique/rotation.  Enlarged cardiac silhouette.  Prominence of central pulmonary vasculature.  Interstitial opacities progressed since 07/03/2022, possibly on the basis of edema versus infectious or inflammatory etiology.  Suggested layering pleural effusions, increased since 07/03/2022.    Dense retrocardiac bibasilar opacities may relate to atelectasis, aspiration or pneumonia.    No definite acute change in the visualized abdomen osseous and soft tissue structures appear without definite acute change.                                MEDICATIONS/FLUIDS:  Medications   albuterol-ipratropium 2.5 mg-0.5 mg/3 mL nebulizer solution 3 mL (3 mLs Nebulization Not Given 7/18/22 0800)   nitroGLYCERIN 50 mg in dextrose 5 % 250 mL infusion (TITRATING) (10 mcg/min Intravenous Rate/Dose Change 7/18/22 1315)   furosemide (LASIX) 500 mg infusion (conc: 10 mg/mL) (20 mg/hr Intravenous Rate/Dose Change 7/18/22 1718)   sodium chloride 0.9% flush 10 mL (has no administration in time range)   enoxaparin injection 40 mg (40 mg Subcutaneous Given 7/18/22 1657)   furosemide injection 80 mg (80 mg Intravenous Given 7/18/22 0757)   midazolam (VERSED) 1 mg/mL injection 0.5 mg (0.5 mg Intravenous Given 7/18/22 1426)         MDM:  Pilar Michael is a 89 y.o. year old female who presents to the ED for shortness of breath, hypoxic, dyspneic    89-91% oxygen saturation on 6 L nasal cannula  Hypertensive in the room blood pressure to 110/89    DDX includes but not limited to:  Flash pulmonary edema, pneumonia, COPD exacerbation, COVID, PE    Labs ordered and reviewed:   I-STAT creatinine 0.7  CMP,   CBC normal white count and platelets, hemoglobin 11.3  BNP elevated at 1 253  Trop 0.051  vbg pH of 7.33, pCO2 55.7  covid neg    Medication given in the ED:  Nitroglycerin drip, DuoNebs, Lasix 80 mg IV    CXR (ordered and reviewed): Monitoring leads overlie the chest.  Right  subclavian approach pacer.  Status post TAVR.     Grossly unchanged cardiomediastinal contours, allowing for differences in technique/rotation.  Enlarged cardiac silhouette.  Prominence of central pulmonary vasculature.  Interstitial opacities progressed since 07/03/2022, possibly on the basis of edema versus infectious or inflammatory etiology.  Suggested layering pleural effusions, increased since 07/03/2022.     Dense retrocardiac bibasilar opacities may relate to atelectasis, aspiration or pneumonia.   Imagings independently visualized: yes      Old records obtained and reviewed:   · Discharge summary from July 11, 2022, echo from July 3, 2022The left ventricle is normal in size with mild concentric hypertrophy and normal systolic function.  · The estimated ejection fraction is 65%.  · Grade II left ventricular diastolic dysfunction.  · Mild mitral regurgitation and 5 mmHg diastolic gradient across the MV related to degenerative MV disease.  · There is a 26 mm Evolut CoreValve transcutaneously-placed aortic bioprosthesis present. There is no aortic insufficiency present. Prosthetic aortic valve is normal.  · The aortic valve mean gradient is 5 mmHg with a dimensionless index of 0.49.  · Mild tricuspid regurgitation.  · Mild pulmonic regurgitation.  · Normal right ventricular size with normal right ventricular systolic function.  · There is a moderate left pleural effusion.    Bipap    Case discussed with the consultant: cardiology    8:40 AM  Feels better on Bipap, looks more comfortable    9:26 AM  Discussed with Cardiology, patient to be admitted to CCU, to draw blood cultures and start the patient on Lasix 40 milligrams/hour continuous drip    IMPRESSION:  1.) Acute hypoxic respiratory failure  2.) Acute CHF exacerbation  3. Hypertensive emergency    Dispo: admit CCU    Aggregate Critical Care Time by Attending (exclusive of procedural time) = 45 minutes         During the Emergency Depment visit, there was a  high probability of imminent or life threatening deterioration in the patient's condition necessitating medical decision  making of a high complexity. Organ systems that were compromised/potentially compromised                      cardiovascular  pulmonary    and/or there was potential for sepsis or metabolic failure.     Pt required constant monitoring because of the potential for them to deteriorate at any moment. Critical care was time spent personally by me on the following activities:  Development of treatment plan with patient or surrogate; discussion with consultant, evaluation of patient's response to treatment, examination of patient, obtaining history from patient or surrogate, ordering and performing treatments and interventions, ordering and reviewing of laboratory studies, ordering and review of radiographic studies, vitals, re-evaluation of patient' condition and review of old charts            The failure to initiate the interventions performed in the Emergency Department on an urgent basis would have likely resulted in sudden, clinically significant or life threatening deterioration in the patient's condition.                              Sara Manzo MD  07/18/22 4853

## 2022-07-18 NOTE — PROGRESS NOTES
Patient's daughter called for nurse. Patient lying in bed. Aaox4, c/o feeling SOB, respirations  28 per minute abdominal muscles in use. Pulse ox 84- 86 % at room air. Oxygen per n/c placed at 4 liters. Charge nurse assessed pt right away. Respiratory therapist called. Pulse ox 90%.  B/p = 156/65, pulse= 60 respirations 30, soon non rebreather applied at 15Liters, pulse ox went up to 95%. Doctor Beltran called several times per charge nurse and this nurse no response.

## 2022-07-18 NOTE — Clinical Note
Diagnosis: SOB (shortness of breath) [925290]   Admitting Provider:: MINA MCNULTY [[id number]]   Future Attending Provider: MINA MCNULTY [[id number]]   Reason for IP Medical Treatment  (Clinical interventions that can only be accomplished in the IP setting? ) :: lasix drip   Estimated Length of Stay:: 3-4 midnights   I certify that Inpatient services for greater than or equal to 2 midnights are medically necessary:: Yes   Plans for Post-Acute care--if anticipated (pick the single best option):: A. No post acute care anticipated at this time

## 2022-07-18 NOTE — ED NOTES
I-STAT Chem-8+ Results:   Value Reference Range   Sodium 137 136-145 mmol/L   Potassium  4.8 3.5-5.1 mmol/L   Chloride 99  mmol/L   Ionized Calcium 1.20 1.06-1.42 mmol/L   CO2 (measured) 30 23-29 mmol/L   Glucose 235  mg/dL   BUN 22 6-30 mg/dL   Creatinine 0.7 0.5-1.4 mg/dL   Hematocrit 38 36-54%

## 2022-07-18 NOTE — PROGRESS NOTES
Patient taken to the E.R. via stretcher awake alert and oriented, non rebreather in place, pt's daughter accompanying patient.

## 2022-07-18 NOTE — PLAN OF CARE
Problem: Adult Inpatient Plan of Care  Goal: Plan of Care Review  Outcome: Ongoing, Progressing  Goal: Absence of Hospital-Acquired Illness or Injury  Outcome: Ongoing, Progressing  Goal: Optimal Comfort and Wellbeing  Outcome: Ongoing, Progressing  Goal: Readiness for Transition of Care  Outcome: Ongoing, Progressing     Problem: Respiratory Compromise (Pneumonia)  Goal: Effective Oxygenation and Ventilation  Outcome: Ongoing, Progressing     Problem: Impaired Wound Healing  Goal: Optimal Wound Healing  Outcome: Ongoing, Progressing     Problem: Skin Injury Risk Increased  Goal: Skin Health and Integrity  Outcome: Ongoing, Progressing     Problem: Fall Injury Risk  Goal: Absence of Fall and Fall-Related Injury  Outcome: Ongoing, Progressing

## 2022-07-18 NOTE — PROGRESS NOTES
07/18/22 0630   Vital Signs   Temp 97.6 °F (36.4 °C)   Temp src Oral   Pulse 60   Heart Rate Source Monitor   Resp (!) 30   SpO2 (!) 94 %   Flow (L/min) 4   O2 Device (Oxygen Therapy) nasal cannula   BP (!) 156/65   BP Location Right arm   BP Method Automatic   Assessments (Pre/Post)   Level of Consciousness (AVPU) alert   Change in Respiratory status of patient, PT usiang abdominal muscles to breathe, SOB reported. 02 increased form RA to NC at 4L. Dr. Beltran contacted message left.     6:47 Am : NP Jesica CARVER, made aware of change in status of patient, NP gave order to send to ER. Awaiting ambulance transportation to [facility] ER.

## 2022-07-19 PROBLEM — I25.10 CORONARY ARTERY DISEASE INVOLVING NATIVE CORONARY ARTERY OF NATIVE HEART WITHOUT ANGINA PECTORIS: Chronic | Status: ACTIVE | Noted: 2019-05-08

## 2022-07-19 PROBLEM — Z95.2 S/P TAVR (TRANSCATHETER AORTIC VALVE REPLACEMENT): Chronic | Status: ACTIVE | Noted: 2019-05-07

## 2022-07-19 PROBLEM — E78.5 HYPERLIPIDEMIA: Chronic | Status: ACTIVE | Noted: 2022-07-03

## 2022-07-19 PROBLEM — D64.9 NORMOCYTIC ANEMIA: Chronic | Status: ACTIVE | Noted: 2022-07-03

## 2022-07-19 PROBLEM — Z95.0 PRESENCE OF PERMANENT CARDIAC PACEMAKER: Chronic | Status: ACTIVE | Noted: 2020-07-07

## 2022-07-19 PROBLEM — E03.9 ACQUIRED HYPOTHYROIDISM: Chronic | Status: ACTIVE | Noted: 2019-04-08

## 2022-07-19 PROBLEM — I10 ESSENTIAL HYPERTENSION: Chronic | Status: ACTIVE | Noted: 2019-04-08

## 2022-07-19 LAB
ALBUMIN SERPL BCP-MCNC: 2.5 G/DL (ref 3.5–5.2)
ALP SERPL-CCNC: 90 U/L (ref 55–135)
ALT SERPL W/O P-5'-P-CCNC: 10 U/L (ref 10–44)
ANION GAP SERPL CALC-SCNC: 9 MMOL/L (ref 8–16)
ASCENDING AORTA: 2.78 CM
AST SERPL-CCNC: 26 U/L (ref 10–40)
BACTERIA UR CULT: NORMAL
BACTERIA UR CULT: NORMAL
BASOPHILS # BLD AUTO: 0.01 K/UL (ref 0–0.2)
BASOPHILS NFR BLD: 0.3 % (ref 0–1.9)
BILIRUB SERPL-MCNC: 1 MG/DL (ref 0.1–1)
BSA FOR ECHO PROCEDURE: 1.91 M2
BUN SERPL-MCNC: 20 MG/DL (ref 8–23)
CALCIUM SERPL-MCNC: 8.8 MG/DL (ref 8.7–10.5)
CHLORIDE SERPL-SCNC: 101 MMOL/L (ref 95–110)
CO2 SERPL-SCNC: 28 MMOL/L (ref 23–29)
CREAT SERPL-MCNC: 0.8 MG/DL (ref 0.5–1.4)
CV ECHO LV RWT: 0.55 CM
DIFFERENTIAL METHOD: ABNORMAL
DOP CALC LVOT AREA: 3.1 CM2
DOP CALC LVOT DIAMETER: 2 CM
DOP CALC LVOT PEAK VEL: 0.85 M/S
DOP CALC LVOT STROKE VOLUME: 63.21 CM3
DOP CALC MV VTI: 67.7 CM
DOP CALCLVOT PEAK VEL VTI: 20.13 CM
E WAVE DECELERATION TIME: 504.87 MSEC
E/A RATIO: 1.22
E/E' RATIO: 28.8 M/S
ECHO LV POSTERIOR WALL: 1.1 CM (ref 0.6–1.1)
EJECTION FRACTION: 68 %
EOSINOPHIL # BLD AUTO: 0.1 K/UL (ref 0–0.5)
EOSINOPHIL NFR BLD: 2.1 % (ref 0–8)
ERYTHROCYTE [DISTWIDTH] IN BLOOD BY AUTOMATED COUNT: 16.9 % (ref 11.5–14.5)
EST. GFR  (AFRICAN AMERICAN): >60 ML/MIN/1.73 M^2
EST. GFR  (NON AFRICAN AMERICAN): >60 ML/MIN/1.73 M^2
FRACTIONAL SHORTENING: 37 % (ref 28–44)
GLUCOSE SERPL-MCNC: 97 MG/DL (ref 70–110)
HCT VFR BLD AUTO: 30.1 % (ref 37–48.5)
HGB BLD-MCNC: 9.6 G/DL (ref 12–16)
IMM GRANULOCYTES # BLD AUTO: 0.01 K/UL (ref 0–0.04)
IMM GRANULOCYTES NFR BLD AUTO: 0.3 % (ref 0–0.5)
INTERVENTRICULAR SEPTUM: 0.89 CM (ref 0.6–1.1)
LA MAJOR: 4.03 CM
LA MINOR: 4.77 CM
LA WIDTH: 3.3 CM
LEFT ATRIUM SIZE: 4.8 CM
LEFT ATRIUM VOLUME INDEX: 32 ML/M2
LEFT ATRIUM VOLUME: 58.82 CM3
LEFT INTERNAL DIMENSION IN SYSTOLE: 2.54 CM (ref 2.1–4)
LEFT VENTRICLE DIASTOLIC VOLUME INDEX: 38.66 ML/M2
LEFT VENTRICLE DIASTOLIC VOLUME: 71.13 ML
LEFT VENTRICLE MASS INDEX: 69 G/M2
LEFT VENTRICLE SYSTOLIC VOLUME INDEX: 12.6 ML/M2
LEFT VENTRICLE SYSTOLIC VOLUME: 23.22 ML
LEFT VENTRICULAR INTERNAL DIMENSION IN DIASTOLE: 4.03 CM (ref 3.5–6)
LEFT VENTRICULAR MASS: 127.66 G
LV LATERAL E/E' RATIO: 27 M/S
LV SEPTAL E/E' RATIO: 30.86 M/S
LYMPHOCYTES # BLD AUTO: 0.8 K/UL (ref 1–4.8)
LYMPHOCYTES NFR BLD: 19.5 % (ref 18–48)
MAGNESIUM SERPL-MCNC: 1.6 MG/DL (ref 1.6–2.6)
MAGNESIUM SERPL-MCNC: 1.7 MG/DL (ref 1.6–2.6)
MCH RBC QN AUTO: 27.4 PG (ref 27–31)
MCHC RBC AUTO-ENTMCNC: 31.9 G/DL (ref 32–36)
MCV RBC AUTO: 86 FL (ref 82–98)
MONOCYTES # BLD AUTO: 0.4 K/UL (ref 0.3–1)
MONOCYTES NFR BLD: 9 % (ref 4–15)
MV MEAN GRADIENT: 5 MMHG
MV PEAK A VEL: 1.77 M/S
MV PEAK E VEL: 2.16 M/S
MV PEAK GRADIENT: 16 MMHG
MV STENOSIS PRESSURE HALF TIME: 146.41 MS
MV VALVE AREA BY CONTINUITY EQUATION: 0.93 CM2
MV VALVE AREA P 1/2 METHOD: 1.5 CM2
NEUTROPHILS # BLD AUTO: 2.7 K/UL (ref 1.8–7.7)
NEUTROPHILS NFR BLD: 68.8 % (ref 38–73)
NRBC BLD-RTO: 0 /100 WBC
PHOSPHATE SERPL-MCNC: 3.2 MG/DL (ref 2.7–4.5)
PHOSPHATE SERPL-MCNC: 3.8 MG/DL (ref 2.7–4.5)
PLATELET # BLD AUTO: 183 K/UL (ref 150–450)
PMV BLD AUTO: 11.6 FL (ref 9.2–12.9)
POTASSIUM SERPL-SCNC: 3.7 MMOL/L (ref 3.5–5.1)
PROT SERPL-MCNC: 7.6 G/DL (ref 6–8.4)
RA MAJOR: 5.58 CM
RA PRESSURE: 15 MMHG
RA WIDTH: 3.52 CM
RBC # BLD AUTO: 3.51 M/UL (ref 4–5.4)
RIGHT VENTRICULAR END-DIASTOLIC DIMENSION: 2.99 CM
SINUS: 2.12 CM
SODIUM SERPL-SCNC: 138 MMOL/L (ref 136–145)
STJ: 2.14 CM
TDI LATERAL: 0.08 M/S
TDI SEPTAL: 0.07 M/S
TDI: 0.08 M/S
WBC # BLD AUTO: 3.89 K/UL (ref 3.9–12.7)

## 2022-07-19 PROCEDURE — 83735 ASSAY OF MAGNESIUM: CPT | Mod: 91 | Performed by: INTERNAL MEDICINE

## 2022-07-19 PROCEDURE — 80053 COMPREHEN METABOLIC PANEL: CPT | Performed by: STUDENT IN AN ORGANIZED HEALTH CARE EDUCATION/TRAINING PROGRAM

## 2022-07-19 PROCEDURE — 99231 PR SUBSEQUENT HOSPITAL CARE,LEVL I: ICD-10-PCS | Mod: GC,,, | Performed by: INTERNAL MEDICINE

## 2022-07-19 PROCEDURE — 84100 ASSAY OF PHOSPHORUS: CPT | Mod: 91 | Performed by: INTERNAL MEDICINE

## 2022-07-19 PROCEDURE — 63600175 PHARM REV CODE 636 W HCPCS: Performed by: STUDENT IN AN ORGANIZED HEALTH CARE EDUCATION/TRAINING PROGRAM

## 2022-07-19 PROCEDURE — 83735 ASSAY OF MAGNESIUM: CPT | Performed by: STUDENT IN AN ORGANIZED HEALTH CARE EDUCATION/TRAINING PROGRAM

## 2022-07-19 PROCEDURE — 84100 ASSAY OF PHOSPHORUS: CPT | Performed by: STUDENT IN AN ORGANIZED HEALTH CARE EDUCATION/TRAINING PROGRAM

## 2022-07-19 PROCEDURE — 63700000 PHARM REV CODE 250 ALT 637 W/O HCPCS: Performed by: STUDENT IN AN ORGANIZED HEALTH CARE EDUCATION/TRAINING PROGRAM

## 2022-07-19 PROCEDURE — 93010 ELECTROCARDIOGRAM REPORT: CPT | Mod: ,,, | Performed by: INTERNAL MEDICINE

## 2022-07-19 PROCEDURE — 85025 COMPLETE CBC W/AUTO DIFF WBC: CPT | Performed by: INTERNAL MEDICINE

## 2022-07-19 PROCEDURE — 94761 N-INVAS EAR/PLS OXIMETRY MLT: CPT

## 2022-07-19 PROCEDURE — 12000002 HC ACUTE/MED SURGE SEMI-PRIVATE ROOM

## 2022-07-19 PROCEDURE — 63600175 PHARM REV CODE 636 W HCPCS: Performed by: INTERNAL MEDICINE

## 2022-07-19 PROCEDURE — 25000003 PHARM REV CODE 250: Performed by: INTERNAL MEDICINE

## 2022-07-19 PROCEDURE — 99900035 HC TECH TIME PER 15 MIN (STAT)

## 2022-07-19 PROCEDURE — 93005 ELECTROCARDIOGRAM TRACING: CPT

## 2022-07-19 PROCEDURE — 93010 EKG 12-LEAD: ICD-10-PCS | Mod: ,,, | Performed by: INTERNAL MEDICINE

## 2022-07-19 PROCEDURE — 27000221 HC OXYGEN, UP TO 24 HOURS

## 2022-07-19 PROCEDURE — 99231 SBSQ HOSP IP/OBS SF/LOW 25: CPT | Mod: GC,,, | Performed by: INTERNAL MEDICINE

## 2022-07-19 RX ORDER — ATORVASTATIN CALCIUM 20 MG/1
40 TABLET, FILM COATED ORAL NIGHTLY
Status: DISCONTINUED | OUTPATIENT
Start: 2022-07-19 | End: 2022-07-22 | Stop reason: HOSPADM

## 2022-07-19 RX ORDER — MAGNESIUM SULFATE HEPTAHYDRATE 40 MG/ML
2 INJECTION, SOLUTION INTRAVENOUS ONCE
Status: COMPLETED | OUTPATIENT
Start: 2022-07-19 | End: 2022-07-19

## 2022-07-19 RX ORDER — CLOPIDOGREL BISULFATE 75 MG/1
75 TABLET ORAL DAILY
Status: DISCONTINUED | OUTPATIENT
Start: 2022-07-19 | End: 2022-07-22 | Stop reason: HOSPADM

## 2022-07-19 RX ORDER — NAPROXEN SODIUM 220 MG/1
81 TABLET, FILM COATED ORAL DAILY
Status: DISCONTINUED | OUTPATIENT
Start: 2022-07-19 | End: 2022-07-22 | Stop reason: HOSPADM

## 2022-07-19 RX ORDER — SODIUM CHLORIDE 0.9 % (FLUSH) 0.9 %
10 SYRINGE (ML) INJECTION
Status: DISCONTINUED | OUTPATIENT
Start: 2022-07-19 | End: 2022-07-22 | Stop reason: HOSPADM

## 2022-07-19 RX ORDER — AMLODIPINE BESYLATE 10 MG/1
10 TABLET ORAL DAILY
Status: DISCONTINUED | OUTPATIENT
Start: 2022-07-19 | End: 2022-07-21

## 2022-07-19 RX ORDER — LISINOPRIL 20 MG/1
20 TABLET ORAL DAILY
Status: DISCONTINUED | OUTPATIENT
Start: 2022-07-19 | End: 2022-07-22 | Stop reason: HOSPADM

## 2022-07-19 RX ORDER — AZITHROMYCIN 250 MG/1
500 TABLET, FILM COATED ORAL DAILY
Status: COMPLETED | OUTPATIENT
Start: 2022-07-19 | End: 2022-07-21

## 2022-07-19 RX ORDER — POTASSIUM CHLORIDE 20 MEQ/1
40 TABLET, EXTENDED RELEASE ORAL ONCE
Status: COMPLETED | OUTPATIENT
Start: 2022-07-19 | End: 2022-07-19

## 2022-07-19 RX ORDER — ATORVASTATIN CALCIUM 10 MG/1
20 TABLET, FILM COATED ORAL NIGHTLY
Status: DISCONTINUED | OUTPATIENT
Start: 2022-07-19 | End: 2022-07-19

## 2022-07-19 RX ADMIN — LEVOTHYROXINE SODIUM 125 MCG: 75 TABLET ORAL at 08:07

## 2022-07-19 RX ADMIN — AMLODIPINE BESYLATE 10 MG: 10 TABLET ORAL at 08:07

## 2022-07-19 RX ADMIN — CLOPIDOGREL 75 MG: 75 TABLET, FILM COATED ORAL at 08:07

## 2022-07-19 RX ADMIN — POTASSIUM CHLORIDE 40 MEQ: 1500 TABLET, EXTENDED RELEASE ORAL at 08:07

## 2022-07-19 RX ADMIN — MAGNESIUM SULFATE 2 G: 2 INJECTION INTRAVENOUS at 08:07

## 2022-07-19 RX ADMIN — ENOXAPARIN SODIUM 40 MG: 100 INJECTION SUBCUTANEOUS at 04:07

## 2022-07-19 RX ADMIN — AZITHROMYCIN MONOHYDRATE 500 MG: 250 TABLET ORAL at 03:07

## 2022-07-19 RX ADMIN — ATORVASTATIN CALCIUM 40 MG: 20 TABLET, FILM COATED ORAL at 10:07

## 2022-07-19 RX ADMIN — CEFTRIAXONE 2 G: 2 INJECTION, SOLUTION INTRAVENOUS at 03:07

## 2022-07-19 RX ADMIN — ASPIRIN 81 MG CHEWABLE TABLET 81 MG: 81 TABLET CHEWABLE at 08:07

## 2022-07-19 RX ADMIN — LISINOPRIL 20 MG: 20 TABLET ORAL at 08:07

## 2022-07-19 NOTE — ED NOTES
Son at bedside, pt sleeping in bed.  Resp even/non-labored, VVS.  Siderails up x 2.  Will continue to monitor.

## 2022-07-19 NOTE — HOSPITAL COURSE
Admitted for acute hypoxemic respiratory failure requiring 15L non re-breather and BiPAP, likely 2/2 acute on chronic diastolic heart failure. Lasix 20 mg/hr ggt w/ moderate UOP and improvement of symptoms, now comfortably back on low flow NC. Stable for step down to hospital medicine.

## 2022-07-19 NOTE — RESIDENT HANDOFF
ICU Transfer of Care Note  Critical Care Medicine    Admit Date: 7/18/2022  LOS: 1    CC: Acute on chronic combined systolic and diastolic CHF (congestive heart failure)    Code Status: Full Code     Transfer to Hospital Medicine discussed with Dr. Díaz.    HPI and Hospital Course:     HPI:  86 y/o female with PMH severe AS s/p TAVR (5/7/19), CAD s/p ostial RCA stent placement (4/19), IDBCA s/p resection and radiation (2003), thyroid cancer s/p resection (1992), HTN, PAD presents to the hospital secondary to dyspnea. Patient was at rehab and found to be short of breath that have been progressively worsening over the morning.  She was recently admitted to the hospital secondary to acute on chronic diastolic heart failure. She was diuresed with Lasix which was eventually switched to Bumex.  She was discharged on Bumex 1 mg b.i.d. and this was decreased to 1 mg daily per her outpatient cardiologist. She presents to the ED hypoxic with O2 sat in the 70s. She was placed on BiPAP with improvement in her O2 saturations. She is hemodynamically stable. Chest x-ray notable for large left pleural effusion pulmonary edema pulmonary vascular congestion.  BNP elevated. Trop mildly elevated. CBC appears at baseline with microcytic anemia. CMP grossly unremarkable. Lactate 1.3. Given patient's acute on chronic diastolic heart failure requiring BiPAP, she will be admitted to CCU for diuresis.      Hospital/ICU Course:  Admitted for acute hypoxemic respiratory failure requiring 15L non re-breather and BiPAP, likely 2/2 acute on chronic diastolic heart failure. Lasix 20 mg/hr ggt w/ moderate UOP and improvement of symptoms, now comfortably back on low flow NC. Stable for step down to hospital medicine.        To Follow Up:     - Lasix ggt 20 mg/hr and monitor UOP (not completely accurate in ED)  - Lipid panel  - Blood cultures  - Palliative care for GOC discussion; daughter would like to speak to pall care team before team speaks w/  pt  - PT/OT      Discharge Plan:     Pending PT/OT recs, likely rehab.    Call with questions - [phone].

## 2022-07-19 NOTE — ASSESSMENT & PLAN NOTE
Patient is identified as having Diastolic (HFpEF) heart failure that is Acute on chronic. CHF is currently uncontrolled due to Continued edema of extremities and JVD, >3 pillow orthopnea and Pulmonary edema/pleural effusion on CXR. Latest ECHO performed and demonstrates- Results for orders placed during the hospital encounter of 07/03/22    Echo    Interpretation Summary  · The left ventricle is normal in size with mild concentric hypertrophy and normal systolic function.  · The estimated ejection fraction is 65%.  · Grade II left ventricular diastolic dysfunction.  · Mild mitral regurgitation and 5 mmHg diastolic gradient across the MV related to degenerative MV disease.  · There is a 26 mm Evolut CoreValve transcutaneously-placed aortic bioprosthesis present. There is no aortic insufficiency present. Prosthetic aortic valve is normal.  · The aortic valve mean gradient is 5 mmHg with a dimensionless index of 0.49.  · Mild tricuspid regurgitation.  · Mild pulmonic regurgitation.  · Normal right ventricular size with normal right ventricular systolic function.  · There is a moderate left pleural effusion.  . Continue ACE/ARB and Furosemide and monitor clinical status closely. Monitor on telemetry. Patient is on CHF pathway.  Monitor strict Is&Os and daily weights.  Place on fluid restriction of 1.5 L. Continue to stress to patient importance of self efficacy and  on diet for CHF. Last BNP reviewed- and noted below   Recent Labs   Lab 07/18/22  0747   BNP 1,253*   .    - Lasix 20 mg/hr ggt  - Daily weights  - Strict I/Os  - Fluid restriction 1.5L  - Cardiac diet  - Risk factor stratification (HTN, HLD, etc)

## 2022-07-19 NOTE — HPI
88 y/o female with PMH severe AS s/p TAVR (5/7/19), CAD s/p ostial RCA stent placement (4/19), IDBCA s/p resection and radiation (2003), thyroid cancer s/p resection (1992), HTN, PAD presents to the hospital secondary to dyspnea. Patient was at rehab and found to be short of breath that have been progressively worsening over the morning.  She was recently admitted to the hospital secondary to acute on chronic diastolic heart failure. She was diuresed with Lasix which was eventually switched to Bumex.  She was discharged on Bumex 1 mg b.i.d. and this was decreased to 1 mg daily per her outpatient cardiologist. She presents to the ED hypoxic with O2 sat in the 70s. She was placed on BiPAP with improvement in her O2 saturations. She is hemodynamically stable. Chest x-ray notable for large left pleural effusion pulmonary edema pulmonary vascular congestion.  BNP elevated. Trop mildly elevated. CBC appears at baseline with microcytic anemia. CMP grossly unremarkable. Lactate 1.3. Given patient's acute on chronic diastolic heart failure requiring BiPAP, she will be admitted to CCU for diuresis.

## 2022-07-19 NOTE — PROGRESS NOTES
[facility] - Emergency Dept  Cardiology  Progress Note    Patient Name: Pilar Michael  MRN: [id number]  Admission Date: 7/18/2022  Hospital Length of Stay: 1 days  Code Status: Full Code   Attending Physician: Bridgette Díaz MD   Primary Care Physician: Joseph Noel MD  Expected Discharge Date: 7/22/2022  Principal Problem:Acute on chronic combined systolic and diastolic CHF (congestive heart failure)    Subjective:     Hospital Course:   Admitted for acute hypoxemic respiratory failure requiring 15L non re-breather and BiPAP, likely 2/2 acute on chronic diastolic heart failure. Lasix 20 mg/hr ggt w/ moderate UOP and improvement of symptoms, now comfortably back on low flow NC. Stable for step down to hospital medicine.      Interval History: NAEON, AF HDS, good UOP. Symptoms improving. Now sats 97% on 3L NC w/o respiratory distress. No other complaints.      Review of Systems   Constitutional: Negative for chills, fever and night sweats.   Eyes:  Negative for visual disturbance.   Cardiovascular:  Positive for dyspnea on exertion, leg swelling and orthopnea. Negative for chest pain.   Respiratory:  Positive for shortness of breath. Negative for cough and hemoptysis.    Skin:  Negative for color change and rash.   Musculoskeletal:  Negative for joint pain and myalgias.   Gastrointestinal:  Negative for abdominal pain, diarrhea, hematemesis, hematochezia and melena.   Genitourinary:  Negative for dysuria and hematuria.   Neurological:  Negative for headaches and light-headedness.   Psychiatric/Behavioral:  Negative for altered mental status and memory loss.    All other systems reviewed and are negative.  Objective:     Vital Signs (Most Recent):  Temp: 98.5 °F (36.9 °C) (07/19/22 0847)  Pulse: 60 (07/19/22 0532)  Resp: 16 (07/19/22 0100)  BP: (!) 140/63 (07/19/22 0856)  SpO2: 98 % (07/19/22 0532)   Vital Signs (24h Range):  Temp:  [97.4 °F (36.3 °C)-98.8 °F (37.1 °C)] 98.5 °F (36.9 °C)  Pulse:  [59-60]  60  Resp:  [16-17] 16  SpO2:  [96 %-100 %] 98 %  BP: (119-157)/(53-72) 140/63     Weight: 84.8 kg (187 lb)  Body mass index is 35.33 kg/m².     SpO2: 98 %  O2 Device (Oxygen Therapy): nasal cannula      Intake/Output Summary (Last 24 hours) at 7/19/2022 0954  Last data filed at 7/19/2022 0934  Gross per 24 hour   Intake 120.31 ml   Output 650 ml   Net -529.69 ml       Lines/Drains/Airways       Peripheral Intravenous Line  Duration                  Peripheral IV - Single Lumen 07/18/22 0740 20 G Right Antecubital 1 day         Peripheral IV - Single Lumen 07/18/22 1316 20 G Right Upper Arm <1 day                    Physical Exam  Vitals and nursing note reviewed.   Constitutional:       General: She is not in acute distress.     Appearance: She is not ill-appearing.   HENT:      Head: Normocephalic and atraumatic.      Right Ear: External ear normal.      Left Ear: External ear normal.      Mouth/Throat:      Mouth: Mucous membranes are moist.      Pharynx: No oropharyngeal exudate.   Eyes:      Extraocular Movements: Extraocular movements intact.      Pupils: Pupils are equal, round, and reactive to light.   Cardiovascular:      Rate and Rhythm: Normal rate and regular rhythm.      Pulses: Normal pulses.      Heart sounds: Murmur (systolic, upper sternal borders) heard.   Pulmonary:      Effort: Pulmonary effort is normal. No respiratory distress.      Breath sounds: Normal breath sounds. No wheezing or rales.      Comments: Comfortable on low flow NC  Abdominal:      General: Abdomen is flat. Bowel sounds are normal. There is no distension.      Palpations: Abdomen is soft.      Tenderness: There is no abdominal tenderness.   Musculoskeletal:         General: No signs of injury. Normal range of motion.      Cervical back: Normal range of motion.      Right lower leg: Edema (1+ to mid-tibial) present.      Left lower leg: Edema (1+ to mid-tibial) present.   Skin:     General: Skin is warm and dry.      Findings:  Bruising (medial side of R knee) present.   Neurological:      General: No focal deficit present.      Mental Status: She is alert and oriented to person, place, and time.      Sensory: No sensory deficit.      Motor: No weakness.      Comments: Hard of hearing at baseline   Psychiatric:         Mood and Affect: Mood normal.         Behavior: Behavior normal.         Thought Content: Thought content normal.       Significant Labs: All pertinent lab results from the last 24 hours have been reviewed.    Significant Imaging:  Reviewed    Assessment and Plan:     * Acute on chronic combined systolic and diastolic CHF (congestive heart failure)  Patient is identified as having Diastolic (HFpEF) heart failure that is Acute on chronic. CHF is currently uncontrolled due to Continued edema of extremities and JVD, >3 pillow orthopnea and Pulmonary edema/pleural effusion on CXR. Latest ECHO performed and demonstrates- Results for orders placed during the hospital encounter of 07/03/22    Echo    Interpretation Summary  · The left ventricle is normal in size with mild concentric hypertrophy and normal systolic function.  · The estimated ejection fraction is 65%.  · Grade II left ventricular diastolic dysfunction.  · Mild mitral regurgitation and 5 mmHg diastolic gradient across the MV related to degenerative MV disease.  · There is a 26 mm Evolut CoreValve transcutaneously-placed aortic bioprosthesis present. There is no aortic insufficiency present. Prosthetic aortic valve is normal.  · The aortic valve mean gradient is 5 mmHg with a dimensionless index of 0.49.  · Mild tricuspid regurgitation.  · Mild pulmonic regurgitation.  · Normal right ventricular size with normal right ventricular systolic function.  · There is a moderate left pleural effusion.  . Continue ACE/ARB and Furosemide and monitor clinical status closely. Monitor on telemetry. Patient is on CHF pathway.  Monitor strict Is&Os and daily weights.  Place on fluid  restriction of 1.5 L. Continue to stress to patient importance of self efficacy and  on diet for CHF. Last BNP reviewed- and noted below   Recent Labs   Lab 07/18/22  0747   BNP 1,253*   .    - Lasix 20 mg/hr ggt  - Daily weights  - Strict I/Os  - Fluid restriction 1.5L  - Cardiac diet  - Risk factor stratification (HTN, HLD, etc)    Shortness of breath  See Acute on chronic CHF    - CTX and azithromycin for CAP coverage    Hyperlipidemia  Continue atorvastatin 40mg qhs  Lipid panel ordered    Normocytic anemia  Hgb stable at baseline 10.7, asymptomatic. Continue to monitor    Presence of permanent cardiac pacemaker  HD stable, continue to monitor    Coronary artery disease involving native coronary artery of native heart without angina pectoris  Continue home meds    - ASA 81mg  - Plavix 75mg    Essential hypertension  HD stable, continue home meds    - amlodipine 10mg  - lisinopril 20mg    Acquired hypothyroidism  Continue home levothyroxine 125mcg    Severe aortic stenosis s/p TAVR  HD stable, continue to monitor      VTE Risk Mitigation (From admission, onward)         Ordered     enoxaparin injection 40 mg  Daily         07/18/22 0932     IP VTE HIGH RISK PATIENT  Once         07/18/22 0932     Place sequential compression device  Until discontinued         07/18/22 0932                Mario Ochoa MD  Cardiology  [facility] - [facility] Dept

## 2022-07-19 NOTE — SUBJECTIVE & OBJECTIVE
Interval History: NAEON, AF HDS, good UOP. Symptoms improving. Now sats 97% on 3L NC w/o respiratory distress. No other complaints.      Review of Systems   Constitutional: Negative for chills, fever and night sweats.   Eyes:  Negative for visual disturbance.   Cardiovascular:  Positive for dyspnea on exertion, leg swelling and orthopnea. Negative for chest pain.   Respiratory:  Positive for shortness of breath. Negative for cough and hemoptysis.    Skin:  Negative for color change and rash.   Musculoskeletal:  Negative for joint pain and myalgias.   Gastrointestinal:  Negative for abdominal pain, diarrhea, hematemesis, hematochezia and melena.   Genitourinary:  Negative for dysuria and hematuria.   Neurological:  Negative for headaches and light-headedness.   Psychiatric/Behavioral:  Negative for altered mental status and memory loss.    All other systems reviewed and are negative.  Objective:     Vital Signs (Most Recent):  Temp: 98.5 °F (36.9 °C) (07/19/22 0847)  Pulse: 60 (07/19/22 0532)  Resp: 16 (07/19/22 0100)  BP: (!) 140/63 (07/19/22 0856)  SpO2: 98 % (07/19/22 0532)   Vital Signs (24h Range):  Temp:  [97.4 °F (36.3 °C)-98.8 °F (37.1 °C)] 98.5 °F (36.9 °C)  Pulse:  [59-60] 60  Resp:  [16-17] 16  SpO2:  [96 %-100 %] 98 %  BP: (119-157)/(53-72) 140/63     Weight: 84.8 kg (187 lb)  Body mass index is 35.33 kg/m².     SpO2: 98 %  O2 Device (Oxygen Therapy): nasal cannula      Intake/Output Summary (Last 24 hours) at 7/19/2022 0954  Last data filed at 7/19/2022 0934  Gross per 24 hour   Intake 120.31 ml   Output 650 ml   Net -529.69 ml       Lines/Drains/Airways       Peripheral Intravenous Line  Duration                  Peripheral IV - Single Lumen 07/18/22 0740 20 G Right Antecubital 1 day         Peripheral IV - Single Lumen 07/18/22 1316 20 G Right Upper Arm <1 day                    Physical Exam  Vitals and nursing note reviewed.   Constitutional:       General: She is not in acute distress.      Appearance: She is not ill-appearing.   HENT:      Head: Normocephalic and atraumatic.      Right Ear: External ear normal.      Left Ear: External ear normal.      Mouth/Throat:      Mouth: Mucous membranes are moist.      Pharynx: No oropharyngeal exudate.   Eyes:      Extraocular Movements: Extraocular movements intact.      Pupils: Pupils are equal, round, and reactive to light.   Cardiovascular:      Rate and Rhythm: Normal rate and regular rhythm.      Pulses: Normal pulses.      Heart sounds: Murmur (systolic, upper sternal borders) heard.   Pulmonary:      Effort: Pulmonary effort is normal. No respiratory distress.      Breath sounds: Normal breath sounds. No wheezing or rales.      Comments: Comfortable on low flow NC  Abdominal:      General: Abdomen is flat. Bowel sounds are normal. There is no distension.      Palpations: Abdomen is soft.      Tenderness: There is no abdominal tenderness.   Musculoskeletal:         General: No signs of injury. Normal range of motion.      Cervical back: Normal range of motion.      Right lower leg: Edema (1+ to mid-tibial) present.      Left lower leg: Edema (1+ to mid-tibial) present.   Skin:     General: Skin is warm and dry.      Findings: Bruising (medial side of R knee) present.   Neurological:      General: No focal deficit present.      Mental Status: She is alert and oriented to person, place, and time.      Sensory: No sensory deficit.      Motor: No weakness.      Comments: Hard of hearing at baseline   Psychiatric:         Mood and Affect: Mood normal.         Behavior: Behavior normal.         Thought Content: Thought content normal.       Significant Labs: All pertinent lab results from the last 24 hours have been reviewed.    Significant Imaging:  Reviewed

## 2022-07-19 NOTE — ED NOTES
Daughter at bedside feeding pt.  Pt eating without difficulty.  Updated on status, will continue to monitor.

## 2022-07-20 PROBLEM — J96.01 ACUTE HYPOXEMIC RESPIRATORY FAILURE: Status: ACTIVE | Noted: 2022-07-20

## 2022-07-20 PROBLEM — Z51.5 PALLIATIVE CARE ENCOUNTER: Status: ACTIVE | Noted: 2022-07-20

## 2022-07-20 LAB
ALBUMIN SERPL BCP-MCNC: 2.5 G/DL (ref 3.5–5.2)
ALP SERPL-CCNC: 90 U/L (ref 55–135)
ALT SERPL W/O P-5'-P-CCNC: 9 U/L (ref 10–44)
ANION GAP SERPL CALC-SCNC: 12 MMOL/L (ref 8–16)
AST SERPL-CCNC: 25 U/L (ref 10–40)
BILIRUB SERPL-MCNC: 0.9 MG/DL (ref 0.1–1)
BUN SERPL-MCNC: 19 MG/DL (ref 8–23)
CALCIUM SERPL-MCNC: 8.9 MG/DL (ref 8.7–10.5)
CHLORIDE SERPL-SCNC: 101 MMOL/L (ref 95–110)
CHOLEST SERPL-MCNC: 106 MG/DL (ref 120–199)
CHOLEST/HDLC SERPL: 4.8 {RATIO} (ref 2–5)
CO2 SERPL-SCNC: 23 MMOL/L (ref 23–29)
CREAT SERPL-MCNC: 0.8 MG/DL (ref 0.5–1.4)
EST. GFR  (AFRICAN AMERICAN): >60 ML/MIN/1.73 M^2
EST. GFR  (NON AFRICAN AMERICAN): >60 ML/MIN/1.73 M^2
GLUCOSE SERPL-MCNC: 77 MG/DL (ref 70–110)
HDLC SERPL-MCNC: 22 MG/DL (ref 40–75)
HDLC SERPL: 20.8 % (ref 20–50)
LDLC SERPL CALC-MCNC: 68 MG/DL (ref 63–159)
MAGNESIUM SERPL-MCNC: 1.7 MG/DL (ref 1.6–2.6)
NONHDLC SERPL-MCNC: 84 MG/DL
PHOSPHATE SERPL-MCNC: 3.3 MG/DL (ref 2.7–4.5)
POTASSIUM SERPL-SCNC: 4 MMOL/L (ref 3.5–5.1)
PROCALCITONIN SERPL IA-MCNC: 0.47 NG/ML
PROT SERPL-MCNC: 7.3 G/DL (ref 6–8.4)
SODIUM SERPL-SCNC: 136 MMOL/L (ref 136–145)
TRIGL SERPL-MCNC: 80 MG/DL (ref 30–150)

## 2022-07-20 PROCEDURE — 20600001 HC STEP DOWN PRIVATE ROOM

## 2022-07-20 PROCEDURE — 84145 PROCALCITONIN (PCT): CPT | Performed by: STUDENT IN AN ORGANIZED HEALTH CARE EDUCATION/TRAINING PROGRAM

## 2022-07-20 PROCEDURE — 99900035 HC TECH TIME PER 15 MIN (STAT)

## 2022-07-20 PROCEDURE — 99233 SBSQ HOSP IP/OBS HIGH 50: CPT | Mod: ,,, | Performed by: STUDENT IN AN ORGANIZED HEALTH CARE EDUCATION/TRAINING PROGRAM

## 2022-07-20 PROCEDURE — 27000190 HC CPAP FULL FACE MASK W/VALVE

## 2022-07-20 PROCEDURE — 63700000 PHARM REV CODE 250 ALT 637 W/O HCPCS: Performed by: STUDENT IN AN ORGANIZED HEALTH CARE EDUCATION/TRAINING PROGRAM

## 2022-07-20 PROCEDURE — 97530 THERAPEUTIC ACTIVITIES: CPT

## 2022-07-20 PROCEDURE — 97161 PT EVAL LOW COMPLEX 20 MIN: CPT

## 2022-07-20 PROCEDURE — 63600175 PHARM REV CODE 636 W HCPCS: Performed by: STUDENT IN AN ORGANIZED HEALTH CARE EDUCATION/TRAINING PROGRAM

## 2022-07-20 PROCEDURE — 25000003 PHARM REV CODE 250: Performed by: INTERNAL MEDICINE

## 2022-07-20 PROCEDURE — 84100 ASSAY OF PHOSPHORUS: CPT | Performed by: STUDENT IN AN ORGANIZED HEALTH CARE EDUCATION/TRAINING PROGRAM

## 2022-07-20 PROCEDURE — 80061 LIPID PANEL: CPT | Performed by: INTERNAL MEDICINE

## 2022-07-20 PROCEDURE — 36415 COLL VENOUS BLD VENIPUNCTURE: CPT | Performed by: STUDENT IN AN ORGANIZED HEALTH CARE EDUCATION/TRAINING PROGRAM

## 2022-07-20 PROCEDURE — 63600175 PHARM REV CODE 636 W HCPCS: Performed by: INTERNAL MEDICINE

## 2022-07-20 PROCEDURE — 97165 OT EVAL LOW COMPLEX 30 MIN: CPT

## 2022-07-20 PROCEDURE — 83735 ASSAY OF MAGNESIUM: CPT | Performed by: STUDENT IN AN ORGANIZED HEALTH CARE EDUCATION/TRAINING PROGRAM

## 2022-07-20 PROCEDURE — 97535 SELF CARE MNGMENT TRAINING: CPT

## 2022-07-20 PROCEDURE — 94761 N-INVAS EAR/PLS OXIMETRY MLT: CPT

## 2022-07-20 PROCEDURE — 99223 1ST HOSP IP/OBS HIGH 75: CPT | Mod: GC,,, | Performed by: INTERNAL MEDICINE

## 2022-07-20 PROCEDURE — 99223 PR INITIAL HOSPITAL CARE,LEVL III: ICD-10-PCS | Mod: GC,,, | Performed by: INTERNAL MEDICINE

## 2022-07-20 PROCEDURE — 94660 CPAP INITIATION&MGMT: CPT

## 2022-07-20 PROCEDURE — 99233 PR SUBSEQUENT HOSPITAL CARE,LEVL III: ICD-10-PCS | Mod: ,,, | Performed by: STUDENT IN AN ORGANIZED HEALTH CARE EDUCATION/TRAINING PROGRAM

## 2022-07-20 PROCEDURE — 80053 COMPREHEN METABOLIC PANEL: CPT | Performed by: STUDENT IN AN ORGANIZED HEALTH CARE EDUCATION/TRAINING PROGRAM

## 2022-07-20 RX ADMIN — AMLODIPINE BESYLATE 10 MG: 10 TABLET ORAL at 08:07

## 2022-07-20 RX ADMIN — ATORVASTATIN CALCIUM 40 MG: 20 TABLET, FILM COATED ORAL at 08:07

## 2022-07-20 RX ADMIN — ASPIRIN 81 MG CHEWABLE TABLET 81 MG: 81 TABLET CHEWABLE at 08:07

## 2022-07-20 RX ADMIN — FUROSEMIDE 30 MG/HR: 10 INJECTION, SOLUTION INTRAMUSCULAR; INTRAVENOUS at 08:07

## 2022-07-20 RX ADMIN — LEVOTHYROXINE SODIUM 125 MCG: 25 TABLET ORAL at 06:07

## 2022-07-20 RX ADMIN — AZITHROMYCIN MONOHYDRATE 500 MG: 250 TABLET ORAL at 08:07

## 2022-07-20 RX ADMIN — CLOPIDOGREL 75 MG: 75 TABLET, FILM COATED ORAL at 08:07

## 2022-07-20 RX ADMIN — FUROSEMIDE 20 MG/HR: 10 INJECTION, SOLUTION INTRAMUSCULAR; INTRAVENOUS at 01:07

## 2022-07-20 RX ADMIN — CEFTRIAXONE 2 G: 2 INJECTION, SOLUTION INTRAVENOUS at 01:07

## 2022-07-20 RX ADMIN — ENOXAPARIN SODIUM 40 MG: 100 INJECTION SUBCUTANEOUS at 05:07

## 2022-07-20 RX ADMIN — LISINOPRIL 20 MG: 20 TABLET ORAL at 08:07

## 2022-07-20 NOTE — ASSESSMENT & PLAN NOTE
Patient is identified as having Diastolic (HFpEF) heart failure that is Acute on chronic. CHF is currently uncontrolled due to Continued edema of extremities and JVD, >3 pillow orthopnea and Pulmonary edema/pleural effusion on CXR. Latest ECHO performed and demonstrates- Results for orders placed during the hospital encounter of 07/03/22     Echo     Interpretation Summary  · The left ventricle is normal in size with mild concentric hypertrophy and normal systolic function.  · The estimated ejection fraction is 65%.  · Grade II left ventricular diastolic dysfunction.  · Mild mitral regurgitation and 5 mmHg diastolic gradient across the MV related to degenerative MV disease.  · There is a 26 mm Evolut CoreValve transcutaneously-placed aortic bioprosthesis present. There is no aortic insufficiency present. Prosthetic aortic valve is normal.  · The aortic valve mean gradient is 5 mmHg with a dimensionless index of 0.49.  · Mild tricuspid regurgitation.  · Mild pulmonic regurgitation.  · Normal right ventricular size with normal right ventricular systolic function.  · There is a moderate left pleural effusion.    - Continue ACE/ARB  - Continue lasix drip- increase to 30mg/hr  - Daily weights  - Goal UOP of 2-3L/day   - Strict I's and O's   - 1.5L fluid restriction  - Will need to gradually need to restart beta blocker for better GDMT- can plan for coreg 3.125mg BID tomorrow

## 2022-07-20 NOTE — SUBJECTIVE & OBJECTIVE
Interval History: Patient stepped down to hospital medicine from CCU. Stable on 3L NC. Patient's son at bedside.    Past Medical History:   Diagnosis Date    Arthritis     Cancer     CHF (congestive heart failure)     Coronary artery disease     Hypertension     Thyroid disease        Past Surgical History:   Procedure Laterality Date    CARDIAC CATHETERIZATION      CARDIAC VALVE SURGERY      CHOLECYSTECTOMY      CORONARY ANGIOGRAPHY N/A 4/23/2019    Procedure: ANGIOGRAM, CORONARY ARTERY;  Surgeon: Bakari Singletary MD;  Location: [facility] CATH LAB;  Service: Cardiology;  Laterality: N/A;    HYSTERECTOMY      lymph nodes removal      THYROIDECTOMY      TRANSCATHETER AORTIC VALVE REPLACEMENT (TAVR) N/A 5/7/2019    Procedure: REPLACEMENT, AORTIC VALVE, TRANSCATHETER (TAVR);  Surgeon: Bakari Singletary MD;  Location: [facility] CATH LAB;  Service: Cardiology;  Laterality: N/A;       Review of patient's allergies indicates:   Allergen Reactions    Penicillins Swelling    Sulfa (sulfonamide antibiotics) Nausea Only       Medications:  Continuous Infusions:   furosemide (LASIX) 10 mg/mL infusion (non-titrating) 20 mg/hr (07/20/22 0142)     Scheduled Meds:   amLODIPine  10 mg Oral Daily    aspirin  81 mg Oral Daily    atorvastatin  40 mg Oral QHS    azithromycin  500 mg Oral Daily    cefTRIAXone (ROCEPHIN) IVPB  2 g Intravenous Q24H    clopidogreL  75 mg Oral Daily    enoxaparin  40 mg Subcutaneous Daily    levothyroxine  125 mcg Oral Before breakfast    lisinopriL  20 mg Oral Daily    perflutren protein-a microsphr  3 mL Intravenous 1 time in Clinic/HOD     PRN Meds:sodium chloride 0.9%, sodium chloride 0.9%    Family History    None       Tobacco Use    Smoking status: Never Smoker    Smokeless tobacco: Never Used   Substance and Sexual Activity    Alcohol use: Not Currently    Drug use: Never    Sexual activity: Not on file       Review of Systems   Constitutional:  Negative for chills and fever.   HENT:  Negative for congestion  and dental problem.    Eyes:  Negative for photophobia and visual disturbance.   Respiratory:  Negative for cough and shortness of breath.    Cardiovascular:  Negative for chest pain and leg swelling.   Gastrointestinal:  Negative for abdominal pain, diarrhea and nausea.   Genitourinary:  Negative for difficulty urinating and dysuria.   Musculoskeletal:  Negative for arthralgias and neck pain.   Skin:  Negative for color change and rash.   Neurological:  Positive for weakness. Negative for headaches.   Psychiatric/Behavioral:  Negative for agitation and behavioral problems.    Objective:     Vital Signs (Most Recent):  Temp: 98.6 °F (37 °C) (07/20/22 1211)  Pulse: (!) 59 (07/20/22 1211)  Resp: 18 (07/20/22 1211)  BP: (!) 117/53 (07/20/22 1211)  SpO2: 99 % (07/20/22 1211)   Vital Signs (24h Range):  Temp:  [97.6 °F (36.4 °C)-98.8 °F (37.1 °C)] 98.6 °F (37 °C)  Pulse:  [59-71] 59  Resp:  [16-18] 18  SpO2:  [99 %-100 %] 99 %  BP: (113-146)/(53-65) 117/53     Weight: 84.8 kg (187 lb)  Body mass index is 35.33 kg/m².    Physical Exam  Constitutional:       General: She is not in acute distress.  HENT:      Head: Normocephalic and atraumatic.      Nose: Nose normal. No congestion.   Eyes:      General: No scleral icterus.     Extraocular Movements: Extraocular movements intact.   Cardiovascular:      Rate and Rhythm: Normal rate and regular rhythm.   Pulmonary:      Effort: Pulmonary effort is normal. No respiratory distress.      Comments: On 3L NC  Abdominal:      General: There is no distension.      Palpations: Abdomen is soft.   Musculoskeletal:         General: Swelling present. No deformity.      Cervical back: Normal range of motion and neck supple.   Skin:     General: Skin is warm and dry.   Neurological:      General: No focal deficit present.      Mental Status: She is alert. Mental status is at baseline.   Psychiatric:         Mood and Affect: Mood normal.         Behavior: Behavior normal.       Palliative  Exam    Advance Care Planning   Advance Care Planning       Significant Labs:   CBC:   Recent Labs   Lab 07/19/22 1912   WBC 3.89*   HGB 9.6*   HCT 30.1*   MCV 86        BMP:  Recent Labs   Lab 07/20/22 0221   GLU 77      K 4.0      CO2 23   BUN 19   CREATININE 0.8   CALCIUM 8.9   MG 1.7     LFT:  Lab Results   Component Value Date    AST 25 07/20/2022    ALKPHOS 90 07/20/2022    BILITOT 0.9 07/20/2022     Albumin:   Albumin   Date Value Ref Range Status   07/20/2022 2.5 (L) 3.5 - 5.2 g/dL Final     Protein:   Total Protein   Date Value Ref Range Status   07/20/2022 7.3 6.0 - 8.4 g/dL Final     Lactic acid:   Lab Results   Component Value Date    LACTATE 1.3 07/18/2022    LACTATE 1.4 11/17/2021       Significant Imaging: I have reviewed all pertinent imaging results/findings within the past 24 hours.

## 2022-07-20 NOTE — ASSESSMENT & PLAN NOTE
- concern for cap on top of CHF exacerbation  - currently on azithro and ceftriaxone- continue with course of treatment  - monitor for clinical improvement and de-escalate antibiotics accordingly

## 2022-07-20 NOTE — ASSESSMENT & PLAN NOTE
- BP currently well-controlled  - Continue home regimen of lisinopril 20mg qday, amlodipine 10mg qday   - Will continue to monitor and further titrate antihypertensives as clinically indicated

## 2022-07-20 NOTE — PROGRESS NOTES
[facility] - Telemetry [facility] Medicine  Progress Note    Patient Name: Pilar Michael  MRN: [id number]  Patient Class: IP- Inpatient   Admission Date: 7/18/2022  Length of Stay: 2 days  Attending Physician: Bridgette Díaz MD  Primary Care Provider: Joseph Noel MD        Subjective:     Principal Problem:Acute on chronic combined systolic and diastolic CHF (congestive heart failure)        HPI:  Pilar Michael is an 86 y/o female with PMH severe AS s/p TAVR (5/7/19), CAD s/p ostial RCA stent placement (4/19), IDBCA s/p resection and radiation (2003), thyroid cancer s/p resection (1992), HTN, PAD presents to the hospital secondary to dyspnea. Patient was at rehab and found to be short of breath that have been progressively worsening over the morning.  She was recently admitted to the hospital secondary to acute on chronic diastolic heart failure. She was diuresed with Lasix which was eventually switched to Bumex.  She was discharged on Bumex 1 mg b.i.d. and this was decreased to 1 mg daily per her outpatient cardiologist. She presents to the ED hypoxic with O2 sat in the 70s. She was placed on BiPAP with improvement in her O2 saturations. She is hemodynamically stable. Chest x-ray notable for large left pleural effusion pulmonary edema pulmonary vascular congestion.  BNP elevated. Trop mildly elevated. CBC appears at baseline with microcytic anemia. CMP grossly unremarkable. Lactate 1.3. Given patient's acute on chronic diastolic heart failure requiring BiPAP, she will be admitted to CCU for diuresis.      Overview/Hospital Course:  CCU Course:  Admitted for acute hypoxemic respiratory failure requiring 15L non re-breather and BiPAP, likely 2/2 acute on chronic diastolic heart failure. Lasix 20 mg/hr ggt w/ moderate UOP and improvement of symptoms, now comfortably back on low flow NC. Stable for step down to hospital medicine.    HM Course:  Lasix drip increased to 30mg/hr for better UOP.  Palliative care evaluated patient for further [facility] discussion 07/20.       Interval History: Patient was seen and evaluated this am- reported some improvement in work of breathing, but still feeling weak and SOB overall. Swelling of LUE  (h/o lymphedema 2/2 LN biopsy/resection with her breast cancer). Poor PO intake-no BM in the past couple of days (normally goes once daily), but patient reports cleanout last week, and she does not desire to increase bowel regimen at this time. Discussed the need for possible continued bipap use- will obtain ABG. Son at bedside, and mentioned h/o recurrent UTIs. Will need to be seen by urology at time of discharge. 700mL UOP today. No chest pain, nausea, vomiting, fevers, chills, night sweats.    Objective:     Vital Signs (Most Recent):  Temp: 98.6 °F (37 °C) (07/20/22 1211)  Pulse: (!) 59 (07/20/22 1211)  Resp: 18 (07/20/22 1211)  BP: (!) 117/53 (07/20/22 1211)  SpO2: 99 % (07/20/22 1211)   Vital Signs (24h Range):  Temp:  [97.6 °F (36.4 °C)-98.8 °F (37.1 °C)] 98.6 °F (37 °C)  Pulse:  [59-71] 59  Resp:  [16-18] 18  SpO2:  [99 %-100 %] 99 %  BP: (113-146)/(53-62) 117/53     Weight: 84.8 kg (187 lb)  Body mass index is 35.33 kg/m².    Intake/Output Summary (Last 24 hours) at 7/20/2022 1427  Last data filed at 7/20/2022 1000  Gross per 24 hour   Intake --   Output 1700 ml   Net -1700 ml      Physical Exam  Gen: in NAD, appears stated age, appears chronically  ill  Neuro: AAOx3, motor, sensory, and strength grossly intact BL  HEENT: NTNC, EOMI, PERRL, MMM  CVS: RRR, no m/r/g; S1/S2 auscultated with no S3 or S4; capillary refill < 2 sec, no JVD  Resp: lungs CTAB, no w/r/r; no belabored breathing or accessory muscle use appreciated, bibasilar crackles  Abd: BS+ in all 4 quadrants; NTND, soft to palpation; no organomegaly appreciated   Extrem: pulses full, equal, and regular over all 4 extremities; swelling of BL LE, lymphedema of LUE, no swelling of LUE      Significant Labs: All  pertinent labs within the past 24 hours have been reviewed.  Blood Culture: No results for input(s): LABBLOO in the last 48 hours.  CBC:   Recent Labs   Lab 07/19/22  1912   WBC 3.89*   HGB 9.6*   HCT 30.1*        CMP:   Recent Labs   Lab 07/19/22  0614 07/20/22  0221    136   K 3.7 4.0    101   CO2 28 23   GLU 97 77   BUN 20 19   CREATININE 0.8 0.8   CALCIUM 8.8 8.9   PROT 7.6 7.3   ALBUMIN 2.5* 2.5*   BILITOT 1.0 0.9   ALKPHOS 90 90   AST 26 25   ALT 10 9*   ANIONGAP 9 12   EGFRNONAA >60.0 >60.0     POCT Glucose: No results for input(s): POCTGLUCOSE in the last 48 hours.  Troponin: No results for input(s): TROPONINI in the last 48 hours.  Urine Culture: No results for input(s): LABURIN in the last 48 hours.  Urine Studies: No results for input(s): COLORU, APPEARANCEUA, PHUR, SPECGRAV, PROTEINUA, GLUCUA, KETONESU, BILIRUBINUA, OCCULTUA, NITRITE, UROBILINOGEN, LEUKOCYTESUR, RBCUA, WBCUA, BACTERIA, SQUAMEPITHEL, HYALINECASTS in the last 48 hours.    Invalid input(s): WRIGHTSUR    Significant Imaging: I have reviewed all pertinent imaging results/findings within the past 24 hours.    CXR 07/19:  FINDINGS:  Cardiac pacemaker is again noted, as is an aortic valvular prosthesis.  Opacity in the inferior hemithorax on the left side has decreased appreciably since the examination referenced above, consistent with a decreased volume of pleural fluid on this side.  Right lung and the mid and upper lung zones on the left appear stable, with no significant new areas of airspace consolidation or volume loss have developed.  No significant volume of pleural fluid is seen on the right.  No pneumothorax.  Surgical clip in the left axillary soft tissues again incidentally noted.     Impression:     No significant detrimental interval change in the appearance of the chest since 07/18/2022 to relate to the current history of shortness of breath.  In fact, the opacity in the inferior left hemithorax has is now less  pronounced than was the case at that time, consistent with a decrease in the volume of left pleural fluid.      Assessment/Plan:      * Acute on chronic combined systolic and diastolic CHF (congestive heart failure)  Patient is identified as having Diastolic (HFpEF) heart failure that is Acute on chronic. CHF is currently uncontrolled due to Continued edema of extremities and JVD, >3 pillow orthopnea and Pulmonary edema/pleural effusion on CXR. Latest ECHO performed and demonstrates- Results for orders placed during the hospital encounter of 07/03/22     Echo     Interpretation Summary  · The left ventricle is normal in size with mild concentric hypertrophy and normal systolic function.  · The estimated ejection fraction is 65%.  · Grade II left ventricular diastolic dysfunction.  · Mild mitral regurgitation and 5 mmHg diastolic gradient across the MV related to degenerative MV disease.  · There is a 26 mm Evolut CoreValve transcutaneously-placed aortic bioprosthesis present. There is no aortic insufficiency present. Prosthetic aortic valve is normal.  · The aortic valve mean gradient is 5 mmHg with a dimensionless index of 0.49.  · Mild tricuspid regurgitation.  · Mild pulmonic regurgitation.  · Normal right ventricular size with normal right ventricular systolic function.  · There is a moderate left pleural effusion.    - Continue ACE/ARB  - Continue lasix drip- increase to 30mg/hr  - Daily weights  - Goal UOP of 2-3L/day   - Strict I's and O's   - 1.5L fluid restriction  - Will need to gradually need to restart beta blocker for better GDMT- can plan for coreg 3.125mg BID tomorrow      Acute hypoxemic respiratory failure  Patient with Hypoxic Respiratory failure which is Acute.  she is not on home oxygen. Supplemental oxygen was provided and noted-  .   Signs/symptoms of respiratory failure include- tachypnea, increased work of breathing and respiratory distress. Contributing diagnoses includes - CHF Labs and  images were reviewed. Patient Has not had a recent ABG. Will treat underlying causes and adjust management of respiratory failure as follows-     - CHF treatment as outlined above  - Continue supplemental oxygen- wean as tolerated as we maximize IV diuresis   - Currently on 3.5L BNC this am     Coronary artery disease involving native coronary artery of native heart without angina pectoris  - continue GDMT- asa 81mg qday, plavix 75mg qday, ACEI- consideration of addition of beta blocker prior to DC    Acquired hypothyroidism  - continue home levothyroxine 125mcg qday     Palliative care encounter  - Palliative consulted for further GOC discussion     Hyperlipidemia  - Continue home statin regimen    Normocytic anemia  - hg at baseline- 10.7  - monitor     CAP (community acquired pneumonia)  - concern for cap on top of CHF exacerbation  - currently on azithro and ceftriaxone- continue with course of treatment  - monitor for clinical improvement and de-escalate antibiotics accordingly    Presence of permanent cardiac pacemaker  - present, HDS, no issues     Essential hypertension  - BP currently well-controlled  - Continue home regimen of lisinopril 20mg qday, amlodipine 10mg qday   - Will continue to monitor and further titrate antihypertensives as clinically indicated     Severe aortic stenosis s/p TAVR  - s/p TAVR, stable, no issues       VTE Risk Mitigation (From admission, onward)         Ordered     enoxaparin injection 40 mg  Daily         07/18/22 0932     IP VTE HIGH RISK PATIENT  Once         07/18/22 0932     Place sequential compression device  Until discontinued         07/18/22 0932                Discharge Planning   CHARLIE: 7/23/2022     Code Status: Full Code   Is the patient medically ready for discharge?: No    Reason for patient still in hospital (select all that apply): Patient trending condition  Discharge Plan A: Skilled Nursing Facility                    Bridgette Díaz MD  Department of  [facility] Medicine   [facility] - Telemetry Stepdown

## 2022-07-20 NOTE — PLAN OF CARE
Problem: Occupational Therapy  Goal: Occupational Therapy Goal  Description: Goals to be met by: 07-31-22     Patient will increase functional independence with ADLs by performing:    UE Dressing with Supervision.  LE Dressing with Minimal Assistance with AE as needed  Grooming while seated with Set-up Assistance.  Toileting from bedside commode with Minimal Assistance for hygiene and clothing management.   Bathing from  shower chair/bench with Minimal Assistance.  Supine to sit with Supervision.  Stand pivot transfers with Supervision with RW  Toilet transfer to bedside commode with Supervision.      Outcome: Ongoing, Progressing

## 2022-07-20 NOTE — HPI
Pilar Michael is an 88 y/o female with PMH severe AS s/p TAVR (5/7/19), CAD s/p ostial RCA stent placement (4/19), IDBCA s/p resection and radiation (2003), thyroid cancer s/p resection (1992), HTN, PAD presents to the hospital secondary to dyspnea. Patient was at rehab and found to be short of breath that have been progressively worsening over the morning.  She was recently admitted to the hospital secondary to acute on chronic diastolic heart failure. She was diuresed with Lasix which was eventually switched to Bumex.  She was discharged on Bumex 1 mg b.i.d. and this was decreased to 1 mg daily per her outpatient cardiologist. She presents to the ED hypoxic with O2 sat in the 70s. She was placed on BiPAP with improvement in her O2 saturations. She is hemodynamically stable. Chest x-ray notable for large left pleural effusion pulmonary edema pulmonary vascular congestion.  BNP elevated. Trop mildly elevated. CBC appears at baseline with microcytic anemia. CMP grossly unremarkable. Lactate 1.3. Given patient's acute on chronic diastolic heart failure requiring BiPAP, she will be admitted to CCU for diuresis.

## 2022-07-20 NOTE — CONSULTS
[facility] - Telemetry Stepdown  Palliative Medicine  Consult Note    Patient Name: Pilar Michael  MRN: [id number]  Admission Date: 7/18/2022  Hospital Length of Stay: 2 days  Code Status: Full Code   Attending Provider: Bridgette Díaz MD  Consulting Provider: Chantal Waddell MD  Primary Care Physician: Joseph Noel MD  Principal Problem:Acute on chronic combined systolic and diastolic CHF (congestive heart failure)    Patient information was obtained from patient and relative(s).      Inpatient consult to Palliative Care  Consult performed by: Chantal Waddell MD  Consult ordered by: Mario Ochoa MD        Assessment/Plan:     Palliative care encounter  Ms. Michael is a 89 year old woman who presented with SOB due to acute heart failure exacerbation. Palliative care was consulted for goals of care discussion.    Living Arrangements:  Lives with her daughter.     Psychosocial/Cultural: Patient currently living with daughter in [place]. She has two biological children: one son and one daughter.     Advance Care Planning   Advance Directives:   Living Will: No    LaPOST: No    Do Not Resuscitate Status: No    Medical Power of : No - patient's daughter states that she will try to bring in POA paperwork.     Decision Making:  Patient and her family answered questions     Recommendations: Palliative team spoke with patient and her son at the bedside. Patient's son explained that patient began going to the hospital more often after her mastectomy late last year. He also states that she became weaker and needed a walker. Patient states that she wants to be at home as she enjoys sewing, jessica, and talking with her neighbors. Spoke with patient's daughter, Katiana, over the phone. She reported a similar story to her brother's that prior to mastectomy, patient was doing her ADLs ie buying groceries and even driving. Then after mastectomy, patient never returned to her baseline. She is aware that patient will likely  "never return to baseline and even the possibility of becoming worse. Her goal is to keep patient at home as she does not want her to go to a nursing home. Discussed what Palliative care is: supporting sick patients, planning for the future and addressing symptoms. She states that patient has HCPOA but has to find the papers. She says that her father (patient's ) did not have a DNR and coded but was brought back with "no brain damage" but that he passed about a week later, and his "body wore out". Patient appears to be improving clinically but would benefit from continued follow up with Palliative Care. Encouraged Katiana to discuss HCPOA and planning for the future with patient and her brother. Will refer to home based Palliative Care.     Thank you for your consult. I will follow-up with patient. Please contact us if you have any additional questions.    Subjective:     HPI:   Ms. Pilar Michael is an 87 year old woman with severe AS s/p TAVR (5/7/19), CAD s/p ostial RCA stent placement (4/19), IDBCA s/p resection and radiation (2003), thyroid cancer s/p resection (1992), HTN, PAD who presented on 07/18 with SOB due to acute heart failure exacerbation. She was initially admitted to CCU as she was requiring BiPAP but able to transition to NC with lasix gtt and stepped down to hospital medicine on 07/19. She states that she has "been better". Patient's son was at bedside. Of note, this is patient's 6th hospitalization ever since her masectomy late last year. She was recently hospitalized at [facility] 2 weeks ago for UTI and acute heart failure exacerbation and was then discharged to SNF. Palliative Care was consulted for goals of care discussion.      Hospital Course:  No notes on file    Interval History: Patient stepped down to hospital medicine from CCU. Stable on 3L NC. Patient's son at bedside.    Past Medical History:   Diagnosis Date    Arthritis     Cancer     CHF (congestive heart failure)     " Coronary artery disease     Hypertension     Thyroid disease        Past Surgical History:   Procedure Laterality Date    CARDIAC CATHETERIZATION      CARDIAC VALVE SURGERY      CHOLECYSTECTOMY      CORONARY ANGIOGRAPHY N/A 4/23/2019    Procedure: ANGIOGRAM, CORONARY ARTERY;  Surgeon: Bakari Singletary MD;  Location: [facility] CATH LAB;  Service: Cardiology;  Laterality: N/A;    HYSTERECTOMY      lymph nodes removal      THYROIDECTOMY      TRANSCATHETER AORTIC VALVE REPLACEMENT (TAVR) N/A 5/7/2019    Procedure: REPLACEMENT, AORTIC VALVE, TRANSCATHETER (TAVR);  Surgeon: Bakari Singletary MD;  Location: [facility] CATH LAB;  Service: Cardiology;  Laterality: N/A;       Review of patient's allergies indicates:   Allergen Reactions    Penicillins Swelling    Sulfa (sulfonamide antibiotics) Nausea Only       Medications:  Continuous Infusions:   furosemide (LASIX) 10 mg/mL infusion (non-titrating) 20 mg/hr (07/20/22 0142)     Scheduled Meds:   amLODIPine  10 mg Oral Daily    aspirin  81 mg Oral Daily    atorvastatin  40 mg Oral QHS    azithromycin  500 mg Oral Daily    cefTRIAXone (ROCEPHIN) IVPB  2 g Intravenous Q24H    clopidogreL  75 mg Oral Daily    enoxaparin  40 mg Subcutaneous Daily    levothyroxine  125 mcg Oral Before breakfast    lisinopriL  20 mg Oral Daily    perflutren protein-a microsphr  3 mL Intravenous 1 time in Clinic/HOD     PRN Meds:sodium chloride 0.9%, sodium chloride 0.9%    Family History    None       Tobacco Use    Smoking status: Never Smoker    Smokeless tobacco: Never Used   Substance and Sexual Activity    Alcohol use: Not Currently    Drug use: Never    Sexual activity: Not on file       Review of Systems   Constitutional:  Negative for chills and fever.   HENT:  Negative for congestion and dental problem.    Eyes:  Negative for photophobia and visual disturbance.   Respiratory:  Negative for cough and shortness of breath.    Cardiovascular:  Negative for chest pain and leg  swelling.   Gastrointestinal:  Negative for abdominal pain, diarrhea and nausea.   Genitourinary:  Negative for difficulty urinating and dysuria.   Musculoskeletal:  Negative for arthralgias and neck pain.   Skin:  Negative for color change and rash.   Neurological:  Positive for weakness. Negative for headaches.   Psychiatric/Behavioral:  Negative for agitation and behavioral problems.    Objective:     Vital Signs (Most Recent):  Temp: 98.6 °F (37 °C) (07/20/22 1211)  Pulse: (!) 59 (07/20/22 1211)  Resp: 18 (07/20/22 1211)  BP: (!) 117/53 (07/20/22 1211)  SpO2: 99 % (07/20/22 1211)   Vital Signs (24h Range):  Temp:  [97.6 °F (36.4 °C)-98.8 °F (37.1 °C)] 98.6 °F (37 °C)  Pulse:  [59-71] 59  Resp:  [16-18] 18  SpO2:  [99 %-100 %] 99 %  BP: (113-146)/(53-65) 117/53     Weight: 84.8 kg (187 lb)  Body mass index is 35.33 kg/m².    Physical Exam  Constitutional:       General: She is not in acute distress.  HENT:      Head: Normocephalic and atraumatic.      Nose: Nose normal. No congestion.   Eyes:      General: No scleral icterus.     Extraocular Movements: Extraocular movements intact.   Cardiovascular:      Rate and Rhythm: Normal rate and regular rhythm.   Pulmonary:      Effort: Pulmonary effort is normal. No respiratory distress.      Comments: On 3L NC  Abdominal:      General: There is no distension.      Palpations: Abdomen is soft.   Musculoskeletal:         General: Swelling present. No deformity.      Cervical back: Normal range of motion and neck supple.   Skin:     General: Skin is warm and dry.   Neurological:      General: No focal deficit present.      Mental Status: She is alert. Mental status is at baseline.   Psychiatric:         Mood and Affect: Mood normal.         Behavior: Behavior normal.       Palliative Exam    Advance Care Planning   Advance Care Planning       Significant Labs:   CBC:   Recent Labs   Lab 07/19/22 1912   WBC 3.89*   HGB 9.6*   HCT 30.1*   MCV 86        BMP:  Recent  Labs   Lab 07/20/22 0221   GLU 77      K 4.0      CO2 23   BUN 19   CREATININE 0.8   CALCIUM 8.9   MG 1.7     LFT:  Lab Results   Component Value Date    AST 25 07/20/2022    ALKPHOS 90 07/20/2022    BILITOT 0.9 07/20/2022     Albumin:   Albumin   Date Value Ref Range Status   07/20/2022 2.5 (L) 3.5 - 5.2 g/dL Final     Protein:   Total Protein   Date Value Ref Range Status   07/20/2022 7.3 6.0 - 8.4 g/dL Final     Lactic acid:   Lab Results   Component Value Date    LACTATE 1.3 07/18/2022    LACTATE 1.4 11/17/2021       Significant Imaging: I have reviewed all pertinent imaging results/findings within the past 24 hours.        Chantal Waddell MD  Palliative Medicine  [facility] - Telemetry Stepdown    I have reviewed the notes, assessments, and/or procedures performed by Dr. Waddell, I concur with her documentation of Pilar Michael.    GOC conversations initiated. Will need continued follow with palliative as an outpatient. Patient would be appropriate for home based palliative medicine     > 50% of 75  min visit spent in chart review, face to face discussion of goals of care,  symptom assessment, coordination of care and emotional support

## 2022-07-20 NOTE — PLAN OF CARE
[facility] - Telemetry Stepdown  Initial Discharge Assessment       Primary Care Provider: Joseph Noel MD    Admission Diagnosis: SOB (shortness of breath) [R06.02]  Acute exacerbation of CHF (congestive heart failure) [I50.9]  Heart failure [I50.9]    Admission Date: 7/18/2022  Expected Discharge Date: 7/22/2022    Discharge Barriers Identified: None    Payor: [facility] MANAGED MCARE / Plan: [employer] MEDICARE ADVANTAGE / Product Type: Medicare Advantage /     Extended Emergency Contact Information  Primary Emergency Contact: Katiana Nickerson  Address: [address] [place] [address]  Mobile Phone: [phone]  Relation: Daughter  Preferred language: English  Secondary Emergency Contact: Jack Michael           [place], [place]  Mobile Phone: [phone]  Relation: Son    Discharge Plan A: Skilled Nursing Facility  Discharge Plan B: Home Health      [id number] [place] - [address] [facility] [address] [facility]  [address] [address]  Phone: [phone] Fax: [phone]      Initial Assessment (most recent)     Adult Discharge Assessment - 07/20/22 1344        Discharge Assessment    Assessment Type Discharge Planning Assessment     Confirmed/corrected address, phone number and insurance Yes     Confirmed Demographics Correct on Facesheet     Source of Information patient     Communicated CHARLIE with patient/caregiver Yes     Reason For Admission SOB     Lives With spouse;child(alejandro), adult     Do you expect to return to your current living situation? Yes     Do you have help at home or someone to help you manage your care at home? Yes     Who are your caregiver(s) and their phone number(s)? Katiana Nickerson (daughter) [phone]     Prior to hospitilization cognitive status: Alert/Oriented     Current cognitive status: Alert/Oriented     Walking or Climbing Stairs Difficulty ambulation difficulty, requires equipment      Dressing/Bathing Difficulty bathing difficulty, requires equipment     Equipment Currently Used at Home walker, rolling;rollator;wheelchair;bedside commode;shower chair     Readmission within 30 days? Yes   Recently discharged from [facility] on 7/11.    Patient currently being followed by outpatient case management? No     Do you currently have service(s) that help you manage your care at home? No     Do you take prescription medications? Yes     Do you have prescription coverage? Yes     Do you have any problems affording any of your prescribed medications? No     Is the patient taking medications as prescribed? yes     Who is going to help you get home at discharge? Patient's family will provide transportation home.     How do you get to doctors appointments? family or friend will provide     Are you on dialysis? No     Do you take coumadin? No     Discharge Plan A Skilled Nursing Facility     Discharge Plan B Home Health     DME Needed Upon Discharge  other (see comments)   TBD    Discharge Plan discussed with: Patient;Adult children     Discharge Barriers Identified None               Ning Church RN  Ext [phone]

## 2022-07-20 NOTE — PT/OT/SLP EVAL
Physical Therapy Evaluation    Patient Name:  Pilar Michael   MRN:  [id number]    Recommendations:     Discharge Recommendations:  nursing facility, skilled   Discharge Equipment Recommendations: none   Barriers to discharge: None    Assessment:     Pilar Michael is a 89 y.o. female admitted with a medical diagnosis of Acute on chronic combined systolic and diastolic CHF (congestive heart failure).  She presents with the following impairments/functional limitations:  weakness, impaired endurance, impaired self care skills, impaired functional mobility, gait instability, impaired balance, edema Pt. cooperative and tolerated treatment well. Pt. progressing with mobility.    Rehab Prognosis: Fair; patient would benefit from acute skilled PT services to address these deficits and reach maximum level of function.    Recent Surgery: * No surgery found *      Plan:     During this hospitalization, patient to be seen 4 x/week to address the identified rehab impairments via gait training, therapeutic activities, therapeutic exercises and progress toward the following goals:    · Plan of Care Expires:  08/19/22    Subjective     Chief Complaint: weakness  Patient/Family Comments/goals: pt. Agreeable to PT  Pain/Comfort:  · Pain Rating 1: 0/10  · Pain Rating Post-Intervention 1: 0/10    Patients cultural, spiritual, Roman Catholic conflicts given the current situation: no    Living Environment:  Pt. Lives with daughter in [facility] with 4 STEPHEN with (B) handrails, but has access to home through garage with only 1 step.  Prior to admission, patients level of function was amb. with rollator.  Equipment used at home: bedside commode, wheelchair, rollator, shower chair, grab bar.  Upon discharge, patient will have assistance from daughter/family.    Objective:     Communicated with nursing prior to session.  Patient found up in chair with PureWick, peripheral IV, oxygen  upon PT entry to room.    General Precautions: Standard,  aspiration, fall   Orthopedic Precautions:N/A   Braces: N/A  Respiratory Status: Nasal cannula, flow 3 L/min    Exams:  · RLE ROM: WFL  · RLE Strength: WFL  · LLE ROM: WFL  · LLE Strength: WFL    Functional Mobility:  · Bed Mobility:     · Sit to Supine: maximal assistance  · Transfers:     · Sit to Stand:  minimum assistance with rolling walker  · Gait: 8 steps with RW and Min A with decreased step length/annie/floor clearance  · Balance: fair    Therapeutic Activities and Exercises:   Discussed therapy needs, goals, safety with mobility, and POC.    AM-PAC 6 CLICK MOBILITY  Total Score:15     Patient left supine with all lines intact and call button in reach.    GOALS:   Multidisciplinary Problems     Physical Therapy Goals        Problem: Physical Therapy    Goal Priority Disciplines Outcome Goal Variances Interventions   Physical Therapy Goal     PT, PT/OT Ongoing, Progressing     Description: Goals to be met by: 8/3/2022     Patient will increase functional independence with mobility by performin. Supine to sit with Stand-by Assistance  2. Sit to supine with Stand-by Assistance  3. Sit to stand transfer with Stand-by Assistance  4. Bed to chair transfer with Stand-by Assistance using Rolling Walker  5. Gait  x 50 feet with Stand-by Assistance using Rolling Walker.   6. Lower extremity exercise program x15 reps per handout, with supervision                     History:     Past Medical History:   Diagnosis Date    Arthritis     Cancer     CHF (congestive heart failure)     Coronary artery disease     Hypertension     Thyroid disease        Past Surgical History:   Procedure Laterality Date    CARDIAC CATHETERIZATION      CARDIAC VALVE SURGERY      CHOLECYSTECTOMY      CORONARY ANGIOGRAPHY N/A 2019    Procedure: ANGIOGRAM, CORONARY ARTERY;  Surgeon: Bakari Singletary MD;  Location: [facility] CATH LAB;  Service: Cardiology;  Laterality: N/A;    HYSTERECTOMY      lymph nodes removal       THYROIDECTOMY      TRANSCATHETER AORTIC VALVE REPLACEMENT (TAVR) N/A 5/7/2019    Procedure: REPLACEMENT, AORTIC VALVE, TRANSCATHETER (TAVR);  Surgeon: Bakari Singletary MD;  Location: [facility] CATH LAB;  Service: Cardiology;  Laterality: N/A;       Time Tracking:     PT Received On: 07/20/22  PT Start Time: 1350     PT Stop Time: 1409  PT Total Time (min): 19 min     Billable Minutes: Evaluation 11 and Therapeutic Activity 8      07/20/2022

## 2022-07-20 NOTE — ASSESSMENT & PLAN NOTE
- continue GDMT- asa 81mg qday, plavix 75mg qday, ACEI- consideration of addition of beta blocker prior to DC

## 2022-07-20 NOTE — ASSESSMENT & PLAN NOTE
"Ms. Michael is a 89 year old woman who presented with SOB due to acute heart failure exacerbation. Palliative care was consulted for goals of care discussion.    Living Arrangements:  Lives with her daughter.     Psychosocial/Cultural: Patient currently living with daughter in [place]. She has two biological children: one son and one daughter.     Advance Care Planning   Advance Directives:   Living Will: No    LaPOST: No    Do Not Resuscitate Status: No    Medical Power of : No - patient's daughter states that she will try to bring in POA paperwork.     Decision Making:  Patient and her family answered questions     Recommendations: Palliative team spoke with patient and her son at the bedside. Patient's son explained that patient began going to the hospital more often after her mastectomy late last year. He also states that she became weaker and needed a walker. Patient states that she wants to be at home as she enjoys sewing, jessica, and talking with her neighbors. Spoke with patient's daughter, Katiana, over the phone. She reported a similar story to her brother's that prior to mastectomy, patient was doing her ADLs ie buying groceries and even driving. Then after mastectomy, patient never returned to her baseline. She is aware that patient will likely never return to baseline and even the possibility of becoming worse. Her goal is to keep patient at home as she does not want her to go to a nursing home. Discussed what Palliative care is: supporting sick patients, planning for the future and addressing symptoms. She states that patient has HCPOA but has to find the papers. She says that her father (patient's ) did not have a DNR and coded but was brought back with "no brain damage" but that he passed about a week later, and his "body wore out". Patient appears to be improving clinically but would benefit from continued follow up with Palliative Care. Encouraged Katiana to discuss HCPOA and " planning for the future with patient and her brother. Will refer to home based Palliative Care.

## 2022-07-20 NOTE — SUBJECTIVE & OBJECTIVE
Interval History: Patient was seen and evaluated this am- reported some improvement in work of breathing, but still feeling weak and SOB overall. Swelling of LUE  (h/o lymphedema 2/2 LN biopsy/resection with her breast cancer). Poor PO intake-no BM in the past couple of days (normally goes once daily), but patient reports cleanout last week, and she does not desire to increase bowel regimen at this time. Discussed the need for possible continued bipap use- will obtain ABG. Son at bedside, and mentioned h/o recurrent UTIs. Will need to be seen by urology at time of discharge. 700mL UOP today. No chest pain, nausea, vomiting, fevers, chills, night sweats.    Objective:     Vital Signs (Most Recent):  Temp: 98.6 °F (37 °C) (07/20/22 1211)  Pulse: (!) 59 (07/20/22 1211)  Resp: 18 (07/20/22 1211)  BP: (!) 117/53 (07/20/22 1211)  SpO2: 99 % (07/20/22 1211)   Vital Signs (24h Range):  Temp:  [97.6 °F (36.4 °C)-98.8 °F (37.1 °C)] 98.6 °F (37 °C)  Pulse:  [59-71] 59  Resp:  [16-18] 18  SpO2:  [99 %-100 %] 99 %  BP: (113-146)/(53-62) 117/53     Weight: 84.8 kg (187 lb)  Body mass index is 35.33 kg/m².    Intake/Output Summary (Last 24 hours) at 7/20/2022 1427  Last data filed at 7/20/2022 1000  Gross per 24 hour   Intake --   Output 1700 ml   Net -1700 ml      Physical Exam  Gen: in NAD, appears stated age, appears chronically  ill  Neuro: AAOx3, motor, sensory, and strength grossly intact BL  HEENT: NTNC, EOMI, PERRL, MMM  CVS: RRR, no m/r/g; S1/S2 auscultated with no S3 or S4; capillary refill < 2 sec, no JVD  Resp: lungs CTAB, no w/r/r; no belabored breathing or accessory muscle use appreciated, bibasilar crackles  Abd: BS+ in all 4 quadrants; NTND, soft to palpation; no organomegaly appreciated   Extrem: pulses full, equal, and regular over all 4 extremities; swelling of BL LE, lymphedema of LUE, no swelling of LUE      Significant Labs: All pertinent labs within the past 24 hours have been reviewed.  Blood Culture: No  results for input(s): LABBLOO in the last 48 hours.  CBC:   Recent Labs   Lab 07/19/22  1912   WBC 3.89*   HGB 9.6*   HCT 30.1*        CMP:   Recent Labs   Lab 07/19/22  0614 07/20/22  0221    136   K 3.7 4.0    101   CO2 28 23   GLU 97 77   BUN 20 19   CREATININE 0.8 0.8   CALCIUM 8.8 8.9   PROT 7.6 7.3   ALBUMIN 2.5* 2.5*   BILITOT 1.0 0.9   ALKPHOS 90 90   AST 26 25   ALT 10 9*   ANIONGAP 9 12   EGFRNONAA >60.0 >60.0     POCT Glucose: No results for input(s): POCTGLUCOSE in the last 48 hours.  Troponin: No results for input(s): TROPONINI in the last 48 hours.  Urine Culture: No results for input(s): LABURIN in the last 48 hours.  Urine Studies: No results for input(s): COLORU, APPEARANCEUA, PHUR, SPECGRAV, PROTEINUA, GLUCUA, KETONESU, BILIRUBINUA, OCCULTUA, NITRITE, UROBILINOGEN, LEUKOCYTESUR, RBCUA, WBCUA, BACTERIA, SQUAMEPITHEL, HYALINECASTS in the last 48 hours.    Invalid input(s): WRIGHTSUR    Significant Imaging: I have reviewed all pertinent imaging results/findings within the past 24 hours.    CXR 07/19:  FINDINGS:  Cardiac pacemaker is again noted, as is an aortic valvular prosthesis.  Opacity in the inferior hemithorax on the left side has decreased appreciably since the examination referenced above, consistent with a decreased volume of pleural fluid on this side.  Right lung and the mid and upper lung zones on the left appear stable, with no significant new areas of airspace consolidation or volume loss have developed.  No significant volume of pleural fluid is seen on the right.  No pneumothorax.  Surgical clip in the left axillary soft tissues again incidentally noted.     Impression:     No significant detrimental interval change in the appearance of the chest since 07/18/2022 to relate to the current history of shortness of breath.  In fact, the opacity in the inferior left hemithorax has is now less pronounced than was the case at that time, consistent with a decrease in the  volume of left pleural fluid.

## 2022-07-20 NOTE — PLAN OF CARE
Problem: Physical Therapy  Goal: Physical Therapy Goal  Description: Goals to be met by: 8/3/2022     Patient will increase functional independence with mobility by performin. Supine to sit with Stand-by Assistance  2. Sit to supine with Stand-by Assistance  3. Sit to stand transfer with Stand-by Assistance  4. Bed to chair transfer with Stand-by Assistance using Rolling Walker  5. Gait  x 50 feet with Stand-by Assistance using Rolling Walker.   6. Lower extremity exercise program x15 reps per handout, with supervision    Outcome: Ongoing, Progressing

## 2022-07-20 NOTE — ASSESSMENT & PLAN NOTE
Patient with Hypoxic Respiratory failure which is Acute.  she is not on home oxygen. Supplemental oxygen was provided and noted-  .   Signs/symptoms of respiratory failure include- tachypnea, increased work of breathing and respiratory distress. Contributing diagnoses includes - CHF Labs and images were reviewed. Patient Has not had a recent ABG. Will treat underlying causes and adjust management of respiratory failure as follows-     - CHF treatment as outlined above  - Continue supplemental oxygen- wean as tolerated as we maximize IV diuresis   - Currently on 3.5L BNC this am

## 2022-07-20 NOTE — HPI
"Ms. Pilar Michael is an 87 year old woman with severe AS s/p TAVR (5/7/19), CAD s/p ostial RCA stent placement (4/19), IDBCA s/p resection and radiation (2003), thyroid cancer s/p resection (1992), HTN, PAD who presented on 07/18 with SOB due to acute heart failure exacerbation. She was initially admitted to CCU as she was requiring BiPAP but able to transition to NC with lasix gtt and stepped down to hospital medicine on 07/19. She states that she has "been better". Patient's son was at bedside. Of note, this is patient's 6th hospitalization ever since her masectomy late last year. She was recently hospitalized at [facility] 2 weeks ago for UTI and acute heart failure exacerbation and was then discharged to SNF. Palliative Care was consulted for goals of care discussion.  "

## 2022-07-20 NOTE — NURSING
End of shift note    AAOx4  2L NC  Lasix gtt infusing @ 30mg/hr  Pure wick replaced- unable to determine accurate urine OP due to pure wick leaking  Up to chair- tolerated well  VSS  NADN

## 2022-07-20 NOTE — PT/OT/SLP EVAL
Occupational Therapy   Evaluation/tx    Name: Pilar Michael  MRN: [id number]  Admitting Diagnosis:  Acute on chronic combined systolic and diastolic CHF (congestive heart failure)  Recent Surgery: * No surgery found *      Recommendations:     Discharge Recommendations: nursing facility, skilled  Discharge Equipment Recommendations:  wheelchair  Barriers to discharge:  Decreased caregiver support    Assessment:     Pilar Michael is a 89 y.o. female with a medical diagnosis of Acute on chronic combined systolic and diastolic CHF (congestive heart failure).  She presents with deficits in functional mobility, endurance and self-care tasks. Pt. Motivated and cooperated well on this date. Requires in creased assist with ADL tasks and is a high fall risk at this time. Patient would benefit from continued OT services to maximize level of safety and independence with self-care tasks.   . Performance deficits affecting function: weakness, impaired endurance, impaired self care skills, impaired functional mobility, gait instability, decreased upper extremity function, decreased ROM.      Rehab Prognosis: Good; patient would benefit from acute skilled OT services to address these deficits and reach maximum level of function.       Plan:     Patient to be seen 4 x/week to address the above listed problems via self-care/home management, therapeutic activities, therapeutic exercises, neuromuscular re-education  · Plan of Care Expires: 08/19/22  · Plan of Care Reviewed with: patient    Subjective     Chief Complaint: being cold  Patient/Family Comments/goals: To get better     Occupational Profile:  Living Environment: Pt.'s daughter lives with pt. And works from home. Pt. Usually enters house through carport which has 1 step and no HR.  Pt.has atub shower with a shower chair and grab bars   Previous level of function: Pt. 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  Patient found supine with PureWick, peripheral IV, oxygen upon OT entry to room. Son present    General Precautions: Standard, fall, aspiration   Orthopedic Precautions:N/A   Braces: N/A  Respiratory Status: Nasal cannula, flow 2 L/min    Occupational Performance:    Bed Mobility:    · Patient completed Supine to Sit with moderate assistance    Functional Mobility/Transfers:  · Patient completed Sit <> Stand Transfer with contact guard assistance  with  no assistive device   · Patient completed Bed <> Chair Transfer using Stand Pivot technique with minimum assistance with no assistive device  · Functional Mobility: not tested    Activities of Daily Living:  · Feeding:  set up A breakfast  ·   · Upper Body Dressing: maximal assistance to don gown like robe  · Lower Body Dressing: total assistance to don socks    Cognitive/Visual Perceptual:  Cognitive/Psychosocial Skills:     -       Oriented to: Person, Place and Situation   -       Follows Commands/attention:Follows two-step commands  -       Communication: clear/fluent  -       Memory: fair  -       Safety awareness/insight to disability: intact   -       Mood/Affect/Coping skills/emotional control: Appropriate to situation  Visual/Perceptual:      -wears contacts    Physical Exam:  Balance: -       sit: good; stand CGA/Min A  Postural examination/scapula alignment:    -       Rounded shoulders  -       Forward head  -       Posterior pelvic tilt  Skin integrity: Bruising of BUE noted  Dominant hand: -       right  Upper Extremity Range of Motion:     -        Right Upper Extremity: WFL  -       Left Upper Extremity: limitations in shoulder 2/2 lymphedema; elbow and below WFL   Strength:    -       Right Upper Extremity: WFL  -       Left Upper Extremity: WFL    AMPAC 6 Click ADL:  AMPAC Total Score: 16    Treatment & Education:  Pt. And son educated on role of OT and pOC  Education:    Patient left up in chair with all lines intact, call button in reach and son present    GOALS:   Multidisciplinary Problems     Occupational Therapy Goals        Problem: Occupational Therapy    Goal Priority Disciplines Outcome Interventions   Occupational Therapy Goal     OT, PT/OT     Description: Goals to be met by: 07-31-22     Patient will increase functional independence with ADLs by performing:    UE Dressing with Supervision.  LE Dressing with Minimal Assistance with AE as needed  Grooming while seated with Set-up Assistance.  Toileting from bedside commode with Minimal Assistance for hygiene and clothing management.   Bathing from  shower chair/bench with Minimal Assistance.  Supine to sit with Supervision.  Stand pivot transfers with Supervision with RW  Toilet transfer to bedside commode with Supervision.                       History:     Past Medical History:   Diagnosis Date    Arthritis     Cancer     CHF (congestive heart failure)     Coronary artery disease     Hypertension     Thyroid disease        Past Surgical History:   Procedure Laterality Date    CARDIAC CATHETERIZATION      CARDIAC VALVE SURGERY      CHOLECYSTECTOMY      CORONARY ANGIOGRAPHY N/A 4/23/2019    Procedure: ANGIOGRAM, CORONARY ARTERY;  Surgeon: Bakari Singletary MD;  Location: [facility] CATH LAB;  Service: Cardiology;  Laterality: N/A;    HYSTERECTOMY      lymph nodes removal      THYROIDECTOMY      TRANSCATHETER AORTIC VALVE REPLACEMENT (TAVR) N/A 5/7/2019    Procedure: REPLACEMENT, AORTIC VALVE, TRANSCATHETER (TAVR);  Surgeon: Bakari Singletary MD;  Location: [facility] CATH LAB;  Service:  Cardiology;  Laterality: N/A;       Time Tracking:     OT Date of Treatment: 07/20/22  OT Start Time: 0852  OT Stop Time: 0919  OT Total Time (min): 27 min    Billable Minutes:Evaluation 15  Self Care/Home Management 12    7/20/2022

## 2022-07-21 LAB
ALBUMIN SERPL BCP-MCNC: 2.4 G/DL (ref 3.5–5.2)
ALP SERPL-CCNC: 87 U/L (ref 55–135)
ALT SERPL W/O P-5'-P-CCNC: 8 U/L (ref 10–44)
ANION GAP SERPL CALC-SCNC: 12 MMOL/L (ref 8–16)
AST SERPL-CCNC: 19 U/L (ref 10–40)
BASOPHILS # BLD AUTO: 0.01 K/UL (ref 0–0.2)
BASOPHILS NFR BLD: 0.3 % (ref 0–1.9)
BILIRUB SERPL-MCNC: 0.6 MG/DL (ref 0.1–1)
BUN SERPL-MCNC: 19 MG/DL (ref 8–23)
CALCIUM SERPL-MCNC: 8.9 MG/DL (ref 8.7–10.5)
CHLORIDE SERPL-SCNC: 101 MMOL/L (ref 95–110)
CO2 SERPL-SCNC: 26 MMOL/L (ref 23–29)
CREAT SERPL-MCNC: 0.7 MG/DL (ref 0.5–1.4)
DIFFERENTIAL METHOD: ABNORMAL
EOSINOPHIL # BLD AUTO: 0.2 K/UL (ref 0–0.5)
EOSINOPHIL NFR BLD: 4.2 % (ref 0–8)
ERYTHROCYTE [DISTWIDTH] IN BLOOD BY AUTOMATED COUNT: 17 % (ref 11.5–14.5)
EST. GFR  (AFRICAN AMERICAN): >60 ML/MIN/1.73 M^2
EST. GFR  (NON AFRICAN AMERICAN): >60 ML/MIN/1.73 M^2
GLUCOSE SERPL-MCNC: 100 MG/DL (ref 70–110)
HCT VFR BLD AUTO: 31.5 % (ref 37–48.5)
HGB BLD-MCNC: 9.8 G/DL (ref 12–16)
IMM GRANULOCYTES # BLD AUTO: 0.01 K/UL (ref 0–0.04)
IMM GRANULOCYTES NFR BLD AUTO: 0.3 % (ref 0–0.5)
LYMPHOCYTES # BLD AUTO: 0.8 K/UL (ref 1–4.8)
LYMPHOCYTES NFR BLD: 20.1 % (ref 18–48)
MAGNESIUM SERPL-MCNC: 1.5 MG/DL (ref 1.6–2.6)
MCH RBC QN AUTO: 26.7 PG (ref 27–31)
MCHC RBC AUTO-ENTMCNC: 31.1 G/DL (ref 32–36)
MCV RBC AUTO: 86 FL (ref 82–98)
MONOCYTES # BLD AUTO: 0.6 K/UL (ref 0.3–1)
MONOCYTES NFR BLD: 14.8 % (ref 4–15)
NEUTROPHILS # BLD AUTO: 2.3 K/UL (ref 1.8–7.7)
NEUTROPHILS NFR BLD: 60.3 % (ref 38–73)
NRBC BLD-RTO: 0 /100 WBC
PHOSPHATE SERPL-MCNC: 3.3 MG/DL (ref 2.7–4.5)
PLATELET # BLD AUTO: 186 K/UL (ref 150–450)
PMV BLD AUTO: 11.4 FL (ref 9.2–12.9)
POTASSIUM SERPL-SCNC: 3.3 MMOL/L (ref 3.5–5.1)
PROT SERPL-MCNC: 7.1 G/DL (ref 6–8.4)
RBC # BLD AUTO: 3.67 M/UL (ref 4–5.4)
SODIUM SERPL-SCNC: 139 MMOL/L (ref 136–145)
WBC # BLD AUTO: 3.79 K/UL (ref 3.9–12.7)

## 2022-07-21 PROCEDURE — 20600001 HC STEP DOWN PRIVATE ROOM

## 2022-07-21 PROCEDURE — 63700000 PHARM REV CODE 250 ALT 637 W/O HCPCS: Performed by: STUDENT IN AN ORGANIZED HEALTH CARE EDUCATION/TRAINING PROGRAM

## 2022-07-21 PROCEDURE — 83735 ASSAY OF MAGNESIUM: CPT | Performed by: STUDENT IN AN ORGANIZED HEALTH CARE EDUCATION/TRAINING PROGRAM

## 2022-07-21 PROCEDURE — 97535 SELF CARE MNGMENT TRAINING: CPT

## 2022-07-21 PROCEDURE — 80053 COMPREHEN METABOLIC PANEL: CPT | Performed by: STUDENT IN AN ORGANIZED HEALTH CARE EDUCATION/TRAINING PROGRAM

## 2022-07-21 PROCEDURE — 63600175 PHARM REV CODE 636 W HCPCS: Performed by: STUDENT IN AN ORGANIZED HEALTH CARE EDUCATION/TRAINING PROGRAM

## 2022-07-21 PROCEDURE — 36415 COLL VENOUS BLD VENIPUNCTURE: CPT | Performed by: STUDENT IN AN ORGANIZED HEALTH CARE EDUCATION/TRAINING PROGRAM

## 2022-07-21 PROCEDURE — 99232 SBSQ HOSP IP/OBS MODERATE 35: CPT | Mod: ,,, | Performed by: STUDENT IN AN ORGANIZED HEALTH CARE EDUCATION/TRAINING PROGRAM

## 2022-07-21 PROCEDURE — 85025 COMPLETE CBC W/AUTO DIFF WBC: CPT | Performed by: INTERNAL MEDICINE

## 2022-07-21 PROCEDURE — 25000003 PHARM REV CODE 250: Performed by: STUDENT IN AN ORGANIZED HEALTH CARE EDUCATION/TRAINING PROGRAM

## 2022-07-21 PROCEDURE — 99900035 HC TECH TIME PER 15 MIN (STAT)

## 2022-07-21 PROCEDURE — 99232 PR SUBSEQUENT HOSPITAL CARE,LEVL II: ICD-10-PCS | Mod: ,,, | Performed by: STUDENT IN AN ORGANIZED HEALTH CARE EDUCATION/TRAINING PROGRAM

## 2022-07-21 PROCEDURE — 97530 THERAPEUTIC ACTIVITIES: CPT

## 2022-07-21 PROCEDURE — 94660 CPAP INITIATION&MGMT: CPT

## 2022-07-21 PROCEDURE — 84100 ASSAY OF PHOSPHORUS: CPT | Performed by: STUDENT IN AN ORGANIZED HEALTH CARE EDUCATION/TRAINING PROGRAM

## 2022-07-21 PROCEDURE — 25000003 PHARM REV CODE 250: Performed by: INTERNAL MEDICINE

## 2022-07-21 RX ORDER — CARVEDILOL 3.12 MG/1
3.12 TABLET ORAL 2 TIMES DAILY
Status: DISCONTINUED | OUTPATIENT
Start: 2022-07-22 | End: 2022-07-22 | Stop reason: HOSPADM

## 2022-07-21 RX ORDER — BUMETANIDE 1 MG/1
1 TABLET ORAL DAILY
Status: DISCONTINUED | OUTPATIENT
Start: 2022-07-21 | End: 2022-07-22 | Stop reason: HOSPADM

## 2022-07-21 RX ORDER — MAGNESIUM SULFATE HEPTAHYDRATE 40 MG/ML
2 INJECTION, SOLUTION INTRAVENOUS ONCE
Status: COMPLETED | OUTPATIENT
Start: 2022-07-21 | End: 2022-07-21

## 2022-07-21 RX ORDER — POTASSIUM CHLORIDE 20 MEQ/1
40 TABLET, EXTENDED RELEASE ORAL ONCE
Status: COMPLETED | OUTPATIENT
Start: 2022-07-21 | End: 2022-07-21

## 2022-07-21 RX ADMIN — CEFTRIAXONE 2 G: 2 INJECTION, SOLUTION INTRAVENOUS at 02:07

## 2022-07-21 RX ADMIN — LEVOTHYROXINE SODIUM 125 MCG: 25 TABLET ORAL at 05:07

## 2022-07-21 RX ADMIN — ASPIRIN 81 MG CHEWABLE TABLET 81 MG: 81 TABLET CHEWABLE at 08:07

## 2022-07-21 RX ADMIN — MAGNESIUM SULFATE HEPTAHYDRATE 2 G: 2 INJECTION, SOLUTION INTRAVENOUS at 08:07

## 2022-07-21 RX ADMIN — AMLODIPINE BESYLATE 10 MG: 10 TABLET ORAL at 08:07

## 2022-07-21 RX ADMIN — BUMETANIDE 1 MG: 1 TABLET ORAL at 02:07

## 2022-07-21 RX ADMIN — ENOXAPARIN SODIUM 40 MG: 100 INJECTION SUBCUTANEOUS at 05:07

## 2022-07-21 RX ADMIN — POTASSIUM CHLORIDE 40 MEQ: 1500 TABLET, EXTENDED RELEASE ORAL at 08:07

## 2022-07-21 RX ADMIN — LISINOPRIL 20 MG: 20 TABLET ORAL at 08:07

## 2022-07-21 RX ADMIN — ATORVASTATIN CALCIUM 40 MG: 20 TABLET, FILM COATED ORAL at 09:07

## 2022-07-21 RX ADMIN — AZITHROMYCIN MONOHYDRATE 500 MG: 250 TABLET ORAL at 08:07

## 2022-07-21 RX ADMIN — CLOPIDOGREL 75 MG: 75 TABLET, FILM COATED ORAL at 08:07

## 2022-07-21 NOTE — SUBJECTIVE & OBJECTIVE
Interval History: Patient was seen and evaluated this am- denies any shortness of breath or increased work of breathing today. Swelling of LUE  (h/o lymphedema 2/2 LN biopsy/resection with her breast cancer).     No chest pain, wheezing, abdominal pain, nausea, vomiting, fevers, chills, night sweats.    Objective:     Vital Signs (Most Recent):  Temp: 97.9 °F (36.6 °C) (07/21/22 1157)  Pulse: 60 (07/21/22 1157)  Resp: 18 (07/21/22 1157)  BP: 118/75 (07/21/22 1157)  SpO2: (!) 92 % (07/21/22 1157)   Vital Signs (24h Range):  Temp:  [97.5 °F (36.4 °C)-99.1 °F (37.3 °C)] 97.9 °F (36.6 °C)  Pulse:  [59-60] 60  Resp:  [16-18] 18  SpO2:  [92 %-99 %] 92 %  BP: (109-132)/(46-75) 118/75     Weight: 84.8 kg (187 lb)  Body mass index is 35.33 kg/m².    Intake/Output Summary (Last 24 hours) at 7/21/2022 1510  Last data filed at 7/21/2022 0600  Gross per 24 hour   Intake --   Output 1100 ml   Net -1100 ml      Physical Exam  Gen: in NAD, appears stated age, appears chronically  ill  Neuro: AAOx3, motor, sensory, and strength grossly intact BL  HEENT: NTNC, EOMI, PERRL, MMM  CVS: RRR, no m/r/g; S1/S2 auscultated with no S3 or S4; capillary refill < 2 sec, no JVD  Resp: lungs CTAB, no w/r/r; no belabored breathing or accessory muscle use appreciated  Abd: BS+ in all 4 quadrants; NTND, soft to palpation; no organomegaly appreciated   Extrem: pulses full, equal, and regular over all 4 extremities; minor swelling of BL LE, lymphedema of LUE, no swelling of LUE      Significant Labs: All pertinent labs within the past 24 hours have been reviewed.  Blood Culture: No results for input(s): LABBLOO in the last 48 hours.  CBC:   Recent Labs   Lab 07/19/22 1912 07/21/22  0435   WBC 3.89* 3.79*   HGB 9.6* 9.8*   HCT 30.1* 31.5*    186     CMP:   Recent Labs   Lab 07/20/22 0221 07/21/22  0435    139   K 4.0 3.3*    101   CO2 23 26   GLU 77 100   BUN 19 19   CREATININE 0.8 0.7   CALCIUM 8.9 8.9   PROT 7.3 7.1   ALBUMIN 2.5*  2.4*   BILITOT 0.9 0.6   ALKPHOS 90 87   AST 25 19   ALT 9* 8*   ANIONGAP 12 12   EGFRNONAA >60.0 >60.0     POCT Glucose: No results for input(s): POCTGLUCOSE in the last 48 hours.  Troponin: No results for input(s): TROPONINI in the last 48 hours.  Urine Culture: No results for input(s): LABURIN in the last 48 hours.  Urine Studies: No results for input(s): COLORU, APPEARANCEUA, PHUR, SPECGRAV, PROTEINUA, GLUCUA, KETONESU, BILIRUBINUA, OCCULTUA, NITRITE, UROBILINOGEN, LEUKOCYTESUR, RBCUA, WBCUA, BACTERIA, SQUAMEPITHEL, HYALINECASTS in the last 48 hours.    Invalid input(s): WRIGHTSUR    Significant Imaging: I have reviewed all pertinent imaging results/findings within the past 24 hours.    CXR 07/19:  FINDINGS:  Cardiac pacemaker is again noted, as is an aortic valvular prosthesis.  Opacity in the inferior hemithorax on the left side has decreased appreciably since the examination referenced above, consistent with a decreased volume of pleural fluid on this side.  Right lung and the mid and upper lung zones on the left appear stable, with no significant new areas of airspace consolidation or volume loss have developed.  No significant volume of pleural fluid is seen on the right.  No pneumothorax.  Surgical clip in the left axillary soft tissues again incidentally noted.     Impression:     No significant detrimental interval change in the appearance of the chest since 07/18/2022 to relate to the current history of shortness of breath.  In fact, the opacity in the inferior left hemithorax has is now less pronounced than was the case at that time, consistent with a decrease in the volume of left pleural fluid.

## 2022-07-21 NOTE — PROGRESS NOTES
[facility] - Telemetry [facility] Medicine  Progress Note    Patient Name: Pilar Michael  MRN: [id number]  Patient Class: IP- Inpatient   Admission Date: 7/18/2022  Length of Stay: 3 days  Attending Physician: Tova Roberts MD  Primary Care Provider: Joseph Noel MD        Subjective:     Principal Problem:Acute on chronic combined systolic and diastolic CHF (congestive heart failure)        HPI:  Pilar Michael is an 86 y/o female with PMH severe AS s/p TAVR (5/7/19), CAD s/p ostial RCA stent placement (4/19), IDBCA s/p resection and radiation (2003), thyroid cancer s/p resection (1992), HTN, PAD presents to the hospital secondary to dyspnea. Patient was at rehab and found to be short of breath that have been progressively worsening over the morning.  She was recently admitted to the hospital secondary to acute on chronic diastolic heart failure. She was diuresed with Lasix which was eventually switched to Bumex.  She was discharged on Bumex 1 mg b.i.d. and this was decreased to 1 mg daily per her outpatient cardiologist. She presents to the ED hypoxic with O2 sat in the 70s. She was placed on BiPAP with improvement in her O2 saturations. She is hemodynamically stable. Chest x-ray notable for large left pleural effusion pulmonary edema pulmonary vascular congestion.  BNP elevated. Trop mildly elevated. CBC appears at baseline with microcytic anemia. CMP grossly unremarkable. Lactate 1.3. Given patient's acute on chronic diastolic heart failure requiring BiPAP, she will be admitted to CCU for diuresis.      Overview/Hospital Course:  CCU Course:  Admitted for acute hypoxemic respiratory failure requiring 15L non re-breather and BiPAP, likely 2/2 acute on chronic diastolic heart failure. Lasix 20 mg/hr ggt w/ moderate UOP and improvement of symptoms, now comfortably back on low flow NC. Stable for step down to hospital medicine.    HM Course:  Lasix drip increased to 30mg/hr for better UOP. Palliative  care evaluated patient for further GOC discussion 07/20.  Supplemental oxygen weaned to room air and lasix drip discontinued 7/21.      Interval History: Patient was seen and evaluated this am- denies any shortness of breath or increased work of breathing today. Swelling of LUE  (h/o lymphedema 2/2 LN biopsy/resection with her breast cancer).     No chest pain, wheezing, abdominal pain, nausea, vomiting, fevers, chills, night sweats.    Objective:     Vital Signs (Most Recent):  Temp: 97.9 °F (36.6 °C) (07/21/22 1157)  Pulse: 60 (07/21/22 1157)  Resp: 18 (07/21/22 1157)  BP: 118/75 (07/21/22 1157)  SpO2: (!) 92 % (07/21/22 1157)   Vital Signs (24h Range):  Temp:  [97.5 °F (36.4 °C)-99.1 °F (37.3 °C)] 97.9 °F (36.6 °C)  Pulse:  [59-60] 60  Resp:  [16-18] 18  SpO2:  [92 %-99 %] 92 %  BP: (109-132)/(46-75) 118/75     Weight: 84.8 kg (187 lb)  Body mass index is 35.33 kg/m².    Intake/Output Summary (Last 24 hours) at 7/21/2022 1510  Last data filed at 7/21/2022 0600  Gross per 24 hour   Intake --   Output 1100 ml   Net -1100 ml      Physical Exam  Gen: in NAD, appears stated age, appears chronically  ill  Neuro: AAOx3, motor, sensory, and strength grossly intact BL  HEENT: NTNC, EOMI, PERRL, MMM  CVS: RRR, no m/r/g; S1/S2 auscultated with no S3 or S4; capillary refill < 2 sec, no JVD  Resp: lungs CTAB, no w/r/r; no belabored breathing or accessory muscle use appreciated  Abd: BS+ in all 4 quadrants; NTND, soft to palpation; no organomegaly appreciated   Extrem: pulses full, equal, and regular over all 4 extremities; minor swelling of BL LE, lymphedema of LUE, no swelling of LUE      Significant Labs: All pertinent labs within the past 24 hours have been reviewed.  Blood Culture: No results for input(s): LABBLOO in the last 48 hours.  CBC:   Recent Labs   Lab 07/19/22  1912 07/21/22  0435   WBC 3.89* 3.79*   HGB 9.6* 9.8*   HCT 30.1* 31.5*    186     CMP:   Recent Labs   Lab 07/20/22  0221 07/21/22  0435     139   K 4.0 3.3*    101   CO2 23 26   GLU 77 100   BUN 19 19   CREATININE 0.8 0.7   CALCIUM 8.9 8.9   PROT 7.3 7.1   ALBUMIN 2.5* 2.4*   BILITOT 0.9 0.6   ALKPHOS 90 87   AST 25 19   ALT 9* 8*   ANIONGAP 12 12   EGFRNONAA >60.0 >60.0     POCT Glucose: No results for input(s): POCTGLUCOSE in the last 48 hours.  Troponin: No results for input(s): TROPONINI in the last 48 hours.  Urine Culture: No results for input(s): LABURIN in the last 48 hours.  Urine Studies: No results for input(s): COLORU, APPEARANCEUA, PHUR, SPECGRAV, PROTEINUA, GLUCUA, KETONESU, BILIRUBINUA, OCCULTUA, NITRITE, UROBILINOGEN, LEUKOCYTESUR, RBCUA, WBCUA, BACTERIA, SQUAMEPITHEL, HYALINECASTS in the last 48 hours.    Invalid input(s): WRIGHTSUR    Significant Imaging: I have reviewed all pertinent imaging results/findings within the past 24 hours.    CXR 07/19:  FINDINGS:  Cardiac pacemaker is again noted, as is an aortic valvular prosthesis.  Opacity in the inferior hemithorax on the left side has decreased appreciably since the examination referenced above, consistent with a decreased volume of pleural fluid on this side.  Right lung and the mid and upper lung zones on the left appear stable, with no significant new areas of airspace consolidation or volume loss have developed.  No significant volume of pleural fluid is seen on the right.  No pneumothorax.  Surgical clip in the left axillary soft tissues again incidentally noted.     Impression:     No significant detrimental interval change in the appearance of the chest since 07/18/2022 to relate to the current history of shortness of breath.  In fact, the opacity in the inferior left hemithorax has is now less pronounced than was the case at that time, consistent with a decrease in the volume of left pleural fluid.      Assessment/Plan:      * Acute on chronic combined systolic and diastolic CHF (congestive heart failure)  Patient is identified as having Diastolic (HFpEF) heart failure  that is Acute on chronic. CHF is currently uncontrolled due to Continued edema of extremities and JVD, >3 pillow orthopnea and Pulmonary edema/pleural effusion on CXR. Latest ECHO performed and demonstrates- Results for orders placed during the hospital encounter of 07/03/22     Echo     Interpretation Summary  · The left ventricle is normal in size with mild concentric hypertrophy and normal systolic function.  · The estimated ejection fraction is 65%.  · Grade II left ventricular diastolic dysfunction.  · Mild mitral regurgitation and 5 mmHg diastolic gradient across the MV related to degenerative MV disease.  · There is a 26 mm Evolut CoreValve transcutaneously-placed aortic bioprosthesis present. There is no aortic insufficiency present. Prosthetic aortic valve is normal.  · The aortic valve mean gradient is 5 mmHg with a dimensionless index of 0.49.  · Mild tricuspid regurgitation.  · Mild pulmonic regurgitation.  · Normal right ventricular size with normal right ventricular systolic function.  · There is a moderate left pleural effusion.    - Continue ACE/ARB  - patient approaching euvolemia with decrease in supplemental oxygenation requirement and improvement in lung exam; transition to p.o. Bumex 1 mg daily as per home regimen  - Daily weights  - Goal UOP of 2-3L/day   - Strict I's and O's   - 1.5L fluid restriction  - Will need to gradually need to restart beta blocker for better GDMT    Acute hypoxemic respiratory failure  Patient with Hypoxic Respiratory failure which is Acute.  she is not on home oxygen. Supplemental oxygen was provided and noted-  .   Signs/symptoms of respiratory failure include- tachypnea, increased work of breathing and respiratory distress. Contributing diagnoses includes - CHF Labs and images were reviewed. Patient Has not had a recent ABG. Will treat underlying causes and adjust management of respiratory failure as follows-     - CHF treatment as outlined above  - Continue  supplemental oxygen- wean as tolerated   - patient evaluated on room air this a.m.; oxygen saturations remaining 92% to 96% ora    Palliative care encounter  - Palliative consulted for further GOC discussion       Hyperlipidemia  - Continue home statin regimen    Normocytic anemia  - hg at baseline- 10.7  - monitor       CAP (community acquired pneumonia)  - concern for cap on top of CHF exacerbation  - currently on azithro and ceftriaxone- continue with course of treatment  - monitor for clinical improvement and de-escalate antibiotics accordingly      Presence of permanent cardiac pacemaker  - present, HDS, no issues       Coronary artery disease involving native coronary artery of native heart without angina pectoris  - continue GDMT- asa 81mg qday, plavix 75mg qday, ACEI- consideration of addition of beta blocker prior to DC      Essential hypertension  - BP currently well-controlled  - Continue home regimen of lisinopril 20mg qday, amlodipine 10mg qday   - Will continue to monitor and further titrate antihypertensives as clinically indicated         Acquired hypothyroidism  - continue home levothyroxine 125mcg qday       Severe aortic stenosis s/p TAVR  - s/p TAVR, stable, no issues       VTE Risk Mitigation (From admission, onward)         Ordered     enoxaparin injection 40 mg  Daily         07/18/22 0932     IP VTE HIGH RISK PATIENT  Once         07/18/22 0932     Place sequential compression device  Until discontinued         07/18/22 0932                Discharge Planning   CHARLIE: 7/22/2022     Code Status: Full Code   Is the patient medically ready for discharge?: No    Reason for patient still in hospital (select all that apply): Patient trending condition and Pending disposition  Discharge Plan A: Skilled Nursing Facility                  Tova Roberts MD  Department of Hospital Medicine   [facility] - Telemetry Stepdown

## 2022-07-21 NOTE — ASSESSMENT & PLAN NOTE
Patient is identified as having Diastolic (HFpEF) heart failure that is Acute on chronic. CHF is currently uncontrolled due to Continued edema of extremities and JVD, >3 pillow orthopnea and Pulmonary edema/pleural effusion on CXR. Latest ECHO performed and demonstrates- Results for orders placed during the hospital encounter of 07/03/22     Echo     Interpretation Summary  · The left ventricle is normal in size with mild concentric hypertrophy and normal systolic function.  · The estimated ejection fraction is 65%.  · Grade II left ventricular diastolic dysfunction.  · Mild mitral regurgitation and 5 mmHg diastolic gradient across the MV related to degenerative MV disease.  · There is a 26 mm Evolut CoreValve transcutaneously-placed aortic bioprosthesis present. There is no aortic insufficiency present. Prosthetic aortic valve is normal.  · The aortic valve mean gradient is 5 mmHg with a dimensionless index of 0.49.  · Mild tricuspid regurgitation.  · Mild pulmonic regurgitation.  · Normal right ventricular size with normal right ventricular systolic function.  · There is a moderate left pleural effusion.    - Continue ACE/ARB  - patient approaching euvolemia with decrease in supplemental oxygenation requirement and improvement in lung exam; transition to p.o. Bumex 1 mg daily as per home regimen  - Daily weights  - Goal UOP of 2-3L/day   - Strict I's and O's   - 1.5L fluid restriction  - Will need to gradually need to restart beta blocker for better GDMT

## 2022-07-21 NOTE — PT/OT/SLP PROGRESS
Occupational Therapy   Treatment    Name: Pilar Michael  MRN: [id number]  Admitting Diagnosis:  Acute on chronic combined systolic and diastolic CHF (congestive heart failure)       Recommendations:     Discharge Recommendations: nursing facility, skilled  Discharge Equipment Recommendations:  none  Barriers to discharge:  Decreased caregiver support    Assessment:     Pilar Michael is a 89 y.o. female with a medical diagnosis of Acute on chronic combined systolic and diastolic CHF (congestive heart failure).  She presents pleasant and agreeable to therapy upon entry to room despite fatigue and just waking up for the day. Performance deficits affecting function are weakness, impaired endurance, impaired self care skills, impaired functional mobility, gait instability, impaired balance, edema, decreased upper extremity function.     Pt performed bed mobility and functional mobility from bed to chair via brief step transfer (~4 steps) to simulate household mobility w/RW utilized. Increased time spent w/ func mob 2/2 decreased dynamic standing balance. Grooming standing at sink and UB dressing performed w/increase assistance required for ADL initiation and completion.     Pt not at baseline for ADL and functional mobility performance in addition to concerns of fall risk and would benefit from continued OT services at this time.     Rehab Prognosis:  Good; patient would benefit from acute skilled OT services to address these deficits and reach maximum level of function.       Plan:     Patient to be seen 4 x/week to address the above listed problems via self-care/home management, therapeutic activities, therapeutic exercises  · Plan of Care Expires: 08/19/22  · Plan of Care Reviewed with: patient, daughter    Subjective     Pain/Comfort:  Pain Rating 1:  (no pain rating given)  Location - Side 1: Right  Location 1: knee  Pain Addressed 1: Distraction, Reposition    Objective:     Communicated with:RN prior to  session.  Patient found supine with peripheral IV, PureWick, oxygen upon OT entry to room.    General Precautions: Standard, aspiration, fall   Orthopedic Precautions:N/A   Braces: N/A  Respiratory Status: Nasal cannula, flow 2 L/min     Occupational Performance:     Bed Mobility:    · Patient completed Rolling/Turning to Right with contact guard assistance  · Patient completed Scooting/Bridging with contact guard assistance  · Patient completed Supine to Sit with minimum assistance   · Limited bed mobility 2/2 BUE weakness and LUE edema and decreased ability to mobilize arm and push up from bed when rolling.     Functional Mobility/Transfers:  · Patient completed Sit <> Stand Transfer with contact guard assistance  with  rolling walker   · Patient completed Bed <> Chair Transfer using Step Transfer technique with minimum assistance with rolling walker  · Functional Mobility: min A required while turning walker to guide hips and assist w/ walker coordination.    Activities of Daily Living:  · Feeding:  Supvsn Set up. Pt w/decreased ability to manage opening condiments and arranging tray 2/2 LUE edema and BUE weakness.    · Grooming: supervision pt washed face w/cloth only utilizing RUE 2/2 LUE edema  · Upper Body Dressing: minimum assistance donned gown w/ assistance needed to thread LUE through opening 2/2 edema      [facility] 6 Click ADL: 16    Treatment & Education:  Pt educated on scope of practice and importance of adhering to safety precautions to reduce fall risk. White board updated to reflect pt status and visual reminder included on board instructing her to call for nurse as needed.    Patient left up in chair with all lines intact, call button in reach and RN notifiedEducation:      GOALS:   Multidisciplinary Problems     Occupational Therapy Goals        Problem: Occupational Therapy    Goal Priority Disciplines Outcome Interventions   Occupational Therapy Goal     OT, PT/OT Ongoing, Progressing     Description: Goals to be met by: 07-31-22     Patient will increase functional independence with ADLs by performing:    UE Dressing with Supervision.  LE Dressing with Minimal Assistance with AE as needed  Grooming while seated with Set-up Assistance.  Toileting from bedside commode with Minimal Assistance for hygiene and clothing management.   Bathing from  shower chair/bench with Minimal Assistance.  Supine to sit with Supervision.  Stand pivot transfers with Supervision with RW  Toilet transfer to bedside commode with Supervision.                       Time Tracking:     OT Date of Treatment: 07/21/22  OT Start Time: 0811  OT Stop Time: 0840  OT Total Time (min): 29 min    Billable Minutes:Self Care/Home Management 10  Therapeutic Activity 19    OT/LAVONNE: OT          7/21/2022

## 2022-07-21 NOTE — ASSESSMENT & PLAN NOTE
Patient with Hypoxic Respiratory failure which is Acute.  she is not on home oxygen. Supplemental oxygen was provided and noted-  .   Signs/symptoms of respiratory failure include- tachypnea, increased work of breathing and respiratory distress. Contributing diagnoses includes - CHF Labs and images were reviewed. Patient Has not had a recent ABG. Will treat underlying causes and adjust management of respiratory failure as follows-     - CHF treatment as outlined above  - Continue supplemental oxygen- wean as tolerated   - patient evaluated on room air this a.m.; oxygen saturations remaining 92% to 96% ora

## 2022-07-21 NOTE — PLAN OF CARE
Problem: Adult Inpatient Plan of Care  Goal: Plan of Care Review  Outcome: Ongoing, Progressing  Goal: Patient-Specific Goal (Individualized)  Outcome: Ongoing, Progressing  Goal: Absence of Hospital-Acquired Illness or Injury  Outcome: Ongoing, Progressing  Goal: Optimal Comfort and Wellbeing  Outcome: Ongoing, Progressing   Pt rested well. VS WNL. No ss of pain or distress. Call light within reach. Bed in low position. Family at bedside. Will continue to monitor and pass on care to oncoming nurse.

## 2022-07-22 ENCOUNTER — HOSPITAL ENCOUNTER (INPATIENT)
Facility: HOSPITAL | Age: 87
LOS: 8 days | Discharge: ANOTHER HOSPITAL | DRG: 292 | End: 2022-07-30
Attending: HOSPITALIST | Admitting: HOSPITALIST
Payer: MEDICARE

## 2022-07-22 VITALS
HEIGHT: 61 IN | WEIGHT: 187 LBS | OXYGEN SATURATION: 92 % | TEMPERATURE: 97 F | DIASTOLIC BLOOD PRESSURE: 58 MMHG | SYSTOLIC BLOOD PRESSURE: 125 MMHG | BODY MASS INDEX: 35.3 KG/M2 | HEART RATE: 60 BPM | RESPIRATION RATE: 16 BRPM

## 2022-07-22 DIAGNOSIS — I50.43 ACUTE ON CHRONIC COMBINED SYSTOLIC AND DIASTOLIC HEART FAILURE: ICD-10-CM

## 2022-07-22 LAB
ALBUMIN SERPL BCP-MCNC: 2.4 G/DL (ref 3.5–5.2)
ALP SERPL-CCNC: 78 U/L (ref 55–135)
ALT SERPL W/O P-5'-P-CCNC: 9 U/L (ref 10–44)
ANION GAP SERPL CALC-SCNC: 10 MMOL/L (ref 8–16)
AST SERPL-CCNC: 18 U/L (ref 10–40)
BASOPHILS # BLD AUTO: 0.01 K/UL (ref 0–0.2)
BASOPHILS NFR BLD: 0.3 % (ref 0–1.9)
BILIRUB SERPL-MCNC: 0.5 MG/DL (ref 0.1–1)
BUN SERPL-MCNC: 21 MG/DL (ref 8–23)
CALCIUM SERPL-MCNC: 8.7 MG/DL (ref 8.7–10.5)
CHLORIDE SERPL-SCNC: 101 MMOL/L (ref 95–110)
CO2 SERPL-SCNC: 27 MMOL/L (ref 23–29)
CREAT SERPL-MCNC: 0.7 MG/DL (ref 0.5–1.4)
DIFFERENTIAL METHOD: ABNORMAL
EOSINOPHIL # BLD AUTO: 0.2 K/UL (ref 0–0.5)
EOSINOPHIL NFR BLD: 5 % (ref 0–8)
ERYTHROCYTE [DISTWIDTH] IN BLOOD BY AUTOMATED COUNT: 16.9 % (ref 11.5–14.5)
EST. GFR  (AFRICAN AMERICAN): >60 ML/MIN/1.73 M^2
EST. GFR  (NON AFRICAN AMERICAN): >60 ML/MIN/1.73 M^2
GLUCOSE SERPL-MCNC: 92 MG/DL (ref 70–110)
HCT VFR BLD AUTO: 29.4 % (ref 37–48.5)
HGB BLD-MCNC: 9.3 G/DL (ref 12–16)
IMM GRANULOCYTES # BLD AUTO: 0.01 K/UL (ref 0–0.04)
IMM GRANULOCYTES NFR BLD AUTO: 0.3 % (ref 0–0.5)
LYMPHOCYTES # BLD AUTO: 0.7 K/UL (ref 1–4.8)
LYMPHOCYTES NFR BLD: 20.4 % (ref 18–48)
MAGNESIUM SERPL-MCNC: 1.8 MG/DL (ref 1.6–2.6)
MCH RBC QN AUTO: 26.9 PG (ref 27–31)
MCHC RBC AUTO-ENTMCNC: 31.6 G/DL (ref 32–36)
MCV RBC AUTO: 85 FL (ref 82–98)
MONOCYTES # BLD AUTO: 0.3 K/UL (ref 0.3–1)
MONOCYTES NFR BLD: 10 % (ref 4–15)
NEUTROPHILS # BLD AUTO: 2.2 K/UL (ref 1.8–7.7)
NEUTROPHILS NFR BLD: 64 % (ref 38–73)
NRBC BLD-RTO: 0 /100 WBC
PHOSPHATE SERPL-MCNC: 3.3 MG/DL (ref 2.7–4.5)
PLATELET # BLD AUTO: 184 K/UL (ref 150–450)
PMV BLD AUTO: 10.8 FL (ref 9.2–12.9)
POTASSIUM SERPL-SCNC: 3.6 MMOL/L (ref 3.5–5.1)
PROT SERPL-MCNC: 6.9 G/DL (ref 6–8.4)
RBC # BLD AUTO: 3.46 M/UL (ref 4–5.4)
SODIUM SERPL-SCNC: 138 MMOL/L (ref 136–145)
WBC # BLD AUTO: 3.39 K/UL (ref 3.9–12.7)

## 2022-07-22 PROCEDURE — 99239 PR HOSPITAL DISCHARGE DAY,>30 MIN: ICD-10-PCS | Mod: ,,, | Performed by: STUDENT IN AN ORGANIZED HEALTH CARE EDUCATION/TRAINING PROGRAM

## 2022-07-22 PROCEDURE — 97535 SELF CARE MNGMENT TRAINING: CPT

## 2022-07-22 PROCEDURE — 94660 CPAP INITIATION&MGMT: CPT

## 2022-07-22 PROCEDURE — 85025 COMPLETE CBC W/AUTO DIFF WBC: CPT | Performed by: INTERNAL MEDICINE

## 2022-07-22 PROCEDURE — 27000221 HC OXYGEN, UP TO 24 HOURS

## 2022-07-22 PROCEDURE — 80053 COMPREHEN METABOLIC PANEL: CPT | Performed by: STUDENT IN AN ORGANIZED HEALTH CARE EDUCATION/TRAINING PROGRAM

## 2022-07-22 PROCEDURE — 99239 HOSP IP/OBS DSCHRG MGMT >30: CPT | Mod: ,,, | Performed by: STUDENT IN AN ORGANIZED HEALTH CARE EDUCATION/TRAINING PROGRAM

## 2022-07-22 PROCEDURE — 36415 COLL VENOUS BLD VENIPUNCTURE: CPT | Performed by: STUDENT IN AN ORGANIZED HEALTH CARE EDUCATION/TRAINING PROGRAM

## 2022-07-22 PROCEDURE — 11000004 HC SNF PRIVATE

## 2022-07-22 PROCEDURE — 25000003 PHARM REV CODE 250: Performed by: INTERNAL MEDICINE

## 2022-07-22 PROCEDURE — 25000003 PHARM REV CODE 250: Performed by: STUDENT IN AN ORGANIZED HEALTH CARE EDUCATION/TRAINING PROGRAM

## 2022-07-22 PROCEDURE — 25000003 PHARM REV CODE 250: Performed by: HOSPITALIST

## 2022-07-22 PROCEDURE — 84100 ASSAY OF PHOSPHORUS: CPT | Performed by: STUDENT IN AN ORGANIZED HEALTH CARE EDUCATION/TRAINING PROGRAM

## 2022-07-22 PROCEDURE — 99900035 HC TECH TIME PER 15 MIN (STAT)

## 2022-07-22 PROCEDURE — 83735 ASSAY OF MAGNESIUM: CPT | Performed by: STUDENT IN AN ORGANIZED HEALTH CARE EDUCATION/TRAINING PROGRAM

## 2022-07-22 RX ORDER — ATORVASTATIN CALCIUM 20 MG/1
40 TABLET, FILM COATED ORAL NIGHTLY
Status: DISCONTINUED | OUTPATIENT
Start: 2022-07-22 | End: 2022-07-31 | Stop reason: HOSPADM

## 2022-07-22 RX ORDER — BUMETANIDE 1 MG/1
1 TABLET ORAL DAILY
Status: DISCONTINUED | OUTPATIENT
Start: 2022-07-23 | End: 2022-07-31 | Stop reason: HOSPADM

## 2022-07-22 RX ORDER — TIMOLOL MALEATE 5 MG/ML
1 SOLUTION/ DROPS OPHTHALMIC 2 TIMES DAILY
Status: DISCONTINUED | OUTPATIENT
Start: 2022-07-22 | End: 2022-07-31 | Stop reason: HOSPADM

## 2022-07-22 RX ORDER — POTASSIUM CHLORIDE 20 MEQ/1
40 TABLET, EXTENDED RELEASE ORAL ONCE
Status: COMPLETED | OUTPATIENT
Start: 2022-07-22 | End: 2022-07-22

## 2022-07-22 RX ORDER — CARVEDILOL 3.12 MG/1
3.12 TABLET ORAL 2 TIMES DAILY
Status: DISCONTINUED | OUTPATIENT
Start: 2022-07-22 | End: 2022-07-31 | Stop reason: HOSPADM

## 2022-07-22 RX ORDER — CALCIUM CARBONATE 200(500)MG
500 TABLET,CHEWABLE ORAL 2 TIMES DAILY PRN
Status: DISCONTINUED | OUTPATIENT
Start: 2022-07-22 | End: 2022-07-31 | Stop reason: HOSPADM

## 2022-07-22 RX ORDER — CLOPIDOGREL BISULFATE 75 MG/1
75 TABLET ORAL DAILY
Status: DISCONTINUED | OUTPATIENT
Start: 2022-07-23 | End: 2022-07-31 | Stop reason: HOSPADM

## 2022-07-22 RX ORDER — CARVEDILOL 3.12 MG/1
3.12 TABLET ORAL 2 TIMES DAILY
Status: ON HOLD
Start: 2022-07-22 | End: 2022-08-23 | Stop reason: HOSPADM

## 2022-07-22 RX ORDER — ATORVASTATIN CALCIUM 40 MG/1
20 TABLET, FILM COATED ORAL NIGHTLY
Status: ON HOLD
Start: 2022-07-22 | End: 2022-08-22 | Stop reason: SDUPTHER

## 2022-07-22 RX ORDER — TALC
6 POWDER (GRAM) TOPICAL NIGHTLY PRN
Status: DISCONTINUED | OUTPATIENT
Start: 2022-07-22 | End: 2022-07-25

## 2022-07-22 RX ORDER — MEGESTROL ACETATE 20 MG/1
40 TABLET ORAL DAILY
Status: DISCONTINUED | OUTPATIENT
Start: 2022-07-23 | End: 2022-07-31 | Stop reason: HOSPADM

## 2022-07-22 RX ORDER — NAPROXEN SODIUM 220 MG/1
81 TABLET, FILM COATED ORAL DAILY
Status: DISCONTINUED | OUTPATIENT
Start: 2022-07-23 | End: 2022-07-31 | Stop reason: HOSPADM

## 2022-07-22 RX ORDER — AMOXICILLIN 250 MG
1 CAPSULE ORAL 2 TIMES DAILY
Status: DISCONTINUED | OUTPATIENT
Start: 2022-07-22 | End: 2022-07-31 | Stop reason: HOSPADM

## 2022-07-22 RX ORDER — ACETAMINOPHEN 325 MG/1
650 TABLET ORAL EVERY 6 HOURS PRN
Status: DISCONTINUED | OUTPATIENT
Start: 2022-07-22 | End: 2022-07-31 | Stop reason: HOSPADM

## 2022-07-22 RX ORDER — LISINOPRIL 20 MG/1
20 TABLET ORAL DAILY
Status: DISCONTINUED | OUTPATIENT
Start: 2022-07-23 | End: 2022-07-31 | Stop reason: HOSPADM

## 2022-07-22 RX ORDER — LEVOFLOXACIN 750 MG/1
750 TABLET ORAL DAILY
Qty: 1 TABLET | Refills: 0
Start: 2022-07-23 | End: 2022-07-24

## 2022-07-22 RX ORDER — LEVOFLOXACIN 750 MG/1
750 TABLET ORAL DAILY
Status: COMPLETED | OUTPATIENT
Start: 2022-07-23 | End: 2022-07-23

## 2022-07-22 RX ORDER — LEVOFLOXACIN 750 MG/1
750 TABLET ORAL DAILY
Status: DISCONTINUED | OUTPATIENT
Start: 2022-07-22 | End: 2022-07-22 | Stop reason: HOSPADM

## 2022-07-22 RX ADMIN — BUMETANIDE 1 MG: 1 TABLET ORAL at 08:07

## 2022-07-22 RX ADMIN — LEVOFLOXACIN 750 MG: 750 TABLET, FILM COATED ORAL at 08:07

## 2022-07-22 RX ADMIN — LISINOPRIL 20 MG: 20 TABLET ORAL at 08:07

## 2022-07-22 RX ADMIN — SENNOSIDES AND DOCUSATE SODIUM 1 TABLET: 50; 8.6 TABLET ORAL at 09:07

## 2022-07-22 RX ADMIN — LEVOTHYROXINE SODIUM 125 MCG: 25 TABLET ORAL at 05:07

## 2022-07-22 RX ADMIN — POTASSIUM CHLORIDE 40 MEQ: 1500 TABLET, EXTENDED RELEASE ORAL at 08:07

## 2022-07-22 RX ADMIN — CARVEDILOL 3.12 MG: 3.12 TABLET, FILM COATED ORAL at 09:07

## 2022-07-22 RX ADMIN — TIMOLOL MALEATE 1 DROP: 5 SOLUTION OPHTHALMIC at 09:07

## 2022-07-22 RX ADMIN — ASPIRIN 81 MG CHEWABLE TABLET 81 MG: 81 TABLET CHEWABLE at 08:07

## 2022-07-22 RX ADMIN — ATORVASTATIN CALCIUM 40 MG: 40 TABLET, FILM COATED ORAL at 09:07

## 2022-07-22 RX ADMIN — CLOPIDOGREL 75 MG: 75 TABLET, FILM COATED ORAL at 08:07

## 2022-07-22 RX ADMIN — CARVEDILOL 3.12 MG: 3.12 TABLET, FILM COATED ORAL at 08:07

## 2022-07-22 NOTE — PLAN OF CARE
07/22/22 1518   Post-Acute Status   Post-Acute Authorization Placement   Post-Acute Placement Status Set-up Complete/Auth obtained   Per Brissa at [facility], nurse can call report to [phone] and SW can schedule transportation for 4:30 PM.    SW arranged wheelchair transport via [facility]. Requested  time is 4:30 PM. Requested  time does not guarantee arrival time.    Updated patient and son at bedside.    Fani Nunn LMSW  [facility]  Ext. [phone]

## 2022-07-22 NOTE — PLAN OF CARE
[facility] with transport at 1630.  Patient family at bedside.  Patient's peripheral IV to be removed.  VSS stable at this time.  Discharge instructions given to family.  Will prepare for discharge and transfer with oxygen tank per transport request.

## 2022-07-22 NOTE — DISCHARGE SUMMARY
"[facility] Medicine  Discharge Summary      Patient Name: Pilar Michael  MRN: [id number]  Admission Date: 7/11/2022  Hospital Length of Stay: 7 days  Discharge Date and Time: 7/18/2022  7:05 AM  Attending Physician: Kyree Priest MD  Discharging Provider: Kyree Priest MD  Primary Care Provider: Joseph Noel MD        HPI:  "Pilar Michael is a 89 y.o. female with has a past medical history of Arthritis, Cancer, CHF (congestive heart failure), Coronary artery disease, Hypertension, and Thyroid disease,TAVR in 2019 and followed by pacemaker implant with dislodgement of atrial lead noted and ventricular paced rhythm. The patient presented to [facility] on 7/3/2022 with a primary complaint of SOB. SpO2 71% upon arrival that improved to to 92% with NC with patient remaining tachypneic. She endorsed subjective nightly fevers of around 101F for the past few days prior to admit and a congested cough. Patient treated for multiple UTI's over the last year with the most recent requiring a course of ciprofloxacin about 2-3 weeks prior to hospitalization.      UA positive for nitrites and WBC's. Blood cx x 2 and U cx finalized negative. Prior urine culture shows Pseudomonas only sensitive to imipenem, tobramycin, gentamicin.  Was inadequately treated with ciprofloxacin last month. 10 day course Meropenem was started 7/4/22 for UTI.      Vascular congestion on initial CXR. Elevated BNP of 1347, elevated troponin 0.156, CPK 11. Lasix started. Once admitted, patient was continued on mild diuresis. Cardiology noted "troponin elevation is not of concern and most likely demand; CXR with more infiltrate on the left and a moderate left pleural effusion and agree consider pneumonia along with mild failure". TTE on 7/3/22 EF 65%, grade II left ventricular diastolic dysfunction, mild mitral tricuspid and pulmonic regurgitation, aortic bioprosthesis present, normal. No aortic insufficiency." per " Benita Monge NP on 7/12/22.     * No surgery found *      Hospital Course: She was discharged early and unexpectedly from SNF due to acute hypoxemic respiratory failure. She was admitted to [facility] for heart failure exacerbation.     Consults:   Consults (From admission, onward)        Status Ordering Provider     Inpatient consult to Registered Dietitian/Nutritionist  Once        Provider:  (Not yet assigned)    CELIA Clinton          Final Active Diagnoses:    Diagnosis Date Noted POA    PRINCIPAL PROBLEM:  Acute on chronic combined systolic and diastolic CHF (congestive heart failure) [I50.43] 05/08/2019 Yes    Debility [R53.81] 07/06/2022 Yes    Urinary tract infection without hematuria [N39.0]  Yes    Hyperlipidemia [E78.5] 07/03/2022 Yes     Chronic    Normocytic anemia [D64.9] 07/03/2022 Yes     Chronic    Presence of permanent cardiac pacemaker [Z95.0] 07/07/2020 Yes     Chronic    Coronary artery disease involving native coronary artery of native heart without angina pectoris [I25.10] 05/08/2019 Yes     Chronic    Acquired hypothyroidism [E03.9] 04/08/2019 Yes     Chronic    Essential hypertension [I10] 04/08/2019 Yes     Chronic    Severe aortic stenosis s/p TAVR [I35.0]  Yes     Chronic      Problems Resolved During this Admission:      Discharged Condition: critical    Disposition: Short Term Hospital    Follow Up:    Patient Instructions:   No discharge procedures on file.  Medications:  Transfer Medications (for Discharge Readmit only):   No current facility-administered medications for this encounter.     Current Outpatient Medications   Medication Sig Dispense Refill    atorvastatin (LIPITOR) 40 MG tablet Take 0.5 tablets (20 mg total) by mouth every evening.      carvediloL (COREG) 3.125 MG tablet Take 1 tablet (3.125 mg total) by mouth 2 (two) times daily.      [START ON 7/23/2022] levoFLOXacin (LEVAQUIN) 750 MG tablet Take 1 tablet (750 mg total) by mouth once daily.  for 1 day 1 tablet 0     Facility-Administered Medications Ordered in Other Encounters   Medication Dose Route Frequency Provider Last Rate Last Admin    aspirin chewable tablet 81 mg  81 mg Oral Daily Mario Ochoa MD   81 mg at 07/22/22 0849    atorvastatin tablet 40 mg  40 mg Oral QHS Mario Ochoa MD   40 mg at 07/21/22 2155    bumetanide tablet 1 mg  1 mg Oral Daily Tova Roberts MD   1 mg at 07/22/22 0849    carvediloL tablet 3.125 mg  3.125 mg Oral BID Tova Roberts MD   3.125 mg at 07/22/22 0853    clopidogreL tablet 75 mg  75 mg Oral Daily Mario Ochoa MD   75 mg at 07/22/22 0849    enoxaparin injection 40 mg  40 mg Subcutaneous Daily Nadia Parker MD   40 mg at 07/21/22 1741    levoFLOXacin tablet 750 mg  750 mg Oral Daily Tova Roberts MD   750 mg at 07/22/22 0853    levothyroxine tablet 125 mcg  125 mcg Oral Before breakfast Mario Ochoa MD   125 mcg at 07/22/22 0549    lisinopriL tablet 20 mg  20 mg Oral Daily Mario Ochoa MD   20 mg at 07/22/22 0849    perflutren protein-A microsphr 0.22 mg/mL IV susp  3 mL Intravenous 1 time in Clinic/HOD Ad Bashir MD        sodium chloride 0.9% flush 10 mL  10 mL Intravenous PRN Mario Ochoa MD           Significant Diagnostic Studies:     Pending Diagnostic Studies:     None        Indwelling Lines/Drains at time of discharge:   Lines/Drains/Airways     None                      Kyree Priest MD  Department of Hospital Medicine  [facility] - Skilled Nursing

## 2022-07-22 NOTE — DISCHARGE SUMMARY
[facility] - Telemetry [facility] Medicine  Discharge Summary      Patient Name: Pilar Michael  MRN: [id number]  Patient Class: IP- Inpatient  Admission Date: 7/18/2022  Hospital Length of Stay: 4 days  Discharge Date and Time:  07/22/2022 4:31 PM  Attending Physician: Tova Roberts MD   Discharging Provider: Tova Roberts MD  Primary Care Provider: Joseph Noel MD  Hospital Medicine Team: [facility] HOSP MED D Tova Roberts MD    HPI:   Pilar Michael is an 86 y/o female with PMH severe AS s/p TAVR (5/7/19), CAD s/p ostial RCA stent placement (4/19), IDBCA s/p resection and radiation (2003), thyroid cancer s/p resection (1992), HTN, PAD presents to the hospital secondary to dyspnea. Patient was at rehab and found to be short of breath that have been progressively worsening over the morning.  She was recently admitted to the hospital secondary to acute on chronic diastolic heart failure. She was diuresed with Lasix which was eventually switched to Bumex.  She was discharged on Bumex 1 mg b.i.d. and this was decreased to 1 mg daily per her outpatient cardiologist. She presents to the ED hypoxic with O2 sat in the 70s. She was placed on BiPAP with improvement in her O2 saturations. She is hemodynamically stable. Chest x-ray notable for large left pleural effusion pulmonary edema pulmonary vascular congestion.  BNP elevated. Trop mildly elevated. CBC appears at baseline with microcytic anemia. CMP grossly unremarkable. Lactate 1.3. Given patient's acute on chronic diastolic heart failure requiring BiPAP, she will be admitted to CCU for diuresis.      * No surgery found *      Hospital Course:   CCU Course:  Admitted for acute hypoxemic respiratory failure requiring 15L non re-breather and BiPAP, likely 2/2 acute on chronic diastolic heart failure. Lasix 20 mg/hr ggt w/ moderate UOP and improvement of symptoms, now comfortably back on low flow NC. Stable for step down to hospital medicine.    HM Course:  Lasix  "drip increased to 30mg/hr for better UOP. Palliative care evaluated patient for further GOC discussion 07/20.  Supplemental oxygen weaned to room air and lasix drip discontinued 7/21.  Patient remains euvolemic on 07/22 on home Bumex.  Medically stable for discharge.  Discharge plan discussed with patient and son.  They are in agreement with discharge plan.  Patient discharged to SNF.     Vitals:    07/22/22 0849 07/22/22 0853 07/22/22 1234 07/22/22 1546   BP: 137/63 134/63 (!) 125/58 (!) 125/58   BP Location:   Right arm    Patient Position:   Lying    Pulse:  78 60 60   Resp:   16 16   Temp:   96.5 °F (35.8 °C) 96.5 °F (35.8 °C)   TempSrc:   Oral    SpO2:   (!) 92% (!) 92%   Weight:    84.8 kg (187 lb)   Height:    5' 0.98" (1.549 m)     Physical Exam  Gen: in NAD, appears stated age, appears chronically  ill  Neuro: AAOx3, motor, sensory, and strength grossly intact BL  HEENT: NTNC, EOMI, PERRL, MMM  CVS: RRR, no m/r/g; S1/S2 auscultated with no S3 or S4; capillary refill < 2 sec, no JVD  Resp: lungs CTAB, no w/r/r; no belabored breathing or accessory muscle use appreciated  Abd: BS+ in all 4 quadrants; NTND, soft to palpation; no organomegaly appreciated   Extrem: pulses full, equal, and regular over all 4 extremities; minor swelling of BL LE improved, lymphedema of LUE, no swelling of LUE     Goals of Care Treatment Preferences:  Code Status: Full Code      Consults:   Consults (From admission, onward)        Status Ordering Provider     Inpatient consult to SNF Stephan  Once        Provider:  (Not yet assigned)    Acknowledged MICHAELLE LUCERO     Inpatient consult to Palliative Care  Once        Provider:  (Not yet assigned)    Completed STEPHANIE SANTANA     Inpatient consult to Cardiology  Once        Provider:  (Not yet assigned)    Completed BHARGAVI ROSAS          No new Assessment & Plan notes have been filed under this hospital service since the last note was generated.  Service: Hospital " Medicine    Final Active Diagnoses:    Diagnosis Date Noted POA    PRINCIPAL PROBLEM:  Acute on chronic combined systolic and diastolic CHF (congestive heart failure) [I50.43] 05/08/2019 Yes    Palliative care encounter [Z51.5] 07/20/2022 Not Applicable    Acute hypoxemic respiratory failure [J96.01] 07/20/2022 Yes    Hyperlipidemia [E78.5] 07/03/2022 Yes     Chronic    Normocytic anemia [D64.9] 07/03/2022 Yes     Chronic    CAP (community acquired pneumonia) [J18.9] 11/17/2021 Yes    Presence of permanent cardiac pacemaker [Z95.0] 07/07/2020 Yes     Chronic    Coronary artery disease involving native coronary artery of native heart without angina pectoris [I25.10] 05/08/2019 Yes     Chronic    Acquired hypothyroidism [E03.9] 04/08/2019 Yes     Chronic    Essential hypertension [I10] 04/08/2019 Yes     Chronic    Severe aortic stenosis s/p TAVR [I35.0]  Yes     Chronic      Problems Resolved During this Admission:    Diagnosis Date Noted Date Resolved POA    Shortness of breath [R06.02]  07/20/2022 Yes       Discharged Condition: stable    Disposition: Skilled Nursing Facility    Follow Up:   Follow-up Information     [facility] Follow up.    Specialty: Skilled Nursing Facility  Contact information:  [address] [place] [address], 3rd Floor  [facility] [address]  [phone]                     Patient Instructions:   No discharge procedures on file.    Significant Diagnostic Studies: Labs:   CMP   Recent Labs   Lab 07/21/22 0435 07/22/22  0349    138   K 3.3* 3.6    101   CO2 26 27    92   BUN 19 21   CREATININE 0.7 0.7   CALCIUM 8.9 8.7   PROT 7.1 6.9   ALBUMIN 2.4* 2.4*   BILITOT 0.6 0.5   ALKPHOS 87 78   AST 19 18   ALT 8* 9*   ANIONGAP 12 10   ESTGFRAFRICA >60.0 >60.0   EGFRNONAA >60.0 >60.0    and CBC   Recent Labs   Lab 07/21/22 0435 07/22/22  0349   WBC 3.79* 3.39*   HGB 9.8* 9.3*   HCT 31.5* 29.4*    184     Microbiology:   Blood  Culture   Lab Results   Component Value Date    LABBLOO No Growth to date 07/18/2022    LABBLOO No Growth to date 07/18/2022    LABBLOO No Growth to date 07/18/2022    LABBLOO No Growth to date 07/18/2022    LABBLOO No Growth to date 07/18/2022    LABBLOO No Growth to date 07/18/2022    LABBLOO No Growth to date 07/18/2022    LABBLOO No Growth to date 07/18/2022    LABBLOO No Growth to date 07/18/2022    LABBLOO No Growth to date 07/18/2022    and Urine Culture    Lab Results   Component Value Date    LABURIN  07/18/2022     Multiple organisms isolated. None in predominance.  Repeat if    LABURIN clinically necessary. 07/18/2022       Pending Diagnostic Studies:     None         Medications:  Reconciled Home Medications:      Medication List      START taking these medications    carvediloL 3.125 MG tablet  Commonly known as: COREG  Take 1 tablet (3.125 mg total) by mouth 2 (two) times daily.     levoFLOXacin 750 MG tablet  Commonly known as: LEVAQUIN  Take 1 tablet (750 mg total) by mouth once daily. for 1 day  Start taking on: July 23, 2022        CHANGE how you take these medications    atorvastatin 40 MG tablet  Commonly known as: LIPITOR  Take 0.5 tablets (20 mg total) by mouth every evening.  What changed: medication strength        CONTINUE taking these medications    aspirin 81 MG Chew  Take 81 mg by mouth once daily.     bumetanide 1 MG tablet  Commonly known as: BUMEX  Take 1 tablet (1 mg total) by mouth once daily.     clopidogreL 75 mg tablet  Commonly known as: PLAVIX  Take 1 tablet (75 mg total) by mouth once daily.     lisinopriL 20 MG tablet  Commonly known as: PRINIVIL,ZESTRIL  Take 20 mg by mouth once daily.     megestroL 40 MG Tab  Commonly known as: MEGACE  Take 1 tablet (40 mg total) by mouth once daily.     SYNTHROID 125 MCG tablet  Generic drug: levothyroxine  Take 125 mcg by mouth once daily.     TIMOPTIC OCUDOSE (PF) 0.5 % Dpet  Generic drug: timoloL maleate (PF)  Place 1 drop into both eyes  2 (two) times a day.        STOP taking these medications    albuterol-ipratropium 2.5 mg-0.5 mg/3 mL nebulizer solution  Commonly known as: DUO-NEB     amLODIPine 10 MG tablet  Commonly known as: NORVASC     losartan 50 MG tablet  Commonly known as: COZAAR     MEROPENEM 1 G/100 ML NS (READY TO MIX SYSTEM)     traZODone 50 MG tablet  Commonly known as: DESYREL            Indwelling Lines/Drains at time of discharge:   Lines/Drains/Airways     None                 Time spent on the discharge of patient: 35 minutes         Tova Roberts MD  Department of Hospital Medicine  [facility] - Telemetry Stepdown

## 2022-07-22 NOTE — PLAN OF CARE
Per Brissa at [facility], they have submitted for insurance auth for patient to return today. Escalated auth to leadership.    Fani Nunn, ULISES  [facility]  Ext. [phone]

## 2022-07-22 NOTE — PLAN OF CARE
NURSING HOME ORDERS    07/22/2022  [facility]  [facility] - TELEMETRY STEPDOWN  [address]  [facility] [address]  Dept: [phone]  Loc: [phone]     Admit to Nursing Home:  Skilled Nursing Facility    Diagnoses:  Active Hospital Problems    Diagnosis  POA    *Acute on chronic combined systolic and diastolic CHF (congestive heart failure) [I50.43]  Yes    Palliative care encounter [Z51.5]  Not Applicable    Acute hypoxemic respiratory failure [J96.01]  Yes    Hyperlipidemia [E78.5]  Yes     Chronic    Normocytic anemia [D64.9]  Yes     Chronic    CAP (community acquired pneumonia) [J18.9]  Yes    Presence of permanent cardiac pacemaker [Z95.0]  Yes     Chronic    Coronary artery disease involving native coronary artery of native heart without angina pectoris [I25.10]  Yes     Chronic    Acquired hypothyroidism [E03.9]  Yes     Chronic    Essential hypertension [I10]  Yes     Chronic    Severe aortic stenosis s/p TAVR [I35.0]  Yes     Chronic      Resolved Hospital Problems    Diagnosis Date Resolved POA    Shortness of breath [R06.02] 07/20/2022 Yes       Patient is homebound due to:  Acute on chronic combined systolic and diastolic CHF (congestive heart failure)    Allergies:  Review of patient's allergies indicates:   Allergen Reactions    Penicillins Swelling    Sulfa (sulfonamide antibiotics) Nausea Only       Vitals:  Routine    Diet: cardiac diet, fluid restriction: 1700ml/day and 2 gram sodium diet    Activities:   Up in a chair each morning as tolerated and Activity as tolerated    Goals of Care Treatment Preferences:  Code Status: Full Code      Labs:  BMP, Mg biweekly for 3 weeks    Nursing Precautions:  Aspiration , Fall and Pressure ulcer prevention    Consults:   PT to evaluate and treat- 5 times a week, OT to evaluate and treat- 5 times a week, Wound Care and Nutrition to evaluate and recommend diet     Miscellaneous Care:     Routine Skin for  Bedridden Patients:  Apply moisture barrier cream to all    CHF Care: Daily Weight with notification of MD/NP of 2lb or > increase in 24 hours    v/s and O2 sat every shift    Oxygen as needed via NC (1-5 LPM) for sats <89%    Report abnormal breath sounds to MD/NP    Edema checks q shift- notify MD/NP of increased edema    Task segmentation by nursing for daily care to decrease exertion      CHF education to include diet ,medication, and CHF flags for MD notification                     Medications: Discontinue all previous medication orders, if any. See new list below.     Medication List      START taking these medications    carvediloL 3.125 MG tablet  Commonly known as: COREG  Take 1 tablet (3.125 mg total) by mouth 2 (two) times daily.     levoFLOXacin 750 MG tablet  Commonly known as: LEVAQUIN  Take 1 tablet (750 mg total) by mouth once daily. for 1 day  Start taking on: July 23, 2022        CHANGE how you take these medications    atorvastatin 40 MG tablet  Commonly known as: LIPITOR  Take 0.5 tablets (20 mg total) by mouth every evening.  What changed: medication strength        CONTINUE taking these medications    aspirin 81 MG Chew  Take 81 mg by mouth once daily.     bumetanide 1 MG tablet  Commonly known as: BUMEX  Take 1 tablet (1 mg total) by mouth once daily.     clopidogreL 75 mg tablet  Commonly known as: PLAVIX  Take 1 tablet (75 mg total) by mouth once daily.     lisinopriL 20 MG tablet  Commonly known as: PRINIVIL,ZESTRIL  Take 20 mg by mouth once daily.     megestroL 40 MG Tab  Commonly known as: MEGACE  Take 1 tablet (40 mg total) by mouth once daily.     SYNTHROID 125 MCG tablet  Generic drug: levothyroxine  Take 125 mcg by mouth once daily.     TIMOPTIC OCUDOSE (PF) 0.5 % Dpet  Generic drug: timoloL maleate (PF)  Place 1 drop into both eyes 2 (two) times a day.        STOP taking these medications    albuterol-ipratropium 2.5 mg-0.5 mg/3 mL nebulizer solution  Commonly known as: DUO-NEB      amLODIPine 10 MG tablet  Commonly known as: NORVASC     losartan 50 MG tablet  Commonly known as: COZAAR     MEROPENEM 1 G/100 ML NS (READY TO MIX SYSTEM)     traZODone 50 MG tablet  Commonly known as: DESYREL              Immunizations Administered as of 7/22/2022     Name Date Dose VIS Date Route Exp Date    COVID-19, MRNA, LN-S, PF (Pfizer) (Purple Cap) 2/6/2021  8:14 AM 0.3 mL 12/12/2020 Intramuscular 6/30/2021    Site: Right deltoid     Given By: Sarah Vincent LPN     : [employer]     Lot: [id number]     COVID-19, MRNA, LN-S, PF (Pfizer) (Purple Cap) 1/15/2021  8:15 AM 0.3 mL 12/12/2020 Intramuscular 4/30/2021    Site: Right deltoid     Given By: Brandie Daniels LPN     : [employer]     Lot: [id number]           This patient has had both covid vaccinations    Some patients may experience side effects after vaccination.  These may include fever, headache, muscle or joint aches.  Most symptoms resolve with 24-48 hours and do not require urgent medical evaluation unless they persist for more than 72 hours or symptoms are concerning for an unrelated medical condition.          _________________________________  Tova Roberts MD  07/22/2022

## 2022-07-22 NOTE — PT/OT/SLP PROGRESS
Physical Therapy      Patient Name:  Pilar Michael   MRN:  [id number]    Patient not seen today secondary to  (pt. with hospital discharge orders signed and scheduled to return to SNF). Will follow-up on Monday, 7/25/2022, if pt. not discharged.    Yuriy Hale, PT  7/22/2022

## 2022-07-22 NOTE — NURSING
Patient discharged from the unit.  Patient left via wheelchair van to [facility].

## 2022-07-22 NOTE — PLAN OF CARE
[facility] - Telemetry Stepdown  Discharge Final Note    Primary Care Provider: Joseph Noel MD    Expected Discharge Date: 7/22/2022    Final Discharge Note (most recent)     Final Note - 07/22/22 1608        Final Note    Assessment Type Final Discharge Note     Anticipated Discharge Disposition Skilled Nursing Facility     What phone number can be called within the next 1-3 days to see how you are doing after discharge? [phone]        Post-Acute Status    Post-Acute Authorization Placement     Post-Acute Placement Status Set-up Complete/Auth obtained   [facility]    Discharge Delays None known at this time                 Important Message from Medicare  Important Message from Medicare regarding Discharge Appeal Rights: Given to patient/caregiver, Explained to patient/caregiver, Signed/date by patient/caregiver     Date IMM was signed: 07/22/22  Time IMM was signed: 1007    Contact Info     [facility]   Specialty: Skilled Nursing Facility    [address], [address] FLO[address]  [place] [address]   Phone: [phone]       Next Steps: Follow up          Ning Church RN  Ext [phone]

## 2022-07-23 LAB
BACTERIA BLD CULT: NORMAL
BACTERIA BLD CULT: NORMAL

## 2022-07-23 PROCEDURE — 27000221 HC OXYGEN, UP TO 24 HOURS

## 2022-07-23 PROCEDURE — 97110 THERAPEUTIC EXERCISES: CPT

## 2022-07-23 PROCEDURE — 97535 SELF CARE MNGMENT TRAINING: CPT

## 2022-07-23 PROCEDURE — 97165 OT EVAL LOW COMPLEX 30 MIN: CPT

## 2022-07-23 PROCEDURE — 11000004 HC SNF PRIVATE

## 2022-07-23 PROCEDURE — 94761 N-INVAS EAR/PLS OXIMETRY MLT: CPT

## 2022-07-23 PROCEDURE — 99900035 HC TECH TIME PER 15 MIN (STAT)

## 2022-07-23 PROCEDURE — 97162 PT EVAL MOD COMPLEX 30 MIN: CPT

## 2022-07-23 PROCEDURE — 25000003 PHARM REV CODE 250: Performed by: HOSPITALIST

## 2022-07-23 RX ADMIN — TIMOLOL MALEATE 1 DROP: 5 SOLUTION OPHTHALMIC at 09:07

## 2022-07-23 RX ADMIN — ASPIRIN 81 MG CHEWABLE TABLET 81 MG: 81 TABLET CHEWABLE at 09:07

## 2022-07-23 RX ADMIN — ATORVASTATIN CALCIUM 40 MG: 40 TABLET, FILM COATED ORAL at 09:07

## 2022-07-23 RX ADMIN — LEVOTHYROXINE SODIUM 125 MCG: 25 TABLET ORAL at 06:07

## 2022-07-23 RX ADMIN — Medication 6 MG: at 09:07

## 2022-07-23 RX ADMIN — CARVEDILOL 3.12 MG: 3.12 TABLET, FILM COATED ORAL at 09:07

## 2022-07-23 RX ADMIN — LEVOFLOXACIN 750 MG: 750 TABLET, FILM COATED ORAL at 09:07

## 2022-07-23 RX ADMIN — BUMETANIDE 1 MG: 1 TABLET ORAL at 09:07

## 2022-07-23 RX ADMIN — SENNOSIDES AND DOCUSATE SODIUM 1 TABLET: 50; 8.6 TABLET ORAL at 09:07

## 2022-07-23 RX ADMIN — CLOPIDOGREL 75 MG: 75 TABLET, FILM COATED ORAL at 09:07

## 2022-07-23 RX ADMIN — MEGESTROL ACETATE 40 MG: 20 TABLET ORAL at 09:07

## 2022-07-23 RX ADMIN — LISINOPRIL 20 MG: 20 TABLET ORAL at 09:07

## 2022-07-23 NOTE — PT/OT/SLP EVAL
"Occupational Therapy   Evaluation & Tx    Name: Pilar Michael  MRN: [id number]  Admit Date: 7/22/2022  Admitting Diagnosis:  <principal problem not specified>   Recent Surgeries: N/A    General Precautions: Standard, aspiration, fall   Orthopedic Precautions:N/A   Braces: N/A     Recommendations:     Discharge Recommendations: home health OT  Level of Assistance Recommended: 24 hours light assistance  Discharge Equipment Recommendations:  bath bench  Barriers to discharge:  Decreased caregiver support    Assessment:     Pilar Michael is a 89 y.o. female with a medical diagnosis of <principal problem not specified>.  She presents with the following performance deficits affecting function: weakness, impaired endurance, impaired sensation, impaired self care skills, impaired functional mobility, gait instability, impaired balance, decreased coordination, decreased lower extremity function, decreased safety awareness, pain, decreased ROM, edema, impaired cardiopulmonary response to activity.  Pt was agreeable to and participated in OT evaluation.  Pt reports that at PLOF she was mod I with mobility and ADLs using AD and increased time.  Pt currently requires min - mod A with func mobility and set up - mod A with ADLs.  Goals established to assist pt with returning to OF regarding ADLs and func mobility.  Pt will benefit from skilled OT services in order to increase her level of safety and independence with ADLs and mobility.      Rehab Potential is good    Activity Tolerance: Fair    Plan:     Patient to be seen 6 x/week to address the above listed problems via self-care/home management, therapeutic activities, therapeutic exercises  · Plan of Care Expires: 08/22/22  · Plan of Care Reviewed with: patient, daughter    Subjective     Chief Complaint: fatigue  Patient/Family Comments/goals: "increase strength and endurance" so that she can return to OF    Occupational Profile:  Living Environment: pt lives with " adult daughter in [facility] - THE only to enter home via garage entrance - has t/s combo for bathing with GB  Previous level of function: mod I using AD for mobility and ADLS  Roles and Routines: mother/grandmother   Equipment Used at Home:  grab bar, shower chair, walker, rolling, rollator, bedside commode, wheelchair (has 1 bedrail attached to bed)  Assistance upon Discharge: assistance from family    Pain/Comfort:  · Pain Rating 1: 0/10  · Pain Rating Post-Intervention 1: 0/10    Patients cultural, spiritual, Restorationist conflicts given the current situation: no    Objective:     Communicated with: nurse prior to session.  Patient found HOB elevated with oxygen, PureWick upon OT entry to room.    Occupational Performance:      07/23/22 1200   Eating   Assistance Needed Set-up A   Oral Hygiene   Assistance Needed Set-up A - sitting EOB for grooming tasks   Toileting Hygiene   Assistance Needed Physical assistance - max A with hygiene and brief change   Physical Assistance Level 51%-75%   Shower/Bathe Self   Assistance Needed Physical assistance - mod A to wash feet and buttocks - sponge bath supine for lower body and sitting EOB for upper body   Physical Assistance Level 51%-75%   Upper Body Dressing   Assistance Needed Set-up A - to lola pullover style shirt   Lower Body Dressing   Assistance Needed Physical assistance - min A to pull pants up to waist when standing   Physical Assistance Level 25% or less   Putting On/Taking Off Footwear   Assistance Needed Physical assistance - Min A to adjust slippers - doffed socks sitting EOB and donned slippers   Physical Assistance Level 25% or less   Roll Left and Right   Assistance Needed Supervision   Comment using bed rail   Lying to Sitting on Side of Bed   Assistance Needed Physical assistance   Physical Assistance Level 26%-50%   Comment using bed rail to lift trunk   Sit to Stand   Assistance Needed Incidental touching - CGA using RW   Toilet Transfer   Comment did not  occur   Reason if not Attempted Activity not applicable  (incontinent)     Cognitive/Visual Perceptual:  Cognitive/Psychosocial Skills:     -       Oriented to: Person, Place, Time and Situation   -       Follows Commands/attention:Follows one-step commands  -       Communication: clear/fluent  -       Memory: No Deficits noted  -       Safety awareness/insight to disability: impaired   -       Mood/Affect/Coping skills/emotional control: Appropriate to situation    Physical Exam:  Balance: -       sit = good; stand = CGA - min A  Postural examination/scapula alignment:    -       Rounded shoulders  -       Forward head  -       Posterior pelvic tilt  -       Kyphosis  Skin integrity: Dry  Edema:  Severe L UE (due to lymphedema)  Sensation: -       Intact  Dominant hand: -       right  Upper Extremity Range of Motion:     -       Right Upper Extremity: WFL  -       Left Upper Extremity: WFL except shoulder  Upper Extremity Strength:    -       Right Upper Extremity: WFL  -       Left Upper Extremity: WFL except shoulder   Strength:    -       Right Upper Extremity: WFL  -       Left Upper Extremity: WFL    AMPAC 6 Click ADL:  AMPAC Total Score: 17    Treatment & Education:  · Pt completed ADLs and func mobility activities for tx session as noted above  · Poor endurance - SOB and fatigued following ADLs  · Whiteboard updated  · Pt educated on role of OT and POC    Education:    Patient left up in chair with all lines intact, call button in reach and daughter present    GOALS:   Multidisciplinary Problems     Occupational Therapy Goals        Problem: Occupational Therapy    Goal Priority Disciplines Outcome Interventions   Occupational Therapy Goal     OT, PT/OT Ongoing, Progressing    Description: Goals to be met by: 08/23/22     Patient will increase functional independence with ADLs by performing:    UE Dressing with Modified [place].  LE Dressing with Modified [place].  Grooming while seated at sink  with Modified Real.  Toileting from bedside commode with Set-up Assistance for hygiene and clothing management.   Bathing from  shower chair/bench with Stand-by Assistance.  Toilet transfer to bedside commode with Stand-by Assistance.  Upper extremity exercise program 3x15 reps per handout, with supervision to increase overall UE strength/endurance in order to improve func mobility skills.   Stand during functional task for 5' with SBA in preparation for standing ADLs. .                     History:     Past Medical History:   Diagnosis Date    Arthritis     Cancer     CHF (congestive heart failure)     Coronary artery disease     Hypertension     Thyroid disease        Past Surgical History:   Procedure Laterality Date    CARDIAC CATHETERIZATION      CARDIAC VALVE SURGERY      CHOLECYSTECTOMY      CORONARY ANGIOGRAPHY N/A 4/23/2019    Procedure: ANGIOGRAM, CORONARY ARTERY;  Surgeon: Bakari Singletary MD;  Location: [facility] CATH LAB;  Service: Cardiology;  Laterality: N/A;    HYSTERECTOMY      lymph nodes removal      THYROIDECTOMY      TRANSCATHETER AORTIC VALVE REPLACEMENT (TAVR) N/A 5/7/2019    Procedure: REPLACEMENT, AORTIC VALVE, TRANSCATHETER (TAVR);  Surgeon: Bakari Singletary MD;  Location: [facility] CATH LAB;  Service: Cardiology;  Laterality: N/A;       Time Tracking:     OT Date of Treatment: 07/23/22  OT Start Time: 0925  OT Stop Time: 1013  OT Total Time (min): 48 min    Billable Minutes:Evaluation 20  Self Care/Home Management 28    7/23/2022

## 2022-07-23 NOTE — PLAN OF CARE
Problem: Occupational Therapy  Goal: Occupational Therapy Goal  Description: Goals to be met by: 08/23/22     Patient will increase functional independence with ADLs by performing:    UE Dressing with Modified [place].  LE Dressing with Modified [place].  Grooming while seated at sink with Modified [place].  Toileting from bedside commode with Set-up Assistance for hygiene and clothing management.   Bathing from  shower chair/bench with Stand-by Assistance.  Toilet transfer to bedside commode with Stand-by Assistance.  Upper extremity exercise program 3x15 reps per handout, with supervision to increase overall UE strength/endurance in order to improve func mobility skills.   Stand during functional task for 5' with SBA in preparation for standing ADLs. .    Outcome: Ongoing, Progressing     Pt was agreeable to and participated in OT evaluation.  Pt reports that at PLOF she was mod I with mobility and ADLs using AD and increased time.  Pt currently requires min - mod A with func mobility and set up - mod A with ADLs.  Goals established to assist pt with returning to OF regarding ADLs and func mobility.  Pt will benefit from skilled OT services in order to increase her level of safety and independence with ADLs and mobility.      Cheryl Neal, OT  7/23/2022

## 2022-07-23 NOTE — PLAN OF CARE
Problem: Physical Therapy  Goal: Physical Therapy Goal  Description: Goals to be met by: 8/22/22     Patient will increase functional independence with mobility by performing:    . Supine to sit with Set-up [place]  . Sit to supine with Set-up [place]  . Rolling to Left and Right with Set-up Assistance.  . Sit to stand transfer with Supervision  . Bed to chair transfer with Supervision using Rolling Walker  . Gait  x 50 feet with Stand-by Assistance using Rolling Walker.   . Wheelchair propulsion x50 feet with Minimal Assistance using bilateral uppper extremities  . Ascend/Descend 2 inch curb step with Moderate Assistance using Rolling Walker.    Outcome: Ongoing, Progressing

## 2022-07-23 NOTE — PT/OT/SLP EVAL
Physical Therapy Evaluation    Patient Name:  Pilar Michael   MRN:  [id number]  Admit Date: 7/22/2022  Admitting Diagnosis: Acute on chronic combined systolic and diastolic CHF (congestive heart failure  Recent Surgeries: N/A    General Precautions: Standard, aspiration, fall   Orthopedic Precautions:N/A   Braces: N/A     Recommendations:     Discharge Recommendations:  home health PT   Level of Assistance Recommended: 24 hours light assistance  Discharge Equipment Recommendations: bath bench, bedside commode, wheelchair, walker, rolling   Barriers to discharge: Inaccessible home environment    Assessment:     Pilar Michael is a 89 y.o. female admitted with a medical diagnosis of  Acute on chronic combined systolic and diastolic CHF (congestive heart failure).  Performance deficits affecting function  weakness, impaired endurance, impaired self care skills, impaired functional mobility, gait instability, impaired balance, decreased upper extremity function, decreased lower extremity function, impaired cardiopulmonary response to activity. Pt was Evie PTA and now requires Dashawn for transfers and very limited GT distance d.t decrease endurance and generalized weakness. Pt found with 2L O2 but was not using O2 PTA. O2 removed and pt. Completed her PT session on RA with O2 sats staying stable above 95%. Pt's nurse notified and requested for PT to leave pt on RA. Pt will benefit from continued snf rehab to help improve her overall functional mobility and safety.    Rehab Potential is good     Activity Tolerance: Fair    Plan:     Patient to be seen 6 x/week to address the above listed problems via gait training, therapeutic activities, therapeutic exercises, neuromuscular re-education, wheelchair management/training     Plan of Care Expires: 08/22/22  Plan of Care Reviewed with: patient, daughter    Subjective     Chief Complaint: weakness  Patient/Family Comments/goals: to gain some (I) prior to d/c  home  Pain/Comfort:  Pain Rating 1: 0/10  Pain Rating Post-Intervention 1: 0/10    Living Environment:   Pt.'s daughter lives with pt. And works from home. Pt. Usually enters house through carport which has 1 step and no HR.  Pt.has a tub shower with a shower chair and grab bars   Previous level of function: Pt. Reported she was able to perform her ADL tasks until she was hospitalized. Pt was (S) using a rollator  Equipment Used at Home:  raised toilet, bedside commode, shower chair, walker, rolling, rollator, grab bar  Assistance upon Discharge: daughter works from home    Patients cultural, spiritual, Caodaism conflicts given the current situation: no    Objective:     Communicated with pt's nurse prior to session.  Patient found up in chair with oxygen  upon PT entry to room.    Exams:  · Cognitive Exam:  Patient is oriented to Person, Place and Situation  · Gross Motor Coordination:  WFL  · Sensation:    · -       Intact  · RLE ROM: WFL  · RLE Strength: grossly 4/5  · LLE ROM: WFL  · LLE Strength: grossly 4/5    Functional Mobility:     07/23/22 1100   Prior Functioning: Everyday Activities   Self Care Needed some help   Indoor Mobility (Ambulation) Needed some help   Stairs Needed some help   Functional Cognition Independent   Prior Device Use Walker   Roll Left and Right   Assistance Needed Supervision   Comment per OT eval   CARE Score - Roll Left and Right 4   Sit to Lying   Physical Assistance Level 26%-50%   CARE Score - Sit to Lying 3   Lying to Sitting on Side of Bed   Physical Assistance Level 26%-50%   Comment per OT eval   CARE Score - Lying to Sitting on Side of Bed 3   Sit to Stand   Physical Assistance Level 25% or less   Comment with RW from w/c   CARE Score - Sit to Stand 3   Chair/Bed-to-Chair Transfer   Physical Assistance Level 25% or less   Comment with RW, SPT   CARE Score - Chair/Bed-to-Chair Transfer 3   Car Transfer   Reason if not Attempted Environmental limitations   CARE Score - Car  Transfer 10   Walk 10 Feet   Comment pt ambulated on RA, 9ft x 3 trials, FFT and decrease annie. pt needed seated rest breaks after each trial d.t fatigue. pt's O2 sats on RA post GT after each trial= 96% O2 sats and 60 HR.   Reason if not Attempted Safety concerns   CARE Score - Walk 10 Feet 88   Walk 50 Feet with Two Turns   Reason if not Attempted Safety concerns   CARE Score - Walk 50 Feet with Two Turns 88   Walk 150 Feet   Reason if not Attempted Safety concerns   CARE Score - Walk 150 Feet 88   Walking 10 Feet on Uneven Surfaces   Reason if not Attempted Safety concerns   CARE Score - Walking 10 Feet on Uneven Surfaces 88   1 Step (Curb)   Reason if not Attempted Safety concerns   CARE Score - 1 Step (Curb) 88   4 Steps   Reason if not Attempted Safety concerns   CARE Score - 4 Steps 88   12 Steps   Reason if not Attempted Safety concerns   CARE Score - 12 Steps 88   Picking Up Object   Reason if not Attempted Safety concerns   CARE Score - Picking Up Object 88   Uses a Wheelchair/Scooter?   Uses a Wheelchair/Scooter? Yes   Wheel 50 Feet with Two Turns   Comment pt attempted to propel w/c but became very fatigued after ~10ft   Reason if not Attempted Safety concerns   CARE Score - Wheel 50 Feet with Two Turns 88   Type of Wheelchair/Scooter Manual   Wheel 150 Feet   Reason if not Attempted Safety concerns   CARE Score - Wheel 150 Feet 88       Therapeutic Activities and Exercises: Mini-eliptical x 8mins with frequent rest breaks d.t fatigue. Resistance set at low to help improve B L/E MMT and endurance.    Education:   Patient and daughter provided with daily orientation and goals of this PT session.   Patient educated to transfer to bedside chair/bedside commode/bathroom with RN/PCT present.    Patient and daughter educated on Fall risk, home safety and plan of care by explanation.    Patient and daughter Verbalized Understanding.   Time provided for therapeutic counseling and discussion of current  health disposition. All questions answered to satisfaction, within scope of PT practice; voiced no other concerns. White board updated in patient's room, RN notified of session.      AM-PAC 6 CLICK MOBILITY  Total Score:15     Patient left up in chair with call button in reach and pt's daugter  present.    GOALS:   Multidisciplinary Problems     Physical Therapy Goals        Problem: Physical Therapy    Goal Priority Disciplines Outcome Goal Variances Interventions   Physical Therapy Goal     PT, PT/OT Ongoing, Progressing     Description: Goals to be met by: 8/22/22     Patient will increase functional independence with mobility by performing:    . Supine to sit with Set-up [place]  . Sit to supine with Set-up [place]  . Rolling to Left and Right with Set-up Assistance.  . Sit to stand transfer with Supervision  . Bed to chair transfer with Supervision using Rolling Walker  . Gait  x 50 feet with Stand-by Assistance using Rolling Walker.   . Wheelchair propulsion x50 feet with Minimal Assistance using bilateral uppper extremities  . Ascend/Descend 2 inch curb step with Moderate Assistance using Rolling Walker.                     History:     Past Medical History:   Diagnosis Date    Arthritis     Cancer     CHF (congestive heart failure)     Coronary artery disease     Hypertension     Thyroid disease        Past Surgical History:   Procedure Laterality Date    CARDIAC CATHETERIZATION      CARDIAC VALVE SURGERY      CHOLECYSTECTOMY      CORONARY ANGIOGRAPHY N/A 4/23/2019    Procedure: ANGIOGRAM, CORONARY ARTERY;  Surgeon: Bakari Singletary MD;  Location: [facility] CATH LAB;  Service: Cardiology;  Laterality: N/A;    HYSTERECTOMY      lymph nodes removal      THYROIDECTOMY      TRANSCATHETER AORTIC VALVE REPLACEMENT (TAVR) N/A 5/7/2019    Procedure: REPLACEMENT, AORTIC VALVE, TRANSCATHETER (TAVR);  Surgeon: Bakari Singletary MD;  Location: [facility] CATH LAB;  Service: Cardiology;  Laterality: N/A;        Time Tracking:     PT Received On: 07/23/22  PT Start Time: 1030     PT Stop Time: 1111  PT Total Time (min): 41 min     Billable Minutes: Evaluation 20mins and Therapeutic Exercise 21mins      07/23/2022

## 2022-07-23 NOTE — NURSING
Patient arrived to the SNF for skill serves via W/C. Required stand by assistance x 1 from chair to bed. Vital sign taken and stable. Patient AOX4, incontinent B/B. Patient denies pain and discomfort at this time. Bed in lowest position, wheel locked, and call light within patient reach.

## 2022-07-23 NOTE — PLAN OF CARE
Problem: Adult Inpatient Plan of Care  Goal: Plan of Care Review  Outcome: Ongoing, Progressing  Goal: Patient-Specific Goal (Individualized)  Outcome: Ongoing, Progressing  Goal: Absence of Hospital-Acquired Illness or Injury  Outcome: Ongoing, Progressing  Goal: Optimal Comfort and Wellbeing  Outcome: Ongoing, Progressing  Goal: Readiness for Transition of Care  Outcome: Ongoing, Progressing     Problem: Fluid Imbalance (Pneumonia)  Goal: Fluid Balance  Outcome: Ongoing, Progressing     Problem: Infection (Pneumonia)  Goal: Resolution of Infection Signs and Symptoms  Outcome: Ongoing, Progressing     Problem: Respiratory Compromise (Pneumonia)  Goal: Effective Oxygenation and Ventilation  Outcome: Ongoing, Progressing     Problem: Impaired Wound Healing  Goal: Optimal Wound Healing  Outcome: Ongoing, Progressing     Problem: Fall Injury Risk  Goal: Absence of Fall and Fall-Related Injury  Outcome: Ongoing, Progressing     Problem: Skin Injury Risk Increased  Goal: Skin Health and Integrity  Outcome: Ongoing, Progressing

## 2022-07-24 PROCEDURE — 25000003 PHARM REV CODE 250: Performed by: HOSPITALIST

## 2022-07-24 PROCEDURE — 94761 N-INVAS EAR/PLS OXIMETRY MLT: CPT

## 2022-07-24 PROCEDURE — 11000004 HC SNF PRIVATE

## 2022-07-24 RX ADMIN — MEGESTROL ACETATE 40 MG: 20 TABLET ORAL at 09:07

## 2022-07-24 RX ADMIN — LEVOTHYROXINE SODIUM 125 MCG: 25 TABLET ORAL at 06:07

## 2022-07-24 RX ADMIN — ASPIRIN 81 MG CHEWABLE TABLET 81 MG: 81 TABLET CHEWABLE at 09:07

## 2022-07-24 RX ADMIN — BUMETANIDE 1 MG: 1 TABLET ORAL at 09:07

## 2022-07-24 RX ADMIN — SENNOSIDES AND DOCUSATE SODIUM 1 TABLET: 50; 8.6 TABLET ORAL at 09:07

## 2022-07-24 RX ADMIN — CARVEDILOL 3.12 MG: 3.12 TABLET, FILM COATED ORAL at 09:07

## 2022-07-24 RX ADMIN — TIMOLOL MALEATE 1 DROP: 5 SOLUTION OPHTHALMIC at 08:07

## 2022-07-24 RX ADMIN — CLOPIDOGREL 75 MG: 75 TABLET, FILM COATED ORAL at 09:07

## 2022-07-24 RX ADMIN — SENNOSIDES AND DOCUSATE SODIUM 1 TABLET: 50; 8.6 TABLET ORAL at 08:07

## 2022-07-24 RX ADMIN — TIMOLOL MALEATE 1 DROP: 5 SOLUTION OPHTHALMIC at 09:07

## 2022-07-24 RX ADMIN — LISINOPRIL 20 MG: 20 TABLET ORAL at 09:07

## 2022-07-24 RX ADMIN — CARVEDILOL 3.12 MG: 3.12 TABLET, FILM COATED ORAL at 08:07

## 2022-07-24 RX ADMIN — ATORVASTATIN CALCIUM 40 MG: 40 TABLET, FILM COATED ORAL at 08:07

## 2022-07-25 LAB
ANION GAP SERPL CALC-SCNC: 7 MMOL/L (ref 8–16)
BASOPHILS # BLD AUTO: 0.01 K/UL (ref 0–0.2)
BASOPHILS NFR BLD: 0.3 % (ref 0–1.9)
BUN SERPL-MCNC: 19 MG/DL (ref 8–23)
CALCIUM SERPL-MCNC: 8.9 MG/DL (ref 8.7–10.5)
CHLORIDE SERPL-SCNC: 102 MMOL/L (ref 95–110)
CO2 SERPL-SCNC: 26 MMOL/L (ref 23–29)
CREAT SERPL-MCNC: 0.8 MG/DL (ref 0.5–1.4)
DIFFERENTIAL METHOD: ABNORMAL
EOSINOPHIL # BLD AUTO: 0.1 K/UL (ref 0–0.5)
EOSINOPHIL NFR BLD: 2.7 % (ref 0–8)
ERYTHROCYTE [DISTWIDTH] IN BLOOD BY AUTOMATED COUNT: 17.2 % (ref 11.5–14.5)
EST. GFR  (AFRICAN AMERICAN): >60 ML/MIN/1.73 M^2
EST. GFR  (NON AFRICAN AMERICAN): >60 ML/MIN/1.73 M^2
GLUCOSE SERPL-MCNC: 92 MG/DL (ref 70–110)
HCT VFR BLD AUTO: 28.5 % (ref 37–48.5)
HGB BLD-MCNC: 9.1 G/DL (ref 12–16)
IMM GRANULOCYTES # BLD AUTO: 0.01 K/UL (ref 0–0.04)
IMM GRANULOCYTES NFR BLD AUTO: 0.3 % (ref 0–0.5)
LYMPHOCYTES # BLD AUTO: 0.6 K/UL (ref 1–4.8)
LYMPHOCYTES NFR BLD: 18.5 % (ref 18–48)
MAGNESIUM SERPL-MCNC: 1.7 MG/DL (ref 1.6–2.6)
MCH RBC QN AUTO: 26.9 PG (ref 27–31)
MCHC RBC AUTO-ENTMCNC: 31.9 G/DL (ref 32–36)
MCV RBC AUTO: 84 FL (ref 82–98)
MONOCYTES # BLD AUTO: 0.4 K/UL (ref 0.3–1)
MONOCYTES NFR BLD: 10.6 % (ref 4–15)
NEUTROPHILS # BLD AUTO: 2.2 K/UL (ref 1.8–7.7)
NEUTROPHILS NFR BLD: 67.6 % (ref 38–73)
NRBC BLD-RTO: 0 /100 WBC
PHOSPHATE SERPL-MCNC: 3.5 MG/DL (ref 2.7–4.5)
PLATELET # BLD AUTO: 164 K/UL (ref 150–450)
PMV BLD AUTO: 10.8 FL (ref 9.2–12.9)
POTASSIUM SERPL-SCNC: 3.7 MMOL/L (ref 3.5–5.1)
RBC # BLD AUTO: 3.38 M/UL (ref 4–5.4)
SODIUM SERPL-SCNC: 135 MMOL/L (ref 136–145)
WBC # BLD AUTO: 3.3 K/UL (ref 3.9–12.7)

## 2022-07-25 PROCEDURE — 83735 ASSAY OF MAGNESIUM: CPT | Performed by: HOSPITALIST

## 2022-07-25 PROCEDURE — 25000003 PHARM REV CODE 250: Performed by: HOSPITALIST

## 2022-07-25 PROCEDURE — 97535 SELF CARE MNGMENT TRAINING: CPT

## 2022-07-25 PROCEDURE — 36415 COLL VENOUS BLD VENIPUNCTURE: CPT | Performed by: HOSPITALIST

## 2022-07-25 PROCEDURE — 84100 ASSAY OF PHOSPHORUS: CPT | Performed by: HOSPITALIST

## 2022-07-25 PROCEDURE — 97530 THERAPEUTIC ACTIVITIES: CPT | Mod: CQ

## 2022-07-25 PROCEDURE — 11000004 HC SNF PRIVATE

## 2022-07-25 PROCEDURE — 97530 THERAPEUTIC ACTIVITIES: CPT

## 2022-07-25 PROCEDURE — 80048 BASIC METABOLIC PNL TOTAL CA: CPT | Performed by: HOSPITALIST

## 2022-07-25 PROCEDURE — 97116 GAIT TRAINING THERAPY: CPT | Mod: CQ

## 2022-07-25 PROCEDURE — 97110 THERAPEUTIC EXERCISES: CPT | Mod: CQ

## 2022-07-25 PROCEDURE — 85025 COMPLETE CBC W/AUTO DIFF WBC: CPT | Performed by: HOSPITALIST

## 2022-07-25 RX ADMIN — LISINOPRIL 20 MG: 20 TABLET ORAL at 10:07

## 2022-07-25 RX ADMIN — SENNOSIDES AND DOCUSATE SODIUM 1 TABLET: 50; 8.6 TABLET ORAL at 09:07

## 2022-07-25 RX ADMIN — BUMETANIDE 1 MG: 1 TABLET ORAL at 10:07

## 2022-07-25 RX ADMIN — ATORVASTATIN CALCIUM 40 MG: 40 TABLET, FILM COATED ORAL at 09:07

## 2022-07-25 RX ADMIN — TIMOLOL MALEATE 1 DROP: 5 SOLUTION OPHTHALMIC at 10:07

## 2022-07-25 RX ADMIN — CLOPIDOGREL 75 MG: 75 TABLET, FILM COATED ORAL at 10:07

## 2022-07-25 RX ADMIN — LEVOTHYROXINE SODIUM 125 MCG: 25 TABLET ORAL at 05:07

## 2022-07-25 RX ADMIN — TIMOLOL MALEATE 1 DROP: 5 SOLUTION OPHTHALMIC at 09:07

## 2022-07-25 RX ADMIN — MEGESTROL ACETATE 40 MG: 20 TABLET ORAL at 10:07

## 2022-07-25 RX ADMIN — CARVEDILOL 3.12 MG: 3.12 TABLET, FILM COATED ORAL at 10:07

## 2022-07-25 RX ADMIN — SENNOSIDES AND DOCUSATE SODIUM 1 TABLET: 50; 8.6 TABLET ORAL at 10:07

## 2022-07-25 RX ADMIN — CARVEDILOL 3.12 MG: 3.12 TABLET, FILM COATED ORAL at 09:07

## 2022-07-25 NOTE — CLINICAL REVIEW
Clinical Pharmacy Chart Review Note      Admit Date: 7/22/2022   LOS: 3 days       Pilar Michael is a 89 y.o. female re-admitted to SNF for PT/OT after hospitalization for acute hypoxemic respiratory failure due to acute on chronic HFpEF.    Review of patient's allergies indicates:   Allergen Reactions    Penicillins Swelling    Sulfa (sulfonamide antibiotics) Nausea Only     Patient Active Problem List    Diagnosis Date Noted    Palliative care encounter 07/20/2022    Acute hypoxemic respiratory failure 07/20/2022    Debility 07/06/2022    Acute on chronic combined systolic (congestive) and diastolic (congestive) heart failure     Urinary tract infection without hematuria     Normocytic anemia 07/03/2022    Increased anion gap metabolic acidosis 07/03/2022    Hyperlipidemia 07/03/2022    Acute cystitis 11/18/2021    CAP (community acquired pneumonia) 11/17/2021    Presence of permanent cardiac pacemaker 07/07/2020    Acute on chronic combined systolic and diastolic CHF (congestive heart failure) 05/08/2019    Coronary artery disease involving native coronary artery of native heart without angina pectoris 05/08/2019    S/P TAVR (transcatheter aortic valve replacement) 05/07/2019    Acquired hypothyroidism 04/08/2019    Essential hypertension 04/08/2019    Severe aortic stenosis s/p TAVR        Scheduled Meds:    aspirin  81 mg Oral Daily    atorvastatin  40 mg Oral QHS    bumetanide  1 mg Oral Daily    carvediloL  3.125 mg Oral BID    clopidogreL  75 mg Oral Daily    levothyroxine  125 mcg Oral Before breakfast    lisinopriL  20 mg Oral Daily    megestroL  40 mg Oral Daily    senna-docusate 8.6-50 mg  1 tablet Oral BID    timolol maleate 0.5%  1 drop Both Eyes BID     Continuous Infusions:   PRN Meds: acetaminophen, calcium carbonate, melatonin    OBJECTIVE:     Vital Signs (Last 24H)  Temp:  [98.4 °F (36.9 °C)]   Pulse:  [78]   Resp:  [18]   BP: (137)/(64)   SpO2:  [96 %-97 %]      Laboratory:  CBC:   Recent Labs   Lab 07/21/22 0435 07/22/22 0349 07/25/22  0513   WBC 3.79* 3.39* 3.30*   RBC 3.67* 3.46* 3.38*   HGB 9.8* 9.3* 9.1*   HCT 31.5* 29.4* 28.5*    184 164   MCV 86 85 84   MCH 26.7* 26.9* 26.9*   MCHC 31.1* 31.6* 31.9*     BMP:   Recent Labs   Lab 07/21/22 0435 07/22/22 0349 07/25/22  0513    92 92    138 135*   K 3.3* 3.6 3.7    101 102   CO2 26 27 26   BUN 19 21 19   CREATININE 0.7 0.7 0.8   CALCIUM 8.9 8.7 8.9   MG 1.5* 1.8 1.7     CMP:   Recent Labs   Lab 07/20/22 0221 07/21/22 0435 07/22/22 0349 07/25/22  0513   GLU 77 100 92 92   CALCIUM 8.9 8.9 8.7 8.9   ALBUMIN 2.5* 2.4* 2.4*  --    PROT 7.3 7.1 6.9  --     139 138 135*   K 4.0 3.3* 3.6 3.7   CO2 23 26 27 26    101 101 102   BUN 19 19 21 19   CREATININE 0.8 0.7 0.7 0.8   ALKPHOS 90 87 78  --    ALT 9* 8* 9*  --    AST 25 19 18  --    BILITOT 0.9 0.6 0.5  --      LFTs:   Recent Labs   Lab 07/20/22 0221 07/21/22 0435 07/22/22 0349   ALT 9* 8* 9*   AST 25 19 18   ALKPHOS 90 87 78   BILITOT 0.9 0.6 0.5   PROT 7.3 7.1 6.9   ALBUMIN 2.5* 2.4* 2.4*     Lab Results   Component Value Date    TSH 4.640 (H) 07/03/2022    FREET4 1.14 07/03/2022       ASSESSMENT/PLAN:     I have reviewed the medications in compliance with CMS Regulation F756 of the [place]. Based on information gathered, the following items may need to be addressed:    **According to PMH and home medication list, patient takes the following medications at home. These medications are not currently ordered at SNF:  · Amlodipine 10 mg daily (d/c'd for hypotension and swelling)  · Losartan 50 mg daily (currently taking lisinopril)    **Patient is taking megace for appetite stimulant. This medication is a Beers drug and is not recommended in older adults.  Beers Criteria: Avoid use in older adults due to minimal effects on weight and increased risk of thrombotic events, potentially fatal   Medications reviewed by PharmD, please  re-consult if needed.      Smiley Mcnair, Pharm. D.  Clinical Pharmacist  [facility]

## 2022-07-25 NOTE — PT/OT/SLP PROGRESS
"Physical Therapy Treatment    Patient Name:  Pilar Michael   MRN:  [id number]  Admit Date: 7/22/2022  Admitting Diagnosis: Acute on chronic combined systolic and diastolic CHF (congestive heart failure)  Recent Surgeries: N/A    General Precautions: Standard, aspiration, fall   Orthopedic Precautions:N/A   Braces: N/A     Recommendations:     Discharge Recommendations:  home health PT   Level of Assistance Recommended at Discharge: 24 hours light assistance  Discharge Equipment Recommendations: bath bench, bedside commode, wheelchair, walker, rolling   Barriers to discharge: Inaccessible home environment    Assessment:     Pilar Michael is a 89 y.o. female admitted with a medical diagnosis of Acute on chronic combined systolic and diastolic CHF (congestive heart failure). Pt tolerated therapy session fairly well, but made c/o feeling very tired at beginning of session, requiring extra time to perform each task. Pt therapy session focused on gait training, LE strengthening, cardio endurance, bed mob, standing balance, transfers and safety awareness in order to assist with achieving highest level of function. Pt would continue to benefit from skilled PT services per POC.       Performance deficits affecting function:  weakness, impaired endurance, impaired self care skills, impaired functional mobility, gait instability, impaired balance, decreased upper extremity function, decreased lower extremity function, impaired cardiopulmonary response to activity .    Rehab Potential is good    Activity Tolerance: Fair    Plan:     Patient to be seen 6 x/week to address the above listed problems via gait training, therapeutic activities, therapeutic exercises, neuromuscular re-education, wheelchair management/training    · Plan of Care Expires: 08/22/22  · Plan of Care Reviewed with: patient, daughter    Subjective     "I don't think right now I can do it, maybe this afternoon, I am so sleepy." Pt's niece present to " encourage patient with getting OOB, with patient agreeable to try.      Pain/Comfort:  · Pain Rating 1: 0/10  · Pain Rating Post-Intervention 1: 0/10    Patient's cultural, spiritual, Anabaptist conflicts given the current situation:  · no    Objective:     Patient found HOB elevated with PureWick upon PT entry to room.     Therapeutic Activities and Exercises: nustep x 10 mins, without rest at level 1 resistance, setting 7.     Functional Mobility:  · Bed Mobility:     · Rolling Left:  minimum assistance  · Rolling Right: contact guard assistance  · Scooting: minimum assistance  · Supine to Sit: minimum assistance  · Transfers:     · Sit to Stand: 3 sets, contact guard assistance with rolling walker  · Bed to Chair: contact guard assistance with  rolling walker  using  Step Transfer  · W/c to nustep chair: contact guard assistance with  no AD  using  Step Transfer  · Gait: x 10', 2 sets, RW, CGA with cues on erect posture.   · Balance: static standing balance x 1 1/2 mins, RW, CGA/SBA.   · Pt requires education/reminders on locking w/c brakes and proper hand placement for transfers.     AM-PAC 6 CLICK MOBILITY  16    Patient left up in chair with call button in reach and niece present.    GOALS:   Multidisciplinary Problems     Physical Therapy Goals        Problem: Physical Therapy    Goal Priority Disciplines Outcome Goal Variances Interventions   Physical Therapy Goal     PT, PT/OT Ongoing, Progressing     Description: Goals to be met by: 8/22/22     Patient will increase functional independence with mobility by performing:    . Supine to sit with Set-up [place]  . Sit to supine with Set-up [place]  . Rolling to Left and Right with Set-up Assistance.  . Sit to stand transfer with Supervision  . Bed to chair transfer with Supervision using Rolling Walker  . Gait  x 50 feet with Stand-by Assistance using Rolling Walker.   . Wheelchair propulsion x50 feet with Minimal Assistance using bilateral uppper  extremities  . Ascend/Descend 2 inch curb step with Moderate Assistance using Rolling Walker.                     Time Tracking:     PT Received On: 07/25/22  PT Start Time: 1115  PT Stop Time: 1213  PT Total Time (min): 58 min    Billable Minutes: Gait Training 10, Therapeutic Activity 34 and Therapeutic Exercise 14    Treatment Type: Treatment  PT/PTA: PTA     PTA Visit Number: 1     07/25/2022

## 2022-07-25 NOTE — PT/OT/SLP PROGRESS
"Occupational Therapy   Treatment    Name: Pilar Michael  MRN: [id number]  Admit Date: 7/22/2022  Admitting Diagnosis:  Acute on chronic combined systolic and diastolic CHF (congestive heart failure)    General Precautions: Standard, fall   Orthopedic Precautions:N/A   Braces: N/A     Recommendations:     Discharge Recommendations: home health OT  Level of Assistance Recommended at Discharge: 24 hours light assistance for ADL's and homemaking tasks  Discharge Equipment Recommendations:  other (see comments) (TBD)  Barriers to discharge:  Decreased caregiver support    Assessment:     Pilar Michael is a 89 y.o. female with a medical diagnosis of acute on chronic combined systolic and diastolic CHF (congestive heart failure).  Pt tolerated session well and without incident, focusing on oral care and w/c mobility.  She continues to require assistance to perform ADLs and mobility and would continue to benefit from skilled OT services at SNF for maximal pt gains in functional independence.  She presents with the following deficits.  Performance deficits affecting function are weakness, impaired endurance, impaired self care skills, impaired functional mobility, gait instability, impaired balance, decreased coordination, decreased ROM, decreased upper extremity function, edema.     Rehab Potential is good    Activity tolerance:  Fair    Plan:     Patient to be seen 6 x/week to address the above listed problems via self-care/home management, therapeutic activities, therapeutic exercises    · Plan of Care Expires: 08/22/22  · Plan of Care Reviewed with: patient, daughter    Subjective   "I haven't tried to propel the wheelchair before."  Communicated with: nursing prior to session.     Pain/Comfort:  · Pain Rating 1: 0/10  · Pain Rating Post-Intervention 1: 0/10    Patient's cultural, spiritual, Mormonism conflicts given the current situation:  · no    Objective:     Patient found up in chair with  (no lines) with her " daughter present upon OT entry to room.    Bed Mobility:    · N/a due to pt sitting in w/c at beginning and ending of session     Functional Mobility/Transfers:  · Functional Mobility: Pt self-propelled w/c in the hallway ~37 ft with SBA before fatiguing, requiring cueing to only use her LUE to successfully avoid bumping into obstacle on her L side.  After a brief rest break, she self-propelled w/c ~20 ft back to her room with SBA.    Activities of Daily Living:  · Grooming: supervision to brush her teeth and wash her face while seated at the sink.  Pt was able to squeeze toothpaste tube, swipe toothpaste from tube using her RUE digits, and place toothpaste on the toothbrush she was holding with her L hand.    [facility] 6 Click ADL: 17      Treatment & Education:  Pt and her daughter edu on role of OT, POC, safety when performing self care tasks, benefit of performing OOB activity, and safety when performing w/c mobility.    - Self care tasks completed-- as noted above      - Pt's daughter verbalized pt wanting to take a shower tomorrow.    Patient left up in chair at her bedroom door with her daughter presentEducation:   about to take her to the activities room    GOALS:   Multidisciplinary Problems     Occupational Therapy Goals        Problem: Occupational Therapy    Goal Priority Disciplines Outcome Interventions   Occupational Therapy Goal     OT, PT/OT Ongoing, Progressing    Description: Goals to be met by: 08/23/22     Patient will increase functional independence with ADLs by performing:    UE Dressing with Modified [place].  LE Dressing with Modified [place].  Grooming while seated at sink with Modified [place].  Toileting from bedside commode with Set-up Assistance for hygiene and clothing management.   Bathing from  shower chair/bench with Stand-by Assistance.  Toilet transfer to bedside commode with Stand-by Assistance.  Upper extremity exercise program 3x15 reps per handout, with supervision  to increase overall UE strength/endurance in order to improve func mobility skills.   Stand during functional task for 5' with SBA in preparation for standing ADLs. .                     Time Tracking:     OT Date of Treatment: 07/25/22  OT Start Time: 1519    OT Stop Time: 1542  OT Total Time (min): 23 min    Billable Minutes:Self Care/Home Management 10 min  Therapeutic Activity 13 min    7/25/2022

## 2022-07-25 NOTE — PLAN OF CARE
Problem: Adult Inpatient Plan of Care  Goal: Plan of Care Review  7/25/2022 1546 by Jocelyn Mina LPN  Outcome: Ongoing, Progressing  7/25/2022 1545 by Jocelyn Mina LPN  Outcome: Ongoing, Progressing  Goal: Patient-Specific Goal (Individualized)  7/25/2022 1546 by Jocelyn Mina LPN  Outcome: Ongoing, Progressing  7/25/2022 1545 by Jocelyn Mina LPN  Outcome: Ongoing, Progressing  Goal: Absence of Hospital-Acquired Illness or Injury  7/25/2022 1546 by Jocelyn Mina LPN  Outcome: Ongoing, Progressing  7/25/2022 1545 by Jocelyn Mina LPN  Outcome: Ongoing, Progressing  Goal: Optimal Comfort and Wellbeing  7/25/2022 1546 by Jocelyn Mina LPN  Outcome: Ongoing, Progressing  7/25/2022 1545 by Jocelyn Mina LPN  Outcome: Ongoing, Progressing  Goal: Readiness for Transition of Care  7/25/2022 1546 by Jocelyn Mina LPN  Outcome: Ongoing, Progressing  7/25/2022 1545 by Jocelyn Mina LPN  Outcome: Ongoing, Progressing     Problem: Fluid Imbalance (Pneumonia)  Goal: Fluid Balance  7/25/2022 1546 by Jocelyn Mina LPN  Outcome: Ongoing, Progressing  7/25/2022 1545 by Jocelyn Mina LPN  Outcome: Ongoing, Progressing     Problem: Infection (Pneumonia)  Goal: Resolution of Infection Signs and Symptoms  7/25/2022 1546 by Jocelyn Mina LPN  Outcome: Ongoing, Progressing  7/25/2022 1545 by Jocelyn Mina LPN  Outcome: Ongoing, Progressing     Problem: Respiratory Compromise (Pneumonia)  Goal: Effective Oxygenation and Ventilation  7/25/2022 1546 by Jocelyn Mina LPN  Outcome: Ongoing, Progressing  7/25/2022 1545 by Jocelyn Mina LPN  Outcome: Ongoing, Progressing     Problem: Impaired Wound Healing  Goal: Optimal Wound Healing  7/25/2022 1546 by Jocelyn Mina LPN  Outcome: Ongoing, Progressing  7/25/2022 1545 by Jocelyn Mina LPN  Outcome: Ongoing, Progressing     Problem: Fall Injury Risk  Goal: Absence of Fall and Fall-Related  Injury  7/25/2022 1546 by Jocelyn Mina LPN  Outcome: Ongoing, Progressing  7/25/2022 1545 by Jocelyn Mina LPN  Outcome: Ongoing, Progressing     Problem: Skin Injury Risk Increased  Goal: Skin Health and Integrity  7/25/2022 1546 by Jocelyn Mina LPN  Outcome: Ongoing, Progressing  7/25/2022 1545 by Jocelyn Mina LPN  Outcome: Ongoing, Progressing

## 2022-07-25 NOTE — PLAN OF CARE
Continue 2 gram sodium diet, `1700 ml fluid restriction, assist with meals, check for pocketing, boost glucose vanilla daily, RD following  Goals: PO to meet 75% of needs with ONS by next RD follow up  Nutrition Goal Status: new  Communication of RD Recs: reviewed with physician     Assessment and Plan  Inadequate oral intake related to reduced appetite in acute illness as evidenced by PO < 75% for less than one month. Started on appetite stimulant and oral nutrition supplement     Continues     Plan  Fluid restricted diet  Mineral restricted diet - 2 gm sodium  Commercial beverage( protein) boost glucose daily, vanilla  Feeding assistance  Collaboration with other providers

## 2022-07-25 NOTE — PLAN OF CARE
Interdisciplinary team, Brissa Bland, RN Nurse Manager, Nathaly Ford, RN MDS Coordinator, Humaira Hay PT, Rehab Supervisor, Cheryl Saunders LM, and Niharika Alanis, Dietician, spoke to patient, patient's niece, and patient's daughter for care plan conference, weekly status update, and therapy progress update. Tentative discharge date set for 8/10/22.

## 2022-07-25 NOTE — CONSULTS
[facility] - Skilled Nursing  Adult Nutrition  Consult Note    SUMMARY   Recommendations  Continue 2 gram sodium diet, `1700 ml fluid restriction, assist with meals, check for pocketing, boost glucose vanilla daily, RD following  Goals: PO to meet 75% of needs with ONS by next RD follow up  Nutrition Goal Status: new  Communication of RD Recs: reviewed with physician    Assessment and Plan  Inadequate oral intake related to reduced appetite in acute illness as evidenced by PO < 75% for less than one month. Started on appetite stimulant and oral nutrition supplement    Continues    Plan  Fluid restricted diet  Mineral restricted diet - 2 gm sodium  Commercial beverage( protein) boost glucose daily, vanilla  Feeding assistance  Collaboration with other providers    Malnutrition Assessment 7/25/22     Skin (Micronutrient): edema, pallor       Energy Intake (Malnutrition): less than 75% for greater than or equal to 1 month   Orbital Region (Subcutaneous Fat Loss): well nourished  Upper Arm Region (Subcutaneous Fat Loss): well nourished  Thoracic and Lumbar Region: well nourished   Restorationism Region (Muscle Loss): well nourished  Clavicle Bone Region (Muscle Loss): well nourished  Clavicle and Acromion Bone Region (Muscle Loss): well nourished  Dorsal Hand (Muscle Loss): well nourished  Anterior Thigh Region (Muscle Loss): well nourished  Posterior Calf Region (Muscle Loss): well nourished                 Reason for Assessment  Reason For Assessment: consult  Diagnosis:  (Acute on chronic heart failure, UTI,)  Relevant Medical History: recent AMS, [place]EV[place] COVID, hx DM, RA, HTN, HLD, Neuropathy, anemia, lymphadema  Interdisciplinary Rounds: did not attend  General Information Comments: Family in room, check for pocketing , assist with meals, likes milk and milkshake agrees to boost probably shoudl be boost glucose, used to being on low sodium diet, NFPE, showiing overall weakness , debility, sleepy today, pale, with swelling  "to upper extremity L. Daily weights  Nutrition Discharge Planning: DC on low sodium diet, fluid per MD    Nutrition/Diet History    Patient Reported Diet/Restrictions/Preferences: low salt  Typical Food/Fluid Intake: regular meals, had home healthy and help with family  Food Preferences: milk, no tea  Spiritual, Cultural Beliefs, Anglican Practices, Values that Affect Care: no  Food Allergies: NKFA  Factors Affecting Nutritional Intake: decreased appetite    Anthropometrics    Temp: 98.9 °F (37.2 °C)  Height: 5' 2" (157.5 cm)  Height (inches): 62 in  Weight Method: Standard Scale  Weight: 80.2 kg (176 lb 12.9 oz)  Weight (lb): 176.81 lb  Ideal Body Weight (IBW), Female: 110 lb  % Ideal Body Weight, Female (lb): 160.74 %  BMI (Calculated): 32.3  BMI Grade: 30 - 34.9- obesity - grade I  Usual Body Weight (UBW), kg:  (170-175#)       Lab/Procedures/Meds    Pertinent Labs Reviewed: reviewed  Pertinent Labs Comments: HgA1c 5.9, Hg 7.7, Hct 24.4, GFR 44.9,  Pertinent Medications Reviewed: reviewed  Pertinent Medications Comments: statin, ASA, famotidine, senna-docusate,  levothyroxine,megace         Estimated/Assessed Needs    Weight Used For Calorie Calculations: 80.2 kg (176 lb 12.9 oz)  Energy Calorie Requirements (kcal): 9263-4141  Energy Need Method: [place]-St Jeor (x 1.0(PAL) 2/2 obesitiy)  Protein Requirements: 60g  Weight Used For Protein Calculations: 50 kg (110 lb 3.7 oz) (IBW kg 2/2 obesity x 1.2 gm/kg)  Fluid Requirements (mL): 1700 per MD     RDA Method (mL): 1200  CHO Requirement: 150g      Nutrition Prescription Ordered  2 gram sodium diet  1700 ml fluid restriction       Evaluation of Received Nutrient/Fluid Intake    I/O: -2700 to date  Energy Calories Required: meeting needs  Protein Required: not meeting needs  Fluid Required: meeting needs  Comments: LBM 7/24  Tolerance: tolerating  % Intake of Estimated Energy Needs: 75 - 100 %  % Meal Intake: 50 - 75 %    Nutrition Risk    Level of Risk/Frequency " of Follow-up: low (one time per week)       Monitor and Evaluation    Food and Nutrient Intake: food and beverage intake  Food and Nutrient Adminstration: diet order  Physical Activity and Function: nutrition-related ADLs and IADLs  Anthropometric Measurements: weight change  Biochemical Data, Medical Tests and Procedures: glucose/endocrine profile, gastrointestinal profile, electrolyte and renal panel  Nutrition-Focused Physical Findings: overall appearance       Nutrition Follow-Up    RD Follow-up?: Yes

## 2022-07-25 NOTE — PROGRESS NOTES
[facility]                                  Skilled Nursing Facility                   Progress Note     Admit Date: 7/22/2022  CHARLIE 8/10/2022  Principal Problem:  Chronic combined systolic and diastolic heart failure   HPI obtained from patient interview and chart review     Chief Complaint:  S/p hospitalization     HPI:   Mrs. Michael is an 87 year old female PMHx of severe AS s/p TAVR (5/7/19), CAD s/p ostial RCA stent placement (4/19), IDBCA s/p resection and radiation (2003), thyroid cancer s/p resection (1992), HTN, PAD who presents to SNF following hospitalization for acute hypoxemic respiratory failure with acute on chronic diastolic heart failure.      The patient presented to [facility] [place] 7/3/2022 with a primary complaint of SOB. SpO2 71% upon arrival that improved to to 92% with NC with patient remaining tachypneic. She endorsed subjective nightly fevers of around 101F for the past few days prior to admit and a congested cough. Patient treated for multiple UTI's over the last year with the most recent requiring a course of ciprofloxacin about 2-3 weeks prior to hospitalization.      CCU Course:  Admitted for acute hypoxemic respiratory failure requiring 15L non re-breather and BiPAP, likely 2/2 acute on chronic diastolic heart failure. Lasix 20 mg/hr ggt w/ moderate UOP and improvement of symptoms, now comfortably back on low flow NC. Stable for step down to hospital medicine.     HM Course:  Lasix drip increased to 30mg/hr for better UOP. Palliative care evaluated patient for further GOC discussion 07/20.  Supplemental oxygen weaned to room air and lasix drip discontinued 7/21.  Patient remains euvolemic on 07/22 on home Bumex.  Medically stable for discharge.  Discharge plan discussed with patient and son.  They are in agreement with discharge plan.  Patient discharged to SNF.    Today, patient is lethargic but is oriented x3.  Able to  answer all questions.  Denies dysuria or respiratory symptoms.  Will discontinue melatonin as patient's niece says melatonin makes her very sleepy.    Patient will be treated at [facility] with PT and OT to improve functional status and ability to perform ADLs.     Past Medical History: Patient has a past medical history of Arthritis, Cancer, CHF (congestive heart failure), Coronary artery disease, Hypertension, and Thyroid disease.    Past Surgical History: Patient has a past surgical history that includes Hysterectomy; Thyroidectomy; lymph nodes removal; Cholecystectomy; Coronary angiography (N/A, 4/23/2019); Transcatheter aortic valve replacement (TAVR) (N/A, 5/7/2019); Cardiac valuve replacement; and Cardiac catheterization.    Social History: Patient reports that she has never smoked. She has never used smokeless tobacco. She reports previous alcohol use. She reports that she does not use drugs.    Family History:  No significant family history to report    Allergies: Patient is allergic to penicillins and sulfa (sulfonamide antibiotics).    ROS  Constitutional: Negative for fever.  +lethargic   Eyes: Negative for blurred vision, double vision   Respiratory: Negative for cough, shortness of breath   Cardiovascular: Negative for chest pain, palpitations. + leg swelling.   Gastrointestinal: Negative for abdominal pain, constipation, diarrhea, nausea, vomiting.   Genitourinary: Negative for dysuria, frequency   Musculoskeletal:  + generalized weakness. Negative for back pain and myalgias.   Skin: Negative for itching and rash.   Neurological: Negative for dizziness, headaches.   Psychiatric/Behavioral: Negative for depression. The patient is not nervous/anxious.      24 hour Vital Sign Range   Temp:  [98.4 °F (36.9 °C)-98.9 °F (37.2 °C)]   Pulse:  [60-78]   Resp:  [17-18]   BP: (123-137)/(58-64)   SpO2:  [96 %-97 %]     PEx  Constitutional: Patient appears debilitated.  No distress noted  HENT:   Head:  Normocephalic and atraumatic.   Eyes: Pupils are equal, round  Neck: Normal range of motion. Neck supple.   Cardiovascular: Normal rate, regular rhythm.  +murmur  Pulmonary/Chest: Effort normal and breath sounds are clear  Abdominal: Soft. Bowel sounds are normal.   Musculoskeletal: Normal range of motion.   Neurological: Alert and oriented to person, place, and time.   Psychiatric: Normal mood and affect. Behavior is normal.   Skin: Skin is warm and dry.    Recent Labs   Lab 07/25/22  0513   *   K 3.7      CO2 26   BUN 19   CREATININE 0.8   MG 1.7       Recent Labs   Lab 07/25/22  0513   WBC 3.30*   RBC 3.38*   HGB 9.1*   HCT 28.5*      MCV 84   MCH 26.9*   MCHC 31.9*         Assessment and Plan:    Acute hypoxemic respiratory failure due to Acute on Chronic HFpEF  PMHx HFpEF, valvular heart disease p/w dyspnea and increasing oxygen requirement with evidence of volume overload on physical exam in the setting of an elevated BNP(1347). Echo in 3/2022 demonstrated EF 55-60% with mitral stenosis. Repeat TTE EF 65% with grade II LVD dysfunction with MR related to MV degenerative disease.  -Continue Bumex 1mg daily   -Strict intake & output with fluid restriction to <2L daily    Recurrent Urinary Tract Infection  Patient with recurrent UTIs, prior urine culture shows Pseudomonas only sensitive to imipenem, tobramycin, gentamicin.  Was inadequately treated with ciprofloxacin last month.  -Meropenem completed      Wounds to Groin, Perineum, and buttox 2/2 urinary incontinence  -maintain pur whick as able to keep area dry  - wound care consult     Dysphasia, history of  -soft dysphasia diet in hospital  -7/12 initiated soft texture diet and SLP consult     Severe aortic stenosis s/p TAVR  PMHx severe AS a TAVR in 2019     Symptomatic bradycardia s/p pacemaker.  Pacemaker placed in 2019 for complete heart block and symptomatic bradycardia, currently ventricularly paced at 60 beats per minute     Essential  Hypertension  Blood pressure over controlled in 89 year old.   Lisinopril 10mg daily     Hyperlipidemia  - lipitor 20 mg PO daily     Hypothyroidism  - Synthroid 125 mcg PO Daily       Anticipate disposition:  Home with home health      Follow-up needed during SNF stay-    Follow-up needed after discharge from SNF: PCP    No future appointments.      I certify that SNF services are required to be given on an inpatient basis because Pilar Michael needs for skilled nursing care and/or skilled rehabilitation are required on a daily basis and such services can only practically be provided in a skilled nursing facility setting and are for an ongoing condition for which she received inpatient care in the hospital.     Total time of the visit 65 minutes [id number]  Non physical exam/ non charting time: 48 minutes   Description of non physical exam/non charting time: counseling patient on clinical conditions and therapies provided regarding acute on chronic diastolic heart failure, lethargy, importance of proper PO nutrition hydration, participation with therapy.  Care update provided to needs at bedside..  Extensive chart review completed including all consultation notes.  All pertinent laboratory and radiographical images reviewed.        Lenka Salazar NP  Department of Hospital Medicine   [facility]     DOS: 7/25/2022       Patient note was created using MModal Dictation.  Any errors in syntax or even information may not have been identified and edited on initial review prior to signing this note.

## 2022-07-26 PROCEDURE — 97535 SELF CARE MNGMENT TRAINING: CPT | Mod: CO

## 2022-07-26 PROCEDURE — 11000004 HC SNF PRIVATE

## 2022-07-26 PROCEDURE — 97110 THERAPEUTIC EXERCISES: CPT | Mod: CQ

## 2022-07-26 PROCEDURE — 25000003 PHARM REV CODE 250: Performed by: HOSPITALIST

## 2022-07-26 PROCEDURE — 97530 THERAPEUTIC ACTIVITIES: CPT | Mod: CQ

## 2022-07-26 PROCEDURE — 25000003 PHARM REV CODE 250: Performed by: NURSE PRACTITIONER

## 2022-07-26 RX ADMIN — LEVOTHYROXINE SODIUM 125 MCG: 25 TABLET ORAL at 06:07

## 2022-07-26 RX ADMIN — CLOPIDOGREL 75 MG: 75 TABLET, FILM COATED ORAL at 09:07

## 2022-07-26 RX ADMIN — BUMETANIDE 1 MG: 1 TABLET ORAL at 09:07

## 2022-07-26 RX ADMIN — MEGESTROL ACETATE 40 MG: 20 TABLET ORAL at 09:07

## 2022-07-26 RX ADMIN — TIMOLOL MALEATE 1 DROP: 5 SOLUTION OPHTHALMIC at 08:07

## 2022-07-26 RX ADMIN — MICONAZOLE NITRATE: 20 OINTMENT TOPICAL at 09:07

## 2022-07-26 RX ADMIN — LISINOPRIL 20 MG: 20 TABLET ORAL at 09:07

## 2022-07-26 RX ADMIN — CARVEDILOL 3.12 MG: 3.12 TABLET, FILM COATED ORAL at 09:07

## 2022-07-26 RX ADMIN — CARVEDILOL 3.12 MG: 3.12 TABLET, FILM COATED ORAL at 08:07

## 2022-07-26 RX ADMIN — TIMOLOL MALEATE 1 DROP: 5 SOLUTION OPHTHALMIC at 09:07

## 2022-07-26 RX ADMIN — ASPIRIN 81 MG CHEWABLE TABLET 81 MG: 81 TABLET CHEWABLE at 09:07

## 2022-07-26 RX ADMIN — ATORVASTATIN CALCIUM 40 MG: 40 TABLET, FILM COATED ORAL at 08:07

## 2022-07-26 RX ADMIN — SENNOSIDES AND DOCUSATE SODIUM 1 TABLET: 50; 8.6 TABLET ORAL at 08:07

## 2022-07-26 NOTE — PROGRESS NOTES
[facility] - Skilled Nursing  Wound Care    Patient Name:  Pilar Michael   MRN:  [id number]  Date: 7/26/2022  Diagnosis: <principal problem not specified>    History:     Past Medical History:   Diagnosis Date    Arthritis     Cancer     CHF (congestive heart failure)     Coronary artery disease     Hypertension     Thyroid disease        Social History     Socioeconomic History    Marital status:    Tobacco Use    Smoking status: Never Smoker    Smokeless tobacco: Never Used   Substance and Sexual Activity    Alcohol use: Not Currently    Drug use: Never       Precautions:     Allergies as of 07/22/2022 - Reviewed 07/22/2022   Allergen Reaction Noted    Penicillins Swelling 08/13/2018    Sulfa (sulfonamide antibiotics) Nausea Only 04/23/2019       [facility] Assessment Details/Treatment   Wound care consulted for bilateral groins, perineum, right buttocks  Ms. Michael has a urinary management device in place to manage moisture. The adhesive on this device is pulling on skin, causing irritation. The skin is pale white/redness and itching.   The buttocks/sacrum is blanchable redness with intact skin-   Etiology- IAD with probable skin fungal infection  Wound care discussed with Ms. Michael and female family member- verbalized understanding    Plan:  Groin/buttocks/sacrum- 1. Cleanse skin/pat dry   2. Apply Miconazole ointment BID/prn cleansing to provide moisture and skin fungal protection.  Waffle mattress in place    Nursing to continue care, pressure prevention measures  Wound care will follow- up prn    Recommendations made to primary team for above plan per written report . Orders placed.      07/26/22 0750        Wound 07/1935 Incontinence associated dermatitis Perineum #3   Date First Assessed/Time First Assessed: 07/1935   Pre-existing: Yes  Primary Wound Type: Incontinence associated dermatitis  Location: (c) Perineum  Wound Number: #3   Wound WDL ex   Dressing Appearance Open to air   Drainage  Amount None   Appearance Intact;Pink;Red;Moist   Tissue loss description Not applicable   Periwound Area Pale white;Redness   Care Cleansed with:;Soap and water;Applied:;Skin Barrier        Wound 07/1935 Blister(s) Right Buttocks #4   Date First Assessed/Time First Assessed: 07/1935   Pre-existing: Yes  Primary Wound Type: Blister(s)  Side: Right  Location: Buttocks  Wound Number: #4   Wound Image    Wound WDL ex   Dressing Appearance Open to air   Drainage Amount None   Appearance Intact;Red;Moist  (blanchable)   Tissue loss description Not applicable   Periwound Area Intact;Dry;Redness;Moist   Care Cleansed with:;Soap and water;Applied:;Skin Barrier  (Miconazole ointment)     07/26/2022

## 2022-07-26 NOTE — PT/OT/SLP PROGRESS
Physical Therapy Treatment    Patient Name:  Pilar Michael   MRN:  [id number]  Admit Date: 7/22/2022  Admitting Diagnosis: Acute on chronic combined systolic and diastolic CHF (congestive heart failure)  Recent Surgeries: N/A  General Precautions: Standard, aspiration, fall   Orthopedic Precautions:N/A   Braces: N/A     Recommendations:     Discharge Recommendations:  home health PT   Level of Assistance Recommended at Discharge: 24 hours light assistance  Discharge Equipment Recommendations: bath bench, bedside commode, wheelchair, walker, rolling   Barriers to discharge: Inaccessible home environment    Assessment:     Pilar Michael is a 89 y.o. female admitted with a medical diagnosis of Acute on chronic combined systolic and diastolic CHF (congestive heart failure). Pt tolerated therapy session well with focus on increasing independence with curb, transfers, LE strengthening and safety awareness in order to assist with achieving highest level of function. Pt would continue to benefit from skilled PT services per POC.     Performance deficits affecting function:  weakness, impaired endurance, impaired self care skills, impaired functional mobility, gait instability, impaired balance, decreased upper extremity function, decreased lower extremity function, impaired cardiopulmonary response to activity .    Rehab Potential is good    Activity Tolerance: Fair    Plan:     Patient to be seen 6 x/week to address the above listed problems via gait training, therapeutic activities, therapeutic exercises, neuromuscular re-education, wheelchair management/training    · Plan of Care Expires: 08/22/22  · Plan of Care Reviewed with: patient, daughter    Subjective     Pt agreeable to PT.     Pain/Comfort:  · Pain Rating 1: 0/10  · Pain Rating Post-Intervention 1: 0/10    Patient's cultural, spiritual, Catholic conflicts given the current situation:  · no    Objective:     Communicated with OT prior to session.  Patient  "found up in chair with PureWick upon PT entry to room.     Therapeutic Activities and Exercises: mini elliptical x 15 mins with occasional brief rest breaks    Functional Mobility:  · Transfers:     · Sit to Stand:  contact guard assistance with rolling walker, with instruction and assistance on locking brakes and proper hand placement.  · Curb training: x 3', RW, CGA with pt beginning 2" curb training, with patient stating "no I can't do it, I am too dizzy I am about to pass out." Pt was also apprehensive about doing the curb step prior to the activity. BP assessed immediately upon pt sitting down in w/c, reading 134/69, HR - 60 bpm and O2 sats 98%. Upon standing again to see if BP changed, the reading in standing was 152/66, HR - 60 bpm and 96%.   · Balance: static standing x 45 seconds, RW, SBA.   · Nursing notified of pt's complaints and vitals with patient left up in her wheelchair at end of session, stating she no longer felt dizzy, just "faint". Nursing aware.   · Extra time required for pt vital signs to be taken and interdisciplinary communication performed with nursing.      AM-PAC 6 CLICK MOBILITY  16    Patient left up in chair with call button in reach and nurse notified.    GOALS:   Multidisciplinary Problems     Physical Therapy Goals        Problem: Physical Therapy    Goal Priority Disciplines Outcome Goal Variances Interventions   Physical Therapy Goal     PT, PT/OT Ongoing, Progressing     Description: Goals to be met by: 8/22/22     Patient will increase functional independence with mobility by performing:    . Supine to sit with Set-up [place]  . Sit to supine with Set-up [place]  . Rolling to Left and Right with Set-up Assistance.  . Sit to stand transfer with Supervision  . Bed to chair transfer with Supervision using Rolling Walker  . Gait  x 50 feet with Stand-by Assistance using Rolling Walker.   . Wheelchair propulsion x50 feet with Minimal Assistance using bilateral uppper " extremities  . Ascend/Descend 2 inch curb step with Moderate Assistance using Rolling Walker.                     Time Tracking:     PT Received On: 07/26/22  PT Start Time: 1335  PT Stop Time: 1422  PT Total Time (min): 47 min    Billable Minutes: Therapeutic Activity 32 and Therapeutic Exercise 15    Treatment Type: Treatment  PT/PTA: PTA     PTA Visit Number: 2     07/26/2022

## 2022-07-26 NOTE — PLAN OF CARE
Problem: Occupational Therapy  Goal: Occupational Therapy Goal  Description: Goals to be met by: 08/23/22     Patient will increase functional independence with ADLs by performing:    UE Dressing with Modified [place].  LE Dressing with Modified [place].  Grooming while seated at sink with Modified [place].  Toileting from bedside commode with Set-up Assistance for hygiene and clothing management.   Bathing from  shower chair/bench with Stand-by Assistance.  Toilet transfer to bedside commode with Stand-by Assistance.  Upper extremity exercise program 3x15 reps per handout, with supervision to increase overall UE strength/endurance in order to improve func mobility skills.   Stand during functional task for 5' with SBA in preparation for standing ADLs. .    Outcome: Ongoing, Progressing

## 2022-07-26 NOTE — PT/OT/SLP PROGRESS
Occupational Therapy   Treatment    Name: Pilar Michael  MRN: [id number]  Admit Date: 7/22/2022  Admitting Diagnosis:  <principal problem not specified>    General Precautions: Standard, fall   Orthopedic Precautions:N/A   Braces:       Recommendations:     Discharge Recommendations: home health OT  Level of Assistance Recommended at Discharge: 24 hours light assistance for ADL's and homemaking tasks  Discharge Equipment Recommendations:  other (see comments) (TBD)  Barriers to discharge:  Decreased caregiver support    Assessment:     Pilar Michael is a 89 y.o. female with a medical diagnosis of acute on chronic combined systolic and diastolic CHF (congestive heart failure).  She presents with  Performance deficits affecting function are weakness, impaired endurance, impaired self care skills, impaired functional mobility, gait instability, impaired balance, decreased coordination, decreased ROM, decreased upper extremity function, edema.      Pt.  participated well with session on this day. Pt. With complaints of dizziness on this day. Pt /60 hr 60. Pt. Nurse notified. Pt  continues to demonstrate levels of physical deficits with  functional indep with daily management activities tasks, selfcare skills with balance,  functional mobility, UB strength and endurance. Pt. Will continue to benefit from continued OT to progress towards goals     Rehab Potential is fair    Activity tolerance:  Fair    Plan:     Patient to be seen 6 x/week to address the above listed problems via self-care/home management, therapeutic activities, therapeutic exercises    · Plan of Care Expires: 08/22/22  · Plan of Care Reviewed with: patient    Subjective     Communicated with: nsg and Pt. prior to session. I am feeling so dizzy    Pain/Comfort:  · Pain Rating 1: 0/10  · Pain Rating Post-Intervention 1: 0/10    Patient's cultural, spiritual, Congregational conflicts given the current situation:  · no    Objective:     Patient found  up in chair    upon OT entry to room.    Functional Mobility/Transfers:  · Patient completed Sit <> Stand Transfer with minimum assistance  with  rolling walker     Activities of Daily Living:  · Grooming: supervision at sink level with grooming needs  · Bathing: moderate assistance with bathing asepcts at sink level  · Upper Body Dressing: maximal assistance to doff/lola pull over shirt  · Lower Body Dressing: maximal assistance to lola pants seated and to mange over hips instance with RW for bal   · Toileting: PCT had just cleaned Pt. brief .    [facility] 6 Click ADL: 17    Treatment & Education:  Pt edu on role of OT, POC, safety when performing self care tasks , benefit of performing OOB activity, and safety when performing functional transfers and mobility management for preparation with goals to progress towards next level of care    Patient left up in chair with all lines intact and call button in reachEducation:      GOALS:   Multidisciplinary Problems     Occupational Therapy Goals        Problem: Occupational Therapy    Goal Priority Disciplines Outcome Interventions   Occupational Therapy Goal     OT, PT/OT Ongoing, Progressing    Description: Goals to be met by: 08/23/22     Patient will increase functional independence with ADLs by performing:    UE Dressing with Modified [place].  LE Dressing with Modified [place].  Grooming while seated at sink with Modified [place].  Toileting from bedside commode with Set-up Assistance for hygiene and clothing management.   Bathing from  shower chair/bench with Stand-by Assistance.  Toilet transfer to bedside commode with Stand-by Assistance.  Upper extremity exercise program 3x15 reps per handout, with supervision to increase overall UE strength/endurance in order to improve func mobility skills.   Stand during functional task for 5' with SBA in preparation for standing ADLs. .                     Time Tracking:     OT Date of Treatment: 07/26/22  OT  Start Time: 1035    OT Stop Time: 1113  OT Total Time (min): 38 min    Billable Minutes:Self Care/Home Management 38    7/26/2022  A client care conference was performed between the [facility]R and YAN, prior to treatment to discuss the patient's status, treatment plan and established goals.

## 2022-07-27 PROCEDURE — 97110 THERAPEUTIC EXERCISES: CPT

## 2022-07-27 PROCEDURE — 97116 GAIT TRAINING THERAPY: CPT

## 2022-07-27 PROCEDURE — 11000004 HC SNF PRIVATE

## 2022-07-27 PROCEDURE — 25000003 PHARM REV CODE 250: Performed by: HOSPITALIST

## 2022-07-27 PROCEDURE — 97530 THERAPEUTIC ACTIVITIES: CPT

## 2022-07-27 PROCEDURE — 99306 PR NURSING FACILITY CARE, INIT, HIGH SEVERITY: ICD-10-PCS | Mod: ,,, | Performed by: HOSPITALIST

## 2022-07-27 PROCEDURE — 99306 1ST NF CARE HIGH MDM 50: CPT | Mod: ,,, | Performed by: HOSPITALIST

## 2022-07-27 RX ADMIN — ASPIRIN 81 MG CHEWABLE TABLET 81 MG: 81 TABLET CHEWABLE at 08:07

## 2022-07-27 RX ADMIN — BUMETANIDE 1 MG: 1 TABLET ORAL at 08:07

## 2022-07-27 RX ADMIN — TIMOLOL MALEATE 1 DROP: 5 SOLUTION OPHTHALMIC at 09:07

## 2022-07-27 RX ADMIN — MEGESTROL ACETATE 40 MG: 20 TABLET ORAL at 09:07

## 2022-07-27 RX ADMIN — TIMOLOL MALEATE 1 DROP: 5 SOLUTION OPHTHALMIC at 08:07

## 2022-07-27 RX ADMIN — LEVOTHYROXINE SODIUM 125 MCG: 25 TABLET ORAL at 06:07

## 2022-07-27 RX ADMIN — MICONAZOLE NITRATE: 20 OINTMENT TOPICAL at 09:07

## 2022-07-27 RX ADMIN — LISINOPRIL 20 MG: 20 TABLET ORAL at 08:07

## 2022-07-27 RX ADMIN — CARVEDILOL 3.12 MG: 3.12 TABLET, FILM COATED ORAL at 09:07

## 2022-07-27 RX ADMIN — CARVEDILOL 3.12 MG: 3.12 TABLET, FILM COATED ORAL at 08:07

## 2022-07-27 RX ADMIN — CLOPIDOGREL 75 MG: 75 TABLET, FILM COATED ORAL at 08:07

## 2022-07-27 RX ADMIN — ATORVASTATIN CALCIUM 40 MG: 40 TABLET, FILM COATED ORAL at 09:07

## 2022-07-27 RX ADMIN — SENNOSIDES AND DOCUSATE SODIUM 1 TABLET: 50; 8.6 TABLET ORAL at 09:07

## 2022-07-27 RX ADMIN — MICONAZOLE NITRATE: 20 OINTMENT TOPICAL at 08:07

## 2022-07-27 NOTE — PLAN OF CARE
Problem: Physical Therapy  Goal: Physical Therapy Goal  Description: Goals to be met by: 8/22/22     Patient will increase functional independence with mobility by performing:    . Supine to sit with Set-up [place]  . Sit to supine with Set-up [place]  . Rolling to Left and Right with Set-up Assistance.  . Sit to stand transfer with Supervision  . Bed to chair transfer with Supervision using Rolling Walker  . Gait  x 50 feet with Stand-by Assistance using Rolling Walker.   . Wheelchair propulsion x50 feet with Minimal Assistance using bilateral uppper extremities  . Ascend/Descend 2 inch curb step with Moderate Assistance using Rolling Walker. - MET on 7/27/2022  Updated goal on 7/27/2022: Ascend/Descend 4 inch curb step with Contact Guard Assistance using Rolling Walker.    Outcome: Ongoing, Progressing   Patient met curb goal. PT updated goal. Patient remains appropriate for skilled PT services in order to increase independence and safety with functional mobility.

## 2022-07-27 NOTE — PT/OT/SLP PROGRESS
"Physical Therapy Treatment    Patient Name:  Pilar Michael   MRN:  [id number]  Admit Date: 7/22/2022  Admitting Diagnosis: Acute on chronic combined systolic and diastolic CHF (congestive heart failure)  Recent Surgeries: * No surgery found *      General Precautions: Standard, fall   Orthopedic Precautions:N/A   Braces: N/A     Recommendations:     Discharge Recommendations:  home health PT   Level of Assistance Recommended at Discharge: 24 hours light assistance  Discharge Equipment Recommendations:  (TBD)   Barriers to discharge: Decreased caregiver support    Assessment:     Pilar Michael is a 89 y.o. female admitted with a medical diagnosis of Acute on chronic combined systolic and diastolic CHF (congestive heart failure). Patient reported feeling tired today. She was able to tolerate 4 gait trials of 12 ft with rolling walker and contact guard assistance fairly well. She ascended/descended 4" curb with rolling walker. She fatigued while propelling wheelchair, and PT assisted patient back to bed.  Patient will benefit from skilled acute physical therapy services to address the mentioned deficits in order to increase safety and independence with functional mobility.     Performance deficits affecting function:  weakness, impaired functional mobility, gait instability, impaired cardiopulmonary response to activity, impaired balance, decreased coordination, decreased ROM, edema, impaired self care skills .    Rehab Potential is good    Activity Tolerance: Fair    Plan:     Patient to be seen 6 x/week to address the above listed problems via gait training, therapeutic activities, therapeutic exercises, neuromuscular re-education, wheelchair management/training    · Plan of Care Expires: 08/22/22  · Plan of Care Reviewed with: patient    Subjective     "I need to rest after eating." Patient is agreeable to therapy with verbal encouragement.     Pain/Comfort:  Pain Rating 1: 0/10  Pain Rating Post-Intervention " "1: 0/10    Patient's cultural, spiritual, Episcopal conflicts given the current situation:  no    Objective:     Communicated with RN and PCT prior to session.  Patient found sitting in wheelchair with no lines upon PT entry to room. Pt is agreeable to physical therapy treatment session.      Functional Mobility:    Bed Mobility:    · Rolling Left:  supervision   · Sit to Supine: supervision    Transfers:    · Sit to Stand:  contact guard assistance  with rolling walker with cues for hand placement  · Stand to Sit: contact guard assistance  with rolling walker with cues for hand placement  · Chair to Bed:  contact guard assistance  with rolling walker using Step Transfer. Bed on pt's right side.    Wheelchair Mobility:    Pt propelled manila wheelchair x 6 feet on level tile with bilateral upper extremities and Stand-by Assistance. Patient discontinues wheelchair propulsion due to fatigue    Gait:   · Patient ambulates 12 ft, 12 ft, 12 ft, and 12 ft on even, indoor jorje with rolling walker and contact guard.   · Gait Deviation(s): decreased step length, flexed posture and decreased annie  · Impairments due to: impaired balance, decreased strength and decreased endurance  · Comments: Patient takes seated rest breaks between reps.       Curb/Stairs:     Pt ascends/descends 4" curb step with Rolling Walker and Minimal Assistance.    Exercises:    Patient performs mini elliptical with bilateral LEs for 10 minutes    Outcome Measures:   AM-PAC 6 CLICK MOBILITY: 17    Education:   Patient educated on the importance of mobility to prevent functional decline during rehabilitation stay   Patient was instructed to utilize staff assistance for mobility/transfers   Patient is appropriate to transfer with rolling walker and contact guard assistance with RN/PCT   White board updated to include patient's safest level of mobility with staff assistance   Patient educated on assistive device use, Fall risk, gait " training, Home exercise program, plan of care, posture and transfer training by explanation.  Patient was receptive to education and needs further education.       Patient left HOB elevated with call button in reach and RN notified.    GOALS:   Multidisciplinary Problems     Physical Therapy Goals        Problem: Physical Therapy    Goal Priority Disciplines Outcome Goal Variances Interventions   Physical Therapy Goal     PT, PT/OT Ongoing, Progressing     Description: Goals to be met by: 8/22/22     Patient will increase functional independence with mobility by performing:    . Supine to sit with Set-up [place]  . Sit to supine with Set-up [place]  . Rolling to Left and Right with Set-up Assistance.  . Sit to stand transfer with Supervision  . Bed to chair transfer with Supervision using Rolling Walker  . Gait  x 50 feet with Stand-by Assistance using Rolling Walker.   . Wheelchair propulsion x50 feet with Minimal Assistance using bilateral uppper extremities  . Ascend/Descend 2 inch curb step with Moderate Assistance using Rolling Walker. - MET on 7/27/2022  Updated goal on 7/27/2022: Ascend/Descend 4 inch curb step with Contact Guard Assistance using Rolling Walker.                     Time Tracking:     PT Received On: 07/27/22  PT Total Time (min): 42 min     Billable Minutes: Gait Training 20 mins, Therapeutic Activity 12 mins and Therapeutic Exercise 10 mins    Treatment Type: Treatment  PT/PTA: PT     PTA Visit Number: 0     07/27/2022

## 2022-07-27 NOTE — PT/OT/SLP PROGRESS
Occupational Therapy      Patient Name:  Pilar Michael   MRN:  [id number]    Patient not seen today secondary to refusal in AM then working with PT in PM and therapist unable to return on 3rd attempt   . Will follow-up with OT POC    7/27/2022

## 2022-07-27 NOTE — H&P
"Hospital Medicine  Skilled Nursing Facility   History and Physical Exam      Date of Service: 07/27/2022      Patient Name: Pilar Michael  MRN: [id number]  Admission Date: 7/22/2022  Attending Physician: Kyree Priest MD  Primary Care Provider: Joseph Noel MD  Code Status: Full Code      Principal problem:  Acute on chronic combined systolic and diastolic CHF (congestive heart failure)      Chief Complaint:   Chief Complaint   Patient presents with    Congestive Heart Failure     Admitted to [facility] for rehabilitation following hospital stay for acute CHF exacerbation.           HPI:   "Mrs. Michael is an 87 year old female PMHx of severe AS s/p TAVR (5/7/19), CAD s/p ostial RCA stent placement (4/19), IDBCA s/p resection and radiation (2003), thyroid cancer s/p resection (1992), HTN, PAD who presents to SNF following hospitalization for acute hypoxemic respiratory failure with acute on chronic diastolic heart failure.       The patient presented to [facility] on 7/3/2022 with a primary complaint of SOB. SpO2 71% upon arrival that improved to to 92% with NC with patient remaining tachypneic. She endorsed subjective nightly fevers of around 101F for the past few days prior to admit and a congested cough. Patient treated for multiple UTI's over the last year with the most recent requiring a course of ciprofloxacin about 2-3 weeks prior to hospitalization.      CCU Course:  Admitted for acute hypoxemic respiratory failure requiring 15L non re-breather and BiPAP, likely 2/2 acute on chronic diastolic heart failure. Lasix 20 mg/hr ggt w/ moderate UOP and improvement of symptoms, now comfortably back on low flow NC. Stable for step down to hospital medicine.      Course:  Lasix drip increased to 30mg/hr for better UOP. Palliative care evaluated patient for further GOC discussion 07/20.  Supplemental oxygen weaned to room air and lasix drip discontinued 7/21.  Patient remains euvolemic on 07/22 on home Bumex. " " Medically stable for discharge.  Discharge plan discussed with patient and son.  They are in agreement with discharge plan.  Patient discharged to SNF.     Today, patient is lethargic but is oriented x3.  Able to answer all questions.  Denies dysuria or respiratory symptoms.  Will discontinue melatonin as patient's niece says melatonin makes her very sleepy." per Lenka Salazar NP on 7/25/22.     She is fatigued and flat. She wakes up and is appropriately interactive during my interview. She reports decreased PO intake with an aversion to meat recently. She loves the fish that the kitchen cooks. She is sleeping well. She declined to work with OT this AM, but did work with PT this afternoon. She wants to get back home, and says that she will do whatever it takes to do that. Her son is at the bedside during the interview.     Patient admitted with skilled services with PT and OT to improve functional status and ability to perform ADLs.       Past Medical History:   Past Medical History:   Diagnosis Date    Arthritis     Cancer     CHF (congestive heart failure)     Coronary artery disease     Hypertension     Thyroid disease        Past Surgical History:   Past Surgical History:   Procedure Laterality Date    CARDIAC CATHETERIZATION      CARDIAC VALVE SURGERY      CHOLECYSTECTOMY      CORONARY ANGIOGRAPHY N/A 4/23/2019    Procedure: ANGIOGRAM, CORONARY ARTERY;  Surgeon: Bakari Singletary MD;  Location: [facility] CATH LAB;  Service: Cardiology;  Laterality: N/A;    HYSTERECTOMY      lymph nodes removal      THYROIDECTOMY      TRANSCATHETER AORTIC VALVE REPLACEMENT (TAVR) N/A 5/7/2019    Procedure: REPLACEMENT, AORTIC VALVE, TRANSCATHETER (TAVR);  Surgeon: Bakari Singletary MD;  Location: [facility] CATH LAB;  Service: Cardiology;  Laterality: N/A;       Social History:   Tobacco Use    Smoking status: Never Smoker    Smokeless tobacco: Never Used   Substance and Sexual Activity    Alcohol use: Not Currently    " Drug use: Never    Sexual activity: Not on file       Family History:   Family History     Problem Relation (Age of Onset)    ALS Sister    Cancer Sister    Kidney disease Father          No current facility-administered medications on file prior to encounter.     Current Outpatient Medications on File Prior to Encounter   Medication Sig    aspirin 81 MG Chew Take 81 mg by mouth once daily.    atorvastatin (LIPITOR) 40 MG tablet Take 0.5 tablets (20 mg total) by mouth every evening.    bumetanide (BUMEX) 1 MG tablet Take 1 tablet (1 mg total) by mouth once daily.    carvediloL (COREG) 3.125 MG tablet Take 1 tablet (3.125 mg total) by mouth 2 (two) times daily.    clopidogreL (PLAVIX) 75 mg tablet Take 1 tablet (75 mg total) by mouth once daily.    lisinopriL (PRINIVIL,ZESTRIL) 20 MG tablet Take 20 mg by mouth once daily.    megestroL (MEGACE) 40 MG Tab Take 1 tablet (40 mg total) by mouth once daily.    SYNTHROID 125 mcg tablet Take 125 mcg by mouth once daily.    TIMOPTIC OCUDOSE, PF, 0.5 % Dpet Place 1 drop into both eyes 2 (two) times a day.    [DISCONTINUED] albuterol-ipratropium (DUO-NEB) 2.5 mg-0.5 mg/3 mL nebulizer solution Take 3 mLs by nebulization every 6 (six) hours as needed for Wheezing. Rescue (Patient not taking: Reported on 7/3/2022)    [DISCONTINUED] amLODIPine (NORVASC) 10 MG tablet Take 1 tablet (10 mg total) by mouth once daily.    [DISCONTINUED] losartan (COZAAR) 50 MG tablet Take 50 mg by mouth once daily.    [DISCONTINUED] traZODone (DESYREL) 50 MG tablet Take 50 mg by mouth nightly.       Allergies:   Review of patient's allergies indicates:   Allergen Reactions    Penicillins Swelling    Sulfa (sulfonamide antibiotics) Nausea Only       ROS:  Review of Systems   Constitutional: Positive for appetite change and fatigue. Negative for chills and fever.   HENT: Negative for congestion and sore throat.    Eyes: Negative for redness and visual disturbance.   Respiratory: Negative  for cough and shortness of breath.    Cardiovascular: Positive for leg swelling. Negative for chest pain and palpitations.   Gastrointestinal: Negative for abdominal pain, nausea and vomiting.   Genitourinary: Negative for difficulty urinating and dysuria.   Musculoskeletal: Positive for arthralgias. Negative for back pain.   Skin: Negative for pallor and rash.   Allergic/Immunologic: Negative for environmental allergies and food allergies.   Neurological: Positive for weakness and numbness. Negative for dizziness and light-headedness.   Hematological: Does not bruise/bleed easily.   Psychiatric/Behavioral: Negative for sleep disturbance. The patient is not nervous/anxious.          Objective:  Temp:  [96.7 °F (35.9 °C)-98.1 °F (36.7 °C)]   Pulse:  [60-78]   Resp:  [17-18]   BP: (132-177)/(65-70)   SpO2:  [94 %-97 %]     Body mass index is 32.58 kg/m².      Physical Exam  Vitals and nursing note reviewed.   Constitutional:       General: She is not in acute distress.     Appearance: She is well-developed.   HENT:      Head: Normocephalic and atraumatic.      Right Ear: External ear normal.      Left Ear: External ear normal.      Nose: No nasal deformity or mucosal edema.      Mouth/Throat:      Mouth: Mucous membranes are moist.      Pharynx: Uvula midline. No oropharyngeal exudate.   Eyes:      General: No scleral icterus.     Conjunctiva/sclera: Conjunctivae normal.      Pupils: Pupils are equal, round, and reactive to light.   Neck:      Trachea: Phonation normal.   Cardiovascular:      Rate and Rhythm: Normal rate and regular rhythm.      Heart sounds: S1 normal and S2 normal. No murmur heard.  Pulmonary:      Effort: Pulmonary effort is normal. No respiratory distress.      Breath sounds: Normal breath sounds. No wheezing or rales.   Chest:   Breasts:      Right: No supraclavicular adenopathy.      Left: No supraclavicular adenopathy.       Abdominal:      General: Bowel sounds are normal. There is no  distension.      Palpations: Abdomen is soft.      Tenderness: There is no abdominal tenderness. There is no guarding or rebound.   Musculoskeletal:         General: Swelling (LUE lymphedema) present. No tenderness.      Cervical back: Neck supple. No muscular tenderness.      Right lower leg: No edema.      Left lower leg: No edema.   Lymphadenopathy:      Cervical:      Right cervical: No superficial cervical adenopathy.     Left cervical: No superficial cervical adenopathy.      Upper Body:      Right upper body: No supraclavicular adenopathy.      Left upper body: No supraclavicular adenopathy.   Skin:     General: Skin is warm and dry.      Findings: Bruising present. No rash.   Neurological:      Mental Status: She is alert and oriented to person, place, and time.      Motor: No tremor or seizure activity.   Psychiatric:         Mood and Affect: Mood normal.         Behavior: Behavior normal. Behavior is cooperative.         Thought Content: Thought content normal.         Significant Labs:   A1C:   Recent Labs   Lab 04/06/22  0548   HGBA1C 5.9*     TSH:   Recent Labs   Lab 07/03/22  1330   TSH 4.640*     CBC:  Recent Labs   Lab 07/25/22  0513   WBC 3.30*   HGB 9.1*   HCT 28.5*      MCV 84     BMP:  Recent Labs   Lab 07/25/22  0513   GLU 92   *   K 3.7      CO2 26   BUN 19   CREATININE 0.8   CALCIUM 8.9   MG 1.7   PHOS 3.5         Significant Imaging: I have reviewed all pertinent imaging results/findings completed during prior hospitalization.      Assessment and Plan:  Active Diagnoses:    Diagnosis Date Noted POA    PRINCIPAL PROBLEM:  Acute on chronic combined systolic and diastolic CHF (congestive heart failure) [I50.43] 05/08/2019 Yes    Debility [R53.81] 07/06/2022 Yes    Hyperlipidemia [E78.5] 07/03/2022 Yes     Chronic    Normocytic anemia [D64.9] 07/03/2022 Yes     Chronic    Presence of permanent cardiac pacemaker [Z95.0] 07/07/2020 Yes     Chronic    Coronary artery disease  involving native coronary artery of native heart without angina pectoris [I25.10] 05/08/2019 Yes     Chronic    S/P TAVR (transcatheter aortic valve replacement) [Z95.2] 05/07/2019 Not Applicable     Chronic    Acquired hypothyroidism [E03.9] 04/08/2019 Yes     Chronic    Essential hypertension [I10] 04/08/2019 Yes     Chronic    Severe aortic stenosis s/p TAVR [I35.0]  Yes     Chronic      Problems Resolved During this Admission:       Acute hypoxemic respiratory failure due to Acute on Chronic HFpEF  PMHx HFpEF, valvular heart disease p/w dyspnea and increasing oxygen requirement with evidence of volume overload on physical exam in the setting of an elevated BNP(1347). Echo in 3/2022 demonstrated EF 55-60% with mitral stenosis. Repeat TTE EF 65% with grade II LVD dysfunction with MR related to MV degenerative disease.  -Continue Bumex 1mg daily   -Strict intake & output with fluid restriction to < 2L daily     Recurrent Urinary Tract Infection  Patient with recurrent UTIs, prior urine culture shows Pseudomonas only sensitive to imipenem, tobramycin, gentamicin.    -Meropenem completed      Wounds to Groin, Perineum, and buttox 2/2 urinary incontinence  -maintain pure whick as able to keep area dry  - wound care consult     Dysphasia, history of  -soft dysphasia diet in hospital  -Continue soft texture diet   - SLP consult     Severe aortic stenosis s/p TAVR  TAVR in 2019     Symptomatic bradycardia s/p pacemaker.  Pacemaker placed in 2019 for complete heart block and symptomatic bradycardia, currently ventricularly paced at 60 beats per minute     Essential Hypertension  Continue lisinopril 10mg daily     Hyperlipidemia  - Continue atorvastatin 20 mg PO daily     Hypothyroidism  - Continue levothyroxine 125 mcg PO Daily       Anticipated Disposition:  Home with home health      No future appointments.    I certify that SNF services are required to be given on an inpatient basis because Pilar Michael needs  for skilled nursing care and/or skilled rehabilitation are required on a daily basis and such services can only practically be provided in a skilled nursing facility setting and are for an ongoing condition for which she received inpatient care in the hospital.       Kyree Priest MD  Department of Hospital Medicine   [facility] - Skilled Nursing

## 2022-07-27 NOTE — PLAN OF CARE
SW met with patient in room for Discharge Planning Assessment. Pt lives with spouse and daughter, Katiana Nickerson and states s/he was previously using a Walker, rollator, or wheelchair for ambulation. Patient will have transportation assistance at discharge from daughter, who will help patient after d/c.  SW is following this Pt for DC planning needs. There are no identified needs at this time.     Patient's preferred [facility] is [facility]  Case Management Department  [facility]  [email or web address] - Skilled Nursing  Initial Discharge Assessment       Primary Care Provider: Joseph Noel MD    Admission Diagnosis: Acute on chronic combined systolic and diastolic heart failure [I50.43]    Admission Date: 7/22/2022  Expected Discharge Date: 8/10/2022    Discharge Barriers Identified: None    Payor: [email or web address] [facility] [employer] MC[employer] / Plan: [employer] MEDICARE ADVANTAGE / Product Type: Medicare Advantage /     Extended Emergency Contact Information  Primary Emergency Contact: Katiana Nickerson  Address: [address] [place] [address] [place] [address]  Mobile Phone: [phone]  Relation: Daughter  Preferred language: English  Secondary Emergency Contact: Jack Michael           [place], [place]  Mobile Phone: [phone]  Relation: Son    Discharge Plan A: Home with family, Home Health  Discharge Plan B: Home with family, Home Health      [id number] [place] - [address] [facility]  [address] [address]  Phone: [phone] Fax: [phone]      Initial Assessment (most recent)     Adult Discharge Assessment - 07/25/22 1009        Discharge Assessment    Assessment Type Discharge Planning Assessment     Confirmed Demographics Correct on Facesheet     Source of Information health record     Communicated CHARLIE with patient/caregiver Date not available/Unable to determine     Lives With  spouse;child(alejandro), adult     Do you expect to return to your current living situation? Yes     Do you have help at home or someone to help you manage your care at home? Yes     Who are your caregiver(s) and their phone number(s)? Katiana Conrad (daughter) [phone]     Prior to hospitilization cognitive status: Unable to Assess     Current cognitive status: Alert/Oriented     Equipment Currently Used at Home wheelchair;shower chair;rollator;walker, rolling;grab bar;bedside commode     Readmission within 30 days? No     Patient currently being followed by outpatient case management? Unable to determine (comments)     Do you currently have service(s) that help you manage your care at home? No     Do you take prescription medications? Yes     Do you have prescription coverage? Yes     Do you have any problems affording any of your prescribed medications? No     Is the patient taking medications as prescribed? yes     Who is going to help you get home at discharge? Katiana Conrad (daughter) [phone]     How do you get to doctors appointments? family or friend will provide     Are you on dialysis? No     Do you take coumadin? No     Discharge Plan A Home with family;Home Health     Discharge Plan B Home with family;Home Health     Discharge Plan discussed with: Patient;Adult children     Discharge Barriers Identified None        Relationship/Environment    Name(s) of Who Lives With Patient Katiana Conrad (daughter) [phone]

## 2022-07-28 LAB
ANION GAP SERPL CALC-SCNC: 10 MMOL/L (ref 8–16)
BASOPHILS # BLD AUTO: 0.02 K/UL (ref 0–0.2)
BASOPHILS NFR BLD: 0.5 % (ref 0–1.9)
BUN SERPL-MCNC: 19 MG/DL (ref 8–23)
CALCIUM SERPL-MCNC: 8.6 MG/DL (ref 8.7–10.5)
CHLORIDE SERPL-SCNC: 107 MMOL/L (ref 95–110)
CO2 SERPL-SCNC: 22 MMOL/L (ref 23–29)
CREAT SERPL-MCNC: 0.7 MG/DL (ref 0.5–1.4)
DIFFERENTIAL METHOD: ABNORMAL
EOSINOPHIL # BLD AUTO: 0.1 K/UL (ref 0–0.5)
EOSINOPHIL NFR BLD: 2.7 % (ref 0–8)
ERYTHROCYTE [DISTWIDTH] IN BLOOD BY AUTOMATED COUNT: 17.8 % (ref 11.5–14.5)
EST. GFR  (AFRICAN AMERICAN): >60 ML/MIN/1.73 M^2
EST. GFR  (NON AFRICAN AMERICAN): >60 ML/MIN/1.73 M^2
GLUCOSE SERPL-MCNC: 92 MG/DL (ref 70–110)
HCT VFR BLD AUTO: 27.9 % (ref 37–48.5)
HGB BLD-MCNC: 8.9 G/DL (ref 12–16)
IMM GRANULOCYTES # BLD AUTO: 0.02 K/UL (ref 0–0.04)
IMM GRANULOCYTES NFR BLD AUTO: 0.5 % (ref 0–0.5)
LYMPHOCYTES # BLD AUTO: 0.7 K/UL (ref 1–4.8)
LYMPHOCYTES NFR BLD: 18.1 % (ref 18–48)
MAGNESIUM SERPL-MCNC: 1.6 MG/DL (ref 1.6–2.6)
MCH RBC QN AUTO: 26.3 PG (ref 27–31)
MCHC RBC AUTO-ENTMCNC: 31.9 G/DL (ref 32–36)
MCV RBC AUTO: 83 FL (ref 82–98)
MONOCYTES # BLD AUTO: 0.5 K/UL (ref 0.3–1)
MONOCYTES NFR BLD: 12.9 % (ref 4–15)
NEUTROPHILS # BLD AUTO: 2.4 K/UL (ref 1.8–7.7)
NEUTROPHILS NFR BLD: 65.3 % (ref 38–73)
NRBC BLD-RTO: 0 /100 WBC
PHOSPHATE SERPL-MCNC: 3.2 MG/DL (ref 2.7–4.5)
PLATELET # BLD AUTO: 154 K/UL (ref 150–450)
PMV BLD AUTO: 11.4 FL (ref 9.2–12.9)
POTASSIUM SERPL-SCNC: 3.4 MMOL/L (ref 3.5–5.1)
RBC # BLD AUTO: 3.38 M/UL (ref 4–5.4)
SODIUM SERPL-SCNC: 139 MMOL/L (ref 136–145)
WBC # BLD AUTO: 3.71 K/UL (ref 3.9–12.7)

## 2022-07-28 PROCEDURE — 36415 COLL VENOUS BLD VENIPUNCTURE: CPT | Performed by: HOSPITALIST

## 2022-07-28 PROCEDURE — 97535 SELF CARE MNGMENT TRAINING: CPT | Mod: CO

## 2022-07-28 PROCEDURE — 83735 ASSAY OF MAGNESIUM: CPT | Performed by: HOSPITALIST

## 2022-07-28 PROCEDURE — 80048 BASIC METABOLIC PNL TOTAL CA: CPT | Performed by: HOSPITALIST

## 2022-07-28 PROCEDURE — 25000003 PHARM REV CODE 250: Performed by: NURSE PRACTITIONER

## 2022-07-28 PROCEDURE — 11000004 HC SNF PRIVATE

## 2022-07-28 PROCEDURE — 85025 COMPLETE CBC W/AUTO DIFF WBC: CPT | Performed by: HOSPITALIST

## 2022-07-28 PROCEDURE — 84100 ASSAY OF PHOSPHORUS: CPT | Performed by: HOSPITALIST

## 2022-07-28 PROCEDURE — 97110 THERAPEUTIC EXERCISES: CPT | Mod: CQ

## 2022-07-28 PROCEDURE — 25000003 PHARM REV CODE 250: Performed by: HOSPITALIST

## 2022-07-28 RX ORDER — POTASSIUM CHLORIDE 20 MEQ/1
40 TABLET, EXTENDED RELEASE ORAL ONCE
Status: COMPLETED | OUTPATIENT
Start: 2022-07-28 | End: 2022-07-28

## 2022-07-28 RX ORDER — SODIUM BICARBONATE 650 MG/1
650 TABLET ORAL ONCE
Status: COMPLETED | OUTPATIENT
Start: 2022-07-28 | End: 2022-07-28

## 2022-07-28 RX ORDER — LANOLIN ALCOHOL/MO/W.PET/CERES
400 CREAM (GRAM) TOPICAL 2 TIMES DAILY
Status: COMPLETED | OUTPATIENT
Start: 2022-07-28 | End: 2022-07-30

## 2022-07-28 RX ADMIN — LEVOTHYROXINE SODIUM 125 MCG: 25 TABLET ORAL at 06:07

## 2022-07-28 RX ADMIN — SENNOSIDES AND DOCUSATE SODIUM 1 TABLET: 50; 8.6 TABLET ORAL at 09:07

## 2022-07-28 RX ADMIN — CARVEDILOL 3.12 MG: 3.12 TABLET, FILM COATED ORAL at 09:07

## 2022-07-28 RX ADMIN — CLOPIDOGREL 75 MG: 75 TABLET, FILM COATED ORAL at 09:07

## 2022-07-28 RX ADMIN — BUMETANIDE 1 MG: 1 TABLET ORAL at 09:07

## 2022-07-28 RX ADMIN — TIMOLOL MALEATE 1 DROP: 5 SOLUTION OPHTHALMIC at 09:07

## 2022-07-28 RX ADMIN — SODIUM BICARBONATE 650 MG TABLET 650 MG: at 09:07

## 2022-07-28 RX ADMIN — ASPIRIN 81 MG CHEWABLE TABLET 81 MG: 81 TABLET CHEWABLE at 09:07

## 2022-07-28 RX ADMIN — MICONAZOLE NITRATE: 20 OINTMENT TOPICAL at 08:07

## 2022-07-28 RX ADMIN — MICONAZOLE NITRATE: 20 OINTMENT TOPICAL at 09:07

## 2022-07-28 RX ADMIN — MEGESTROL ACETATE 40 MG: 20 TABLET ORAL at 09:07

## 2022-07-28 RX ADMIN — POTASSIUM CHLORIDE 40 MEQ: 1500 TABLET, EXTENDED RELEASE ORAL at 09:07

## 2022-07-28 RX ADMIN — CALCIUM CARBONATE (ANTACID) CHEW TAB 500 MG 500 MG: 500 CHEW TAB at 11:07

## 2022-07-28 RX ADMIN — LISINOPRIL 20 MG: 20 TABLET ORAL at 09:07

## 2022-07-28 RX ADMIN — Medication 400 MG: at 09:07

## 2022-07-28 RX ADMIN — ATORVASTATIN CALCIUM 40 MG: 40 TABLET, FILM COATED ORAL at 09:07

## 2022-07-28 NOTE — PROGRESS NOTES
[facility]                                  Skilled Nursing Facility                   Progress Note     Admit Date: 7/22/2022  CHARLIE 8/10/2022  Principal Problem:  Acute on chronic combined systolic and diastolic CHF (congestive heart failure)   HPI obtained from patient interview and chart review     Chief Complaint: Re-evaluation of medical treatment and therapy status: Lab review    HPI:   Mrs. Michael is an 87 year old female PMHx of severe AS s/p TAVR (5/7/19), CAD s/p ostial RCA stent placement (4/19), IDBCA s/p resection and radiation (2003), thyroid cancer s/p resection (1992), HTN, PAD who presents to SNF following hospitalization for acute hypoxemic respiratory failure with acute on chronic diastolic heart failure.     Interval history: All of today's labs reviewed and are listed below.  24 hr vital sign ranges listed below.  Patient is slightly less lethargic today.  She is able to maintain meaningful conversations.  She states she is still having some daytime sleepiness.  Patient denies shortness of breath, abdominal discomfort, nausea, or vomiting.  Patient reports an adequate appetite.  Patient denies dysuria.  Patient reports having regular bowel movements.  Patient progessing with PT/OT. Continuing to follow and treat all acute and chronic conditions.    Past Medical History: Patient has a past medical history of Arthritis, Cancer, CHF (congestive heart failure), Coronary artery disease, Hypertension, and Thyroid disease.    Past Surgical History: Patient has a past surgical history that includes Hysterectomy; Thyroidectomy; lymph nodes removal; Cholecystectomy; Coronary angiography (N/A, 4/23/2019); Transcatheter aortic valve replacement (TAVR) (N/A, 5/7/2019); Cardiac valuve replacement; and Cardiac catheterization.    Social History: Patient reports that she has never smoked. She has never used smokeless tobacco. She reports  previous alcohol use. She reports that she does not use drugs.    Family History:  No significant family history to report  Allergies: Patient is allergic to penicillins and sulfa (sulfonamide antibiotics).    ROS  Constitutional: Negative for fever.  +lethargy   Eyes: Negative for blurred vision, double vision   Respiratory: Negative for cough, shortness of breath   Cardiovascular: Negative for chest pain, palpitations. + leg swelling.   Gastrointestinal: Negative for abdominal pain, constipation, diarrhea, nausea, vomiting.   Genitourinary: Negative for dysuria, frequency   Musculoskeletal:  + generalized weakness. Negative for back pain and myalgias.   Skin: Negative for itching and rash.   Neurological: Negative for dizziness, headaches.   Psychiatric/Behavioral: Negative for depression. The patient is not nervous/anxious.      24 hour Vital Sign Range   Temp:  [96.4 °F (35.8 °C)-99.3 °F (37.4 °C)]   Pulse:  [60-78]   Resp:  [18]   BP: (144-161)/(67-68)   SpO2:  [95 %-97 %]     PEx  Constitutional: Patient appears debilitated.  No distress noted  HENT:   Head: Normocephalic and atraumatic.   Eyes: Pupils are equal, round  Neck: Normal range of motion. Neck supple.   Cardiovascular: Normal rate, regular rhythm.  +murmur  Pulmonary/Chest: Effort normal and breath sounds are clear  Abdominal: Soft. Bowel sounds are normal.   Musculoskeletal: Normal range of motion.   Neurological: Alert and oriented to person, place, and time.   Psychiatric: Normal mood and affect. Behavior is normal.   Skin: Skin is warm and dry.     Recent Labs   Lab 07/28/22  0549      K 3.4*      CO2 22*   BUN 19   CREATININE 0.7   MG 1.6       Recent Labs   Lab 07/28/22  0549   WBC 3.71*   RBC 3.38*   HGB 8.9*   HCT 27.9*      MCV 83   MCH 26.3*   MCHC 31.9*         Assessment and Plan:    Assessment and Plan:      Hypokalemia  - initiated potassium 40 mEq x1 dose    Hypomagnesemia  - initiated magnesium oxide 400 mg BID x2  days    Metabolic acidosis, mild  - initiated sodium bicarbonate 650 mg x 1 dose    Acute hypoxemic respiratory failure due to Acute on Chronic HFpEF  PMHx HFpEF, valvular heart disease p/w dyspnea and increasing oxygen requirement with evidence of volume overload on physical exam in the setting of an elevated BNP(1347). Echo in 3/2022 demonstrated EF 55-60% with mitral stenosis. Repeat TTE EF 65% with grade II LVD dysfunction with MR related to MV degenerative disease.  -Continue Bumex 1mg daily   -Strict intake & output with fluid restriction to <2L daily     Recurrent Urinary Tract Infection  Patient with recurrent UTIs, prior urine culture shows Pseudomonas only sensitive to imipenem, tobramycin, gentamicin.  Was inadequately treated with ciprofloxacin last month.  -Meropenem completed      Wounds to Groin, Perineum, and buttox 2/2 urinary incontinence  -maintain pur whick as able to keep area dry  - wound care consult     Dysphasia, history of  -soft dysphasia diet in hospital  -7/12 initiated soft texture diet and SLP consult     Severe aortic stenosis s/p TAVR  PMHx severe AS a TAVR in 2019     Symptomatic bradycardia s/p pacemaker.  Pacemaker placed in 2019 for complete heart block and symptomatic bradycardia, currently ventricularly paced at 60 beats per minute     Essential Hypertension  Blood pressure over controlled in 89 year old.   Lisinopril 10mg daily     Hyperlipidemia  - lipitor 20 mg PO daily     Hypothyroidism  - Synthroid 125 mcg PO Daily        Anticipate disposition:  Home with home health        Follow-up needed during SNF stay-     Follow-up needed after discharge from SNF: PCP    No future appointments.        Lenka Salazar NP  Department of Hospital Medicine   [facility] Sk[facility] Facility     DOS: 7/28/2022       Patient note was created using MModal Dictation.  Any errors in syntax or even information may not have been identified and edited on initial review prior to  signing this note.

## 2022-07-28 NOTE — PT/OT/SLP PROGRESS
Physical Therapy Treatment    Patient Name:  Pilar Michael   MRN:  [id number]  Admit Date: 7/22/2022  Admitting Diagnosis: Acute on chronic combined systolic and diastolic CHF (congestive heart failure)  Recent Surgeries: N/A  General Precautions: Standard, fall   Orthopedic Precautions:N/A   Braces: N/A     Recommendations:     Discharge Recommendations:  home health PT   Level of Assistance Recommended at Discharge: 24 hours light assistance  Discharge Equipment Recommendations:  (TBD)   Barriers to discharge: Decreased caregiver support    Assessment:     Pilar Michael is a 89 y.o. female admitted with a medical diagnosis of Acute on chronic combined systolic and diastolic CHF (congestive heart failure). Pt tolerated therapy session well with focus on increasing independence with LE strengthening and cardio endurance in order to assist with achieving highest level of function. Pt would continue to benefit from skilled PT services per POC.     Performance deficits affecting function:  weakness, impaired functional mobility, gait instability, impaired cardiopulmonary response to activity, impaired balance, decreased coordination, decreased ROM, edema, impaired self care skills .    Rehab Potential is good    Activity Tolerance: Fair    Plan:     Patient to be seen 6 x/week to address the above listed problems via gait training, therapeutic activities, therapeutic exercises, neuromuscular re-education, wheelchair management/training    · Plan of Care Expires: 08/22/22  · Plan of Care Reviewed with: patient    Subjective     On first attempt patient with OT, second attempt pt agreeable to PT, but nursing notified PTA that patient had recent c/o dizziness, agreeable to participate in seated activities.     Pain/Comfort:  · Pain Rating 1: 0/10  · Pain Rating Post-Intervention 1: 0/10    Patient's cultural, spiritual, Yazidism conflicts given the current situation:  · no    Objective:     Communicated with  nursing/OT prior to session.  Patient found up in chair with PureWick upon PT entry to room.     Therapeutic Activities and Exercises: mini elliptical x 10 minutes  Seated LE strength exercises, including: LAQ's, AP, ABD/ADD scissors, marching, GS, ADD pillow squeezes x 20 reps, each.     AM-PAC 6 CLICK MOBILITY  17    Patient left up in chair with call button in reach.    GOALS:   Multidisciplinary Problems     Physical Therapy Goals        Problem: Physical Therapy    Goal Priority Disciplines Outcome Goal Variances Interventions   Physical Therapy Goal     PT, PT/OT Ongoing, Progressing     Description: Goals to be met by: 8/22/22     Patient will increase functional independence with mobility by performing:    . Supine to sit with Set-up [place]  . Sit to supine with Set-up [place]  . Rolling to Left and Right with Set-up Assistance.  . Sit to stand transfer with Supervision  . Bed to chair transfer with Supervision using Rolling Walker  . Gait  x 50 feet with Stand-by Assistance using Rolling Walker.   . Wheelchair propulsion x50 feet with Minimal Assistance using bilateral uppper extremities  . Ascend/Descend 2 inch curb step with Moderate Assistance using Rolling Walker. - MET on 7/27/2022  Updated goal on 7/27/2022: Ascend/Descend 4 inch curb step with Contact Guard Assistance using Rolling Walker.                     Time Tracking:     PT Received On: 07/28/22  PT Start Time: 1144  PT Stop Time: 1224  PT Total Time (min): 40 min    Billable Minutes: Therapeutic Exercise 40    Treatment Type: Treatment  PT/PTA: PTA     PTA Visit Number: 1     07/28/2022

## 2022-07-28 NOTE — PT/OT/SLP PROGRESS
"Occupational Therapy   Treatment    Name: Pilar Michael  MRN: [id number]  Admit Date: 7/22/2022  Admitting Diagnosis:  Acute on chronic combined systolic and diastolic CHF (congestive heart failure)    General Precautions: Standard, fall   Orthopedic Precautions:N/A   Braces:       Recommendations:     Discharge Recommendations: home health OT  Level of Assistance Recommended at Discharge: 24 hours light assistance for ADL's and homemaking tasks  Discharge Equipment Recommendations:  other (see comments) (TBD)  Barriers to discharge:  Decreased caregiver support    Assessment:     Pilar Michael is a 89 y.o. female with a medical diagnosis of Acute on chronic combined systolic and diastolic CHF (congestive heart failure) . Performance deficits affecting function are weakness, impaired endurance, impaired self care skills, impaired functional mobility, gait instability, impaired balance, decreased coordination, decreased ROM, decreased upper extremity function, edema.. Pt. participated well with session on this day. Session limited 2* to dizziness. BP checked and reported as 133/60 HR-60 O2-95. Pt. Will continue to benefit from continued OT to progress towards goals    Rehab Potential is fair    Activity tolerance:  Fair    Plan:     Patient to be seen 6 x/week to address the above listed problems via self-care/home management, therapeutic activities, therapeutic exercises    · Plan of Care Expires: 08/22/22  · Plan of Care Reviewed with: patient    Subjective     Communicated with: camryn prior to session. "A bath would be nice"    Pain/Comfort:  · Pain Rating 1: 0/10  · Pain Rating Post-Intervention 1: 0/10    Patient's cultural, spiritual, Baptist conflicts given the current situation:  · no    Objective:     Patient found HOB elevated upon OT entry to room.    Bed Mobility:    · Patient completed Rolling/Turning to Right with stand by assistance  · Patient completed Scooting/Bridging with stand by " assistance  · Patient completed Supine to Sit with stand by assistance     Functional Mobility/Transfers:  · Patient completed Sit <> Stand Transfer with contact guard assistance  with  rolling walker   · Patient completed Bed <> Chair Transfer using Stand Pivot technique with contact guard assistance with rolling walker  · Functional Mobility: not tested 2* to complaints of dizziness. Nursing notified. /60 HR-60 O2-95    Activities of Daily Living:  · Grooming: supervision with grooming aspects while seated at sink level   · Bathing: minimum assistance with cleaning of BLEs while seated at sink. CGA to steady with cleaning of rosalva jarrett  · Upper Body Dressing: moderate assistance to doff/lola pullover gown   · Lower Body Dressing: maximal assistance to lola BLE socks   · Toileting: Maximal assistance with hygiene and diaper management     [facility] 6 Click ADL: 17      Treatment & Education:  Pt. Educated on safety with ADL tasks as well as functional mobility and need for staff assist.     Patient left up in chair with all lines intact, call button in reach and nurse notifiedEducation:      GOALS:   Multidisciplinary Problems     Occupational Therapy Goals        Problem: Occupational Therapy    Goal Priority Disciplines Outcome Interventions   Occupational Therapy Goal     OT, PT/OT Ongoing, Progressing    Description: Goals to be met by: 08/23/22     Patient will increase functional independence with ADLs by performing:    UE Dressing with Modified [place].  LE Dressing with Modified [place].  Grooming while seated at sink with Modified [place].  Toileting from bedside commode with Set-up Assistance for hygiene and clothing management.   Bathing from  shower chair/bench with Stand-by Assistance.  Toilet transfer to bedside commode with Stand-by Assistance.  Upper extremity exercise program 3x15 reps per handout, with supervision to increase overall UE strength/endurance in order to improve func  mobility skills.   Stand during functional task for 5' with SBA in preparation for standing ADLs. .                     Time Tracking:     OT Date of Treatment: 07/28/22  OT Start Time: 1049    OT Stop Time: 1130  OT Total Time (min): 41 min    Billable Minutes:Self Care/Home Management 41    7/28/2022   OT and YAN have discussed the above patients goals and status in collaboration with Plan of Care.

## 2022-07-29 PROCEDURE — 94761 N-INVAS EAR/PLS OXIMETRY MLT: CPT

## 2022-07-29 PROCEDURE — 97535 SELF CARE MNGMENT TRAINING: CPT

## 2022-07-29 PROCEDURE — 11000004 HC SNF PRIVATE

## 2022-07-29 PROCEDURE — 25000003 PHARM REV CODE 250: Performed by: HOSPITALIST

## 2022-07-29 PROCEDURE — 97530 THERAPEUTIC ACTIVITIES: CPT

## 2022-07-29 PROCEDURE — 25000003 PHARM REV CODE 250: Performed by: NURSE PRACTITIONER

## 2022-07-29 PROCEDURE — 97530 THERAPEUTIC ACTIVITIES: CPT | Mod: CQ

## 2022-07-29 PROCEDURE — 97116 GAIT TRAINING THERAPY: CPT | Mod: CQ

## 2022-07-29 RX ADMIN — Medication 400 MG: at 11:07

## 2022-07-29 RX ADMIN — TIMOLOL MALEATE 1 DROP: 5 SOLUTION OPHTHALMIC at 11:07

## 2022-07-29 RX ADMIN — Medication 400 MG: at 08:07

## 2022-07-29 RX ADMIN — SENNOSIDES AND DOCUSATE SODIUM 1 TABLET: 50; 8.6 TABLET ORAL at 08:07

## 2022-07-29 RX ADMIN — LEVOTHYROXINE SODIUM 125 MCG: 25 TABLET ORAL at 06:07

## 2022-07-29 RX ADMIN — BUMETANIDE 1 MG: 1 TABLET ORAL at 11:07

## 2022-07-29 RX ADMIN — ASPIRIN 81 MG CHEWABLE TABLET 81 MG: 81 TABLET CHEWABLE at 11:07

## 2022-07-29 RX ADMIN — CLOPIDOGREL 75 MG: 75 TABLET, FILM COATED ORAL at 11:07

## 2022-07-29 RX ADMIN — CARVEDILOL 3.12 MG: 3.12 TABLET, FILM COATED ORAL at 11:07

## 2022-07-29 RX ADMIN — MEGESTROL ACETATE 40 MG: 20 TABLET ORAL at 11:07

## 2022-07-29 RX ADMIN — LISINOPRIL 20 MG: 20 TABLET ORAL at 11:07

## 2022-07-29 RX ADMIN — ATORVASTATIN CALCIUM 40 MG: 40 TABLET, FILM COATED ORAL at 08:07

## 2022-07-29 RX ADMIN — TIMOLOL MALEATE 1 DROP: 5 SOLUTION OPHTHALMIC at 08:07

## 2022-07-29 RX ADMIN — CARVEDILOL 3.12 MG: 3.12 TABLET, FILM COATED ORAL at 08:07

## 2022-07-29 RX ADMIN — MICONAZOLE NITRATE: 20 OINTMENT TOPICAL at 08:07

## 2022-07-29 RX ADMIN — MICONAZOLE NITRATE: 20 OINTMENT TOPICAL at 11:07

## 2022-07-29 NOTE — PT/OT/SLP PROGRESS
"Physical Therapy Treatment    Patient Name:  Pilar Michael   MRN:  [id number]  Admit Date: 7/22/2022  Admitting Diagnosis: Acute on chronic combined systolic and diastolic CHF (congestive heart failure)  Recent Surgeries: N/A  General Precautions: Standard, fall   Orthopedic Precautions:N/A   Braces: N/A     Recommendations:     Discharge Recommendations:  home health PT   Level of Assistance Recommended at Discharge: 24 hours light assistance  Discharge Equipment Recommendations:  (TBD)   Barriers to discharge: Decreased caregiver support    Assessment:     Pilar Michael is a 89 y.o. female admitted with a medical diagnosis of Acute on chronic combined systolic and diastolic CHF (congestive heart failure). Pt therapy session very limited due to pt's c/o dizziness immediately upon standing with several attempts made to stand and ambulate with RW. Pt BP reading with error x 3 attempts, 4th attempt BP was 145/62, HR 60 and O2 sats 95% after first stand. Second BP reading was 130/60, HR 60. Pt states she still feels "faint" after several attempts, taking patient back to her room and reporting pt's vitals and complaints of symptoms to nursing. Pt would continue to benefit from skilled PT services per POC.       Performance deficits affecting function:  weakness, impaired functional mobility, gait instability, impaired cardiopulmonary response to activity, impaired balance, decreased coordination, decreased ROM, edema, impaired self care skills .    Rehab Potential is good    Activity Tolerance: Fair    Plan:     Patient to be seen 6 x/week to address the above listed problems via gait training, therapeutic activities, therapeutic exercises, neuromuscular re-education, wheelchair management/training    · Plan of Care Expires: 08/22/22  · Plan of Care Reviewed with: patient    Subjective     Pt agreeable to PT. "I am so sleepy today."    Pain/Comfort:  · Pain Rating 1: 0/10  · Pain Rating Post-Intervention 1: " 0/10    Patient's cultural, spiritual, Congregation conflicts given the current situation:  · no    Objective:     Communicated with pt's family member and pt prior to session.  Patient found up in chair with PureWick upon PT entry to room.     Functional Mobility:  · Transfers:     · Sit to Stand: 2 sets, contact guard assistance with rolling walker. Reminders on sequencing/hand placement.   · Gait: x 5', RW, CGA and follow with w/c.   · Balance: static standing x 15 seconds, excess trunk flexion, RW. c/o dizziness.    AM-PAC 6 CLICK MOBILITY  17    Patient left up in chair with call button in reach, nursing notified and family member present.    GOALS:   Multidisciplinary Problems     Physical Therapy Goals        Problem: Physical Therapy    Goal Priority Disciplines Outcome Goal Variances Interventions   Physical Therapy Goal     PT, PT/OT Ongoing, Progressing     Description: Goals to be met by: 8/22/22     Patient will increase functional independence with mobility by performing:    . Supine to sit with Set-up [place]  . Sit to supine with Set-up [place]  . Rolling to Left and Right with Set-up Assistance.  . Sit to stand transfer with Supervision  . Bed to chair transfer with Supervision using Rolling Walker  . Gait  x 50 feet with Stand-by Assistance using Rolling Walker.   . Wheelchair propulsion x50 feet with Minimal Assistance using bilateral uppper extremities  . Ascend/Descend 2 inch curb step with Moderate Assistance using Rolling Walker. - MET on 7/27/2022  Updated goal on 7/27/2022: Ascend/Descend 4 inch curb step with Contact Guard Assistance using Rolling Walker.                     Time Tracking:     PT Received On: 07/29/22  PT Start Time: 1406  PT Stop Time: 1440  PT Total Time (min): 34 min    Billable Minutes: Gait Training 14 and Therapeutic Activity 20    Treatment Type: Treatment  PT/PTA: PTA     PTA Visit Number: 2     07/29/2022

## 2022-07-29 NOTE — PT/OT/SLP PROGRESS
"Occupational Therapy   Treatment    Name: Pilar Michael  MRN: [id number]  Admit Date: 7/22/2022  Admitting Diagnosis:  Acute on chronic combined systolic and diastolic CHF (congestive heart failure)    General Precautions: Standard, fall   Orthopedic Precautions:N/A   Braces: N/A     Recommendations:     Discharge Recommendations: home health OT  Level of Assistance Recommended at Discharge: 24 hours physical assistance for all ADL's and home management tasks  Discharge Equipment Recommendations:  other (see comments) (TBD)  Barriers to discharge:  Decreased caregiver support    Assessment:     Pilar Michael is a 89 y.o. female with a medical diagnosis of Acute on chronic combined systolic and diastolic CHF (congestive heart failure).  Pt tolerated session well and without incident, but she continues to require assistance to perform self-care tasks.   She was limited by fatigue and dizziness. Pt would continue to benefit from skilled OT services at SNF to maximize her gains in functional independence.  She presents with the following. Performance deficits affecting function are weakness, impaired endurance, impaired self care skills, impaired functional mobility, gait instability, impaired balance, impaired cardiopulmonary response to activity, decreased ROM, edema, decreased lower extremity function, decreased upper extremity function, decreased coordination.     Rehab Potential is good    Activity tolerance:  Fair    Plan:     Patient to be seen 6 x/week to address the above listed problems via self-care/home management, therapeutic activities, therapeutic exercises    · Plan of Care Expires: 08/22/22  · Plan of Care Reviewed with: patient    Subjective   "I am so sleepy.  I don't feel right."  Communicated with: nursing prior to session.     Pain/Comfort:  · Pain Rating 1: 0/10  · Pain Rating Post-Intervention 1: 0/10    Patient's cultural, spiritual, Mormonism conflicts given the current " situation:  · no    Objective:     Patient found HOB elevated with  (no lines) upon OT entry to room.    Bed Mobility:    · Patient completed Rolling/Turning to Right with stand by assistance  · Patient completed Scooting/Bridging to EOB with contact guard assistance  · Patient completed Supine to Sit with stand by assistance     Functional Mobility/Transfers:  · Patient completed Sit <> Stand Transfer from multiple surfaces with minimum assistance with  rolling walker and with grab bar from toilet  · Patient completed Bed <> Chair Transfer using Step Transfer technique with minimum assistance with rolling walker  · Patient completed Toilet Transfer Step Transfer technique with minimum assistance with  rolling walker  · Functional Mobility: Pt ambulated ~4 ft from EOB to the w/c and then ~6 ft x 2 trials from w/c to toilet and back to w/c with Min A with RW.    Activities of Daily Living:  · Feeding:  Setup assistance of tray table to eat her breakfast while HOB elevated   · Grooming: Setup assistance to comb her hair, wash her face, and wash her hands while seated at sink.  SBA to brush her teeth while seated at sink with verbal cueing to place toothpaste on the toothbrush as pt was placing paste on her finger.    · Upper Body Dressing: stand by assistance to doff dirty pullover dress while seated in chair.  Mod A to lola buttoned dress with (A) to thread BUE through sleeves and to button the dress as pt was able to snap both snaps in front; dress became soiled while toileting and was rinsed and placed in a plastic bag.  Min A to lola pullover shirt.   · Lower Body Dressing: moderate assistance to lola her underwear and pants with (A) to thread BLE through pant legs and to pull underwear and pants over her waist as pt fatigued in standing.  She was able to pull pants up from her ankles to her thighs while seated without assistance.   · Toileting: maximal assistance to doff soiled brief and perform perineal care  while pt stood and sat on the toilet.  She had a large BM.  Nursing notified.      [facility] 6 Click ADL: 14    Treatment & Education:  - Pt's vitals were taken after performing activity.  Her BP read 166/72, her HR was 61 BPM, and her oxygen sats ranged from 87-91% on room air.  Nursing notified    Pt edu on role of OT, POC, deep breathing techniques, safety when performing self care tasks, benefit of performing OOB activity, and safety when performing functional transfers and mobility.  - White board updated  - Self care tasks completed-- as noted above       Patient left up in chair with call button in reach and nursing notifiedEducation:      GOALS:   Multidisciplinary Problems     Occupational Therapy Goals        Problem: Occupational Therapy    Goal Priority Disciplines Outcome Interventions   Occupational Therapy Goal     OT, PT/OT Ongoing, Progressing    Description: Goals to be met by: 08/23/22     Patient will increase functional independence with ADLs by performing:    UE Dressing with Modified [place].  LE Dressing with Modified [place].  Grooming while seated at sink with Modified [place].  Toileting from bedside commode with Set-up Assistance for hygiene and clothing management.   Bathing from  shower chair/bench with Stand-by Assistance.  Toilet transfer to bedside commode with Stand-by Assistance.  Upper extremity exercise program 3x15 reps per handout, with supervision to increase overall UE strength/endurance in order to improve func mobility skills.   Stand during functional task for 5' with SBA in preparation for standing ADLs. .                     Time Tracking:     OT Date of Treatment: 07/29/22  OT Start Time: 0920    OT Stop Time: 1026  OT Total Time (min): 66 min    Billable Minutes:Self Care/Home Management 45 min  Therapeutic Activity 21 min    7/29/2022

## 2022-07-29 NOTE — PLAN OF CARE
Problem: Adult Inpatient Plan of Care  Goal: Plan of Care Review  Outcome: Ongoing, Progressing  Goal: Patient-Specific Goal (Individualized)  Outcome: Ongoing, Progressing  Goal: Absence of Hospital-Acquired Illness or Injury  Outcome: Ongoing, Progressing  Goal: Optimal Comfort and Wellbeing  Outcome: Ongoing, Progressing  Goal: Readiness for Transition of Care  Outcome: Ongoing, Progressing     Problem: Fluid Imbalance (Pneumonia)  Goal: Fluid Balance  Outcome: Ongoing, Progressing     Problem: Infection (Pneumonia)  Goal: Resolution of Infection Signs and Symptoms  Outcome: Ongoing, Progressing     Problem: Respiratory Compromise (Pneumonia)  Goal: Effective Oxygenation and Ventilation  Outcome: Ongoing, Progressing     Problem: Fall Injury Risk  Goal: Absence of Fall and Fall-Related Injury  Outcome: Ongoing, Progressing     Problem: Skin Injury Risk Increased  Goal: Skin Health and Integrity  Outcome: Ongoing, Progressing

## 2022-07-30 ENCOUNTER — HOSPITAL ENCOUNTER (INPATIENT)
Facility: HOSPITAL | Age: 87
LOS: 9 days | Discharge: NURSING, REHAB OR LONG-TERM CARE | DRG: 871 | End: 2022-08-09
Attending: STUDENT IN AN ORGANIZED HEALTH CARE EDUCATION/TRAINING PROGRAM | Admitting: EMERGENCY MEDICINE
Payer: MEDICARE

## 2022-07-30 VITALS
HEART RATE: 60 BPM | WEIGHT: 178.56 LBS | SYSTOLIC BLOOD PRESSURE: 200 MMHG | RESPIRATION RATE: 18 BRPM | HEIGHT: 62 IN | OXYGEN SATURATION: 95 % | BODY MASS INDEX: 32.86 KG/M2 | TEMPERATURE: 102 F | DIASTOLIC BLOOD PRESSURE: 80 MMHG

## 2022-07-30 DIAGNOSIS — A41.9 SEPSIS, DUE TO UNSPECIFIED ORGANISM, UNSPECIFIED WHETHER ACUTE ORGAN DYSFUNCTION PRESENT: ICD-10-CM

## 2022-07-30 DIAGNOSIS — R41.0 DELIRIUM: ICD-10-CM

## 2022-07-30 DIAGNOSIS — R53.1 WEAKNESS: ICD-10-CM

## 2022-07-30 DIAGNOSIS — R07.9 CHEST PAIN: ICD-10-CM

## 2022-07-30 DIAGNOSIS — I50.42 CHRONIC COMBINED SYSTOLIC AND DIASTOLIC HEART FAILURE: ICD-10-CM

## 2022-07-30 DIAGNOSIS — I50.43 ACUTE ON CHRONIC COMBINED SYSTOLIC (CONGESTIVE) AND DIASTOLIC (CONGESTIVE) HEART FAILURE: Primary | ICD-10-CM

## 2022-07-30 LAB
ALBUMIN SERPL BCP-MCNC: 2.6 G/DL (ref 3.5–5.2)
ALP SERPL-CCNC: 79 U/L (ref 55–135)
ALT SERPL W/O P-5'-P-CCNC: 7 U/L (ref 10–44)
ANION GAP SERPL CALC-SCNC: 8 MMOL/L (ref 8–16)
AST SERPL-CCNC: 17 U/L (ref 10–40)
BACTERIA #/AREA URNS AUTO: ABNORMAL /HPF
BASOPHILS # BLD AUTO: 0.01 K/UL (ref 0–0.2)
BASOPHILS NFR BLD: 0.2 % (ref 0–1.9)
BILIRUB SERPL-MCNC: 0.8 MG/DL (ref 0.1–1)
BILIRUB UR QL STRIP: NEGATIVE
BNP SERPL-MCNC: 785 PG/ML (ref 0–99)
BUN SERPL-MCNC: 21 MG/DL (ref 8–23)
CALCIUM SERPL-MCNC: 8.5 MG/DL (ref 8.7–10.5)
CHLORIDE SERPL-SCNC: 104 MMOL/L (ref 95–110)
CLARITY UR REFRACT.AUTO: ABNORMAL
CO2 SERPL-SCNC: 24 MMOL/L (ref 23–29)
COLOR UR AUTO: YELLOW
CREAT SERPL-MCNC: 0.8 MG/DL (ref 0.5–1.4)
DIFFERENTIAL METHOD: ABNORMAL
EOSINOPHIL # BLD AUTO: 0 K/UL (ref 0–0.5)
EOSINOPHIL NFR BLD: 0.5 % (ref 0–8)
ERYTHROCYTE [DISTWIDTH] IN BLOOD BY AUTOMATED COUNT: 18.2 % (ref 11.5–14.5)
EST. GFR  (AFRICAN AMERICAN): >60 ML/MIN/1.73 M^2
EST. GFR  (NON AFRICAN AMERICAN): >60 ML/MIN/1.73 M^2
GLUCOSE SERPL-MCNC: 110 MG/DL (ref 70–110)
GLUCOSE UR QL STRIP: NEGATIVE
HCT VFR BLD AUTO: 29.1 % (ref 37–48.5)
HGB BLD-MCNC: 9.2 G/DL (ref 12–16)
HGB UR QL STRIP: NEGATIVE
IMM GRANULOCYTES # BLD AUTO: 0.02 K/UL (ref 0–0.04)
IMM GRANULOCYTES NFR BLD AUTO: 0.3 % (ref 0–0.5)
KETONES UR QL STRIP: NEGATIVE
LACTATE SERPL-SCNC: 1.4 MMOL/L (ref 0.5–2.2)
LEUKOCYTE ESTERASE UR QL STRIP: ABNORMAL
LYMPHOCYTES # BLD AUTO: 0.5 K/UL (ref 1–4.8)
LYMPHOCYTES NFR BLD: 7.9 % (ref 18–48)
MCH RBC QN AUTO: 26.9 PG (ref 27–31)
MCHC RBC AUTO-ENTMCNC: 31.6 G/DL (ref 32–36)
MCV RBC AUTO: 85 FL (ref 82–98)
MICROSCOPIC COMMENT: ABNORMAL
MONOCYTES # BLD AUTO: 0.3 K/UL (ref 0.3–1)
MONOCYTES NFR BLD: 5.5 % (ref 4–15)
NEUTROPHILS # BLD AUTO: 5 K/UL (ref 1.8–7.7)
NEUTROPHILS NFR BLD: 85.6 % (ref 38–73)
NITRITE UR QL STRIP: NEGATIVE
NRBC BLD-RTO: 0 /100 WBC
PH UR STRIP: 5 [PH] (ref 5–8)
PLATELET # BLD AUTO: 143 K/UL (ref 150–450)
PMV BLD AUTO: 11.2 FL (ref 9.2–12.9)
POTASSIUM SERPL-SCNC: 4 MMOL/L (ref 3.5–5.1)
PROT SERPL-MCNC: 7.1 G/DL (ref 6–8.4)
PROT UR QL STRIP: NEGATIVE
RBC # BLD AUTO: 3.42 M/UL (ref 4–5.4)
RBC #/AREA URNS AUTO: 2 /HPF (ref 0–4)
SODIUM SERPL-SCNC: 136 MMOL/L (ref 136–145)
SP GR UR STRIP: 1.01 (ref 1–1.03)
SQUAMOUS #/AREA URNS AUTO: 2 /HPF
TROPONIN I SERPL DL<=0.01 NG/ML-MCNC: 0.03 NG/ML (ref 0–0.03)
TSH SERPL DL<=0.005 MIU/L-ACNC: 2.41 UIU/ML (ref 0.4–4)
URN SPEC COLLECT METH UR: ABNORMAL
WBC # BLD AUTO: 5.82 K/UL (ref 3.9–12.7)
WBC #/AREA URNS AUTO: 11 /HPF (ref 0–5)

## 2022-07-30 PROCEDURE — U0002 COVID-19 LAB TEST NON-CDC: HCPCS | Performed by: EMERGENCY MEDICINE

## 2022-07-30 PROCEDURE — 99900035 HC TECH TIME PER 15 MIN (STAT)

## 2022-07-30 PROCEDURE — 83605 ASSAY OF LACTIC ACID: CPT | Performed by: EMERGENCY MEDICINE

## 2022-07-30 PROCEDURE — 18500000 HC LEAVE OF ABSENCE HOSPITAL SERVICES

## 2022-07-30 PROCEDURE — 97110 THERAPEUTIC EXERCISES: CPT | Mod: CQ

## 2022-07-30 PROCEDURE — 96365 THER/PROPH/DIAG IV INF INIT: CPT

## 2022-07-30 PROCEDURE — 84443 ASSAY THYROID STIM HORMONE: CPT | Performed by: EMERGENCY MEDICINE

## 2022-07-30 PROCEDURE — 87086 URINE CULTURE/COLONY COUNT: CPT | Performed by: EMERGENCY MEDICINE

## 2022-07-30 PROCEDURE — 94761 N-INVAS EAR/PLS OXIMETRY MLT: CPT

## 2022-07-30 PROCEDURE — 81001 URINALYSIS AUTO W/SCOPE: CPT | Performed by: EMERGENCY MEDICINE

## 2022-07-30 PROCEDURE — 25000003 PHARM REV CODE 250: Performed by: HOSPITALIST

## 2022-07-30 PROCEDURE — 25000003 PHARM REV CODE 250: Performed by: NURSE PRACTITIONER

## 2022-07-30 PROCEDURE — 93010 ELECTROCARDIOGRAM REPORT: CPT | Mod: ,,, | Performed by: INTERNAL MEDICINE

## 2022-07-30 PROCEDURE — 87040 BLOOD CULTURE FOR BACTERIA: CPT | Mod: 59 | Performed by: EMERGENCY MEDICINE

## 2022-07-30 PROCEDURE — 99285 EMERGENCY DEPT VISIT HI MDM: CPT | Mod: 25

## 2022-07-30 PROCEDURE — 63600175 PHARM REV CODE 636 W HCPCS: Performed by: EMERGENCY MEDICINE

## 2022-07-30 PROCEDURE — 85025 COMPLETE CBC W/AUTO DIFF WBC: CPT | Performed by: EMERGENCY MEDICINE

## 2022-07-30 PROCEDURE — 27000221 HC OXYGEN, UP TO 24 HOURS

## 2022-07-30 PROCEDURE — 25000003 PHARM REV CODE 250: Performed by: EMERGENCY MEDICINE

## 2022-07-30 PROCEDURE — 97116 GAIT TRAINING THERAPY: CPT | Mod: CQ

## 2022-07-30 PROCEDURE — 80053 COMPREHEN METABOLIC PANEL: CPT | Performed by: EMERGENCY MEDICINE

## 2022-07-30 PROCEDURE — 99285 EMERGENCY DEPT VISIT HI MDM: CPT | Mod: CS,,, | Performed by: STUDENT IN AN ORGANIZED HEALTH CARE EDUCATION/TRAINING PROGRAM

## 2022-07-30 PROCEDURE — 93005 ELECTROCARDIOGRAM TRACING: CPT

## 2022-07-30 PROCEDURE — 93010 EKG 12-LEAD: ICD-10-PCS | Mod: ,,, | Performed by: INTERNAL MEDICINE

## 2022-07-30 PROCEDURE — 84484 ASSAY OF TROPONIN QUANT: CPT | Performed by: EMERGENCY MEDICINE

## 2022-07-30 PROCEDURE — 99285 PR EMERGENCY DEPT VISIT,LEVEL V: ICD-10-PCS | Mod: CS,,, | Performed by: STUDENT IN AN ORGANIZED HEALTH CARE EDUCATION/TRAINING PROGRAM

## 2022-07-30 PROCEDURE — 97530 THERAPEUTIC ACTIVITIES: CPT | Mod: CQ

## 2022-07-30 PROCEDURE — 83880 ASSAY OF NATRIURETIC PEPTIDE: CPT | Performed by: EMERGENCY MEDICINE

## 2022-07-30 RX ORDER — CEFEPIME HYDROCHLORIDE 1 G/50ML
2 INJECTION, SOLUTION INTRAVENOUS
Status: COMPLETED | OUTPATIENT
Start: 2022-07-30 | End: 2022-07-31

## 2022-07-30 RX ADMIN — ACETAMINOPHEN 650 MG: 325 TABLET ORAL at 08:07

## 2022-07-30 RX ADMIN — CLOPIDOGREL 75 MG: 75 TABLET, FILM COATED ORAL at 09:07

## 2022-07-30 RX ADMIN — CEFEPIME HYDROCHLORIDE 2 G: 2 INJECTION, SOLUTION INTRAVENOUS at 11:07

## 2022-07-30 RX ADMIN — TIMOLOL MALEATE 1 DROP: 5 SOLUTION OPHTHALMIC at 08:07

## 2022-07-30 RX ADMIN — LISINOPRIL 20 MG: 20 TABLET ORAL at 09:07

## 2022-07-30 RX ADMIN — CARVEDILOL 3.12 MG: 3.12 TABLET, FILM COATED ORAL at 09:07

## 2022-07-30 RX ADMIN — BUMETANIDE 1 MG: 1 TABLET ORAL at 09:07

## 2022-07-30 RX ADMIN — ATORVASTATIN CALCIUM 40 MG: 40 TABLET, FILM COATED ORAL at 09:07

## 2022-07-30 RX ADMIN — LEVOTHYROXINE SODIUM 125 MCG: 25 TABLET ORAL at 06:07

## 2022-07-30 RX ADMIN — SENNOSIDES AND DOCUSATE SODIUM 1 TABLET: 50; 8.6 TABLET ORAL at 08:07

## 2022-07-30 RX ADMIN — MEGESTROL ACETATE 40 MG: 20 TABLET ORAL at 09:07

## 2022-07-30 RX ADMIN — SODIUM CHLORIDE 500 ML: 0.9 INJECTION, SOLUTION INTRAVENOUS at 10:07

## 2022-07-30 RX ADMIN — Medication 400 MG: at 09:07

## 2022-07-30 RX ADMIN — MICONAZOLE NITRATE: 20 OINTMENT TOPICAL at 09:07

## 2022-07-30 RX ADMIN — ASPIRIN 81 MG CHEWABLE TABLET 81 MG: 81 TABLET CHEWABLE at 09:07

## 2022-07-30 RX ADMIN — TIMOLOL MALEATE 1 DROP: 5 SOLUTION OPHTHALMIC at 09:07

## 2022-07-30 NOTE — TREATMENT PLAN
"Rehab Services' DME recommendations    Pilar Michael  MRN: [id number]       [x] Walker Florentin (4'4"-5'7")    Wheels Yes    [x] Wheelchair  Number of hours up in a wheelchair per day 4 - 8 hrs     Style Light weight and reclining      Seat Width 20    Seat Depth 20    Back Height Standard    Leg Support Standard, Heel Loops and Swing Away    Arm Height Detachable and Desk    Lap Belt Velcro    Accessories Front Brakes, Brake Extensions, Anti-tippers and Safety belt    Cushion Basic    Justification for Cushion pressure ulcer prevention    Justification for wheelchair order: (Please select all that apply) Caregiver is capable and willing to operate wheelchair safely, A reclining back is ordered, The patient requires the use of a wheelchair for ADLs within the home and Patient mobility limitations cannot be sufficiently resolved by the use of other ambulatory therapies      [x] 3 in 1 commode Standard      [x] Tub bench Padded     [x] Home health PT, OT and Aidryder Etienne, PTA 7/30/2022         "

## 2022-07-30 NOTE — Clinical Note
Diagnosis: Sepsis, due to unspecified organism, unspecified whether acute organ dysfunction present [[id number]]   Admitting Provider:: MARY ARCE [[id number]]   Future Attending Provider: CHRISTIANA CANALES [[id number]]   Reason for IP Medical Treatment  (Clinical interventions that can only be accomplished in the IP setting? ) :: Sepsis   Estimated Length of Stay:: 2 midnights   I certify that Inpatient services for greater than or equal to 2 midnights are medically necessary:: Yes   Plans for Post-Acute care--if anticipated (pick the single best option):: A. No post acute care anticipated at this time

## 2022-07-30 NOTE — PT/OT/SLP PROGRESS
Physical Therapy Treatment    Patient Name:  Pilar Michael   MRN:  [id number]  Admit Date: 7/22/2022  Admitting Diagnosis: Acute on chronic combined systolic and diastolic CHF (congestive heart failure)  Recent Surgeries: N/A  General Precautions: Standard, fall   Orthopedic Precautions:N/A   Braces: N/A     Recommendations:     Discharge Recommendations:  home health PT   Level of Assistance Recommended at Discharge: 24 hours light assistance  Discharge Equipment Recommendations:  (TBD)   Barriers to discharge: Decreased caregiver support    Assessment:     Pilar Michael is a 89 y.o. female admitted with a medical diagnosis of Acute on chronic combined systolic and diastolic CHF (congestive heart failure). Pt tolerated therapy session fairly well, but presents very lethargic and family friends present. With encouragement, patient agreeable to participate in therapy, but has consistently required max encouragement to perform therapy due to lethargy and decreased motivation. Pt session focused on gait training, transfers, LE strengthening, safety awareness and cardio endurance in order to assist with achieving highest level of function. Pt would continue to benefit from skilled PT services per POC.       Performance deficits affecting function:  weakness, impaired functional mobility, gait instability, impaired cardiopulmonary response to activity, impaired balance, decreased coordination, decreased ROM, edema, impaired self care skills .    Rehab Potential is good    Activity Tolerance: Fair and Poor    Plan:     Patient to be seen 6 x/week to address the above listed problems via gait training, therapeutic activities, therapeutic exercises, neuromuscular re-education, wheelchair management/training    · Plan of Care Expires: 08/22/22  · Plan of Care Reviewed with: patient    Subjective     Pt agreeable to PT after receiving encouragement from family members/PTA.     Pain/Comfort:  · Pain Rating 1: 0/10  · Pain  Rating Post-Intervention 1: 0/10    Patient's cultural, spiritual, Jewish conflicts given the current situation:  · no    Objective:     Patient found up in chair with PureWick, oxygen upon PT entry to room.     Therapeutic Activities and Exercises: mini elliptical x 10 minutes with several rest breaks due to pt difficulty maintaining level of alertness, requiring vc's.     Functional Mobility:  · Transfers:     · Sit to Stand: 3 sets, contact guard assistance with rolling walker.  · Gait: x 15', x 23', RW, Min A x 2 (follow with w/c and line mngmt assitance). Pt instruction on erect posture and keeping AD close to pt's body.   · Pt became SOB after each walk, O2 sats WFL on 1LO2. Instructed on diaphragmatic breathing technique to decrease SOB.     AM-PAC 6 CLICK MOBILITY  16    Patient left up in chair with all lines intact, call button in reach and nursing, family friend present.    GOALS:   Multidisciplinary Problems     Physical Therapy Goals        Problem: Physical Therapy    Goal Priority Disciplines Outcome Goal Variances Interventions   Physical Therapy Goal     PT, PT/OT Ongoing, Progressing     Description: Goals to be met by: 8/22/22     Patient will increase functional independence with mobility by performing:    . Supine to sit with Set-up [place]  . Sit to supine with Set-up [place]  . Rolling to Left and Right with Set-up Assistance.  . Sit to stand transfer with Supervision  . Bed to chair transfer with Supervision using Rolling Walker  . Gait  x 50 feet with Stand-by Assistance using Rolling Walker.   . Wheelchair propulsion x50 feet with Minimal Assistance using bilateral uppper extremities  . Ascend/Descend 2 inch curb step with Moderate Assistance using Rolling Walker. - MET on 7/27/2022  Updated goal on 7/27/2022: Ascend/Descend 4 inch curb step with Contact Guard Assistance using Rolling Walker.                     Time Tracking:     PT Received On: 07/30/22  PT Start Time:  1401  PT Stop Time: 1448  PT Total Time (min): 47 min    Billable Minutes: Gait Training 17, Therapeutic Activity 20 and Therapeutic Exercise 10    Treatment Type: Treatment  PT/PTA: PTA     PTA Visit Number: 3     07/30/2022

## 2022-07-31 PROBLEM — I50.42 CHRONIC COMBINED SYSTOLIC AND DIASTOLIC HEART FAILURE: Status: ACTIVE | Noted: 2019-05-08

## 2022-07-31 PROBLEM — A41.9 SEPSIS: Status: ACTIVE | Noted: 2022-07-31

## 2022-07-31 PROBLEM — R41.0 DELIRIUM: Status: ACTIVE | Noted: 2022-07-31

## 2022-07-31 LAB
ADENOVIRUS: NOT DETECTED
BORDETELLA PARAPERTUSSIS (IS1001): NOT DETECTED
BORDETELLA PERTUSSIS (PTXP): NOT DETECTED
CHLAMYDIA PNEUMONIAE: NOT DETECTED
CORONAVIRUS 229E, COMMON COLD VIRUS: NOT DETECTED
CORONAVIRUS HKU1, COMMON COLD VIRUS: NOT DETECTED
CORONAVIRUS NL63, COMMON COLD VIRUS: NOT DETECTED
CORONAVIRUS OC43, COMMON COLD VIRUS: NOT DETECTED
FLUBV RNA NPH QL NAA+NON-PROBE: NOT DETECTED
HPIV1 RNA NPH QL NAA+NON-PROBE: NOT DETECTED
HPIV2 RNA NPH QL NAA+NON-PROBE: NOT DETECTED
HPIV3 RNA NPH QL NAA+NON-PROBE: NOT DETECTED
HPIV4 RNA NPH QL NAA+NON-PROBE: NOT DETECTED
HUMAN METAPNEUMOVIRUS: NOT DETECTED
INFLUENZA A (SUBTYPES H1,H1-2009,H3): NOT DETECTED
MYCOPLASMA PNEUMONIAE: NOT DETECTED
PROCALCITONIN SERPL IA-MCNC: 0.23 NG/ML
RESPIRATORY INFECTION PANEL SOURCE: NORMAL
RSV RNA NPH QL NAA+NON-PROBE: NOT DETECTED
RV+EV RNA NPH QL NAA+NON-PROBE: NOT DETECTED
SARS-COV-2 RDRP RESP QL NAA+PROBE: NEGATIVE
SARS-COV-2 RNA RESP QL NAA+PROBE: NOT DETECTED

## 2022-07-31 PROCEDURE — 36415 COLL VENOUS BLD VENIPUNCTURE: CPT | Performed by: STUDENT IN AN ORGANIZED HEALTH CARE EDUCATION/TRAINING PROGRAM

## 2022-07-31 PROCEDURE — 99223 1ST HOSP IP/OBS HIGH 75: CPT | Mod: ,,, | Performed by: INTERNAL MEDICINE

## 2022-07-31 PROCEDURE — 63700000 PHARM REV CODE 250 ALT 637 W/O HCPCS: Performed by: STUDENT IN AN ORGANIZED HEALTH CARE EDUCATION/TRAINING PROGRAM

## 2022-07-31 PROCEDURE — 99223 PR INITIAL HOSPITAL CARE,LEVL III: ICD-10-PCS | Mod: ,,, | Performed by: INTERNAL MEDICINE

## 2022-07-31 PROCEDURE — 87798 DETECT AGENT NOS DNA AMP: CPT | Performed by: STUDENT IN AN ORGANIZED HEALTH CARE EDUCATION/TRAINING PROGRAM

## 2022-07-31 PROCEDURE — 63600175 PHARM REV CODE 636 W HCPCS: Performed by: INTERNAL MEDICINE

## 2022-07-31 PROCEDURE — 20600001 HC STEP DOWN PRIVATE ROOM

## 2022-07-31 PROCEDURE — 63600175 PHARM REV CODE 636 W HCPCS

## 2022-07-31 PROCEDURE — 25000003 PHARM REV CODE 250

## 2022-07-31 PROCEDURE — 25000003 PHARM REV CODE 250: Performed by: STUDENT IN AN ORGANIZED HEALTH CARE EDUCATION/TRAINING PROGRAM

## 2022-07-31 PROCEDURE — 25000003 PHARM REV CODE 250: Performed by: INTERNAL MEDICINE

## 2022-07-31 PROCEDURE — 84145 PROCALCITONIN (PCT): CPT | Performed by: STUDENT IN AN ORGANIZED HEALTH CARE EDUCATION/TRAINING PROGRAM

## 2022-07-31 RX ORDER — LISINOPRIL 20 MG/1
20 TABLET ORAL DAILY
Status: DISCONTINUED | OUTPATIENT
Start: 2022-07-31 | End: 2022-08-01

## 2022-07-31 RX ORDER — SODIUM CHLORIDE 0.9 % (FLUSH) 0.9 %
10 SYRINGE (ML) INJECTION
Status: DISCONTINUED | OUTPATIENT
Start: 2022-07-31 | End: 2022-08-09 | Stop reason: HOSPADM

## 2022-07-31 RX ORDER — NALOXONE HCL 0.4 MG/ML
0.02 VIAL (ML) INJECTION
Status: DISCONTINUED | OUTPATIENT
Start: 2022-07-31 | End: 2022-08-09 | Stop reason: HOSPADM

## 2022-07-31 RX ORDER — CEFEPIME HYDROCHLORIDE 1 G/50ML
1 INJECTION, SOLUTION INTRAVENOUS
Status: DISCONTINUED | OUTPATIENT
Start: 2022-07-31 | End: 2022-07-31

## 2022-07-31 RX ORDER — GLUCAGON 1 MG
1 KIT INJECTION
Status: DISCONTINUED | OUTPATIENT
Start: 2022-07-31 | End: 2022-08-09 | Stop reason: HOSPADM

## 2022-07-31 RX ORDER — AZITHROMYCIN 250 MG/1
500 TABLET, FILM COATED ORAL DAILY
Status: COMPLETED | OUTPATIENT
Start: 2022-07-31 | End: 2022-08-02

## 2022-07-31 RX ORDER — ENOXAPARIN SODIUM 100 MG/ML
40 INJECTION SUBCUTANEOUS EVERY 24 HOURS
Status: DISCONTINUED | OUTPATIENT
Start: 2022-07-31 | End: 2022-08-01

## 2022-07-31 RX ORDER — ATORVASTATIN CALCIUM 20 MG/1
20 TABLET, FILM COATED ORAL NIGHTLY
Status: DISCONTINUED | OUTPATIENT
Start: 2022-07-31 | End: 2022-08-09 | Stop reason: HOSPADM

## 2022-07-31 RX ORDER — SODIUM CHLORIDE 0.9 % (FLUSH) 0.9 %
10 SYRINGE (ML) INJECTION
Status: DISCONTINUED | OUTPATIENT
Start: 2022-07-31 | End: 2022-08-05

## 2022-07-31 RX ORDER — TIMOLOL MALEATE 5 MG/ML
1 SOLUTION/ DROPS OPHTHALMIC 2 TIMES DAILY
Refills: 6 | Status: DISCONTINUED | OUTPATIENT
Start: 2022-07-31 | End: 2022-08-09 | Stop reason: HOSPADM

## 2022-07-31 RX ORDER — MEGESTROL ACETATE 40 MG/1
40 TABLET ORAL DAILY
Status: DISCONTINUED | OUTPATIENT
Start: 2022-07-31 | End: 2022-08-09 | Stop reason: HOSPADM

## 2022-07-31 RX ORDER — BUMETANIDE 1 MG/1
1 TABLET ORAL DAILY
Status: DISCONTINUED | OUTPATIENT
Start: 2022-07-31 | End: 2022-08-02

## 2022-07-31 RX ORDER — ACETAMINOPHEN 325 MG/1
650 TABLET ORAL EVERY 4 HOURS PRN
Status: DISCONTINUED | OUTPATIENT
Start: 2022-07-31 | End: 2022-08-09 | Stop reason: HOSPADM

## 2022-07-31 RX ORDER — CLOPIDOGREL BISULFATE 75 MG/1
75 TABLET ORAL DAILY
Status: DISCONTINUED | OUTPATIENT
Start: 2022-07-31 | End: 2022-08-09 | Stop reason: HOSPADM

## 2022-07-31 RX ORDER — IBUPROFEN 200 MG
24 TABLET ORAL
Status: DISCONTINUED | OUTPATIENT
Start: 2022-07-31 | End: 2022-08-09 | Stop reason: HOSPADM

## 2022-07-31 RX ORDER — TALC
6 POWDER (GRAM) TOPICAL NIGHTLY PRN
Status: DISCONTINUED | OUTPATIENT
Start: 2022-07-31 | End: 2022-08-09 | Stop reason: HOSPADM

## 2022-07-31 RX ORDER — CARVEDILOL 3.12 MG/1
3.12 TABLET ORAL 2 TIMES DAILY
Status: DISCONTINUED | OUTPATIENT
Start: 2022-07-31 | End: 2022-08-09 | Stop reason: HOSPADM

## 2022-07-31 RX ORDER — CEFEPIME HYDROCHLORIDE 1 G/50ML
2 INJECTION, SOLUTION INTRAVENOUS
Status: DISCONTINUED | OUTPATIENT
Start: 2022-07-31 | End: 2022-08-02

## 2022-07-31 RX ORDER — NAPROXEN SODIUM 220 MG/1
81 TABLET, FILM COATED ORAL DAILY
Status: DISCONTINUED | OUTPATIENT
Start: 2022-07-31 | End: 2022-08-09 | Stop reason: HOSPADM

## 2022-07-31 RX ORDER — TALC
6 POWDER (GRAM) TOPICAL NIGHTLY PRN
Status: DISCONTINUED | OUTPATIENT
Start: 2022-07-31 | End: 2022-07-31 | Stop reason: SDUPTHER

## 2022-07-31 RX ORDER — IBUPROFEN 200 MG
16 TABLET ORAL
Status: DISCONTINUED | OUTPATIENT
Start: 2022-07-31 | End: 2022-08-09 | Stop reason: HOSPADM

## 2022-07-31 RX ORDER — SODIUM CHLORIDE 0.9 % (FLUSH) 0.9 %
10 SYRINGE (ML) INJECTION EVERY 12 HOURS PRN
Status: DISCONTINUED | OUTPATIENT
Start: 2022-07-31 | End: 2022-08-09 | Stop reason: HOSPADM

## 2022-07-31 RX ADMIN — CARVEDILOL 3.12 MG: 3.12 TABLET, FILM COATED ORAL at 08:07

## 2022-07-31 RX ADMIN — TIMOLOL MALEATE 1 DROP: 5 SOLUTION OPHTHALMIC at 08:07

## 2022-07-31 RX ADMIN — MEGESTROL ACETATE 40 MG: 40 TABLET ORAL at 09:07

## 2022-07-31 RX ADMIN — CEFEPIME HYDROCHLORIDE 2 G: 2 INJECTION, SOLUTION INTRAVENOUS at 01:07

## 2022-07-31 RX ADMIN — BUMETANIDE 1 MG: 1 TABLET ORAL at 01:07

## 2022-07-31 RX ADMIN — LISINOPRIL 20 MG: 20 TABLET ORAL at 09:07

## 2022-07-31 RX ADMIN — LEVOTHYROXINE SODIUM 125 MCG: 25 TABLET ORAL at 06:07

## 2022-07-31 RX ADMIN — VANCOMYCIN HYDROCHLORIDE 2000 MG: 500 INJECTION, POWDER, LYOPHILIZED, FOR SOLUTION INTRAVENOUS at 04:07

## 2022-07-31 RX ADMIN — ATORVASTATIN CALCIUM 20 MG: 20 TABLET, FILM COATED ORAL at 08:07

## 2022-07-31 RX ADMIN — ASPIRIN 81 MG CHEWABLE TABLET 81 MG: 81 TABLET CHEWABLE at 09:07

## 2022-07-31 RX ADMIN — AZITHROMYCIN MONOHYDRATE 500 MG: 250 TABLET ORAL at 01:07

## 2022-07-31 RX ADMIN — ENOXAPARIN SODIUM 40 MG: 100 INJECTION SUBCUTANEOUS at 05:07

## 2022-07-31 RX ADMIN — CLOPIDOGREL 75 MG: 75 TABLET, FILM COATED ORAL at 09:07

## 2022-07-31 NOTE — PLAN OF CARE
Pt alert and oriented, no distress at this time, daughter at bedside, goo urine output. Bedrest watching tv, will continue to monitor.

## 2022-07-31 NOTE — ED PROVIDER NOTES
Encounter Date: 7/30/2022       History     Chief Complaint   Patient presents with    Fever     From [facility]. Fever of 102. Increased lethargy.      89 y/o female with PMH severe AS s/p TAVR (5/7/19), CAD s/p ostial RCA stent placement (4/19), IDBCA s/p resection and radiation (2003), thyroid cancer s/p resection (1992), HTN, PAD, sent to the ED from SNF for fever and altered mental status.  Patient is alert and oriented to self and place only, history was taken from her daughter.  She reports that patient is currently on rehab for UTI, and CHF exacerbation.  Today they felt that patient have a fever 102, refractory to Tylenol, also appear to be more fatigue and disoriented.  Her daughter said that patient used to get disoriented with a fever.  Denies shortness of breath, chest pain, abdominal pain, vomiting, diarrhea.        Review of patient's allergies indicates:   Allergen Reactions    Penicillins Swelling    Sulfa (sulfonamide antibiotics) Nausea Only     Past Medical History:   Diagnosis Date    Arthritis     Cancer     CHF (congestive heart failure)     Coronary artery disease     Hypertension     Thyroid disease      Past Surgical History:   Procedure Laterality Date    CARDIAC CATHETERIZATION      CARDIAC VALVE SURGERY      CHOLECYSTECTOMY      CORONARY ANGIOGRAPHY N/A 4/23/2019    Procedure: ANGIOGRAM, CORONARY ARTERY;  Surgeon: Bakari Singletary MD;  Location: [facility] CATH LAB;  Service: Cardiology;  Laterality: N/A;    HYSTERECTOMY      lymph nodes removal      THYROIDECTOMY      TRANSCATHETER AORTIC VALVE REPLACEMENT (TAVR) N/A 5/7/2019    Procedure: REPLACEMENT, AORTIC VALVE, TRANSCATHETER (TAVR);  Surgeon: Bakari Singletary MD;  Location: [facility] CATH LAB;  Service: Cardiology;  Laterality: N/A;     No family history on file.  Social History     Tobacco Use    Smoking status: Never Smoker    Smokeless tobacco: Never Used   Substance Use Topics    Alcohol use: Not Currently    Drug  use: Never     Review of Systems   Unable to perform ROS: Mental status change       Physical Exam     Initial Vitals   BP Pulse Resp Temp SpO2   07/30/22 2135 07/30/22 2135 07/30/22 2136 07/30/22 2135 07/30/22 2135   (!) 133/50 60 19 (!) 100.7 °F (38.2 °C) (!) 94 %      MAP       --                Physical Exam    Nursing note and vitals reviewed.  Constitutional: She appears well-developed. No distress.   HENT:   Head: Normocephalic and atraumatic.   Nose: Nose normal.   Mouth/Throat: Oropharynx is clear and moist.   Eyes: Conjunctivae and EOM are normal. Pupils are equal, round, and reactive to light.   Neck: No JVD present.   Normal range of motion.  Cardiovascular: Normal rate, regular rhythm, normal heart sounds and intact distal pulses.   Pulmonary/Chest: No respiratory distress. She has rales (left lower lobe).   Abdominal: Abdomen is soft. She exhibits no distension. There is no abdominal tenderness.   Musculoskeletal:         General: Edema present. No tenderness. Normal range of motion.      Cervical back: Normal range of motion.      Comments: Chronic lymphedema on her left arm     Neurological: She is alert. She has normal strength. No cranial nerve deficit.   Oriented to self and place only.  Following commands   Skin: Skin is warm and dry. Capillary refill takes less than 2 seconds. No rash noted.         ED Course   Procedures  Labs Reviewed   CBC W/ AUTO DIFFERENTIAL - Abnormal; Notable for the following components:       Result Value    RBC 3.42 (*)     Hemoglobin 9.2 (*)     Hematocrit 29.1 (*)     MCH 26.9 (*)     MCHC 31.6 (*)     RDW 18.2 (*)     Platelets 143 (*)     Lymph # 0.5 (*)     Gran % 85.6 (*)     Lymph % 7.9 (*)     All other components within normal limits   COMPREHENSIVE METABOLIC PANEL - Abnormal; Notable for the following components:    Calcium 8.5 (*)     Albumin 2.6 (*)     ALT 7 (*)     All other components within normal limits   URINALYSIS, REFLEX TO URINE CULTURE - Abnormal;  Notable for the following components:    Appearance, UA Hazy (*)     Leukocytes, UA Trace (*)     All other components within normal limits    Narrative:     Specimen Source->Urine   B-TYPE NATRIURETIC PEPTIDE - Abnormal; Notable for the following components:     (*)     All other components within normal limits   TROPONIN I - Abnormal; Notable for the following components:    Troponin I 0.028 (*)     All other components within normal limits   URINALYSIS MICROSCOPIC - Abnormal; Notable for the following components:    WBC, UA 11 (*)     All other components within normal limits    Narrative:     Specimen Source->Urine   CULTURE, BLOOD   CULTURE, BLOOD   CULTURE, URINE   LACTIC ACID, PLASMA   TSH   SARS-COV-2 RNA AMPLIFICATION, QUAL          Imaging Results          X-Ray Chest AP Portable (Final result)  Result time 07/30/22 22:36:50    Final result by Benjamín Jones MD (07/30/22 22:36:50)                 Impression:      Left pleural effusion with associated left basilar atelectasis or consolidation resulting in opacification of the lower left hemithorax, similar to prior.    No significant change from prior study.      Electronically signed by: Benjamín Jones MD  Date:    07/30/2022  Time:    22:36             Narrative:    EXAMINATION:  XR CHEST AP PORTABLE    CLINICAL HISTORY:  Sepsis;    TECHNIQUE:  Single frontal view of the chest was performed.    COMPARISON:  07/19/2022.    FINDINGS:  Pacer leads, similar to prior.    Left pleural effusion with associated left basilar atelectasis or consolidation resulting in opacification of the lower left hemithorax, similar to prior.    Heart and lungs  appear unchanged when allowing for differences in technique and positioning.                              X-Rays:   Independently Interpreted Readings:   Chest X-Ray: Cardiomegaly present.  Increased vascular markings consistent with CHF are present. Left pleural effusion present.     Medications   sodium  chloride 0.9% bolus 500 mL (0 mLs Intravenous Stopped 7/31/22 0008)   cefepime in dextrose 5 % IVPB 2 g (0 g Intravenous Stopped 7/31/22 0008)     Medical Decision Making:   History:   Old Medical Records: I decided to obtain old medical records.  Initial Assessment:   89 y/o female with PMH severe AS s/p TAVR (5/7/19), CAD s/p ostial RCA stent placement (4/19), IDBCA s/p resection and radiation (2003), thyroid cancer s/p resection (1992), HTN, PAD, sent to the ED from SNF for fever and altered mental status. Patient is alert, oriented to self and place only.  Patient has a fever on arrival (last tylenol was 1 hour ago), hemodynamically stable, hypoxia.  Differential Diagnosis:   UTI, viral illness, pneumonia, bacteremia, concern for sepsis, CHF exacerbation, electrolyte disturbance.  Doubt intracranial process  Clinical Tests:   Lab Tests: Ordered and Reviewed  Radiological Study: Ordered and Reviewed  Medical Tests: Ordered and Reviewed          [unfilled]   ED Course as of 07/31/22 0035   Sat Jul 30, 2022 2249 WBC: 5.82 [NC]   2249 Hemoglobin(!): 9.2 [NC]   2249 Platelets(!): 143 [NC]   2350 Troponin I(!): 0.028 [NC]   Sun Jul 31, 2022   0030 BNP(!): 785 [NC]   0030 Lactate, [place]: 1.4 [NC]   0030 TSH: 2.409 [NC]   0030 Urinalysis, Reflex to Urine Culture Urine, Clean Catch(!)  No UTI [NC]   0031 X-Ray Chest AP Portable  Left pleural effusion versus consolidation.  Stable from last x-ray.  Likely the source of her fever, we will treat with cefepime (Noted penicillin allergy, however, patient tolerating ceftriaxone before) [NC]   0032 Discussed with Hospital Medicine, pending covid result for admission.    [NC]   0034 Sign out to incoming team, will follow up with covid result. Anticipate admission for septic encephalopathy  [NC]      ED Course User Index  [NC] Ramiro Harris MD             Clinical Impression:   Final diagnoses:  [R41.0] Delirium                 aRmiro Harris MD  Resident  07/31/22 0035

## 2022-07-31 NOTE — SUBJECTIVE & OBJECTIVE
Past Medical History:   Diagnosis Date    Arthritis     Cancer     CHF (congestive heart failure)     Coronary artery disease     Hypertension     Thyroid disease        Past Surgical History:   Procedure Laterality Date    CARDIAC CATHETERIZATION      CARDIAC VALVE SURGERY      CHOLECYSTECTOMY      CORONARY ANGIOGRAPHY N/A 4/23/2019    Procedure: ANGIOGRAM, CORONARY ARTERY;  Surgeon: Bakari Singletary MD;  Location: [facility] CATH LAB;  Service: Cardiology;  Laterality: N/A;    HYSTERECTOMY      lymph nodes removal      THYROIDECTOMY      TRANSCATHETER AORTIC VALVE REPLACEMENT (TAVR) N/A 5/7/2019    Procedure: REPLACEMENT, AORTIC VALVE, TRANSCATHETER (TAVR);  Surgeon: Bakari Singletary MD;  Location: [facility] CATH LAB;  Service: Cardiology;  Laterality: N/A;       Review of patient's allergies indicates:   Allergen Reactions    Penicillins Swelling    Sulfa (sulfonamide antibiotics) Nausea Only       Current Facility-Administered Medications on File Prior to Encounter   Medication    [MAR Hold - Suspended Admission] acetaminophen tablet 650 mg    [MAR Hold - Suspended Admission] aspirin chewable tablet 81 mg    [MAR Hold - Suspended Admission] atorvastatin tablet 40 mg    [MAR Hold - Suspended Admission] bumetanide tablet 1 mg    [MAR Hold - Suspended Admission] calcium carbonate 200 mg calcium (500 mg) chewable tablet 500 mg    [MAR Hold - Suspended Admission] carvediloL tablet 3.125 mg    [MAR Hold - Suspended Admission] clopidogreL tablet 75 mg    [MAR Hold - Suspended Admission] levothyroxine tablet 125 mcg    [MAR Hold - Suspended Admission] lisinopriL tablet 20 mg    [COMPLETED] magnesium oxide tablet 400 mg    [MAR Hold - Suspended Admission] megestroL tablet 40 mg    [MAR Hold - Suspended Admission] miconazole nitrate 2% ointment    [MAR Hold - Suspended Admission] senna-docusate 8.6-50 mg per tablet 1 tablet    [MAR Hold - Suspended Admission] timolol maleate 0.5% ophthalmic solution 1 drop     Current Outpatient  Medications on File Prior to Encounter   Medication Sig    aspirin 81 MG Chew Take 81 mg by mouth once daily.    atorvastatin (LIPITOR) 40 MG tablet Take 0.5 tablets (20 mg total) by mouth every evening.    bumetanide (BUMEX) 1 MG tablet Take 1 tablet (1 mg total) by mouth once daily.    carvediloL (COREG) 3.125 MG tablet Take 1 tablet (3.125 mg total) by mouth 2 (two) times daily.    clopidogreL (PLAVIX) 75 mg tablet Take 1 tablet (75 mg total) by mouth once daily.    lisinopriL (PRINIVIL,ZESTRIL) 20 MG tablet Take 20 mg by mouth once daily.    megestroL (MEGACE) 40 MG Tab Take 1 tablet (40 mg total) by mouth once daily.    SYNTHROID 125 mcg tablet Take 125 mcg by mouth once daily.    TIMOPTIC OCUDOSE, PF, 0.5 % Dpet Place 1 drop into both eyes 2 (two) times a day.    [DISCONTINUED] albuterol-ipratropium (DUO-NEB) 2.5 mg-0.5 mg/3 mL nebulizer solution Take 3 mLs by nebulization every 6 (six) hours as needed for Wheezing. Rescue (Patient not taking: Reported on 7/3/2022)    [DISCONTINUED] amLODIPine (NORVASC) 10 MG tablet Take 1 tablet (10 mg total) by mouth once daily.    [DISCONTINUED] losartan (COZAAR) 50 MG tablet Take 50 mg by mouth once daily.    [DISCONTINUED] traZODone (DESYREL) 50 MG tablet Take 50 mg by mouth nightly.     Family History       Problem Relation (Age of Onset)    ALS Sister    Cancer Sister    Kidney disease Father          Tobacco Use    Smoking status: Never Smoker    Smokeless tobacco: Never Used   Substance and Sexual Activity    Alcohol use: Not Currently    Drug use: Never    Sexual activity: Not on file     Review of Systems   Constitutional:  Positive for chills and fever.   HENT:  Negative for congestion.    Eyes:  Negative for visual disturbance.   Respiratory:  Negative for cough and shortness of breath.    Cardiovascular:  Negative for chest pain and leg swelling.   Gastrointestinal:  Negative for abdominal pain, constipation, diarrhea, nausea and vomiting.   Genitourinary:   Negative for difficulty urinating and urgency.   Musculoskeletal:  Positive for joint swelling.        Lymphedema of L arm   Skin:  Negative for rash.   Neurological:  Negative for dizziness and headaches.   Psychiatric/Behavioral:  Negative for agitation.    Objective:     Vital Signs (Most Recent):  Temp: 99.3 °F (37.4 °C) (07/31/22 0259)  Pulse: 60 (07/31/22 0259)  Resp: 18 (07/31/22 0259)  BP: (!) 166/63 (07/31/22 0259)  SpO2: 98 % (07/31/22 0259)   Vital Signs (24h Range):  Temp:  [98.4 °F (36.9 °C)-102 °F (38.9 °C)] 99.3 °F (37.4 °C)  Pulse:  [59-60] 60  Resp:  [14-19] 18  SpO2:  [88 %-100 %] 98 %  BP: (125-200)/(50-80) 166/63     Weight: 90.3 kg (199 lb 1.2 oz)  Body mass index is 36.41 kg/m².    Physical Exam  Constitutional:       Appearance: Normal appearance.   HENT:      Head: Normocephalic and atraumatic.      Right Ear: External ear normal.      Left Ear: External ear normal.      Nose: Nose normal.      Mouth/Throat:      Mouth: Mucous membranes are moist.      Pharynx: Oropharynx is clear.   Eyes:      General: No scleral icterus.     Extraocular Movements: Extraocular movements intact.      Conjunctiva/sclera: Conjunctivae normal.   Cardiovascular:      Rate and Rhythm: Normal rate and regular rhythm.      Heart sounds: Normal heart sounds.   Pulmonary:      Effort: Pulmonary effort is normal. No respiratory distress.      Breath sounds: Rales present. No wheezing.      Comments: Rales heard in L lower lung zones    Abdominal:      General: Abdomen is flat. Bowel sounds are normal. There is no distension.      Palpations: Abdomen is soft.      Tenderness: There is no abdominal tenderness.   Musculoskeletal:         General: Swelling present. No tenderness.      Cervical back: Normal range of motion.      Comments: Lymphedema of LUE   Skin:     General: Skin is warm and dry.   Neurological:      Mental Status: She is alert and oriented to person, place, and time.   Psychiatric:         Behavior:  Behavior normal.           Significant Labs: All pertinent labs within the past 24 hours have been reviewed.  CBC:   Recent Labs   Lab 07/30/22 2212   WBC 5.82   HGB 9.2*   HCT 29.1*   *     CMP:   Recent Labs   Lab 07/30/22 2212      K 4.0      CO2 24      BUN 21   CREATININE 0.8   CALCIUM 8.5*   PROT 7.1   ALBUMIN 2.6*   BILITOT 0.8   ALKPHOS 79   AST 17   ALT 7*   ANIONGAP 8   EGFRNONAA >60.0     Cardiac Markers:   Recent Labs   Lab 07/30/22 2213   *     Urine Studies:   Recent Labs   Lab 07/30/22 2234   COLORU Yellow   APPEARANCEUA Hazy*   PHUR 5.0   SPECGRAV 1.015   PROTEINUA Negative   GLUCUA Negative   KETONESU Negative   BILIRUBINUA Negative   OCCULTUA Negative   NITRITE Negative   LEUKOCYTESUR Trace*   RBCUA 2   WBCUA 11*   BACTERIA Occasional   SQUAMEPITHEL 2       Significant Imaging: I have reviewed all pertinent imaging results/findings within the past 24 hours.

## 2022-07-31 NOTE — PLAN OF CARE
Plan of care reviewed with patient and her daughter on arrival to the unit.  Pt orientated to room and use of the call button. IV to right upper arm patent and infusing IV antibiotics. Pt afebrile. Pt orientated to person only. Daughter remains at bedside. No signs of bleeding noted

## 2022-07-31 NOTE — PROVIDER PROGRESS NOTES - EMERGENCY DEPT.
Encounter Date: 7/30/2022    ED Physician Progress Notes        Physician Note:   I assumed care of the pt at 11PM. The pt's covid test returned negative at 2359 and the pt was admitted to hospital medicine.

## 2022-07-31 NOTE — H&P
[facility] - [facility] ([address] Medicine  History & Physical    Patient Name: Pilar Michael  MRN: [id number]  Patient Class: IP- Inpatient  Admission Date: 7/30/2022  Attending Physician: Prince Stringer MD   Primary Care Provider: Joseph Noel MD         Patient information was obtained from patient, relative(s), past medical records and ER records.     Subjective:     Principal Problem:Sepsis    Chief Complaint:   Chief Complaint   Patient presents with    Fever     From [facility]. Fever of 102. Increased lethargy.         HPI: Ms. Michael is a 89 y.o. F with PMH systolic and diastolic HF, CAD s/p RCA stent, pacemaker placement, thyroid cancer s/p treatment 2003, breast cancer s/p mastectomy 01/2022, CVA 04/2022 without residual deficits, HLD, HTN, hypothyroidism who presents to the ED from [facility] for fever and AMS. She developed fever 7/30 up to 102F and became lethargic and confused. She had been at SNF for approximately one week after [place] [facility] [place].     Per daughter at bedside, the patient becomes confused and lethargic when she has fever. During previous admission for CHF exacerbation she was also treated empirically for CAP.     In the ED, she had T100.7, HR 60, /50, O2 94% on room air. She was placed on 2L NC with improvement in sats to 99%. CBC remarkable for H/H of 9/29, calcium 8.5, , troponin 0.028. UA hazy with trace leukocytes. CXR with L pleural effusion and L basilar atelectasis v consolidation, similar to prior study. She was given 2g Cefepime x1 dose for sepsis of unknown source. She is admitted to medicine for workup and treatment of sepsis.       Past Medical History:   Diagnosis Date    Arthritis     Cancer     CHF (congestive heart failure)     Coronary artery disease     Hypertension     Thyroid disease        Past Surgical History:   Procedure Laterality Date    CARDIAC CATHETERIZATION      CARDIAC VALVE  SURGERY      CHOLECYSTECTOMY      CORONARY ANGIOGRAPHY N/A 4/23/2019    Procedure: ANGIOGRAM, CORONARY ARTERY;  Surgeon: Bakari Singletary MD;  Location: [facility] CATH LAB;  Service: Cardiology;  Laterality: N/A;    HYSTERECTOMY      lymph nodes removal      THYROIDECTOMY      TRANSCATHETER AORTIC VALVE REPLACEMENT (TAVR) N/A 5/7/2019    Procedure: REPLACEMENT, AORTIC VALVE, TRANSCATHETER (TAVR);  Surgeon: Bakari Singletary MD;  Location: [facility] CATH LAB;  Service: Cardiology;  Laterality: N/A;       Review of patient's allergies indicates:   Allergen Reactions    Penicillins Swelling    Sulfa (sulfonamide antibiotics) Nausea Only       Current Facility-Administered Medications on File Prior to Encounter   Medication    [MAR Hold - Suspended Admission] acetaminophen tablet 650 mg    [MAR Hold - Suspended Admission] aspirin chewable tablet 81 mg    [MAR Hold - Suspended Admission] atorvastatin tablet 40 mg    [MAR Hold - Suspended Admission] bumetanide tablet 1 mg    [MAR Hold - Suspended Admission] calcium carbonate 200 mg calcium (500 mg) chewable tablet 500 mg    [MAR Hold - Suspended Admission] carvediloL tablet 3.125 mg    [MAR Hold - Suspended Admission] clopidogreL tablet 75 mg    [MAR Hold - Suspended Admission] levothyroxine tablet 125 mcg    [MAR Hold - Suspended Admission] lisinopriL tablet 20 mg    [COMPLETED] magnesium oxide tablet 400 mg    [MAR Hold - Suspended Admission] megestroL tablet 40 mg    [MAR Hold - Suspended Admission] miconazole nitrate 2% ointment    [MAR Hold - Suspended Admission] senna-docusate 8.6-50 mg per tablet 1 tablet    [MAR Hold - Suspended Admission] timolol maleate 0.5% ophthalmic solution 1 drop     Current Outpatient Medications on File Prior to Encounter   Medication Sig    aspirin 81 MG Chew Take 81 mg by mouth once daily.    atorvastatin (LIPITOR) 40 MG tablet Take 0.5 tablets (20 mg total) by mouth every evening.    bumetanide (BUMEX) 1 MG tablet Take  1 tablet (1 mg total) by mouth once daily.    carvediloL (COREG) 3.125 MG tablet Take 1 tablet (3.125 mg total) by mouth 2 (two) times daily.    clopidogreL (PLAVIX) 75 mg tablet Take 1 tablet (75 mg total) by mouth once daily.    lisinopriL (PRINIVIL,ZESTRIL) 20 MG tablet Take 20 mg by mouth once daily.    megestroL (MEGACE) 40 MG Tab Take 1 tablet (40 mg total) by mouth once daily.    SYNTHROID 125 mcg tablet Take 125 mcg by mouth once daily.    TIMOPTIC OCUDOSE, PF, 0.5 % Dpet Place 1 drop into both eyes 2 (two) times a day.    [DISCONTINUED] albuterol-ipratropium (DUO-NEB) 2.5 mg-0.5 mg/3 mL nebulizer solution Take 3 mLs by nebulization every 6 (six) hours as needed for Wheezing. Rescue (Patient not taking: Reported on 7/3/2022)    [DISCONTINUED] amLODIPine (NORVASC) 10 MG tablet Take 1 tablet (10 mg total) by mouth once daily.    [DISCONTINUED] losartan (COZAAR) 50 MG tablet Take 50 mg by mouth once daily.    [DISCONTINUED] traZODone (DESYREL) 50 MG tablet Take 50 mg by mouth nightly.     Family History       Problem Relation (Age of Onset)    ALS Sister    Cancer Sister    Kidney disease Father          Tobacco Use    Smoking status: Never Smoker    Smokeless tobacco: Never Used   Substance and Sexual Activity    Alcohol use: Not Currently    Drug use: Never    Sexual activity: Not on file     Review of Systems   Constitutional:  Positive for chills and fever.   HENT:  Negative for congestion.    Eyes:  Negative for visual disturbance.   Respiratory:  Negative for cough and shortness of breath.    Cardiovascular:  Negative for chest pain and leg swelling.   Gastrointestinal:  Negative for abdominal pain, constipation, diarrhea, nausea and vomiting.   Genitourinary:  Negative for difficulty urinating and urgency.   Musculoskeletal:  Positive for joint swelling.        Lymphedema of L arm   Skin:  Negative for rash.   Neurological:  Negative for dizziness and headaches.   Psychiatric/Behavioral:   Negative for agitation.    Objective:     Vital Signs (Most Recent):  Temp: 99.3 °F (37.4 °C) (07/31/22 0259)  Pulse: 60 (07/31/22 0259)  Resp: 18 (07/31/22 0259)  BP: (!) 166/63 (07/31/22 0259)  SpO2: 98 % (07/31/22 0259)   Vital Signs (24h Range):  Temp:  [98.4 °F (36.9 °C)-102 °F (38.9 °C)] 99.3 °F (37.4 °C)  Pulse:  [59-60] 60  Resp:  [14-19] 18  SpO2:  [88 %-100 %] 98 %  BP: (125-200)/(50-80) 166/63     Weight: 90.3 kg (199 lb 1.2 oz)  Body mass index is 36.41 kg/m².    Physical Exam  Constitutional:       Appearance: Normal appearance.   HENT:      Head: Normocephalic and atraumatic.      Right Ear: External ear normal.      Left Ear: External ear normal.      Nose: Nose normal.      Mouth/Throat:      Mouth: Mucous membranes are moist.      Pharynx: Oropharynx is clear.   Eyes:      General: No scleral icterus.     Extraocular Movements: Extraocular movements intact.      Conjunctiva/sclera: Conjunctivae normal.   Cardiovascular:      Rate and Rhythm: Normal rate and regular rhythm.      Heart sounds: Normal heart sounds.   Pulmonary:      Effort: Pulmonary effort is normal. No respiratory distress.      Breath sounds: Rales present. No wheezing.      Comments: Rales heard in L lower lung zones    Abdominal:      General: Abdomen is flat. Bowel sounds are normal. There is no distension.      Palpations: Abdomen is soft.      Tenderness: There is no abdominal tenderness.   Musculoskeletal:         General: Swelling present. No tenderness.      Cervical back: Normal range of motion.      Comments: Lymphedema of LUE   Skin:     General: Skin is warm and dry.   Neurological:      Mental Status: She is alert and oriented to person, place, and time.   Psychiatric:         Behavior: Behavior normal.           Significant Labs: All pertinent labs within the past 24 hours have been reviewed.  CBC:   Recent Labs   Lab 07/30/22  2212   WBC 5.82   HGB 9.2*   HCT 29.1*   *     CMP:   Recent Labs   Lab  "07/30/22 2212      K 4.0      CO2 24      BUN 21   CREATININE 0.8   CALCIUM 8.5*   PROT 7.1   ALBUMIN 2.6*   BILITOT 0.8   ALKPHOS 79   AST 17   ALT 7*   ANIONGAP 8   EGFRNONAA >60.0     Cardiac Markers:   Recent Labs   Lab 07/30/22 2213   *     Urine Studies:   Recent Labs   Lab 07/30/22 2234   COLORU Yellow   APPEARANCEUA Hazy*   PHUR 5.0   SPECGRAV 1.015   PROTEINUA Negative   GLUCUA Negative   KETONESU Negative   BILIRUBINUA Negative   OCCULTUA Negative   NITRITE Negative   LEUKOCYTESUR Trace*   RBCUA 2   WBCUA 11*   BACTERIA Occasional   SQUAMEPITHEL 2       Significant Imaging: I have reviewed all pertinent imaging results/findings within the past 24 hours.    Assessment/Plan:     * Sepsis  This patient does have evidence of infective focus  My overall impression is sepsis. Vital signs were reviewed and noted in progress note.  Antibiotics given-   Antibiotics (From admission, onward)            Start     Stop Route Frequency Ordered    07/31/22 1130  cefepime in dextrose 5 % IVPB 2 g         -- IV Every 12 hours (non-standard times) 07/31/22 0329    07/31/22 0430  vancomycin 2 g in dextrose 5 % 500 mL IVPB         -- IV Once 07/31/22 0330    07/31/22 0413  vancomycin - pharmacy to dose  (vancomycin IVPB)        "And" Linked Group Details    -- IV pharmacy to manage frequency 07/31/22 0328        Cultures were taken-   Microbiology Results (last 7 days)     Procedure Component Value Units Date/Time    Urine culture [[id number]] Collected: 07/30/22 2234    Order Status: No result Specimen: Urine Updated: 07/30/22 2313    Blood culture x two cultures. Draw prior to antibiotics. [[id number]] Collected: 07/30/22 2213    Order Status: Sent Specimen: Blood from Peripheral, Hand, Right Updated: 07/30/22 2220    Blood culture x two cultures. Draw prior to antibiotics. [[id number]] Collected: 07/30/22 2213    Order Status: Sent Specimen: Blood from Peripheral, Antecubital, Right Updated: " 07/30/22 2220        Latest lactate reviewed, they are-  Recent Labs   Lab 07/30/22 2212   LACTATE 1.4       Organ dysfunction indicated by Encephalopathy   Source- CAP     Source control Achieved by- antibiotics    CXR with L pleural effusion and L lower zone atelectasis v consolidation - possible source of sepsis  -Vanc/Cefepime started 7/31  -2L NC to maintain sats >92%  -f/u urine cx  -f/u blood cx  -f/u resp infection panel  -de-escalate antibiotics as appropriate      Delirium  Patient lethargic and confused in ED. Found to be oriented x3 after several hours. Intermittent confusion per daughter at bedside.  -delirium precautions placed      CAP (community acquired pneumonia)  Patient with CXR showing L pleural effusion, L basilar atelectasis v consolidation, requiring 2L NC. Daughter at bedside reports productive cough, though mostly in the mornings.  -Vanc/cefepime started  -f/u respiratory infection panel      Chronic combined systolic and diastolic heart failure  Patient recently admitted to [facility] for CHF exacerbation. She does not have LE edema or SOB at this time.  -on 2L NC  -continue home Bumex      Coronary artery disease involving native coronary artery of native heart without angina pectoris  S/p RCA stent placement  -continue home ASA 81mg  -continue home atorvastatin  -continue home Plavix      Severe aortic stenosis s/p TAVR  Continue to monitor      Essential hypertension  Systolic BP 130s in ED  -continue home carvedilol 3.125mg, lisinopril 20mg      Hyperlipidemia  -continue home atorvastatin 20mg      Acquired hypothyroidism  -continue home synthroid 125mcg      Normocytic anemia  Continue to monitor  -transfuse if Hgb<7  -daily CBC        VTE Risk Mitigation (From admission, onward)         Ordered     enoxaparin injection 40 mg  Daily         07/31/22 0304     IP VTE HIGH RISK PATIENT  Once         07/31/22 0304     Place sequential compression device  Until discontinued          07/31/22 0304                   PATRICK LECHUGA MD  Department of Hospital Medicine   [facility] - Intensive Care ([place]-)

## 2022-07-31 NOTE — NURSING
89 yo female admitted to  [id number] with fever and altered mental status. Pt base line per daughter is A/O x4; currently pt is alert to person only. Resp easy and regular to inspection - diminished to auscultation ( pt not taking a deep breath). Pt has significant lymphedema to left arm - history of breast cancer with mastectomy.

## 2022-07-31 NOTE — ASSESSMENT & PLAN NOTE
"This patient does have evidence of infective focus  My overall impression is sepsis. Vital signs were reviewed and noted in progress note.  Antibiotics given-   Antibiotics (From admission, onward)            Start     Stop Route Frequency Ordered    07/31/22 1130  cefepime in dextrose 5 % IVPB 2 g         -- IV Every 12 hours (non-standard times) 07/31/22 0329    07/31/22 0430  vancomycin 2 g in dextrose 5 % 500 mL IVPB         -- IV Once 07/31/22 0330    07/31/22 0413  vancomycin - pharmacy to dose  (vancomycin IVPB)        "And" Linked Group Details    -- IV pharmacy to manage frequency 07/31/22 0328        Cultures were taken-   Microbiology Results (last 7 days)     Procedure Component Value Units Date/Time    Urine culture [[id number]] Collected: 07/30/22 2234    Order Status: No result Specimen: Urine Updated: 07/30/22 2313    Blood culture x two cultures. Draw prior to antibiotics. [[id number]] Collected: 07/30/22 2213    Order Status: Sent Specimen: Blood from Peripheral, Hand, Right Updated: 07/30/22 2220    Blood culture x two cultures. Draw prior to antibiotics. [[id number]] Collected: 07/30/22 2213    Order Status: Sent Specimen: Blood from Peripheral, Antecubital, Right Updated: 07/30/22 2220        Latest lactate reviewed, they are-  Recent Labs   Lab 07/30/22 2212   LACTATE 1.4       Organ dysfunction indicated by Encephalopathy   Source- CAP     Source control Achieved by- antibiotics    CXR with L pleural effusion and L lower zone atelectasis v consolidation - possible source of sepsis  -Vanc/Cefepime started 7/31  -2L NC to maintain sats >92%  -f/u urine cx  -f/u blood cx  -f/u resp infection panel  -de-escalate antibiotics as appropriate    "

## 2022-07-31 NOTE — NURSING
Assess patient Vitals 88 RA  90-92 2L via NC, /80 Temp 102, Lethargic, confused, moaning, family reports patient did not eat or drink much of anything today. MD notified. Sent to [facility] stretcher transport for evaluation for higher level of care. Family at bedside

## 2022-07-31 NOTE — ASSESSMENT & PLAN NOTE
S/p RCA stent placement  -continue home ASA 81mg  -continue home atorvastatin  -continue home Plavix

## 2022-07-31 NOTE — ASSESSMENT & PLAN NOTE
Patient lethargic and confused in ED. Found to be oriented x3 after several hours. Intermittent confusion per daughter at bedside.  -delirium precautions placed

## 2022-07-31 NOTE — ASSESSMENT & PLAN NOTE
Patient with CXR showing L pleural effusion, L basilar atelectasis v consolidation, requiring 2L NC. Daughter at bedside reports productive cough, though mostly in the mornings.  -Vanc/cefepime started  -f/u respiratory infection panel

## 2022-07-31 NOTE — HPI
Ms. Michael is a 89 y.o. F with PMH systolic and diastolic HF, CAD s/p RCA stent, pacemaker placement, thyroid cancer s/p treatment 2003, breast cancer s/p mastectomy 01/2022, CVA 04/2022 without residual deficits, HLD, HTN, hypothyroidism who presents to the ED from SNF for fever and AMS. She developed fever 7/30 up to 102F and became lethargic and confused. She had been at SNF for approximately one week after being discharged from [facility] for acute exacerbation of CHF.     Per daughter at bedside, the patient becomes confused and lethargic when she has fever. During previous admission for CHF exacerbation she was also treated empirically for CAP.     In the ED, she had T100.7, HR 60, /50, O2 94% on room air. She was placed on 2L NC with improvement in sats to 99%. CBC remarkable for H/H of 9/29, calcium 8.5, , troponin 0.028. UA hazy with trace leukocytes. CXR with L pleural effusion and L basilar atelectasis v consolidation, similar to prior study. She was given 2g Cefepime x1 dose for sepsis of unknown source. She is admitted to medicine for workup and treatment of sepsis.

## 2022-07-31 NOTE — SUBJECTIVE & OBJECTIVE
Interval History:  No acute events overnight. Patient has complaints of fatigue. O2 sats dropped this morning when patient attempted to ambulate, rebounded quickly. Currently on  had an episode of  Lovenox held in anticipation of possible thoracentesis tomorrow.     Review of Systems   Constitutional:  Positive for fatigue. Negative for chills and fever.   HENT:  Negative for congestion and sore throat.    Respiratory:  Positive for shortness of breath (intermittent). Negative for cough.    Cardiovascular:  Negative for chest pain, palpitations and leg swelling.   Gastrointestinal:  Negative for abdominal pain, nausea and vomiting.   Genitourinary:  Negative for dysuria and hematuria.   Musculoskeletal:  Negative for back pain and myalgias.   Neurological:  Negative for light-headedness and headaches.   Psychiatric/Behavioral:  Negative for agitation. The patient is not nervous/anxious.    Objective:     Vital Signs (Most Recent):  Temp: 98.3 °F (36.8 °C) (07/31/22 1200)  Pulse: 60 (07/31/22 1237)  Resp: (!) 22 (07/31/22 1200)  BP: (!) 112/56 (07/31/22 1200)  SpO2: (!) 94 % (07/31/22 1237)   Vital Signs (24h Range):  Temp:  [98.3 °F (36.8 °C)-102 °F (38.9 °C)] 98.3 °F (36.8 °C)  Pulse:  [59-60] 60  Resp:  [14-31] 22  SpO2:  [88 %-100 %] 94 %  BP: (108-200)/(50-80) 112/56     Weight: 90.3 kg (199 lb 1.2 oz)  Body mass index is 36.41 kg/m².    Intake/Output Summary (Last 24 hours) at 7/31/2022 1449  Last data filed at 7/31/2022 0600  Gross per 24 hour   Intake 50 ml   Output 50 ml   Net 0 ml      Physical Exam  Constitutional:       Appearance: Normal appearance.   HENT:      Head: Normocephalic and atraumatic.      Right Ear: External ear normal.      Left Ear: External ear normal.      Nose: Nose normal.      Mouth/Throat:      Mouth: Mucous membranes are moist.      Pharynx: Oropharynx is clear.   Eyes:      General: No scleral icterus.     Extraocular Movements: Extraocular movements intact.       Conjunctiva/sclera: Conjunctivae normal.   Cardiovascular:      Rate and Rhythm: Normal rate and regular rhythm.      Heart sounds: Normal heart sounds.   Pulmonary:      Effort: Pulmonary effort is normal. No respiratory distress.      Breath sounds: Rales present. No wheezing.      Comments: Rales heard in L lower lung zones    Abdominal:      General: Abdomen is flat. Bowel sounds are normal. There is no distension.      Palpations: Abdomen is soft.      Tenderness: There is no abdominal tenderness.   Musculoskeletal:         General: Swelling present. No tenderness.      Cervical back: Normal range of motion.      Comments: Lymphedema of LUE   Skin:     General: Skin is warm and dry.   Neurological:      Mental Status: She is alert and oriented to person, place, and time.   Psychiatric:         Behavior: Behavior normal.       Significant Labs: All pertinent labs within the past 24 hours have been reviewed.    Significant Imaging: I have reviewed all pertinent imaging results/findings within the past 24 hours.

## 2022-07-31 NOTE — ED NOTES
LOC: The patient is awake, alert, and oriented to self and place only. Pt is calm and cooperative. Affect is appropriate.  Speech is appropriate and clear.     APPEARANCE: Patient resting comfortably in no acute distress.  Patient is clean and well groomed.    SKIN: The skin is warm and dry; color consistent with ethnicity.  Patient has normal skin turgor and moist mucus membranes.  Skin intact; no breakdown or bruising noted.     MUSCULOSKELETAL: Patient moving upper and lower extremities with difficulty; denies pain in the extremities or back.  Reports weakness.     RESPIRATORY: Airway is open and patent. Respirations spontaneous, even, easy, and non-labored.  Patient has a normal effort and rate.  No accessory muscle use noted. Denies cough.     CARDIAC:  Normal rate noted.  No peripheral edema noted. No complaints of chest pain.      ABDOMEN: Soft and non tender to palpation.  No distention noted. Pt denies abdominal pain; denies nausea, vomiting, diarrhea, or constipation.    NEUROLOGIC: Eyes open spontaneously.  Behavior appropriate to situation.  Follows commands; facial expression symmetrical.  Purposeful motor response noted; normal sensation in all extremities. Pt denies headache; denies lightheadedness or dizziness; denies visual disturbances; denies loss of balance; denies unilateral weakness.

## 2022-07-31 NOTE — PROGRESS NOTES
Pharmacokinetic Initial Assessment: IV Vancomycin    Assessment/Plan:    Initiate intravenous vancomycin with loading dose of 2000 mg once followed by a maintenance dose of vancomycin 1500mg IV every 24 hours  Desired empiric serum trough concentration is 15 to 20 mcg/mL  Draw vancomycin trough level 60 min prior to third dose on 8/2 at approximately 0330  Pharmacy will continue to follow and monitor vancomycin.      Please contact pharmacy at extension [phone] with any questions regarding this assessment.     Thank you for the consult,   Payam Benavides       Patient brief summary:  Pilar Michael is a 89 y.o. female initiated on antimicrobial therapy with IV Vancomycin for treatment of suspected lower respiratory infection    Drug Allergies:   Review of patient's allergies indicates:   Allergen Reactions    Penicillins Swelling    Sulfa (sulfonamide antibiotics) Nausea Only       Actual Body Weight:   90.3kg    Renal Function:   Estimated Creatinine Clearance: 48.8 mL/min (based on SCr of 0.8 mg/dL).,     Dialysis Method (if applicable):  N/A    CBC (last 72 hours):  Recent Labs   Lab Result Units 07/28/22  0549 07/30/22 2212   WBC K/uL 3.71* 5.82   Hemoglobin g/dL 8.9* 9.2*   Hematocrit % 27.9* 29.1*   Platelets K/uL 154 143*   Gran % % 65.3 85.6*   Lymph % % 18.1 7.9*   Mono % % 12.9 5.5   Eosinophil % % 2.7 0.5   Basophil % % 0.5 0.2   Differential Method  Automated Automated       Metabolic Panel (last 72 hours):  Recent Labs   Lab Result Units 07/28/22  0549 07/30/22 2212 07/30/22  2234   Sodium mmol/L 139 136  --    Potassium mmol/L 3.4* 4.0  --    Chloride mmol/L 107 104  --    CO2 mmol/L 22* 24  --    Glucose mg/dL 92 110  --    Glucose, UA   --   --  Negative   BUN mg/dL 19 21  --    Creatinine mg/dL 0.7 0.8  --    Albumin g/dL  --  2.6*  --    Total Bilirubin mg/dL  --  0.8  --    Alkaline Phosphatase U/L  --  79  --    AST U/L  --  17  --    ALT U/L  --  7*  --    Magnesium mg/dL 1.6  --   --     Phosphorus mg/dL 3.2  --   --        Drug levels (last 3 results):  No results for input(s): VANCOMYCINRA, VANCORANDOM, VANCOMYCINPE, VANCOPEAK, VANCOMYCINTR, VANCOTROUGH in the last 72 hours.    Microbiologic Results:  Microbiology Results (last 7 days)     Procedure Component Value Units Date/Time    Urine culture [[id number]] Collected: 07/30/22 2234    Order Status: No result Specimen: Urine Updated: 07/30/22 2313    Blood culture x two cultures. Draw prior to antibiotics. [[id number]] Collected: 07/30/22 2213    Order Status: Sent Specimen: Blood from Peripheral, Hand, Right Updated: 07/30/22 2220    Blood culture x two cultures. Draw prior to antibiotics. [[id number]] Collected: 07/30/22 2213    Order Status: Sent Specimen: Blood from Peripheral, Antecubital, Right Updated: 07/30/22 2220

## 2022-07-31 NOTE — NURSING
"During rounds, patient was laying in bed appearing to be sleeping but groaning. Blood pressure elevated, 02 sats low on RA, Fever of 101.7, very lethargic; patient's daughter stated, "my mom has not eaten nor drank much of anything all day." Nurse contacted RN and RN came to assess patient and decided to send out patient to ED for possible higher level of care. MD on call was notified.    "

## 2022-07-31 NOTE — ASSESSMENT & PLAN NOTE
Patient recently admitted to [facility] for CHF exacerbation. She does not have LE edema or SOB at this time.  -on 2L NC  -continue home Bumex

## 2022-08-01 PROBLEM — J90 PLEURAL EFFUSION: Status: ACTIVE | Noted: 2022-08-01

## 2022-08-01 LAB
ALBUMIN SERPL BCP-MCNC: 2.3 G/DL (ref 3.5–5.2)
ALP SERPL-CCNC: 74 U/L (ref 55–135)
ALT SERPL W/O P-5'-P-CCNC: 9 U/L (ref 10–44)
ANION GAP SERPL CALC-SCNC: 7 MMOL/L (ref 8–16)
AST SERPL-CCNC: 19 U/L (ref 10–40)
BACTERIA UR CULT: NORMAL
BASOPHILS # BLD AUTO: 0.01 K/UL (ref 0–0.2)
BASOPHILS NFR BLD: 0.2 % (ref 0–1.9)
BILIRUB SERPL-MCNC: 0.8 MG/DL (ref 0.1–1)
BUN SERPL-MCNC: 20 MG/DL (ref 8–23)
CALCIUM SERPL-MCNC: 8.4 MG/DL (ref 8.7–10.5)
CHLORIDE SERPL-SCNC: 106 MMOL/L (ref 95–110)
CO2 SERPL-SCNC: 21 MMOL/L (ref 23–29)
CREAT SERPL-MCNC: 0.7 MG/DL (ref 0.5–1.4)
DIFFERENTIAL METHOD: ABNORMAL
EOSINOPHIL # BLD AUTO: 0.1 K/UL (ref 0–0.5)
EOSINOPHIL NFR BLD: 3.1 % (ref 0–8)
ERYTHROCYTE [DISTWIDTH] IN BLOOD BY AUTOMATED COUNT: 18.3 % (ref 11.5–14.5)
EST. GFR  (NO RACE VARIABLE): >60 ML/MIN/1.73 M^2
GLUCOSE SERPL-MCNC: 117 MG/DL (ref 70–110)
HCT VFR BLD AUTO: 28.6 % (ref 37–48.5)
HGB BLD-MCNC: 9.2 G/DL (ref 12–16)
IMM GRANULOCYTES # BLD AUTO: 0.02 K/UL (ref 0–0.04)
IMM GRANULOCYTES NFR BLD AUTO: 0.4 % (ref 0–0.5)
LYMPHOCYTES # BLD AUTO: 0.7 K/UL (ref 1–4.8)
LYMPHOCYTES NFR BLD: 14.7 % (ref 18–48)
MAGNESIUM SERPL-MCNC: 1.6 MG/DL (ref 1.6–2.6)
MCH RBC QN AUTO: 26.9 PG (ref 27–31)
MCHC RBC AUTO-ENTMCNC: 32.2 G/DL (ref 32–36)
MCV RBC AUTO: 84 FL (ref 82–98)
MONOCYTES # BLD AUTO: 0.5 K/UL (ref 0.3–1)
MONOCYTES NFR BLD: 10.1 % (ref 4–15)
NEUTROPHILS # BLD AUTO: 3.3 K/UL (ref 1.8–7.7)
NEUTROPHILS NFR BLD: 71.5 % (ref 38–73)
NRBC BLD-RTO: 0 /100 WBC
PHOSPHATE SERPL-MCNC: 3.1 MG/DL (ref 2.7–4.5)
PLATELET # BLD AUTO: 129 K/UL (ref 150–450)
PMV BLD AUTO: 11.6 FL (ref 9.2–12.9)
POTASSIUM SERPL-SCNC: 3.4 MMOL/L (ref 3.5–5.1)
PROT SERPL-MCNC: 6.9 G/DL (ref 6–8.4)
RBC # BLD AUTO: 3.42 M/UL (ref 4–5.4)
SODIUM SERPL-SCNC: 134 MMOL/L (ref 136–145)
WBC # BLD AUTO: 4.56 K/UL (ref 3.9–12.7)

## 2022-08-01 PROCEDURE — 20600001 HC STEP DOWN PRIVATE ROOM

## 2022-08-01 PROCEDURE — 99222 1ST HOSP IP/OBS MODERATE 55: CPT | Mod: GC,,, | Performed by: INTERNAL MEDICINE

## 2022-08-01 PROCEDURE — 25000003 PHARM REV CODE 250: Performed by: STUDENT IN AN ORGANIZED HEALTH CARE EDUCATION/TRAINING PROGRAM

## 2022-08-01 PROCEDURE — 80053 COMPREHEN METABOLIC PANEL: CPT

## 2022-08-01 PROCEDURE — 99232 PR SUBSEQUENT HOSPITAL CARE,LEVL II: ICD-10-PCS | Mod: GC,,, | Performed by: HOSPITALIST

## 2022-08-01 PROCEDURE — 99232 SBSQ HOSP IP/OBS MODERATE 35: CPT | Mod: GC,,, | Performed by: HOSPITALIST

## 2022-08-01 PROCEDURE — 63700000 PHARM REV CODE 250 ALT 637 W/O HCPCS: Performed by: STUDENT IN AN ORGANIZED HEALTH CARE EDUCATION/TRAINING PROGRAM

## 2022-08-01 PROCEDURE — 99222 PR INITIAL HOSPITAL CARE,LEVL II: ICD-10-PCS | Mod: GC,,, | Performed by: INTERNAL MEDICINE

## 2022-08-01 PROCEDURE — 25000003 PHARM REV CODE 250

## 2022-08-01 PROCEDURE — 85025 COMPLETE CBC W/AUTO DIFF WBC: CPT

## 2022-08-01 PROCEDURE — 63600175 PHARM REV CODE 636 W HCPCS

## 2022-08-01 PROCEDURE — 83735 ASSAY OF MAGNESIUM: CPT

## 2022-08-01 PROCEDURE — 27000221 HC OXYGEN, UP TO 24 HOURS

## 2022-08-01 PROCEDURE — 97530 THERAPEUTIC ACTIVITIES: CPT

## 2022-08-01 PROCEDURE — 84100 ASSAY OF PHOSPHORUS: CPT

## 2022-08-01 PROCEDURE — 25000003 PHARM REV CODE 250: Performed by: INTERNAL MEDICINE

## 2022-08-01 PROCEDURE — 94799 UNLISTED PULMONARY SVC/PX: CPT

## 2022-08-01 PROCEDURE — 94761 N-INVAS EAR/PLS OXIMETRY MLT: CPT

## 2022-08-01 PROCEDURE — 97161 PT EVAL LOW COMPLEX 20 MIN: CPT

## 2022-08-01 PROCEDURE — 36415 COLL VENOUS BLD VENIPUNCTURE: CPT

## 2022-08-01 PROCEDURE — 63600175 PHARM REV CODE 636 W HCPCS: Performed by: INTERNAL MEDICINE

## 2022-08-01 RX ORDER — LISINOPRIL 20 MG/1
40 TABLET ORAL DAILY
Status: DISCONTINUED | OUTPATIENT
Start: 2022-08-02 | End: 2022-08-02

## 2022-08-01 RX ORDER — POTASSIUM CHLORIDE 20 MEQ/1
40 TABLET, EXTENDED RELEASE ORAL ONCE
Status: COMPLETED | OUTPATIENT
Start: 2022-08-01 | End: 2022-08-01

## 2022-08-01 RX ORDER — HYDRALAZINE HYDROCHLORIDE 25 MG/1
25 TABLET, FILM COATED ORAL
Status: DISCONTINUED | OUTPATIENT
Start: 2022-08-01 | End: 2022-08-06

## 2022-08-01 RX ADMIN — TIMOLOL MALEATE 1 DROP: 5 SOLUTION OPHTHALMIC at 08:08

## 2022-08-01 RX ADMIN — CEFEPIME HYDROCHLORIDE 2 G: 2 INJECTION, SOLUTION INTRAVENOUS at 01:08

## 2022-08-01 RX ADMIN — LISINOPRIL 20 MG: 20 TABLET ORAL at 08:08

## 2022-08-01 RX ADMIN — AZITHROMYCIN MONOHYDRATE 500 MG: 250 TABLET ORAL at 08:08

## 2022-08-01 RX ADMIN — MEGESTROL ACETATE 40 MG: 40 TABLET ORAL at 08:08

## 2022-08-01 RX ADMIN — CARVEDILOL 3.12 MG: 3.12 TABLET, FILM COATED ORAL at 08:08

## 2022-08-01 RX ADMIN — POTASSIUM CHLORIDE 40 MEQ: 1500 TABLET, EXTENDED RELEASE ORAL at 05:08

## 2022-08-01 RX ADMIN — ASPIRIN 81 MG CHEWABLE TABLET 81 MG: 81 TABLET CHEWABLE at 08:08

## 2022-08-01 RX ADMIN — ENOXAPARIN SODIUM 40 MG: 100 INJECTION SUBCUTANEOUS at 05:08

## 2022-08-01 RX ADMIN — BUMETANIDE 1 MG: 1 TABLET ORAL at 02:08

## 2022-08-01 RX ADMIN — CEFEPIME HYDROCHLORIDE 2 G: 2 INJECTION, SOLUTION INTRAVENOUS at 11:08

## 2022-08-01 RX ADMIN — CLOPIDOGREL 75 MG: 75 TABLET, FILM COATED ORAL at 08:08

## 2022-08-01 RX ADMIN — LEVOTHYROXINE SODIUM 125 MCG: 25 TABLET ORAL at 06:08

## 2022-08-01 RX ADMIN — HYDRALAZINE HYDROCHLORIDE 25 MG: 50 TABLET ORAL at 05:08

## 2022-08-01 RX ADMIN — VANCOMYCIN HYDROCHLORIDE 1500 MG: 1.5 INJECTION, POWDER, LYOPHILIZED, FOR SOLUTION INTRAVENOUS at 05:08

## 2022-08-01 RX ADMIN — ATORVASTATIN CALCIUM 20 MG: 20 TABLET, FILM COATED ORAL at 08:08

## 2022-08-01 RX ADMIN — HYDRALAZINE HYDROCHLORIDE 25 MG: 50 TABLET ORAL at 09:08

## 2022-08-01 NOTE — CONSULTS
Consult Note  Pulmonary & Critical Care Medicine    Attending: Akiko Smith  Fellow: Shannan Borrego  Admit Date: 7/30/2022  Today's Date: 08/01/2022  Reason for Consult:  Bilateral Pleural Effusions    SUBJECTIVE:     HPI: Ms. Michael is a 89yo CF with a PMH significant for HFpEF, CAD, AS, and PPM placement who presented to this facility from [facility] 7/30 for fever and acute encephalopathy. She was admitted to medicine for treatment of sepsis with an unknown source at that time. She has been treated with vancomycin and cefepime since admission. WBC has not been elevated this admission with a transient granulocytosis noted. Procal was negative and BNP was elevated. Afebrile since admission. Pulmonology consulted for evaluation of pleural effusions.     On review of previous imaging, bilateral pleural effusions have been present since 7/18 with notable improvement on subsequent CXRs. Ms. Michael had 2 previous admissions this month for CHF exacerbations with 1 accompanied with UTI. She was diuresed on furosemide and was subsequently switched to BID bumex for improved diuresis. She was discharged on Bumex BID, but it was changed to daily per her cardiologist. Oxygen saturations have been in the mid to low 90s this admission with an increase to 100% on 2L NC. Her BP has also been persistently elevated with the SBP being in the 200s during my interview.     Review of patient's allergies indicates:   Allergen Reactions    Penicillins Swelling    Sulfa (sulfonamide antibiotics) Nausea Only       Past Medical History:   Diagnosis Date    Arthritis     Cancer     CHF (congestive heart failure)     Coronary artery disease     Hypertension     Thyroid disease      Past Surgical History:   Procedure Laterality Date    CARDIAC CATHETERIZATION      CARDIAC VALVE SURGERY      CHOLECYSTECTOMY      CORONARY ANGIOGRAPHY N/A 4/23/2019    Procedure: ANGIOGRAM, CORONARY ARTERY;  Surgeon: Bakari Singletary MD;  Location: [facility]  CATH LAB;  Service: Cardiology;  Laterality: N/A;    HYSTERECTOMY      lymph nodes removal      THYROIDECTOMY      TRANSCATHETER AORTIC VALVE REPLACEMENT (TAVR) N/A 5/7/2019    Procedure: REPLACEMENT, AORTIC VALVE, TRANSCATHETER (TAVR);  Surgeon: Bakari Singletary MD;  Location: [facility] CATH LAB;  Service: Cardiology;  Laterality: N/A;     Family History   Problem Relation Age of Onset    Kidney disease Father     Cancer Sister     ALS Sister      Social History     Tobacco Use    Smoking status: Never Smoker    Smokeless tobacco: Never Used   Substance Use Topics    Alcohol use: Not Currently    Drug use: Never       All medications reviewed.    Review of Systems   Constitutional: Positive for malaise/fatigue. Negative for chills and fever.   HENT: Negative for congestion, hearing loss, sinus pain and sore throat.    Eyes: Negative for blurred vision and double vision.   Respiratory: Positive for shortness of breath. Negative for cough, hemoptysis, sputum production and wheezing.    Cardiovascular: Positive for orthopnea. Negative for chest pain, palpitations, claudication, leg swelling and PND.   Gastrointestinal: Negative for abdominal pain, constipation, diarrhea, heartburn, nausea and vomiting.   Genitourinary: Negative for dysuria, frequency and urgency.   Musculoskeletal: Negative for falls, joint pain and myalgias.   Skin: Negative for itching and rash.   Neurological: Positive for weakness. Negative for dizziness, sensory change, speech change, focal weakness and headaches.   All other systems reviewed and are negative.      OBJECTIVE:     Vital Signs Trends/Hx Reviewed  Vitals:    08/01/22 0550 08/01/22 0750 08/01/22 0814 08/01/22 1505   BP: (!) 156/70  (!) 165/77 (!) 175/78   BP Location: Right arm  Right arm Right arm   Patient Position: Lying  Lying Lying   Pulse: 60  60 60   Resp: 20  (!) 24 (!) 22   Temp: 98.1 °F (36.7 °C)  98.7 °F (37.1 °C) 98.7 °F (37.1 °C)   TempSrc: Oral  Oral Oral   SpO2:  (!) 92% 97% 98% 95%   Weight:       Height:           Physical Exam:  General: Obese CF sitting up in bed in NAD, cooperative & interactive.  HEENT: AT/NC, PERRL, EOMI, oral and nasal mucosa moist. NC in place.  Neck: Supple without JVD or palpable LAD.   Cardiac: normal rate, regular rhythm, with no MRG with brisk cap refill and symmetric pulses in distal extremities.  Respiratory: Normal inspection. Symmetric chest rise. Normal palpation and percussion. Auscultation clear bilaterally. Decreased breath sound LLL. No increased work of breathing noted.   Abdomen: Soft, NT/ND. Obese abdomen. +BS. No hepatosplenomegaly.   Extremities: Left arm chronically significantly more swollen than right.  Neuro: Grossly intact to brief exam. Oriented x3 with appropriate mood/affect to situation.       Laboratory:  No results for input(s): PH, PCO2, PO2, HCO3, POCSATURATED, BE in the last 24 hours.  Recent Labs   Lab 08/01/22  0702   WBC 4.56   RBC 3.42*   HGB 9.2*   HCT 28.6*   *   MCV 84   MCH 26.9*   MCHC 32.2     Recent Labs   Lab 08/01/22  0702   *   K 3.4*      CO2 21*   BUN 20   CREATININE 0.7   MG 1.6       Microbiology Data:   Microbiology Results (last 7 days)     Procedure Component Value Units Date/Time    Urine culture [[id number]] Collected: 07/30/22 2234    Order Status: Completed Specimen: Urine Updated: 08/01/22 0116     Urine Culture, Routine No significant growth    Narrative:      Specimen Source->Urine    Respiratory Infection Panel (PCR), Nasopharyngeal [[id number]] Collected: 07/31/22 1602    Order Status: Completed Specimen: Nasopharyngeal Swab Updated: 07/31/22 2358     Respiratory Infection Panel Source NP Swab     Adenovirus Not Detected     Coronavirus 229E, Common Cold Virus Not Detected     Coronavirus HKU1, Common Cold Virus Not Detected     Coronavirus NL63, Common Cold Virus Not Detected     Coronavirus OC43, Common Cold Virus Not Detected     Comment: The Coronavirus strains  detected in this test cause the common cold.  These strains are not the COVID-19 (novel Coronavirus)strain   associated with the respiratory disease outbreak.          SARS-CoV2 (COVID-19) Qualitative PCR Not Detected     Human Metapneumovirus Not Detected     Human Rhinovirus/Enterovirus Not Detected     Influenza A (subtypes H1, H1-2009,H3) Not Detected     Influenza B Not Detected     Parainfluenza Virus 1 Not Detected     Parainfluenza Virus 2 Not Detected     Parainfluenza Virus 3 Not Detected     Parainfluenza Virus 4 Not Detected     Respiratory Syncytial Virus Not Detected     Bordetella Parapertussis (HW0050) Not Detected     Bordetella pertussis (ptxP) Not Detected     Chlamydia pneumoniae Not Detected     Mycoplasma pneumoniae Not Detected    Narrative:      For all other respiratory sources, order [id number] -  Respiratory Viral Panel by PCR    Blood culture x two cultures. Draw prior to antibiotics. [[id number]] Collected: 07/30/22 2213    Order Status: Completed Specimen: Blood from Peripheral, Antecubital, Right Updated: 07/31/22 2312     Blood Culture, Routine No Growth to date      No Growth to date    Narrative:      Aerobic and anaerobic    Blood culture x two cultures. Draw prior to antibiotics. [[id number]] Collected: 07/30/22 2213    Order Status: Completed Specimen: Blood from Peripheral, Hand, Right Updated: 07/31/22 2312     Blood Culture, Routine No Growth to date      No Growth to date    Narrative:      Aerobic and anaerobic    Culture, Respiratory with Gram Stain [[id number]]     Order Status: No result Specimen: Respiratory            Chest Imaging:   CT Chest- Bilateral pleural effusions with left greater than right. Area of atelectasis associated with left pleural effusion.     Scheduled Medications:    aspirin  81 mg Oral Daily    atorvastatin  20 mg Oral QHS    azithromycin  500 mg Oral Daily    bumetanide  1 mg Oral Daily    carvediloL  3.125 mg Oral BID    ceFEPime (MAXIPIME) IVPB   2 g Intravenous Q12H    clopidogreL  75 mg Oral Daily    enoxaparin  40 mg Subcutaneous Daily    levothyroxine  125 mcg Oral Before breakfast    lisinopriL  20 mg Oral Daily    megestroL  40 mg Oral Daily    timolol maleate 0.5%  1 drop Both Eyes BID    vancomycin (VANCOCIN) IVPB  1,500 mg Intravenous Q24H       PRN Medications:   acetaminophen, dextrose 10%, dextrose 10%, glucagon (human recombinant), glucose, glucose, melatonin, naloxone, sodium chloride 0.9%, sodium chloride 0.9%, sodium chloride 0.9%    Assessment & Plan:   Patient Active Problem List   Diagnosis    Severe aortic stenosis s/p TAVR    Acquired hypothyroidism    Essential hypertension    S/P TAVR (transcatheter aortic valve replacement)    Chronic combined systolic and diastolic heart failure    Coronary artery disease involving native coronary artery of native heart without angina pectoris    Presence of permanent cardiac pacemaker    CAP (community acquired pneumonia)    Acute cystitis    Normocytic anemia    Increased anion gap metabolic acidosis    Hyperlipidemia    Acute on chronic combined systolic (congestive) and diastolic (congestive) heart failure    Debility    Palliative care encounter    Acute hypoxemic respiratory failure    Delirium    Sepsis       ASSESSMENT & RECOMMENDATIONS     #Bilateral Pleural Effusions  -Seen again on CT chest. L>R with atelectasis associated with the left effusion. Pleural effusions have been present and improving in size with diuresis since initial presentation 7/18.   -Unclear at this time of pleural effusions are a source of infection. Aside from initial fever, no other evidence of infection is present as blood cultures continue to be negative with a negative procal and lactate. A 2nd viable option for pleural effusions is slowly resolving CHF exacerbation with the need for more aggressive diuresis as her diuretics were reduced by cardiology after discharge.   -Spoke to patient  about thoracentesis and she stated that she would be interested in the procedure if necessary and she would like us to speak with her daughter.    -If thoracentesis is performed tomorrow, patient does not have to be NPO for the bedside procedure. Please hold lovenox. Will discuss case with pulmonary staff.      Thank you for allowing us to participate in the care of this patient. We will continue to follow. Please call with questions.    Shannan Borrego M.D., PGY-IV  [employer] Pulmonary/Critical Care Fellow

## 2022-08-01 NOTE — PROGRESS NOTES
[facility]                                  Skilled Nursing Facility                   Progress Note     Admit Date: 7/22/2022  CHARLIE 8/10/2022  Principal Problem:  Chronic combined systolic and diastolic heart failure   HPI obtained from patient interview and chart review     Chief Complaint:  Re-evaluation of medical treatment and therapy status:  Evaluation of lethargy    HPI:   Mrs. Michael is an 87 year old female PMHx of severe AS s/p TAVR (5/7/19), CAD s/p ostial RCA stent placement (4/19), IDBCA s/p resection and radiation (2003), thyroid cancer s/p resection (1992), HTN, PAD who presents to SNF following hospitalization for acute hypoxemic respiratory failure with acute on chronic diastolic heart failure.     Interval history: 24 hr vital sign ranges listed below.  Patient appears lethargic today.  Patient states that she slept well last night and has been able to maintain better alertness today.  Patient denies shortness of breath, abdominal discomfort, nausea, or vomiting.  Patient reports an adequate appetite.  Patient denies dysuria.  Patient reports having regular bowel movements.  Patient progessing with PT/OT. Continuing to follow and treat all acute and chronic conditions.    Past Medical History: Patient has a past medical history of Arthritis, Cancer, CHF (congestive heart failure), Coronary artery disease, Hypertension, and Thyroid disease.    Past Surgical History: Patient has a past surgical history that includes Hysterectomy; Thyroidectomy; lymph nodes removal; Cholecystectomy; Coronary angiography (N/A, 4/23/2019); Transcatheter aortic valve replacement (TAVR) (N/A, 5/7/2019); Cardiac valuve replacement; and Cardiac catheterization.    Social History: Patient reports that she has never smoked. She has never used smokeless tobacco. She reports previous alcohol use. She reports that she does not use drugs.    Family  History:  No significant family history to report    Allergies: Patient is allergic to penicillins and sulfa (sulfonamide antibiotics).    ROS  Constitutional: Negative for fever.  +lethargic, improved   Eyes: Negative for blurred vision, double vision   Respiratory: Negative for cough, shortness of breath   Cardiovascular: Negative for chest pain, palpitations. + leg swelling.   Gastrointestinal: Negative for abdominal pain, constipation, diarrhea, nausea, vomiting.   Genitourinary: Negative for dysuria, frequency   Musculoskeletal:  + generalized weakness. Negative for back pain and myalgias.   Skin: Negative for itching and rash.   Neurological: Negative for dizziness, headaches.   Psychiatric/Behavioral: Negative for depression. The patient is not nervous/anxious.      24 hour Vital Sign Range   Temp:  [98.1 °F (36.7 °C)-98.8 °F (37.1 °C)]   Pulse:  [60]   Resp:  [18-24]   BP: (139-165)/(63-77)   SpO2:  [92 %-98 %]     PEx  Constitutional: Patient appears debilitated.  No distress noted  HENT:   Head: Normocephalic and atraumatic.   Eyes: Pupils are equal, round  Neck: Normal range of motion. Neck supple.   Cardiovascular: Normal rate, regular rhythm.  +murmur  Pulmonary/Chest: Effort normal and breath sounds are clear  Abdominal: Soft. Bowel sounds are normal.   Musculoskeletal: Normal range of motion.   Neurological: Alert and oriented to person, place, and time.   Psychiatric: Normal mood and affect. Behavior is normal.   Skin: Skin is warm and dry.    Recent Labs   Lab 08/01/22  0702   *   K 3.4*      CO2 21*   BUN 20   CREATININE 0.7   MG 1.6       Recent Labs   Lab 08/01/22  0702   WBC 4.56   RBC 3.42*   HGB 9.2*   HCT 28.6*   *   MCV 84   MCH 26.9*   MCHC 32.2         Assessment and Plan:    Acute hypoxemic respiratory failure due to Acute on Chronic HFpEF  PMHx HFpEF, valvular heart disease p/w dyspnea and increasing oxygen requirement with evidence of volume overload on physical exam  in the setting of an elevated BNP(1347). Echo in 3/2022 demonstrated EF 55-60% with mitral stenosis. Repeat TTE EF 65% with grade II LVD dysfunction with MR related to MV degenerative disease.  -Continue Bumex 1mg daily   -Strict intake & output with fluid restriction to <2L daily    Recurrent Urinary Tract Infection  Patient with recurrent UTIs, prior urine culture shows Pseudomonas only sensitive to imipenem, tobramycin, gentamicin.  Was inadequately treated with ciprofloxacin last month.  -Meropenem completed      Wounds to Groin, Perineum, and buttox 2/2 urinary incontinence  -maintain pur whick as able to keep area dry  - wound care consult     Dysphasia, history of  -soft dysphasia diet in hospital  -7/12 initiated soft texture diet and SLP consult     Severe aortic stenosis s/p TAVR  PMHx severe AS a TAVR in 2019     Symptomatic bradycardia s/p pacemaker.  Pacemaker placed in 2019 for complete heart block and symptomatic bradycardia, currently ventricularly paced at 60 beats per minute     Essential Hypertension  Blood pressure over controlled in 89 year old.   Lisinopril 10mg daily     Hyperlipidemia  - lipitor 20 mg PO daily     Hypothyroidism  - Synthroid 125 mcg PO Daily       Anticipate disposition:  Home with home health      Follow-up needed during SNF stay-    Follow-up needed after discharge from SNF: PCP    No future appointments.          Lenka Salazar NP  Department of Hospital Medicine   [facility]     DOS: 7/26/2022       Patient note was created using MModal Dictation.  Any errors in syntax or even information may not have been identified and edited on initial review prior to signing this note.

## 2022-08-01 NOTE — ASSESSMENT & PLAN NOTE
Patient with CXR showing L pleural effusion, L basilar atelectasis v consolidation, requiring 2L NC. Daughter at bedside reports productive cough, though mostly in the mornings.  -Vanc/cefepime/azithromycin started  - Pleural effusion on CT chest, pulmonology consulted for possible thoracentesis  - Lovenox held for possible thoracentesis  -f/u respiratory infection panel

## 2022-08-01 NOTE — PLAN OF CARE
Problem: Adult Inpatient Plan of Care  Goal: Plan of Care Review  Outcome: Ongoing, Progressing  Goal: Patient-Specific Goal (Individualized)  Outcome: Ongoing, Progressing  Goal: Absence of Hospital-Acquired Illness or Injury  Outcome: Ongoing, Progressing  Goal: Optimal Comfort and Wellbeing  Outcome: Ongoing, Progressing  Goal: Readiness for Transition of Care  Outcome: Ongoing, Progressing     Problem: Fluid Imbalance (Pneumonia)  Goal: Fluid Balance  Outcome: Ongoing, Progressing     Problem: Infection (Pneumonia)  Goal: Resolution of Infection Signs and Symptoms  Outcome: Ongoing, Progressing     Problem: Respiratory Compromise (Pneumonia)  Goal: Effective Oxygenation and Ventilation  Outcome: Ongoing, Progressing     Problem: Impaired Wound Healing  Goal: Optimal Wound Healing  Outcome: Ongoing, Progressing     Problem: Adjustment to Illness (Sepsis/Septic Shock)  Goal: Optimal Coping  Outcome: Ongoing, Progressing     Problem: Bleeding (Sepsis/Septic Shock)  Goal: Absence of Bleeding  Outcome: Ongoing, Progressing     Problem: Glycemic Control Impaired (Sepsis/Septic Shock)  Goal: Blood Glucose Level Within Desired Range  Outcome: Ongoing, Progressing     Problem: Infection Progression (Sepsis/Septic Shock)  Goal: Absence of Infection Signs and Symptoms  Outcome: Ongoing, Progressing     Problem: Nutrition Impaired (Sepsis/Septic Shock)  Goal: Optimal Nutrition Intake  Outcome: Ongoing, Progressing     Problem: Fall Injury Risk  Goal: Absence of Fall and Fall-Related Injury  Outcome: Ongoing, Progressing     Problem: Skin Injury Risk Increased  Goal: Skin Health and Integrity  Outcome: Ongoing, Progressing

## 2022-08-01 NOTE — PT/OT/SLP EVAL
Physical Therapy Evaluation      Patient Name:  Pilar Michael   MRN:  [id number]    Recommendations:     Discharge Recommendations:  nursing facility, skilled   Discharge Equipment Recommendations: none   Barriers to discharge: Increased skilled assistance needed    Assessment:     Pilar Michael is a 89 y.o. female admitted with a medical diagnosis of Sepsis.  She presents with the following impairments/functional limitations:  weakness, impaired endurance, impaired self care skills, impaired functional mobility, gait instability, impaired balance, decreased coordination, decreased upper extremity function, decreased lower extremity function, impaired cardiopulmonary response to activity . Patient tolerated session fairly. Patient reported feeling fatigued throughout.  Patient will benefit from PT services to address the mentioned deficits in order to promote an improve functional mobility status. Patient is currently functioning below PLOF Patient would require increased assistance should they discharge home . Upon d/c, recommendation SNF  for Pilar Michael in order to progress towards an improved level of functional mobility independence.     Rehab Prognosis: Good; patient would benefit from acute skilled PT services to address these deficits and reach maximum level of function.    Recent Surgery: * No surgery found *      Plan:     During this hospitalization, patient to be seen 3 x/week to address the identified rehab impairments via gait training, therapeutic activities, therapeutic exercises, neuromuscular re-education and progress toward the following goals:    · Plan of Care Expires:  08/31/22    History:     Past Medical History:   Diagnosis Date    Arthritis     Cancer     CHF (congestive heart failure)     Coronary artery disease     Hypertension     Thyroid disease        Past Surgical History:   Procedure Laterality Date    CARDIAC CATHETERIZATION      CARDIAC VALVE SURGERY       "CHOLECYSTECTOMY      CORONARY ANGIOGRAPHY N/A 4/23/2019    Procedure: ANGIOGRAM, CORONARY ARTERY;  Surgeon: Bakari Singletary MD;  Location: [facility] CATH [facility];  Service: Cardiology;  Laterality: N/A;    HYSTERECTOMY      lymph nodes removal      THYROIDECTOMY      TRANSCATHETER AORTIC VALVE REPLACEMENT (TAVR) N/A 5/7/2019    Procedure: REPLACEMENT, AORTIC VALVE, TRANSCATHETER (TAVR);  Surgeon: Bakari Singletary MD;  Location: [facility] CATH [facility];  Service: Cardiology;  Laterality: N/A;       Subjective     Chief Complaint: weakness   Patient/Family Comments/goals: "I just don't feel good."  Pain/Comfort:  · Pain Rating 1: 0/10  · Pain Rating Post-Intervention 1: 0/10    Patients cultural, spiritual, Bahai conflicts given the current situation: no    Living Environment:  Patient's living environment is as follows: *patient admitted from [facility]* - reports was ambulating with RW   Home type home    1 or 2 stories  single-story    Number of STEPHEN/ rails 4 STEPHEN (from front door); 1 STEPHEN (from carport) bilateral handrails   AD used?/Owned?  BSC, wheelchair, rollator, shower chair, grab bars    Bathroom set-up  tub/shower combo   Working? No   Driving? No   Individuals living with patient:  dtr    Hobbies/Roles: None stated    Prior to admission, patients level of function was ambulatory with rollator and assist as needed for ADLs. Patient reports 0 falls. Equipment used at home: wheelchair, shower chair, rollator, walker, rolling, grab bar, bedside commode.  DME owned (not currently used): none.  Upon discharge, patient will have assistance from dtr - works from home.  Objective:   Communicated with RN prior to session.  Patient found HOB elevated with telemetry, blood pressure cuff, pulse ox (continuous), peripheral IV, PureWick, oxygen  upon PT entry to room. See below for detailed functional assessment. Patient agreeable to participate in initial evaluation.    General Precautions: Standard, fall   Orthopedic " Precautions:N/A   Braces: N/A     Vitals:    08/01/22 0814   BP: (!) 165/77   Pulse: 60   Resp: (!) 24   Temp: 98.7 °F (37.1 °C)       Exams:  · Cognitive Exam:  Patient is oriented to Person, Place, Time, Situation  · Patient follows % of one -step commands   · Fine Motor Coordination:  Test:   Comments    Rapid ankle DF/PF  Intact     · Postural Exam:  Patient presented with the following abnormalities:    · -       Rounded shoulders  · -       Forward head  · Sensation:    LEFT LE: Intact light touch to BLEs RIGHT LE: Intact light touch to BLEs      ROM and Strength   Right Lower Extremity  Left Lower Extremity    Hip Flexion (Iliopsoas)  WFL WFL   Knee Extension (Quadriceps) WFL WFL   Knee Flexion (Hamstrings) WFL WFL   Ankle DF (Ant. Tib) WFL WFL   Ankle PF (Gastrocnemius)  WFL WFL     Functional Mobility:  · Bed Mobility:    · Rolling Left:  minimum assistance  · Scooting: minimum assistance  · Supine to Sit: moderate assistance for LE management and trunk management  · Sit to Supine: moderate assistance for LE management and trunk management    Patient deferred further mobility this date - reports being too fatigued    Balance:    Level of assist Total Time    Static Sitting  Dashawn 5min    Dynamic Sitting  Dashawn    Static Standing  NT    Dynamic Standing  NT       Therapeutic Activities and Education:  -Patient educated on the role and goal of PT services during acute care LOS. Question and concerns acknowledged and answered as appropriate.   -Will continue to educated as needed.      - Educated on the importance of OOB mobility within safe range in order to decrease adverse effects of prolonged bedrest.        - Patient encouraged to get OOBTC 3x daily with assistance  -White board updated in patients room to reflect level of assistance needed with nursing.       - Patient is not clear to transfer with RN/PCT for OOB mobility with RN.     AM-PAC 6 CLICK MOBILITY  Total Score:17     Patient left HOB  elevated with all lines intact, call button in reach and RN notified.  White board updated in patient room to reflect level of function with nursing.     GOALS:   Multidisciplinary Problems     Physical Therapy Goals        Problem: Physical Therapy    Goal Priority Disciplines Outcome Goal Variances Interventions   Physical Therapy Goal     PT, PT/OT Ongoing, Progressing     Description: Goals to be met by: 8/10/22     Patient will increase functional independence with mobility by performin. Supine to sit with Contact Guard Assistance  2. Sit to supine with Contact Guard Assistance  3. Sit to stand transfer with Contact Guard Assistance  4. Gait  x 50 feet with Contact Guard Assistance using LRAD, if needed.   5. Stand for 5 minutes with Contact Guard Assistance using LRAD, if needed  6. Lower extremity exercise program x10 reps per handout, with independence                     Time Tracking:     PT Received On: 22  PT Start Time: 909     PT Stop Time: 925  PT Total Time (min): 16 min     Billable Minutes: Evaluation 8 and Therapeutic Activity 8      Mary Thompson, PT  2022

## 2022-08-01 NOTE — DISCHARGE SUMMARY
[facility] Medicine  Discharge Summary      Patient Name: Pilar Michael  MRN: [id number]  Patient Class: IP- SNF  Admission Date: 7/22/2022  Hospital Length of Stay: 8 days  Discharge Date and Time: 7/30/22  Attending Physician: No att. providers found   Discharging Provider: Lenka Salazar NP  Primary Care Provider: Joseph Noel MD      HPI:   No notes on file    * No surgery found *      Hospital Course:   Patient prematurely discharge from SNF.  Patient sent to ED for evaluation of febrile episode.  See epic for full details.       Goals of Care Treatment Preferences:  Code Status: Full Code      Consults:   Consults (From admission, onward)        Status Ordering Provider     Inpatient consult to Registered Dietitian/Nutritionist  Once        Provider:  (Not yet assigned)    CELIA Clinton          No new Assessment & Plan notes have been filed under this hospital service since the last note was generated.  Service: Hospital Medicine    Final Active Diagnoses:    Diagnosis Date Noted POA    PRINCIPAL PROBLEM:  Chronic combined systolic and diastolic heart failure [I50.42] 05/08/2019 Yes    Debility [R53.81] 07/06/2022 Yes    Hyperlipidemia [E78.5] 07/03/2022 Yes     Chronic    Normocytic anemia [D64.9] 07/03/2022 Yes     Chronic    Presence of permanent cardiac pacemaker [Z95.0] 07/07/2020 Yes     Chronic    Coronary artery disease involving native coronary artery of native heart without angina pectoris [I25.10] 05/08/2019 Yes     Chronic    S/P TAVR (transcatheter aortic valve replacement) [Z95.2] 05/07/2019 Not Applicable     Chronic    Acquired hypothyroidism [E03.9] 04/08/2019 Yes     Chronic    Essential hypertension [I10] 04/08/2019 Yes     Chronic    Severe aortic stenosis s/p TAVR [I35.0]  Yes     Chronic      Problems Resolved During this Admission:       Disposition: Short Term Hospital    Follow Up:    Patient Instructions:   No discharge  procedures on file.      Pending Diagnostic Studies:     None         Medications:  Reconciled Home Medications:      Medication List      ASK your doctor about these medications    albuterol-ipratropium 2.5 mg-0.5 mg/3 mL nebulizer solution  Commonly known as: DUO-NEB  Take 3 mLs by nebulization every 6 (six) hours as needed for Wheezing. Rescue     amLODIPine 10 MG tablet  Commonly known as: NORVASC  Take 1 tablet (10 mg total) by mouth once daily.     aspirin 81 MG Chew  Take 81 mg by mouth once daily.     atorvastatin 40 MG tablet  Commonly known as: LIPITOR  Take 0.5 tablets (20 mg total) by mouth every evening.     bumetanide 1 MG tablet  Commonly known as: BUMEX  Take 1 tablet (1 mg total) by mouth once daily.     carvediloL 3.125 MG tablet  Commonly known as: COREG  Take 1 tablet (3.125 mg total) by mouth 2 (two) times daily.     clopidogreL 75 mg tablet  Commonly known as: PLAVIX  Take 1 tablet (75 mg total) by mouth once daily.     levoFLOXacin 750 MG tablet  Commonly known as: LEVAQUIN  Take 1 tablet (750 mg total) by mouth once daily. for 1 day  Ask about: Should I take this medication?     lisinopriL 20 MG tablet  Commonly known as: PRINIVIL,ZESTRIL  Take 20 mg by mouth once daily.     losartan 50 MG tablet  Commonly known as: COZAAR  Take 50 mg by mouth once daily.     megestroL 40 MG Tab  Commonly known as: MEGACE  Take 1 tablet (40 mg total) by mouth once daily.     MEROPENEM 1 G/100 ML NS (READY TO MIX SYSTEM)  Inject 100 mLs (1 g total) into the vein every 12 (twelve) hours. for 9 days     SYNTHROID 125 MCG tablet  Generic drug: levothyroxine  Take 125 mcg by mouth once daily.     TIMOPTIC OCUDOSE (PF) 0.5 % Dpet  Generic drug: timoloL maleate (PF)  Place 1 drop into both eyes 2 (two) times a day.     traZODone 50 MG tablet  Commonly known as: DESYREL  Take 50 mg by mouth nightly.            Indwelling Lines/Drains at time of discharge:   Lines/Drains/Airways     Drain  Duration           Female  External Urinary Catheter 07/29/22 1900 2 days                Time spent on the discharge of patient: 0 minutes         Lenka Salazar NP  Department of Hospital Medicine  [facility] - Skilled Nursing

## 2022-08-01 NOTE — PROGRESS NOTES
[facility] ([address] Medicine  Progress Note    Patient Name: Pilar Michael  MRN: [id number]  Patient Class: IP- Inpatient   Admission Date: 7/30/2022  Length of Stay: 1 days  Attending Physician: Carolina Peña MD  Primary Care Provider: Joseph Noel MD        Subjective:     Principal Problem:Sepsis        HPI:  Ms. Michael is a 89 y.o. F with PMH systolic and diastolic HF, CAD s/p RCA stent, pacemaker placement, thyroid cancer s/p treatment 2003, breast cancer s/p mastectomy 01/2022, CVA 04/2022 without residual deficits, HLD, HTN, hypothyroidism who presents to the ED from SNF for fever and AMS. She developed fever 7/30 up to 102F and became lethargic and confused. She had been at SNF for approximately one week after being [place] [facility] [place] exacerbation of [place]F.     Per daughter at bedside, the patient becomes confused and lethargic when she has fever. During previous admission for CHF exacerbation she was also treated empirically for CAP.     In the ED, she had T100.7, HR 60, /50, O2 94% on room air. She was placed on 2L NC with improvement in sats to 99%. CBC remarkable for H/H of 9/29, calcium 8.5, , troponin 0.028. UA hazy with trace leukocytes. CXR with L pleural effusion and L basilar atelectasis v consolidation, similar to prior study. She was given 2g Cefepime x1 dose for sepsis of unknown source. She is admitted to medicine for workup and treatment of sepsis.       Overview/Hospital Course:  No notes on file    Interval History:  No acute events overnight. Patient has complaints of fatigue. O2 sats dropped this morning when patient attempted to ambulate, rebounded quickly. Currently on  had an episode of  Lovenox held in anticipation of possible thoracentesis tomorrow.     Review of Systems   Constitutional:  Positive for fatigue. Negative for chills and fever.   HENT:  Negative for congestion and sore throat.    Respiratory:  Positive for  shortness of breath (intermittent). Negative for cough.    Cardiovascular:  Negative for chest pain, palpitations and leg swelling.   Gastrointestinal:  Negative for abdominal pain, nausea and vomiting.   Genitourinary:  Negative for dysuria and hematuria.   Musculoskeletal:  Negative for back pain and myalgias.   Neurological:  Negative for light-headedness and headaches.   Psychiatric/Behavioral:  Negative for agitation. The patient is not nervous/anxious.    Objective:     Vital Signs (Most Recent):  Temp: 98.3 °F (36.8 °C) (07/31/22 1200)  Pulse: 60 (07/31/22 1237)  Resp: (!) 22 (07/31/22 1200)  BP: (!) 112/56 (07/31/22 1200)  SpO2: (!) 94 % (07/31/22 1237)   Vital Signs (24h Range):  Temp:  [98.3 °F (36.8 °C)-102 °F (38.9 °C)] 98.3 °F (36.8 °C)  Pulse:  [59-60] 60  Resp:  [14-31] 22  SpO2:  [88 %-100 %] 94 %  BP: (108-200)/(50-80) 112/56     Weight: 90.3 kg (199 lb 1.2 oz)  Body mass index is 36.41 kg/m².    Intake/Output Summary (Last 24 hours) at 7/31/2022 1449  Last data filed at 7/31/2022 0600  Gross per 24 hour   Intake 50 ml   Output 50 ml   Net 0 ml      Physical Exam  Constitutional:       Appearance: Normal appearance.   HENT:      Head: Normocephalic and atraumatic.      Right Ear: External ear normal.      Left Ear: External ear normal.      Nose: Nose normal.      Mouth/Throat:      Mouth: Mucous membranes are moist.      Pharynx: Oropharynx is clear.   Eyes:      General: No scleral icterus.     Extraocular Movements: Extraocular movements intact.      Conjunctiva/sclera: Conjunctivae normal.   Cardiovascular:      Rate and Rhythm: Normal rate and regular rhythm.      Heart sounds: Normal heart sounds.   Pulmonary:      Effort: Pulmonary effort is normal. No respiratory distress.      Breath sounds: Rales present. No wheezing.      Comments: Rales heard in L lower lung zones    Abdominal:      General: Abdomen is flat. Bowel sounds are normal. There is no distension.      Palpations: Abdomen is  "soft.      Tenderness: There is no abdominal tenderness.   Musculoskeletal:         General: Swelling present. No tenderness.      Cervical back: Normal range of motion.      Comments: Lymphedema of LUE   Skin:     General: Skin is warm and dry.   Neurological:      Mental Status: She is alert and oriented to person, place, and time.   Psychiatric:         Behavior: Behavior normal.       Significant Labs: All pertinent labs within the past 24 hours have been reviewed.    Significant Imaging: I have reviewed all pertinent imaging results/findings within the past 24 hours.      Assessment/Plan:      * Sepsis  This patient does have evidence of infective focus  My overall impression is sepsis. Vital signs were reviewed and noted in progress note.  Antibiotics given-   Antibiotics (From admission, onward)            Start     Stop Route Frequency Ordered    07/31/22 1130  cefepime in dextrose 5 % IVPB 2 g         -- IV Every 12 hours (non-standard times) 07/31/22 0329    07/31/22 0430  vancomycin 2 g in dextrose 5 % 500 mL IVPB         -- IV Once 07/31/22 0330    07/31/22 0413  vancomycin - pharmacy to dose  (vancomycin IVPB)        "And" Linked Group Details    -- IV pharmacy to manage frequency 07/31/22 0328        Cultures were taken-   Microbiology Results (last 7 days)     Procedure Component Value Units Date/Time    Urine culture [[id number]] Collected: 07/30/22 2234    Order Status: No result Specimen: Urine Updated: 07/30/22 2313    Blood culture x two cultures. Draw prior to antibiotics. [[id number]] Collected: 07/30/22 2213    Order Status: Sent Specimen: Blood from Peripheral, Hand, Right Updated: 07/30/22 2220    Blood culture x two cultures. Draw prior to antibiotics. [[id number]] Collected: 07/30/22 2213    Order Status: Sent Specimen: Blood from Peripheral, Antecubital, Right Updated: 07/30/22 2220        Latest lactate reviewed, they are-  Recent Labs   Lab 07/30/22 2212   LACTATE 1.4       Organ " dysfunction indicated by Encephalopathy   Source- CAP     Source control Achieved by- antibiotics    CXR with L pleural effusion and L lower zone atelectasis v consolidation - possible source of sepsis  -Vanc/Cefepime started 7/31  -2L NC to maintain sats >92%  -f/u urine cx  -f/u blood cx  -f/u resp infection panel  -de-escalate antibiotics as appropriate      Pleural effusion  See CAP      Delirium  Patient lethargic and confused in ED. Found to be oriented x3 after several hours. Intermittent confusion per daughter at bedside.  -delirium precautions placed      Hyperlipidemia  -continue home atorvastatin 20mg      Normocytic anemia  Continue to monitor  -transfuse if Hgb<7  -daily CBC      CAP (community acquired pneumonia)  Patient with CXR showing L pleural effusion, L basilar atelectasis v consolidation, requiring 2L NC. Daughter at bedside reports productive cough, though mostly in the mornings.  -Vanc/cefepime/azithromycin started  - Pleural effusion on CT chest, pulmonology consulted for possible thoracentesis  - Lovenox held for possible thoracentesis  -f/u respiratory infection panel      Coronary artery disease involving native coronary artery of native heart without angina pectoris  S/p RCA stent placement  -continue home ASA 81mg  -continue home atorvastatin  -continue home Plavix      Chronic combined systolic and diastolic heart failure  Patient recently admitted to [facility] for CHF exacerbation. She does not have LE edema or SOB at this time.  -on 2L NC  -continue home Bumex      Essential hypertension  Systolic BP 130s in ED  -continue home carvedilol 3.125mg, lisinopril 20mg      Acquired hypothyroidism  -continue home synthroid 125mcg      Severe aortic stenosis s/p TAVR  Continue to monitor        VTE Risk Mitigation (From admission, onward)         Ordered     IP VTE HIGH RISK PATIENT  Once         07/31/22 0304     Place sequential compression device  Until discontinued         07/31/22 0304                 Discharge Planning   CHARLIE: 8/3/2022     Code Status: Full Code   Is the patient medically ready for discharge?: No    Reason for patient still in hospital (select all that apply): Patient trending condition, Laboratory test and Treatment  Discharge Plan A: Home with family                  Reny Orozco MD  Department of Hospital Medicine   [facility] - Intensive Care ([facility])

## 2022-08-01 NOTE — HOSPITAL COURSE
Patient prematurely discharge from SNF.  Patient sent to ED for evaluation of febrile episode.  See epic for full details.

## 2022-08-01 NOTE — PT/OT/SLP PROGRESS
Occupational Therapy      Patient Name:  Pilar Michael   MRN:  [id number]    Patient not seen today secondary to  (just finishing PT eval in morning & PT recommended wait until later in day then in afternoon pt declined therapy evaluation due to feeling poorly.). Will follow-up 8/2/2022.    8/1/2022

## 2022-08-01 NOTE — PLAN OF CARE
"[facility] ([place] [address])  Initial Discharge Assessment       Primary Care Provider: Joseph Noel MD    Admission Diagnosis: Delirium [R41.0]  Sepsis, due to unspecified organism, unspecified whether acute organ dysfunction present [A41.9]    Admission Date: 7/30/2022  Expected Discharge Date: 8/3/2022    Discharge Barriers Identified: None    Payor: [facility] MANAGED MCARE / Plan: [employer]ICARE ADVANTAGE / Product Type: Medicare Advantage /     Extended Emergency Contact Information  Primary Emergency Contact: Katiana Nickerson  Address: [address] [place] [address]  Mobile Phone: [phone]  Relation: Daughter  Preferred language: English  Secondary Emergency Contact: Jack Michael           [place], [place]  Mobile Phone: [phone]  Relation: Son    Discharge Plan A: Home with family  Discharge Plan B: Home Health      [id number] - [place], [place] - [address] [facility]  [address]  [place] [address]  Phone: [phone] Fax: [phone]      Initial Assessment (most recent)     Adult Discharge Assessment - 08/01/22 1334        Discharge Assessment    Assessment Type Discharge Planning Assessment     Confirmed/corrected address, phone number and insurance Yes     Confirmed Demographics Correct on Facesheet     Source of Information patient;family     When was your last doctors appointment? --   "About 3 months"    Communicated CHARLIE with patient/caregiver Yes     Reason For Admission Sepsis     Lives With child(alejandro), adult     Facility Arrived From: Home     Do you expect to return to your current living situation? Yes     Who are your caregiver(s) and their phone number(s)? Daughter Katiana Nickerson [phone]     Prior to hospitilization cognitive status: Alert/Oriented     Current cognitive status: Alert/Oriented     Walking or Climbing Stairs Difficulty ambulation difficulty, requires equipment     " Mobility Management Walker     Dressing/Bathing Difficulty bathing difficulty, requires equipment     Dressing/Bathing Management Shower chair     Home Accessibility not wheelchair accessible     Home Layout Able to live on 1st floor     Equipment Currently Used at Home shower chair;walker, rolling     Patient currently being followed by outpatient case management? No     Do you currently have service(s) that help you manage your care at home? No     Do you take prescription medications? Yes     Do you have prescription coverage? Yes     Coverage [employer] medicare     Do you have any problems affording any of your prescribed medications? No     Is the patient taking medications as prescribed? yes     Who is going to help you get home at discharge? Daughter Katiana Conrad [phone]     How do you get to doctors appointments? family or friend will provide     Are you on dialysis? No     Do you take coumadin? No     Discharge Plan A Home with family     Discharge Plan B Home Health     DME Needed Upon Discharge  --   TBD    Discharge Plan discussed with: Patient     Discharge Barriers Identified None        Relationship/Environment    Name(s) of Who Lives With Patient Kristie Conrad [phone]               Pt lives with her daughter in a one story home with 3 steps. Pt uses a walker and a shower chair but no other DME. Pt has used home health before, but could not recall the name of the agency. Pt not on dialysis or Coumadin.    Pt is not on dialysis or Coumadin.    CM will follow for discharge needs.     ZAFAR Gamboa LMSW  [facility]  [id number]

## 2022-08-01 NOTE — PLAN OF CARE
Problem: Physical Therapy  Goal: Physical Therapy Goal  Description: Goals to be met by: 8/10/22     Patient will increase functional independence with mobility by performin. Supine to sit with Contact Guard Assistance  2. Sit to supine with Contact Guard Assistance  3. Sit to stand transfer with Contact Guard Assistance  4. Gait  x 50 feet with Contact Guard Assistance using LRAD, if needed.   5. Stand for 5 minutes with Contact Guard Assistance using LRAD, if needed  6. Lower extremity exercise program x10 reps per handout, with independence    Outcome: Ongoing, Progressing     Initial evaluation completed. Patient tolerated assessment well. Established POC and goals. Patient would continue to benefit from skilled PT services in order to improve functional mobility independence.     Mary Thompson, PT, DPT  2022

## 2022-08-02 LAB
ALBUMIN SERPL BCP-MCNC: 2.6 G/DL (ref 3.5–5.2)
ALP SERPL-CCNC: 84 U/L (ref 55–135)
ALT SERPL W/O P-5'-P-CCNC: 8 U/L (ref 10–44)
ANION GAP SERPL CALC-SCNC: 10 MMOL/L (ref 8–16)
AST SERPL-CCNC: 25 U/L (ref 10–40)
BASOPHILS # BLD AUTO: 0.01 K/UL (ref 0–0.2)
BASOPHILS NFR BLD: 0.1 % (ref 0–1.9)
BILIRUB SERPL-MCNC: 1.2 MG/DL (ref 0.1–1)
BUN SERPL-MCNC: 19 MG/DL (ref 8–23)
CALCIUM SERPL-MCNC: 9 MG/DL (ref 8.7–10.5)
CHLORIDE SERPL-SCNC: 108 MMOL/L (ref 95–110)
CO2 SERPL-SCNC: 18 MMOL/L (ref 23–29)
CREAT SERPL-MCNC: 0.7 MG/DL (ref 0.5–1.4)
DIFFERENTIAL METHOD: ABNORMAL
EOSINOPHIL # BLD AUTO: 0 K/UL (ref 0–0.5)
EOSINOPHIL NFR BLD: 0.5 % (ref 0–8)
ERYTHROCYTE [DISTWIDTH] IN BLOOD BY AUTOMATED COUNT: 17.8 % (ref 11.5–14.5)
EST. GFR  (NO RACE VARIABLE): >60 ML/MIN/1.73 M^2
GLUCOSE SERPL-MCNC: 118 MG/DL (ref 70–110)
HCT VFR BLD AUTO: 32.8 % (ref 37–48.5)
HGB BLD-MCNC: 10.2 G/DL (ref 12–16)
IMM GRANULOCYTES # BLD AUTO: 0.04 K/UL (ref 0–0.04)
IMM GRANULOCYTES NFR BLD AUTO: 0.5 % (ref 0–0.5)
LYMPHOCYTES # BLD AUTO: 0.6 K/UL (ref 1–4.8)
LYMPHOCYTES NFR BLD: 8.3 % (ref 18–48)
MAGNESIUM SERPL-MCNC: 1.8 MG/DL (ref 1.6–2.6)
MCH RBC QN AUTO: 26.3 PG (ref 27–31)
MCHC RBC AUTO-ENTMCNC: 31.1 G/DL (ref 32–36)
MCV RBC AUTO: 85 FL (ref 82–98)
MONOCYTES # BLD AUTO: 0.4 K/UL (ref 0.3–1)
MONOCYTES NFR BLD: 4.6 % (ref 4–15)
NEUTROPHILS # BLD AUTO: 6.5 K/UL (ref 1.8–7.7)
NEUTROPHILS NFR BLD: 86 % (ref 38–73)
NRBC BLD-RTO: 0 /100 WBC
PHOSPHATE SERPL-MCNC: 3.1 MG/DL (ref 2.7–4.5)
PLATELET # BLD AUTO: 187 K/UL (ref 150–450)
PMV BLD AUTO: 11.5 FL (ref 9.2–12.9)
POTASSIUM SERPL-SCNC: 5.4 MMOL/L (ref 3.5–5.1)
PROT SERPL-MCNC: 8.2 G/DL (ref 6–8.4)
RBC # BLD AUTO: 3.88 M/UL (ref 4–5.4)
SODIUM SERPL-SCNC: 136 MMOL/L (ref 136–145)
VANCOMYCIN TROUGH SERPL-MCNC: 15.9 UG/ML (ref 10–22)
WBC # BLD AUTO: 7.58 K/UL (ref 3.9–12.7)

## 2022-08-02 PROCEDURE — 97535 SELF CARE MNGMENT TRAINING: CPT

## 2022-08-02 PROCEDURE — 99232 PR SUBSEQUENT HOSPITAL CARE,LEVL II: ICD-10-PCS | Mod: GC,,, | Performed by: INTERNAL MEDICINE

## 2022-08-02 PROCEDURE — 97530 THERAPEUTIC ACTIVITIES: CPT

## 2022-08-02 PROCEDURE — 99232 PR SUBSEQUENT HOSPITAL CARE,LEVL II: ICD-10-PCS | Mod: GC,,, | Performed by: HOSPITALIST

## 2022-08-02 PROCEDURE — 97165 OT EVAL LOW COMPLEX 30 MIN: CPT

## 2022-08-02 PROCEDURE — 36415 COLL VENOUS BLD VENIPUNCTURE: CPT | Performed by: HOSPITALIST

## 2022-08-02 PROCEDURE — 63700000 PHARM REV CODE 250 ALT 637 W/O HCPCS: Performed by: STUDENT IN AN ORGANIZED HEALTH CARE EDUCATION/TRAINING PROGRAM

## 2022-08-02 PROCEDURE — 80053 COMPREHEN METABOLIC PANEL: CPT

## 2022-08-02 PROCEDURE — 25000003 PHARM REV CODE 250

## 2022-08-02 PROCEDURE — 63600175 PHARM REV CODE 636 W HCPCS: Performed by: INTERNAL MEDICINE

## 2022-08-02 PROCEDURE — 85025 COMPLETE CBC W/AUTO DIFF WBC: CPT | Performed by: HOSPITALIST

## 2022-08-02 PROCEDURE — 80202 ASSAY OF VANCOMYCIN: CPT | Performed by: INTERNAL MEDICINE

## 2022-08-02 PROCEDURE — 99232 SBSQ HOSP IP/OBS MODERATE 35: CPT | Mod: GC,,, | Performed by: INTERNAL MEDICINE

## 2022-08-02 PROCEDURE — 99232 SBSQ HOSP IP/OBS MODERATE 35: CPT | Mod: GC,,, | Performed by: HOSPITALIST

## 2022-08-02 PROCEDURE — 20600001 HC STEP DOWN PRIVATE ROOM

## 2022-08-02 PROCEDURE — 83735 ASSAY OF MAGNESIUM: CPT

## 2022-08-02 PROCEDURE — 84100 ASSAY OF PHOSPHORUS: CPT

## 2022-08-02 RX ORDER — BUMETANIDE 1 MG/1
1 TABLET ORAL 2 TIMES DAILY
Status: DISCONTINUED | OUTPATIENT
Start: 2022-08-02 | End: 2022-08-02

## 2022-08-02 RX ORDER — LISINOPRIL 20 MG/1
20 TABLET ORAL DAILY
Status: DISCONTINUED | OUTPATIENT
Start: 2022-08-03 | End: 2022-08-09 | Stop reason: HOSPADM

## 2022-08-02 RX ORDER — BUMETANIDE 1 MG/1
2 TABLET ORAL DAILY
Status: DISCONTINUED | OUTPATIENT
Start: 2022-08-03 | End: 2022-08-02

## 2022-08-02 RX ORDER — BUMETANIDE 1 MG/1
1 TABLET ORAL 2 TIMES DAILY
Status: DISCONTINUED | OUTPATIENT
Start: 2022-08-02 | End: 2022-08-04

## 2022-08-02 RX ADMIN — ATORVASTATIN CALCIUM 20 MG: 20 TABLET, FILM COATED ORAL at 09:08

## 2022-08-02 RX ADMIN — ASPIRIN 81 MG CHEWABLE TABLET 81 MG: 81 TABLET CHEWABLE at 09:08

## 2022-08-02 RX ADMIN — HYDRALAZINE HYDROCHLORIDE 25 MG: 50 TABLET ORAL at 12:08

## 2022-08-02 RX ADMIN — TIMOLOL MALEATE 1 DROP: 5 SOLUTION OPHTHALMIC at 10:08

## 2022-08-02 RX ADMIN — LISINOPRIL 40 MG: 20 TABLET ORAL at 09:08

## 2022-08-02 RX ADMIN — CARVEDILOL 3.12 MG: 3.12 TABLET, FILM COATED ORAL at 09:08

## 2022-08-02 RX ADMIN — LEVOTHYROXINE SODIUM 125 MCG: 25 TABLET ORAL at 06:08

## 2022-08-02 RX ADMIN — BUMETANIDE 1 MG: 1 TABLET ORAL at 05:08

## 2022-08-02 RX ADMIN — MEGESTROL ACETATE 40 MG: 40 TABLET ORAL at 09:08

## 2022-08-02 RX ADMIN — TIMOLOL MALEATE 1 DROP: 5 SOLUTION OPHTHALMIC at 09:08

## 2022-08-02 RX ADMIN — VANCOMYCIN HYDROCHLORIDE 1500 MG: 1.5 INJECTION, POWDER, LYOPHILIZED, FOR SOLUTION INTRAVENOUS at 05:08

## 2022-08-02 RX ADMIN — AZITHROMYCIN MONOHYDRATE 500 MG: 250 TABLET ORAL at 09:08

## 2022-08-02 RX ADMIN — CLOPIDOGREL 75 MG: 75 TABLET, FILM COATED ORAL at 09:08

## 2022-08-02 NOTE — SUBJECTIVE & OBJECTIVE
Interval History: Pulmonary was consulted and believe the pulmonary effusion is due to insufficient diuresis. We will stop abx for now and monitor. Per pulm recs, we will increase the bumetanide to 1mg BID from QD and monitor urinary output. Abx will be stopped for now.     Review of Systems   Constitutional:  Positive for fatigue. Negative for chills and fever.   HENT:  Negative for congestion and sore throat.    Respiratory:  Positive for shortness of breath (persistant). Negative for cough and wheezing.    Cardiovascular:  Negative for chest pain, palpitations and leg swelling.   Gastrointestinal:  Negative for abdominal pain, nausea and vomiting.   Genitourinary:  Negative for dysuria and hematuria.   Musculoskeletal:  Negative for back pain and myalgias.   Neurological:  Negative for light-headedness and headaches.   Psychiatric/Behavioral:  Negative for agitation. The patient is not nervous/anxious.    Objective:     Vital Signs (Most Recent):  Temp: 98.2 °F (36.8 °C) (08/02/22 0400)  Pulse: 60 (08/02/22 0700)  Resp: (!) 37 (08/02/22 0400)  BP: (!) 168/70 (08/02/22 0700)  SpO2: 95 % (08/02/22 0700)   Vital Signs (24h Range):  Temp:  [98.2 °F (36.8 °C)-98.5 °F (36.9 °C)] 98.2 °F (36.8 °C)  Pulse:  [59-60] 60  Resp:  [24-40] 37  SpO2:  [95 %-97 %] 95 %  BP: (168-215)/(70-92) 168/70     Weight: 90.3 kg (199 lb 1.2 oz)  Body mass index is 36.41 kg/m².    Intake/Output Summary (Last 24 hours) at 8/2/2022 1513  Last data filed at 8/1/2022 1929  Gross per 24 hour   Intake --   Output 1000 ml   Net -1000 ml      Physical Exam  Constitutional:       Appearance: Normal appearance.   HENT:      Head: Normocephalic and atraumatic.      Right Ear: External ear normal.      Left Ear: External ear normal.      Nose: Nose normal.      Mouth/Throat:      Mouth: Mucous membranes are moist.      Pharynx: Oropharynx is clear.   Eyes:      General: No scleral icterus.     Extraocular Movements: Extraocular movements intact.       Conjunctiva/sclera: Conjunctivae normal.   Cardiovascular:      Rate and Rhythm: Normal rate and regular rhythm.      Heart sounds: Normal heart sounds.   Pulmonary:      Effort: Pulmonary effort is normal. No respiratory distress.      Breath sounds: Rales present. No wheezing.      Comments: Rales heard in L lower lung zones    Abdominal:      General: Abdomen is flat. Bowel sounds are normal. There is no distension.      Palpations: Abdomen is soft.      Tenderness: There is no abdominal tenderness.   Musculoskeletal:         General: Swelling present. No tenderness.      Cervical back: Normal range of motion.      Comments: Lymphedema of LUE. Edema reduced from yesterday.    Skin:     General: Skin is warm and dry.   Neurological:      Mental Status: She is alert and oriented to person, place, and time.   Psychiatric:         Behavior: Behavior normal.       Significant Labs: All pertinent labs within the past 24 hours have been reviewed.    Significant Imaging: I have reviewed all pertinent imaging results/findings within the past 24 hours.

## 2022-08-02 NOTE — HOSPITAL COURSE
"Ms. Michael is a 89 y.o. F with PMH systolic and diastolic HF, CAD s/p RCA stent, pacemaker placement, thyroid cancer s/p treatment 2003, breast cancer s/p mastectomy 01/2022, CVA 04/2022 without residual deficits, HLD, HTN, hypothyroidism who presents to the ED from SNF for fever and AMS. She developed fever 7/30 up to 102F and became lethargic and confused. She had been at SNF for approximately one week after being discharged from [facility] for acute exacerbation of CHF. Pt. Was started on cefepime, vanc, and azithromycin. During hospital course she has not become febrile and has become easily arousal. Pulmonology was consulted and believe the pulmonary effusion is due to insufficient diuresis. We will stop abx for now and monitor. Per pulm recs, we will up the bumetanide to 1mg BID, and monitor.  Patient is doing well, diuresis was suboptimal. Gave a 1 time dose of 80mg lasix to attempt to increase output. Patient can resume 1mg BID after. She has been taking off oxygen for >1 day and is sating >90%. She feels much better and family agrees.  Family informed nursing that patient experienced an "episode" of feeling unwell. As dictated by her niece, she was brushing her teeth while in the bed, and "the color drained from her face." She was conscious the entire  time and can easily recall the period in which she didn't feel great. O2 stats are still >90%. Ekg obtained noted no significant changes from the one several days prior. Patient's son endorsed she occasionally gets periods of feeling "unwell." Will continue to monitor. Patient to be D/C'd today to SNF.       Interval Hx: Patient states feeling overall well and well rested. She has no complaints at this time and feels well enough to leave the hospital. Family is agreeable. Awaiting transport and D/C.   "

## 2022-08-02 NOTE — PT/OT/SLP EVAL
"Occupational Therapy   Evaluation    Name: Pilar Michael  MRN: [id number]  Admitting Diagnosis:  Sepsis  Recent Surgery: * No surgery found *      Recommendations:     Discharge Recommendations: nursing facility, skilled  Discharge Equipment Recommendations:  none  Barriers to discharge:  Decreased caregiver support    Assessment:     Pilar Michael is a 89 y.o. female with a medical diagnosis of Sepsis.  She presents with decreased independence with ADL's. Performance deficits affecting function: weakness, impaired endurance, impaired self care skills, impaired functional mobility, impaired balance, decreased upper extremity function, decreased lower extremity function, decreased safety awareness, impaired cardiopulmonary response to activity.      Rehab Prognosis: Fair; patient would benefit from acute skilled OT services to address these deficits and reach maximum level of function.       Plan:     Patient to be seen 3 x/week to address the above listed problems via self-care/home management, therapeutic activities, therapeutic exercises, neuromuscular re-education  · Plan of Care Expires: 09/01/22  · Plan of Care Reviewed with: patient, daughter    Subjective     Chief Complaint: fatigue  Patient/Family Comments/goals: to feel better    Occupational Profile:  Living Environment: Pt resides with daughter in 1 story house with 1 step to enter from carport & ramp access as well.  Pt was modified independent with ADL's & used RW for ambulation. Pt did require (A) transferring in & out of bathtub. Pt does not drive & is retired from owning a grocery store.  Pt enjoys "most anything with friends."  Equipment Used at Home:  rollator, shower chair, walker, rolling, bedside commode, wheelchair, grab bar (grab bars in bathtub & around toilet, hand held shower hose, bed rail attached to bed)  Assistance upon Discharge: daughter    Pain/Comfort:  · Pain Rating 1: 0/10  · Pain Rating Post-Intervention 1: " 0/10    Patients cultural, spiritual, Faith conflicts given the current situation: no    Objective:     Communicated with: RN prior to session.  Patient found supine with telemetry, pulse ox (continuous), oxygen, blood pressure cuff, PureWick (daughter present in room during session) upon OT entry to room.    General Precautions: Standard, fall   Orthopedic Precautions:N/A   Braces: N/A    Occupational Performance:    Bed Mobility:    · Patient completed Supine to Sit with contact guard assistance    Functional Mobility/Transfers:  · Patient completed Sit <> Stand Transfer with contact guard assistance  with  and without RW from EOB x 1 trial & from chair x 2 trials   · Patient completed Bed <> Chair Transfer using Step Transfer technique with Min (A) with no assistive device  · Functional Mobility: CGA-Min (A) with static standing trials x 2 prolonged trials during hygiene after incontinence of BM    Activities of Daily Living:  · Grooming: stand by assistance seated EOB  · Lower Body Dressing: total assistance donning brief after BM incontinence  · Toileting: maximal assistance while standing due to BM incontinence    Cognitive/Visual Perceptual:  Cognitive/Psychosocial Skills:     -       Oriented to: Person, Place, Situation and year however not month   -       Follows Commands/attention:Follows two-step commands  -       Safety awareness/insight to disability: impaired     Physical Exam:  Edema:  lymphedema to LUE  Sensation:    -       Intact  Dominant hand:    -       right  Upper Extremity Range of Motion:  BUE WFL except 90* right shoulder flexion & ~80* left shoulder flexion  Upper Extremity Strength: WFL BUE except 3-/5 (B) shoulder flexion   Strength: fair BUE    AMPAC 6 Click ADL:  AMPAC Total Score: 14    Treatment & Education:  Min (A) with static standing trials x 2 prolonged trials using RW.  Pt required SBA-CGA for postural control while seated EOB.  Provided verbal & physical cues to  facilitate postural control. Provided education on safety.  Provided rest breaks due to SOB with activities.  Noted wheezing during activities at times.  Provided education regarding role of OT, POC, & discharge recommendations with pt & daughter verbalizing understanding.  Pt had no further questions & when asked whether there were any concerns pt reported none.        Education:    Patient left up in chair with all lines intact, call button in reach, RN notified and daughter present    GOALS:   Multidisciplinary Problems     Occupational Therapy Goals        Problem: Occupational Therapy    Goal Priority Disciplines Outcome Interventions   Occupational Therapy Goal     OT, PT/OT Ongoing, Progressing    Description: Goals to be met by: 8/16     Patient will increase functional independence with ADLs by performing:    UE Dressing with Minimal Assistance.  LE Dressing with Moderate Assistance.  Grooming while seated with Supervision.  Toileting from bedside commode with Moderate Assistance for hygiene and clothing management.   Supine to sit with Stand-by Assistance.  Step transfer with Stand-by Assistance using RW                     History:     Past Medical History:   Diagnosis Date    Arthritis     Cancer     CHF (congestive heart failure)     Coronary artery disease     Hypertension     Thyroid disease        Past Surgical History:   Procedure Laterality Date    CARDIAC CATHETERIZATION      CARDIAC VALVE SURGERY      CHOLECYSTECTOMY      CORONARY ANGIOGRAPHY N/A 4/23/2019    Procedure: ANGIOGRAM, CORONARY ARTERY;  Surgeon: Bakari Singletary MD;  Location: [facility] CATH LAB;  Service: Cardiology;  Laterality: N/A;    HYSTERECTOMY      lymph nodes removal      THYROIDECTOMY      TRANSCATHETER AORTIC VALVE REPLACEMENT (TAVR) N/A 5/7/2019    Procedure: REPLACEMENT, AORTIC VALVE, TRANSCATHETER (TAVR);  Surgeon: Bakari Singletary MD;  Location: [facility] CATH LAB;  Service: Cardiology;  Laterality: N/A;        Time Tracking:     OT Date of Treatment: 08/02/22  OT Start Time: 1021  OT Stop Time: 1100  OT Total Time (min): 39 min    Billable Minutes:Evaluation 14  Self Care/Home Management 10  Therapeutic Activity 15    8/2/2022

## 2022-08-02 NOTE — PROGRESS NOTES
Progress Note  Pulmonary & Critical Care Medicine    Attending: Akiko Smith  Fellow: Shannan Borreog  Admit Date: 7/30/2022  Today's Date: 08/02/2022      SUBJECTIVE:   HPI: Ms. Michael is a 89yo CF with a PMH significant for HFpEF, CAD, AS, and PPM placement who presented to this facility from [facility] 7/30 for fever and acute encephalopathy. She was admitted to medicine for treatment of sepsis with an unknown source at that time. She has been treated with vancomycin and cefepime since admission. WBC has not been elevated this admission with a transient granulocytosis noted. Procal was negative and BNP was elevated. Afebrile since admission. Pulmonology consulted for evaluation of pleural effusions.      On review of previous imaging, bilateral pleural effusions have been present since 7/18 with notable improvement on subsequent CXRs. Ms. Michael had 2 previous admissions this month for CHF exacerbations with 1 accompanied with UTI. She was diuresed on furosemide and was subsequently switched to BID bumex for improved diuresis. She was discharged on Bumex BID, but it was changed to daily per her cardiologist. Oxygen saturations have been in the mid to low 90s this admission with an increase to 100% on 2L NC. Her BP has also been persistently elevated with the SBP being in the 200s during my interview.     Interval History: NAEO. On O2 this AM with sats in the mid to high 90s. Daughter at bedside. Explained that we would not be proceeding with the thoracentesis today and would like to see hoe she improved with better diuresis. Patient and daughter understood the plan and agreed to it.       OBJECTIVE:     Vital Signs Trends/Hx Reviewed  Vitals:    08/01/22 2053 08/01/22 2300 08/02/22 0400 08/02/22 0700   BP: (!) 215/92 (!) 190/80 (!) 168/70 (!) 168/70   BP Location:       Patient Position:       Pulse: 60 (!) 59 60 60   Resp: (!) 32 (!) 40 (!) 37    Temp: 98.5 °F (36.9 °C) 98.5 °F (36.9 °C) 98.2 °F (36.8 °C)     TempSrc: Oral Oral Oral    SpO2: 97% 96% 97% 95%   Weight:       Height:           Ventilator settings:   No data recorded       Physical Exam:  General: Obese CF sitting up in bed in NAD, cooperative & interactive.  HEENT: AT/NC, PERRL, EOMI, oral and nasal mucosa moist. NC in place.  Neck: Supple without JVD or palpable LAD.   Cardiac: normal rate, regular rhythm, with no MRG with brisk cap refill and symmetric pulses in distal extremities.  Respiratory: Normal inspection. Symmetric chest rise. Normal palpation and percussion. Auscultation clear bilaterally. Decreased breath sound LLL. Mild increased work of breathing noted.   Abdomen: Soft, NT/ND. Obese abdomen. +BS. No hepatosplenomegaly.   Extremities: Left arm chronically significantly more swollen than right.  Neuro: Grossly intact to brief exam. Oriented x3 with appropriate mood/affect to situation.        Laboratory:  No results for input(s): PH, PCO2, PO2, HCO3, POCSATURATED, BE in the last 24 hours.  Recent Labs   Lab 08/02/22  0652   WBC 7.58   RBC 3.88*   HGB 10.2*   HCT 32.8*      MCV 85   MCH 26.3*   MCHC 31.1*     Recent Labs   Lab 08/02/22  0520      K 5.4*      CO2 18*   BUN 19   CREATININE 0.7   MG 1.8       Microbiology Data:   Microbiology Results (last 7 days)     Procedure Component Value Units Date/Time    Blood culture x two cultures. Draw prior to antibiotics. [[id number]] Collected: 07/30/22 2213    Order Status: Completed Specimen: Blood from Peripheral, Hand, Right Updated: 08/01/22 2312     Blood Culture, Routine No Growth to date      No Growth to date      No Growth to date    Narrative:      Aerobic and anaerobic    Blood culture x two cultures. Draw prior to antibiotics. [[id number]] Collected: 07/30/22 2213    Order Status: Completed Specimen: Blood from Peripheral, Antecubital, Right Updated: 08/01/22 2312     Blood Culture, Routine No Growth to date      No Growth to date      No Growth to date    Narrative:       Aerobic and anaerobic    Urine culture [[id number]] Collected: 07/30/22 2234    Order Status: Completed Specimen: Urine Updated: 08/01/22 0116     Urine Culture, Routine No significant growth    Narrative:      Specimen Source->Urine    Respiratory Infection Panel (PCR), Nasopharyngeal [[id number]] Collected: 07/31/22 1602    Order Status: Completed Specimen: Nasopharyngeal Swab Updated: 07/31/22 2358     Respiratory Infection Panel Source NP Swab     Adenovirus Not Detected     Coronavirus 229E, Common Cold Virus Not Detected     Coronavirus HKU1, Common Cold Virus Not Detected     Coronavirus NL63, Common Cold Virus Not Detected     Coronavirus OC43, Common Cold Virus Not Detected     Comment: The Coronavirus strains detected in this test cause the common cold.  These strains are not the COVID-19 (novel Coronavirus)strain   associated with the respiratory disease outbreak.          SARS-CoV2 (COVID-19) Qualitative PCR Not Detected     Human Metapneumovirus Not Detected     Human Rhinovirus/Enterovirus Not Detected     Influenza A (subtypes H1, H1-2009,H3) Not Detected     Influenza B Not Detected     Parainfluenza Virus 1 Not Detected     Parainfluenza Virus 2 Not Detected     Parainfluenza Virus 3 Not Detected     Parainfluenza Virus 4 Not Detected     Respiratory Syncytial Virus Not Detected     Bordetella Parapertussis (KP7982) Not Detected     Bordetella pertussis (ptxP) Not Detected     Chlamydia pneumoniae Not Detected     Mycoplasma pneumoniae Not Detected    Narrative:      For all other respiratory sources, order [id number] -  Respiratory Viral Panel by PCR    Culture, Respiratory with Gram Stain [[id number]]     Order Status: No result Specimen: Respiratory            Chest Imaging:   No results found in the last 24 hours.      Scheduled Medications:    aspirin  81 mg Oral Daily    atorvastatin  20 mg Oral QHS    [START ON 8/3/2022] bumetanide  2 mg Oral Daily    carvediloL  3.125 mg Oral BID     clopidogreL  75 mg Oral Daily    levothyroxine  125 mcg Oral Before breakfast    [START ON 8/3/2022] lisinopriL  20 mg Oral Daily    megestroL  40 mg Oral Daily    timolol maleate 0.5%  1 drop Both Eyes BID       PRN Medications:   acetaminophen, dextrose 10%, dextrose 10%, glucagon (human recombinant), glucose, glucose, hydrALAZINE, melatonin, naloxone, sodium chloride 0.9%, sodium chloride 0.9%, sodium chloride 0.9%    Problem List:   Patient Active Problem List   Diagnosis    Severe aortic stenosis s/p TAVR    Acquired hypothyroidism    Essential hypertension    S/P TAVR (transcatheter aortic valve replacement)    Chronic combined systolic and diastolic heart failure    Coronary artery disease involving native coronary artery of native heart without angina pectoris    Presence of permanent cardiac pacemaker    CAP (community acquired pneumonia)    Acute cystitis    Normocytic anemia    Increased anion gap metabolic acidosis    Hyperlipidemia    Acute on chronic combined systolic (congestive) and diastolic (congestive) heart failure    Debility    Palliative care encounter    Acute hypoxemic respiratory failure    Delirium    Sepsis    Pleural effusion       ASSESSMENT & RECOMMENDATIONS     #Bilateral Pleural Effusions  -Seen again on CT chest. L>R with atelectasis associated with the left effusion. Pleural effusions have been present and improving in size with diuresis since initial presentation 7/18.   -Unclear at this time of pleural effusions are a source of infection. Aside from initial fever, no other evidence of infection is present as blood cultures continue to be negative with a negative procal and lactate. A 2nd viable option for pleural effusions is slowly resolving CHF exacerbation with the need for more aggressive diuresis as her diuretics were reduced by cardiology after discharge.   -Will hold off on thoracentesis at this time. Will evaluate patient's improvement after  initiation of more aggressive diuresis. Recommend Bumex 1mg BID as this regimen worked well for her in the past. Also encouraged patient to utilize her incentive spirometer more to help improve her atelectasis and oxygenation.     #Hypoxemia  -Likely 2/2 pleural effusion  -Will likely improve with diuresis.   -Recommend 1mg Bumex BID    Thank you for allowing us to participate in the care of this patient. We will continue to follow. Please call with questions.    Shannan Borrego M.D., PGY-V  [employer] Pulmonary/Critical Care Fellow

## 2022-08-02 NOTE — NURSING
Patient resting in bed. No complaints of pain. Up to chair by therapy-- back to bed by FARAZ Sena and PCT. See full assessment. AUOP per dede. Will hand off report to on coming RN.

## 2022-08-02 NOTE — ASSESSMENT & PLAN NOTE
This patient does have evidence of infective focus  My overall impression is sepsis. Vital signs were reviewed and noted in progress note.  Antibiotics given-   Antibiotics (From admission, onward)            None        Cultures were taken-   Microbiology Results (last 7 days)     Procedure Component Value Units Date/Time    Blood culture x two cultures. Draw prior to antibiotics. [[id number]] Collected: 07/30/22 2213    Order Status: Completed Specimen: Blood from Peripheral, Hand, Right Updated: 08/01/22 2312     Blood Culture, Routine No Growth to date      No Growth to date      No Growth to date    Narrative:      Aerobic and anaerobic    Blood culture x two cultures. Draw prior to antibiotics. [[id number]] Collected: 07/30/22 2213    Order Status: Completed Specimen: Blood from Peripheral, Antecubital, Right Updated: 08/01/22 2312     Blood Culture, Routine No Growth to date      No Growth to date      No Growth to date    Narrative:      Aerobic and anaerobic    Urine culture [[id number]] Collected: 07/30/22 2234    Order Status: Completed Specimen: Urine Updated: 08/01/22 0116     Urine Culture, Routine No significant growth    Narrative:      Specimen Source->Urine    Respiratory Infection Panel (PCR), Nasopharyngeal [[id number]] Collected: 07/31/22 1602    Order Status: Completed Specimen: Nasopharyngeal Swab Updated: 07/31/22 2358     Respiratory Infection Panel Source NP Swab     Adenovirus Not Detected     Coronavirus 229E, Common Cold Virus Not Detected     Coronavirus HKU1, Common Cold Virus Not Detected     Coronavirus NL63, Common Cold Virus Not Detected     Coronavirus OC43, Common Cold Virus Not Detected     Comment: The Coronavirus strains detected in this test cause the common cold.  These strains are not the COVID-19 (novel Coronavirus)strain   associated with the respiratory disease outbreak.          SARS-CoV2 (COVID-19) Qualitative PCR Not Detected     Human Metapneumovirus Not Detected      Human Rhinovirus/Enterovirus Not Detected     Influenza A (subtypes H1, H1-2009,H3) Not Detected     Influenza B Not Detected     Parainfluenza Virus 1 Not Detected     Parainfluenza Virus 2 Not Detected     Parainfluenza Virus 3 Not Detected     Parainfluenza Virus 4 Not Detected     Respiratory Syncytial Virus Not Detected     Bordetella Parapertussis ([id number]) Not Detected     Bordetella pertussis (ptxP) Not Detected     Chlamydia pneumoniae Not Detected     Mycoplasma pneumoniae Not Detected    Narrative:      For all other respiratory sources, order [id number] -  Respiratory Viral Panel by PCR    Culture, Respiratory with Gram Stain [[id number]]     Order Status: No result Specimen: Respiratory         Latest lactate reviewed, they are-  Recent Labs   Lab 07/30/22  2212   LACTATE 1.4       Organ dysfunction indicated by Encephalopathy   Source- CAP     Source control Achieved by- antibiotics    CXR with L pleural effusion and L lower zone atelectasis v consolidation - possible source of sepsis  -Vanc/Cefepime started 7/31  -2L NC to maintain sats >92%  -f/u urine cx  -f/u blood cx  -f/u resp infection panel  - Will stop anx for now. Patient is afebrile and stable

## 2022-08-02 NOTE — ASSESSMENT & PLAN NOTE
Patient with CXR showing L pleural effusion, L basilar atelectasis v consolidation, requiring 2L NC. Daughter at bedside reports productive cough, though mostly in the mornings.  - Vanc/cefepime/azithromycin  Stopped for now. Low suspicion for infectious process at this time due to negative cultures   - Pleural effusion on CT chest, pulmonology consulted for possible thoracentesis  - Pulm declined performing para, believes the effusion to be a diuresis problem  - Will increase Bumex to 1mg BID

## 2022-08-02 NOTE — ASSESSMENT & PLAN NOTE
BP increased to >200 overnight. Was provided hydralazine. Will monitor for now   -continue home carvedilol 3.125mg, lisinopril 20mg

## 2022-08-02 NOTE — ASSESSMENT & PLAN NOTE
Patient recently admitted to [facility] for CHF exacerbation. She does not have LE edema or SOB at this time.  - on 2L NC  - increased Bumex to 1mg BID from QD per Pulmonary recs

## 2022-08-02 NOTE — PROGRESS NOTES
[facility] ([address] Medicine  Progress Note    Patient Name: Pilar Michael  MRN: [id number]  Patient Class: IP- Inpatient   Admission Date: 7/30/2022  Length of Stay: 2 days  Attending Physician: Carolina Peña MD  Primary Care Provider: Joseph Noel MD        Subjective:     Principal Problem:Sepsis        HPI:  Ms. Michael is a 89 y.o. F with PMH systolic and diastolic HF, CAD s/p RCA stent, pacemaker placement, thyroid cancer s/p treatment 2003, breast cancer s/p mastectomy 01/2022, CVA 04/2022 without residual deficits, HLD, HTN, hypothyroidism who presents to the ED from SNF for fever and AMS. She developed fever 7/30 up to 102F and became lethargic and confused. She had been at SNF for approximately one week after being [place] [facility] [place]F.     Per daughter at bedside, the patient becomes confused and lethargic when she has fever. During previous admission for CHF exacerbation she was also treated empirically for CAP.     In the ED, she had T100.7, HR 60, /50, O2 94% on room air. She was placed on 2L NC with improvement in sats to 99%. CBC remarkable for H/H of 9/29, calcium 8.5, , troponin 0.028. UA hazy with trace leukocytes. CXR with L pleural effusion and L basilar atelectasis v consolidation, similar to prior study. She was given 2g Cefepime x1 dose for sepsis of unknown source. She is admitted to medicine for workup and treatment of sepsis.       Overview/Hospital Course:  Ms. Michael is a 89 y.o. F with PMH systolic and diastolic HF, CAD s/p RCA stent, pacemaker placement, thyroid cancer s/p treatment 2003, breast cancer s/p mastectomy 01/2022, CVA 04/2022 without residual deficits, HLD, HTN, hypothyroidism who presents to the ED from SNF for fever and AMS. She developed fever 7/30 up to 102F and became lethargic and confused. She had been at SNF for approximately one week after being discharged [place] [facility] [place]  exacerbation of CHF. Pt. Was started on cefepime, vanc, and azithromycin. During hospital course she has not become febrile and has become easily arousal. Pulmonology was consulted and believe the pulmonary effusion is due to insufficient diuresis. We will stop abx for now and monitor. Per pulm recs, we will up the bumetanide to 1mg BID, and monitor.        Interval History: Pulmonary was consulted and believe the pulmonary effusion is due to insufficient diuresis. We will stop abx for now and monitor. Per pulm recs, we will increase the bumetanide to 1mg BID from QD and monitor urinary output. Abx will be stopped for now due to negative cultures and low suspicion at this time.     Review of Systems   Constitutional:  Positive for fatigue. Negative for chills and fever.   HENT:  Negative for congestion and sore throat.    Respiratory:  Positive for shortness of breath (persistant). Negative for cough and wheezing.    Cardiovascular:  Negative for chest pain, palpitations and leg swelling.   Gastrointestinal:  Negative for abdominal pain, nausea and vomiting.   Genitourinary:  Negative for dysuria and hematuria.   Musculoskeletal:  Negative for back pain and myalgias.   Neurological:  Negative for light-headedness and headaches.   Psychiatric/Behavioral:  Negative for agitation. The patient is not nervous/anxious.    Objective:     Vital Signs (Most Recent):  Temp: 98.2 °F (36.8 °C) (08/02/22 0400)  Pulse: 60 (08/02/22 0700)  Resp: (!) 37 (08/02/22 0400)  BP: (!) 168/70 (08/02/22 0700)  SpO2: 95 % (08/02/22 0700)   Vital Signs (24h Range):  Temp:  [98.2 °F (36.8 °C)-98.5 °F (36.9 °C)] 98.2 °F (36.8 °C)  Pulse:  [59-60] 60  Resp:  [24-40] 37  SpO2:  [95 %-97 %] 95 %  BP: (168-215)/(70-92) 168/70     Weight: 90.3 kg (199 lb 1.2 oz)  Body mass index is 36.41 kg/m².    Intake/Output Summary (Last 24 hours) at 8/2/2022 1513  Last data filed at 8/1/2022 1929  Gross per 24 hour   Intake --   Output 1000 ml   Net -1000 ml       Physical Exam  Constitutional:       Appearance: Normal appearance.   HENT:      Head: Normocephalic and atraumatic.      Right Ear: External ear normal.      Left Ear: External ear normal.      Nose: Nose normal.      Mouth/Throat:      Mouth: Mucous membranes are moist.      Pharynx: Oropharynx is clear.   Eyes:      General: No scleral icterus.     Extraocular Movements: Extraocular movements intact.      Conjunctiva/sclera: Conjunctivae normal.   Cardiovascular:      Rate and Rhythm: Normal rate and regular rhythm.      Heart sounds: Normal heart sounds.   Pulmonary:      Effort: Pulmonary effort is normal. No respiratory distress.      Breath sounds: Rales present. No wheezing.      Comments: Rales heard in L lower lung zones    Abdominal:      General: Abdomen is flat. Bowel sounds are normal. There is no distension.      Palpations: Abdomen is soft.      Tenderness: There is no abdominal tenderness.   Musculoskeletal:         General: Swelling present. No tenderness.      Cervical back: Normal range of motion.      Comments: Lymphedema of LUE. Edema reduced from yesterday.    Skin:     General: Skin is warm and dry.   Neurological:      Mental Status: She is alert and oriented to person, place, and time.   Psychiatric:         Behavior: Behavior normal.       Significant Labs: All pertinent labs within the past 24 hours have been reviewed.    Significant Imaging: I have reviewed all pertinent imaging results/findings within the past 24 hours.      Assessment/Plan:      * Sepsis  This patient does have evidence of infective focus  My overall impression is sepsis. Vital signs were reviewed and noted in progress note.  Antibiotics given-   Antibiotics (From admission, onward)            None        Cultures were taken-   Microbiology Results (last 7 days)     Procedure Component Value Units Date/Time    Blood culture x two cultures. Draw prior to antibiotics. [[id number]] Collected: 07/30/22 2213    Order  Status: Completed Specimen: Blood from Peripheral, Hand, Right Updated: 08/01/22 2312     Blood Culture, Routine No Growth to date      No Growth to date      No Growth to date    Narrative:      Aerobic and anaerobic    Blood culture x two cultures. Draw prior to antibiotics. [[id number]] Collected: 07/30/22 2213    Order Status: Completed Specimen: Blood from Peripheral, Antecubital, Right Updated: 08/01/22 2312     Blood Culture, Routine No Growth to date      No Growth to date      No Growth to date    Narrative:      Aerobic and anaerobic    Urine culture [[id number]] Collected: 07/30/22 2234    Order Status: Completed Specimen: Urine Updated: 08/01/22 0116     Urine Culture, Routine No significant growth    Narrative:      Specimen Source->Urine    Respiratory Infection Panel (PCR), Nasopharyngeal [[id number]] Collected: 07/31/22 1602    Order Status: Completed Specimen: Nasopharyngeal Swab Updated: 07/31/22 2358     Respiratory Infection Panel Source NP Swab     Adenovirus Not Detected     Coronavirus 229E, Common Cold Virus Not Detected     Coronavirus HKU1, Common Cold Virus Not Detected     Coronavirus NL63, Common Cold Virus Not Detected     Coronavirus OC43, Common Cold Virus Not Detected     Comment: The Coronavirus strains detected in this test cause the common cold.  These strains are not the COVID-19 (novel Coronavirus)strain   associated with the respiratory disease outbreak.          SARS-CoV2 (COVID-19) Qualitative PCR Not Detected     Human Metapneumovirus Not Detected     Human Rhinovirus/Enterovirus Not Detected     Influenza A (subtypes H1, H1-2009,H3) Not Detected     Influenza B Not Detected     Parainfluenza Virus 1 Not Detected     Parainfluenza Virus 2 Not Detected     Parainfluenza Virus 3 Not Detected     Parainfluenza Virus 4 Not Detected     Respiratory Syncytial Virus Not Detected     Bordetella Parapertussis ([id number]) Not Detected     Bordetella pertussis (ptxP) Not Detected      Chlamydia pneumoniae Not Detected     Mycoplasma pneumoniae Not Detected    Narrative:      For all other respiratory sources, order [id number] -  Respiratory Viral Panel by PCR    Culture, Respiratory with Gram Stain [[id number]]     Order Status: No result Specimen: Respiratory         Latest lactate reviewed, they are-  Recent Labs   Lab 07/30/22  2212   LACTATE 1.4       Organ dysfunction indicated by Encephalopathy   Source- CAP     Source control Achieved by- antibiotics    CXR with L pleural effusion and L lower zone atelectasis v consolidation - possible source of sepsis  -Vanc/Cefepime started 7/31  -2L NC to maintain sats >92%  -f/u urine cx  -f/u blood cx  -f/u resp infection panel  - Will stop anx for now. Patient is afebrile and stable       Pleural effusion  See CAP      Delirium  Patient lethargic and confused in ED. Found to be oriented x3 after several hours. Intermittent confusion per daughter at bedside.  -delirium precautions placed      Hyperlipidemia  -continue home atorvastatin 20mg      Normocytic anemia  Continue to monitor  -transfuse if Hgb<7  -daily CBC      CAP (community acquired pneumonia)  Patient with CXR showing L pleural effusion, L basilar atelectasis v consolidation, requiring 2L NC. Daughter at bedside reports productive cough, though mostly in the mornings.  - Vanc/cefepime/azithromycin  Stopped for now. Low suspicion for infectious process at this time due to negative cultures   - Pleural effusion on CT chest, pulmonology consulted for possible thoracentesis  - Pulm declined performing para, believes the effusion to be a diuresis problem  - Will increase Bumex to 1mg BID       Coronary artery disease involving native coronary artery of native heart without angina pectoris  S/p RCA stent placement  -continue home ASA 81mg  -continue home atorvastatin  -continue home Plavix      Chronic combined systolic and diastolic heart failure  Patient recently admitted to [facility] for CHF  exacerbation. She does not have LE edema or SOB at this time.  - on 2L NC  - increased Bumex to 1mg BID from QD per Pulmonary recs        Essential hypertension  BP increased to >200 overnight. Was provided hydralazine. Will monitor for now   -continue home carvedilol 3.125mg, lisinopril 20mg       Acquired hypothyroidism  -continue home synthroid 125mcg      Severe aortic stenosis s/p TAVR  Continue to monitor        VTE Risk Mitigation (From admission, onward)         Ordered     IP VTE HIGH RISK PATIENT  Once         07/31/22 0304     Place sequential compression device  Until discontinued         07/31/22 0304                Discharge Planning   CHARLIE: 8/3/2022     Code Status: Full Code   Is the patient medically ready for discharge?: No    Reason for patient still in hospital (select all that apply): Patient trending condition, Laboratory test, Treatment and Consult recommendations'  Discharge Plan A: Home with family                  Bj West, DO  Department of Hospital Medicine   [facility] ([place]

## 2022-08-02 NOTE — PLAN OF CARE
Problem: Occupational Therapy  Goal: Occupational Therapy Goal  Description: Goals to be met by: 8/16     Patient will increase functional independence with ADLs by performing:    UE Dressing with Minimal Assistance.  LE Dressing with Moderate Assistance.  Grooming while seated with Supervision.  Toileting from bedside commode with Moderate Assistance for hygiene and clothing management.   Supine to sit with Stand-by Assistance.  Step transfer with Stand-by Assistance using RW    Outcome: Ongoing, Progressing     OT eval completed.

## 2022-08-02 NOTE — PLAN OF CARE
Problem: Adult Inpatient Plan of Care  Goal: Plan of Care Review  Outcome: Ongoing, Progressing     Problem: Fluid Imbalance (Pneumonia)  Goal: Fluid Balance  Outcome: Ongoing, Progressing     Problem: Fall Injury Risk  Goal: Absence of Fall and Fall-Related Injury  Outcome: Ongoing, Progressing

## 2022-08-03 LAB
ALBUMIN SERPL BCP-MCNC: 2.5 G/DL (ref 3.5–5.2)
ALP SERPL-CCNC: 74 U/L (ref 55–135)
ALT SERPL W/O P-5'-P-CCNC: 6 U/L (ref 10–44)
ANION GAP SERPL CALC-SCNC: 9 MMOL/L (ref 8–16)
AST SERPL-CCNC: 16 U/L (ref 10–40)
BASOPHILS # BLD AUTO: 0.02 K/UL (ref 0–0.2)
BASOPHILS NFR BLD: 0.3 % (ref 0–1.9)
BILIRUB SERPL-MCNC: 1.2 MG/DL (ref 0.1–1)
BUN SERPL-MCNC: 20 MG/DL (ref 8–23)
CALCIUM SERPL-MCNC: 9 MG/DL (ref 8.7–10.5)
CHLORIDE SERPL-SCNC: 106 MMOL/L (ref 95–110)
CO2 SERPL-SCNC: 24 MMOL/L (ref 23–29)
CREAT SERPL-MCNC: 0.7 MG/DL (ref 0.5–1.4)
DIFFERENTIAL METHOD: ABNORMAL
EOSINOPHIL # BLD AUTO: 0.1 K/UL (ref 0–0.5)
EOSINOPHIL NFR BLD: 1 % (ref 0–8)
ERYTHROCYTE [DISTWIDTH] IN BLOOD BY AUTOMATED COUNT: 18.4 % (ref 11.5–14.5)
EST. GFR  (NO RACE VARIABLE): >60 ML/MIN/1.73 M^2
GLUCOSE SERPL-MCNC: 85 MG/DL (ref 70–110)
HCT VFR BLD AUTO: 30.3 % (ref 37–48.5)
HGB BLD-MCNC: 9.7 G/DL (ref 12–16)
IMM GRANULOCYTES # BLD AUTO: 0.02 K/UL (ref 0–0.04)
IMM GRANULOCYTES NFR BLD AUTO: 0.3 % (ref 0–0.5)
LYMPHOCYTES # BLD AUTO: 0.6 K/UL (ref 1–4.8)
LYMPHOCYTES NFR BLD: 10.5 % (ref 18–48)
MCH RBC QN AUTO: 27.5 PG (ref 27–31)
MCHC RBC AUTO-ENTMCNC: 32 G/DL (ref 32–36)
MCV RBC AUTO: 86 FL (ref 82–98)
MONOCYTES # BLD AUTO: 0.3 K/UL (ref 0.3–1)
MONOCYTES NFR BLD: 5.6 % (ref 4–15)
NEUTROPHILS # BLD AUTO: 5 K/UL (ref 1.8–7.7)
NEUTROPHILS NFR BLD: 82.3 % (ref 38–73)
NRBC BLD-RTO: 0 /100 WBC
PLATELET # BLD AUTO: 157 K/UL (ref 150–450)
PMV BLD AUTO: 11.5 FL (ref 9.2–12.9)
POTASSIUM SERPL-SCNC: 3.7 MMOL/L (ref 3.5–5.1)
PROT SERPL-MCNC: 7.3 G/DL (ref 6–8.4)
RBC # BLD AUTO: 3.53 M/UL (ref 4–5.4)
SODIUM SERPL-SCNC: 139 MMOL/L (ref 136–145)
WBC # BLD AUTO: 6.07 K/UL (ref 3.9–12.7)

## 2022-08-03 PROCEDURE — 25000003 PHARM REV CODE 250

## 2022-08-03 PROCEDURE — 99232 PR SUBSEQUENT HOSPITAL CARE,LEVL II: ICD-10-PCS | Mod: GC,,, | Performed by: INTERNAL MEDICINE

## 2022-08-03 PROCEDURE — 36415 COLL VENOUS BLD VENIPUNCTURE: CPT

## 2022-08-03 PROCEDURE — 85025 COMPLETE CBC W/AUTO DIFF WBC: CPT

## 2022-08-03 PROCEDURE — 80053 COMPREHEN METABOLIC PANEL: CPT

## 2022-08-03 PROCEDURE — 20600001 HC STEP DOWN PRIVATE ROOM

## 2022-08-03 PROCEDURE — 25000003 PHARM REV CODE 250: Performed by: HOSPITALIST

## 2022-08-03 PROCEDURE — 99232 SBSQ HOSP IP/OBS MODERATE 35: CPT | Mod: GC,,, | Performed by: INTERNAL MEDICINE

## 2022-08-03 PROCEDURE — 99232 PR SUBSEQUENT HOSPITAL CARE,LEVL II: ICD-10-PCS | Mod: GC,,, | Performed by: HOSPITALIST

## 2022-08-03 PROCEDURE — 99232 SBSQ HOSP IP/OBS MODERATE 35: CPT | Mod: GC,,, | Performed by: HOSPITALIST

## 2022-08-03 PROCEDURE — 97530 THERAPEUTIC ACTIVITIES: CPT

## 2022-08-03 RX ADMIN — ATORVASTATIN CALCIUM 20 MG: 20 TABLET, FILM COATED ORAL at 08:08

## 2022-08-03 RX ADMIN — MEGESTROL ACETATE 40 MG: 40 TABLET ORAL at 09:08

## 2022-08-03 RX ADMIN — CLOPIDOGREL 75 MG: 75 TABLET, FILM COATED ORAL at 09:08

## 2022-08-03 RX ADMIN — CARVEDILOL 3.12 MG: 3.12 TABLET, FILM COATED ORAL at 08:08

## 2022-08-03 RX ADMIN — TIMOLOL MALEATE 1 DROP: 5 SOLUTION OPHTHALMIC at 09:08

## 2022-08-03 RX ADMIN — ASPIRIN 81 MG CHEWABLE TABLET 81 MG: 81 TABLET CHEWABLE at 09:08

## 2022-08-03 RX ADMIN — CARVEDILOL 3.12 MG: 3.12 TABLET, FILM COATED ORAL at 09:08

## 2022-08-03 RX ADMIN — TIMOLOL MALEATE 1 DROP: 5 SOLUTION OPHTHALMIC at 08:08

## 2022-08-03 RX ADMIN — LISINOPRIL 20 MG: 20 TABLET ORAL at 09:08

## 2022-08-03 RX ADMIN — BUMETANIDE 1 MG: 1 TABLET ORAL at 09:08

## 2022-08-03 RX ADMIN — BUMETANIDE 1 MG: 1 TABLET ORAL at 06:08

## 2022-08-03 RX ADMIN — LEVOTHYROXINE SODIUM 125 MCG: 25 TABLET ORAL at 06:08

## 2022-08-03 NOTE — ASSESSMENT & PLAN NOTE
BP increased to >200 overnight. Was provided hydralazine. Will monitor for now   - continue home carvedilol 3.125mg, lisinopril 20mg   - Bumex increased to 1mg BID

## 2022-08-03 NOTE — PROGRESS NOTES
Progress Note  Pulmonary & Critical Care Medicine    Attending: Akiko Smith  Fellow: Shannan Borrego  Admit Date: 7/30/2022  Today's Date: 08/03/2022      SUBJECTIVE:   HPI: Ms. Michael is a 89yo CF with a PMH significant for HFpEF, CAD, AS, and PPM placement who presented to this facility from [facility] 7/30 for fever and acute encephalopathy. She was admitted to medicine for treatment of sepsis with an unknown source at that time. She has been treated with vancomycin and cefepime since admission. WBC has not been elevated this admission with a transient granulocytosis noted. Procal was negative and BNP was elevated. Afebrile since admission. Pulmonology consulted for evaluation of pleural effusions.      On review of previous imaging, bilateral pleural effusions have been present since 7/18 with notable improvement on subsequent CXRs. Ms. Michael had 2 previous admissions this month for CHF exacerbations with 1 accompanied with UTI. She was diuresed on furosemide and was subsequently switched to BID bumex for improved diuresis. She was discharged on Bumex BID, but it was changed to daily per her cardiologist. Oxygen saturations have been in the mid to low 90s this admission with an increase to 100% on 2L NC. Her BP has also been persistently elevated with the SBP being in the 200s during my interview.     Interval History: NAEO. Oh 3.5L O2 this AM with O2 sats @ 100%. States that she feels tired. No increase WOB noted.    OBJECTIVE:     Vital Signs Trends/Hx Reviewed  Vitals:    08/02/22 2030 08/02/22 2245 08/03/22 0700 08/03/22 0745   BP: (!) 168/70 (!) 168/70 (!) 151/65 (!) 166/72   BP Location:  Right arm Right arm    Patient Position:  Lying Lying    Pulse: 60 60 60 60   Resp: (!) 26 (!) 30 (!) 35 (!) 22   Temp: 98.7 °F (37.1 °C) 98.2 °F (36.8 °C) 98.2 °F (36.8 °C) 98.5 °F (36.9 °C)   TempSrc: Oral Oral Oral    SpO2: 97% 95% 100% 99%   Weight:       Height:           Ventilator settings:   No data recorded        Physical Exam:  General: Obese CF laying in bed in NAD, cooperative & interactive.  HEENT: AT/NC, PERRL, EOMI, oral and nasal mucosa moist. NC in place.  Neck: Supple without JVD or palpable LAD.   Cardiac: normal rate, regular rhythm, with no MRG with brisk cap refill and symmetric pulses in distal extremities.  Respiratory: Normal inspection. Symmetric chest rise. Normal palpation and percussion. Auscultation clear bilaterally. Decreased breath sound LLL. Mild increased work of breathing noted.   Abdomen: Soft, NT/ND. Obese abdomen. +BS. No hepatosplenomegaly.   Extremities: Left arm chronically significantly more swollen than right.  Neuro: Grossly intact to brief exam. Oriented x3 with appropriate mood/affect to situation.        Laboratory:  No results for input(s): PH, PCO2, PO2, HCO3, POCSATURATED, BE in the last 24 hours.  Recent Labs   Lab 08/03/22  0428   WBC 6.07   RBC 3.53*   HGB 9.7*   HCT 30.3*      MCV 86   MCH 27.5   MCHC 32.0     Recent Labs   Lab 08/03/22  0823      K 3.7      CO2 24   BUN 20   CREATININE 0.7       Microbiology Data:   Microbiology Results (last 7 days)     Procedure Component Value Units Date/Time    Blood culture x two cultures. Draw prior to antibiotics. [[id number]] Collected: 07/30/22 2213    Order Status: Completed Specimen: Blood from Peripheral, Antecubital, Right Updated: 08/02/22 2312     Blood Culture, Routine No Growth to date      No Growth to date      No Growth to date      No Growth to date    Narrative:      Aerobic and anaerobic    Blood culture x two cultures. Draw prior to antibiotics. [[id number]] Collected: 07/30/22 2213    Order Status: Completed Specimen: Blood from Peripheral, Hand, Right Updated: 08/02/22 2312     Blood Culture, Routine No Growth to date      No Growth to date      No Growth to date      No Growth to date    Narrative:      Aerobic and anaerobic    Urine culture [[id number]] Collected: 07/30/22 2234    Order Status:  Completed Specimen: Urine Updated: 08/01/22 0116     Urine Culture, Routine No significant growth    Narrative:      Specimen Source->Urine    Respiratory Infection Panel (PCR), Nasopharyngeal [[id number]] Collected: 07/31/22 1602    Order Status: Completed Specimen: Nasopharyngeal Swab Updated: 07/31/22 2358     Respiratory Infection Panel Source NP Swab     Adenovirus Not Detected     Coronavirus 229E, Common Cold Virus Not Detected     Coronavirus HKU1, Common Cold Virus Not Detected     Coronavirus NL63, Common Cold Virus Not Detected     Coronavirus OC43, Common Cold Virus Not Detected     Comment: The Coronavirus strains detected in this test cause the common cold.  These strains are not the COVID-19 (novel Coronavirus)strain   associated with the respiratory disease outbreak.          SARS-CoV2 (COVID-19) Qualitative PCR Not Detected     Human Metapneumovirus Not Detected     Human Rhinovirus/Enterovirus Not Detected     Influenza A (subtypes H1, H1-2009,H3) Not Detected     Influenza B Not Detected     Parainfluenza Virus 1 Not Detected     Parainfluenza Virus 2 Not Detected     Parainfluenza Virus 3 Not Detected     Parainfluenza Virus 4 Not Detected     Respiratory Syncytial Virus Not Detected     Bordetella Parapertussis ([id number]) Not Detected     Bordetella pertussis (ptxP) Not Detected     Chlamydia pneumoniae Not Detected     Mycoplasma pneumoniae Not Detected    Narrative:      For all other respiratory sources, order [id number] -  Respiratory Viral Panel by PCR    Culture, Respiratory with Gram Stain [[id number]]     Order Status: No result Specimen: Respiratory            Chest Imaging:   No results found in the last 24 hours.      Scheduled Medications:    aspirin  81 mg Oral Daily    atorvastatin  20 mg Oral QHS    bumetanide  1 mg Oral BID loop    carvediloL  3.125 mg Oral BID    clopidogreL  75 mg Oral Daily    levothyroxine  125 mcg Oral Before breakfast    lisinopriL  20 mg Oral Daily     megestroL  40 mg Oral Daily    timolol maleate 0.5%  1 drop Both Eyes BID       PRN Medications:   acetaminophen, dextrose 10%, dextrose 10%, glucagon (human recombinant), glucose, glucose, hydrALAZINE, melatonin, naloxone, sodium chloride 0.9%, sodium chloride 0.9%, sodium chloride 0.9%    Problem List:   Patient Active Problem List   Diagnosis    Severe aortic stenosis s/p TAVR    Acquired hypothyroidism    Essential hypertension    S/P TAVR (transcatheter aortic valve replacement)    Chronic combined systolic and diastolic heart failure    Coronary artery disease involving native coronary artery of native heart without angina pectoris    Presence of permanent cardiac pacemaker    CAP (community acquired pneumonia)    Acute cystitis    Normocytic anemia    Increased anion gap metabolic acidosis    Hyperlipidemia    Acute on chronic combined systolic (congestive) and diastolic (congestive) heart failure    Debility    Palliative care encounter    Acute hypoxemic respiratory failure    Delirium    Sepsis    Pleural effusion       ASSESSMENT & RECOMMENDATIONS     #Bilateral Pleural Effusions  -Seen again on CT chest. L>R with atelectasis associated with the left effusion. Pleural effusions have been present and improving in size with diuresis since initial presentation 7/18.   -Unclear at this time of pleural effusions are a source of infection. Aside from initial fever, no other evidence of infection is present as blood cultures continue to be negative with a negative procal and lactate. A 2nd viable option for pleural effusions is slowly resolving CHF exacerbation with the need for more aggressive diuresis as her diuretics were reduced by cardiology after discharge.   -Continue aggressive diuresis. Recommend IV Bumex 1mg BID  From herf past records.  Continue to encourage patient to utilize her incentive spirometer to help improve her atelectasis and oxygenation.     #Hypoxemia  -Likely 2/2  pleural effusion  -Will likely improve with diuresis.   -Recommend switching to 1mg IV Bumex BID    Thank you for allowing us to participate in the care of this patient. We will continue to follow. Please call with questions.    Shannan Borrego M.D., PGY-V  [employer] Pulmonary/Critical Care Fellow

## 2022-08-03 NOTE — PROGRESS NOTES
[facility] ([address] Medicine  Progress Note    Patient Name: Pilar Michael  MRN: [id number]  Patient Class: IP- Inpatient   Admission Date: 7/30/2022  Length of Stay: 3 days  Attending Physician: Carolina Peña MD  Primary Care Provider: Joseph Noel MD        Subjective:     Principal Problem:Sepsis        HPI:  Ms. Michael is a 89 y.o. F with PMH systolic and diastolic HF, CAD s/p RCA stent, pacemaker placement, thyroid cancer s/p treatment 2003, breast cancer s/p mastectomy 01/2022, CVA 04/2022 without residual deficits, HLD, HTN, hypothyroidism who presents to the ED from SNF for fever and AMS. She developed fever 7/30 up to 102F and became lethargic and confused. She had been at SNF for approximately one week after being discharged from [facility] [place] [facility] for acute exacerbation of CHF.     Per daughter at bedside, the patient becomes confused and lethargic when she has fever. During previous admission for CHF exacerbation she was also treated empirically for CAP.     In the ED, she had T100.7, HR 60, /50, O2 94% on room air. She was placed on 2L NC with improvement in sats to 99%. CBC remarkable for H/H of 9/29, calcium 8.5, , troponin 0.028. UA hazy with trace leukocytes. CXR with L pleural effusion and L basilar atelectasis v consolidation, similar to prior study. She was given 2g Cefepime x1 dose for sepsis of unknown source. She is admitted to medicine for workup and treatment of sepsis.       Overview/Hospital Course:  Ms. Michael is a 89 y.o. F with PMH systolic and diastolic HF, CAD s/p RCA stent, pacemaker placement, thyroid cancer s/p treatment 2003, breast cancer s/p mastectomy 01/2022, CVA 04/2022 without residual deficits, HLD, HTN, hypothyroidism who presents to the ED from SNF for fever and AMS. She developed fever 7/30 up to 102F and became lethargic and confused. She had been at SNF for approximately one week after being discharged from [facility] for acute  exacerbation of CHF. Pt. Was started on cefepime, vanc, and azithromycin. During hospital course she has not become febrile and has become easily arousal. Pulmonology was consulted and believe the pulmonary effusion is due to insufficient diuresis. We will stop abx for now and monitor. Per pulm recs, we will up the bumetanide to 1mg BID, and monitor.        Interval History: Pt. Is resting comfortably well. Was on NC sating at 100%. Removed NC and sating >90% with movement. Will continue to diurese per pulmonary recommendations.     Review of Systems   Constitutional:  Positive for fatigue. Negative for chills and fever.   HENT:  Negative for congestion and sore throat.    Respiratory:  Negative for cough, shortness of breath (Patient breathing comfortable off NC) and wheezing.    Cardiovascular:  Negative for chest pain, palpitations and leg swelling.   Gastrointestinal:  Negative for abdominal pain, nausea and vomiting.   Genitourinary:  Negative for dysuria and hematuria.   Musculoskeletal:  Negative for back pain and myalgias.   Neurological:  Negative for light-headedness and headaches.   Psychiatric/Behavioral:  Negative for agitation. The patient is not nervous/anxious.    Objective:     Vital Signs (Most Recent):  Temp: 98.2 °F (36.8 °C) (08/03/22 0700)  Pulse: 60 (08/03/22 0700)  Resp: (!) 35 (08/03/22 0700)  BP: (!) 151/65 (08/03/22 0700)  SpO2: 100 % (08/03/22 0700)   Vital Signs (24h Range):  Temp:  [97.6 °F (36.4 °C)-98.8 °F (37.1 °C)] 98.2 °F (36.8 °C)  Pulse:  [60] 60  Resp:  [26-38] 35  SpO2:  [95 %-100 %] 100 %  BP: (137-168)/(59-70) 151/65     Weight: 90.3 kg (199 lb 1.2 oz)  Body mass index is 36.41 kg/m².    Intake/Output Summary (Last 24 hours) at 8/3/2022 0805  Last data filed at 8/2/2022 1800  Gross per 24 hour   Intake --   Output 400 ml   Net -400 ml      Physical Exam  Constitutional:       Appearance: Normal appearance.      Comments: Resting comfortably    HENT:      Head: Normocephalic  and atraumatic.      Right Ear: External ear normal.      Left Ear: External ear normal.      Nose: Nose normal.      Mouth/Throat:      Mouth: Mucous membranes are moist.      Pharynx: Oropharynx is clear.   Eyes:      General: No scleral icterus.     Extraocular Movements: Extraocular movements intact.      Conjunctiva/sclera: Conjunctivae normal.   Cardiovascular:      Rate and Rhythm: Normal rate and regular rhythm.      Pulses: Normal pulses.      Heart sounds: Murmur heard.   Pulmonary:      Effort: Pulmonary effort is normal. No respiratory distress.      Breath sounds: Rales present. No wheezing.      Comments: Rales heard in L lower lung zones    Abdominal:      General: Abdomen is flat. Bowel sounds are normal. There is no distension.      Palpations: Abdomen is soft.      Tenderness: There is no abdominal tenderness.   Musculoskeletal:         General: Swelling present. No tenderness.      Cervical back: Normal range of motion.      Comments: Lymphedema of LUE. Edema reduced from yesterday.    Skin:     General: Skin is warm and dry.   Neurological:      Mental Status: She is alert and oriented to person, place, and time.   Psychiatric:         Mood and Affect: Mood normal.         Behavior: Behavior normal.         Thought Content: Thought content normal.         Judgment: Judgment normal.       Significant Labs: All pertinent labs within the past 24 hours have been reviewed.    Significant Imaging: I have reviewed all pertinent imaging results/findings within the past 24 hours.      Assessment/Plan:      * Sepsis  This patient does have evidence of infective focus  My overall impression is sepsis. Vital signs were reviewed and noted in progress note.  Antibiotics given-   Antibiotics (From admission, onward)            None        Cultures were taken-   Microbiology Results (last 7 days)     Procedure Component Value Units Date/Time    Blood culture x two cultures. Draw prior to antibiotics. [[id number]]  Collected: 07/30/22 2213    Order Status: Completed Specimen: Blood from Peripheral, Antecubital, Right Updated: 08/02/22 2312     Blood Culture, Routine No Growth to date      No Growth to date      No Growth to date      No Growth to date    Narrative:      Aerobic and anaerobic    Blood culture x two cultures. Draw prior to antibiotics. [[id number]] Collected: 07/30/22 2213    Order Status: Completed Specimen: Blood from Peripheral, Hand, Right Updated: 08/02/22 2312     Blood Culture, Routine No Growth to date      No Growth to date      No Growth to date      No Growth to date    Narrative:      Aerobic and anaerobic    Urine culture [[id number]] Collected: 07/30/22 2234    Order Status: Completed Specimen: Urine Updated: 08/01/22 0116     Urine Culture, Routine No significant growth    Narrative:      Specimen Source->Urine    Respiratory Infection Panel (PCR), Nasopharyngeal [[id number]] Collected: 07/31/22 1602    Order Status: Completed Specimen: Nasopharyngeal Swab Updated: 07/31/22 2358     Respiratory Infection Panel Source NP Swab     Adenovirus Not Detected     Coronavirus 229E, Common Cold Virus Not Detected     Coronavirus HKU1, Common Cold Virus Not Detected     Coronavirus NL63, Common Cold Virus Not Detected     Coronavirus OC43, Common Cold Virus Not Detected     Comment: The Coronavirus strains detected in this test cause the common cold.  These strains are not the COVID-19 (novel Coronavirus)strain   associated with the respiratory disease outbreak.          SARS-CoV2 (COVID-19) Qualitative PCR Not Detected     Human Metapneumovirus Not Detected     Human Rhinovirus/Enterovirus Not Detected     Influenza A (subtypes H1, H1-2009,H3) Not Detected     Influenza B Not Detected     Parainfluenza Virus 1 Not Detected     Parainfluenza Virus 2 Not Detected     Parainfluenza Virus 3 Not Detected     Parainfluenza Virus 4 Not Detected     Respiratory Syncytial Virus Not Detected     Bordetella  Parapertussis (AP4204) Not Detected     Bordetella pertussis (ptxP) Not Detected     Chlamydia pneumoniae Not Detected     Mycoplasma pneumoniae Not Detected    Narrative:      For all other respiratory sources, order [id number] -  Respiratory Viral Panel by PCR    Culture, Respiratory with Gram Stain [[id number]]     Order Status: No result Specimen: Respiratory         Latest lactate reviewed, they are-  No results for input(s): LACTATE in the last 72 hours.    Organ dysfunction indicated by Encephalopathy   Source- CAP     Source control Achieved by- antibiotics    CXR with L pleural effusion and L lower zone atelectasis v consolidation - possible source of sepsis  -Vanc/Cefepime started 7/31  -2L NC to maintain sats >92%  -f/u urine cx - negative   -f/u blood cx - negative   -f/u resp infection panel  - Will stop abx for now. Patient is afebrile and stable       Pleural effusion  See CAP      Delirium  Patient lethargic and confused in ED. Found to be oriented x3 after several hours. Intermittent confusion per daughter at bedside.  -delirium precautions placed      Hyperlipidemia  -continue home atorvastatin 20mg      Normocytic anemia  Continue to monitor  -transfuse if Hgb<7  -daily CBC      CAP (community acquired pneumonia)  Patient with CXR showing L pleural effusion, L basilar atelectasis v consolidation, requiring 2L NC. Daughter at bedside reports productive cough, though mostly in the mornings.  - Vanc/cefepime/azithromycin  Stopped for now. Low suspicion for infectious process at this time due to negative cultures   - Pleural effusion on CT chest, pulmonology consulted for possible thoracentesis  - Pulm declined performing para, believes the effusion to be a diuresis problem  - Will increase Bumex to 1mg BID       Coronary artery disease involving native coronary artery of native heart without angina pectoris  S/p RCA stent placement  -continue home ASA 81mg  -continue home atorvastatin  -continue home  Plavix      Chronic combined systolic and diastolic heart failure  Patient recently admitted to [facility] for CHF exacerbation. She does not have LE edema or SOB at this time.  - on 2L NC - currently off NC sating >90%   - increased Bumex to 1mg BID from QD per Pulmonary recs        Essential hypertension  BP increased to >200 overnight. Was provided hydralazine. Will monitor for now   - continue home carvedilol 3.125mg, lisinopril 20mg   - Bumex increased to 1mg BID       Acquired hypothyroidism  -continue home synthroid 125mcg      Severe aortic stenosis s/p TAVR  Continue to monitor        VTE Risk Mitigation (From admission, onward)         Ordered     IP VTE HIGH RISK PATIENT  Once         07/31/22 0304     Place sequential compression device  Until discontinued         07/31/22 0304                Discharge Planning   CHARLIE: 8/3/2022     Code Status: Full Code   Is the patient medically ready for discharge?: No    Reason for patient still in hospital (select all that apply): Patient trending condition, Laboratory test, Treatment and Consult recommendations  Discharge Plan A: Home with family                  [place], DO  Department of Hospital Medicine   [facility] ([place])

## 2022-08-03 NOTE — SUBJECTIVE & OBJECTIVE
Interval History: Pt. Is resting comfortably well. Was on NC sating at 100%. Removed NC and sating >90% with movement. Will continue to diurese per pulmonary recommendations.     Review of Systems   Constitutional:  Positive for fatigue. Negative for chills and fever.   HENT:  Negative for congestion and sore throat.    Respiratory:  Negative for cough, shortness of breath (Patient breathing comfortable off NC) and wheezing.    Cardiovascular:  Negative for chest pain, palpitations and leg swelling.   Gastrointestinal:  Negative for abdominal pain, nausea and vomiting.   Genitourinary:  Negative for dysuria and hematuria.   Musculoskeletal:  Negative for back pain and myalgias.   Neurological:  Negative for light-headedness and headaches.   Psychiatric/Behavioral:  Negative for agitation. The patient is not nervous/anxious.    Objective:     Vital Signs (Most Recent):  Temp: 98.2 °F (36.8 °C) (08/03/22 0700)  Pulse: 60 (08/03/22 0700)  Resp: (!) 35 (08/03/22 0700)  BP: (!) 151/65 (08/03/22 0700)  SpO2: 100 % (08/03/22 0700)   Vital Signs (24h Range):  Temp:  [97.6 °F (36.4 °C)-98.8 °F (37.1 °C)] 98.2 °F (36.8 °C)  Pulse:  [60] 60  Resp:  [26-38] 35  SpO2:  [95 %-100 %] 100 %  BP: (137-168)/(59-70) 151/65     Weight: 90.3 kg (199 lb 1.2 oz)  Body mass index is 36.41 kg/m².    Intake/Output Summary (Last 24 hours) at 8/3/2022 0805  Last data filed at 8/2/2022 1800  Gross per 24 hour   Intake --   Output 400 ml   Net -400 ml      Physical Exam  Constitutional:       Appearance: Normal appearance.      Comments: Resting comfortably    HENT:      Head: Normocephalic and atraumatic.      Right Ear: External ear normal.      Left Ear: External ear normal.      Nose: Nose normal.      Mouth/Throat:      Mouth: Mucous membranes are moist.      Pharynx: Oropharynx is clear.   Eyes:      General: No scleral icterus.     Extraocular Movements: Extraocular movements intact.      Conjunctiva/sclera: Conjunctivae normal.    Cardiovascular:      Rate and Rhythm: Normal rate and regular rhythm.      Pulses: Normal pulses.      Heart sounds: Murmur heard.   Pulmonary:      Effort: Pulmonary effort is normal. No respiratory distress.      Breath sounds: Rales present. No wheezing.      Comments: Rales heard in L lower lung zones    Abdominal:      General: Abdomen is flat. Bowel sounds are normal. There is no distension.      Palpations: Abdomen is soft.      Tenderness: There is no abdominal tenderness.   Musculoskeletal:         General: Swelling present. No tenderness.      Cervical back: Normal range of motion.      Comments: Lymphedema of LUE. Edema reduced from yesterday.    Skin:     General: Skin is warm and dry.   Neurological:      Mental Status: She is alert and oriented to person, place, and time.   Psychiatric:         Mood and Affect: Mood normal.         Behavior: Behavior normal.         Thought Content: Thought content normal.         Judgment: Judgment normal.       Significant Labs: All pertinent labs within the past 24 hours have been reviewed.    Significant Imaging: I have reviewed all pertinent imaging results/findings within the past 24 hours.

## 2022-08-03 NOTE — NURSING
Patient resting in bed. No complaints of pain. No SOB. Sp02 >90% on 3L NC. See full assessment. AUOP per dede. Will hand off report to on coming RN.

## 2022-08-03 NOTE — ASSESSMENT & PLAN NOTE
This patient does have evidence of infective focus  My overall impression is sepsis. Vital signs were reviewed and noted in progress note.  Antibiotics given-   Antibiotics (From admission, onward)            None        Cultures were taken-   Microbiology Results (last 7 days)     Procedure Component Value Units Date/Time    Blood culture x two cultures. Draw prior to antibiotics. [[id number]] Collected: 07/30/22 2213    Order Status: Completed Specimen: Blood from Peripheral, Antecubital, Right Updated: 08/02/22 2312     Blood Culture, Routine No Growth to date      No Growth to date      No Growth to date      No Growth to date    Narrative:      Aerobic and anaerobic    Blood culture x two cultures. Draw prior to antibiotics. [[id number]] Collected: 07/30/22 2213    Order Status: Completed Specimen: Blood from Peripheral, Hand, Right Updated: 08/02/22 2312     Blood Culture, Routine No Growth to date      No Growth to date      No Growth to date      No Growth to date    Narrative:      Aerobic and anaerobic    Urine culture [[id number]] Collected: 07/30/22 2234    Order Status: Completed Specimen: Urine Updated: 08/01/22 0116     Urine Culture, Routine No significant growth    Narrative:      Specimen Source->Urine    Respiratory Infection Panel (PCR), Nasopharyngeal [[id number]] Collected: 07/31/22 1602    Order Status: Completed Specimen: Nasopharyngeal Swab Updated: 07/31/22 2358     Respiratory Infection Panel Source NP Swab     Adenovirus Not Detected     Coronavirus 229E, Common Cold Virus Not Detected     Coronavirus HKU1, Common Cold Virus Not Detected     Coronavirus NL63, Common Cold Virus Not Detected     Coronavirus OC43, Common Cold Virus Not Detected     Comment: The Coronavirus strains detected in this test cause the common cold.  These strains are not the COVID-19 (novel Coronavirus)strain   associated with the respiratory disease outbreak.          SARS-CoV2 (COVID-19) Qualitative PCR Not Detected      Human Metapneumovirus Not Detected     Human Rhinovirus/Enterovirus Not Detected     Influenza A (subtypes H1, H1-2009,H3) Not Detected     Influenza B Not Detected     Parainfluenza Virus 1 Not Detected     Parainfluenza Virus 2 Not Detected     Parainfluenza Virus 3 Not Detected     Parainfluenza Virus 4 Not Detected     Respiratory Syncytial Virus Not Detected     Bordetella Parapertussis (JG8801) Not Detected     Bordetella pertussis (ptxP) Not Detected     Chlamydia pneumoniae Not Detected     Mycoplasma pneumoniae Not Detected    Narrative:      For all other respiratory sources, order [id number] -  Respiratory Viral Panel by PCR    Culture, Respiratory with Gram Stain [[id number]]     Order Status: No result Specimen: Respiratory         Latest lactate reviewed, they are-  No results for input(s): LACTATE in the last 72 hours.    Organ dysfunction indicated by Encephalopathy   Source- CAP     Source control Achieved by- antibiotics    CXR with L pleural effusion and L lower zone atelectasis v consolidation - possible source of sepsis  -Vanc/Cefepime started 7/31  -2L NC to maintain sats >92%  -f/u urine cx - negative   -f/u blood cx - negative   -f/u resp infection panel  - Will stop abx for now. Patient is afebrile and stable

## 2022-08-03 NOTE — PT/OT/SLP PROGRESS
Physical Therapy Treatment    Patient Name:  Pilar Michael   MRN:  [id number]    Recommendations:     Discharge Recommendations:  nursing facility, skilled   Discharge Equipment Recommendations: none   Barriers to discharge: requires increased assistance for functional mobility     Assessment:     Pilar Michael is a 89 y.o. female admitted with a medical diagnosis of Sepsis.  She presents with the following impairments/functional limitations:  weakness, impaired endurance, impaired functional mobility, gait instability, impaired balance, decreased upper extremity function, decreased lower extremity function, decreased safety awareness, impaired cardiopulmonary response to activity. Patient tolerated PT session fairly well. Patient initially required constant verbal cues to maintain alertness level, but once seated on edge of bed she was more alert. She required minimal assistance for bed mobility due to being soiled and requiring a brief change prior to getting into the chair. She sat on edge of bed ~15 minutes with contact guard assistance. She required minimal assistance to take ~6 steps from bed to bedside chair. PCT and RN notified. She would continue to benefit from PT in order to increase strength and endurance for functional mobility.     Rehab Prognosis: Good; patient would benefit from acute skilled PT services to address these deficits and reach maximum level of function.    Recent Surgery: * No surgery found *      Plan:     During this hospitalization, patient to be seen 3 x/week to address the identified rehab impairments via gait training, therapeutic activities, therapeutic exercises, neuromuscular re-education and progress toward the following goals:    · Plan of Care Expires:  08/31/22    Subjective     Chief Complaint: Sleepy.   Patient/Family Comments/goals: To get stronger.   Pain/Comfort:  · Pain Rating 1: 0/10      Objective:     Communicated with RN prior to session.  Patient found supine  with telemetry, pulse ox (continuous), PureWick upon PT entry to room.  (Niece present in room.)    General Precautions: Standard, fall   Orthopedic Precautions:N/A   Braces: N/A  Respiratory Status: Room air     Functional Mobility:  · Bed Mobility:     · Rolling Left:  minimum assistance  · Rolling Right: minimum assistance  · Supine to Sit: minimum assistance  · Sit to Supine: minimum assistance  · Transfers:     · Sit to Stand:  minimum assistance with hand-held assist  · Gait: ~6 steps from bed to bedside chair with minimal assistance / hand held assistance. She was limited in ambulation distance due to fatigue.       AM-PAC 6 CLICK MOBILITY  Turning over in bed (including adjusting bedclothes, sheets and blankets)?: 3  Sitting down on and standing up from a chair with arms (e.g., wheelchair, bedside commode, etc.): 3  Moving from lying on back to sitting on the side of the bed?: 3  Moving to and from a bed to a chair (including a wheelchair)?: 3  Need to walk in hospital room?: 3  Climbing 3-5 steps with a railing?: 2  Basic Mobility Total Score: 17       Therapeutic Activities and Exercises:  Patient sat on edge of bed ~15 minutes with contact guard assistance. She performed B LE ankle pumps ans LAQ x10 reps.     Patient and niece educated in PT role, POC, and OOB activity to maximize recovery.     Patient left up in chair with all lines intact, call button in reach and RN, PCT, and niece present.    GOALS:   Multidisciplinary Problems     Physical Therapy Goals        Problem: Physical Therapy    Goal Priority Disciplines Outcome Goal Variances Interventions   Physical Therapy Goal     PT, PT/OT Ongoing, Progressing     Description: Goals to be met by: 8/10/22     Patient will increase functional independence with mobility by performin. Supine to sit with Contact Guard Assistance  2. Sit to supine with Contact Guard Assistance  3. Sit to stand transfer with Contact Guard Assistance  4. Gait  x 50 feet  with Contact Guard Assistance using LRAD, if needed.   5. Stand for 5 minutes with Contact Guard Assistance using LRAD, if needed  6. Lower extremity exercise program x10 reps per handout, with independence                     Time Tracking:     PT Received On: 08/03/22  PT Start Time: 1202     PT Stop Time: 1245  PT Total Time (min): 43 min     Billable Minutes: Therapeutic Activity 43    Treatment Type: Treatment  PT/PTA: PT     PTA Visit Number: 0     08/03/2022

## 2022-08-03 NOTE — RESPIRATORY THERAPY
RAPID RESPONSE RESPIRATORY CHART CHECK       Chart check completed, instructed to call [phone] for further concerns or assistance.

## 2022-08-04 ENCOUNTER — TELEPHONE (OUTPATIENT)
Dept: HEPATOLOGY | Facility: CLINIC | Age: 87
End: 2022-08-04
Payer: MEDICARE

## 2022-08-04 LAB
ALBUMIN SERPL BCP-MCNC: 2.6 G/DL (ref 3.5–5.2)
ALP SERPL-CCNC: 79 U/L (ref 55–135)
ALT SERPL W/O P-5'-P-CCNC: 9 U/L (ref 10–44)
ANION GAP SERPL CALC-SCNC: 9 MMOL/L (ref 8–16)
AST SERPL-CCNC: 17 U/L (ref 10–40)
BACTERIA BLD CULT: NORMAL
BACTERIA BLD CULT: NORMAL
BASOPHILS # BLD AUTO: 0.02 K/UL (ref 0–0.2)
BASOPHILS NFR BLD: 0.4 % (ref 0–1.9)
BILIRUB SERPL-MCNC: 1.1 MG/DL (ref 0.1–1)
BUN SERPL-MCNC: 18 MG/DL (ref 8–23)
CALCIUM SERPL-MCNC: 9.1 MG/DL (ref 8.7–10.5)
CHLORIDE SERPL-SCNC: 107 MMOL/L (ref 95–110)
CO2 SERPL-SCNC: 24 MMOL/L (ref 23–29)
CREAT SERPL-MCNC: 0.8 MG/DL (ref 0.5–1.4)
DIFFERENTIAL METHOD: ABNORMAL
EOSINOPHIL # BLD AUTO: 0.1 K/UL (ref 0–0.5)
EOSINOPHIL NFR BLD: 2.2 % (ref 0–8)
ERYTHROCYTE [DISTWIDTH] IN BLOOD BY AUTOMATED COUNT: 18.2 % (ref 11.5–14.5)
EST. GFR  (NO RACE VARIABLE): >60 ML/MIN/1.73 M^2
GLUCOSE SERPL-MCNC: 81 MG/DL (ref 70–110)
HCT VFR BLD AUTO: 31.2 % (ref 37–48.5)
HGB BLD-MCNC: 9.9 G/DL (ref 12–16)
IMM GRANULOCYTES # BLD AUTO: 0.02 K/UL (ref 0–0.04)
IMM GRANULOCYTES NFR BLD AUTO: 0.4 % (ref 0–0.5)
LYMPHOCYTES # BLD AUTO: 0.7 K/UL (ref 1–4.8)
LYMPHOCYTES NFR BLD: 13 % (ref 18–48)
MAGNESIUM SERPL-MCNC: 1.6 MG/DL (ref 1.6–2.6)
MCH RBC QN AUTO: 27 PG (ref 27–31)
MCHC RBC AUTO-ENTMCNC: 31.7 G/DL (ref 32–36)
MCV RBC AUTO: 85 FL (ref 82–98)
MONOCYTES # BLD AUTO: 0.4 K/UL (ref 0.3–1)
MONOCYTES NFR BLD: 7.4 % (ref 4–15)
NEUTROPHILS # BLD AUTO: 4.3 K/UL (ref 1.8–7.7)
NEUTROPHILS NFR BLD: 76.6 % (ref 38–73)
NRBC BLD-RTO: 0 /100 WBC
PHOSPHATE SERPL-MCNC: 2.3 MG/DL (ref 2.7–4.5)
PLATELET # BLD AUTO: 172 K/UL (ref 150–450)
PMV BLD AUTO: 10.5 FL (ref 9.2–12.9)
POCT GLUCOSE: 137 MG/DL (ref 70–110)
POTASSIUM SERPL-SCNC: 3.2 MMOL/L (ref 3.5–5.1)
PROT SERPL-MCNC: 7.6 G/DL (ref 6–8.4)
RBC # BLD AUTO: 3.67 M/UL (ref 4–5.4)
SODIUM SERPL-SCNC: 140 MMOL/L (ref 136–145)
WBC # BLD AUTO: 5.55 K/UL (ref 3.9–12.7)

## 2022-08-04 PROCEDURE — 84100 ASSAY OF PHOSPHORUS: CPT

## 2022-08-04 PROCEDURE — 99232 PR SUBSEQUENT HOSPITAL CARE,LEVL II: ICD-10-PCS | Mod: GC,,, | Performed by: HOSPITALIST

## 2022-08-04 PROCEDURE — 36415 COLL VENOUS BLD VENIPUNCTURE: CPT

## 2022-08-04 PROCEDURE — 25000003 PHARM REV CODE 250

## 2022-08-04 PROCEDURE — 99232 SBSQ HOSP IP/OBS MODERATE 35: CPT | Mod: GC,,, | Performed by: HOSPITALIST

## 2022-08-04 PROCEDURE — 85025 COMPLETE CBC W/AUTO DIFF WBC: CPT

## 2022-08-04 PROCEDURE — 93005 ELECTROCARDIOGRAM TRACING: CPT

## 2022-08-04 PROCEDURE — 99232 SBSQ HOSP IP/OBS MODERATE 35: CPT | Mod: GC,,, | Performed by: INTERNAL MEDICINE

## 2022-08-04 PROCEDURE — 83735 ASSAY OF MAGNESIUM: CPT

## 2022-08-04 PROCEDURE — 93010 EKG 12-LEAD: ICD-10-PCS | Mod: ,,, | Performed by: INTERNAL MEDICINE

## 2022-08-04 PROCEDURE — 99232 PR SUBSEQUENT HOSPITAL CARE,LEVL II: ICD-10-PCS | Mod: GC,,, | Performed by: INTERNAL MEDICINE

## 2022-08-04 PROCEDURE — 25000003 PHARM REV CODE 250: Performed by: HOSPITALIST

## 2022-08-04 PROCEDURE — 20600001 HC STEP DOWN PRIVATE ROOM

## 2022-08-04 PROCEDURE — 93010 ELECTROCARDIOGRAM REPORT: CPT | Mod: ,,, | Performed by: INTERNAL MEDICINE

## 2022-08-04 PROCEDURE — 80053 COMPREHEN METABOLIC PANEL: CPT

## 2022-08-04 PROCEDURE — 63600175 PHARM REV CODE 636 W HCPCS: Performed by: HOSPITALIST

## 2022-08-04 RX ORDER — POTASSIUM CHLORIDE 20 MEQ/1
40 TABLET, EXTENDED RELEASE ORAL ONCE
Status: COMPLETED | OUTPATIENT
Start: 2022-08-04 | End: 2022-08-04

## 2022-08-04 RX ORDER — FUROSEMIDE 10 MG/ML
80 INJECTION INTRAMUSCULAR; INTRAVENOUS ONCE
Status: COMPLETED | OUTPATIENT
Start: 2022-08-04 | End: 2022-08-04

## 2022-08-04 RX ORDER — FUROSEMIDE 10 MG/ML
90 INJECTION INTRAMUSCULAR; INTRAVENOUS ONCE
Status: DISCONTINUED | OUTPATIENT
Start: 2022-08-04 | End: 2022-08-04

## 2022-08-04 RX ADMIN — CARVEDILOL 3.12 MG: 3.12 TABLET, FILM COATED ORAL at 10:08

## 2022-08-04 RX ADMIN — POTASSIUM CHLORIDE 40 MEQ: 1500 TABLET, EXTENDED RELEASE ORAL at 03:08

## 2022-08-04 RX ADMIN — TIMOLOL MALEATE 1 DROP: 5 SOLUTION OPHTHALMIC at 10:08

## 2022-08-04 RX ADMIN — FUROSEMIDE 80 MG: 10 INJECTION, SOLUTION INTRAMUSCULAR; INTRAVENOUS at 10:08

## 2022-08-04 RX ADMIN — CARVEDILOL 3.12 MG: 3.12 TABLET, FILM COATED ORAL at 08:08

## 2022-08-04 RX ADMIN — TIMOLOL MALEATE 1 DROP: 5 SOLUTION OPHTHALMIC at 08:08

## 2022-08-04 RX ADMIN — MEGESTROL ACETATE 40 MG: 40 TABLET ORAL at 10:08

## 2022-08-04 RX ADMIN — LEVOTHYROXINE SODIUM 125 MCG: 25 TABLET ORAL at 06:08

## 2022-08-04 RX ADMIN — ATORVASTATIN CALCIUM 20 MG: 20 TABLET, FILM COATED ORAL at 08:08

## 2022-08-04 RX ADMIN — LISINOPRIL 20 MG: 20 TABLET ORAL at 10:08

## 2022-08-04 RX ADMIN — ASPIRIN 81 MG CHEWABLE TABLET 81 MG: 81 TABLET CHEWABLE at 10:08

## 2022-08-04 RX ADMIN — CLOPIDOGREL 75 MG: 75 TABLET, FILM COATED ORAL at 10:08

## 2022-08-04 NOTE — PLAN OF CARE
Alert and oriented x 4. Speech is clear. VS stable. Syncopal episode after lunch. MD's aware. New orders given. No WOB or sign of distress. Safety measures in place. Family at bedside.

## 2022-08-04 NOTE — PROGRESS NOTES
Progress Note  Pulmonary & Critical Care Medicine    Attending: Akiko Smith  Fellow: Shannan Borrego  Admit Date: 7/30/2022  Today's Date: 08/04/2022      SUBJECTIVE:   HPI: Ms. Michael is a 89yo CF with a PMH significant for HFpEF, CAD, AS, and PPM placement who presented to this facility from [facility] 7/30 for fever and acute encephalopathy. She was admitted to medicine for treatment of sepsis with an unknown source at that time. She has been treated with vancomycin and cefepime since admission. WBC has not been elevated this admission with a transient granulocytosis noted. Procal was negative and BNP was elevated. Afebrile since admission. Pulmonology consulted for evaluation of pleural effusions.      On review of previous imaging, bilateral pleural effusions have been present since 7/18 with notable improvement on subsequent CXRs. Ms. Michael had 2 previous admissions this month for CHF exacerbations with 1 accompanied with UTI. She was diuresed on furosemide and was subsequently switched to BID bumex for improved diuresis. She was discharged on Bumex BID, but it was changed to daily per her cardiologist. Oxygen saturations have been in the mid to low 90s this admission with an increase to 100% on 2L NC. Her BP has also been persistently elevated with the SBP being in the 200s during my interview.     Interval History: NAEO. Good O2 sats on RA. No increase WOB noted.    OBJECTIVE:     Vital Signs Trends/Hx Reviewed  Vitals:    08/04/22 1150 08/04/22 1406 08/04/22 1455 08/04/22 1602   BP: (!) 114/48 (!) 150/67  (!) 167/72   Pulse:  60 (!) 59 60   Resp:  17  (!) 25   Temp: 97.9 °F (36.6 °C)   98 °F (36.7 °C)   TempSrc:       SpO2:  95% 97% (!) 94%   Weight:       Height:           Ventilator settings:   No data recorded       Physical Exam:  General: Obese CF laying in bed in NAD, cooperative & interactive.  HEENT: AT/NC, PERRL, EOMI, oral and nasal mucosa moist. NC in place.  Neck: Supple without JVD or palpable  LAD.   Cardiac: normal rate, regular rhythm, with no MRG with brisk cap refill and symmetric pulses in distal extremities.  Respiratory: Normal inspection. Symmetric chest rise. Normal palpation and percussion. Auscultation clear bilaterally. Decreased breath sound LLL. Mild increased work of breathing noted.   Abdomen: Soft, NT/ND. Obese abdomen. +BS. No hepatosplenomegaly.   Extremities: Left arm chronically significantly more swollen than right.  Neuro: Grossly intact to brief exam. Oriented x3 with appropriate mood/affect to situation.        Laboratory:  No results for input(s): PH, PCO2, PO2, HCO3, POCSATURATED, BE in the last 24 hours.  Recent Labs   Lab 08/04/22 0826   WBC 5.55   RBC 3.67*   HGB 9.9*   HCT 31.2*      MCV 85   MCH 27.0   MCHC 31.7*     Recent Labs   Lab 08/04/22 0826      K 3.2*      CO2 24   BUN 18   CREATININE 0.8   MG 1.6       Microbiology Data:   Microbiology Results (last 7 days)     Procedure Component Value Units Date/Time    Blood culture x two cultures. Draw prior to antibiotics. [[id number]] Collected: 07/30/22 2213    Order Status: Completed Specimen: Blood from Peripheral, Hand, Right Updated: 08/03/22 2312     Blood Culture, Routine No Growth to date      No Growth to date      No Growth to date      No Growth to date      No Growth to date    Narrative:      Aerobic and anaerobic    Blood culture x two cultures. Draw prior to antibiotics. [[id number]] Collected: 07/30/22 2213    Order Status: Completed Specimen: Blood from Peripheral, Antecubital, Right Updated: 08/03/22 2312     Blood Culture, Routine No Growth to date      No Growth to date      No Growth to date      No Growth to date      No Growth to date    Narrative:      Aerobic and anaerobic    Urine culture [[id number]] Collected: 07/30/22 2234    Order Status: Completed Specimen: Urine Updated: 08/01/22 0116     Urine Culture, Routine No significant growth    Narrative:      Specimen Source->Urine     Respiratory Infection Panel (PCR), Nasopharyngeal [[id number]] Collected: 07/31/22 1602    Order Status: Completed Specimen: Nasopharyngeal Swab Updated: 07/31/22 9417     Respiratory Infection Panel Source NP Swab     Adenovirus Not Detected     Coronavirus 229E, Common Cold Virus Not Detected     Coronavirus HKU1, Common Cold Virus Not Detected     Coronavirus NL63, Common Cold Virus Not Detected     Coronavirus OC43, Common Cold Virus Not Detected     Comment: The Coronavirus strains detected in this test cause the common cold.  These strains are not the COVID-19 (novel Coronavirus)strain   associated with the respiratory disease outbreak.          SARS-CoV2 (COVID-19) Qualitative PCR Not Detected     Human Metapneumovirus Not Detected     Human Rhinovirus/Enterovirus Not Detected     Influenza A (subtypes H1, H1-2009,H3) Not Detected     Influenza B Not Detected     Parainfluenza Virus 1 Not Detected     Parainfluenza Virus 2 Not Detected     Parainfluenza Virus 3 Not Detected     Parainfluenza Virus 4 Not Detected     Respiratory Syncytial Virus Not Detected     Bordetella Parapertussis (CZ4060) Not Detected     Bordetella pertussis (ptxP) Not Detected     Chlamydia pneumoniae Not Detected     Mycoplasma pneumoniae Not Detected    Narrative:      For all other respiratory sources, order [id number] -  Respiratory Viral Panel by PCR    Culture, Respiratory with Gram Stain [[id number]]     Order Status: No result Specimen: Respiratory            Chest Imaging:   No results found in the last 24 hours.      Scheduled Medications:    aspirin  81 mg Oral Daily    atorvastatin  20 mg Oral QHS    carvediloL  3.125 mg Oral BID    clopidogreL  75 mg Oral Daily    levothyroxine  125 mcg Oral Before breakfast    lisinopriL  20 mg Oral Daily    megestroL  40 mg Oral Daily    timolol maleate 0.5%  1 drop Both Eyes BID       PRN Medications:   acetaminophen, dextrose 10%, dextrose 10%, glucagon (human recombinant),  glucose, glucose, hydrALAZINE, melatonin, naloxone, sodium chloride 0.9%, sodium chloride 0.9%, sodium chloride 0.9%    Problem List:   Patient Active Problem List   Diagnosis    Severe aortic stenosis s/p TAVR    Acquired hypothyroidism    Essential hypertension    S/P TAVR (transcatheter aortic valve replacement)    Chronic combined systolic and diastolic heart failure    Coronary artery disease involving native coronary artery of native heart without angina pectoris    Presence of permanent cardiac pacemaker    CAP (community acquired pneumonia)    Acute cystitis    Normocytic anemia    Increased anion gap metabolic acidosis    Hyperlipidemia    Acute on chronic combined systolic (congestive) and diastolic (congestive) heart failure    Debility    Palliative care encounter    Acute hypoxemic respiratory failure    Delirium    Sepsis    Pleural effusion       ASSESSMENT & RECOMMENDATIONS     #Bilateral Pleural Effusions  -Seen again on CT chest. L>R with atelectasis associated with the left effusion. Pleural effusions have been present and improving in size with diuresis since initial presentation 7/18.   -Unclear at this time of pleural effusions are a source of infection. Aside from initial fever, no other evidence of infection is present as blood cultures continue to be negative with a negative procal and lactate. A 2nd viable option for pleural effusions is slowly resolving CHF exacerbation with the need for more aggressive diuresis as her diuretics were reduced by cardiology after discharge.   -Continue aggressive diuresis.  Continue to encourage patient to utilize her incentive spirometer to help improve her atelectasis and oxygenation.     #Hypoxemia  -Likely 2/2 pleural effusion  -Will likely improve with diuresis.   -Recommend aggressive diuresis.    Thank you for allowing us to participate in the care of this patient. We will sign off at this time. Please call with  questions.    Shannan Borrego M.D., PGY-V  U Pulmonary/Critical Care Fellow

## 2022-08-04 NOTE — PLAN OF CARE
"   08/04/22 0941   Post-Acute Status   Post-Acute Authorization Placement   Post-Acute Placement Status Referrals Sent   Coverage [facility] Medicare   Discharge Delays (!) Post-Acute Set-up   Discharge Plan   Discharge Plan A Return to nursing home;Skilled Nursing Facility   Discharge Plan B Return to Nursing Home;Skilled Nursing Facility     Pt came to the hospital from [facility]. SW sent a Return to Nursing Home referral. SW will follow.    11:39 AM  SW spoke with Pt's daughter, Katiana and Son Jack. They are not open to sending referrals to other SNF's as the only SNF's accepting Pt's insurance are no acceptable to the family, and are too far away. SW discussed Home health with them, they are somewhat open to that if [facility] if unable to take her back, but asked that SW "do all you can" to get her back into [facility]. SW will follow with [facility] to see if a bed has become available.     3:16 PM  Pt given lasix and placed on a heart monitor, therefore, he is not medically ready for discharge. Lorraine at [facility] said they'll have beds available Tuesday.    ZAFAR Gamboa, LMSW [facility]  [id number]    "

## 2022-08-04 NOTE — PHARMACY MED REC
Admission Medication History         Medications listed below were obtained from: SNF/Rehab/[facility]   aspirin chewable tablet 81 mg  Dose: 81 mg  Freq: Daily Route: Oral  atorvastatin tablet 40 mg  Dose: 40 mg  Freq: Nightly Route: Oral  bumetanide tablet 1 mg  Dose: 1 mg  Freq: Daily Route: Oral  carvediloL tablet 3.125 mg  Dose: 3.125 mg  Freq: 2 times daily Route: Oral  clopidogreL tablet 75 mg  Dose: 75 mg  Freq: Daily Route: Oral  levothyroxine tablet 125 mcg  Dose: 125 mcg  Freq: Before breakfast Route: Oral  lisinopriL tablet 20 mg  Dose: 20 mg  Freq: Daily Route: Oral  magnesium oxide tablet 400 mg  Dose: 400 mg  Freq: 2 times daily Route: Oral  megestroL tablet 40 mg  Dose: 40 mg  Freq: Daily Route: Oral  miconazole nitrate 2% ointment  Freq: 2 times daily Route: Top  senna-docusate 8.6-50 mg per tablet 1 tablet  Dose: 1 tablet  Freq: 2 times daily Route: Oral  timolol maleate 0.5% ophthalmic solution 1 drop  Dose: 1 drop  Freq: 2 times daily Route: Both Eyes      Potential issues to be addressed PRIOR TO DISCHARGE  The listed medications were obtained from another facility ([facility]). The patient may not have been able to fill these prescriptions prior to this admission and may require new scripts upon discharge.     Mariela Rodgers  [id number]                  .

## 2022-08-04 NOTE — TELEPHONE ENCOUNTER
----- Message from Mary Costa sent at 8/4/2022 12:02 PM CDT -----  Contact: Katiana/daughter/ [phone]  RE;JESUS HERNANDEZ      Please call daughter about patient  care.

## 2022-08-04 NOTE — ASSESSMENT & PLAN NOTE
Patient with CXR showing L pleural effusion, L basilar atelectasis v consolidation, requiring 2L NC. Daughter at bedside reports productive cough, though mostly in the mornings.  - Vanc/cefepime/azithromycin  Stopped for now. Low suspicion for infectious process at this time due to negative cultures   - Pleural effusion on CT chest, pulmonology consulted for possible thoracentesis  - Pulm declined performing para, believes the effusion to be a diuresis problem  - Will increase Bumex to 1mg BID   - Gave 1x dose of IV lasix to increase output, will resume regular bumex

## 2022-08-04 NOTE — PROGRESS NOTES
[facility] ([address] Medicine  Progress Note    Patient Name: Pilar Michael  MRN: [id number]  Patient Class: IP- Inpatient   Admission Date: 7/30/2022  Length of Stay: 4 days  Attending Physician: Carolina Peña MD  Primary Care Provider: Joseph Noel MD        Subjective:     Principal Problem:Sepsis        HPI:  Ms. Michael is a 89 y.o. F with PMH systolic and diastolic HF, CAD s/p RCA stent, pacemaker placement, thyroid cancer s/p treatment 2003, breast cancer s/p mastectomy 01/2022, CVA 04/2022 without residual deficits, HLD, HTN, hypothyroidism who presents to the ED from SNF for fever and AMS. She developed fever 7/30 up to 102F and became lethargic and confused. She had been at SNF for approximately one week after being discharged from [facility] for acute exacerbation of CHF.     Per daughter at bedside, the patient becomes confused and lethargic when she has fever. During previous admission for CHF exacerbation she was also treated empirically for CAP.     In the ED, she had T100.7, HR 60, /50, O2 94% on room air. She was placed on 2L NC with improvement in sats to 99%. CBC remarkable for H/H of 9/29, calcium 8.5, , troponin 0.028. UA hazy with trace leukocytes. CXR with L pleural effusion and L basilar atelectasis v consolidation, similar to prior study. She was given 2g Cefepime x1 dose for sepsis of unknown source. She is admitted to medicine for workup and treatment of sepsis.       Overview/Hospital Course:  Ms. Michael is a 89 y.o. F with PMH systolic and diastolic HF, CAD s/p RCA stent, pacemaker placement, thyroid cancer s/p treatment 2003, breast cancer s/p mastectomy 01/2022, CVA 04/2022 without residual deficits, HLD, HTN, hypothyroidism who presents to the ED from SNF for fever and AMS. She developed fever 7/30 up to 102F and became lethargic and confused. She had been at SNF for approximately one week after being discharged from [facility] for acute  exacerbation of CHF. Pt. Was started on cefepime, vanc, and azithromycin. During hospital course she has not become febrile and has become easily arousal. Pulmonology was consulted and believe the pulmonary effusion is due to insufficient diuresis. We will stop abx for now and monitor. Per pulm recs, we will up the bumetanide to 1mg BID, and monitor.  Patient is doing well, diuresis was suboptimal. Gave a 1 time dose of 80mg lasix to attempt to increase output. Patient can resume 1mg BID after. She has been taking off oxygen for >1 day and is sating >90%. She feels much better and family agrees.        Interval History: Dieresis yesterday was suboptimal. Gave one time dose of IV lasix to increase output. Patient SpO2 >90% off NC with movement. Lungs are clear. Potential D/C tomorrow pending patient disposition.       Review of Systems   Constitutional:  Negative for chills, fatigue and fever.   HENT:  Negative for congestion and sore throat.    Respiratory:  Negative for cough, shortness of breath and wheezing.         Off NC    Cardiovascular:  Negative for chest pain, palpitations and leg swelling.   Gastrointestinal:  Negative for abdominal pain, nausea and vomiting.   Genitourinary:  Negative for dysuria and hematuria.   Musculoskeletal:  Negative for back pain and myalgias.   Neurological:  Negative for light-headedness and headaches.   Psychiatric/Behavioral:  Negative for agitation. The patient is not nervous/anxious.    Objective:     Vital Signs (Most Recent):  Temp: 98.3 °F (36.8 °C) (08/04/22 0757)  Pulse: 60 (08/04/22 0756)  Resp: 18 (08/04/22 0756)  BP: (!) 132/56 (08/04/22 0757)  SpO2: 96 % (08/04/22 0756)   Vital Signs (24h Range):  Temp:  [98.2 °F (36.8 °C)-98.6 °F (37 °C)] 98.3 °F (36.8 °C)  Pulse:  [60] 60  Resp:  [16-20] 18  SpO2:  [93 %-97 %] 96 %  BP: (132-170)/(56-69) 132/56     Weight: 90.3 kg (199 lb 1.2 oz)  Body mass index is 36.41 kg/m².    Intake/Output Summary (Last 24 hours) at 8/4/2022  1145  Last data filed at 8/4/2022 0600  Gross per 24 hour   Intake --   Output 200 ml   Net -200 ml      Physical Exam  Constitutional:       Appearance: Normal appearance.      Comments: Resting comfortably    HENT:      Head: Normocephalic and atraumatic.      Right Ear: External ear normal.      Left Ear: External ear normal.      Nose: Nose normal.      Mouth/Throat:      Mouth: Mucous membranes are moist.      Pharynx: Oropharynx is clear.   Eyes:      General: No scleral icterus.     Extraocular Movements: Extraocular movements intact.      Conjunctiva/sclera: Conjunctivae normal.   Cardiovascular:      Rate and Rhythm: Normal rate and regular rhythm.      Pulses: Normal pulses.      Heart sounds: Murmur heard.   Pulmonary:      Effort: Pulmonary effort is normal. No respiratory distress.      Breath sounds: No wheezing or rales.   Abdominal:      General: Abdomen is flat. Bowel sounds are normal. There is no distension.      Palpations: Abdomen is soft.      Tenderness: There is no abdominal tenderness.   Musculoskeletal:         General: Swelling present. No tenderness.      Cervical back: Normal range of motion.      Comments: Lymphedema of LUE. Edema reduced from yesterday.    Skin:     General: Skin is warm and dry.   Neurological:      Mental Status: She is alert and oriented to person, place, and time.   Psychiatric:         Mood and Affect: Mood normal.         Behavior: Behavior normal.         Thought Content: Thought content normal.         Judgment: Judgment normal.       Significant Labs: All pertinent labs within the past 24 hours have been reviewed.    Significant Imaging: I have reviewed all pertinent imaging results/findings within the past 24 hours.      Assessment/Plan:      * Sepsis  This patient does have evidence of infective focus  My overall impression is sepsis. Vital signs were reviewed and noted in progress note.  Antibiotics given-   Antibiotics (From admission, onward)             None        Cultures were taken-   Microbiology Results (last 7 days)     Procedure Component Value Units Date/Time    Blood culture x two cultures. Draw prior to antibiotics. [[id number]] Collected: 07/30/22 2213    Order Status: Completed Specimen: Blood from Peripheral, Antecubital, Right Updated: 08/02/22 2312     Blood Culture, Routine No Growth to date      No Growth to date      No Growth to date      No Growth to date    Narrative:      Aerobic and anaerobic    Blood culture x two cultures. Draw prior to antibiotics. [[id number]] Collected: 07/30/22 2213    Order Status: Completed Specimen: Blood from Peripheral, Hand, Right Updated: 08/02/22 2312     Blood Culture, Routine No Growth to date      No Growth to date      No Growth to date      No Growth to date    Narrative:      Aerobic and anaerobic    Urine culture [[id number]] Collected: 07/30/22 2234    Order Status: Completed Specimen: Urine Updated: 08/01/22 0116     Urine Culture, Routine No significant growth    Narrative:      Specimen Source->Urine    Respiratory Infection Panel (PCR), Nasopharyngeal [[id number]] Collected: 07/31/22 1602    Order Status: Completed Specimen: Nasopharyngeal Swab Updated: 07/31/22 2358     Respiratory Infection Panel Source NP Swab     Adenovirus Not Detected     Coronavirus 229E, Common Cold Virus Not Detected     Coronavirus HKU1, Common Cold Virus Not Detected     Coronavirus NL63, Common Cold Virus Not Detected     Coronavirus OC43, Common Cold Virus Not Detected     Comment: The Coronavirus strains detected in this test cause the common cold.  These strains are not the COVID-19 (novel Coronavirus)strain   associated with the respiratory disease outbreak.          SARS-CoV2 (COVID-19) Qualitative PCR Not Detected     Human Metapneumovirus Not Detected     Human Rhinovirus/Enterovirus Not Detected     Influenza A (subtypes H1, H1-2009,H3) Not Detected     Influenza B Not Detected     Parainfluenza Virus 1 Not Detected      Parainfluenza Virus 2 Not Detected     Parainfluenza Virus 3 Not Detected     Parainfluenza Virus 4 Not Detected     Respiratory Syncytial Virus Not Detected     Bordetella Parapertussis (HU2780) Not Detected     Bordetella pertussis (ptxP) Not Detected     Chlamydia pneumoniae Not Detected     Mycoplasma pneumoniae Not Detected    Narrative:      For all other respiratory sources, order [id number] -  Respiratory Viral Panel by PCR    Culture, Respiratory with Gram Stain [[id number]]     Order Status: No result Specimen: Respiratory         Latest lactate reviewed, they are-  No results for input(s): LACTATE in the last 72 hours.    Organ dysfunction indicated by Encephalopathy   Source- CAP     Source control Achieved by- antibiotics    CXR with L pleural effusion and L lower zone atelectasis v consolidation - possible source of sepsis  -Vanc/Cefepime started 7/31  -2L NC to maintain sats >92%  -f/u urine cx - negative   -f/u blood cx - negative   -f/u resp infection panel  - Will stop abx for now. Patient is afebrile and stable       Pleural effusion  See CAP      Delirium  Patient lethargic and confused in ED. Found to be oriented x3 after several hours. Intermittent confusion per daughter at bedside.  -delirium precautions placed      Hyperlipidemia  -continue home atorvastatin 20mg      Normocytic anemia  Continue to monitor  -transfuse if Hgb<7  -daily CBC      CAP (community acquired pneumonia)  Patient with CXR showing L pleural effusion, L basilar atelectasis v consolidation, requiring 2L NC. Daughter at bedside reports productive cough, though mostly in the mornings.  - Vanc/cefepime/azithromycin  Stopped for now. Low suspicion for infectious process at this time due to negative cultures   - Pleural effusion on CT chest, pulmonology consulted for possible thoracentesis  - Pulm declined performing para, believes the effusion to be a diuresis problem  - Will increase Bumex to 1mg BID   - Gave 1x dose of IV  lasix to increase output, will resume regular bumex       Coronary artery disease involving native coronary artery of native heart without angina pectoris  S/p RCA stent placement  -continue home ASA 81mg  -continue home atorvastatin  -continue home Plavix      Chronic combined systolic and diastolic heart failure  Patient recently admitted to [facility] for CHF exacerbation. She does not have LE edema or SOB at this time.  - on 2L NC  - increased Bumex to 1mg BID from QD per Pulmonary recs        Essential hypertension  BP increased to >200 overnight. Was provided hydralazine. Will monitor for now   - continue home carvedilol 3.125mg, lisinopril 20mg   - Bumex increased to 1mg BID       Acquired hypothyroidism  -continue home synthroid 125mcg      Severe aortic stenosis s/p TAVR  Continue to monitor        VTE Risk Mitigation (From admission, onward)         Ordered     IP VTE HIGH RISK PATIENT  Once         07/31/22 0304     Place sequential compression device  Until discontinued         07/31/22 0304                Discharge Planning   CHARLIE: 8/4/2022     Code Status: Full Code   Is the patient medically ready for discharge?: No    Reason for patient still in hospital (select all that apply): Patient trending condition and Pending disposition  Discharge Plan A: Return to nursing home, Skilled Nursing Facility   Discharge Delays: (!) Post-Acute Set-up              Bj West, DO  Department of Hospital Medicine   [facility] ([place])

## 2022-08-05 LAB
ALBUMIN SERPL BCP-MCNC: 2.6 G/DL (ref 3.5–5.2)
ALP SERPL-CCNC: 78 U/L (ref 55–135)
ALT SERPL W/O P-5'-P-CCNC: 8 U/L (ref 10–44)
ANION GAP SERPL CALC-SCNC: 7 MMOL/L (ref 8–16)
AST SERPL-CCNC: 16 U/L (ref 10–40)
BASOPHILS # BLD AUTO: 0.01 K/UL (ref 0–0.2)
BASOPHILS NFR BLD: 0.2 % (ref 0–1.9)
BILIRUB SERPL-MCNC: 1 MG/DL (ref 0.1–1)
BUN SERPL-MCNC: 19 MG/DL (ref 8–23)
CALCIUM SERPL-MCNC: 8.6 MG/DL (ref 8.7–10.5)
CHLORIDE SERPL-SCNC: 104 MMOL/L (ref 95–110)
CO2 SERPL-SCNC: 26 MMOL/L (ref 23–29)
CREAT SERPL-MCNC: 0.7 MG/DL (ref 0.5–1.4)
DIFFERENTIAL METHOD: ABNORMAL
EOSINOPHIL # BLD AUTO: 0.1 K/UL (ref 0–0.5)
EOSINOPHIL NFR BLD: 1.9 % (ref 0–8)
ERYTHROCYTE [DISTWIDTH] IN BLOOD BY AUTOMATED COUNT: 18.4 % (ref 11.5–14.5)
EST. GFR  (NO RACE VARIABLE): >60 ML/MIN/1.73 M^2
GLUCOSE SERPL-MCNC: 94 MG/DL (ref 70–110)
HCT VFR BLD AUTO: 29.9 % (ref 37–48.5)
HGB BLD-MCNC: 9.7 G/DL (ref 12–16)
IMM GRANULOCYTES # BLD AUTO: 0.02 K/UL (ref 0–0.04)
IMM GRANULOCYTES NFR BLD AUTO: 0.3 % (ref 0–0.5)
LYMPHOCYTES # BLD AUTO: 0.7 K/UL (ref 1–4.8)
LYMPHOCYTES NFR BLD: 11.5 % (ref 18–48)
MAGNESIUM SERPL-MCNC: 1.5 MG/DL (ref 1.6–2.6)
MCH RBC QN AUTO: 27.4 PG (ref 27–31)
MCHC RBC AUTO-ENTMCNC: 32.4 G/DL (ref 32–36)
MCV RBC AUTO: 85 FL (ref 82–98)
MONOCYTES # BLD AUTO: 0.4 K/UL (ref 0.3–1)
MONOCYTES NFR BLD: 6.3 % (ref 4–15)
NEUTROPHILS # BLD AUTO: 4.6 K/UL (ref 1.8–7.7)
NEUTROPHILS NFR BLD: 79.8 % (ref 38–73)
NRBC BLD-RTO: 0 /100 WBC
PHOSPHATE SERPL-MCNC: 2.6 MG/DL (ref 2.7–4.5)
PLATELET # BLD AUTO: 172 K/UL (ref 150–450)
PMV BLD AUTO: 10.5 FL (ref 9.2–12.9)
POTASSIUM SERPL-SCNC: 3.3 MMOL/L (ref 3.5–5.1)
PROT SERPL-MCNC: 7.1 G/DL (ref 6–8.4)
RBC # BLD AUTO: 3.54 M/UL (ref 4–5.4)
SODIUM SERPL-SCNC: 137 MMOL/L (ref 136–145)
WBC # BLD AUTO: 5.75 K/UL (ref 3.9–12.7)

## 2022-08-05 PROCEDURE — 99900035 HC TECH TIME PER 15 MIN (STAT)

## 2022-08-05 PROCEDURE — 94799 UNLISTED PULMONARY SVC/PX: CPT

## 2022-08-05 PROCEDURE — 25000003 PHARM REV CODE 250

## 2022-08-05 PROCEDURE — 63600175 PHARM REV CODE 636 W HCPCS: Performed by: HOSPITALIST

## 2022-08-05 PROCEDURE — 84100 ASSAY OF PHOSPHORUS: CPT

## 2022-08-05 PROCEDURE — 97535 SELF CARE MNGMENT TRAINING: CPT

## 2022-08-05 PROCEDURE — 25000003 PHARM REV CODE 250: Performed by: HOSPITALIST

## 2022-08-05 PROCEDURE — 20600001 HC STEP DOWN PRIVATE ROOM

## 2022-08-05 PROCEDURE — 80053 COMPREHEN METABOLIC PANEL: CPT

## 2022-08-05 PROCEDURE — 97116 GAIT TRAINING THERAPY: CPT

## 2022-08-05 PROCEDURE — 99232 PR SUBSEQUENT HOSPITAL CARE,LEVL II: ICD-10-PCS | Mod: GC,,, | Performed by: HOSPITALIST

## 2022-08-05 PROCEDURE — 36415 COLL VENOUS BLD VENIPUNCTURE: CPT

## 2022-08-05 PROCEDURE — 97530 THERAPEUTIC ACTIVITIES: CPT

## 2022-08-05 PROCEDURE — 85025 COMPLETE CBC W/AUTO DIFF WBC: CPT

## 2022-08-05 PROCEDURE — 99232 SBSQ HOSP IP/OBS MODERATE 35: CPT | Mod: GC,,, | Performed by: HOSPITALIST

## 2022-08-05 PROCEDURE — 83735 ASSAY OF MAGNESIUM: CPT

## 2022-08-05 RX ORDER — BUMETANIDE 1 MG/1
1 TABLET ORAL DAILY
Status: DISCONTINUED | OUTPATIENT
Start: 2022-08-05 | End: 2022-08-06

## 2022-08-05 RX ORDER — MAGNESIUM SULFATE HEPTAHYDRATE 40 MG/ML
2 INJECTION, SOLUTION INTRAVENOUS ONCE
Status: COMPLETED | OUTPATIENT
Start: 2022-08-05 | End: 2022-08-05

## 2022-08-05 RX ORDER — POTASSIUM CHLORIDE 20 MEQ/1
40 TABLET, EXTENDED RELEASE ORAL ONCE
Status: COMPLETED | OUTPATIENT
Start: 2022-08-05 | End: 2022-08-05

## 2022-08-05 RX ADMIN — ASPIRIN 81 MG CHEWABLE TABLET 81 MG: 81 TABLET CHEWABLE at 08:08

## 2022-08-05 RX ADMIN — MEGESTROL ACETATE 40 MG: 40 TABLET ORAL at 08:08

## 2022-08-05 RX ADMIN — CARVEDILOL 3.12 MG: 3.12 TABLET, FILM COATED ORAL at 08:08

## 2022-08-05 RX ADMIN — LEVOTHYROXINE SODIUM 125 MCG: 25 TABLET ORAL at 05:08

## 2022-08-05 RX ADMIN — POTASSIUM CHLORIDE 40 MEQ: 1500 TABLET, EXTENDED RELEASE ORAL at 08:08

## 2022-08-05 RX ADMIN — ATORVASTATIN CALCIUM 20 MG: 20 TABLET, FILM COATED ORAL at 08:08

## 2022-08-05 RX ADMIN — POTASSIUM CHLORIDE 40 MEQ: 1500 TABLET, EXTENDED RELEASE ORAL at 12:08

## 2022-08-05 RX ADMIN — CLOPIDOGREL 75 MG: 75 TABLET, FILM COATED ORAL at 08:08

## 2022-08-05 RX ADMIN — TIMOLOL MALEATE 1 DROP: 5 SOLUTION OPHTHALMIC at 08:08

## 2022-08-05 RX ADMIN — BUMETANIDE 1 MG: 1 TABLET ORAL at 12:08

## 2022-08-05 RX ADMIN — MAGNESIUM SULFATE 2 G: 2 INJECTION INTRAVENOUS at 12:08

## 2022-08-05 RX ADMIN — LISINOPRIL 20 MG: 20 TABLET ORAL at 08:08

## 2022-08-05 NOTE — PLAN OF CARE
Problem: Adult Inpatient Plan of Care  Goal: Plan of Care Review  Outcome: Ongoing, Progressing  Goal: Patient-Specific Goal (Individualized)  Outcome: Ongoing, Progressing  Goal: Absence of Hospital-Acquired Illness or Injury  Outcome: Ongoing, Progressing  Goal: Optimal Comfort and Wellbeing  Outcome: Ongoing, Progressing  Goal: Readiness for Transition of Care  Outcome: Ongoing, Progressing     Problem: Fluid Imbalance (Pneumonia)  Goal: Fluid Balance  Outcome: Ongoing, Progressing     Problem: Impaired Wound Healing  Goal: Optimal Wound Healing  Outcome: Ongoing, Progressing     Problem: Fall Injury Risk  Goal: Absence of Fall and Fall-Related Injury  Outcome: Ongoing, Progressing     Problem: Skin Injury Risk Increased  Goal: Skin Health and Integrity  Outcome: Ongoing, Progressing

## 2022-08-05 NOTE — PROGRESS NOTES
[facility] ([address] Medicine  Progress Note    Patient Name: Pilar Michael  MRN: [id number]  Patient Class: IP- Inpatient   Admission Date: 7/30/2022  Length of Stay: 5 days  Attending Physician: Carolina Peña MD  Primary Care Provider: Joseph Noel MD        Subjective:     Principal Problem:Sepsis        HPI:  Ms. Michael is a 89 y.o. F with PMH systolic and diastolic HF, CAD s/p RCA stent, pacemaker placement, thyroid cancer s/p treatment 2003, breast cancer s/p mastectomy 01/2022, CVA 04/2022 without residual deficits, HLD, HTN, hypothyroidism who presents to the ED from SNF for fever and AMS. She developed fever 7/30 up to 102F and became lethargic and confused. She had been at SNF for approximately one week after being discharged from [facility] for acute exacerbation of CHF.     Per daughter at bedside, the patient becomes confused and lethargic when she has fever. During previous admission for CHF exacerbation she was also treated empirically for CAP.     In the ED, she had T100.7, HR 60, /50, O2 94% on room air. She was placed on 2L NC with improvement in sats to 99%. CBC remarkable for H/H of 9/29, calcium 8.5, , troponin 0.028. UA hazy with trace leukocytes. CXR with L pleural effusion and L basilar atelectasis v consolidation, similar to prior study. She was given 2g Cefepime x1 dose for sepsis of unknown source. She is admitted to medicine for workup and treatment of sepsis.       Overview/Hospital Course:  Ms. Michael is a 89 y.o. F with PMH systolic and diastolic HF, CAD s/p RCA stent, pacemaker placement, thyroid cancer s/p treatment 2003, breast cancer s/p mastectomy 01/2022, CVA 04/2022 without residual deficits, HLD, HTN, hypothyroidism who presents to the ED from SNF for fever and AMS. She developed fever 7/30 up to 102F and became lethargic and confused. She had been at SNF for approximately one week after being discharged from [facility] for acute  exacerbation of CHF. Pt. Was started on cefepime, vanc, and azithromycin. During hospital course she has not become febrile and has become easily arousal. Pulmonology was consulted and believe the pulmonary effusion is due to insufficient diuresis. We will stop abx for now and monitor. Per pulm recs, we will up the bumetanide to 1mg BID, and monitor.  Patient is doing well, diuresis was suboptimal. Gave a 1 time dose of 80mg lasix to attempt to increase output. Patient can resume 1mg BID after. She has been taking off oxygen for >1 day and is sating >90%. She feels much better and family agrees.        Interval History: Bumex 1mg BID to continue. CM aware, family has preference [facility] however no rooms available until next week. They are also open to home health. Patient is doing well, SpO2 >92% on RA with movement. She is able to sit comfortably in a chair and can ambulate from the bed without exertion.     Review of Systems   Constitutional:  Negative for chills, fatigue and fever.   HENT:  Negative for congestion and sore throat.    Respiratory:  Negative for cough, shortness of breath and wheezing.         Off NC    Cardiovascular:  Negative for chest pain, palpitations and leg swelling.   Gastrointestinal:  Negative for abdominal pain, nausea and vomiting.   Genitourinary:  Negative for dysuria and hematuria.   Musculoskeletal:  Negative for back pain and myalgias.   Neurological:  Negative for light-headedness and headaches.   Psychiatric/Behavioral:  Negative for agitation. The patient is not nervous/anxious.    Objective:     Vital Signs (Most Recent):  Temp: 98.1 °F (36.7 °C) (08/05/22 1315)  Pulse: 60 (08/05/22 0755)  Resp: (!) 21 (08/05/22 0755)  BP: (!) 165/71 (08/05/22 0755)  SpO2: (!) 91 % (08/05/22 0755)   Vital Signs (24h Range):  Temp:  [98 °F (36.7 °C)-98.5 °F (36.9 °C)] 98.1 °F (36.7 °C)  Pulse:  [59-60] 60  Resp:  [14-31] 21  SpO2:  [91 %-96 %] 91 %  BP: (148-167)/(64-72) 165/71      Weight: 90.3 kg (199 lb 1.2 oz)  Body mass index is 36.41 kg/m².    Intake/Output Summary (Last 24 hours) at 8/5/2022 1521  Last data filed at 8/5/2022 1100  Gross per 24 hour   Intake 790 ml   Output 900 ml   Net -110 ml      Physical Exam  Constitutional:       Appearance: Normal appearance.      Comments: Resting comfortably    HENT:      Head: Normocephalic and atraumatic.      Right Ear: External ear normal.      Left Ear: External ear normal.      Nose: Nose normal.      Mouth/Throat:      Mouth: Mucous membranes are moist.      Pharynx: Oropharynx is clear.   Eyes:      General: No scleral icterus.     Extraocular Movements: Extraocular movements intact.      Conjunctiva/sclera: Conjunctivae normal.   Cardiovascular:      Rate and Rhythm: Normal rate and regular rhythm.      Pulses: Normal pulses.      Heart sounds: Murmur heard.   Pulmonary:      Effort: Pulmonary effort is normal. No respiratory distress.      Breath sounds: No wheezing or rales.   Abdominal:      General: Abdomen is flat. Bowel sounds are normal. There is no distension.      Palpations: Abdomen is soft.      Tenderness: There is no abdominal tenderness.   Musculoskeletal:         General: Swelling present. No tenderness.      Cervical back: Normal range of motion.      Comments: Lymphedema of LUE.    Skin:     General: Skin is warm and dry.   Neurological:      Mental Status: She is alert and oriented to person, place, and time.   Psychiatric:         Mood and Affect: Mood normal.         Behavior: Behavior normal.         Thought Content: Thought content normal.         Judgment: Judgment normal.       Significant Labs: All pertinent labs within the past 24 hours have been reviewed.    Significant Imaging: I have reviewed all pertinent imaging results/findings within the past 24 hours.      Assessment/Plan:      * Sepsis  This patient does have evidence of infective focus  My overall impression is sepsis. Vital signs were reviewed and  noted in progress note.  Antibiotics given-   Antibiotics (From admission, onward)            None        Cultures were taken-   Microbiology Results (last 7 days)     Procedure Component Value Units Date/Time    Blood culture x two cultures. Draw prior to antibiotics. [[id number]] Collected: 07/30/22 2213    Order Status: Completed Specimen: Blood from Peripheral, Antecubital, Right Updated: 08/02/22 2312     Blood Culture, Routine No Growth to date      No Growth to date      No Growth to date      No Growth to date    Narrative:      Aerobic and anaerobic    Blood culture x two cultures. Draw prior to antibiotics. [[id number]] Collected: 07/30/22 2213    Order Status: Completed Specimen: Blood from Peripheral, Hand, Right Updated: 08/02/22 2312     Blood Culture, Routine No Growth to date      No Growth to date      No Growth to date      No Growth to date    Narrative:      Aerobic and anaerobic    Urine culture [[id number]] Collected: 07/30/22 2234    Order Status: Completed Specimen: Urine Updated: 08/01/22 0116     Urine Culture, Routine No significant growth    Narrative:      Specimen Source->Urine    Respiratory Infection Panel (PCR), Nasopharyngeal [[id number]] Collected: 07/31/22 1602    Order Status: Completed Specimen: Nasopharyngeal Swab Updated: 07/31/22 2358     Respiratory Infection Panel Source NP Swab     Adenovirus Not Detected     Coronavirus 229E, Common Cold Virus Not Detected     Coronavirus HKU1, Common Cold Virus Not Detected     Coronavirus NL63, Common Cold Virus Not Detected     Coronavirus OC43, Common Cold Virus Not Detected     Comment: The Coronavirus strains detected in this test cause the common cold.  These strains are not the COVID-19 (novel Coronavirus)strain   associated with the respiratory disease outbreak.          SARS-CoV2 (COVID-19) Qualitative PCR Not Detected     Human Metapneumovirus Not Detected     Human Rhinovirus/Enterovirus Not Detected     Influenza A (subtypes H1,  H1-2009,H3) Not Detected     Influenza B Not Detected     Parainfluenza Virus 1 Not Detected     Parainfluenza Virus 2 Not Detected     Parainfluenza Virus 3 Not Detected     Parainfluenza Virus 4 Not Detected     Respiratory Syncytial Virus Not Detected     Bordetella Parapertussis (JK9742) Not Detected     Bordetella pertussis (ptxP) Not Detected     Chlamydia pneumoniae Not Detected     Mycoplasma pneumoniae Not Detected    Narrative:      For all other respiratory sources, order [id number] -  Respiratory Viral Panel by PCR    Culture, Respiratory with Gram Stain [[id number]]     Order Status: No result Specimen: Respiratory         Latest lactate reviewed, they are-  No results for input(s): LACTATE in the last 72 hours.    Organ dysfunction indicated by Encephalopathy   Source- CAP     Source control Achieved by- antibiotics    CXR with L pleural effusion and L lower zone atelectasis v consolidation - possible source of sepsis  -Vanc/Cefepime started 7/31  -2L NC to maintain sats >92%  -f/u urine cx - negative   -f/u blood cx - negative   -f/u resp infection panel  - Will stop abx for now. Patient is afebrile and stable       Pleural effusion  See CAP      Delirium  Patient lethargic and confused in ED. Found to be oriented x3 after several hours. Intermittent confusion per daughter at bedside.  -delirium precautions placed      Hyperlipidemia  -continue home atorvastatin 20mg      Normocytic anemia  Continue to monitor  -transfuse if Hgb<7  -daily CBC      CAP (community acquired pneumonia)  Patient with CXR showing L pleural effusion, L basilar atelectasis v consolidation, requiring 2L NC. Daughter at bedside reports productive cough, though mostly in the mornings.  - Vanc/cefepime/azithromycin  Stopped for now. Low suspicion for infectious process at this time due to negative cultures   - Pleural effusion on CT chest, pulmonology consulted for possible thoracentesis  - Pulm declined performing para, believes the  effusion to be a diuresis problem  - Will increase Bumex to 1mg BID   - Gave 1x dose of IV lasix to increase output, will resume regular bumex       Coronary artery disease involving native coronary artery of native heart without angina pectoris  S/p RCA stent placement  -continue home ASA 81mg  -continue home atorvastatin  -continue home Plavix      Chronic combined systolic and diastolic heart failure  Patient recently admitted to [facility] for CHF exacerbation. She does not have LE edema or SOB at this time.  - on 2L NC  - increased Bumex to 1mg BID from QD per Pulmonary recs        Essential hypertension  BP increased to >200 overnight. Was provided hydralazine. Will monitor for now   - continue home carvedilol 3.125mg, lisinopril 20mg   - Bumex increased to 1mg BID       Acquired hypothyroidism  -continue home synthroid 125mcg      Severe aortic stenosis s/p TAVR  Continue to monitor        VTE Risk Mitigation (From admission, onward)         Ordered     IP VTE HIGH RISK PATIENT  Once         07/31/22 0304     Place sequential compression device  Until discontinued         07/31/22 0304                Discharge Planning   CHARLIE: 8/9/2022     Code Status: Full Code   Is the patient medically ready for discharge?: Yes    Reason for patient still in hospital (select all that apply): Other (specify) Awaiting Placement   Discharge Plan A: Return to nursing home, Skilled Nursing Facility   Discharge Delays: (!) Post-Acute Set-up              Bj West, DO  Department of Hospital Medicine   [facility] ([place])

## 2022-08-05 NOTE — NURSING
Patient is AAO*4.  Had  Bladder movement . Worked with OT and PT  Team , was up in chair for couple of hours . Call light within reach , Safety precaution maintained . Will continue monitoring .

## 2022-08-05 NOTE — PLAN OF CARE
Problem: Occupational Therapy  Goal: Occupational Therapy Goal  Description: Goals to be met by: 8/16     Patient will increase functional independence with ADLs by performing:    UE Dressing with Minimal Assistance.  LE Dressing with Moderate Assistance.  Grooming while seated with Supervision.  Toileting from bedside commode with Moderate Assistance for hygiene and clothing management.   Supine to sit with Stand-by Assistance.  Step transfer with Stand-by Assistance using RW    Outcome: Ongoing, Progressing     Goals remain appropriate

## 2022-08-05 NOTE — PT/OT/SLP PROGRESS
"Physical Therapy Treatment      Patient Name:  Pilar Michael   MRN:  [id number]    Recommendations:   Discharge Recommendations:  nursing facility, skilled   Discharge Equipment Recommendations: none   Barriers to discharge: None  Assessment:   Pilar Michael is a 89 y.o. female admitted with a medical diagnosis of Sepsis. She presents with the following impairments/functional limitations:  weakness, impaired endurance, impaired self care skills, impaired functional mobility, impaired cognition, impaired balance, gait instability, decreased coordination, decreased upper extremity function, decreased lower extremity function, impaired cardiopulmonary response to activity. Patient tolerated session well. Patient reporting increased fatigue during session, slightly pale. Vitals taken (see below). Improved once back to bed. Patient is currently functioning below PLOF Patient would require increased assistance should they discharge home . Patient would continue to benefit from skilled PT services while in the hospital. At this time, upon d/c recommendation of SNF  in order for patient to progress towards an improved level of functional mobility independence.     Rehab Prognosis: Good; patient would benefit from acute skilled PT services to address these deficits and reach maximum level of function.    Recent Surgery: * No surgery found *      Plan:     During this hospitalization, patient to be seen 3 x/week to address the identified rehab impairments via gait training, therapeutic activities, therapeutic exercises, neuromuscular re-education and progress toward the following goals:    · Plan of Care Expires:  08/31/22    Subjective     Chief Complaint: weakness   Patient/Family Comments/goals: "I just feel tired."  Pain/Comfort:  · Pain Rating 1: 0/10  · Pain Rating Post-Intervention 1: 0/10    Objective:   Communicated with RN prior to session.  Pilar Michael found up in chair with telemetry, pulse ox " (continuous), peripheral IV, PureWick upon PT entry to room.     General Precautions: Standard, fall   Orthopedic Precautions:N/A   Braces: N/A     BP:      Sittin/70mmHg ()      Supine: 176/72mmHg ()     Vitals:    22 1315   BP:    Pulse:    Resp:    Temp: 98.1 °F (36.7 °C)       Functional Mobility:  · Bed Mobility:    · Scooting: maximal assistance  · Sit to Supine: minimum assistance for LE management  · Transfers:     · Sit to Stand:  minimum assistance with hand-held assist  · Gait: 4 steps to bed with Dashawn and HHA    · decreased speed, swing-through gait pattern , forward flexed posture, increased cervical flexion , decreased hip extension  and mildly unsteady       Balance:    Level of assist   Static Sitting  SBA   Dynamic Sitting  SBA   Static Standing  Dashawn   Dynamic Standing  Dashawn     AM-PAC 6 CLICK MOBILITY  Turning over in bed (including adjusting bedclothes, sheets and blankets)?: 3  Sitting down on and standing up from a chair with arms (e.g., wheelchair, bedside commode, etc.): 3  Moving from lying on back to sitting on the side of the bed?: 3  Moving to and from a bed to a chair (including a wheelchair)?: 3  Need to walk in hospital room?: 3  Climbing 3-5 steps with a railing?: 2  Basic Mobility Total Score: 17     Therapeutic Activities and Education:  -Patient educated on the continued role and goal of PT  -Questions and concerns answered within the the PT scope of practice.        - Patient encouraged to continue to complete mobility in safe range to prevent adverse risks associated with prolonged bed rest   -White board updated in patient room to reflect level of assistance needed with nursing.   -Patient is clear to stand pivot transfer with RN/PCT, assist x1 for mobility with FARAZ Michael left HOB elevated with all lines intact, call button in reach, RN notified and niece  present..    GOALS:   Multidisciplinary Problems     Physical Therapy Goals         Problem: Physical Therapy    Goal Priority Disciplines Outcome Goal Variances Interventions   Physical Therapy Goal     PT, PT/OT Ongoing, Progressing     Description: Goals to be met by: 8/10/22     Patient will increase functional independence with mobility by performin. Supine to sit with Contact Guard Assistance  2. Sit to supine with Contact Guard Assistance  3. Sit to stand transfer with Contact Guard Assistance  4. Gait  x 50 feet with Contact Guard Assistance using LRAD, if needed.   5. Stand for 5 minutes with Contact Guard Assistance using LRAD, if needed  6. Lower extremity exercise program x10 reps per handout, with independence                     Time Tracking:     PT Received On: 22  PT Start Time: 1335     PT Stop Time: 1402  PT Total Time (min): 27 min     Billable Minutes: Gait Training 10 and Therapeutic Activity 17    Treatment Type: Treatment  PT/PTA: PT     PTA Visit Number: 0     Mary Thompson, PT  2022

## 2022-08-05 NOTE — SUBJECTIVE & OBJECTIVE
Interval History: Bumex 1mg BID to continue. CM aware[facility]. They are also open to home health. Patient is doing well, SpO2 >92% on RA with movement. She is able to sit comfortably in a chair and can ambulate from the bed without exertion.     Review of Systems   Constitutional:  Negative for chills, fatigue and fever.   HENT:  Negative for congestion and sore throat.    Respiratory:  Negative for cough, shortness of breath and wheezing.         Off NC    Cardiovascular:  Negative for chest pain, palpitations and leg swelling.   Gastrointestinal:  Negative for abdominal pain, nausea and vomiting.   Genitourinary:  Negative for dysuria and hematuria.   Musculoskeletal:  Negative for back pain and myalgias.   Neurological:  Negative for light-headedness and headaches.   Psychiatric/Behavioral:  Negative for agitation. The patient is not nervous/anxious.    Objective:     Vital Signs (Most Recent):  Temp: 98.1 °F (36.7 °C) (08/05/22 1315)  Pulse: 60 (08/05/22 0755)  Resp: (!) 21 (08/05/22 0755)  BP: (!) 165/71 (08/05/22 0755)  SpO2: (!) 91 % (08/05/22 0755)   Vital Signs (24h Range):  Temp:  [98 °F (36.7 °C)-98.5 °F (36.9 °C)] 98.1 °F (36.7 °C)  Pulse:  [59-60] 60  Resp:  [14-31] 21  SpO2:  [91 %-96 %] 91 %  BP: (148-167)/(64-72) 165/71     Weight: 90.3 kg (199 lb 1.2 oz)  Body mass index is 36.41 kg/m².    Intake/Output Summary (Last 24 hours) at 8/5/2022 1521  Last data filed at 8/5/2022 1100  Gross per 24 hour   Intake 790 ml   Output 900 ml   Net -110 ml      Physical Exam  Constitutional:       Appearance: Normal appearance.      Comments: Resting comfortably    HENT:      Head: Normocephalic and atraumatic.      Right Ear: External ear normal.      Left Ear: External ear normal.      Nose: Nose normal.      Mouth/Throat:      Mouth: Mucous membranes are moist.      Pharynx: Oropharynx is clear.   Eyes:      General: No scleral icterus.      Extraocular Movements: Extraocular movements intact.      Conjunctiva/sclera: Conjunctivae normal.   Cardiovascular:      Rate and Rhythm: Normal rate and regular rhythm.      Pulses: Normal pulses.      Heart sounds: Murmur heard.   Pulmonary:      Effort: Pulmonary effort is normal. No respiratory distress.      Breath sounds: No wheezing or rales.   Abdominal:      General: Abdomen is flat. Bowel sounds are normal. There is no distension.      Palpations: Abdomen is soft.      Tenderness: There is no abdominal tenderness.   Musculoskeletal:         General: Swelling present. No tenderness.      Cervical back: Normal range of motion.      Comments: Lymphedema of LUE.    Skin:     General: Skin is warm and dry.   Neurological:      Mental Status: She is alert and oriented to person, place, and time.   Psychiatric:         Mood and Affect: Mood normal.         Behavior: Behavior normal.         Thought Content: Thought content normal.         Judgment: Judgment normal.       Significant Labs: All pertinent labs within the past 24 hours have been reviewed.    Significant Imaging: I have reviewed all pertinent imaging results/findings within the past 24 hours.

## 2022-08-05 NOTE — PT/OT/SLP PROGRESS
Occupational Therapy   Treatment    Name: Pilar Michael  MRN: [id number]  Admitting Diagnosis:  Sepsis       Recommendations:     Discharge Recommendations: nursing facility, skilled  Discharge Equipment Recommendations:  none  Barriers to discharge:  Decreased caregiver support    Assessment:     Pilar Michael is a 89 y.o. female with a medical diagnosis of Sepsis.  She presents with fair participation and motivation. Pt continues to require (A) with all activities & is at risk for falls. Goals remain appropriate. Performance deficits affecting function are weakness, impaired endurance, impaired self care skills, impaired functional mobility, impaired balance, decreased upper extremity function, decreased lower extremity function, decreased safety awareness, impaired cardiopulmonary response to activity.     Rehab Prognosis:  Fair; patient would benefit from acute skilled OT services to address these deficits and reach maximum level of function.       Plan:     Patient to be seen 3 x/week to address the above listed problems via self-care/home management, therapeutic activities, therapeutic exercises, neuromuscular re-education  · Plan of Care Expires: 09/01/22  · Plan of Care Reviewed with: patient, family    Subjective   Pt reported that she was tired this morning.  Pain/Comfort:  · Pain Rating 1: 0/10  · Pain Rating Post-Intervention 1: 0/10    Objective:     Communicated with: RN prior to session.  Patient found supine with telemetry, pulse ox (continuous), PureWick (niece arrived during session) upon OT entry to room.    General Precautions: Standard, fall   Orthopedic Precautions:N/A   Braces: N/A     Occupational Performance:     Bed Mobility:    · Patient completed Scooting/Bridging with moderate assistance scooting forward on EOB, SBA scooting back in chair  · Patient completed Supine to Sit with moderate assistance     Functional Mobility/Transfers:  · Patient completed Sit <> Stand Transfer with  minimum assistance  with  no assistive device from EOB  · Patient completed Bed <> Chair Transfer using Step Transfer technique with minimum assistance with no assistive device    Activities of Daily Living:  · Grooming: stand by assistance while seated EOB to wash face, brush teeth & brush hair      AMPAC 6 Click ADL: 15    Treatment & Education:  Pt required SBA-Min (A) for postural control while seated EOB.  Provided verbal & physical cues to facilitate postural control. Provided education on safety as well as progress with activities on this date.  Pt had no further questions & when asked whether there were any concerns pt reported none.      Patient left up in chair with all lines intact, call button in reach, RN notified and niece presentEducation:      GOALS:   Multidisciplinary Problems     Occupational Therapy Goals        Problem: Occupational Therapy    Goal Priority Disciplines Outcome Interventions   Occupational Therapy Goal     OT, PT/OT Ongoing, Progressing    Description: Goals to be met by: 8/16     Patient will increase functional independence with ADLs by performing:    UE Dressing with Minimal Assistance.  LE Dressing with Moderate Assistance.  Grooming while seated with Supervision.  Toileting from bedside commode with Moderate Assistance for hygiene and clothing management.   Supine to sit with Stand-by Assistance.  Step transfer with Stand-by Assistance using RW                     Time Tracking:     OT Date of Treatment: 08/05/22  OT Start Time: 0949  OT Stop Time: 1012  OT Total Time (min): 23 min    Billable Minutes:Self Care/Home Management 13  Therapeutic Activity 10    OT/LAVONNE: OT          8/5/2022

## 2022-08-05 NOTE — PLAN OF CARE
Problem: Adult Inpatient Plan of Care  Goal: Plan of Care Review  8/5/2022 1631 by Mariola Ramirez RN  Outcome: Ongoing, Progressing  8/5/2022 1153 by Mariola Ramirez RN  Outcome: Ongoing, Progressing  Goal: Patient-Specific Goal (Individualized)  8/5/2022 1631 by Mariola Ramirez RN  Outcome: Ongoing, Progressing  8/5/2022 1153 by Mariola Ramirez RN  Outcome: Ongoing, Progressing  Goal: Absence of Hospital-Acquired Illness or Injury  8/5/2022 1631 by Mariola Ramirez RN  Outcome: Ongoing, Progressing  8/5/2022 1153 by Mariola Ramirez RN  Outcome: Ongoing, Progressing  Goal: Optimal Comfort and Wellbeing  8/5/2022 1631 by Mariola Ramirez RN  Outcome: Ongoing, Progressing  8/5/2022 1153 by Mariola Ramirez RN  Outcome: Ongoing, Progressing  Goal: Readiness for Transition of Care  8/5/2022 1631 by Mariola Ramirez RN  Outcome: Ongoing, Progressing  8/5/2022 1153 by Mariola Ramirez RN  Outcome: Ongoing, Progressing     Problem: Fluid Imbalance (Pneumonia)  Goal: Fluid Balance  8/5/2022 1631 by Mariola Ramirez RN  Outcome: Ongoing, Progressing  8/5/2022 1153 by Mariola Ramirez RN  Outcome: Ongoing, Progressing     Problem: Infection (Pneumonia)  Goal: Resolution of Infection Signs and Symptoms  8/5/2022 1631 by Mariola Ramirez RN  Outcome: Ongoing, Progressing  8/5/2022 1153 by Mariola Ramirez RN  Outcome: Ongoing, Progressing     Problem: Respiratory Compromise (Pneumonia)  Goal: Effective Oxygenation and Ventilation  8/5/2022 1631 by Mariola Ramirez RN  Outcome: Ongoing, Progressing  8/5/2022 1153 by Mariola Ramriez RN  Outcome: Ongoing, Progressing     Problem: Impaired Wound Healing  Goal: Optimal Wound Healing  8/5/2022 1631 by Mariola Ramirez RN  Outcome: Ongoing, Progressing  8/5/2022 1153 by Mariola Ramirez RN  Outcome: Ongoing, Progressing     Problem: Adjustment to Illness (Sepsis/Septic Shock)  Goal: Optimal Coping  8/5/2022 1631 by Mariola Ramirez RN  Outcome: Ongoing, Progressing  8/5/2022 1153 by Mariola Ramirez RN  Outcome: Ongoing, Progressing      Problem: Bleeding (Sepsis/Septic Shock)  Goal: Absence of Bleeding  8/5/2022 1631 by Mariola Ramirez RN  Outcome: Ongoing, Progressing  8/5/2022 1153 by Mariola Ramirez RN  Outcome: Ongoing, Progressing     Problem: Glycemic Control Impaired (Sepsis/Septic Shock)  Goal: Blood Glucose Level Within Desired Range  8/5/2022 1631 by Mariola Ramirez RN  Outcome: Ongoing, Progressing  8/5/2022 1153 by Mariola Ramirez RN  Outcome: Ongoing, Progressing     Problem: Infection Progression (Sepsis/Septic Shock)  Goal: Absence of Infection Signs and Symptoms  8/5/2022 1631 by Mariola Ramirez RN  Outcome: Ongoing, Progressing  8/5/2022 1153 by Mariola Ramirez RN  Outcome: Ongoing, Progressing     Problem: Nutrition Impaired (Sepsis/Septic Shock)  Goal: Optimal Nutrition Intake  8/5/2022 1631 by Mariola Ramirez RN  Outcome: Ongoing, Progressing  8/5/2022 1153 by Mariola Ramirez RN  Outcome: Ongoing, Progressing     Problem: Fall Injury Risk  Goal: Absence of Fall and Fall-Related Injury  8/5/2022 1631 by Mariola Ramirez RN  Outcome: Ongoing, Progressing  8/5/2022 1153 by Mariola Ramirez RN  Outcome: Ongoing, Progressing     Problem: Skin Injury Risk Increased  Goal: Skin Health and Integrity  8/5/2022 1631 by Mariola Ramirez RN  Outcome: Ongoing, Progressing  8/5/2022 1153 by Mariola Ramirez RN  Outcome: Ongoing, Progressing

## 2022-08-06 LAB
ALBUMIN SERPL BCP-MCNC: 2.6 G/DL (ref 3.5–5.2)
ALP SERPL-CCNC: 76 U/L (ref 55–135)
ALT SERPL W/O P-5'-P-CCNC: 7 U/L (ref 10–44)
ANION GAP SERPL CALC-SCNC: 10 MMOL/L (ref 8–16)
AST SERPL-CCNC: 19 U/L (ref 10–40)
BASOPHILS # BLD AUTO: 0.03 K/UL (ref 0–0.2)
BASOPHILS NFR BLD: 0.4 % (ref 0–1.9)
BILIRUB SERPL-MCNC: 1 MG/DL (ref 0.1–1)
BUN SERPL-MCNC: 17 MG/DL (ref 8–23)
CALCIUM SERPL-MCNC: 9 MG/DL (ref 8.7–10.5)
CHLORIDE SERPL-SCNC: 107 MMOL/L (ref 95–110)
CO2 SERPL-SCNC: 20 MMOL/L (ref 23–29)
CREAT SERPL-MCNC: 0.8 MG/DL (ref 0.5–1.4)
DIFFERENTIAL METHOD: ABNORMAL
EOSINOPHIL # BLD AUTO: 0.1 K/UL (ref 0–0.5)
EOSINOPHIL NFR BLD: 1.9 % (ref 0–8)
ERYTHROCYTE [DISTWIDTH] IN BLOOD BY AUTOMATED COUNT: 18.5 % (ref 11.5–14.5)
EST. GFR  (NO RACE VARIABLE): >60 ML/MIN/1.73 M^2
GLUCOSE SERPL-MCNC: 95 MG/DL (ref 70–110)
HCT VFR BLD AUTO: 31.8 % (ref 37–48.5)
HGB BLD-MCNC: 10.3 G/DL (ref 12–16)
IMM GRANULOCYTES # BLD AUTO: 0.07 K/UL (ref 0–0.04)
IMM GRANULOCYTES NFR BLD AUTO: 0.9 % (ref 0–0.5)
LYMPHOCYTES # BLD AUTO: 0.7 K/UL (ref 1–4.8)
LYMPHOCYTES NFR BLD: 9.1 % (ref 18–48)
MAGNESIUM SERPL-MCNC: 1.8 MG/DL (ref 1.6–2.6)
MCH RBC QN AUTO: 26.5 PG (ref 27–31)
MCHC RBC AUTO-ENTMCNC: 32.4 G/DL (ref 32–36)
MCV RBC AUTO: 82 FL (ref 82–98)
MONOCYTES # BLD AUTO: 0.4 K/UL (ref 0.3–1)
MONOCYTES NFR BLD: 6 % (ref 4–15)
NEUTROPHILS # BLD AUTO: 6 K/UL (ref 1.8–7.7)
NEUTROPHILS NFR BLD: 81.7 % (ref 38–73)
NRBC BLD-RTO: 0 /100 WBC
PHOSPHATE SERPL-MCNC: 2.5 MG/DL (ref 2.7–4.5)
PLATELET # BLD AUTO: 142 K/UL (ref 150–450)
PMV BLD AUTO: 10.7 FL (ref 9.2–12.9)
POTASSIUM SERPL-SCNC: 4.2 MMOL/L (ref 3.5–5.1)
PROT SERPL-MCNC: 7.7 G/DL (ref 6–8.4)
RBC # BLD AUTO: 3.89 M/UL (ref 4–5.4)
SODIUM SERPL-SCNC: 137 MMOL/L (ref 136–145)
WBC # BLD AUTO: 7.38 K/UL (ref 3.9–12.7)

## 2022-08-06 PROCEDURE — 25000003 PHARM REV CODE 250: Performed by: HOSPITALIST

## 2022-08-06 PROCEDURE — 25000003 PHARM REV CODE 250

## 2022-08-06 PROCEDURE — 20600001 HC STEP DOWN PRIVATE ROOM

## 2022-08-06 PROCEDURE — 99232 SBSQ HOSP IP/OBS MODERATE 35: CPT | Mod: ,,, | Performed by: HOSPITALIST

## 2022-08-06 PROCEDURE — 99232 PR SUBSEQUENT HOSPITAL CARE,LEVL II: ICD-10-PCS | Mod: ,,, | Performed by: HOSPITALIST

## 2022-08-06 PROCEDURE — 83735 ASSAY OF MAGNESIUM: CPT

## 2022-08-06 PROCEDURE — 80053 COMPREHEN METABOLIC PANEL: CPT

## 2022-08-06 PROCEDURE — 85025 COMPLETE CBC W/AUTO DIFF WBC: CPT

## 2022-08-06 PROCEDURE — 36415 COLL VENOUS BLD VENIPUNCTURE: CPT

## 2022-08-06 PROCEDURE — 25000003 PHARM REV CODE 250: Performed by: STUDENT IN AN ORGANIZED HEALTH CARE EDUCATION/TRAINING PROGRAM

## 2022-08-06 PROCEDURE — 84100 ASSAY OF PHOSPHORUS: CPT

## 2022-08-06 RX ORDER — BUMETANIDE 1 MG/1
1 TABLET ORAL 2 TIMES DAILY
Status: DISCONTINUED | OUTPATIENT
Start: 2022-08-06 | End: 2022-08-09 | Stop reason: HOSPADM

## 2022-08-06 RX ORDER — MAGNESIUM SULFATE 1 G/100ML
1 INJECTION INTRAVENOUS ONCE
Status: DISCONTINUED | OUTPATIENT
Start: 2022-08-06 | End: 2022-08-06

## 2022-08-06 RX ORDER — HYDRALAZINE HYDROCHLORIDE 50 MG/1
50 TABLET, FILM COATED ORAL
Status: DISCONTINUED | OUTPATIENT
Start: 2022-08-06 | End: 2022-08-09 | Stop reason: HOSPADM

## 2022-08-06 RX ADMIN — BUMETANIDE 1 MG: 1 TABLET ORAL at 09:08

## 2022-08-06 RX ADMIN — TIMOLOL MALEATE 1 DROP: 5 SOLUTION OPHTHALMIC at 09:08

## 2022-08-06 RX ADMIN — BUMETANIDE 1 MG: 1 TABLET ORAL at 05:08

## 2022-08-06 RX ADMIN — HYDRALAZINE HYDROCHLORIDE 50 MG: 50 TABLET ORAL at 10:08

## 2022-08-06 RX ADMIN — MEGESTROL ACETATE 40 MG: 40 TABLET ORAL at 09:08

## 2022-08-06 RX ADMIN — CARVEDILOL 3.12 MG: 3.12 TABLET, FILM COATED ORAL at 09:08

## 2022-08-06 RX ADMIN — HYDRALAZINE HYDROCHLORIDE 50 MG: 50 TABLET ORAL at 05:08

## 2022-08-06 RX ADMIN — LEVOTHYROXINE SODIUM 125 MCG: 25 TABLET ORAL at 05:08

## 2022-08-06 RX ADMIN — ATORVASTATIN CALCIUM 20 MG: 20 TABLET, FILM COATED ORAL at 09:08

## 2022-08-06 RX ADMIN — CLOPIDOGREL 75 MG: 75 TABLET, FILM COATED ORAL at 09:08

## 2022-08-06 RX ADMIN — ASPIRIN 81 MG CHEWABLE TABLET 81 MG: 81 TABLET CHEWABLE at 09:08

## 2022-08-06 RX ADMIN — LISINOPRIL 20 MG: 20 TABLET ORAL at 09:08

## 2022-08-06 NOTE — PLAN OF CARE
Problem: Adult Inpatient Plan of Care  Goal: Plan of Care Review  Outcome: Ongoing, Progressing  Goal: Patient-Specific Goal (Individualized)  Outcome: Ongoing, Progressing  Goal: Absence of Hospital-Acquired Illness or Injury  Outcome: Ongoing, Progressing  Goal: Readiness for Transition of Care  Outcome: Ongoing, Progressing     Problem: Impaired Wound Healing  Goal: Optimal Wound Healing  Outcome: Ongoing, Progressing

## 2022-08-06 NOTE — SUBJECTIVE & OBJECTIVE
Interval History:  Pedal edema at stable. JVD + upto earlobes. Increased bumatenide to 1 mg bid.       Review of Systems   Constitutional:  Negative for activity change, fever and unexpected weight change.   HENT:  Negative for congestion.    Eyes:  Negative for redness.   Respiratory:  Negative for cough, chest tightness and shortness of breath.    Cardiovascular:  Negative for chest pain, palpitations and leg swelling.   Gastrointestinal:  Negative for abdominal pain, blood in stool, diarrhea, nausea and vomiting.   Endocrine: Negative for polyuria.   Genitourinary:  Negative for dysuria and frequency.   Musculoskeletal:  Negative for arthralgias.   Skin:  Negative for rash.   Neurological:  Negative for seizures and numbness.   Psychiatric/Behavioral:  Negative for agitation and behavioral problems.    Objective:     Vital Signs (Most Recent):  Temp: 97.9 °F (36.6 °C) (08/06/22 1200)  Pulse: 60 (08/06/22 1500)  Resp: (!) 27 (08/06/22 1500)  BP: (!) 177/81 (08/06/22 1300)  SpO2: (!) 93 % (08/06/22 1500)   Vital Signs (24h Range):  Temp:  [97.9 °F (36.6 °C)-98.3 °F (36.8 °C)] 97.9 °F (36.6 °C)  Pulse:  [60] 60  Resp:  [16-50] 27  SpO2:  [91 %-96 %] 93 %  BP: (152-217)/() 177/81     Weight: 90.3 kg (199 lb 1.2 oz)  Body mass index is 36.41 kg/m².    Intake/Output Summary (Last 24 hours) at 8/6/2022 1658  Last data filed at 8/6/2022 0535  Gross per 24 hour   Intake 200 ml   Output 300 ml   Net -100 ml      Physical Exam  Constitutional:       Appearance: Normal appearance.   HENT:      Head: Normocephalic and atraumatic.      Nose: Nose normal.      Mouth/Throat:      Mouth: Mucous membranes are moist.      Pharynx: Oropharynx is clear.   Eyes:      Extraocular Movements: Extraocular movements intact.      Conjunctiva/sclera: Conjunctivae normal.      Pupils: Pupils are equal, round, and reactive to light.   Cardiovascular:      Rate and Rhythm: Normal rate and regular rhythm.      Pulses: Normal pulses.       Heart sounds: Normal heart sounds.      Comments: + JVD upto earlobes.   Pulmonary:      Effort: Pulmonary effort is normal.      Breath sounds: Rales present.   Abdominal:      General: Abdomen is flat. Bowel sounds are normal.      Palpations: Abdomen is soft.   Musculoskeletal:         General: Normal range of motion.      Right lower leg: Edema present.      Left lower leg: Edema present.   Skin:     General: Skin is warm and dry.   Neurological:      General: No focal deficit present.      Mental Status: She is alert and oriented to person, place, and time. Mental status is at baseline.   Psychiatric:         Mood and Affect: Mood normal.         Behavior: Behavior normal.         Thought Content: Thought content normal.         Judgment: Judgment normal.       Significant Labs: All pertinent labs within the past 24 hours have been reviewed.    Significant Imaging: I have reviewed all pertinent imaging results/findings within the past 24 hours.

## 2022-08-06 NOTE — ASSESSMENT & PLAN NOTE
Patient recently admitted to [facility] for CHF exacerbation. She does not have LE edema or SOB at this time.  - on 2L NC  - increased Bumex to 1mg BID from QD per Pulmonary recs

## 2022-08-06 NOTE — PROGRESS NOTES
[facility] - Intensive Care ([address] Medicine  Progress Note    Patient Name: Pilar Michael  MRN: [id number]  Patient Class: IP- Inpatient   Admission Date: 7/30/2022  Length of Stay: 6 days  Attending Physician: Carolina Peña MD  Primary Care Provider: Joseph Noel MD        Subjective:     Principal Problem:Sepsis        HPI:  Ms. Michael is a 89 y.o. F with PMH systolic and diastolic HF, CAD s/p RCA stent, pacemaker placement, thyroid cancer s/p treatment 2003, breast cancer s/p mastectomy 01/2022, CVA 04/2022 without residual deficits, HLD, HTN, hypothyroidism who presents to the ED from SNF for fever and AMS. She developed fever 7/30 up to 102F and became lethargic and confused. She had been at SNF for approximately one week after being discharged from [facility] for acute exacerbation of CHF.     Per daughter at bedside, the patient becomes confused and lethargic when she has fever. During previous admission for CHF exacerbation she was also treated empirically for CAP.     In the ED, she had T100.7, HR 60, /50, O2 94% on room air. She was placed on 2L NC with improvement in sats to 99%. CBC remarkable for H/H of 9/29, calcium 8.5, , troponin 0.028. UA hazy with trace leukocytes. CXR with L pleural effusion and L basilar atelectasis v consolidation, similar to prior study. She was given 2g Cefepime x1 dose for sepsis of unknown source. She is admitted to medicine for workup and treatment of sepsis.       Overview/Hospital Course:  Ms. Michael is a 89 y.o. F with PMH systolic and diastolic HF, CAD s/p RCA stent, pacemaker placement, thyroid cancer s/p treatment 2003, breast cancer s/p mastectomy 01/2022, CVA 04/2022 without residual deficits, HLD, HTN, hypothyroidism who presents to the ED from SNF for fever and AMS. She developed fever 7/30 up to 102F and became lethargic and confused. She had been at SNF for approximately one week after being discharged from [facility] for acute  exacerbation of CHF. Pt. Was started on cefepime, vanc, and azithromycin. During hospital course she has not become febrile and has become easily arousal. Pulmonology was consulted and believe the pulmonary effusion is due to insufficient diuresis. We will stop abx for now and monitor. Per pulm recs, we will up the bumetanide to 1mg BID, and monitor.  Patient is doing well, diuresis was suboptimal. Gave a 1 time dose of 80mg lasix to attempt to increase output. Patient can resume 1mg BID after. She has been taking off oxygen for >1 day and is sating >90%. She feels much better and family agrees.        Interval History:  Pedal edema at stable. JVD + upto earlobes. Increased bumatenide to 1 mg bid.       Review of Systems   Constitutional:  Negative for activity change, fever and unexpected weight change.   HENT:  Negative for congestion.    Eyes:  Negative for redness.   Respiratory:  Negative for cough, chest tightness and shortness of breath.    Cardiovascular:  Negative for chest pain, palpitations and leg swelling.   Gastrointestinal:  Negative for abdominal pain, blood in stool, diarrhea, nausea and vomiting.   Endocrine: Negative for polyuria.   Genitourinary:  Negative for dysuria and frequency.   Musculoskeletal:  Negative for arthralgias.   Skin:  Negative for rash.   Neurological:  Negative for seizures and numbness.   Psychiatric/Behavioral:  Negative for agitation and behavioral problems.    Objective:     Vital Signs (Most Recent):  Temp: 97.9 °F (36.6 °C) (08/06/22 1200)  Pulse: 60 (08/06/22 1500)  Resp: (!) 27 (08/06/22 1500)  BP: (!) 177/81 (08/06/22 1300)  SpO2: (!) 93 % (08/06/22 1500)   Vital Signs (24h Range):  Temp:  [97.9 °F (36.6 °C)-98.3 °F (36.8 °C)] 97.9 °F (36.6 °C)  Pulse:  [60] 60  Resp:  [16-50] 27  SpO2:  [91 %-96 %] 93 %  BP: (152-217)/() 177/81     Weight: 90.3 kg (199 lb 1.2 oz)  Body mass index is 36.41 kg/m².    Intake/Output Summary (Last 24 hours) at 8/6/2022 8770  Last  data filed at 8/6/2022 0535  Gross per 24 hour   Intake 200 ml   Output 300 ml   Net -100 ml      Physical Exam  Constitutional:       Appearance: Normal appearance.   HENT:      Head: Normocephalic and atraumatic.      Nose: Nose normal.      Mouth/Throat:      Mouth: Mucous membranes are moist.      Pharynx: Oropharynx is clear.   Eyes:      Extraocular Movements: Extraocular movements intact.      Conjunctiva/sclera: Conjunctivae normal.      Pupils: Pupils are equal, round, and reactive to light.   Cardiovascular:      Rate and Rhythm: Normal rate and regular rhythm.      Pulses: Normal pulses.      Heart sounds: Normal heart sounds.      Comments: + JVD upto earlobes.   Pulmonary:      Effort: Pulmonary effort is normal.      Breath sounds: Rales present.   Abdominal:      General: Abdomen is flat. Bowel sounds are normal.      Palpations: Abdomen is soft.   Musculoskeletal:         General: Normal range of motion.      Right lower leg: Edema present.      Left lower leg: Edema present.   Skin:     General: Skin is warm and dry.   Neurological:      General: No focal deficit present.      Mental Status: She is alert and oriented to person, place, and time. Mental status is at baseline.   Psychiatric:         Mood and Affect: Mood normal.         Behavior: Behavior normal.         Thought Content: Thought content normal.         Judgment: Judgment normal.       Significant Labs: All pertinent labs within the past 24 hours have been reviewed.    Significant Imaging: I have reviewed all pertinent imaging results/findings within the past 24 hours.      Assessment/Plan:      * Sepsis  This patient does have evidence of infective focus  My overall impression is sepsis. Vital signs were reviewed and noted in progress note.  Antibiotics given-   Antibiotics (From admission, onward)            None        Cultures were taken-   Microbiology Results (last 7 days)     Procedure Component Value Units Date/Time    Blood  culture x two cultures. Draw prior to antibiotics. [[id number]] Collected: 07/30/22 2213    Order Status: Completed Specimen: Blood from Peripheral, Antecubital, Right Updated: 08/02/22 2312     Blood Culture, Routine No Growth to date      No Growth to date      No Growth to date      No Growth to date    Narrative:      Aerobic and anaerobic    Blood culture x two cultures. Draw prior to antibiotics. [[id number]] Collected: 07/30/22 2213    Order Status: Completed Specimen: Blood from Peripheral, Hand, Right Updated: 08/02/22 2312     Blood Culture, Routine No Growth to date      No Growth to date      No Growth to date      No Growth to date    Narrative:      Aerobic and anaerobic    Urine culture [[id number]] Collected: 07/30/22 2234    Order Status: Completed Specimen: Urine Updated: 08/01/22 0116     Urine Culture, Routine No significant growth    Narrative:      Specimen Source->Urine    Respiratory Infection Panel (PCR), Nasopharyngeal [[id number]] Collected: 07/31/22 1602    Order Status: Completed Specimen: Nasopharyngeal Swab Updated: 07/31/22 2358     Respiratory Infection Panel Source NP Swab     Adenovirus Not Detected     Coronavirus 229E, Common Cold Virus Not Detected     Coronavirus HKU1, Common Cold Virus Not Detected     Coronavirus NL63, Common Cold Virus Not Detected     Coronavirus OC43, Common Cold Virus Not Detected     Comment: The Coronavirus strains detected in this test cause the common cold.  These strains are not the COVID-19 (novel Coronavirus)strain   associated with the respiratory disease outbreak.          SARS-CoV2 (COVID-19) Qualitative PCR Not Detected     Human Metapneumovirus Not Detected     Human Rhinovirus/Enterovirus Not Detected     Influenza A (subtypes H1, H1-2009,H3) Not Detected     Influenza B Not Detected     Parainfluenza Virus 1 Not Detected     Parainfluenza Virus 2 Not Detected     Parainfluenza Virus 3 Not Detected     Parainfluenza Virus 4 Not Detected      Respiratory Syncytial Virus Not Detected     Bordetella Parapertussis (LQ2673) Not Detected     Bordetella pertussis (ptxP) Not Detected     Chlamydia pneumoniae Not Detected     Mycoplasma pneumoniae Not Detected    Narrative:      For all other respiratory sources, order [id number] -  Respiratory Viral Panel by PCR    Culture, Respiratory with Gram Stain [[id number]]     Order Status: No result Specimen: Respiratory         Latest lactate reviewed, they are-  No results for input(s): LACTATE in the last 72 hours.    Organ dysfunction indicated by Encephalopathy   Source- CAP     Source control Achieved by- antibiotics    CXR with L pleural effusion and L lower zone atelectasis v consolidation - possible source of sepsis  -Vanc/Cefepime started 7/31  -2L NC to maintain sats >92%  -f/u urine cx - negative   -f/u blood cx - negative   -f/u resp infection panel  - Will stop abx for now. Patient is afebrile and stable       Pleural effusion  See CAP      Delirium  Patient lethargic and confused in ED. Found to be oriented x3 after several hours. Intermittent confusion per daughter at bedside.  -delirium precautions placed      Hyperlipidemia  -continue home atorvastatin 20mg      Normocytic anemia  Continue to monitor  -transfuse if Hgb<7  -daily CBC      CAP (community acquired pneumonia)  Patient with CXR showing L pleural effusion, L basilar atelectasis v consolidation, requiring 2L NC. Daughter at bedside reports productive cough, though mostly in the mornings.  - Vanc/cefepime/azithromycin  Stopped for now. Low suspicion for infectious process at this time due to negative cultures   - Pleural effusion on CT chest, pulmonology consulted for possible thoracentesis  - Pulm declined performing para, believes the effusion to be a diuresis problem  - Will increase Bumex to 1mg BID   - Gave 1x dose of IV lasix to increase output, will resume regular bumex       Coronary artery disease involving native coronary artery of  native heart without angina pectoris  S/p RCA stent placement  -continue home ASA 81mg  -continue home atorvastatin  -continue home Plavix      Chronic combined systolic and diastolic heart failure  Patient recently admitted to [facility] for CHF exacerbation. She does not have LE edema or SOB at this time.  - on 2L NC  - increased Bumex to 1mg BID from QD per Pulmonary recs        Essential hypertension  BP increased to >200 overnight. Was provided hydralazine. Will monitor for now   - continue home carvedilol 3.125mg, lisinopril 20mg   - Bumex increased to 1mg BID       Acquired hypothyroidism  -continue home synthroid 125mcg      Severe aortic stenosis s/p TAVR  Continue to monitor        VTE Risk Mitigation (From admission, onward)         Ordered     IP VTE HIGH RISK PATIENT  Once         07/31/22 0304     Place sequential compression device  Until discontinued         07/31/22 0304                Discharge Planning   CHARLIE: 8/9/2022     Code Status: Full Code   Is the patient medically ready for discharge?: Yes    Reason for patient still in hospital (select all that apply): Treatment  Discharge Plan A: Return to nursing home, Skilled Nursing Facility   Discharge Delays: (!) Post-Acute Set-up              Ulysses Dong MD  Department of Hospital Medicine   [facility] - Intensive Care ([place]

## 2022-08-07 LAB
ANION GAP SERPL CALC-SCNC: 9 MMOL/L (ref 8–16)
BASOPHILS # BLD AUTO: 0.02 K/UL (ref 0–0.2)
BASOPHILS NFR BLD: 0.2 % (ref 0–1.9)
BUN SERPL-MCNC: 21 MG/DL (ref 8–23)
CALCIUM SERPL-MCNC: 8.7 MG/DL (ref 8.7–10.5)
CHLORIDE SERPL-SCNC: 105 MMOL/L (ref 95–110)
CO2 SERPL-SCNC: 25 MMOL/L (ref 23–29)
CREAT SERPL-MCNC: 0.8 MG/DL (ref 0.5–1.4)
DIFFERENTIAL METHOD: ABNORMAL
EOSINOPHIL # BLD AUTO: 0.1 K/UL (ref 0–0.5)
EOSINOPHIL NFR BLD: 0.8 % (ref 0–8)
ERYTHROCYTE [DISTWIDTH] IN BLOOD BY AUTOMATED COUNT: 18.5 % (ref 11.5–14.5)
EST. GFR  (NO RACE VARIABLE): >60 ML/MIN/1.73 M^2
GLUCOSE SERPL-MCNC: 98 MG/DL (ref 70–110)
HCT VFR BLD AUTO: 30.8 % (ref 37–48.5)
HGB BLD-MCNC: 9.7 G/DL (ref 12–16)
IMM GRANULOCYTES # BLD AUTO: 0.05 K/UL (ref 0–0.04)
IMM GRANULOCYTES NFR BLD AUTO: 0.5 % (ref 0–0.5)
LYMPHOCYTES # BLD AUTO: 1.1 K/UL (ref 1–4.8)
LYMPHOCYTES NFR BLD: 11.3 % (ref 18–48)
MAGNESIUM SERPL-MCNC: 1.6 MG/DL (ref 1.6–2.6)
MCH RBC QN AUTO: 27.1 PG (ref 27–31)
MCHC RBC AUTO-ENTMCNC: 31.5 G/DL (ref 32–36)
MCV RBC AUTO: 86 FL (ref 82–98)
MONOCYTES # BLD AUTO: 0.6 K/UL (ref 0.3–1)
MONOCYTES NFR BLD: 6 % (ref 4–15)
NEUTROPHILS # BLD AUTO: 8 K/UL (ref 1.8–7.7)
NEUTROPHILS NFR BLD: 81.2 % (ref 38–73)
NRBC BLD-RTO: 0 /100 WBC
PHOSPHATE SERPL-MCNC: 3.2 MG/DL (ref 2.7–4.5)
PLATELET # BLD AUTO: 199 K/UL (ref 150–450)
PMV BLD AUTO: 10.9 FL (ref 9.2–12.9)
POTASSIUM SERPL-SCNC: 3.7 MMOL/L (ref 3.5–5.1)
RBC # BLD AUTO: 3.58 M/UL (ref 4–5.4)
SODIUM SERPL-SCNC: 139 MMOL/L (ref 136–145)
WBC # BLD AUTO: 9.87 K/UL (ref 3.9–12.7)

## 2022-08-07 PROCEDURE — 20600001 HC STEP DOWN PRIVATE ROOM

## 2022-08-07 PROCEDURE — 25000003 PHARM REV CODE 250: Performed by: STUDENT IN AN ORGANIZED HEALTH CARE EDUCATION/TRAINING PROGRAM

## 2022-08-07 PROCEDURE — 99232 PR SUBSEQUENT HOSPITAL CARE,LEVL II: ICD-10-PCS | Mod: GC,,, | Performed by: HOSPITALIST

## 2022-08-07 PROCEDURE — 36415 COLL VENOUS BLD VENIPUNCTURE: CPT | Performed by: STUDENT IN AN ORGANIZED HEALTH CARE EDUCATION/TRAINING PROGRAM

## 2022-08-07 PROCEDURE — 83735 ASSAY OF MAGNESIUM: CPT | Performed by: STUDENT IN AN ORGANIZED HEALTH CARE EDUCATION/TRAINING PROGRAM

## 2022-08-07 PROCEDURE — 25000003 PHARM REV CODE 250: Performed by: HOSPITALIST

## 2022-08-07 PROCEDURE — 80048 BASIC METABOLIC PNL TOTAL CA: CPT | Performed by: STUDENT IN AN ORGANIZED HEALTH CARE EDUCATION/TRAINING PROGRAM

## 2022-08-07 PROCEDURE — 99232 SBSQ HOSP IP/OBS MODERATE 35: CPT | Mod: GC,,, | Performed by: HOSPITALIST

## 2022-08-07 PROCEDURE — 25000003 PHARM REV CODE 250

## 2022-08-07 PROCEDURE — 85025 COMPLETE CBC W/AUTO DIFF WBC: CPT | Performed by: STUDENT IN AN ORGANIZED HEALTH CARE EDUCATION/TRAINING PROGRAM

## 2022-08-07 PROCEDURE — 84100 ASSAY OF PHOSPHORUS: CPT | Performed by: STUDENT IN AN ORGANIZED HEALTH CARE EDUCATION/TRAINING PROGRAM

## 2022-08-07 RX ADMIN — BUMETANIDE 1 MG: 1 TABLET ORAL at 05:08

## 2022-08-07 RX ADMIN — CARVEDILOL 3.12 MG: 3.12 TABLET, FILM COATED ORAL at 09:08

## 2022-08-07 RX ADMIN — CLOPIDOGREL 75 MG: 75 TABLET, FILM COATED ORAL at 09:08

## 2022-08-07 RX ADMIN — ATORVASTATIN CALCIUM 20 MG: 20 TABLET, FILM COATED ORAL at 09:08

## 2022-08-07 RX ADMIN — LEVOTHYROXINE SODIUM 125 MCG: 25 TABLET ORAL at 05:08

## 2022-08-07 RX ADMIN — LISINOPRIL 20 MG: 20 TABLET ORAL at 09:08

## 2022-08-07 RX ADMIN — MEGESTROL ACETATE 40 MG: 40 TABLET ORAL at 09:08

## 2022-08-07 RX ADMIN — ASPIRIN 81 MG CHEWABLE TABLET 81 MG: 81 TABLET CHEWABLE at 09:08

## 2022-08-07 RX ADMIN — BUMETANIDE 1 MG: 1 TABLET ORAL at 09:08

## 2022-08-07 RX ADMIN — TIMOLOL MALEATE 1 DROP: 5 SOLUTION OPHTHALMIC at 10:08

## 2022-08-07 NOTE — ASSESSMENT & PLAN NOTE
Patient recently admitted to [facility] for CHF exacerbation. She does not have LE edema or SOB at this time.  - on Room Air   - Bumex 1mg BID

## 2022-08-07 NOTE — SUBJECTIVE & OBJECTIVE
Interval History: Pt. doing well today, can ambulate and desires to be moved to the chair. Back on Bumex 1mg BID. Awaiting placement.     Review of Systems   Constitutional:  Negative for activity change, fever and unexpected weight change.   HENT:  Negative for congestion.    Eyes:  Negative for redness.   Respiratory:  Negative for cough, chest tightness, shortness of breath and wheezing.    Cardiovascular:  Negative for chest pain, palpitations and leg swelling.   Gastrointestinal:  Negative for abdominal pain, blood in stool, diarrhea, nausea and vomiting.   Endocrine: Negative for polyuria.   Genitourinary:  Negative for dysuria and frequency.   Musculoskeletal:  Negative for arthralgias and back pain.   Skin:  Negative for rash.   Neurological:  Negative for seizures and numbness.   Psychiatric/Behavioral:  Negative for agitation and behavioral problems.    Objective:     Vital Signs (Most Recent):  Temp: 98 °F (36.7 °C) (08/06/22 2132)  Pulse: 60 (08/06/22 2132)  Resp: (!) 52 (08/06/22 2130)  BP: (!) 158/64 (08/06/22 2132)  SpO2: (!) 93 % (08/06/22 2130)   Vital Signs (24h Range):  Temp:  [97.5 °F (36.4 °C)-98 °F (36.7 °C)] 98 °F (36.7 °C)  Pulse:  [60] 60  Resp:  [21-52] 52  SpO2:  [91 %-94 %] 93 %  BP: (152-220)/() 158/64     Weight: 90.3 kg (199 lb 1.2 oz)  Body mass index is 36.41 kg/m².    Intake/Output Summary (Last 24 hours) at 8/7/2022 0856  Last data filed at 8/7/2022 0500  Gross per 24 hour   Intake 43.72 ml   Output 350 ml   Net -306.28 ml      Physical Exam  Constitutional:       Appearance: Normal appearance.   HENT:      Head: Normocephalic and atraumatic.      Nose: Nose normal.      Mouth/Throat:      Mouth: Mucous membranes are moist.      Pharynx: Oropharynx is clear.   Eyes:      Extraocular Movements: Extraocular movements intact.      Conjunctiva/sclera: Conjunctivae normal.      Pupils: Pupils are equal, round, and reactive to light.   Cardiovascular:      Rate and Rhythm: Normal  rate and regular rhythm.      Pulses: Normal pulses.      Heart sounds: Normal heart sounds. No murmur heard.  Pulmonary:      Effort: Pulmonary effort is normal.      Breath sounds: No rales.   Abdominal:      General: Abdomen is flat. Bowel sounds are normal. There is no distension.      Palpations: Abdomen is soft.      Tenderness: There is no abdominal tenderness. There is no guarding.   Musculoskeletal:         General: Normal range of motion.      Right lower leg: No edema.      Left lower leg: No edema.   Skin:     General: Skin is warm and dry.   Neurological:      General: No focal deficit present.      Mental Status: She is alert and oriented to person, place, and time. Mental status is at baseline.      Motor: No weakness.   Psychiatric:         Mood and Affect: Mood normal.         Behavior: Behavior normal.         Thought Content: Thought content normal.         Judgment: Judgment normal.       Significant Labs: All pertinent labs within the past 24 hours have been reviewed.    Significant Imaging: I have reviewed all pertinent imaging results/findings within the past 24 hours.

## 2022-08-07 NOTE — PROGRESS NOTES
[facility] ([address] Medicine  Progress Note    Patient Name: Pilar Michael  MRN: [id number]  Patient Class: IP- Inpatient   Admission Date: 7/30/2022  Length of Stay: 7 days  Attending Physician: Carolina Peña MD  Primary Care Provider: Joseph Noel MD        Subjective:     Principal Problem:Sepsis        HPI:  Ms. Michael is a 89 y.o. F with PMH systolic and diastolic HF, CAD s/p RCA stent, pacemaker placement, thyroid cancer s/p treatment 2003, breast cancer s/p mastectomy 01/2022, CVA 04/2022 without residual deficits, HLD, HTN, hypothyroidism who presents to the ED from SNF for fever and AMS. She developed fever 7/30 up to 102F and became lethargic and confused. She had been at SNF for approximately one week after [place] [facility].     Per daughter at bedside, the patient becomes confused and lethargic when she has fever. During previous admission for CHF exacerbation she was also treated empirically for CAP.     In the ED, she had T100.7, HR 60, /50, O2 94% on room air. She was placed on 2L NC with improvement in sats to 99%. CBC remarkable for H/H of 9/29, calcium 8.5, , troponin 0.028. UA hazy with trace leukocytes. CXR with L pleural effusion and L basilar atelectasis v consolidation, similar to prior study. She was given 2g Cefepime x1 dose for sepsis of unknown source. She is admitted to medicine for workup and treatment of sepsis.       Overview/Hospital Course:  Ms. Michael is a 89 y.o. F with PMH systolic and diastolic HF, CAD s/p RCA stent, pacemaker placement, thyroid cancer s/p treatment 2003, breast cancer s/p mastectomy 01/2022, CVA 04/2022 without residual deficits, HLD, HTN, hypothyroidism who presents to the ED from SNF for fever and AMS. She developed fever 7/30 up to 102F and became lethargic and confused. She had been at SNF for approximately one week after [place] [facility] [place]  "exacerbation of CHF. Pt. Was started on cefepime, vanc, and azithromycin. During hospital course she has not become febrile and has become easily arousal. Pulmonology was consulted and believe the pulmonary effusion is due to insufficient diuresis. We will stop abx for now and monitor. Per pulm recs, we will up the bumetanide to 1mg BID, and monitor.  Patient is doing well, diuresis was suboptimal. Gave a 1 time dose of 80mg lasix to attempt to increase output. Patient can resume 1mg BID after. She has been taking off oxygen for >1 day and is sating >90%. She feels much better and family agrees.  Family informed nursing that patient experienced an "episode" of feeling unwell. As dictated by her niece, she was brushing her teeth while in the bed, and "the color drained from her face." She was conscious the entire  time and can easily recall the period in which she didn't feel great. O2 stats are still >90%. Ekg obtained noted no significant changes from the one several days prior. Patient's son endorsed she occasionally gets periods of feeling "unwell." Will continue to monitor.       Interval History: Pt. doing well today, can ambulate and desires to be moved to the chair. Back on Bumex 1mg BID. Awaiting placement.     Review of Systems   Constitutional:  Negative for activity change, fever and unexpected weight change.   HENT:  Negative for congestion.    Eyes:  Negative for redness.   Respiratory:  Negative for cough, chest tightness, shortness of breath and wheezing.    Cardiovascular:  Negative for chest pain, palpitations and leg swelling.   Gastrointestinal:  Negative for abdominal pain, blood in stool, diarrhea, nausea and vomiting.   Endocrine: Negative for polyuria.   Genitourinary:  Negative for dysuria and frequency.   Musculoskeletal:  Negative for arthralgias and back pain.   Skin:  Negative for rash.   Neurological:  Negative for seizures and numbness.   Psychiatric/Behavioral:  Negative for agitation " and behavioral problems.    Objective:     Vital Signs (Most Recent):  Temp: 98 °F (36.7 °C) (08/06/22 2132)  Pulse: 60 (08/06/22 2132)  Resp: (!) 52 (08/06/22 2130)  BP: (!) 158/64 (08/06/22 2132)  SpO2: (!) 93 % (08/06/22 2130)   Vital Signs (24h Range):  Temp:  [97.5 °F (36.4 °C)-98 °F (36.7 °C)] 98 °F (36.7 °C)  Pulse:  [60] 60  Resp:  [21-52] 52  SpO2:  [91 %-94 %] 93 %  BP: (152-220)/() 158/64     Weight: 90.3 kg (199 lb 1.2 oz)  Body mass index is 36.41 kg/m².    Intake/Output Summary (Last 24 hours) at 8/7/2022 0856  Last data filed at 8/7/2022 0500  Gross per 24 hour   Intake 43.72 ml   Output 350 ml   Net -306.28 ml      Physical Exam  Constitutional:       Appearance: Normal appearance.   HENT:      Head: Normocephalic and atraumatic.      Nose: Nose normal.      Mouth/Throat:      Mouth: Mucous membranes are moist.      Pharynx: Oropharynx is clear.   Eyes:      Extraocular Movements: Extraocular movements intact.      Conjunctiva/sclera: Conjunctivae normal.      Pupils: Pupils are equal, round, and reactive to light.   Cardiovascular:      Rate and Rhythm: Normal rate and regular rhythm.      Pulses: Normal pulses.      Heart sounds: Normal heart sounds. No murmur heard.  Pulmonary:      Effort: Pulmonary effort is normal.      Breath sounds: No rales.   Abdominal:      General: Abdomen is flat. Bowel sounds are normal. There is no distension.      Palpations: Abdomen is soft.      Tenderness: There is no abdominal tenderness. There is no guarding.   Musculoskeletal:         General: Normal range of motion.      Right lower leg: No edema.      Left lower leg: No edema.   Skin:     General: Skin is warm and dry.   Neurological:      General: No focal deficit present.      Mental Status: She is alert and oriented to person, place, and time. Mental status is at baseline.      Motor: No weakness.   Psychiatric:         Mood and Affect: Mood normal.         Behavior: Behavior normal.         Thought  Content: Thought content normal.         Judgment: Judgment normal.       Significant Labs: All pertinent labs within the past 24 hours have been reviewed.    Significant Imaging: I have reviewed all pertinent imaging results/findings within the past 24 hours.      Assessment/Plan:      * Sepsis  This patient does have evidence of infective focus  My overall impression is sepsis. Vital signs were reviewed and noted in progress note.  Antibiotics given-   Antibiotics (From admission, onward)            None        Cultures were taken-   Microbiology Results (last 7 days)     Procedure Component Value Units Date/Time    Blood culture x two cultures. Draw prior to antibiotics. [[id number]] Collected: 07/30/22 2213    Order Status: Completed Specimen: Blood from Peripheral, Hand, Right Updated: 08/04/22 2312     Blood Culture, Routine No growth after 5 days.    Narrative:      Aerobic and anaerobic    Blood culture x two cultures. Draw prior to antibiotics. [[id number]] Collected: 07/30/22 2213    Order Status: Completed Specimen: Blood from Peripheral, Antecubital, Right Updated: 08/04/22 2312     Blood Culture, Routine No growth after 5 days.    Narrative:      Aerobic and anaerobic    Urine culture [[id number]] Collected: 07/30/22 2234    Order Status: Completed Specimen: Urine Updated: 08/01/22 0116     Urine Culture, Routine No significant growth    Narrative:      Specimen Source->Urine    Respiratory Infection Panel (PCR), Nasopharyngeal [[id number]] Collected: 07/31/22 1602    Order Status: Completed Specimen: Nasopharyngeal Swab Updated: 07/31/22 2358     Respiratory Infection Panel Source NP Swab     Adenovirus Not Detected     Coronavirus 229E, Common Cold Virus Not Detected     Coronavirus HKU1, Common Cold Virus Not Detected     Coronavirus NL63, Common Cold Virus Not Detected     Coronavirus OC43, Common Cold Virus Not Detected     Comment: The Coronavirus strains detected in this test cause the common  cold.  These strains are not the COVID-19 (novel Coronavirus)strain   associated with the respiratory disease outbreak.          SARS-CoV2 (COVID-19) Qualitative PCR Not Detected     Human Metapneumovirus Not Detected     Human Rhinovirus/Enterovirus Not Detected     Influenza A (subtypes H1, H1-2009,H3) Not Detected     Influenza B Not Detected     Parainfluenza Virus 1 Not Detected     Parainfluenza Virus 2 Not Detected     Parainfluenza Virus 3 Not Detected     Parainfluenza Virus 4 Not Detected     Respiratory Syncytial Virus Not Detected     Bordetella Parapertussis (MK0708) Not Detected     Bordetella pertussis (ptxP) Not Detected     Chlamydia pneumoniae Not Detected     Mycoplasma pneumoniae Not Detected    Narrative:      For all other respiratory sources, order [id number] -  Respiratory Viral Panel by PCR        Latest lactate reviewed, they are-  No results for input(s): LACTATE in the last 72 hours.    Organ dysfunction indicated by Encephalopathy   Source- CAP     Source control Achieved by- antibiotics    CXR with L pleural effusion and L lower zone atelectasis v consolidation - possible source of sepsis  -Vanc/Cefepime started 7/31- D/C'd due to low suspicion of infectious process   - On RA SpO2 >93%   - f/u urine cx - negative   - f/u blood cx - negative   - f/u resp infection panel  - Will stop abx for now. Patient is afebrile and stable       Pleural effusion  See CAP      Delirium  Patient lethargic and confused in ED. Found to be oriented x3 after several hours. Intermittent confusion per daughter at bedside.  -delirium precautions placed      Hyperlipidemia  -continue home atorvastatin 20mg      Normocytic anemia  Continue to monitor  -transfuse if Hgb<7  -daily CBC      CAP (community acquired pneumonia)  Patient with CXR showing L pleural effusion, L basilar atelectasis v consolidation, requiring 2L NC. Daughter at bedside reports productive cough, though mostly in the mornings.  -  Vanc/cefepime/azithromycin  Stopped for now. Low suspicion for infectious process at this time due to negative cultures   - Pleural effusion on CT chest, pulmonology consulted for possible thoracentesis  - Pulm declined performing para, believes the effusion to be a diuresis problem  -  Bumex to 1mg BID   - Gave 1x dose of IV lasix to increase output, will resume regular bumex       Coronary artery disease involving native coronary artery of native heart without angina pectoris  S/p RCA stent placement  -continue home ASA 81mg  -continue home atorvastatin  -continue home Plavix      Chronic combined systolic and diastolic heart failure  Patient recently admitted to [facility] for CHF exacerbation. She does not have LE edema or SOB at this time.  - on Room Air   - Bumex 1mg BID       Essential hypertension  BP increased to >200 overnight. Was provided hydralazine. Will monitor for now   - continue home carvedilol 3.125mg, lisinopril 20mg   - BUMEX 1mg BID       Acquired hypothyroidism  -continue home synthroid 125mcg      Severe aortic stenosis s/p TAVR  Continue to monitor        VTE Risk Mitigation (From admission, onward)         Ordered     IP VTE HIGH RISK PATIENT  Once         07/31/22 0304     Place sequential compression device  Until discontinued         07/31/22 0304                Discharge Planning   CHARLIE: 8/9/2022     Code Status: Full Code   Is the patient medically ready for discharge?: Yes    Reason for patient still in hospital (select all that apply): Other (specify) awaiting placement   Discharge Plan A: Return to nursing home, Skilled Nursing Facility   Discharge Delays: (!) Post-Acute Set-up              [facility]   Department of Hospital Medicine   [facility] - [facility] ([place])

## 2022-08-07 NOTE — ASSESSMENT & PLAN NOTE
BP increased to >200 overnight. Was provided hydralazine. Will monitor for now   - continue home carvedilol 3.125mg, lisinopril 20mg   - BUMEX 1mg BID

## 2022-08-07 NOTE — ASSESSMENT & PLAN NOTE
Patient with CXR showing L pleural effusion, L basilar atelectasis v consolidation, requiring 2L NC. Daughter at bedside reports productive cough, though mostly in the mornings.  - Vanc/cefepime/azithromycin  Stopped for now. Low suspicion for infectious process at this time due to negative cultures   - Pleural effusion on CT chest, pulmonology consulted for possible thoracentesis  - Pulm declined performing para, believes the effusion to be a diuresis problem  -  Bumex to 1mg BID   - Gave 1x dose of IV lasix to increase output, will resume regular bumex

## 2022-08-07 NOTE — ASSESSMENT & PLAN NOTE
This patient does have evidence of infective focus  My overall impression is sepsis. Vital signs were reviewed and noted in progress note.  Antibiotics given-   Antibiotics (From admission, onward)            None        Cultures were taken-   Microbiology Results (last 7 days)     Procedure Component Value Units Date/Time    Blood culture x two cultures. Draw prior to antibiotics. [[id number]] Collected: 07/30/22 2213    Order Status: Completed Specimen: Blood from Peripheral, Hand, Right Updated: 08/04/22 2312     Blood Culture, Routine No growth after 5 days.    Narrative:      Aerobic and anaerobic    Blood culture x two cultures. Draw prior to antibiotics. [[id number]] Collected: 07/30/22 2213    Order Status: Completed Specimen: Blood from Peripheral, Antecubital, Right Updated: 08/04/22 2312     Blood Culture, Routine No growth after 5 days.    Narrative:      Aerobic and anaerobic    Urine culture [[id number]] Collected: 07/30/22 2234    Order Status: Completed Specimen: Urine Updated: 08/01/22 0116     Urine Culture, Routine No significant growth    Narrative:      Specimen Source->Urine    Respiratory Infection Panel (PCR), Nasopharyngeal [[id number]] Collected: 07/31/22 1602    Order Status: Completed Specimen: Nasopharyngeal Swab Updated: 07/31/22 2358     Respiratory Infection Panel Source NP Swab     Adenovirus Not Detected     Coronavirus 229E, Common Cold Virus Not Detected     Coronavirus HKU1, Common Cold Virus Not Detected     Coronavirus NL63, Common Cold Virus Not Detected     Coronavirus OC43, Common Cold Virus Not Detected     Comment: The Coronavirus strains detected in this test cause the common cold.  These strains are not the COVID-19 (novel Coronavirus)strain   associated with the respiratory disease outbreak.          SARS-CoV2 (COVID-19) Qualitative PCR Not Detected     Human Metapneumovirus Not Detected     Human Rhinovirus/Enterovirus Not Detected     Influenza A (subtypes H1, H1-2009,H3)  Not Detected     Influenza B Not Detected     Parainfluenza Virus 1 Not Detected     Parainfluenza Virus 2 Not Detected     Parainfluenza Virus 3 Not Detected     Parainfluenza Virus 4 Not Detected     Respiratory Syncytial Virus Not Detected     Bordetella Parapertussis ([id number]) Not Detected     Bordetella pertussis (ptxP) Not Detected     Chlamydia pneumoniae Not Detected     Mycoplasma pneumoniae Not Detected    Narrative:      For all other respiratory sources, order [id number] -  Respiratory Viral Panel by PCR        Latest lactate reviewed, they are-  No results for input(s): LACTATE in the last 72 hours.    Organ dysfunction indicated by Encephalopathy   Source- CAP     Source control Achieved by- antibiotics    CXR with L pleural effusion and L lower zone atelectasis v consolidation - possible source of sepsis  -Vanc/Cefepime started 7/31- D/C'd due to low suspicion of infectious process   - On RA SpO2 >93%   - f/u urine cx - negative   - f/u blood cx - negative   - f/u resp infection panel  - Will stop abx for now. Patient is afebrile and stable

## 2022-08-08 PROCEDURE — 20600001 HC STEP DOWN PRIVATE ROOM

## 2022-08-08 PROCEDURE — 25000003 PHARM REV CODE 250

## 2022-08-08 PROCEDURE — 99232 PR SUBSEQUENT HOSPITAL CARE,LEVL II: ICD-10-PCS | Mod: GC,,, | Performed by: STUDENT IN AN ORGANIZED HEALTH CARE EDUCATION/TRAINING PROGRAM

## 2022-08-08 PROCEDURE — 99232 SBSQ HOSP IP/OBS MODERATE 35: CPT | Mod: GC,,, | Performed by: STUDENT IN AN ORGANIZED HEALTH CARE EDUCATION/TRAINING PROGRAM

## 2022-08-08 PROCEDURE — 87070 CULTURE OTHR SPECIMN AEROBIC: CPT | Performed by: STUDENT IN AN ORGANIZED HEALTH CARE EDUCATION/TRAINING PROGRAM

## 2022-08-08 PROCEDURE — 25000003 PHARM REV CODE 250: Performed by: HOSPITALIST

## 2022-08-08 PROCEDURE — 25000003 PHARM REV CODE 250: Performed by: STUDENT IN AN ORGANIZED HEALTH CARE EDUCATION/TRAINING PROGRAM

## 2022-08-08 PROCEDURE — 87106 FUNGI IDENTIFICATION YEAST: CPT | Performed by: STUDENT IN AN ORGANIZED HEALTH CARE EDUCATION/TRAINING PROGRAM

## 2022-08-08 PROCEDURE — 87205 SMEAR GRAM STAIN: CPT | Performed by: STUDENT IN AN ORGANIZED HEALTH CARE EDUCATION/TRAINING PROGRAM

## 2022-08-08 PROCEDURE — 97530 THERAPEUTIC ACTIVITIES: CPT | Mod: CQ

## 2022-08-08 RX ORDER — BUMETANIDE 1 MG/1
1 TABLET ORAL 2 TIMES DAILY
Qty: 60 TABLET | Refills: 2 | Status: ON HOLD | OUTPATIENT
Start: 2022-08-08 | End: 2022-08-23 | Stop reason: HOSPADM

## 2022-08-08 RX ADMIN — CLOPIDOGREL 75 MG: 75 TABLET, FILM COATED ORAL at 10:08

## 2022-08-08 RX ADMIN — CARVEDILOL 3.12 MG: 3.12 TABLET, FILM COATED ORAL at 08:08

## 2022-08-08 RX ADMIN — MEGESTROL ACETATE 40 MG: 40 TABLET ORAL at 10:08

## 2022-08-08 RX ADMIN — TIMOLOL MALEATE 1 DROP: 5 SOLUTION OPHTHALMIC at 08:08

## 2022-08-08 RX ADMIN — TIMOLOL MALEATE 1 DROP: 5 SOLUTION OPHTHALMIC at 10:08

## 2022-08-08 RX ADMIN — ASPIRIN 81 MG CHEWABLE TABLET 81 MG: 81 TABLET CHEWABLE at 10:08

## 2022-08-08 RX ADMIN — ATORVASTATIN CALCIUM 20 MG: 20 TABLET, FILM COATED ORAL at 08:08

## 2022-08-08 RX ADMIN — CARVEDILOL 3.12 MG: 3.12 TABLET, FILM COATED ORAL at 10:08

## 2022-08-08 RX ADMIN — BUMETANIDE 1 MG: 1 TABLET ORAL at 04:08

## 2022-08-08 RX ADMIN — LEVOTHYROXINE SODIUM 125 MCG: 25 TABLET ORAL at 06:08

## 2022-08-08 RX ADMIN — BUMETANIDE 1 MG: 1 TABLET ORAL at 10:08

## 2022-08-08 RX ADMIN — LISINOPRIL 20 MG: 20 TABLET ORAL at 10:08

## 2022-08-08 NOTE — PLAN OF CARE
CM contacted [facility] regarding patient's return to SNF.   [facility] will have a bed available tomorrow.

## 2022-08-08 NOTE — NURSING
Patient is AAO*4.  Had bowel and bladder movement . Worked  With OT and PT team. Patient is Up in chair . Was acceptance to care provided .  Safety precaution maintained . Will continue monitoring.

## 2022-08-08 NOTE — PROGRESS NOTES
[facility] ([address] Medicine  Progress Note    Patient Name: Pilar Michael  MRN: [id number]  Patient Class: IP- Inpatient   Admission Date: 7/30/2022  Length of Stay: 8 days  Attending Physician: Mario Campoverde MD  Primary Care Provider: Joseph Noel MD        Subjective:     Principal Problem:Sepsis        HPI:  Ms. Michael is a 89 y.o. F with PMH systolic and diastolic HF, CAD s/p RCA stent, pacemaker placement, thyroid cancer s/p treatment 2003, breast cancer s/p mastectomy 01/2022, CVA 04/2022 without residual deficits, HLD, HTN, hypothyroidism who presents to the ED from SNF for fever and AMS. She developed fever 7/30 up to 102F and became lethargic and confused. She had been at SNF for approximately one week after being discharged from [facility] [place] for acute exacerbation of CHF.     Per daughter at bedside, the patient becomes confused and lethargic when she has fever. During previous admission for CHF exacerbation she was also treated empirically for CAP.     In the ED, she had T100.7, HR 60, /50, O2 94% on room air. She was placed on 2L NC with improvement in sats to 99%. CBC remarkable for H/H of 9/29, calcium 8.5, , troponin 0.028. UA hazy with trace leukocytes. CXR with L pleural effusion and L basilar atelectasis v consolidation, similar to prior study. She was given 2g Cefepime x1 dose for sepsis of unknown source. She is admitted to medicine for workup and treatment of sepsis.       Overview/Hospital Course:  Ms. Michael is a 89 y.o. F with PMH systolic and diastolic HF, CAD s/p RCA stent, pacemaker placement, thyroid cancer s/p treatment 2003, breast cancer s/p mastectomy 01/2022, CVA 04/2022 without residual deficits, HLD, HTN, hypothyroidism who presents to the ED from SNF for fever and AMS. She developed fever 7/30 up to 102F and became lethargic and confused. She had been at SNF for approximately one week after being discharged from [facility] [place] for acute exacerbation  "of CHF. Pt. Was started on cefepime, vanc, and azithromycin. During hospital course she has not become febrile and has become easily arousal. Pulmonology was consulted and believe the pulmonary effusion is due to insufficient diuresis. We will stop abx for now and monitor. Per pulm recs, we will up the bumetanide to 1mg BID, and monitor.  Patient is doing well, diuresis was suboptimal. Gave a 1 time dose of 80mg lasix to attempt to increase output. Patient can resume 1mg BID after. She has been taking off oxygen for >1 day and is sating >90%. She feels much better and family agrees.  Family informed nursing that patient experienced an "episode" of feeling unwell. As dictated by her niece, she was brushing her teeth while in the bed, and "the color drained from her face." She was conscious the entire  time and can easily recall the period in which she didn't feel great. O2 stats are still >90%. Ekg obtained noted no significant changes from the one several days prior. Patient's son endorsed she occasionally gets periods of feeling "unwell." Will continue to monitor. CM updated bed available tomorrow.       Interval History: Patient is resting comfortably in her bed. CM notified bed availability tomorrow.       Review of Systems   Constitutional:  Negative for activity change, fever and unexpected weight change.   HENT:  Negative for congestion.    Eyes:  Negative for redness.   Respiratory:  Negative for cough, chest tightness, shortness of breath and wheezing.    Cardiovascular:  Negative for chest pain, palpitations and leg swelling.   Gastrointestinal:  Negative for abdominal pain, blood in stool, diarrhea, nausea and vomiting.   Endocrine: Negative for polyuria.   Genitourinary:  Negative for dysuria and frequency.   Musculoskeletal:  Negative for arthralgias and back pain.   Skin:  Negative for rash.   Neurological:  Negative for seizures and numbness.   Psychiatric/Behavioral:  Negative for agitation and " behavioral problems.    Objective:     Vital Signs (Most Recent):  Temp: 98.2 °F (36.8 °C) (08/08/22 0315)  Pulse: 60 (08/07/22 2158)  Resp: 18 (08/07/22 1926)  BP: (!) 141/59 (08/08/22 0315)  SpO2: (!) 93 % (08/07/22 1926)   Vital Signs (24h Range):  Temp:  [98 °F (36.7 °C)-98.4 °F (36.9 °C)] 98.2 °F (36.8 °C)  Pulse:  [60] 60  Resp:  [18-29] 18  SpO2:  [93 %-94 %] 93 %  BP: (118-150)/(50-72) 141/59     Weight: 90.3 kg (199 lb 1.2 oz)  Body mass index is 36.41 kg/m².    Intake/Output Summary (Last 24 hours) at 8/8/2022 1127  Last data filed at 8/8/2022 0656  Gross per 24 hour   Intake --   Output 700 ml   Net -700 ml      Physical Exam  Constitutional:       Appearance: Normal appearance.   HENT:      Head: Normocephalic and atraumatic.      Nose: Nose normal.      Mouth/Throat:      Mouth: Mucous membranes are moist.      Pharynx: Oropharynx is clear.   Eyes:      Extraocular Movements: Extraocular movements intact.      Conjunctiva/sclera: Conjunctivae normal.      Pupils: Pupils are equal, round, and reactive to light.   Cardiovascular:      Rate and Rhythm: Normal rate and regular rhythm.      Pulses: Normal pulses.      Heart sounds: Normal heart sounds. No murmur heard.  Pulmonary:      Effort: Pulmonary effort is normal.      Breath sounds: No rales.   Abdominal:      General: Abdomen is flat. Bowel sounds are normal. There is no distension.      Palpations: Abdomen is soft.      Tenderness: There is no abdominal tenderness. There is no guarding.   Musculoskeletal:         General: Normal range of motion.      Right lower leg: No edema.      Left lower leg: No edema.      Comments: Lymphedema left arm    Skin:     General: Skin is warm and dry.   Neurological:      General: No focal deficit present.      Mental Status: She is alert and oriented to person, place, and time. Mental status is at baseline.      Motor: No weakness.   Psychiatric:         Mood and Affect: Mood normal.         Behavior: Behavior  normal.         Thought Content: Thought content normal.         Judgment: Judgment normal.       Significant Labs: All pertinent labs within the past 24 hours have been reviewed.    Significant Imaging: I have reviewed all pertinent imaging results/findings within the past 24 hours.      Assessment/Plan:      * Sepsis  This patient does have evidence of infective focus  My overall impression is sepsis. Vital signs were reviewed and noted in progress note.  Antibiotics given-   Antibiotics (From admission, onward)            None        Cultures were taken-   Microbiology Results (last 7 days)     Procedure Component Value Units Date/Time    Culture, Respiratory with Gram Stain [[id number]] Collected: 08/08/22 1042    Order Status: Sent Specimen: Sputum Updated: 08/08/22 1043    Blood culture x two cultures. Draw prior to antibiotics. [[id number]] Collected: 07/30/22 2213    Order Status: Completed Specimen: Blood from Peripheral, Hand, Right Updated: 08/04/22 2312     Blood Culture, Routine No growth after 5 days.    Narrative:      Aerobic and anaerobic    Blood culture x two cultures. Draw prior to antibiotics. [[id number]] Collected: 07/30/22 2213    Order Status: Completed Specimen: Blood from Peripheral, Antecubital, Right Updated: 08/04/22 2312     Blood Culture, Routine No growth after 5 days.    Narrative:      Aerobic and anaerobic        Latest lactate reviewed, they are-  No results for input(s): LACTATE in the last 72 hours.    Organ dysfunction indicated by Encephalopathy   Source- CAP     Source control Achieved by- antibiotics    CXR with L pleural effusion and L lower zone atelectasis v consolidation - possible source of sepsis  -Vanc/Cefepime started 7/31- D/C'd due to low suspicion of infectious process   - On RA SpO2 >93%   - f/u urine cx - negative   - f/u blood cx - negative   - f/u resp infection panel  - Will stop abx for now. Patient is afebrile and stable       Pleural effusion  See  CAP      Delirium  Patient lethargic and confused in ED. Found to be oriented x3 after several hours. Intermittent confusion per daughter at bedside.  -delirium precautions placed      Hyperlipidemia  -continue home atorvastatin 20mg      Normocytic anemia  Continue to monitor  -transfuse if Hgb<7  -daily CBC      CAP (community acquired pneumonia)  Patient with CXR showing L pleural effusion, L basilar atelectasis v consolidation, requiring 2L NC. Daughter at bedside reports productive cough, though mostly in the mornings.  - Vanc/cefepime/azithromycin  Stopped for now. Low suspicion for infectious process at this time due to negative cultures   - Pleural effusion on CT chest, pulmonology consulted for possible thoracentesis  - Pulm declined performing para, believes the effusion to be a diuresis problem  -  Bumex to 1mg BID   - Gave 1x dose of IV lasix to increase output, will resume regular bumex       Coronary artery disease involving native coronary artery of native heart without angina pectoris  S/p RCA stent placement  -continue home ASA 81mg  -continue home atorvastatin  -continue home Plavix      Chronic combined systolic and diastolic heart failure  Patient recently admitted to [facility] for CHF exacerbation. She does not have LE edema or SOB at this time.  - on Room Air   - Bumex 1mg BID       Essential hypertension  BP increased to >200 overnight. Was provided hydralazine. Will monitor for now   - continue home carvedilol 3.125mg, lisinopril 20mg   - BUMEX 1mg BID       Acquired hypothyroidism  -continue home synthroid 125mcg      Severe aortic stenosis s/p TAVR  Continue to monitor        VTE Risk Mitigation (From admission, onward)         Ordered     IP VTE HIGH RISK PATIENT  Once         07/31/22 0304     Place sequential compression device  Until discontinued         07/31/22 0304                Discharge Planning   CHARLIE: 8/9/2022     Code Status: Full Code   Is the patient medically ready for  discharge?: Yes    Reason for patient still in hospital (select all that apply): Other (specify) Placement tomorrow   Discharge Plan A: Return to nursing home, Skilled Nursing Facility   Discharge Delays: (!) Post-Acute Set-up              Zortman West, DO  Department of Hospital Medicine   [facility] ([address])

## 2022-08-08 NOTE — ASSESSMENT & PLAN NOTE
Patient recently admitted to [facility] for CHF exacerbation. She does not have LE edema or SOB at this time.  - on Room Air   - Bumex 1mg BID

## 2022-08-08 NOTE — PLAN OF CARE
Problem: Adult Inpatient Plan of Care  Goal: Plan of Care Review  Outcome: Ongoing, Progressing  Goal: Patient-Specific Goal (Individualized)  Outcome: Ongoing, Progressing  Goal: Absence of Hospital-Acquired Illness or Injury  Outcome: Ongoing, Progressing  Goal: Optimal Comfort and Wellbeing  Outcome: Ongoing, Progressing  Goal: Readiness for Transition of Care  Outcome: Ongoing, Progressing     Problem: Fluid Imbalance (Pneumonia)  Goal: Fluid Balance  Outcome: Ongoing, Progressing     Problem: Infection (Pneumonia)  Goal: Resolution of Infection Signs and Symptoms  Outcome: Ongoing, Progressing     Problem: Impaired Wound Healing  Goal: Optimal Wound Healing  Outcome: Ongoing, Progressing     Problem: Adjustment to Illness (Sepsis/Septic Shock)  Goal: Optimal Coping  Outcome: Ongoing, Progressing     Problem: Bleeding (Sepsis/Septic Shock)  Goal: Absence of Bleeding  Outcome: Ongoing, Progressing     Problem: Glycemic Control Impaired (Sepsis/Septic Shock)  Goal: Blood Glucose Level Within Desired Range  Outcome: Ongoing, Progressing     Problem: Infection Progression (Sepsis/Septic Shock)  Goal: Absence of Infection Signs and Symptoms  Outcome: Ongoing, Progressing     Problem: Fall Injury Risk  Goal: Absence of Fall and Fall-Related Injury  Outcome: Ongoing, Progressing     Problem: Skin Injury Risk Increased  Goal: Skin Health and Integrity  Outcome: Ongoing, Progressing

## 2022-08-08 NOTE — ASSESSMENT & PLAN NOTE
This patient does have evidence of infective focus  My overall impression is sepsis. Vital signs were reviewed and noted in progress note.  Antibiotics given-   Antibiotics (From admission, onward)            None        Cultures were taken-   Microbiology Results (last 7 days)     Procedure Component Value Units Date/Time    Culture, Respiratory with Gram Stain [[id number]] Collected: 08/08/22 1042    Order Status: Sent Specimen: Sputum Updated: 08/08/22 1043    Blood culture x two cultures. Draw prior to antibiotics. [[id number]] Collected: 07/30/22 2213    Order Status: Completed Specimen: Blood from Peripheral, Hand, Right Updated: 08/04/22 2312     Blood Culture, Routine No growth after 5 days.    Narrative:      Aerobic and anaerobic    Blood culture x two cultures. Draw prior to antibiotics. [[id number]] Collected: 07/30/22 2213    Order Status: Completed Specimen: Blood from Peripheral, Antecubital, Right Updated: 08/04/22 2312     Blood Culture, Routine No growth after 5 days.    Narrative:      Aerobic and anaerobic        Latest lactate reviewed, they are-  No results for input(s): LACTATE in the last 72 hours.    Organ dysfunction indicated by Encephalopathy   Source- CAP     Source control Achieved by- antibiotics    CXR with L pleural effusion and L lower zone atelectasis v consolidation - possible source of sepsis  -Vanc/Cefepime started 7/31- D/C'd due to low suspicion of infectious process   - On RA SpO2 >93%   - f/u urine cx - negative   - f/u blood cx - negative   - f/u resp infection panel  - Will stop abx for now. Patient is afebrile and stable

## 2022-08-08 NOTE — PLAN OF CARE
Problem: Adult Inpatient Plan of Care  Goal: Plan of Care Review  Outcome: Ongoing, Progressing  Goal: Patient-Specific Goal (Individualized)  Outcome: Ongoing, Progressing  Goal: Absence of Hospital-Acquired Illness or Injury  Outcome: Ongoing, Progressing  Goal: Optimal Comfort and Wellbeing  Outcome: Ongoing, Progressing  Goal: Readiness for Transition of Care  Outcome: Ongoing, Progressing     Problem: Fluid Imbalance (Pneumonia)  Goal: Fluid Balance  Outcome: Ongoing, Progressing     Problem: Infection (Pneumonia)  Goal: Resolution of Infection Signs and Symptoms  Outcome: Ongoing, Progressing     Problem: Impaired Wound Healing  Goal: Optimal Wound Healing  Outcome: Ongoing, Progressing     Problem: Adjustment to Illness (Sepsis/Septic Shock)  Goal: Optimal Coping  Outcome: Ongoing, Progressing     Problem: Bleeding (Sepsis/Septic Shock)  Goal: Absence of Bleeding  Outcome: Ongoing, Progressing     Problem: Glycemic Control Impaired (Sepsis/Septic Shock)  Goal: Blood Glucose Level Within Desired Range  Outcome: Ongoing, Progressing     Problem: Infection Progression (Sepsis/Septic Shock)  Goal: Absence of Infection Signs and Symptoms  Outcome: Ongoing, Progressing     Problem: Nutrition Impaired (Sepsis/Septic Shock)  Goal: Optimal Nutrition Intake  Outcome: Ongoing, Progressing     Problem: Fall Injury Risk  Goal: Absence of Fall and Fall-Related Injury  Outcome: Ongoing, Progressing     Problem: Skin Injury Risk Increased  Goal: Skin Health and Integrity  Outcome: Ongoing, Progressing

## 2022-08-08 NOTE — PT/OT/SLP PROGRESS
Occupational Therapy      Patient Name:  Pilar Michael   MRN:  [id number]    Patient not seen today secondary to this therapist unable to attempt on this dater and pt. To be seen 08-09-22 8/8/2022

## 2022-08-08 NOTE — PT/OT/SLP PROGRESS
Physical Therapy Treatment    Patient Name:  Pilar Michael   MRN:  [id number]    Recommendations:     Discharge Recommendations:  nursing facility, skilled   Discharge Equipment Recommendations: none   Barriers to discharge: Inaccessible home and Decreased caregiver support    Assessment:     Pilar Michael is a 89 y.o. female admitted with a medical diagnosis of Sepsis.  She presents with the following impairments/functional limitations:  weakness, gait instability, decreased lower extremity function, decreased upper extremity function, impaired balance, impaired skin, impaired self care skills, impaired functional mobility. Ms. Michael tolerated treatment fair.  OOB mobility limited secondary to feeling dizzy.  She requires assistance with all mobility and transfers. Ms. Michael would benefit from further IP therapy to increase mobility, strength and independence.      Rehab Prognosis: Good; patient would benefit from acute skilled PT services to address these deficits and reach maximum level of function.    Recent Surgery: * No surgery found *      Plan:     During this hospitalization, patient to be seen 3 x/week to address the identified rehab impairments via gait training, therapeutic activities, therapeutic exercises, neuromuscular re-education and progress toward the following goals:    · Plan of Care Expires:  08/31/22    Subjective     Chief Complaint: I feel like I am drunk.     Patient/Family Comments/goals: I feel tired today  Pain/Comfort:  · Pain Rating 1: 0/10      Objective:     Communicated with NSg prior to session.  Patient found supine with telemetry, pulse ox (continuous), PureWick upon PT entry to room.     General Precautions: Standard, fall   Orthopedic Precautions:N/A   Braces: N/A  Respiratory Status: Room air     Functional Mobility:  · Bed Mobility:     · Rolling Left:  contact guard assistance  · Rolling Right: contact guard assistance  · Supine to Sit: contact guard assistance and  minimum assistance  · Transfers:     · Sit to Stand:  minimum assistance with no AD  · Bed to Chair: minimum assistance with  no AD  using  Stand Pivot  · Balance: Good sitting and fair standing      AM-PAC 6 CLICK MOBILITY  Turning over in bed (including adjusting bedclothes, sheets and blankets)?: 3  Sitting down on and standing up from a chair with arms (e.g., wheelchair, bedside commode, etc.): 3  Moving from lying on back to sitting on the side of the bed?: 3  Moving to and from a bed to a chair (including a wheelchair)?: 3  Need to walk in hospital room?: 3  Climbing 3-5 steps with a railing?: 2  Basic Mobility Total Score: 17       Therapeutic Activities and Exercises:   Patient requires total assistance for pericare  Seated EOB 4-5 min with Supervision  SPT from bed to chair taking 3-4 steps with no AD.   Tolerated static standing with RW 10 sec and requested to return to seated position secondary to dizziness. /55 once seated. NSG notified and aware.  Patient educated on benefits of OOB mobility.    Patient left up in chair with call button in reach and NSG notified..    GOALS:   Multidisciplinary Problems     Physical Therapy Goals        Problem: Physical Therapy    Goal Priority Disciplines Outcome Goal Variances Interventions   Physical Therapy Goal     PT, PT/OT Ongoing, Progressing     Description: Goals to be met by: 8/10/22     Patient will increase functional independence with mobility by performin. Supine to sit with Contact Guard Assistance  2. Sit to supine with Contact Guard Assistance  3. Sit to stand transfer with Contact Guard Assistance  4. Gait  x 50 feet with Contact Guard Assistance using LRAD, if needed.   5. Stand for 5 minutes with Contact Guard Assistance using LRAD, if needed  6. Lower extremity exercise program x10 reps per handout, with independence                     Time Tracking:     PT Received On: 22  PT Start Time: 1218     PT Stop Time: 1246  PT Total  Time (min): 28 min     Billable Minutes: Therapeutic Activity 28    Treatment Type: Treatment  PT/PTA: PTA     PTA Visit Number: 1     08/08/2022

## 2022-08-08 NOTE — SUBJECTIVE & OBJECTIVE
Interval History: Patient is resting comfortably in her bed. CM notified bed availability tomorrow.       Review of Systems   Constitutional:  Negative for activity change, fever and unexpected weight change.   HENT:  Negative for congestion.    Eyes:  Negative for redness.   Respiratory:  Negative for cough, chest tightness, shortness of breath and wheezing.    Cardiovascular:  Negative for chest pain, palpitations and leg swelling.   Gastrointestinal:  Negative for abdominal pain, blood in stool, diarrhea, nausea and vomiting.   Endocrine: Negative for polyuria.   Genitourinary:  Negative for dysuria and frequency.   Musculoskeletal:  Negative for arthralgias and back pain.   Skin:  Negative for rash.   Neurological:  Negative for seizures and numbness.   Psychiatric/Behavioral:  Negative for agitation and behavioral problems.    Objective:     Vital Signs (Most Recent):  Temp: 98.2 °F (36.8 °C) (08/08/22 0315)  Pulse: 60 (08/07/22 2158)  Resp: 18 (08/07/22 1926)  BP: (!) 141/59 (08/08/22 0315)  SpO2: (!) 93 % (08/07/22 1926)   Vital Signs (24h Range):  Temp:  [98 °F (36.7 °C)-98.4 °F (36.9 °C)] 98.2 °F (36.8 °C)  Pulse:  [60] 60  Resp:  [18-29] 18  SpO2:  [93 %-94 %] 93 %  BP: (118-150)/(50-72) 141/59     Weight: 90.3 kg (199 lb 1.2 oz)  Body mass index is 36.41 kg/m².    Intake/Output Summary (Last 24 hours) at 8/8/2022 1127  Last data filed at 8/8/2022 0656  Gross per 24 hour   Intake --   Output 700 ml   Net -700 ml      Physical Exam  Constitutional:       Appearance: Normal appearance.   HENT:      Head: Normocephalic and atraumatic.      Nose: Nose normal.      Mouth/Throat:      Mouth: Mucous membranes are moist.      Pharynx: Oropharynx is clear.   Eyes:      Extraocular Movements: Extraocular movements intact.      Conjunctiva/sclera: Conjunctivae normal.      Pupils: Pupils are equal, round, and reactive to light.   Cardiovascular:      Rate and Rhythm: Normal rate and regular rhythm.      Pulses:  Normal pulses.      Heart sounds: Normal heart sounds. No murmur heard.  Pulmonary:      Effort: Pulmonary effort is normal.      Breath sounds: No rales.   Abdominal:      General: Abdomen is flat. Bowel sounds are normal. There is no distension.      Palpations: Abdomen is soft.      Tenderness: There is no abdominal tenderness. There is no guarding.   Musculoskeletal:         General: Normal range of motion.      Right lower leg: No edema.      Left lower leg: No edema.      Comments: Lymphedema left arm    Skin:     General: Skin is warm and dry.   Neurological:      General: No focal deficit present.      Mental Status: She is alert and oriented to person, place, and time. Mental status is at baseline.      Motor: No weakness.   Psychiatric:         Mood and Affect: Mood normal.         Behavior: Behavior normal.         Thought Content: Thought content normal.         Judgment: Judgment normal.       Significant Labs: All pertinent labs within the past 24 hours have been reviewed.    Significant Imaging: I have reviewed all pertinent imaging results/findings within the past 24 hours.

## 2022-08-09 ENCOUNTER — HOSPITAL ENCOUNTER (INPATIENT)
Facility: HOSPITAL | Age: 87
LOS: 16 days | Discharge: HOME HEALTH | DRG: 291 | End: 2022-08-25
Attending: HOSPITALIST | Admitting: HOSPITALIST
Payer: MEDICARE

## 2022-08-09 VITALS
SYSTOLIC BLOOD PRESSURE: 132 MMHG | HEIGHT: 62 IN | TEMPERATURE: 99 F | RESPIRATION RATE: 18 BRPM | OXYGEN SATURATION: 94 % | BODY MASS INDEX: 36.63 KG/M2 | DIASTOLIC BLOOD PRESSURE: 61 MMHG | HEART RATE: 60 BPM | WEIGHT: 199.06 LBS

## 2022-08-09 DIAGNOSIS — J90 PLEURAL EFFUSION: ICD-10-CM

## 2022-08-09 DIAGNOSIS — I50.43 ACUTE ON CHRONIC COMBINED SYSTOLIC (CONGESTIVE) AND DIASTOLIC (CONGESTIVE) HEART FAILURE: ICD-10-CM

## 2022-08-09 DIAGNOSIS — I10 ESSENTIAL HYPERTENSION: Primary | Chronic | ICD-10-CM

## 2022-08-09 DIAGNOSIS — R53.81 DEBILITY: ICD-10-CM

## 2022-08-09 DIAGNOSIS — Z95.0 PRESENCE OF PERMANENT CARDIAC PACEMAKER: Chronic | ICD-10-CM

## 2022-08-09 DIAGNOSIS — D64.9 NORMOCYTIC ANEMIA: Chronic | ICD-10-CM

## 2022-08-09 DIAGNOSIS — I50.42 CHRONIC COMBINED SYSTOLIC AND DIASTOLIC HEART FAILURE: ICD-10-CM

## 2022-08-09 DIAGNOSIS — J18.9 PNEUMONIA: ICD-10-CM

## 2022-08-09 PROCEDURE — 97530 THERAPEUTIC ACTIVITIES: CPT

## 2022-08-09 PROCEDURE — 25000003 PHARM REV CODE 250

## 2022-08-09 PROCEDURE — 97535 SELF CARE MNGMENT TRAINING: CPT

## 2022-08-09 PROCEDURE — 99239 HOSP IP/OBS DSCHRG MGMT >30: CPT | Mod: ,,, | Performed by: STUDENT IN AN ORGANIZED HEALTH CARE EDUCATION/TRAINING PROGRAM

## 2022-08-09 PROCEDURE — 25000003 PHARM REV CODE 250: Performed by: HOSPITALIST

## 2022-08-09 PROCEDURE — 25000003 PHARM REV CODE 250: Performed by: STUDENT IN AN ORGANIZED HEALTH CARE EDUCATION/TRAINING PROGRAM

## 2022-08-09 PROCEDURE — 11000004 HC SNF PRIVATE

## 2022-08-09 PROCEDURE — 99239 PR HOSPITAL DISCHARGE DAY,>30 MIN: ICD-10-PCS | Mod: ,,, | Performed by: STUDENT IN AN ORGANIZED HEALTH CARE EDUCATION/TRAINING PROGRAM

## 2022-08-09 RX ORDER — CLOPIDOGREL BISULFATE 75 MG/1
75 TABLET ORAL DAILY
Status: DISCONTINUED | OUTPATIENT
Start: 2022-08-10 | End: 2022-08-25 | Stop reason: HOSPADM

## 2022-08-09 RX ORDER — MEGESTROL ACETATE 20 MG/1
40 TABLET ORAL DAILY
Status: DISCONTINUED | OUTPATIENT
Start: 2022-08-10 | End: 2022-08-25 | Stop reason: HOSPADM

## 2022-08-09 RX ORDER — CARVEDILOL 3.12 MG/1
3.12 TABLET ORAL 2 TIMES DAILY WITH MEALS
Status: DISCONTINUED | OUTPATIENT
Start: 2022-08-09 | End: 2022-08-25 | Stop reason: HOSPADM

## 2022-08-09 RX ORDER — TIMOLOL MALEATE 5 MG/ML
1 SOLUTION/ DROPS OPHTHALMIC 2 TIMES DAILY
Status: DISCONTINUED | OUTPATIENT
Start: 2022-08-09 | End: 2022-08-25 | Stop reason: HOSPADM

## 2022-08-09 RX ORDER — TALC
6 POWDER (GRAM) TOPICAL NIGHTLY PRN
Status: DISCONTINUED | OUTPATIENT
Start: 2022-08-09 | End: 2022-08-25 | Stop reason: HOSPADM

## 2022-08-09 RX ORDER — AMOXICILLIN 250 MG
1 CAPSULE ORAL 2 TIMES DAILY
Status: DISCONTINUED | OUTPATIENT
Start: 2022-08-09 | End: 2022-08-25 | Stop reason: HOSPADM

## 2022-08-09 RX ORDER — IBUPROFEN 200 MG
16 TABLET ORAL
Status: DISCONTINUED | OUTPATIENT
Start: 2022-08-09 | End: 2022-08-11

## 2022-08-09 RX ORDER — BUMETANIDE 1 MG/1
1 TABLET ORAL 2 TIMES DAILY
Status: DISCONTINUED | OUTPATIENT
Start: 2022-08-10 | End: 2022-08-25 | Stop reason: HOSPADM

## 2022-08-09 RX ORDER — CALCIUM CARBONATE 200(500)MG
500 TABLET,CHEWABLE ORAL 2 TIMES DAILY PRN
Status: DISCONTINUED | OUTPATIENT
Start: 2022-08-09 | End: 2022-08-25 | Stop reason: HOSPADM

## 2022-08-09 RX ORDER — GLUCAGON 1 MG
1 KIT INJECTION
Status: DISCONTINUED | OUTPATIENT
Start: 2022-08-09 | End: 2022-08-11

## 2022-08-09 RX ORDER — ATORVASTATIN CALCIUM 20 MG/1
20 TABLET, FILM COATED ORAL NIGHTLY
Status: DISCONTINUED | OUTPATIENT
Start: 2022-08-09 | End: 2022-08-25 | Stop reason: HOSPADM

## 2022-08-09 RX ORDER — IBUPROFEN 200 MG
24 TABLET ORAL
Status: DISCONTINUED | OUTPATIENT
Start: 2022-08-09 | End: 2022-08-11

## 2022-08-09 RX ORDER — NAPROXEN SODIUM 220 MG/1
81 TABLET, FILM COATED ORAL DAILY
Status: DISCONTINUED | OUTPATIENT
Start: 2022-08-10 | End: 2022-08-25 | Stop reason: HOSPADM

## 2022-08-09 RX ORDER — LISINOPRIL 20 MG/1
20 TABLET ORAL DAILY
Status: DISCONTINUED | OUTPATIENT
Start: 2022-08-10 | End: 2022-08-22

## 2022-08-09 RX ORDER — ACETAMINOPHEN 325 MG/1
650 TABLET ORAL EVERY 6 HOURS PRN
Status: DISCONTINUED | OUTPATIENT
Start: 2022-08-09 | End: 2022-08-25 | Stop reason: HOSPADM

## 2022-08-09 RX ADMIN — CARVEDILOL 3.12 MG: 3.12 TABLET, FILM COATED ORAL at 08:08

## 2022-08-09 RX ADMIN — ATORVASTATIN CALCIUM 20 MG: 20 TABLET, FILM COATED ORAL at 10:08

## 2022-08-09 RX ADMIN — TIMOLOL MALEATE 1 DROP: 5 SOLUTION OPHTHALMIC at 08:08

## 2022-08-09 RX ADMIN — MEGESTROL ACETATE 40 MG: 40 TABLET ORAL at 08:08

## 2022-08-09 RX ADMIN — LEVOTHYROXINE SODIUM 125 MCG: 25 TABLET ORAL at 06:08

## 2022-08-09 RX ADMIN — ASPIRIN 81 MG CHEWABLE TABLET 81 MG: 81 TABLET CHEWABLE at 08:08

## 2022-08-09 RX ADMIN — BUMETANIDE 1 MG: 1 TABLET ORAL at 08:08

## 2022-08-09 RX ADMIN — CARVEDILOL 3.12 MG: 3.12 TABLET, FILM COATED ORAL at 10:08

## 2022-08-09 RX ADMIN — TIMOLOL MALEATE 1 DROP: 5 SOLUTION OPHTHALMIC at 10:08

## 2022-08-09 RX ADMIN — LISINOPRIL 20 MG: 20 TABLET ORAL at 08:08

## 2022-08-09 RX ADMIN — CLOPIDOGREL 75 MG: 75 TABLET, FILM COATED ORAL at 08:08

## 2022-08-09 NOTE — PLAN OF CARE
Pt AAOx2. Pt reoriented to time and situation. RA. Pt has 20g R AC no redness, swelling or pain noted. Pt's LUE swollen +4 edema noted. Blanchable redness noted to buttocks. Purewick intact. Pt remains on cardiac diet. Daughter remains at bedside throughout the night. Pt to DC to [facility] today. No c/o pain voiced/expressed. No s/s of distress noted. Monitoring continues.

## 2022-08-09 NOTE — PLAN OF CARE
NURSING HOME ORDERS    08/09/2022  [facility]  [facility] ([place]  [address]  [facility] [address]  Dept: [phone]  Loc: [phone]     Admit to Nursing Home:      Diagnoses:  Active Hospital Problems    Diagnosis  POA    *Sepsis [A41.9]  Yes    Pleural effusion [J90]  Unknown    Delirium [R41.0]  Unknown    Hyperlipidemia [E78.5]  Yes     Chronic    Normocytic anemia [D64.9]  Yes     Chronic    CAP (community acquired pneumonia) [J18.9]  Yes    Chronic combined systolic and diastolic heart failure [I50.42]  Yes    Coronary artery disease involving native coronary artery of native heart without angina pectoris [I25.10]  Yes     Chronic    Acquired hypothyroidism [E03.9]  Yes     Chronic    Essential hypertension [I10]  Yes     Chronic    Severe aortic stenosis s/p TAVR [I35.0]  Yes     Chronic      Resolved Hospital Problems    Diagnosis Date Resolved POA    Urinary tract infection without hematuria [N39.0] 07/31/2022 Yes       Patient is homebound due to:  Sepsis    Allergies:  Review of patient's allergies indicates:   Allergen Reactions    Penicillins Swelling    Sulfa (sulfonamide antibiotics) Nausea Only       Vitals:  Once weekly    Diet: cardiac diet    Activities:   Activity as tolerated    Goals of Care Treatment Preferences:  Code Status: Full Code      Labs:  CBC, CMP, BNP, weekly     Nursing Precautions:  Fall    Consults:   PT to evaluate and treat- 3 times a week, OT to evaluate and treat- 3 times a week, ST to evaluate and treat- 3 times a week and Nutrition to evaluate and recommend diet     Miscellaneous Care: CHF Care: Daily Weight with notification of MD/NP of 2lb or > increase in 24 hours    v/s and O2 sat every shift    Oxygen as needed for sats <90%    Report abnormal breath sounds to MD/NP    Edema checks q shift- notify MD/NP of increased edema    Task segmentation by nursing for daily care to decrease  exertion    Schedule appointment with [facility] in 1 week    CHF education to include diet ,medication, and CHF flags for MD notification                   Diabetes Care:  Report CBG < 60 or > 350 to physician.      Medications: Discontinue all previous medication orders, if any. See new list below.     Medication List      CHANGE how you take these medications    bumetanide 1 MG tablet  Commonly known as: BUMEX  Take 1 tablet (1 mg total) by mouth 2 (two) times a day.  What changed: when to take this        CONTINUE taking these medications    aspirin 81 MG Chew  Take 81 mg by mouth once daily.     atorvastatin 40 MG tablet  Commonly known as: LIPITOR  Take 0.5 tablets (20 mg total) by mouth every evening.     carvediloL 3.125 MG tablet  Commonly known as: COREG  Take 1 tablet (3.125 mg total) by mouth 2 (two) times daily.     clopidogreL 75 mg tablet  Commonly known as: PLAVIX  Take 1 tablet (75 mg total) by mouth once daily.     lisinopriL 20 MG tablet  Commonly known as: PRINIVIL,ZESTRIL  Take 20 mg by mouth once daily.     megestroL 40 MG Tab  Commonly known as: MEGACE  Take 1 tablet (40 mg total) by mouth once daily.     SYNTHROID 125 MCG tablet  Generic drug: levothyroxine  Take 125 mcg by mouth once daily.     TIMOPTIC OCUDOSE (PF) 0.5 % Dpet  Generic drug: timoloL maleate (PF)  Place 1 drop into both eyes 2 (two) times a day.        STOP taking these medications    albuterol-ipratropium 2.5 mg-0.5 mg/3 mL nebulizer solution  Commonly known as: DUO-NEB     amLODIPine 10 MG tablet  Commonly known as: NORVASC     losartan 50 MG tablet  Commonly known as: COZAAR     traZODone 50 MG tablet  Commonly known as: DESYREL              Immunizations Administered as of 8/9/2022     Name Date Dose VIS Date Route Exp Date    COVID-19, MRNA, LN-S, PF (Pfizer) (Purple Cap) 2/6/2021  8:14 AM 0.3 mL 12/12/2020 Intramuscular 6/30/2021    Site: Right deltoid     Given By: Sarah Vincent LPN     : Pf[employer]  Inc     Lot: [id number]     COVID-19, MRNA, LN-S, PF (Pfizer) (Purple Cap) 1/15/2021  8:15 AM 0.3 mL 12/12/2020 Intramuscular 4/30/2021    Site: Right deltoid     Given By: Brandie Daniels LPN     : Pf[employer] Inc     Lot: [id number]           This patient has had both covid vaccinations    Some patients may experience side effects after vaccination.  These may include fever, headache, muscle or joint aches.  Most symptoms resolve with 24-48 hours and do not require urgent medical evaluation unless they persist for more than 72 hours or symptoms are concerning for an unrelated medical condition.          _________________________________  Bj Campoverde DO  08/09/2022

## 2022-08-09 NOTE — ASSESSMENT & PLAN NOTE
Patient recently admitted to [facility] for CHF exacerbation. She does not have LE edema or SOB at this time.  - on Room Air   - Bumex 1mg BID

## 2022-08-09 NOTE — PT/OT/SLP PROGRESS
"  Occupational Therapy   Treatment     Name: Pilar Michael  MRN: [id number]  Admitting Diagnosis:  Sepsis    Length of Stay: 9 days    Recommendations:     Discharge Recommendations: nursing facility, skilled  Discharge Equipment Recommendations:  none  Barriers to discharge:  Decreased caregiver support      Plan:     Patient to be seen 3 x/week to address the above listed problems via self-care/home management, therapeutic activities, therapeutic exercises, neuromuscular re-education  · Plan of Care Expires: 09/01/22  · Plan of Care Reviewed with: patient, son      Assessment:     Pilar Michael is a 89 y.o. female with a medical diagnosis of Sepsis.  She presents with the following performance deficits affecting function: weakness, impaired endurance, impaired self care skills, gait instability, impaired functional mobility, impaired balance, decreased upper extremity function, impaired coordination, impaired fine motor.      Pt mainly limited by overall deconditioning and impaired FM skills on this date. Pt requiring minimum assistance for bed mobility, minimum assistance for functional mobility with hand-held assist, and supervision for ADLs (grooming) while seated UI. Pt continues to progress toward OT goals. Pt with good motivation and fair tolerance for therapy. Pt would benefit from SNF OT upon D/C to return to [facility].    Rehab Prognosis: Good; patient would benefit from acute skilled OT services to address these deficits and reach maximum level of function.         Subjective     Communicated with: FARAZ Garnett prior to session.  Patient found HOB elevated with Azalia, bed alarm and no family present upon OT entry to room.    Chief Complaint: Fever (From [facility]. Fever of 102. Increased lethargy. )    Patient/Family Comments/goals: "We can't roll me into the bathroom?"    Pain/Comfort:  · Pain Rating 1: 0/10  · Pain Rating Post-Intervention 1: 0/10      Objective:     Patient found with: Azalia, " "bed alarm   General Precautions: Standard, fall   Orthopedic Precautions:N/A   Braces: N/A   Oxygen Device: Room Air  Vitals: BP (!) 146/65 (BP Location: Right arm, Patient Position: Lying)   Pulse 62   Temp 98.1 °F (36.7 °C) (Oral)   Resp 18   Ht 5' 2" (1.575 m)   Wt 90.3 kg (199 lb 1.2 oz)   SpO2 95%   BMI 36.41 kg/m²     Occupational Performance:  Bed Mobility:     Patient completed Supine to Sit with minimum assistance on left side of bed   Scooting anteriorly to EOB to have both feet planted on floor: moderate assistance    Functional Mobility/Transfers:   Static Sitting EOB: Supervision   Patient completed Sit <> Stand Transfer with contact guard assistance  with  hand-held assist    Static Standing Balance: Contact guard assistance - minimum assistance   Patient completed Bed <> Chair Transfer using Stand Pivot technique with minimum assistance with hand-held assist    Activities of Daily Living:   Grooming: supervision (setup) to perform oral hygiene, facial hygiene, and comb hair while seated UIC    AMPAC 6 Click ADL:  AMPAC Total Score: 15      Treatment & Education:  - Educated on role of OT, POC, and goals for this hospital stay  - Emphasized importance of OOB ax and level of staff assistance required for transfers and functional mobility (minimum assistance)  - Encouraged pt to alert OT of personal self-care goals and/or comfort-related concerns during future OT sessions  - Pt denies any further questions, concerns, or requests at this time  - Whiteboard updated        Patient left up in chair with all lines intact, call button in reach, son present, and RN notified    GOALS:   Multidisciplinary Problems     Occupational Therapy Goals        Problem: Occupational Therapy    Goal Priority Disciplines Outcome Interventions   Occupational Therapy Goal     OT, PT/OT Ongoing, Progressing    Description: Goals to be met by: 8/16     Patient will increase functional independence with ADLs by " performing:    UE Dressing with Minimal Assistance.  LE Dressing with Moderate Assistance.  Grooming while seated with Supervision.  Toileting from bedside commode with Moderate Assistance for hygiene and clothing management.   Supine to sit with Stand-by Assistance.  Step transfer with Stand-by Assistance using RW                       Time Tracking:     OT Date of Treatment: 08/09/22  OT Start Time: 0937  OT Stop Time: 1010  OT Total Time (min): 33 min  Additional staff present: N/A      Billable Minutes:Self Care/Home Management 13  Therapeutic Activity 20      8/9/2022

## 2022-08-09 NOTE — DISCHARGE SUMMARY
[facility] - [facility] ([place] [address])  [facility] Medicine  Discharge Summary      Patient Name: Pilar Michael  MRN: [id number]  Patient Class: IP- Inpatient  Admission Date: 7/30/2022  Hospital Length of Stay: 9 days  Discharge Date and Time:  08/09/2022 8:30 AM  Attending Physician: Jose Luis Colón MD   Discharging Provider: Bj Campoverde DO  Primary Care Provider: Joseph Noel MD  [facility] Medicine Team: [facility] 2 Bj West, DO    HPI:   Ms. Michael is a 89 y.o. F with PMH systolic and diastolic HF, CAD s/p RCA stent, pacemaker placement, thyroid cancer s/p treatment 2003, breast cancer s/p mastectomy 01/2022, CVA 04/2022 without residual deficits, HLD, HTN, hypothyroidism who presents to the ED from SNF for fever and AMS. She developed fever 7/30 up to 102F and became lethargic and confused. She had been at SNF for approximately one week after being discharged from [facility] for acute exacerbation of CHF.     Per daughter at bedside, the patient becomes confused and lethargic when she has fever. During previous admission for CHF exacerbation she was also treated empirically for CAP.     In the ED, she had T100.7, HR 60, /50, O2 94% on room air. She was placed on 2L NC with improvement in sats to 99%. CBC remarkable for H/H of 9/29, calcium 8.5, , troponin 0.028. UA hazy with trace leukocytes. CXR with L pleural effusion and L basilar atelectasis v consolidation, similar to prior study. She was given 2g Cefepime x1 dose for sepsis of unknown source. She is admitted to medicine for workup and treatment of sepsis.         Hospital Course:   Ms. Michael is a 89 y.o. F with PMH systolic and diastolic HF, CAD s/p RCA stent, pacemaker placement, thyroid cancer s/p treatment 2003, breast cancer s/p mastectomy 01/2022, CVA 04/2022 without residual deficits, HLD, HTN, hypothyroidism who presents to the ED from SNF for fever and AMS. She developed fever 7/30 up to 102F and became lethargic and  "confused. She had been at SNF for approximately one week after being discharged from [facility] for acute exacerbation of CHF. Pt. Was started on cefepime, vanc, and azithromycin. During hospital course she has not become febrile and has become easily arousal. Pulmonology was consulted and believe the pulmonary effusion is due to insufficient diuresis. We will stop abx for now and monitor. Per pulm recs, we will up the bumetanide to 1mg BID, and monitor.  Patient is doing well, diuresis was suboptimal. Gave a 1 time dose of 80mg lasix to attempt to increase output. Patient can resume 1mg BID after. She has been taking off oxygen for >1 day and is sating >90%. She feels much better and family agrees.  Family informed nursing that patient experienced an "episode" of feeling unwell. As dictated by her niece, she was brushing her teeth while in the bed, and "the color drained from her face." She was conscious the entire  time and can easily recall the period in which she didn't feel great. O2 stats are still >90%. Ekg obtained noted no significant changes from the one several days prior. Patient's son endorsed she occasionally gets periods of feeling "unwell." Will continue to monitor. Patient to be D/C'd today to SNF with F/U and to establish at primary care center.       Interval Hx: Patient states feeling overall good and well rested. She has no complaints at this time and feels well enough to leave the hospital. Family is agreeable. Awaiting transport and D/C.      Physical Exam  Constitutional:       Appearance: Normal appearance.   HENT:      Head: Normocephalic and atraumatic.      Nose: Nose normal.      Mouth/Throat:      Mouth: Mucous membranes are moist.      Pharynx: Oropharynx is clear.   Eyes:      Extraocular Movements: Extraocular movements intact.      Conjunctiva/sclera: Conjunctivae normal.      Pupils: Pupils are equal, round, and reactive to light.   Cardiovascular:      Rate and Rhythm: Normal rate and " "regular rhythm.      Pulses: Normal pulses.      Heart sounds: Murmur heard.   Pulmonary:      Effort: Pulmonary effort is normal.      Breath sounds: No rales.   Abdominal:      General: Abdomen is flat. Bowel sounds are normal. There is no distension.      Palpations: Abdomen is soft.      Tenderness: There is no abdominal tenderness. There is no guarding.   Musculoskeletal:         General: Normal range of motion.      Right lower leg: No edema.      Left lower leg: No edema.      Comments: Lymphedema left arm    Skin:     General: Skin is warm and dry.   Neurological:      General: No focal deficit present.      Mental Status: She is alert and oriented to person, place, and time. Mental status is at baseline.      Motor: No weakness.   Psychiatric:         Mood and Affect: Mood normal.         Behavior: Behavior normal.         Thought Content: Thought content normal.         Judgment: Judgment normal.     Goals of Care Treatment Preferences:  Code Status: Full Code      Consults:   Consults (From admission, onward)        Status Ordering Provider     Inpatient consult to Spiritual Care  Once        Provider:  (Not yet assigned)    Completed JESUS HERNANDEZ     Inpatient consult to Pulmonology  Once        Provider:  (Not yet assigned)    Completed FRIEDA THAYER     Pharmacy to dose Vancomycin consult  Once        Provider:  (Not yet assigned)   "And" Linked Group Details    Completed PATRICK LECHUGA          No new Assessment & Plan notes have been filed under this hospital service since the last note was generated.  Service: Hospital Medicine    Final Active Diagnoses:    Diagnosis Date Noted POA    PRINCIPAL PROBLEM:  Sepsis [A41.9] 07/31/2022 Yes    Pleural effusion [J90] 08/01/2022 Unknown    Delirium [R41.0] 07/31/2022 Unknown    Hyperlipidemia [E78.5] 07/03/2022 Yes     Chronic    Normocytic anemia [D64.9] 07/03/2022 Yes     Chronic    CAP (community acquired pneumonia) [J18.9] 11/17/2021 Yes    " Chronic combined systolic and diastolic heart failure [I50.42] 05/08/2019 Yes    Coronary artery disease involving native coronary artery of native heart without angina pectoris [I25.10] 05/08/2019 Yes     Chronic    Acquired hypothyroidism [E03.9] 04/08/2019 Yes     Chronic    Essential hypertension [I10] 04/08/2019 Yes     Chronic    Severe aortic stenosis s/p TAVR [I35.0]  Yes     Chronic      Problems Resolved During this Admission:    Diagnosis Date Noted Date Resolved POA    Urinary tract infection without hematuria [N39.0]  07/31/2022 Yes       Discharged Condition: stable    Follow Up:   - F/U cardiology outpatient   - [facility] to establish     Patient Instructions:      Ambulatory referral/consult to Internal Medicine   Standing Status: Future   Referral Priority: Routine Referral Type: Consultation   Referral Reason: Specialty Services Required   Referred to Provider: JOESFINA NASH Requested Specialty: Internal Medicine     Ambulatory referral/consult to Cardiology   Standing Status: Future   Referral Priority: Routine Referral Type: Consultation   Referral Reason: Specialty Services Required   Requested Specialty: Cardiology       Significant Diagnostic Studies: Labs:   BMP:   No results for input(s): GLU, NA, K, CL, CO2, BUN, CREATININE, CALCIUM, MG in the last 48 hours., CMP   No results for input(s): NA, K, CL, CO2, GLU, BUN, CREATININE, CALCIUM, PROT, ALBUMIN, BILITOT, ALKPHOS, AST, ALT, ANIONGAP, ESTGFRAFRICA, EGFRNONAA in the last 48 hours., CBC   No results for input(s): WBC, HGB, HCT, PLT in the last 48 hours.Microbiology:   Blood Culture   Lab Results   Component Value Date    LABBLOO No growth after 5 days. 07/30/2022    LABBLOO No growth after 5 days. 07/30/2022   , Sputum Culture   Lab Results   Component Value Date    GSRESP <10 epithelial cells per low power field. 08/08/2022    GSRESP Rare WBC's 08/08/2022    GSRESP Few budding yeast 08/08/2022    GSRESP Rare Gram  negative rods 08/08/2022    GSRESP Rare Gram positive cocci 08/08/2022    and Urine Culture    Lab Results   Component Value Date    LABURIN No significant growth 07/30/2022       Pending Diagnostic Studies:     None         Medications:  Reconciled Home Medications:      Medication List      CHANGE how you take these medications    bumetanide 1 MG tablet  Commonly known as: BUMEX  Take 1 tablet (1 mg total) by mouth 2 (two) times a day.  What changed: when to take this        CONTINUE taking these medications    aspirin 81 MG Chew  Take 81 mg by mouth once daily.     atorvastatin 40 MG tablet  Commonly known as: LIPITOR  Take 0.5 tablets (20 mg total) by mouth every evening.     carvediloL 3.125 MG tablet  Commonly known as: COREG  Take 1 tablet (3.125 mg total) by mouth 2 (two) times daily.     clopidogreL 75 mg tablet  Commonly known as: PLAVIX  Take 1 tablet (75 mg total) by mouth once daily.     lisinopriL 20 MG tablet  Commonly known as: PRINIVIL,ZESTRIL  Take 20 mg by mouth once daily.     megestroL 40 MG Tab  Commonly known as: MEGACE  Take 1 tablet (40 mg total) by mouth once daily.     SYNTHROID 125 MCG tablet  Generic drug: levothyroxine  Take 125 mcg by mouth once daily.     TIMOPTIC OCUDOSE (PF) 0.5 % Dpet  Generic drug: timoloL maleate (PF)  Place 1 drop into both eyes 2 (two) times a day.        STOP taking these medications    albuterol-ipratropium 2.5 mg-0.5 mg/3 mL nebulizer solution  Commonly known as: DUO-NEB     amLODIPine 10 MG tablet  Commonly known as: NORVASC     losartan 50 MG tablet  Commonly known as: COZAAR     traZODone 50 MG tablet  Commonly known as: DESYREL            Indwelling Lines/Drains at time of discharge:   Lines/Drains/Airways     Drain  Duration           Female External Urinary Catheter 07/29/22 1900 10 days                Time spent on the discharge of patient: 45 minutes         Bj Campoverde DO  Department of Hospital Medicine  [facility] - Intensive Care [facility]  [place]-[address])

## 2022-08-09 NOTE — ASSESSMENT & PLAN NOTE
This patient does have evidence of infective focus  My overall impression is sepsis. Vital signs were reviewed and noted in progress note.  Antibiotics given-   Antibiotics (From admission, onward)            None        Cultures were taken-   Microbiology Results (last 7 days)     Procedure Component Value Units Date/Time    Culture, Respiratory with Gram Stain [[id number]] Collected: 08/08/22 1042    Order Status: Completed Specimen: Sputum Updated: 08/09/22 0317     Gram Stain (Respiratory) <10 epithelial cells per low power field.     Gram Stain (Respiratory) Rare WBC's     Gram Stain (Respiratory) Few budding yeast     Gram Stain (Respiratory) Rare Gram negative rods     Gram Stain (Respiratory) Rare Gram positive cocci    Blood culture x two cultures. Draw prior to antibiotics. [[id number]] Collected: 07/30/22 2213    Order Status: Completed Specimen: Blood from Peripheral, Hand, Right Updated: 08/04/22 2312     Blood Culture, Routine No growth after 5 days.    Narrative:      Aerobic and anaerobic    Blood culture x two cultures. Draw prior to antibiotics. [[id number]] Collected: 07/30/22 2213    Order Status: Completed Specimen: Blood from Peripheral, Antecubital, Right Updated: 08/04/22 2312     Blood Culture, Routine No growth after 5 days.    Narrative:      Aerobic and anaerobic        Latest lactate reviewed, they are-  No results for input(s): LACTATE in the last 72 hours.    Organ dysfunction indicated by Encephalopathy   Source- CAP     Source control Achieved by- antibiotics    CXR with L pleural effusion and L lower zone atelectasis v consolidation - possible source of sepsis  -Vanc/Cefepime started 7/31- D/C'd due to low suspicion of infectious process   - On RA SpO2 >93%   - f/u urine cx - negative   - f/u blood cx - negative   - f/u resp infection panel  - Will stop abx for now. Patient is afebrile and stable

## 2022-08-09 NOTE — PROGRESS NOTES
[facility]                                  Skilled Nursing Facility                   Progress Note     Admit Date: 7/22/2022  CHARLIE 8/10/2022  Principal Problem:  Chronic combined systolic and diastolic heart failure   HPI obtained from patient interview and chart review     Chief Complaint: Re-evaluation of medical treatment and therapy status, re-evaluation of patient's lethargy    HPI:   Mrs. Michael is an 87 year old female PMHx of severe AS s/p TAVR (5/7/19), CAD s/p ostial RCA stent placement (4/19), IDBCA s/p resection and radiation (2003), thyroid cancer s/p resection (1992), HTN, PAD who presents to SNF following hospitalization for acute hypoxemic respiratory failure with acute on chronic diastolic heart failure.     Interval history:  24 hr vital sign ranges listed below.  Patient remains lethargic but no change from yesterday.  She is able to arouse and answer questions appropriately.  Patient states she does not know if she is fully sleeping at night which could be causing her daytime sleepiness.  Encourage all lytes to be on during the day.  She is able to maintain meaningful conversations.  She states she is still having some daytime sleepiness.  Patient denies shortness of breath, abdominal discomfort, nausea, or vomiting.  Patient reports an adequate appetite.  Patient denies dysuria.  Patient reports having regular bowel movements.  Patient progessing with PT/OT. Continuing to follow and treat all acute and chronic conditions.    Past Medical History: Patient has a past medical history of Arthritis, Cancer, CHF (congestive heart failure), Coronary artery disease, Hypertension, and Thyroid disease.    Past Surgical History: Patient has a past surgical history that includes Hysterectomy; Thyroidectomy; lymph nodes removal; Cholecystectomy; Coronary angiography (N/A, 4/23/2019); Transcatheter aortic valve replacement (TAVR)  (N/A, 5/7/2019); Cardiac valuve replacement; and Cardiac catheterization.    Social History: Patient reports that she has never smoked. She has never used smokeless tobacco. She reports previous alcohol use. She reports that she does not use drugs.    Family History:  No significant family history to report  Allergies: Patient is allergic to penicillins and sulfa (sulfonamide antibiotics).    ROS  Constitutional: Negative for fever.  +lethargy   Eyes: Negative for blurred vision, double vision   Respiratory: Negative for cough, shortness of breath   Cardiovascular: Negative for chest pain, palpitations. + leg swelling.   Gastrointestinal: Negative for abdominal pain, constipation, diarrhea, nausea, vomiting.   Genitourinary: Negative for dysuria, frequency   Musculoskeletal:  + generalized weakness. Negative for back pain and myalgias.   Skin: Negative for itching and rash.   Neurological: Negative for dizziness, headaches.   Psychiatric/Behavioral: Negative for depression. The patient is not nervous/anxious.      24 hour Vital Sign Range   Temp:  [97.9 °F (36.6 °C)-98.6 °F (37 °C)]   Pulse:  [60-64]   Resp:  [18-20]   BP: (119-158)/(51-68)   SpO2:  [91 %-95 %]     PEx  Constitutional: Patient appears debilitated.  No distress noted  HENT:   Head: Normocephalic and atraumatic.   Eyes: Pupils are equal, round  Neck: Normal range of motion. Neck supple.   Cardiovascular: Normal rate, regular rhythm.  +murmur  Pulmonary/Chest: Effort normal and breath sounds are clear  Abdominal: Soft. Bowel sounds are normal.   Musculoskeletal: Normal range of motion.   Neurological: Alert and oriented to person, place, and time.   Psychiatric: Normal mood and affect. Behavior is normal.   Skin: Skin is warm and dry.     No results for input(s): GLUCOSE, NA, K, CL, CO2, BUN, CREATININE, MG in the last 24 hours.    Invalid input(s):  CALCIUM    No results for input(s): WBC, RBC, HGB, HCT, PLT, MCV, MCH, MCHC in the last 24  hours.      Assessment and Plan:    Assessment and Plan:     Possible hospital delirium  Delirium precautions:  - Avoid antihistamines, anticholinergics, hypnotics, and minimize opiates while controlling for pain as these medications may exacerbate delirium. Cues for day/night will assist with keeping patient calm and oriented - during daytime, please keep adequate light in room (open windows, lights on) and please keep room dim at night-time to encourage normal sleep-wake cycles. Continuing to have nursing and family reorient the patient and encourage family to visit    Acute hypoxemic respiratory failure due to Acute on Chronic HFpEF  PMHx HFpEF, valvular heart disease p/w dyspnea and increasing oxygen requirement with evidence of volume overload on physical exam in the setting of an elevated BNP(1347). Echo in 3/2022 demonstrated EF 55-60% with mitral stenosis. Repeat TTE EF 65% with grade II LVD dysfunction with MR related to MV degenerative disease.  -Continue Bumex 1mg daily   -Strict intake & output with fluid restriction to <2L daily     Recurrent Urinary Tract Infection  Patient with recurrent UTIs, prior urine culture shows Pseudomonas only sensitive to imipenem, tobramycin, gentamicin.  Was inadequately treated with ciprofloxacin last month.  -Meropenem completed      Wounds to Groin, Perineum, and buttox 2/2 urinary incontinence  -maintain pur whick as able to keep area dry  - wound care consult     Dysphasia, history of  -soft dysphasia diet in hospital  -7/12 initiated soft texture diet and SLP consult     Severe aortic stenosis s/p TAVR  PMHx severe AS a TAVR in 2019     Symptomatic bradycardia s/p pacemaker.  Pacemaker placed in 2019 for complete heart block and symptomatic bradycardia, currently ventricularly paced at 60 beats per minute     Essential Hypertension  Blood pressure over controlled in 89 year old.   Lisinopril 10mg daily     Hyperlipidemia  - lipitor 20 mg PO  daily     Hypothyroidism  - Synthroid 125 mcg PO Daily        Anticipate disposition:  Home with home health        Follow-up needed during SNF stay-     Follow-up needed after discharge from SNF: PCP    No future appointments.        Lenka Salazar NP  Department of Hospital Medicine   [facility]     DOS: 7/29/2022       Patient note was created using MModal Dictation.  Any errors in syntax or even information may not have been identified and edited on initial review prior to signing this note.

## 2022-08-09 NOTE — PLAN OF CARE
Patient is stable to dc, RYAN received a call from [facility] with [facility] reporting that a peer to peer is being offered before making final decision of auth. RYAN has informed physician to contact [phone] option [phone]. No later 1:00 pm today. Patient has and accepting SNF at [facility].           Jo Jules LMSW  [facility]   [phone]

## 2022-08-09 NOTE — NURSING
Admission note:  Ms Michael was escorted onto unit via w/c van service. Pt was accompanied by son. She was assisted into bed. VS, assessment, and admission information collected at this time. Pt's son stated that his sister usually signs his mother's consent forms. Safety measures maintained. Pt oriented to room, fall protocols, and call button/TV remote. Will continue to monitor.

## 2022-08-09 NOTE — PLAN OF CARE
08/09/22 1510   Post-Acute Status   Post-Acute Authorization Placement   Post-Acute Placement Status Set-up Complete/Auth obtained   Discharge Plan   Discharge Plan A Skilled Nursing Facility   Discharge Plan B Skilled Nursing Facility     Pt has been accepted to transfer to [facility] today.  RYAN updated the pt's son, Jack - [phone].  The nurse was given the number to call report - [phone].  RYAN set up WC van  transportation via the [facility]. RYAN requested a  time of 3:30pm.    Abbey Whitmore LCSW   PRN

## 2022-08-09 NOTE — DISCHARGE INSTRUCTIONS
Patient Education        Sepsis Discharge Instructions, Adult   About this topic   Sepsis is a widespread bloodstream infection. Germs may enter your blood through a cut or open wound. Germs may also enter the blood when you have a medical or dental procedure. Most often, your body is able to fight off these germs. Sometimes, the body is not able to. Then, the germs attack the body's organs and tissues.  This kind of serious infection may cause swelling and blood clotting. It needs to be treated right away. Drugs given through a vein are used to treat sepsis. You may need to be in the hospital, depending on how sick you are.     What care is needed at home?   Ask your doctor what you need to do when you go home. Make sure you ask questions if you do not understand what the doctor says. This way you will know what you need to do.  Take all your drugs as ordered, even if you are feeling better.  Get lots of rest. Sleep when you are feeling tired. Avoid doing tiring activities.  Clean items and surfaces you often touch like door handles, remotes, and phones. This can help reduce the spread of infection.  Stay away from people who are sick. Keep away from crowded places until you have fully recovered.  Wash your hands often with soap and water for at least 20 seconds, especially after coughing or sneezing. Alcohol-based hand sanitizers also work to kill germs.  Tell other people to wash their hands before they come near you.  If you are sick, cover your mouth and nose with tissue when coughing or sneezing. You can also cough into your elbow. Throw away tissues in the trash and wash your hands after touching used tissues.  Keep your hands away from your eyes and nose. Germs can enter these body areas easily.  Do not get too close (kissing, hugging) to people who are sick.  Do not share towels or hankies with anyone who is sick.  Do not smoke.  Get a flu shot each year.  What follow-up care is needed?   Your doctor may  ask you to make visits to the office to check on your progress. Be sure to keep these visits.  What drugs may be needed?   The doctor may order drugs to:  Fight an infection  Decrease swelling  Prevent blood clots  Raise blood pressure  Control blood sugar  Will physical activity be limited?   You may have to limit your activity for a time. Talk to your doctor about the right amount of activity for you.  What problems could happen?   Lung problems  Kidney damage  Heart problems  Brain problems  What can be done to prevent this health problem?   Make sure you get all of the shots recommended by the doctor.  Seek treatment right away if you have an infection.  Avoid touching wounds or open cuts.  Wash your hands:  Before and after wound care  After going to the bathroom  Before eating  Anytime they are dirty  Eat a healthy diet. This will improve the body's defense system.  Avoid smoking and alcohol. This weakens the body's defense system.  When do I need to call the doctor?   Signs of infection. These include a fever of 100.4°F (38°C) or higher, chills, very sore throat, ear or sinus pain, cough, more sputum or change in color of sputum, pain with passing urine, mouth sores, or a wound that will not heal.  Changes in mental status  Loose stools  Throwing up  Slow heart rate  Breathing problems, like feeling short of breath or having pain with breathing  Bleeding into the skin that looks like tiny bruises or a rash  You are not feeling better in 1 to 2 weeks or you are feeling worse  Teach Back: Helping You Understand   The Teach Back Method helps you understand the information we are giving you. After you talk with the staff, tell them in your own words what you learned. This helps to make sure the staff has described each thing clearly. It also helps to explain things that may have been confusing. Before going home, make sure you can do these:  I can tell you about my condition.  I can tell you what I will do to  keep from getting sick.  I can tell you how and when to wash my hands to avoid passing the infection to others.  I can tell you what I will do if I have a fever, chills, trouble breathing, or bleeding into the skin.  Where can I learn more?   National [place] [employer]  http://www.nigms.nih.gov/Education/factsheet_sepsis.htm   NHS Choices  http://www.nhs.uk/Conditions/Blood-poisoning/Pages/Introduction.aspx    Centers for Disease Control and Prevention  https://www.cdc.gov/sepsis/basic/qa.html   Last Reviewed Date   2021-08-16  Consumer Information Use and Disclaimer   This information is not specific medical advice and does not replace information you receive from your health care provider. This is only a brief summary of general information. It does NOT include all information about conditions, illnesses, injuries, tests, procedures, treatments, therapies, discharge instructions or life-style choices that may apply to you. You must talk with your health care provider for complete information about your health and treatment options. This information should not be used to decide whether or not to accept your health care providers advice, instructions or recommendations. Only your health care provider has the knowledge and training to provide advice that is right for you.  Copyright   Copyright © 2021 [employer] and its affiliates and/or licensors. All rights reserved.

## 2022-08-10 ENCOUNTER — PATIENT OUTREACH (OUTPATIENT)
Dept: ADMINISTRATIVE | Facility: CLINIC | Age: 87
End: 2022-08-10
Payer: MEDICARE

## 2022-08-10 PROCEDURE — 97535 SELF CARE MNGMENT TRAINING: CPT

## 2022-08-10 PROCEDURE — 25000003 PHARM REV CODE 250: Performed by: HOSPITALIST

## 2022-08-10 PROCEDURE — 11000004 HC SNF PRIVATE

## 2022-08-10 PROCEDURE — 97110 THERAPEUTIC EXERCISES: CPT

## 2022-08-10 PROCEDURE — 97162 PT EVAL MOD COMPLEX 30 MIN: CPT

## 2022-08-10 PROCEDURE — 97165 OT EVAL LOW COMPLEX 30 MIN: CPT

## 2022-08-10 RX ADMIN — MEGESTROL ACETATE 40 MG: 20 TABLET ORAL at 08:08

## 2022-08-10 RX ADMIN — CARVEDILOL 3.12 MG: 3.12 TABLET, FILM COATED ORAL at 05:08

## 2022-08-10 RX ADMIN — ATORVASTATIN CALCIUM 20 MG: 20 TABLET, FILM COATED ORAL at 08:08

## 2022-08-10 RX ADMIN — TIMOLOL MALEATE 1 DROP: 5 SOLUTION OPHTHALMIC at 08:08

## 2022-08-10 RX ADMIN — CARVEDILOL 3.12 MG: 3.12 TABLET, FILM COATED ORAL at 08:08

## 2022-08-10 RX ADMIN — BUMETANIDE 1 MG: 1 TABLET ORAL at 08:08

## 2022-08-10 RX ADMIN — CLOPIDOGREL BISULFATE 75 MG: 75 TABLET, FILM COATED ORAL at 08:08

## 2022-08-10 RX ADMIN — LEVOTHYROXINE SODIUM 125 MCG: 25 TABLET ORAL at 05:08

## 2022-08-10 RX ADMIN — LISINOPRIL 20 MG: 20 TABLET ORAL at 08:08

## 2022-08-10 RX ADMIN — ASPIRIN 81 MG CHEWABLE TABLET 81 MG: 81 TABLET CHEWABLE at 08:08

## 2022-08-10 RX ADMIN — SENNOSIDES AND DOCUSATE SODIUM 1 TABLET: 50; 8.6 TABLET ORAL at 08:08

## 2022-08-10 RX ADMIN — BUMETANIDE 1 MG: 1 TABLET ORAL at 05:08

## 2022-08-10 NOTE — PLAN OF CARE
Problem: Physical Therapy  Goal: Physical Therapy Goal  Description: Goals to be met by: 9/9/22    Patient will increase functional independence with mobility by performing:      . Supine to sit with Set-up [place]  . Sit to supine with Set-up [place]  . Rolling to Left and Right with Set-up Assistance.  . Sit to stand transfer with Supervision  . Bed to chair transfer with Supervision using Rolling Walker  . Gait  x 50 feet with Stand-by Assistance using Rolling Walker.   . Wheelchair propulsion x50 feet with Minimal Assistance using bilateral uppper extremities  . Ascend/Descend 2 inch curb step with Moderate Assistance using Rolling Walker.       Outcome: Ongoing, Progressing

## 2022-08-10 NOTE — PLAN OF CARE
Problem: Occupational Therapy  Goal: Occupational Therapy Goal  Description: Goals to be met by: 8/26/22     Patient will increase functional independence with ADLs by performing:    Feeding with Modified [place].  UE Dressing with Stand-by Assistance.  LE Dressing with Stand-by Assistance.  Grooming while seated at sink with Supervision.  Toileting from toilet or BSC with Minimal Assistance for hygiene and clothing management.   Bathing from  shower chair/bench with Contact Guard Assistance.  Supine to sit with Modified [place].  Step transfer with Stand-by Assistance with RW to steady.  Upper extremity exercise with assistance as needed.  Caregiver will be educated on level of assist required to safely perform self care tasks and functional transfers..     Outcome: Ongoing, Progressing

## 2022-08-10 NOTE — PT/OT/SLP EVAL
Physical Therapy Evaluation    Patient Name:  Pilar Michael   MRN:  [id number]  Admit Date: 8/9/2022  Admitting Diagnosis: Sepsis  Recent Surgeries: N/A    General Precautions: Standard, fall   Orthopedic Precautions:N/A   Braces: N/A     Recommendations:     Discharge Recommendations:  home health PT   Level of Assistance Recommended: 24 hours light assistance  Discharge Equipment Recommendations: wheelchair, walker, rolling, bath bench, bedside commode   Barriers to discharge: Decreased caregiver support    Assessment:     Pilar Michael is a 89 y.o. female admitted with a medical diagnosis of Sepsis.  Performance deficits affecting function  weakness, impaired endurance, impaired self care skills, impaired functional mobility, gait instability, impaired balance, decreased upper extremity function, decreased lower extremity function, edema, impaired cardiopulmonary response to activity .    Rehab Potential is good     Activity Tolerance: Good    Plan:     Patient to be seen 5 x/week to address the above listed problems via gait training, therapeutic activities, therapeutic exercises, neuromuscular re-education, wheelchair management/training     Plan of Care Expires: 09/09/22  Plan of Care Reviewed with: patient    Subjective     Chief Complaint: None noted by pt.   Patient/Family Comments/goals: None noted by pt.  Pain/Comfort:  · Pain Rating 1: 0/10  · Pain Rating Post-Intervention 1: 0/10    Living Environment:   Pt.'s daughter lives with pt. And works from home. Pt. Usually enters house through carport which has 1 step and no HR.  Pt.has a tub shower with a shower chair and grab bars   Previous level of function: Pt. Reported she was able to perform her ADL tasks until she was hospitalized. Pt was (S) using a rollator  Equipment Used at Home:  raised toilet, bedside commode, shower chair, walker, rolling, rollator, grab bar  Assistance upon Discharge: daughter works from home       Patients cultural,  spiritual, Confucianist conflicts given the current situation: no    Objective:     Communicated with pt's nurse prior to session.  Patient found up in chair with    upon PT entry to room.    Exams:  · Cognitive Exam:  Patient is oriented to Person, Place and Situation  · Gross Motor Coordination:  WFL  · Sensation:    · -       Intact  · RLE ROM: WFL  · RLE Strength: grossly 4/5  · LLE ROM: WFL  · LLE Strength: grossly 4/5   JACK/E with lymphedema    Functional Mobility:     08/10/22 1048   Prior Functioning: Everyday Activities   Self Care Needed some help   Indoor Mobility (Ambulation) Needed some help   Stairs Needed some help   Functional Cognition Independent   Prior Device Use Walker   Roll Left and Right   Assistance Needed Supervision   Comment per OT eval   CARE Score - Roll Left and Right 4   Sit to Lying   Assistance Needed Supervision   Comment per OT eval   CARE Score - Sit to Lying 4   Lying to Sitting on Side of Bed   Physical Assistance Level 25% or less   Comment per OT eval   CARE Score - Lying to Sitting on Side of Bed 3   Sit to Stand   Physical Assistance Level 25% or less   Comment from w/c with RW   CARE Score - Sit to Stand 3   Chair/Bed-to-Chair Transfer   Physical Assistance Level 25% or less   Comment SPT with RW   CARE Score - Chair/Bed-to-Chair Transfer 3   Chair/Bed-to-Chair Transfer Discharge Goal   Discharge Goal 4   Car Transfer   Reason if not Attempted Environmental limitations   CARE Score - Car Transfer 10   Walk 10 Feet   Assistance Needed Incidental touching   Comment 7ft +18ft with RW and CGA for safety as pt with FFT, decrease step length and decrease annie.   CARE Score - Walk 10 Feet 4   Walk 50 Feet with Two Turns   Reason if not Attempted Safety concerns   CARE Score - Walk 50 Feet with Two Turns 88   Walk 150 Feet   Reason if not Attempted Safety concerns   CARE Score - Walk 150 Feet 88   Walking 10 Feet on Uneven Surfaces   Reason if not Attempted Safety concerns   CARE  Score - Walking 10 Feet on Uneven Surfaces 88   1 Step (Curb)   Reason if not Attempted Safety concerns   CARE Score - 1 Step (Curb) 88   4 Steps   Reason if not Attempted Safety concerns   CARE Score - 4 Steps 88   12 Steps   Reason if not Attempted Safety concerns   CARE Score - 12 Steps 88   Picking Up Object   Reason if not Attempted Safety concerns   CARE Score - Picking Up Object 88   Uses a Wheelchair/Scooter?   Uses a Wheelchair/Scooter? Yes   Wheel 50 Feet with Two Turns   Physical Assistance Level 76% or more   Comment with B U/Es   CARE Score - Wheel 50 Feet with Two Turns 2   Type of Wheelchair/Scooter Manual   Wheel 150 Feet   Comment pt became fatigued at ~ 55ft   Reason if not Attempted Safety concerns   CARE Score - Wheel 150 Feet 88   Type of Wheelchair/Scooter Manual       Therapeutic Activities and Exercises: Mini-eliptical x 10mins. Resistance set at medium to help improve B L/E MMT and endurance.    Education:  · Patient provided with daily orientation and goals of this PT session.  · Patient educated to transfer to bedside chair/bedside commode/bathroom with RN/PCT present.   · Patient educated on Fall risk, home safety and plan of care by explanation.   · Patient  Verbalized Understanding.  · Time provided for therapeutic counseling and discussion of current health disposition. All questions answered to satisfaction, within scope of PT practice; voiced no other concerns. White board updated in patient's room, RN notified of session.          AM-PAC 6 CLICK MOBILITY  Total Score:16     Patient left up in chair with call button in reach.    GOALS:   Multidisciplinary Problems     Physical Therapy Goals        Problem: Physical Therapy    Goal Priority Disciplines Outcome Goal Variances Interventions   Physical Therapy Goal     PT, PT/OT Ongoing, Progressing     Description: Goals to be met by: 9/9/22    Patient will increase functional independence with mobility by performing:      . Supine to  sit with Set-up [place]  . Sit to supine with Set-up [place]  . Rolling to Left and Right with Set-up Assistance.  . Sit to stand transfer with Supervision  . Bed to chair transfer with Supervision using Rolling Walker  . Gait  x 50 feet with Stand-by Assistance using Rolling Walker.   . Wheelchair propulsion x50 feet with Minimal Assistance using bilateral uppper extremities  . Ascend/Descend 2 inch curb step with Moderate Assistance using Rolling Walker.                        History:     Past Medical History:   Diagnosis Date    Arthritis     Cancer     CHF (congestive heart failure)     Coronary artery disease     Hypertension     Thyroid disease        Past Surgical History:   Procedure Laterality Date    CARDIAC CATHETERIZATION      CARDIAC VALVE SURGERY      CHOLECYSTECTOMY      CORONARY ANGIOGRAPHY N/A 4/23/2019    Procedure: ANGIOGRAM, CORONARY ARTERY;  Surgeon: Bakari Singletary MD;  Location: [facility] CATH LAB;  Service: Cardiology;  Laterality: N/A;    HYSTERECTOMY      lymph nodes removal      THYROIDECTOMY      TRANSCATHETER AORTIC VALVE REPLACEMENT (TAVR) N/A 5/7/2019    Procedure: REPLACEMENT, AORTIC VALVE, TRANSCATHETER (TAVR);  Surgeon: Bakari Singletary MD;  Location: [facility] CATH LAB;  Service: Cardiology;  Laterality: N/A;       Time Tracking:     PT Received On: 08/10/22  PT Start Time: 1000     PT Stop Time: 1035  PT Total Time (min): 35 min     Billable Minutes: Evaluation 15mins and Therapeutic Exercise 20mins      08/10/2022

## 2022-08-10 NOTE — PLAN OF CARE
[facility] - Skilled Nursing  Discharge Final Note    Primary Care Provider: Joseph Noel MD    Expected Discharge Date:     Final Discharge Note (most recent)       Final Note - 08/10/22 0857          Final Note    Assessment Type Final Discharge Note     Anticipated Discharge Disposition Skilled Nursing Facility (P)         Post-Acute Status    Post-Acute Placement Status Set-up Complete/Auth obtained (P)                      Important Message from Medicare           Pt transferred to [facility].    Abbey Whitmore LCSW   PRN

## 2022-08-10 NOTE — PT/OT/SLP EVAL
Occupational Therapy   Evaluation/Treatment    Name: Pilar Michael  MRN: [id number]  Admit Date: 8/9/2022  Admitting Diagnosis:  Pneumonia   Recent Surgeries: N/A    General Precautions: Standard, fall   Orthopedic Precautions:N/A   Braces: N/A     Recommendations:     Discharge Recommendations: home health OT  Level of Assistance Recommended: 24 hours light assistance  Discharge Equipment Recommendations:   (TBD)  Barriers to discharge:  Decreased caregiver support    Assessment:     Pilar Michael is a 89 y.o. female with a medical diagnosis of Pneumonia .  She remains limited in performance of self-care , functional mobility and ADLs and currently not performing tasks at OF . Currently presenting with performance deficits including  weakness, impaired endurance, impaired self care skills, impaired functional mobility, gait instability, impaired balance, decreased coordination, decreased upper extremity function, decreased lower extremity function, decreased safety awareness, decreased ROM, impaired coordination, impaired fine motor, impaired cardiopulmonary response to activity, impaired joint extensibility.    Pt tolerated Tx without incident but requires assist to perform self care tasks, functional mobility and functional transfers .  ROM in (L) UE is limited secondary to prior Lymphedema   She would continue to benefit from OT intervention to further her functional (I)ce and safety.    Rehab Potential is good    Activity Tolerance: Good    Plan:     Patient to be seen 5 x/week to address the above listed problems via self-care/home management, therapeutic activities, therapeutic exercises  · Plan of Care Expires: 09/09/22  · Plan of Care Reviewed with: patient    Subjective     Chief Complaint: Pt reporting limited use of (L) UE.  Patient/Family Comments/goals: Pt wants to return to [facility]    Occupational Profile:  Living Environment: with dghtr in [facility], 3-4 steps to enter, B rails that are able to be  reached simultaneously, t/s combo with grab bars   Previous level of function: family present reports that pt has required increased assist for all ADLs, sitting and standing, functional mobility.   Equipment Used at Home:  wheelchair, shower chair, bedside commode, walker, rolling, rollator  Assistance upon Discharge: from family, however, dghtr is working from home and Niece available is limited in physical abilities to assist .      Pain/Comfort:  · Pain Rating 1: 0/10  · Pain Rating Post-Intervention 1: 0/10    Patients cultural, spiritual, Yarsani conflicts given the current situation: no    Objective:     Communicated with: nurse prior to session.  Patient found supine with PureWick upon OT entry to room.    Occupational Performance:     Eating   Assistance Needed Set-up / clean-up   Physical Assistance Level   (with (A) to open containers and to cut food)   CARE Score - Eating 5   Oral Hygiene   Assistance Needed Verbal cues   Physical Assistance Level No physical assistance  (with all grooming performed seated sinkside.)   CARE Score - Oral Hygiene 4   Toileting Hygiene   Assistance Needed Physical assistance   Physical Assistance Level 76% or more  (with Pt assisting with cleaning front rosalva area and pull pants up over (R) hip.)   CARE Score - Toileting Hygiene 2   Shower/Bathe Self   Assistance Needed Physical assistance   Physical Assistance Level 51%-75%  (with (A) to wash lower body and rear rosalva area. Steadying needed when standing.)   CARE Score - Shower/Bathe Self 2   Upper Body Dressing   Assistance Needed Incidental touching   Physical Assistance Level 25% or less  (with (A) to manage shirt over her head when donning/doffing pullover shirt.)   CARE Score - Upper Body Dressing 3   Lower Body Dressing   Assistance Needed Physical assistance   Physical Assistance Level 26%-50%  (with Pt donning pants seated unsupported with RW to steady when standing.)   CARE Score - Lower Body Dressing 3   Putting  On/Taking Off Footwear   Assistance Needed Physical assistance   Physical Assistance Level 51%-75%  (with Pt donning socks and with slip on shoes.)   CARE Score - Putting On/Taking Off Footwear 2   Roll Left and Right   Assistance Needed Supervision   Physical Assistance Level No physical assistance  (with HOB flat and Pt using bed rails.)   CARE Score - Roll Left and Right 4   Sit to Lying   Assistance Needed Supervision   Physical Assistance Level No physical assistance  (with HOB flat and Pt using bed rails.)   CARE Score - Sit to Lying 4   Lying to Sitting on Side of Bed   Assistance Needed Incidental touching   Physical Assistance Level 25% or less  (with HOB flat and Pt using bed rails.)   CARE Score - Lying to Sitting on Side of Bed 3   Sit to Stand   Assistance Needed Incidental touching   Physical Assistance Level 25% or less  (when transitioning from sitting to standing with RW)   CARE Score - Sit to Stand 3   Chair/Bed-to-Chair Transfer   Assistance Needed Incidental touching   Physical Assistance Level 25% or less  (when transitioning from sitting to standing with RW)   CARE Score - Chair/Bed-to-Chair Transfer 3   Toilet Transfer   Assistance Needed Incidental touching   Physical Assistance Level 25% or less  (when transitioning from sitting to standing with RW)   CARE Score - Toilet Transfer 3       Cognitive/Visual Perceptual:  Cognitive/Psychosocial Skills:     -       Oriented to: Person, Place and Situation   -       Follows Commands/attention:Follows multistep  commands  -       Communication: clear/fluent  -       Memory: No Deficits noted  -       Safety awareness/insight to disability: intact   -       Mood/Affect/Coping skills/emotional control: Appropriate to situation  Visual/Perceptual:      -Intact .    Physical Exam:  Balance: - Pt demonstrated good functional sitting balance as noted when performing self care tasks.          Fair Minus functional standing with RW and Min to CGA required  for safety.   Postural examination/scapula alignment:    -       Rounded shoulders  Skin integrity: Visible skin intact  Edema:  Severe (L) UE   Sensation:    -       Intact  Motor Planning: -       WFLs  Dominant hand: -       Right  Upper Extremity Range of Motion:     -       Right Upper Extremity: WFL  -       Left Upper Extremity: WFL except Shld Flexion and Abduction with both limited to 0-40 degs AROM and 0-80 A/AROM  Upper Extremity Strength:    -       Right Upper Extremity: Grossly 4/5 throughout.  -       Left Upper Extremity: WFL except and grossly 3+/5 from Elbow to distal UE. Shld strength grossly 2+/5   Strength:    -       Right Upper Extremity: WFL  -       Left Upper Extremity: Diminished.  Fine Motor Coordination:    -       Intact (R) UE but diminished in (L).affecting her performance of manipulating objects.     AMPAC 6 Click ADL:  AMPAC Total Score: 15    Treatment & Education:  Performed 2 minutes on UBE with no resistance.   Pt edu on role of OT, POC, safety when performing self care tasks , benefit of performing OOB activity, and safety when performing functional transfers and mobility.  - White board updated  - Self care tasks completed-- as noted above     Education:    Patient left up in chair with call button in reach    GOALS:   Multidisciplinary Problems     Occupational Therapy Goals        Problem: Occupational Therapy    Goal Priority Disciplines Outcome Interventions   Occupational Therapy Goal     OT, PT/OT Ongoing, Progressing    Description: Goals to be met by: 8/26/22     Patient will increase functional independence with ADLs by performing:    Feeding with Modified [place].  UE Dressing with Stand-by Assistance.  LE Dressing with Stand-by Assistance.  Grooming while seated at sink with Supervision.  Toileting from toilet or BSC with Minimal Assistance for hygiene and clothing management.   Bathing from  shower chair/bench with Contact Guard Assistance.  Supine to sit  with Modified [place].  Step transfer with Stand-by Assistance with RW to steady.  Upper extremity exercise with assistance as needed.  Caregiver will be educated on level of assist required to safely perform self care tasks and functional transfers..                      History:     Past Medical History:   Diagnosis Date    Arthritis     Cancer     CHF (congestive heart failure)     Coronary artery disease     Hypertension     Thyroid disease        Past Surgical History:   Procedure Laterality Date    CARDIAC CATHETERIZATION      CARDIAC VALVE SURGERY      CHOLECYSTECTOMY      CORONARY ANGIOGRAPHY N/A 4/23/2019    Procedure: ANGIOGRAM, CORONARY ARTERY;  Surgeon: Bakari Singletary MD;  Location: [facility] CATH LAB;  Service: Cardiology;  Laterality: N/A;    HYSTERECTOMY      lymph nodes removal      THYROIDECTOMY      TRANSCATHETER AORTIC VALVE REPLACEMENT (TAVR) N/A 5/7/2019    Procedure: REPLACEMENT, AORTIC VALVE, TRANSCATHETER (TAVR);  Surgeon: Bakari Singletary MD;  Location: [facility] CATH LAB;  Service: Cardiology;  Laterality: N/A;       Time Tracking:     OT Date of Treatment: 08/10/22  OT Start Time: 0840  OT Stop Time: 0933  OT Total Time (min): 53 min    Billable Minutes:Evaluation 15  Self Care/Home Management 30  Therapeutic Activity 8    8/10/2022

## 2022-08-11 LAB
ANION GAP SERPL CALC-SCNC: 8 MMOL/L (ref 8–16)
BACTERIA SPEC AEROBE CULT: ABNORMAL
BACTERIA SPEC AEROBE CULT: ABNORMAL
BASOPHILS # BLD AUTO: 0.02 K/UL (ref 0–0.2)
BASOPHILS NFR BLD: 0.4 % (ref 0–1.9)
BNP SERPL-MCNC: 495 PG/ML (ref 0–99)
BUN SERPL-MCNC: 24 MG/DL (ref 8–23)
CALCIUM SERPL-MCNC: 8.7 MG/DL (ref 8.7–10.5)
CHLORIDE SERPL-SCNC: 104 MMOL/L (ref 95–110)
CO2 SERPL-SCNC: 26 MMOL/L (ref 23–29)
CREAT SERPL-MCNC: 0.7 MG/DL (ref 0.5–1.4)
DIFFERENTIAL METHOD: ABNORMAL
EOSINOPHIL # BLD AUTO: 0.2 K/UL (ref 0–0.5)
EOSINOPHIL NFR BLD: 4 % (ref 0–8)
ERYTHROCYTE [DISTWIDTH] IN BLOOD BY AUTOMATED COUNT: 18 % (ref 11.5–14.5)
EST. GFR  (NO RACE VARIABLE): >60 ML/MIN/1.73 M^2
GLUCOSE SERPL-MCNC: 83 MG/DL (ref 70–110)
GRAM STN SPEC: ABNORMAL
HCT VFR BLD AUTO: 31.8 % (ref 37–48.5)
HGB BLD-MCNC: 9.8 G/DL (ref 12–16)
IMM GRANULOCYTES # BLD AUTO: 0.04 K/UL (ref 0–0.04)
IMM GRANULOCYTES NFR BLD AUTO: 0.7 % (ref 0–0.5)
LYMPHOCYTES # BLD AUTO: 0.9 K/UL (ref 1–4.8)
LYMPHOCYTES NFR BLD: 16 % (ref 18–48)
MAGNESIUM SERPL-MCNC: 1.5 MG/DL (ref 1.6–2.6)
MCH RBC QN AUTO: 26.3 PG (ref 27–31)
MCHC RBC AUTO-ENTMCNC: 30.8 G/DL (ref 32–36)
MCV RBC AUTO: 86 FL (ref 82–98)
MONOCYTES # BLD AUTO: 0.5 K/UL (ref 0.3–1)
MONOCYTES NFR BLD: 9.4 % (ref 4–15)
NEUTROPHILS # BLD AUTO: 3.9 K/UL (ref 1.8–7.7)
NEUTROPHILS NFR BLD: 69.5 % (ref 38–73)
NRBC BLD-RTO: 0 /100 WBC
PHOSPHATE SERPL-MCNC: 3.1 MG/DL (ref 2.7–4.5)
PLATELET # BLD AUTO: 199 K/UL (ref 150–450)
PMV BLD AUTO: 11.6 FL (ref 9.2–12.9)
POTASSIUM SERPL-SCNC: 3.6 MMOL/L (ref 3.5–5.1)
RBC # BLD AUTO: 3.72 M/UL (ref 4–5.4)
SODIUM SERPL-SCNC: 138 MMOL/L (ref 136–145)
WBC # BLD AUTO: 5.55 K/UL (ref 3.9–12.7)

## 2022-08-11 PROCEDURE — 36415 COLL VENOUS BLD VENIPUNCTURE: CPT | Performed by: HOSPITALIST

## 2022-08-11 PROCEDURE — 25000003 PHARM REV CODE 250: Performed by: HOSPITALIST

## 2022-08-11 PROCEDURE — 97110 THERAPEUTIC EXERCISES: CPT | Mod: CQ

## 2022-08-11 PROCEDURE — 85025 COMPLETE CBC W/AUTO DIFF WBC: CPT | Performed by: HOSPITALIST

## 2022-08-11 PROCEDURE — 97110 THERAPEUTIC EXERCISES: CPT

## 2022-08-11 PROCEDURE — 83880 ASSAY OF NATRIURETIC PEPTIDE: CPT | Performed by: HOSPITALIST

## 2022-08-11 PROCEDURE — 97530 THERAPEUTIC ACTIVITIES: CPT

## 2022-08-11 PROCEDURE — 11000004 HC SNF PRIVATE

## 2022-08-11 PROCEDURE — 83735 ASSAY OF MAGNESIUM: CPT | Performed by: HOSPITALIST

## 2022-08-11 PROCEDURE — 97116 GAIT TRAINING THERAPY: CPT | Mod: CQ

## 2022-08-11 PROCEDURE — 80048 BASIC METABOLIC PNL TOTAL CA: CPT | Performed by: HOSPITALIST

## 2022-08-11 PROCEDURE — 97530 THERAPEUTIC ACTIVITIES: CPT | Mod: CQ

## 2022-08-11 PROCEDURE — 84100 ASSAY OF PHOSPHORUS: CPT | Performed by: HOSPITALIST

## 2022-08-11 RX ADMIN — ATORVASTATIN CALCIUM 20 MG: 20 TABLET, FILM COATED ORAL at 08:08

## 2022-08-11 RX ADMIN — LEVOTHYROXINE SODIUM 125 MCG: 25 TABLET ORAL at 05:08

## 2022-08-11 RX ADMIN — BUMETANIDE 1 MG: 1 TABLET ORAL at 05:08

## 2022-08-11 RX ADMIN — TIMOLOL MALEATE 1 DROP: 5 SOLUTION OPHTHALMIC at 08:08

## 2022-08-11 RX ADMIN — CARVEDILOL 3.12 MG: 3.12 TABLET, FILM COATED ORAL at 08:08

## 2022-08-11 RX ADMIN — CLOPIDOGREL BISULFATE 75 MG: 75 TABLET, FILM COATED ORAL at 08:08

## 2022-08-11 RX ADMIN — ASPIRIN 81 MG CHEWABLE TABLET 81 MG: 81 TABLET CHEWABLE at 08:08

## 2022-08-11 RX ADMIN — MEGESTROL ACETATE 40 MG: 20 TABLET ORAL at 08:08

## 2022-08-11 RX ADMIN — BUMETANIDE 1 MG: 1 TABLET ORAL at 08:08

## 2022-08-11 RX ADMIN — CARVEDILOL 3.12 MG: 3.12 TABLET, FILM COATED ORAL at 05:08

## 2022-08-11 RX ADMIN — LISINOPRIL 20 MG: 20 TABLET ORAL at 08:08

## 2022-08-11 NOTE — PT/OT/SLP PROGRESS
Occupational Therapy   Treatment    Name: Pilar Michael  MRN: [id number]  Admit Date: 8/9/2022  Admitting Diagnosis:  Pneumonia  General Precautions: Standard, fall   Orthopedic Precautions:N/A   Braces: N/A     Recommendations:     Discharge Recommendations: home health OT  Level of Assistance Recommended at Discharge: 24 hours physical assistance for all ADL's and home management tasks  Discharge Equipment Recommendations:   (TBD)  Barriers to discharge:  Decreased caregiver support    Assessment:     Pilar Michael is a 89 y.o. female with a medical diagnosis of Pneumonia.  She remains limited in performance of self-care , functional mobility and ADLs and currently not performing tasks at OF . Currently presenting with performance deficits including weakness, impaired endurance, impaired self care skills, impaired functional mobility, gait instability, impaired balance, decreased coordination, decreased upper extremity function, decreased lower extremity function, decreased safety awareness, abnormal tone, decreased ROM, impaired fine motor, impaired cardiopulmonary response to activity.    Pt tolerated Tx without incident and is making progress but continues to require assist to perform self care tasks, functional mobility and functional transfers .  She would continue to benefit from OT intervention to further her functional (I)ce and safety.    Rehab Potential is good    Activity tolerance:  Fair    Plan:     Patient to be seen 5 x/week to address the above listed problems via self-care/home management, therapeutic activities, therapeutic exercises    · Plan of Care Expires: 09/09/22  · Plan of Care Reviewed with: patient    Subjective     Communicated with: nurse prior to session.     Pain/Comfort:  Pain Rating 1: 0/10  Pain Rating Post-Intervention 1: 0/10    Patient's cultural, spiritual, Mu-ism conflicts given the current situation:  no    Objective:     Patient found up in chair with  (no lines)  upon OT entry to room.    Bed Mobility:    · Patient completed Scooting/Bridging with maximal assistance  · Patient completed Sit to Supine with maximal assistance     Functional Mobility/Transfers:  · Patient completed Sit <> Stand Transfer with minimum assistance  with  rolling walker   · Patient completed Bed <> Chair Transfer using Stand Pivot technique with minimum assistance with rolling walker    AMPAC 6 Click ADL: 14    OT Exercises: Pt performed UBE exercises  for 10 minutes with Min resistance to increase functional endurance and strength in order increase independence when performing self care tasks, functional ambulation, W/C propulsion, and functional standing activities .      Treatment & Education:  *Pt edu on POC, safety when performing self care tasks , benefit of performing OOB activity, and safety when performing functional transfers and mobility.  - White board updated  - Self care tasks completed-- as noted above   **    Patient left supine with call button in reachEducation:      GOALS:   Multidisciplinary Problems     Occupational Therapy Goals        Problem: Occupational Therapy    Goal Priority Disciplines Outcome Interventions   Occupational Therapy Goal     OT, PT/OT Ongoing, Progressing    Description: Goals to be met by: 8/26/22     Patient will increase functional independence with ADLs by performing:    Feeding with Modified [place].  UE Dressing with Stand-by Assistance.  LE Dressing with Stand-by Assistance.  Grooming while seated at sink with Supervision.  Toileting from toilet or BSC with Minimal Assistance for hygiene and clothing management.   Bathing from  shower chair/bench with Contact Guard Assistance.  Supine to sit with Modified [place].  Step transfer with Stand-by Assistance with RW to steady.  Upper extremity exercise with assistance as needed.  Caregiver will be educated on level of assist required to safely perform self care tasks and functional transfers..                       Time Tracking:     OT Date of Treatment: 08/11/22  OT Start Time: 1400    OT Stop Time: 1430  OT Total Time (min): 30 min    Billable Minutes:Therapeutic Activity 18  Therapeutic Exercise 12    8/11/2022

## 2022-08-11 NOTE — PT/OT/SLP PROGRESS
"Physical Therapy Treatment    Patient Name:  Pilar Michael   MRN:  [id number]  Admit Date: 8/9/2022  Admitting Diagnosis: <principal problem not specified>  Recent Surgeries: N/A    General Precautions: Standard, fall   Orthopedic Precautions:N/A   Braces:  N/A     Recommendations:     Discharge Recommendations:  home health PT   Level of Assistance Recommended at Discharge: 24 hours light assistance  Discharge Equipment Recommendations: wheelchair, walker, rolling, bath bench, bedside commode   Barriers to discharge: Decreased caregiver support    Assessment:     Pilar Michael is a 89 y.o. female admitted with a medical diagnosis of <principal problem not specified> . Pt participated, and tolerated treatment well. Pt will continue to benefit from skilled PT services to improve all deficits noted below Resume PT POC as indicated. .      Performance deficits affecting function:  weakness, impaired endurance, impaired self care skills, impaired functional mobility, gait instability, impaired balance, decreased upper extremity function, decreased lower extremity function, edema, impaired cardiopulmonary response to activity .    Rehab Potential is good    Activity Tolerance: Fair    Plan:     Patient to be seen 5 x/week to address the above listed problems via gait training, therapeutic activities, therapeutic exercises, neuromuscular re-education, wheelchair management/training    · Plan of Care Expires: 09/09/22  · Plan of Care Reviewed with: patient    Subjective     "I am so sleepy today.".     Pain/Comfort:  · Pain Rating 1: 0/10  · Pain Rating Post-Intervention 1: 0/10    Patient's cultural, spiritual, Pentecostalism conflicts given the current situation:  · no    Objective:     Communicated with nursing prior to session.  Patient found HOB elevated with  all lines intact upon PT entry to room.     Therapeutic Activities and Exercises:   -Donned pants at start of tx session.  -mini-elliptical x10 min   -BLE " therex x10 reps in supine with assistance : AP,HS,Hip abd/add  Functional Mobility:  · Bed Mobility:     · Scooting: moderate assistance  · Supine to Sit: moderate assistance  · Transfers:  Sit to Stand:  minimum assistance with RW; minimum assistance no AD Stand Pivot  · Gait: Attempted,but pt declined due to fatigue.     AM-PAC 6 CLICK MOBILITY  16    Patient left up in chair with all lines intact, call button in reach and nursing notified.    GOALS:   Multidisciplinary Problems     Physical Therapy Goals        Problem: Physical Therapy    Goal Priority Disciplines Outcome Goal Variances Interventions   Physical Therapy Goal     PT, PT/OT Ongoing, Progressing     Description: Goals to be met by: 9/9/22    Patient will increase functional independence with mobility by performing:      . Supine to sit with Set-up [place]  . Sit to supine with Set-up [place]  . Rolling to Left and Right with Set-up Assistance.  . Sit to stand transfer with Supervision  . Bed to chair transfer with Supervision using Rolling Walker  . Gait  x 50 feet with Stand-by Assistance using Rolling Walker.   . Wheelchair propulsion x50 feet with Minimal Assistance using bilateral uppper extremities  . Ascend/Descend 2 inch curb step with Moderate Assistance using Rolling Walker.                        Time Tracking:     PT Received On: 08/11/22             Billable Minutes:  Therapeutic Activity 10 and Therapeutic Exercise 15    Treatment Type: Treatment  PT/PTA: PTA     PTA Visit Number: 1 08/11/2022

## 2022-08-11 NOTE — PLAN OF CARE
Problem: Adult Inpatient Plan of Care  Goal: Plan of Care Review  Outcome: Ongoing, Progressing  Goal: Patient-Specific Goal (Individualized)  Outcome: Ongoing, Progressing  Goal: Absence of Hospital-Acquired Illness or Injury  Outcome: Ongoing, Progressing  Goal: Optimal Comfort and Wellbeing  Outcome: Ongoing, Progressing  Goal: Readiness for Transition of Care  Outcome: Ongoing, Progressing     Problem: Adjustment to Illness (Sepsis/Septic Shock)  Goal: Optimal Coping  Outcome: Ongoing, Progressing     Problem: Bleeding (Sepsis/Septic Shock)  Goal: Absence of Bleeding  Outcome: Ongoing, Progressing     Problem: Glycemic Control Impaired (Sepsis/Septic Shock)  Goal: Blood Glucose Level Within Desired Range  Outcome: Ongoing, Progressing     Problem: Infection Progression (Sepsis/Septic Shock)  Goal: Absence of Infection Signs and Symptoms  Outcome: Ongoing, Progressing     Problem: Nutrition Impaired (Sepsis/Septic Shock)  Goal: Optimal Nutrition Intake  Outcome: Ongoing, Progressing     Problem: Fluid Imbalance (Pneumonia)  Goal: Fluid Balance  Outcome: Ongoing, Progressing     Problem: Infection (Pneumonia)  Goal: Resolution of Infection Signs and Symptoms  Outcome: Ongoing, Progressing     Problem: Respiratory Compromise (Pneumonia)  Goal: Effective Oxygenation and Ventilation  Outcome: Ongoing, Progressing     Problem: Impaired Wound Healing  Goal: Optimal Wound Healing  Outcome: Ongoing, Progressing     Problem: Fall Injury Risk  Goal: Absence of Fall and Fall-Related Injury  Outcome: Ongoing, Progressing     Problem: Skin Injury Risk Increased  Goal: Skin Health and Integrity  Outcome: Ongoing, Progressing

## 2022-08-11 NOTE — PLAN OF CARE
Problem: Occupational Therapy  Goal: Occupational Therapy Goal  Description: Goals to be met by: 8/26/22     Patient will increase functional independence with ADLs by performing:    Feeding with Modified [place].  UE Dressing with Stand-by Assistance.  LE Dressing with Stand-by Assistance.  Grooming while seated at sink with Supervision.  Toileting from toilet or BSC with Minimal Assistance for hygiene and clothing management.   Bathing from  shower chair/bench with Contact Guard Assistance.  Supine to sit with Modified [place].  Step transfer with Stand-by Assistance with RW to steady.  Upper extremity exercise with assistance as needed.  Caregiver will be educated on level of assist required to safely perform self care tasks and functional transfers..     Outcome: Ongoing, Progressing

## 2022-08-11 NOTE — CLINICAL REVIEW
Clinical Pharmacy Chart Review Note      Admit Date: 8/9/2022   LOS: 2 days       Pilar Michael is a 89 y.o. female admitted to SNF for PT/OT after hospitalization for sepsis.    Review of patient's allergies indicates:   Allergen Reactions    Penicillins Swelling    Sulfa (sulfonamide antibiotics) Nausea Only     Patient Active Problem List    Diagnosis Date Noted    Pleural effusion 08/01/2022    Delirium 07/31/2022    Sepsis 07/31/2022    Palliative care encounter 07/20/2022    Acute hypoxemic respiratory failure 07/20/2022    Debility 07/06/2022    Acute on chronic combined systolic (congestive) and diastolic (congestive) heart failure     Normocytic anemia 07/03/2022    Increased anion gap metabolic acidosis 07/03/2022    Hyperlipidemia 07/03/2022    Acute cystitis 11/18/2021    CAP (community acquired pneumonia) 11/17/2021    Presence of permanent cardiac pacemaker 07/07/2020    Chronic combined systolic and diastolic heart failure 05/08/2019    Coronary artery disease involving native coronary artery of native heart without angina pectoris 05/08/2019    S/P TAVR (transcatheter aortic valve replacement) 05/07/2019    Acquired hypothyroidism 04/08/2019    Essential hypertension 04/08/2019    Severe aortic stenosis s/p TAVR        Scheduled Meds:    aspirin  81 mg Oral Daily    atorvastatin  20 mg Oral QHS    bumetanide  1 mg Oral BID loop    carvediloL  3.125 mg Oral BID WM    clopidogreL  75 mg Oral Daily    levothyroxine  125 mcg Oral Before breakfast    lisinopriL  20 mg Oral Daily    megestroL  40 mg Oral Daily    senna-docusate 8.6-50 mg  1 tablet Oral BID    timolol maleate 0.5%  1 drop Both Eyes BID     Continuous Infusions:   PRN Meds: acetaminophen, calcium carbonate, dextrose 10%, dextrose 10%, glucagon (human recombinant), glucose, glucose, melatonin    OBJECTIVE:     Vital Signs (Last 24H)  Temp:  [98.3 °F (36.8 °C)]   Pulse:  [60]   Resp:  [16]   BP:  (142-164)/(64-72)   SpO2:  [97 %]     Laboratory:  CBC:   Recent Labs   Lab 08/06/22  0402 08/07/22  0902 08/11/22  0520   WBC 7.38 9.87 5.55   RBC 3.89* 3.58* 3.72*   HGB 10.3* 9.7* 9.8*   HCT 31.8* 30.8* 31.8*   * 199 199   MCV 82 86 86   MCH 26.5* 27.1 26.3*   MCHC 32.4 31.5* 30.8*     BMP:   Recent Labs   Lab 08/06/22  0402 08/07/22  0902 08/11/22  0520   GLU 95 98 83    139 138   K 4.2 3.7 3.6    105 104   CO2 20* 25 26   BUN 17 21 24*   CREATININE 0.8 0.8 0.7   CALCIUM 9.0 8.7 8.7   MG 1.8 1.6 1.5*     CMP:   Recent Labs   Lab 08/05/22  0500 08/06/22 0402 08/07/22  0902 08/11/22  0520   GLU 94 95 98 83   CALCIUM 8.6* 9.0 8.7 8.7   ALBUMIN 2.6* 2.6*  --   --    PROT 7.1 7.7  --   --     137 139 138   K 3.3* 4.2 3.7 3.6   CO2 26 20* 25 26    107 105 104   BUN 19 17 21 24*   CREATININE 0.7 0.8 0.8 0.7   ALKPHOS 78 76  --   --    ALT 8* 7*  --   --    AST 16 19  --   --    BILITOT 1.0 1.0  --   --      LFTs:   Recent Labs   Lab 08/05/22  0500 08/06/22 0402   ALT 8* 7*   AST 16 19   ALKPHOS 78 76   BILITOT 1.0 1.0   PROT 7.1 7.7   ALBUMIN 2.6* 2.6*     Lab Results   Component Value Date    TSH 2.409 07/30/2022    FREET4 1.14 07/03/2022         ASSESSMENT/PLAN:     I have reviewed the medications in compliance with CMS Regulation F756 of the [place]. Based on information gathered, the following items may need to be addressed:    **Patient is taking megace for appetite stimulant. This medication is a Beers drug and is not recommended in older adults.  Beers Criteria: Avoid use in older adults due to minimal effects on weight and increased risk of thrombotic events, potentially fatal   Medications reviewed by PharmD, please re-consult if needed.      Smiley Mcnair, Pharm. D.  Clinical Pharmacist  [facility]

## 2022-08-11 NOTE — CONSULTS
[facility] - Skilled Nursing  Adult Nutrition  Consult Note    SUMMARY   Recommendations  Continue cardiac diet, add boost plus vanilla BID, recommend MVI,RD following  Goals: PO to meet 75% of needs with ONS by next RD follow-up  Nutrition Goal Status: new  Communication of RD Recs: reviewed with physician    Assessment and Plan  Inadequate oral intake related to AMS, SOB, loss of appetite as evidenced by PO < 75% > one month.    Continues    Plan  Collaboration with other providers  Commercial beverage( calories, protein) boost plus BID  Fat and mineral modified diet- cardiac      Malnutrition Assessment 8/11/22     Skin (Micronutrient): edema, pallor  Hair/Scalp (Micronutrient): dry  Eyes (Micronutrient): conjunctiva dull       Weight Loss (Malnutrition): greater than 7.5% in 3 months  Energy Intake (Malnutrition): less than 75% for greater than or equal to 1 month   Orbital Region (Subcutaneous Fat Loss): mild depletion  Upper Arm Region (Subcutaneous Fat Loss): well nourished  Thoracic and Lumbar Region: well nourished   [place] Region (Muscle Loss): mild depletion  Clavicle Bone Region (Muscle Loss): well nourished  Clavicle and Acromion Bone Region (Muscle Loss): well nourished  Dorsal Hand (Muscle Loss): mild depletion  Patellar Region (Muscle Loss): well nourished  Anterior Thigh Region (Muscle Loss): mild depletion   Edema (Fluid Accumulation): 2-->mild (ankles)         moderate swelling to BUE  Reason for Assessment    Reason For Assessment: consult  Diagnosis:  (pneumonia)  Relevant Medical History: HF, CAD,  TAVR, HLD, HTN, Thyroid disease, cancer, lymphedema,  Interdisciplinary Rounds: did not attend  General Information Comments: Son in room, patient has been in hospital/SNF for quite a long time now. PO/appetite is improving. She would like her boost plus order back BID vanilla. NFPE completed. pale, arm swelling , fluid retention issues. obese,otherwise nourished  Nutrition Discharge Planning: QASIM  "on cardiac diet, fluid per MD, ONS of choice  Nutrition/Diet History    Patient Reported Diet/Restrictions/Preferences: general  Spiritual, Cultural Beliefs, Buddhist Practices, Values that Affect Care: no  Food Allergies: NKFA  Factors Affecting Nutritional Intake: decreased appetite (SOB)    Anthropometrics    Temp: 98.3 °F (36.8 °C)  Height Method: Stated  Height: 5' 2" (157.5 cm)  Height (inches): 62 in  Weight Method: Standard Scale  Weight: 77.2 kg (170 lb 3.1 oz)  Weight (lb): 170.2 lb  Ideal Body Weight (IBW), Female: 110 lb  % Ideal Body Weight, Female (lb): 154.73 %  BMI (Calculated): 31.1  Usual Body Weight (UBW), k.63 kg  Weight Change Amount:  (likely fluid weight mostly)  % Usual Body Weight: 87.29  % Weight Change From Usual Weight: -12.9 %       Lab/Procedures/Meds  Pertinent Labs Reviewed: reviewed  Pertinent Labs Comments: Hg 9.8, Hct 31.8, BUN 24, Albumin 2.6, Mg 1.5  Pertinent Medications Reviewed: reviewed  Pertinent Medications Comments: ASA, statin, bumetanide, megace, senna-docusate, levothyroxine, lisinopril    Estimated/Assessed Needs    Weight Used For Calorie Calculations: 77.2 kg (170 lb 3.1 oz)  Energy Calorie Requirements (kcal): 1374  Energy Need Method: [place]-St Jeor (x 1.2 (PAL))  Protein Requirements: 75g-65g  Weight Used For Protein Calculations: 50 kg (110 lb 3.7 oz) (IBW 2/2 obesity x 1.5-1.3 g/kg)  Fluid Requirements (mL): 1500 or per MD     RDA Method (mL): 1374  CHO Requirement: -    Nutrition Prescription Ordered  Current Diet Order: Cardiac  Oral Nutrition Supplement: add boost plus BId vanilla    Evaluation of Received Nutrient/Fluid Intake  I/O: no data  Energy Calories Required: meeting needs  Protein Required: meeting needs  Fluid Required: meeting needs  Comments: LBM   Tolerance: tolerating  % Intake of Estimated Energy Needs: 50 - 75 %  % Meal Intake: 50 - 75 %    Nutrition Risk  Level of Risk/Frequency of Follow-up: low (one time per week)     Monitor and " Evaluation    Food and Nutrient Intake: food and beverage intake  Food and Nutrient Adminstration: diet order  Physical Activity and Function: nutrition-related ADLs and IADLs  Anthropometric Measurements: weight change  Biochemical Data, Medical Tests and Procedures: gastrointestinal profile, electrolyte and renal panel  Nutrition-Focused Physical Findings: overall appearance     Nutrition Follow-Up  RD Follow-up?: Yes

## 2022-08-11 NOTE — PLAN OF CARE
Continue cardiac diet, add boost plus vanilla BID, recommend MVI,RD following  Goals: PO to meet 75% of needs with ONS by next RD follow-up  Nutrition Goal Status: new  Communication of RD Recs: reviewed with physician     Assessment and Plan  Inadequate oral intake related to AMS, SOB, loss of appetite as evidenced by PO < 75% > one month.     Continues     Plan  Collaboration with other providers  Commercial beverage( calories, protein) boost plus BID  Fat and mineral modified diet- cardiac

## 2022-08-12 PROCEDURE — 25000003 PHARM REV CODE 250: Performed by: HOSPITALIST

## 2022-08-12 PROCEDURE — 97535 SELF CARE MNGMENT TRAINING: CPT | Mod: CO

## 2022-08-12 PROCEDURE — 97530 THERAPEUTIC ACTIVITIES: CPT | Mod: CQ

## 2022-08-12 PROCEDURE — 11000004 HC SNF PRIVATE

## 2022-08-12 PROCEDURE — 97110 THERAPEUTIC EXERCISES: CPT | Mod: CO

## 2022-08-12 PROCEDURE — 25000003 PHARM REV CODE 250: Performed by: NURSE PRACTITIONER

## 2022-08-12 PROCEDURE — 97116 GAIT TRAINING THERAPY: CPT | Mod: CQ

## 2022-08-12 RX ADMIN — ASPIRIN 81 MG CHEWABLE TABLET 81 MG: 81 TABLET CHEWABLE at 09:08

## 2022-08-12 RX ADMIN — SENNOSIDES AND DOCUSATE SODIUM 1 TABLET: 50; 8.6 TABLET ORAL at 09:08

## 2022-08-12 RX ADMIN — BUMETANIDE 1 MG: 1 TABLET ORAL at 04:08

## 2022-08-12 RX ADMIN — TIMOLOL MALEATE 1 DROP: 5 SOLUTION OPHTHALMIC at 08:08

## 2022-08-12 RX ADMIN — TIMOLOL MALEATE 1 DROP: 5 SOLUTION OPHTHALMIC at 09:08

## 2022-08-12 RX ADMIN — LISINOPRIL 20 MG: 20 TABLET ORAL at 09:08

## 2022-08-12 RX ADMIN — ATORVASTATIN CALCIUM 20 MG: 20 TABLET, FILM COATED ORAL at 08:08

## 2022-08-12 RX ADMIN — THERA TABS 1 TABLET: TAB at 02:08

## 2022-08-12 RX ADMIN — CLOPIDOGREL BISULFATE 75 MG: 75 TABLET, FILM COATED ORAL at 09:08

## 2022-08-12 RX ADMIN — BUMETANIDE 1 MG: 1 TABLET ORAL at 08:08

## 2022-08-12 RX ADMIN — CARVEDILOL 3.12 MG: 3.12 TABLET, FILM COATED ORAL at 08:08

## 2022-08-12 RX ADMIN — MEGESTROL ACETATE 40 MG: 20 TABLET ORAL at 09:08

## 2022-08-12 RX ADMIN — CARVEDILOL 3.12 MG: 3.12 TABLET, FILM COATED ORAL at 04:08

## 2022-08-12 RX ADMIN — LEVOTHYROXINE SODIUM 125 MCG: 25 TABLET ORAL at 05:08

## 2022-08-12 NOTE — PT/OT/SLP PROGRESS
"Occupational Therapy   Treatment    Name: Pilar Michael  MRN: [id number]  Admit Date: 8/9/2022  Admitting Diagnosis:  Pneumonia  General Precautions: Standard, fall   Orthopedic Precautions:N/A   Braces:       Recommendations:     Discharge Recommendations: home health OT  Level of Assistance Recommended at Discharge: 24 hours physical assistance for all ADL's and home management tasks  Discharge Equipment Recommendations:   (TBD)  Barriers to discharge:  Decreased caregiver support    Assessment:     Pilar Michael is a 89 y.o. female with a medical diagnosis of Pneumonia . Performance deficits affecting function are  weakness, impaired endurance, impaired self care skills, impaired functional mobility, gait instability, impaired balance, decreased coordination, decreased upper extremity function, decreased lower extremity function, decreased safety awareness, abnormal tone, decreased ROM, impaired fine motor, impaired cardiopulmonary response to activity. Pt. participated well with session on this day. Pt. Required increased encouragement to participate in therapy session. Pt. Will continue to benefit from continued OT to progress towards goals    Rehab Potential is good    Activity tolerance:  Fair    Plan:     Patient to be seen 5 x/week to address the above listed problems via self-care/home management, therapeutic activities, therapeutic exercises    · Plan of Care Expires: 09/09/22  · Plan of Care Reviewed with: patient    Subjective     Communicated with: nscedric prior to session. "I dont want to get up. Im too tired"    Pain/Comfort:  · Pain Rating 1: 0/10  · Pain Rating Post-Intervention 1: 0/10    Patient's cultural, spiritual, Zoroastrian conflicts given the current situation:  · no    Objective:     Patient found HOB elevated upon OT entry to room.    Bed Mobility:    · Patient completed Rolling/Turning to Right with minimum assistance  · Patient completed Scooting/Bridging with minimum " assistance  · Patient completed Supine to Sit with minimum assistance     Functional Mobility/Transfers:  · Patient completed Sit <> Stand Transfer with minimum assistance  with  rolling walker   · Patient completed Bed <> Chair Transfer using Stand Pivot technique with minimum assistance with hand-held assist  · Patient completed Toilet Transfer Stand Pivot technique with minimum assistance with  grab bars  · Functional Mobility: not tested    Activities of Daily Living:  · Grooming: supervision with grooming aspects while seated  · Upper Body Dressing: minimum assistance to doff/lola pullover shirt   · Lower Body Dressing: moderate assistance to thread pants through BLEs and manage over hips instance  · Toileting: maximal assistance with hygiene and diaper management. Increased time with toileting     [facility] 6 Click ADL: 14    OT Exercises: UE Ergometer 10 mins with RUE only     Treatment & Education:  Pt. Educated on safety with ADL tasks as well as functional mobility and need for staff assist.     Patient left up in chair with all lines intact and call button in reachEducation:      GOALS:   Multidisciplinary Problems     Occupational Therapy Goals        Problem: Occupational Therapy    Goal Priority Disciplines Outcome Interventions   Occupational Therapy Goal     OT, PT/OT Ongoing, Progressing    Description: Goals to be met by: 8/26/22     Patient will increase functional independence with ADLs by performing:    Feeding with Modified [place].  UE Dressing with Stand-by Assistance.  LE Dressing with Stand-by Assistance.  Grooming while seated at sink with Supervision.  Toileting from toilet or BSC with Minimal Assistance for hygiene and clothing management.   Bathing from  shower chair/bench with Contact Guard Assistance.  Supine to sit with Modified [place].  Step transfer with Stand-by Assistance with RW to steady.  Upper extremity exercise with assistance as needed.  Caregiver will be educated on  level of assist required to safely perform self care tasks and functional transfers..                      Time Tracking:     OT Date of Treatment: 08/12/22  OT Start Time: 1130    OT Stop Time: 1214  OT Total Time (min): 44 min    Billable Minutes:Self Care/Home Management 34  Therapeutic Exercise 10    8/12/2022   OT and YAN have discussed the above patients goals and status in collaboration with Plan of Care.

## 2022-08-12 NOTE — PLAN OF CARE
Problem: Occupational Therapy  Goal: Occupational Therapy Goal  Description: Goals to be met by: 8/26/22     Patient will increase functional independence with ADLs by performing:    Feeding with Modified [place].  UE Dressing with Stand-by Assistance.-ongoing  LE Dressing with Stand-by Assistance.  Grooming while seated at sink with Supervision.=MET  Toileting from toilet or BSC with Minimal Assistance for hygiene and clothing management.   Bathing from  shower chair/bench with Contact Guard Assistance.  Supine to sit with Modified [place].  Step transfer with Stand-by Assistance with RW to steady.  Upper extremity exercise with assistance as needed.  Caregiver will be educated on level of assist required to safely perform self care tasks and functional transfers..     Outcome: Ongoing, Progressing

## 2022-08-12 NOTE — PT/OT/SLP PROGRESS
Physical Therapy Treatment    Patient Name:  Pilar Michael   MRN:  [id number]  Admit Date: 8/9/2022  Admitting Diagnosis: Sepsis  General Precautions: Standard, fall   Orthopedic Precautions:N/A   Braces:   N/A    Recommendations:     Discharge Recommendations:  home health PT   Level of Assistance Recommended at Discharge: {SNF ASSISTANCE LEVEL OHS:342647865}  Discharge Equipment Recommendations: wheelchair, walker, rolling, bath bench, bedside commode   Barriers to discharge: Decreased caregiver support    Assessment:     Pilar Michael is a 89 y.o. female admitted with a medical diagnosis of Sepsis.      Performance deficits affecting function:  weakness, impaired endurance, impaired self care skills, impaired functional mobility, gait instability, impaired balance, decreased upper extremity function, decreased lower extremity function, edema, impaired cardiopulmonary response to activity .        Rehab Potential is {Rehab potential:38618}    Activity Tolerance: {THERAPY ACTIVITY TOLERANCE OHS:826327580}    Plan:     Patient to be seen 5 x/week to address the above listed problems via gait training, therapeutic activities, therapeutic exercises, neuromuscular re-education, wheelchair management/training    · Plan of Care Expires: 09/09/22  · Plan of Care Reviewed with: patient    Subjective     ***.     Pain/Comfort:  · Pain Rating 1: 0/10  · Pain Rating Post-Intervention 1: 0/10    Patient's cultural, spiritual, Confucianist conflicts given the current situation:  · no    Objective:     Communicated with *** prior to session.  Patient found {IP OT PATIENT LEFT POSITION OHS:897899795} with  (no active lines attached) upon PT entry to room.     Therapeutic Activities and Exercises: ***    Functional Mobility:  · {IP PT ACUTE FUNCTIONAL MOBILITY OHS:59028}    AM-PAC 6 CLICK MOBILITY  16    Patient left {IP OT PATIENT LEFT POSITION OHS:205400585} with {Patient left with:27731}.    GOALS:   Multidisciplinary  Problems     Physical Therapy Goals        Problem: Physical Therapy    Goal Priority Disciplines Outcome Goal Variances Interventions   Physical Therapy Goal     PT, PT/OT Ongoing, Progressing     Description: Goals to be met by: 9/9/22    Patient will increase functional independence with mobility by performing:      . Supine to sit with Set-up [place]  . Sit to supine with Set-up [place]  . Rolling to Left and Right with Set-up Assistance.  . Sit to stand transfer with Supervision  . Bed to chair transfer with Supervision using Rolling Walker  . Gait  x 50 feet with Stand-by Assistance using Rolling Walker.   . Wheelchair propulsion x50 feet with Minimal Assistance using bilateral uppper extremities  . Ascend/Descend 2 inch curb step with Moderate Assistance using Rolling Walker.                        Time Tracking:     PT Received On: 08/12/22  PT Start Time: 1429  PT Stop Time: 1453  PT Total Time (min): 24 min    Billable Minutes: {IP PT TREATMENT BILLABLE MINUTES OHS:7669924978}    Treatment Type: Treatment  PT/PTA: PTA     PTA Visit Number: 2     08/12/2022

## 2022-08-13 PROBLEM — A41.9 SEPSIS: Status: RESOLVED | Noted: 2022-07-31 | Resolved: 2022-08-13

## 2022-08-13 PROBLEM — J18.9 CAP (COMMUNITY ACQUIRED PNEUMONIA): Status: RESOLVED | Noted: 2021-11-17 | Resolved: 2022-08-13

## 2022-08-13 PROCEDURE — 11000004 HC SNF PRIVATE

## 2022-08-13 PROCEDURE — 99306 1ST NF CARE HIGH MDM 50: CPT | Mod: ,,, | Performed by: HOSPITALIST

## 2022-08-13 PROCEDURE — 99306 PR NURSING FACILITY CARE, INIT, HIGH SEVERITY: ICD-10-PCS | Mod: ,,, | Performed by: HOSPITALIST

## 2022-08-13 PROCEDURE — 25000003 PHARM REV CODE 250: Performed by: NURSE PRACTITIONER

## 2022-08-13 PROCEDURE — 25000003 PHARM REV CODE 250: Performed by: HOSPITALIST

## 2022-08-13 RX ADMIN — CLOPIDOGREL BISULFATE 75 MG: 75 TABLET, FILM COATED ORAL at 09:08

## 2022-08-13 RX ADMIN — ASPIRIN 81 MG CHEWABLE TABLET 81 MG: 81 TABLET CHEWABLE at 09:08

## 2022-08-13 RX ADMIN — THERA TABS 1 TABLET: TAB at 09:08

## 2022-08-13 RX ADMIN — CARVEDILOL 3.12 MG: 3.12 TABLET, FILM COATED ORAL at 08:08

## 2022-08-13 RX ADMIN — LISINOPRIL 20 MG: 20 TABLET ORAL at 09:08

## 2022-08-13 RX ADMIN — TIMOLOL MALEATE 1 DROP: 5 SOLUTION OPHTHALMIC at 09:08

## 2022-08-13 RX ADMIN — CARVEDILOL 3.12 MG: 3.12 TABLET, FILM COATED ORAL at 05:08

## 2022-08-13 RX ADMIN — ATORVASTATIN CALCIUM 20 MG: 20 TABLET, FILM COATED ORAL at 08:08

## 2022-08-13 RX ADMIN — SENNOSIDES AND DOCUSATE SODIUM 1 TABLET: 50; 8.6 TABLET ORAL at 09:08

## 2022-08-13 RX ADMIN — MEGESTROL ACETATE 40 MG: 20 TABLET ORAL at 09:08

## 2022-08-13 RX ADMIN — BUMETANIDE 1 MG: 1 TABLET ORAL at 08:08

## 2022-08-13 RX ADMIN — BUMETANIDE 1 MG: 1 TABLET ORAL at 05:08

## 2022-08-13 RX ADMIN — LEVOTHYROXINE SODIUM 125 MCG: 25 TABLET ORAL at 06:08

## 2022-08-13 RX ADMIN — TIMOLOL MALEATE 1 DROP: 5 SOLUTION OPHTHALMIC at 08:08

## 2022-08-13 NOTE — PLAN OF CARE
Problem: Adult Inpatient Plan of Care  Goal: Plan of Care Review  Outcome: Ongoing, Progressing  Flowsheets (Taken 8/13/2022 0056)  Plan of Care Reviewed With:   patient   daughter  Goal: Patient-Specific Goal (Individualized)  Outcome: Ongoing, Progressing  Flowsheets (Taken 8/13/2022 0056)  Anxieties, Fears or Concerns: that patient will have return of edema  Individualized Care Needs: monitor patient edema, fluid intake, weight trneds  Goal: Absence of Hospital-Acquired Illness or Injury  Outcome: Ongoing, Progressing  Intervention: Identify and Manage Fall Risk  Flowsheets (Taken 8/13/2022 0056)  Safety Promotion/Fall Prevention:   assistive device/personal item within reach   diversional activities provided   Fall Risk signage in place   Fall Risk reviewed with patient/family   lighting adjusted   medications reviewed   side rails raised x 3   instructed to call staff for mobility  Goal: Optimal Comfort and Wellbeing  Outcome: Ongoing, Progressing  Intervention: Provide Person-Centered Care  Flowsheets (Taken 8/13/2022 0056)  Trust Relationship/Rapport:   care explained   questions encouraged   choices provided   reassurance provided   emotional support provided   thoughts/feelings acknowledged   empathic listening provided   questions answered     Problem: Adjustment to Illness (Sepsis/Septic Shock)  Goal: Optimal Coping  Outcome: Ongoing, Progressing  Intervention: Optimize Psychosocial Adjustment to Illness  Flowsheets (Taken 8/13/2022 0056)  Supportive Measures:   active listening utilized   verbalization of feelings encouraged   positive reinforcement provided  Family/Support System Care: support provided     Problem: Glycemic Control Impaired (Sepsis/Septic Shock)  Goal: Blood Glucose Level Within Desired Range  Outcome: Ongoing, Progressing  Intervention: Optimize Glycemic Control  Flowsheets (Taken 8/13/2022 0056)  Glycemic Management: blood glucose monitored     Problem: Infection Progression  (Sepsis/Septic Shock)  Goal: Absence of Infection Signs and Symptoms  Outcome: Ongoing, Progressing  Intervention: Promote Recovery  Flowsheets (Taken 8/13/2022 0056)  Sleep/Rest Enhancement:   awakenings minimized   regular sleep/rest pattern promoted   relaxation techniques promoted     Problem: Fluid Imbalance (Pneumonia)  Goal: Fluid Balance  Outcome: Ongoing, Progressing  Intervention: Monitor and Manage Fluid Balance  Flowsheets (Taken 8/13/2022 0056)  Fluid/Electrolyte Management: fluids restricted     Problem: Respiratory Compromise (Pneumonia)  Goal: Effective Oxygenation and Ventilation  Outcome: Ongoing, Progressing  Intervention: Optimize Oxygenation and Ventilation  Flowsheets (Taken 8/13/2022 0056)  Head of Bed (HOB) Positioning: HOB at 30-45 degrees     Problem: Fall Injury Risk  Goal: Absence of Fall and Fall-Related Injury  Outcome: Ongoing, Progressing  Intervention: Identify and Manage Contributors  Flowsheets (Taken 8/13/2022 0056)  Self-Care Promotion:   independence encouraged   safe use of adaptive equipment encouraged   BADL personal objects within reach   BADL personal routines maintained  Medication Review/Management: medications reviewed

## 2022-08-13 NOTE — H&P
"Hospital Medicine  Skilled Nursing Facility   History and Physical Exam      Date of Service: 08/13/2022      Patient Name: Pilar Michael  MRN: [id number]  Admission Date: 8/9/2022  Attending Physician: Kyree Priest MD  Primary Care Provider: Joseph Noel MD  Code Status: Full Code      Principal problem:  Chronic combined systolic and diastolic heart failure      Chief Complaint:   Chief Complaint   Patient presents with    Congestive Heart Failure     Admitted to [facility] for rehabilitation following hospital stay for pneumonia and acute on chronic heart failure.           HPI:   "Mrs. Michael is an 87 year old female PMHx of severe AS s/p TAVR (5/7/19), CAD s/p ostial RCA stent placement (4/19), IDBCA s/p resection and radiation (2003), thyroid cancer s/p resection (1992), HTN, PAD who presents to SNF following hospitalization for acute hypoxemic respiratory failure with acute on chronic diastolic heart failure who [place] [facility] [place].  Admission to SNF for secondary weakness and debility.     Patient was [place] [facility] [place] on [place] for evaluation possible fever. Per [facility],  "  Per daughter at bedside, the patient becomes confused and lethargic when she has fever. During previous admission for CHF exacerbation she was also treated empirically for CAP. In the ED, she had T100.7, HR 60, /50, O2 94% on room air. She was placed on 2L NC with improvement in sats to 99%. CBC remarkable for H/H of 9/29, calcium 8.5, , troponin 0.028. UA hazy with trace leukocytes. CXR with L pleural effusion and L basilar atelectasis v consolidation, similar to prior study. She was given 2g Cefepime x1 dose for sepsis of unknown source. She is admitted to medicine for workup and treatment of sepsis. She developed fever 7/30 up to 102F and became lethargic and confused. She had been at SNF for approximately one week after being discharged [place] [facility] for acute exacerbation of CHF. Pt. Was started on cefepime, vanc, " "and azithromycin. During hospital course she has not become febrile and has become easily arousal. Pulmonology was consulted and believe the pulmonary effusion is due to insufficient diuresis. We will stop abx for now and monitor. Per pulkasey victoria, we will up the bumetanide to 1mg BID, and monitor.  Patient is doing well, diuresis was suboptimal. Gave a 1 time dose of 80mg lasix to attempt to increase output. Patient can resume 1mg BID after. She has been taking off oxygen for >1 day and is sating >90%. She feels much better and family agrees.  Family informed nursing that patient experienced an "episode" of feeling unwell. As dictated by her niece, she was brushing her teeth while in the bed, and "the color drained from her face." She was conscious the entire time and can easily recall the period in which she didn't feel great. O2 stats are still >90%. Ekg obtained noted no significant changes from the one several days prior. Patient's son endorsed she occasionally gets periods of feeling "unwell." Will continue to monitor. Patient to be D/C'd today to SNF with F/U and to establish at primary care center."     Today, patient appears lethargic which was her previous presentation while at SNF.  She is easily arousable and states she has no complaints at this time." per Lenka Salazar NP on 8/10/22.     She is in the Activity Room with her son and a family friend coloring a picture. She is working with therapy but tires quickly. She is urinating well. She does have some leg swelling that improves when she is lying in the bed. Her appetite is about the same as it has been, but does enjoy the fish. She has lost her appetite for meat. This is her third admission back to OS over the last 5 weeks.     Patient admitted with skilled services with PT and OT to improve functional status and ability to perform ADLs.       Past Medical History:   Past Medical History:   Diagnosis Date    Arthritis     Cancer     CHF (congestive " heart failure)     Coronary artery disease     Hypertension     Thyroid disease        Past Surgical History:   Past Surgical History:   Procedure Laterality Date    CARDIAC CATHETERIZATION      CARDIAC VALVE SURGERY      CHOLECYSTECTOMY      CORONARY ANGIOGRAPHY N/A 4/23/2019    Procedure: ANGIOGRAM, CORONARY ARTERY;  Surgeon: Bakari Singletary MD;  Location: [facility] CATH LAB;  Service: Cardiology;  Laterality: N/A;    HYSTERECTOMY      lymph nodes removal      THYROIDECTOMY      TRANSCATHETER AORTIC VALVE REPLACEMENT (TAVR) N/A 5/7/2019    Procedure: REPLACEMENT, AORTIC VALVE, TRANSCATHETER (TAVR);  Surgeon: Bakari Singletary MD;  Location: [facility] CATH LAB;  Service: Cardiology;  Laterality: N/A;       Social History:   Tobacco Use    Smoking status: Never Smoker    Smokeless tobacco: Never Used   Substance and Sexual Activity    Alcohol use: Not Currently    Drug use: Never    Sexual activity: Not on file       Family History:   Family History     Problem Relation (Age of Onset)    ALS Sister    Cancer Sister    Kidney disease Father          No current facility-administered medications on file prior to encounter.     Current Outpatient Medications on File Prior to Encounter   Medication Sig    aspirin 81 MG Chew Take 81 mg by mouth once daily.    atorvastatin (LIPITOR) 40 MG tablet Take 0.5 tablets (20 mg total) by mouth every evening.    bumetanide (BUMEX) 1 MG tablet Take 1 tablet (1 mg total) by mouth 2 (two) times a day.    carvediloL (COREG) 3.125 MG tablet Take 1 tablet (3.125 mg total) by mouth 2 (two) times daily.    clopidogreL (PLAVIX) 75 mg tablet Take 1 tablet (75 mg total) by mouth once daily.    lisinopriL (PRINIVIL,ZESTRIL) 20 MG tablet Take 20 mg by mouth once daily.    megestroL (MEGACE) 40 MG Tab Take 1 tablet (40 mg total) by mouth once daily.    SYNTHROID 125 mcg tablet Take 125 mcg by mouth once daily.    TIMOPTIC OCUDOSE, PF, 0.5 % Dpet Place 1 drop into both eyes 2  (two) times a day.    [DISCONTINUED] albuterol-ipratropium (DUO-NEB) 2.5 mg-0.5 mg/3 mL nebulizer solution Take 3 mLs by nebulization every 6 (six) hours as needed for Wheezing. Rescue (Patient not taking: Reported on 7/3/2022)    [DISCONTINUED] amLODIPine (NORVASC) 10 MG tablet Take 1 tablet (10 mg total) by mouth once daily.    [DISCONTINUED] losartan (COZAAR) 50 MG tablet Take 50 mg by mouth once daily.    [DISCONTINUED] traZODone (DESYREL) 50 MG tablet Take 50 mg by mouth nightly.       Allergies:   Review of patient's allergies indicates:   Allergen Reactions    Penicillins Swelling    Sulfa (sulfonamide antibiotics) Nausea Only       ROS:  Review of Systems   Constitutional: Positive for fatigue. Negative for appetite change, chills and fever.   HENT: Negative for congestion and sore throat.    Eyes: Negative for redness and visual disturbance.   Respiratory: Negative for cough and shortness of breath.    Cardiovascular: Positive for leg swelling. Negative for chest pain and palpitations.   Gastrointestinal: Negative for abdominal pain, nausea and vomiting.   Genitourinary: Negative for difficulty urinating and dysuria.   Musculoskeletal: Negative for arthralgias and back pain.   Skin: Negative for pallor and rash.   Allergic/Immunologic: Negative for environmental allergies and food allergies.   Neurological: Positive for weakness. Negative for dizziness and light-headedness.   Hematological: Does not bruise/bleed easily.   Psychiatric/Behavioral: Negative for sleep disturbance. The patient is not nervous/anxious.          Objective:  Temp:  [97.6 °F (36.4 °C)-98.5 °F (36.9 °C)]   Pulse:  [60]   Resp:  [16-18]   BP: (124-148)/(53-58)   SpO2:  [95 %-97 %]     Body mass index is 31.13 kg/m².      Physical Exam  Vitals and nursing note reviewed.   Constitutional:       General: She is not in acute distress.     Appearance: She is well-developed.   HENT:      Head: Normocephalic and atraumatic.      Right  Ear: External ear normal.      Left Ear: External ear normal.      Nose: No nasal deformity or mucosal edema.      Mouth/Throat:      Mouth: Mucous membranes are moist.      Pharynx: Uvula midline. No oropharyngeal exudate.   Eyes:      General: No scleral icterus.     Conjunctiva/sclera: Conjunctivae normal.      Pupils: Pupils are equal, round, and reactive to light.   Neck:      Trachea: Phonation normal.   Cardiovascular:      Rate and Rhythm: Normal rate and regular rhythm.      Heart sounds: S1 normal and S2 normal. No murmur heard.  Pulmonary:      Effort: Pulmonary effort is normal. No respiratory distress.      Breath sounds: Normal breath sounds. No wheezing or rales.   Chest:   Breasts:      Right: No supraclavicular adenopathy.      Left: No supraclavicular adenopathy.       Abdominal:      General: Bowel sounds are normal. There is no distension.      Palpations: Abdomen is soft.      Tenderness: There is no abdominal tenderness. There is no guarding or rebound.   Musculoskeletal:         General: Swelling (LUE lymphedema) present. No tenderness.      Cervical back: Neck supple. No muscular tenderness.      Right lower le+ Pitting Edema present.      Left lower le+ Pitting Edema present.   Lymphadenopathy:      Cervical:      Right cervical: No superficial cervical adenopathy.     Left cervical: No superficial cervical adenopathy.      Upper Body:      Right upper body: No supraclavicular adenopathy.      Left upper body: No supraclavicular adenopathy.   Skin:     General: Skin is warm and dry.      Findings: Bruising present. No rash.   Neurological:      Mental Status: She is alert and oriented to person, place, and time.      Motor: No tremor or seizure activity.   Psychiatric:         Mood and Affect: Mood normal. Affect is flat.         Behavior: Behavior normal. Behavior is cooperative.         Thought Content: Thought content normal.         Significant Labs:   A1C:   Recent Labs   Lab  04/06/22  0548   HGBA1C 5.9*     TSH:   Recent Labs   Lab 07/30/22  2212   TSH 2.409     CBC:  Recent Labs   Lab 08/11/22  0520   WBC 5.55   HGB 9.8*   HCT 31.8*      MCV 86     BMP:  Recent Labs   Lab 08/11/22  0520   GLU 83      K 3.6      CO2 26   BUN 24*   CREATININE 0.7   CALCIUM 8.7   MG 1.5*   PHOS 3.1     BNP  Recent Labs   Lab 08/11/22  1142   *         Significant Imaging: I have reviewed all pertinent imaging results/findings completed during prior hospitalization.      Assessment and Plan:  Active Diagnoses:    Diagnosis Date Noted POA    PRINCIPAL PROBLEM:  Chronic combined systolic and diastolic heart failure [I50.42] 05/08/2019 Yes    Pleural effusion [J90] 08/01/2022 Yes    Delirium [R41.0] 07/31/2022 Yes    Acute hypoxemic respiratory failure [J96.01] 07/20/2022 Yes    Debility [R53.81] 07/06/2022 Yes    Hyperlipidemia [E78.5] 07/03/2022 Yes     Chronic    Normocytic anemia [D64.9] 07/03/2022 Yes     Chronic    Presence of permanent cardiac pacemaker [Z95.0] 07/07/2020 Yes     Chronic    Coronary artery disease involving native coronary artery of native heart without angina pectoris [I25.10] 05/08/2019 Yes     Chronic    Acquired hypothyroidism [E03.9] 04/08/2019 Yes     Chronic    Essential hypertension [I10] 04/08/2019 Yes     Chronic    Severe aortic stenosis s/p TAVR [I35.0]  Yes     Chronic      Problems Resolved During this Admission:       Chronic combined systolic and diastolic heart failure  Pleural effusion  - Patient recently admitted to [facility] for CHF exacerbation.   - Currently on room air.   - She had dependent LE edema when up in chair for extended periods.   - Continue Bumex 1mg BID   - Follow volume status closely.   - Pulm declined performing thoracentesis, believes the effusion to be a diuresis problem     Hyperlipidemia  - continue home atorvastatin 20 mg qHS      Normocytic anemia  - transfuse if Hgb<7  - continue to monitor twice weekly  CBCs     Coronary artery disease involving native coronary artery of native heart without angina pectoris  S/p RCA stent placement  -continue home ASA 81 mg daily, atorvastatin 20 mg qHS and clopidogrel 75 mg daily.       Essential hypertension  - continue home carvedilol 3.125 mg BID, lisinopril 20mg daily, and Bumex 1mg BID      Acquired hypothyroidism  - continue home levothyroxine 125 mcg daily      Severe aortic stenosis s/p TAVR  - Continue to monitor     Debility   - Continue with PT/OT for gait training and strengthening and restoration of ADL's   - Encourage mobility, OOB in chair, and early ambulation as appropriate  - Fall precautions   - Monitor for bowel and bladder dysfunction  - Monitor for and prevent skin breakdown and pressure ulcers  - Continue DVT prophylaxis with frequent ambulation, patient also on aspirin and Plavix       Anticipated Disposition:  Home with home health      No future appointments.    I certify that SNF services are required to be given on an inpatient basis because Pilar Michael needs for skilled nursing care and/or skilled rehabilitation are required on a daily basis and such services can only practically be provided in a skilled nursing facility setting and are for an ongoing condition for which she received inpatient care in the hospital.       Kyree Priest MD  Department of Hospital Medicine   [facility] - Skilled Nursing

## 2022-08-14 PROCEDURE — 97530 THERAPEUTIC ACTIVITIES: CPT | Mod: CO

## 2022-08-14 PROCEDURE — 11000004 HC SNF PRIVATE

## 2022-08-14 PROCEDURE — 97110 THERAPEUTIC EXERCISES: CPT

## 2022-08-14 PROCEDURE — 25000003 PHARM REV CODE 250: Performed by: HOSPITALIST

## 2022-08-14 PROCEDURE — 97535 SELF CARE MNGMENT TRAINING: CPT | Mod: CO

## 2022-08-14 PROCEDURE — 97110 THERAPEUTIC EXERCISES: CPT | Mod: CO

## 2022-08-14 PROCEDURE — 25000003 PHARM REV CODE 250: Performed by: NURSE PRACTITIONER

## 2022-08-14 PROCEDURE — 97116 GAIT TRAINING THERAPY: CPT

## 2022-08-14 RX ADMIN — LISINOPRIL 20 MG: 20 TABLET ORAL at 08:08

## 2022-08-14 RX ADMIN — LEVOTHYROXINE SODIUM 125 MCG: 25 TABLET ORAL at 05:08

## 2022-08-14 RX ADMIN — CARVEDILOL 3.12 MG: 3.12 TABLET, FILM COATED ORAL at 05:08

## 2022-08-14 RX ADMIN — TIMOLOL MALEATE 1 DROP: 5 SOLUTION OPHTHALMIC at 08:08

## 2022-08-14 RX ADMIN — THERA TABS 1 TABLET: TAB at 08:08

## 2022-08-14 RX ADMIN — CARVEDILOL 3.12 MG: 3.12 TABLET, FILM COATED ORAL at 08:08

## 2022-08-14 RX ADMIN — TIMOLOL MALEATE 1 DROP: 5 SOLUTION OPHTHALMIC at 07:08

## 2022-08-14 RX ADMIN — ASPIRIN 81 MG CHEWABLE TABLET 81 MG: 81 TABLET CHEWABLE at 08:08

## 2022-08-14 RX ADMIN — CLOPIDOGREL BISULFATE 75 MG: 75 TABLET, FILM COATED ORAL at 08:08

## 2022-08-14 RX ADMIN — SENNOSIDES AND DOCUSATE SODIUM 1 TABLET: 50; 8.6 TABLET ORAL at 08:08

## 2022-08-14 RX ADMIN — ATORVASTATIN CALCIUM 20 MG: 20 TABLET, FILM COATED ORAL at 07:08

## 2022-08-14 RX ADMIN — BUMETANIDE 1 MG: 1 TABLET ORAL at 05:08

## 2022-08-14 RX ADMIN — MEGESTROL ACETATE 40 MG: 20 TABLET ORAL at 08:08

## 2022-08-14 RX ADMIN — BUMETANIDE 1 MG: 1 TABLET ORAL at 08:08

## 2022-08-14 NOTE — PLAN OF CARE
Problem: Occupational Therapy  Goal: Occupational Therapy Goal  Description: Goals to be met by: 8/26/22     Patient will increase functional independence with ADLs by performing:    Feeding with Modified [place].  UE Dressing with Stand-by Assistance.-ongoing  LE Dressing with Stand-by Assistance.  Grooming while seated at sink with Supervision.=MET  Toileting from toilet or BSC with Minimal Assistance for hygiene and clothing management.   Bathing from  shower chair/bench with Contact Guard Assistance.  Supine to sit with Modified [place].  Step transfer with Stand-by Assistance with RW to steady.  Upper extremity exercise with assistance as needed.  Caregiver will be educated on level of assist required to safely perform self care tasks and functional transfers..     Outcome: Ongoing, Progressing

## 2022-08-14 NOTE — PT/OT/SLP PROGRESS
Physical Therapy Treatment    Patient Name:  Pilar Michael   MRN:  [id number]  Admit Date: 8/9/2022  Admitting Diagnosis: Chronic combined systolic and diastolic heart failure  Recent Surgeries: N/A    General Precautions: Standard, fall   Orthopedic Precautions:N/A   Braces: N/A     Recommendations:     Discharge Recommendations:  home health PT   Level of Assistance Recommended at Discharge: 24 hours light assistance  Discharge Equipment Recommendations: wheelchair, walker, rolling, bath bench, bedside commode   Barriers to discharge: Decreased caregiver support    Assessment:     Pilar Michael is a 89 y.o. female admitted with a medical diagnosis of Chronic combined systolic and diastolic heart failure . Pt complaining of not seeing clearly through her R eye. Pt's nurse notified. Pt  However participated well during PT. Pt will benefit from continued snf rehab to help improve her overall functional mobility and safety.      Performance deficits affecting function:  weakness, impaired endurance, impaired self care skills, impaired functional mobility, gait instability, impaired balance, visual deficits, decreased upper extremity function, decreased lower extremity function, impaired cardiopulmonary response to activity .    Rehab Potential is good    Activity Tolerance: Fair    Plan:     Patient to be seen 5 x/week to address the above listed problems via gait training, therapeutic activities, therapeutic exercises, neuromuscular re-education, wheelchair management/training    · Plan of Care Expires: 09/09/22  · Plan of Care Reviewed with: patient    Subjective     Pt agreeable to work with PT.     Pain/Comfort:  · Pain Rating 1: 0/10  · Pain Rating Post-Intervention 1: 0/10    Patient's cultural, spiritual, Bahai conflicts given the current situation:  · no    Objective:     Communicated with pt's nurse prior to session.  Patient found up in chair with   upon PT entry to room.     Therapeutic Activities  and Exercises: Mini-eliptical x 10mins set at a mediun resistance to help improve B L/E MMT and endurance    Functional Mobility:  · Transfers:     · Sit to Stand:  minimum assistance with rolling walker  · Gait: 9ft +10ft +12ft with RW and Dashawn/CGA for safety as pt with FFT, decrease step length and annie. pt needed seated rest breaks d.t fatigue.    AM-PAC 6 CLICK MOBILITY  16    Patient left up in chair with all lines intact and call button in reach.    GOALS:   Multidisciplinary Problems     Physical Therapy Goals        Problem: Physical Therapy    Goal Priority Disciplines Outcome Goal Variances Interventions   Physical Therapy Goal     PT, PT/OT Ongoing, Progressing     Description: Goals to be met by: 9/9/22    Patient will increase functional independence with mobility by performing:      . Supine to sit with Set-up [place]  . Sit to supine with Set-up [place]  . Rolling to Left and Right with Set-up Assistance.  . Sit to stand transfer with Supervision  . Bed to chair transfer with Supervision using Rolling Walker  . Gait  x 50 feet with Stand-by Assistance using Rolling Walker.   . Wheelchair propulsion x50 feet with Minimal Assistance using bilateral uppper extremities  . Ascend/Descend 2 inch curb step with Moderate Assistance using Rolling Walker.                        Time Tracking:     PT Received On: 08/14/22  PT Start Time: 1100  PT Stop Time: 1124  PT Total Time (min): 24 min    Billable Minutes: Gait Training 14mins and Therapeutic Exercise 10mins    Treatment Type: Treatment  PT/PTA: PT     PTA Visit Number: 0     08/14/2022

## 2022-08-14 NOTE — PT/OT/SLP PROGRESS
"Occupational Therapy   Treatment    Name: Pilar Michael  MRN: [id number]  Admit Date: 8/9/2022  Admitting Diagnosis:  Chronic combined systolic and diastolic heart failure    General Precautions: Standard, fall   Orthopedic Precautions:N/A   Braces: N/A     Recommendations:     Discharge Recommendations: home health OT  Level of Assistance Recommended at Discharge: 24 hours physical assistance for all ADL's and home management tasks  Discharge Equipment Recommendations:   (TBD)  Barriers to discharge:  Decreased caregiver support    Assessment:     Pilar Michael is a 89 y.o. female with a medical diagnosis of Chronic combined systolic and diastolic heart failure.  She presents with the following performance deficits affecting function are weakness, impaired endurance, impaired self care skills, impaired functional mobility, gait instability, impaired balance, decreased lower extremity function, decreased upper extremity function, decreased safety awareness, pain, impaired cardiopulmonary response to activity, decreased ROM. Patient primarily limited by c/o "not myself this morning" but was able to tolerate OT session fair today. Patient would benefit from continued OT services to address deficits and progress towards goals. Continue OT POC.    Rehab Potential is good    Activity tolerance:  Fair    Plan:     Patient to be seen 5 x/week to address the above listed problems via self-care/home management, therapeutic activities, therapeutic exercises    · Plan of Care Expires: 09/09/22  · Plan of Care Reviewed with: patient, daughter    Subjective     Communicated with: RN and OTR prior to session. "I don't feel like myself today." Patient c/o poor sleep hygiene .    A client care conference was completed by the OTR and the YAN prior to treatment by the YAN to discuss the patient's POC and current status.    Pain/Comfort:  · Pain Rating 1: 0/10  · Pain Rating Post-Intervention 1: 0/10    Patient's cultural, " spiritual, Rastafari conflicts given the current situation:  · no    Objective:     Patient found HOB elevated with PureWick upon OT entry to room.    Bed Mobility:    · Patient completed Scooting/Bridging with contact guard assistance  · Patient completed Supine to Sit with contact guard assistance and HOB flat with support at trunk to facilitate upright posture; increased time for mobility due to fatigue     Functional Mobility/Transfers:  · Patient completed Sit <> Stand Transfer with contact guard assistance  with  rolling walker   · Patient completed Bed > WheelChair Transfer using Step Transfer technique with minimum assistance with rolling walker  · Functional Mobility: did not assess    Activities of Daily Living:  · Grooming: supervision for item retrieval and min cues for thoroughness to complete facial hygiene seated in w/c   · Lower Body Dressing: Max(A) for decreased endurance and BUE gross motor strength to thread and pull pants up to thighs; good functional reach noted today but limited by fatigue due to c/o poor sleep prior night    [facility] 6 Click ADL: 14    OT Exercises: UE Ergometer x10 min (R)UE with no added resistance with focus on improving muscular endurance and restoring muscular strength required for increased activity tolerance and (I) during ADL tasks. Instruction and facilitation provided to maintain proper form and joint integrity required to maximize appropriate muscle recruitment.    Treatment & Education:  Education provided on OT POC, goals, and current progress.   Addressed all patient questions/concerns within [facility] scope of practice.     Patient left seated in w/c for PT handoff with PT presentEducation:      GOALS:   Multidisciplinary Problems     Occupational Therapy Goals        Problem: Occupational Therapy    Goal Priority Disciplines Outcome Interventions   Occupational Therapy Goal     OT, PT/OT Ongoing, Progressing    Description: Goals to be met by: 8/26/22     Patient will  increase functional independence with ADLs by performing:    Feeding with Modified [place].  UE Dressing with Stand-by Assistance.-ongoing  LE Dressing with Stand-by Assistance.  Grooming while seated at sink with Supervision.=MET  Toileting from toilet or BSC with Minimal Assistance for hygiene and clothing management.   Bathing from  shower chair/bench with Contact Guard Assistance.  Supine to sit with Modified [place].  Step transfer with Stand-by Assistance with RW to steady.  Upper extremity exercise with assistance as needed.  Caregiver will be educated on level of assist required to safely perform self care tasks and functional transfers..                      Time Tracking:     OT Date of Treatment: 08/14/22  OT Start Time: 0957    OT Stop Time: 1041  OT Total Time (min): 44 min    Billable Minutes:Self Care/Home Management 18  Therapeutic Activity 16  Therapeutic Exercise 10    8/14/2022

## 2022-08-15 LAB
ANION GAP SERPL CALC-SCNC: 8 MMOL/L (ref 8–16)
BASOPHILS # BLD AUTO: 0.01 K/UL (ref 0–0.2)
BASOPHILS NFR BLD: 0.2 % (ref 0–1.9)
BNP SERPL-MCNC: 595 PG/ML (ref 0–99)
BUN SERPL-MCNC: 26 MG/DL (ref 8–23)
CALCIUM SERPL-MCNC: 8.4 MG/DL (ref 8.7–10.5)
CHLORIDE SERPL-SCNC: 104 MMOL/L (ref 95–110)
CO2 SERPL-SCNC: 25 MMOL/L (ref 23–29)
CREAT SERPL-MCNC: 0.7 MG/DL (ref 0.5–1.4)
DIFFERENTIAL METHOD: ABNORMAL
EOSINOPHIL # BLD AUTO: 0.2 K/UL (ref 0–0.5)
EOSINOPHIL NFR BLD: 3.5 % (ref 0–8)
ERYTHROCYTE [DISTWIDTH] IN BLOOD BY AUTOMATED COUNT: 17.9 % (ref 11.5–14.5)
EST. GFR  (NO RACE VARIABLE): >60 ML/MIN/1.73 M^2
GLUCOSE SERPL-MCNC: 85 MG/DL (ref 70–110)
HCT VFR BLD AUTO: 27.5 % (ref 37–48.5)
HGB BLD-MCNC: 8.6 G/DL (ref 12–16)
IMM GRANULOCYTES # BLD AUTO: 0.01 K/UL (ref 0–0.04)
IMM GRANULOCYTES NFR BLD AUTO: 0.2 % (ref 0–0.5)
LYMPHOCYTES # BLD AUTO: 0.8 K/UL (ref 1–4.8)
LYMPHOCYTES NFR BLD: 16.1 % (ref 18–48)
MAGNESIUM SERPL-MCNC: 1.5 MG/DL (ref 1.6–2.6)
MCH RBC QN AUTO: 26.8 PG (ref 27–31)
MCHC RBC AUTO-ENTMCNC: 31.3 G/DL (ref 32–36)
MCV RBC AUTO: 86 FL (ref 82–98)
MONOCYTES # BLD AUTO: 0.4 K/UL (ref 0.3–1)
MONOCYTES NFR BLD: 8.9 % (ref 4–15)
NEUTROPHILS # BLD AUTO: 3.5 K/UL (ref 1.8–7.7)
NEUTROPHILS NFR BLD: 71.1 % (ref 38–73)
NRBC BLD-RTO: 0 /100 WBC
PHOSPHATE SERPL-MCNC: 3.2 MG/DL (ref 2.7–4.5)
PLATELET # BLD AUTO: 176 K/UL (ref 150–450)
PMV BLD AUTO: 11.3 FL (ref 9.2–12.9)
POTASSIUM SERPL-SCNC: 3.5 MMOL/L (ref 3.5–5.1)
RBC # BLD AUTO: 3.21 M/UL (ref 4–5.4)
SODIUM SERPL-SCNC: 137 MMOL/L (ref 136–145)
WBC # BLD AUTO: 4.85 K/UL (ref 3.9–12.7)

## 2022-08-15 PROCEDURE — 25000003 PHARM REV CODE 250: Performed by: NURSE PRACTITIONER

## 2022-08-15 PROCEDURE — 84100 ASSAY OF PHOSPHORUS: CPT | Performed by: HOSPITALIST

## 2022-08-15 PROCEDURE — 25000003 PHARM REV CODE 250: Performed by: HOSPITALIST

## 2022-08-15 PROCEDURE — 85025 COMPLETE CBC W/AUTO DIFF WBC: CPT | Performed by: HOSPITALIST

## 2022-08-15 PROCEDURE — 83735 ASSAY OF MAGNESIUM: CPT | Performed by: HOSPITALIST

## 2022-08-15 PROCEDURE — 83880 ASSAY OF NATRIURETIC PEPTIDE: CPT | Performed by: HOSPITALIST

## 2022-08-15 PROCEDURE — 11000004 HC SNF PRIVATE

## 2022-08-15 PROCEDURE — 80048 BASIC METABOLIC PNL TOTAL CA: CPT | Performed by: HOSPITALIST

## 2022-08-15 PROCEDURE — 36415 COLL VENOUS BLD VENIPUNCTURE: CPT | Performed by: HOSPITALIST

## 2022-08-15 RX ORDER — LANOLIN ALCOHOL/MO/W.PET/CERES
400 CREAM (GRAM) TOPICAL 2 TIMES DAILY
Status: COMPLETED | OUTPATIENT
Start: 2022-08-15 | End: 2022-08-17

## 2022-08-15 RX ADMIN — LISINOPRIL 20 MG: 20 TABLET ORAL at 08:08

## 2022-08-15 RX ADMIN — CARVEDILOL 3.12 MG: 3.12 TABLET, FILM COATED ORAL at 05:08

## 2022-08-15 RX ADMIN — MEGESTROL ACETATE 40 MG: 20 TABLET ORAL at 08:08

## 2022-08-15 RX ADMIN — ATORVASTATIN CALCIUM 20 MG: 20 TABLET, FILM COATED ORAL at 08:08

## 2022-08-15 RX ADMIN — LEVOTHYROXINE SODIUM 125 MCG: 25 TABLET ORAL at 05:08

## 2022-08-15 RX ADMIN — BUMETANIDE 1 MG: 1 TABLET ORAL at 08:08

## 2022-08-15 RX ADMIN — CARVEDILOL 3.12 MG: 3.12 TABLET, FILM COATED ORAL at 08:08

## 2022-08-15 RX ADMIN — SENNOSIDES AND DOCUSATE SODIUM 1 TABLET: 50; 8.6 TABLET ORAL at 08:08

## 2022-08-15 RX ADMIN — THERA TABS 1 TABLET: TAB at 08:08

## 2022-08-15 RX ADMIN — CLOPIDOGREL BISULFATE 75 MG: 75 TABLET, FILM COATED ORAL at 08:08

## 2022-08-15 RX ADMIN — BUMETANIDE 1 MG: 1 TABLET ORAL at 05:08

## 2022-08-15 RX ADMIN — ASPIRIN 81 MG CHEWABLE TABLET 81 MG: 81 TABLET CHEWABLE at 08:08

## 2022-08-15 RX ADMIN — TIMOLOL MALEATE 1 DROP: 5 SOLUTION OPHTHALMIC at 08:08

## 2022-08-15 RX ADMIN — Medication 400 MG: at 08:08

## 2022-08-15 NOTE — PROGRESS NOTES
[facility]                                  Skilled Nursing Facility                   Progress Note     Admit Date: 8/9/2022  CHARLIE 8/25/2022  Principal Problem:  Chronic combined systolic and diastolic heart failure   HPI obtained from patient interview and chart review     Chief Complaint:  Re-evaluation of medical treatment and therapy status: Lab review    HPI:   Mrs. Michael is an 87 year old female PMHx of severe AS s/p TAVR (5/7/19), CAD s/p ostial RCA stent placement (4/19), IDBCA s/p resection and radiation (2003), thyroid cancer s/p resection (1992), HTN, PAD who presents to SNF following hospitalization for acute hypoxemic respiratory failure with acute on chronic diastolic heart failure who admitted back to [facility] for UTI.  Admission to SNF for secondary weakness and debility.    Interval history: All of today's labs reviewed- Na 138, K 3.6, BUN/creatinine 24/0.7, Mag 1.5, WBC 5.5, H&H 9.8/31, platelets 119.  24 hr vital sign ranges listed below.  Patient continues to be lethargic but overall unchanged from yesterday.  Patient denies shortness of breath, abdominal discomfort, nausea, or vomiting.  Patient reports an adequate appetite.  Patient denies dysuria.  Patient reports having regular bowel movements.  Patient progessing with PT/OT- Transfers:  Sit to Stand:  minimum assistance with RW; minimum assistance no AD Stand Pivot, Gait: Attempted,but pt declined due to fatigue. Continuing to follow and treat all acute and chronic conditions.    Past Medical History: Patient has a past medical history of Arthritis, Cancer, CHF (congestive heart failure), Coronary artery disease, Hypertension, and Thyroid disease.    Past Surgical History: Patient has a past surgical history that includes Hysterectomy; Thyroidectomy; lymph nodes removal; Cholecystectomy; Coronary angiography (N/A, 4/23/2019); Transcatheter aortic valve replacement (TAVR)  (N/A, 5/7/2019); Cardiac valuve replacement; and Cardiac catheterization.    Social History: Patient reports that she has never smoked. She has never used smokeless tobacco. She reports previous alcohol use. She reports that she does not use drugs.    Family History: family history includes ALS in her sister; Cancer in her sister; Kidney disease in her father.    Allergies: Patient is allergic to penicillins and sulfa (sulfonamide antibiotics).    ROS  Constitutional: Negative for fever   Eyes: Negative for blurred vision, double vision   Respiratory: Negative for cough, shortness of breath   Cardiovascular: Negative for chest pain, palpitations, and leg swelling.   Gastrointestinal: Negative for abdominal pain, constipation, diarrhea, nausea, vomiting.   Genitourinary: Negative for dysuria, frequency   Musculoskeletal:  + generalized weakness. Negative for back pain and myalgias.   Skin: Negative for itching and rash.   Neurological: Negative for dizziness, headaches.   Psychiatric/Behavioral: Negative for depression. The patient is not nervous/anxious.      24 hour Vital Sign Range   Temp:  [98 °F (36.7 °C)-98.2 °F (36.8 °C)]   Pulse:  [60-89]   Resp:  [16-18]   BP: (138-151)/(63-70)   SpO2:  [94 %-97 %]     PEx  Constitutional: Patient appears debilitated.  No distress noted  HENT:   Head: Normocephalic and atraumatic.   Eyes: Pupils are equal, round  Neck: Normal range of motion. Neck supple.   Cardiovascular: Normal rate, regular rhythm and normal heart sounds.    Pulmonary/Chest: Effort normal and breath sounds are clear  Abdominal: Soft. Bowel sounds are normal.   Musculoskeletal: Normal range of motion.  LUE with chronic lymphedema  Neurological:  Lethargic and oriented to person, place, and time.    Psychiatric: Normal mood and affect. Behavior is normal.   Skin: Skin is warm and dry.       Assessment and Plan:    Hypokalemia  - initiated potassium 30 mEq x1 dose    Hypomagnesemia  - initiated magnesium  oxide 400 mg BID x2 days    CAP (community acquired pneumonia)  Pleural effusion  - Patient with CXR showing L pleural effusion, L basilar atelectasis v consolidation, requiring 2L NC. Daughter at bedside reports productive cough, though mostly in the mornings.  - Vanc/cefepime/azithromycin  Stopped for now. Low suspicion for infectious process at this time due to negative cultures   - Pleural effusion on CT chest, pulmonology consulted for possible thoracentesis  - Pulm declined performing para, believes the effusion to be a diuresis problem  - continue  Bumex to 1mg BID      Hyperlipidemia  - continue home atorvastatin 20mg     Normocytic anemia  - transfuse if Hgb<7  - continue to monitor twice weekly CBCs    Coronary artery disease involving native coronary artery of native heart without angina pectoris  S/p RCA stent placement  -continue home ASA 81mg  -continue home atorvastatin  -continue home Plavix 75 mg daily     Chronic combined systolic and diastolic heart failure  - Patient recently admitted to [facility] for CHF exacerbation. She does not have LE edema or SOB at this time.  - on Room Air   - Bumex 1mg BID      Essential hypertension  - continue home carvedilol 3.125mg, lisinopril 20mg, Bumex 1mg BID      Acquired hypothyroidism  - continue home levothyroxine 125mcg      Severe aortic stenosis s/p TAVR  - Continue to monitor    Debility   - Continue with PT/OT for gait training and strengthening and restoration of ADL's   - Encourage mobility, OOB in chair, and early ambulation as appropriate  - Fall precautions   - Monitor for bowel and bladder dysfunction  - Monitor for and prevent skin breakdown and pressure ulcers  - Continue DVT prophylaxis with frequent ambulation, patient also on aspirin and Plavix         Anticipate disposition:  Home with home health      Follow-up needed during SNF stay-    Follow-up needed after discharge from SNF: PCP    No future appointments.        Lenka Salazar,  NP  Department of Hospital Medicine   [facility]     DOS: 8/11/2022       Patient note was created using MModal Dictation.  Any errors in syntax or even information may not have been identified and edited on initial review prior to signing this note.

## 2022-08-15 NOTE — PROGRESS NOTES
Pt refused daily standing weight. bedscale is incorrect. Will pass on to day shift nurse to try to get a weight today.

## 2022-08-15 NOTE — PROGRESS NOTES
"                                                        [facility]                                  Skilled Nursing Facility                   Progress Note     Admit Date: 8/9/2022  CHARLIE 8/25/2022  Principal Problem:  Chronic combined systolic and diastolic heart failure   HPI obtained from patient interview and chart review     Chief Complaint:  S/p hospitalization    HPI:   Mrs. Michael is an 87 year old female PMHx of severe AS s/p TAVR (5/7/19), CAD s/p ostial RCA stent placement (4/19), IDBCA s/p resection and radiation (2003), thyroid cancer s/p resection (1992), HTN, PAD who presents to SNF following hospitalization for acute hypoxemic respiratory failure with acute on chronic diastolic heart failure who admitted back to [facility] for UTI.  Admission to SNF for secondary weakness and debility.    Patient was sent to [facility] ED on 07/30 for evaluation possible fever. Per [facility],  "  Per daughter at bedside, the patient becomes confused and lethargic when she has fever. During previous admission for CHF exacerbation she was also treated empirically for CAP. In the ED, she had T100.7, HR 60, /50, O2 94% on room air. She was placed on 2L NC with improvement in sats to 99%. CBC remarkable for H/H of 9/29, calcium 8.5, , troponin 0.028. UA hazy with trace leukocytes. CXR with L pleural effusion and L basilar atelectasis v consolidation, similar to prior study. She was given 2g Cefepime x1 dose for sepsis of unknown source. She is admitted to medicine for workup and treatment of sepsis. She developed fever 7/30 up to 102F and became lethargic and confused. She had been at SNF for approximately one week after being discharged from [facility] for acute exacerbation of CHF. Pt. Was started on cefepime, vanc, and azithromycin. During hospital course she has not become febrile and has become easily arousal. Pulmonology was consulted and believe the pulmonary effusion is due to insufficient diuresis. We will " "stop abx for now and monitor. Per pulm recs, we will up the bumetanide to 1mg BID, and monitor.  Patient is doing well, diuresis was suboptimal. Gave a 1 time dose of 80mg lasix to attempt to increase output. Patient can resume 1mg BID after. She has been taking off oxygen for >1 day and is sating >90%. She feels much better and family agrees.  Family informed nursing that patient experienced an "episode" of feeling unwell. As dictated by her niece, she was brushing her teeth while in the bed, and "the color drained from her face." She was conscious the entire time and can easily recall the period in which she didn't feel great. O2 stats are still >90%. Ekg obtained noted no significant changes from the one several days prior. Patient's son endorsed she occasionally gets periods of feeling "unwell." Will continue to monitor. Patient to be D/C'd today to SNF with F/U and to establish at primary care center."    Today, patient appears lethargic which was her previous presentation while at SNF.  She is easily arousable and states she has no complaints at this time.    Patient will be treated at [facility] with PT and OT to improve functional status and ability to perform ADLs.     Past Medical History: Patient has a past medical history of Arthritis, Cancer, CHF (congestive heart failure), Coronary artery disease, Hypertension, and Thyroid disease.    Past Surgical History: Patient has a past surgical history that includes Hysterectomy; Thyroidectomy; lymph nodes removal; Cholecystectomy; Coronary angiography (N/A, 4/23/2019); Transcatheter aortic valve replacement (TAVR) (N/A, 5/7/2019); Cardiac valuve replacement; and Cardiac catheterization.    Social History: Patient reports that she has never smoked. She has never used smokeless tobacco. She reports previous alcohol use. She reports that she does not use drugs.    Family History: family history includes ALS in her sister; Cancer in her sister; Kidney disease in " her father.    Allergies: Patient is allergic to penicillins and sulfa (sulfonamide antibiotics).    ROS  Constitutional: Negative for fever   Eyes: Negative for blurred vision, double vision   Respiratory: Negative for cough, shortness of breath   Cardiovascular: Negative for chest pain, palpitations, and leg swelling.   Gastrointestinal: Negative for abdominal pain, constipation, diarrhea, nausea, vomiting.   Genitourinary: Negative for dysuria, frequency   Musculoskeletal:  + generalized weakness. Negative for back pain and myalgias.   Skin: Negative for itching and rash.   Neurological: Negative for dizziness, headaches.   Psychiatric/Behavioral: Negative for depression. The patient is not nervous/anxious.      24 hour Vital Sign Range   Temp:  [98 °F (36.7 °C)-98.2 °F (36.8 °C)]   Pulse:  [60-89]   Resp:  [16-18]   BP: (138-151)/(63-70)   SpO2:  [94 %-97 %]     PEx  Constitutional: Patient appears debilitated.  No distress noted  HENT:   Head: Normocephalic and atraumatic.   Eyes: Pupils are equal, round  Neck: Normal range of motion. Neck supple.   Cardiovascular: Normal rate, regular rhythm and normal heart sounds.    Pulmonary/Chest: Effort normal and breath sounds are clear  Abdominal: Soft. Bowel sounds are normal.   Musculoskeletal: Normal range of motion.  LUE with chronic lymphedema  Neurological:  Lethargic and oriented to person, place, and time.    Psychiatric: Normal mood and affect. Behavior is normal.   Skin: Skin is warm and dry. Full skin assessment completed.  No skin breakdown noted    Recent Labs   Lab 08/15/22  0555      K 3.5      CO2 25   BUN 26*   CREATININE 0.7   MG 1.5*       Recent Labs   Lab 08/15/22  0555   WBC 4.85   RBC 3.21*   HGB 8.6*   HCT 27.5*      MCV 86   MCH 26.8*   MCHC 31.3*         Assessment and Plan:    CAP (community acquired pneumonia)  Pleural effusion  - Patient with CXR showing L pleural effusion, L basilar atelectasis v consolidation, requiring  2L NC. Daughter at bedside reports productive cough, though mostly in the mornings.  - Vanc/cefepime/azithromycin  Stopped for now. Low suspicion for infectious process at this time due to negative cultures   - Pleural effusion on CT chest, pulmonology consulted for possible thoracentesis  - Pulm declined performing para, believes the effusion to be a diuresis problem  - continue  Bumex to 1mg BID      Hyperlipidemia  - continue home atorvastatin 20mg     Normocytic anemia  - transfuse if Hgb<7  - continue to monitor twice weekly CBCs    Coronary artery disease involving native coronary artery of native heart without angina pectoris  S/p RCA stent placement  -continue home ASA 81mg  -continue home atorvastatin  -continue home Plavix 75 mg daily     Chronic combined systolic and diastolic heart failure  - Patient recently admitted to [facility] for CHF exacerbation. She does not have LE edema or SOB at this time.  - on Room Air   - Bumex 1mg BID      Essential hypertension  - continue home carvedilol 3.125mg, lisinopril 20mg, Bumex 1mg BID      Acquired hypothyroidism  - continue home levothyroxine 125mcg      Severe aortic stenosis s/p TAVR  - Continue to monitor    Debility   - Continue with PT/OT for gait training and strengthening and restoration of ADL's   - Encourage mobility, OOB in chair, and early ambulation as appropriate  - Fall precautions   - Monitor for bowel and bladder dysfunction  - Monitor for and prevent skin breakdown and pressure ulcers  - Continue DVT prophylaxis with frequent ambulation, patient also on aspirin and Plavix         Anticipate disposition:  Home with home health      Follow-up needed during SNF stay-    Follow-up needed after discharge from SNF: PCP    No future appointments.        Total time of the visit 68 minutes 9516-5710  Non physical exam/ non charting time: 48 minutes   Description of non physical exam/non charting time: counseling patient on clinical conditions and  therapies provided regarding diuretic dose, heart failure medications, importance of proper PO nutrition hydration, participation with therapy, beginning of discharge planning. Extensive chart review completed including all consultation notes.  All pertinent laboratory and radiographical images reviewed.        Lenka Salazar NP  Department of Hospital Medicine   [facility]     DOS: 8/10/2022       Patient note was created using MModal Dictation.  Any errors in syntax or even information may not have been identified and edited on initial review prior to signing this note.

## 2022-08-15 NOTE — NURSING
Patient is complaining of blurriness in her right eye and says she feels more weak today. Pupils are equal to light accomodation. BP is 110/56, HR 60, O2 sat 95% on room air. Left hand  is slightly weaker than right which is her baseline. NP and MD notified.

## 2022-08-15 NOTE — PROGRESS NOTES
[facility]                                  Skilled Nursing Facility                   Progress Note     Admit Date: 8/9/2022  CHARLIE 8/25/2022  Principal Problem:  Chronic combined systolic and diastolic heart failure   HPI obtained from patient interview and chart review     Chief Complaint:  Re-evaluation of medical treatment and therapy status: Lab review    HPI:   Mrs. Michael is an 87 year old female PMHx of severe AS s/p TAVR (5/7/19), CAD s/p ostial RCA stent placement (4/19), IDBCA s/p resection and radiation (2003), thyroid cancer s/p resection (1992), HTN, PAD who presents to SNF following hospitalization for acute hypoxemic respiratory failure with acute on chronic diastolic heart failure who admitted back to [facility] [place] [facility] for UTI.  Admission to SNF for secondary weakness and debility.    Interval history: All of today's labs reviewed and are listed below.  Mag 1.5.  24 hr vital sign ranges listed below.  Patient continues to be lethargic but overall seems better from last week.  Patient expressed her feelings of lethargy as well and states sometimes she does not want to participate with therapy.  Phone conversation held with patient's son and daughter at bedside to provide care update.  Patient denies shortness of breath, abdominal discomfort, nausea, or vomiting.  Patient reports an adequate appetite.  Patient denies dysuria.  Patient reports having regular bowel movements.  Patient progessing with PT/OT- Gait: 9ft +10ft +12ft with RW and Dashawn/CGA for safety as pt with FFT, decrease step length and annie. pt needed seated rest breaks d.t fatigue on 8/15; declined PT today. Continuing to follow and treat all acute and chronic conditions.    Past Medical History: Patient has a past medical history of Arthritis, Cancer, CHF (congestive heart failure), Coronary artery disease, Hypertension, and Thyroid disease.    Past Surgical History:  Patient has a past surgical history that includes Hysterectomy; Thyroidectomy; lymph nodes removal; Cholecystectomy; Coronary angiography (N/A, 4/23/2019); Transcatheter aortic valve replacement (TAVR) (N/A, 5/7/2019); Cardiac valuve replacement; and Cardiac catheterization.    Social History: Patient reports that she has never smoked. She has never used smokeless tobacco. She reports previous alcohol use. She reports that she does not use drugs.    Family History: family history includes ALS in her sister; Cancer in her sister; Kidney disease in her father.    Allergies: Patient is allergic to penicillins and sulfa (sulfonamide antibiotics).    ROS  Constitutional: Negative for fever   Eyes: Negative for blurred vision, double vision   Respiratory: Negative for cough, shortness of breath   Cardiovascular: Negative for chest pain, palpitations, and leg swelling.   Gastrointestinal: Negative for abdominal pain, constipation, diarrhea, nausea, vomiting.   Genitourinary: Negative for dysuria, frequency   Musculoskeletal:  + generalized weakness. Negative for back pain and myalgias.   Skin: Negative for itching and rash.   Neurological: Negative for dizziness, headaches.   Psychiatric/Behavioral: Negative for depression. The patient is not nervous/anxious.      24 hour Vital Sign Range   Temp:  [98 °F (36.7 °C)-98.2 °F (36.8 °C)]   Pulse:  [60-89]   Resp:  [16-18]   BP: (138-151)/(63-70)   SpO2:  [94 %-97 %]     PEx  Constitutional: Patient appears debilitated.  No distress noted  HENT:   Head: Normocephalic and atraumatic.   Eyes: Pupils are equal, round  Neck: Normal range of motion. Neck supple.   Cardiovascular: Normal rate, regular rhythm and normal heart sounds.    Pulmonary/Chest: Effort normal and breath sounds are clear  Abdominal: Soft. Bowel sounds are normal.   Musculoskeletal: Normal range of motion.  LUE with chronic lymphedema  Neurological:  Lethargic and oriented to person, place, and time.     Psychiatric: Normal mood and affect. Behavior is normal.   Skin: Skin is warm and dry.     Recent Labs   Lab 08/15/22  0555      K 3.5      CO2 25   BUN 26*   CREATININE 0.7   MG 1.5*       Recent Labs   Lab 08/15/22  0555   WBC 4.85   RBC 3.21*   HGB 8.6*   HCT 27.5*      MCV 86   MCH 26.8*   MCHC 31.3*         Assessment and Plan:    Hypomagnesemia  - initiated magnesium oxide 400 mg BID x2 days    CAP (community acquired pneumonia)  Pleural effusion  - Patient with CXR showing L pleural effusion, L basilar atelectasis v consolidation, requiring 2L NC. Daughter at bedside reports productive cough, though mostly in the mornings.  - Vanc/cefepime/azithromycin  Stopped for now. Low suspicion for infectious process at this time due to negative cultures   - Pleural effusion on CT chest, pulmonology consulted for possible thoracentesis  - Pulm declined performing para, believes the effusion to be a diuresis problem  - continue  Bumex to 1mg BID      Hyperlipidemia  - continue home atorvastatin 20mg     Normocytic anemia  - transfuse if Hgb<7  - continue to monitor twice weekly CBCs    Coronary artery disease involving native coronary artery of native heart without angina pectoris  S/p RCA stent placement  -continue home ASA 81mg  -continue home atorvastatin  -continue home Plavix 75 mg daily     Chronic combined systolic and diastolic heart failure  - Patient recently admitted to [facility] for CHF exacerbation. She does not have LE edema or SOB at this time.  - on Room Air   - Bumex 1mg BID      Essential hypertension  - continue home carvedilol 3.125mg, lisinopril 20mg, Bumex 1mg BID      Acquired hypothyroidism  - continue home levothyroxine 125mcg      Severe aortic stenosis s/p TAVR  - Continue to monitor    Debility   - Continue with PT/OT for gait training and strengthening and restoration of ADL's   - Encourage mobility, OOB in chair, and early ambulation as appropriate  - Fall precautions    - Monitor for bowel and bladder dysfunction  - Monitor for and prevent skin breakdown and pressure ulcers  - Continue DVT prophylaxis with frequent ambulation, patient also on aspirin and Plavix         Anticipate disposition:  Home with home health      Follow-up needed during SNF stay-    Follow-up needed after discharge from SNF: PCP    No future appointments.        Lenka Salazar NP  Department of Hospital Medicine   [facility]     DOS: 8/15/2022      Patient note was created using MModal Dictation.  Any errors in syntax or even information may not have been identified and edited on initial review prior to signing this note.

## 2022-08-15 NOTE — PT/OT/SLP PROGRESS
"Physical Therapy      Patient Name:  Pilar Michael   MRN:  [id number]    Patient not seen today secondary to pt unwilling to participate, family present, ed on the importance/benefits of PT services "not up to it today, wiped out" declined OOB/supine therex  . Will follow-up next PT session        "

## 2022-08-15 NOTE — PLAN OF CARE
Interdisciplinary team, Brissa Bland, RN Nurse Manager, Lisy Jules, RN Charge Nurse, Mario Jackson, Unit Director, Nathaly Ford, RN MDS Coordinator, Humaira Hay PT, Rehab Supervisor, Cheryl Saunders LMSW, Lucy Burleson, Yamile, and Niharika Alanis, Dietician, spoke to patient for care plan conference, weekly status update, and therapy progress update. Tentative discharge date set for 8/25/22.

## 2022-08-16 PROCEDURE — 97116 GAIT TRAINING THERAPY: CPT | Mod: CQ

## 2022-08-16 PROCEDURE — 25000003 PHARM REV CODE 250: Performed by: NURSE PRACTITIONER

## 2022-08-16 PROCEDURE — 97530 THERAPEUTIC ACTIVITIES: CPT | Mod: CQ

## 2022-08-16 PROCEDURE — 97110 THERAPEUTIC EXERCISES: CPT | Mod: CQ

## 2022-08-16 PROCEDURE — 11000004 HC SNF PRIVATE

## 2022-08-16 PROCEDURE — 97535 SELF CARE MNGMENT TRAINING: CPT

## 2022-08-16 PROCEDURE — 25000003 PHARM REV CODE 250: Performed by: HOSPITALIST

## 2022-08-16 RX ORDER — ERGOCALCIFEROL 1.25 MG/1
50000 CAPSULE ORAL
Status: DISCONTINUED | OUTPATIENT
Start: 2022-08-17 | End: 2022-08-25 | Stop reason: HOSPADM

## 2022-08-16 RX ADMIN — BUMETANIDE 1 MG: 1 TABLET ORAL at 08:08

## 2022-08-16 RX ADMIN — CARVEDILOL 3.12 MG: 3.12 TABLET, FILM COATED ORAL at 05:08

## 2022-08-16 RX ADMIN — ASPIRIN 81 MG CHEWABLE TABLET 81 MG: 81 TABLET CHEWABLE at 09:08

## 2022-08-16 RX ADMIN — CLOPIDOGREL BISULFATE 75 MG: 75 TABLET, FILM COATED ORAL at 09:08

## 2022-08-16 RX ADMIN — MEGESTROL ACETATE 40 MG: 20 TABLET ORAL at 09:08

## 2022-08-16 RX ADMIN — CARVEDILOL 3.12 MG: 3.12 TABLET, FILM COATED ORAL at 08:08

## 2022-08-16 RX ADMIN — ATORVASTATIN CALCIUM 20 MG: 20 TABLET, FILM COATED ORAL at 09:08

## 2022-08-16 RX ADMIN — TIMOLOL MALEATE 1 DROP: 5 SOLUTION OPHTHALMIC at 09:08

## 2022-08-16 RX ADMIN — SENNOSIDES AND DOCUSATE SODIUM 1 TABLET: 50; 8.6 TABLET ORAL at 09:08

## 2022-08-16 RX ADMIN — Medication 400 MG: at 09:08

## 2022-08-16 RX ADMIN — LEVOTHYROXINE SODIUM 125 MCG: 25 TABLET ORAL at 06:08

## 2022-08-16 RX ADMIN — LISINOPRIL 20 MG: 20 TABLET ORAL at 09:08

## 2022-08-16 RX ADMIN — THERA TABS 1 TABLET: TAB at 09:08

## 2022-08-16 RX ADMIN — BUMETANIDE 1 MG: 1 TABLET ORAL at 04:08

## 2022-08-16 NOTE — PROGRESS NOTES
"                                                        [facility]                                  Skilled Nursing Facility                   Progress Note     Admit Date: 8/9/2022  CHARLIE 8/25/2022  Principal Problem:  Chronic combined systolic and diastolic heart failure   HPI obtained from patient interview and chart review     Chief Complaint:  Re-evaluation of medical treatment and therapy status:  Re-evaluation of lethargy    HPI:   Mrs. Michael is an 87 year old female PMHx of severe AS s/p TAVR (5/7/19), CAD s/p ostial RCA stent placement (4/19), IDBCA s/p resection and radiation (2003), thyroid cancer s/p resection (1992), HTN, PAD who presents to SNF following hospitalization for acute hypoxemic respiratory failure with acute on chronic diastolic heart failure who admitted back to [facility] [place].  Admission to SNF for secondary weakness and debility.    Interval history:  24 hr vital sign ranges listed below.  Continues to be lethargic.  Family states patient has been this way since March.  Does seem a bit better than yesterday.  Patient denies shortness of breath, abdominal discomfort, nausea, or vomiting.  Patient reports an adequate appetite.  Patient denies dysuria.  Patient reports having regular bowel movements.  Patient progessing with PT/OT- Gait: amb with RW min/CGA ~ 5 ft declined further gait distance/trials despite encouragement/education 2* to fatigue "tomorrow I'll do more". Continuing to follow and treat all acute and chronic conditions.    Past Medical History: Patient has a past medical history of Arthritis, Cancer, CHF (congestive heart failure), Coronary artery disease, Hypertension, and Thyroid disease.    Past Surgical History: Patient has a past surgical history that includes Hysterectomy; Thyroidectomy; lymph nodes removal; Cholecystectomy; Coronary angiography (N/A, 4/23/2019); Transcatheter aortic valve replacement (TAVR) (N/A, 5/7/2019); Cardiac valuve replacement; " and Cardiac catheterization.    Social History: Patient reports that she has never smoked. She has never used smokeless tobacco. She reports previous alcohol use. She reports that she does not use drugs.    Family History: family history includes ALS in her sister; Cancer in her sister; Kidney disease in her father.    Allergies: Patient is allergic to penicillins and sulfa (sulfonamide antibiotics).    ROS  Constitutional: Negative for fever   Eyes: Negative for blurred vision, double vision   Respiratory: Negative for cough, shortness of breath   Cardiovascular: Negative for chest pain, palpitations, and leg swelling.   Gastrointestinal: Negative for abdominal pain, constipation, diarrhea, nausea, vomiting.   Genitourinary: Negative for dysuria, frequency   Musculoskeletal:  + generalized weakness. Negative for back pain and myalgias.   Skin: Negative for itching and rash.   Neurological: Negative for dizziness, headaches.   Psychiatric/Behavioral: Negative for depression. The patient is not nervous/anxious.      24 hour Vital Sign Range   Temp:  [98.4 °F (36.9 °C)-98.7 °F (37.1 °C)]   Pulse:  [60]   Resp:  [16-17]   BP: (126-134)/(59-60)   SpO2:  [94 %-95 %]     PEx  Constitutional: Patient appears debilitated.  No distress noted  HENT:   Head: Normocephalic and atraumatic.   Eyes: Pupils are equal, round  Neck: Normal range of motion. Neck supple.   Cardiovascular: Normal rate, regular rhythm and normal heart sounds.    Pulmonary/Chest: Effort normal and breath sounds are clear  Abdominal: Soft. Bowel sounds are normal.   Musculoskeletal: Normal range of motion.  LUE with chronic lymphedema  Neurological:  Lethargic and oriented to person, place, and time.    Psychiatric: Normal mood and affect. Behavior is normal.   Skin: Skin is warm and dry.     No results for input(s): GLUCOSE, NA, K, CL, CO2, BUN, CREATININE, MG in the last 24 hours.    Invalid input(s):  CALCIUM    No results for input(s): WBC, RBC, HGB,  HCT, PLT, MCV, MCH, MCHC in the last 24 hours.      Assessment and Plan:    Lethargy  - ordered TSH, T4, B12 for next lab draw; initiated vitamin-D supplement    CAP (community acquired pneumonia)  Pleural effusion  - Patient with CXR showing L pleural effusion, L basilar atelectasis v consolidation, requiring 2L NC. Daughter at bedside reports productive cough, though mostly in the mornings.  - Vanc/cefepime/azithromycin  Stopped for now. Low suspicion for infectious process at this time due to negative cultures   - Pleural effusion on CT chest, pulmonology consulted for possible thoracentesis  - Pulm declined performing para, believes the effusion to be a diuresis problem  - continue  Bumex to 1mg BID      Hyperlipidemia  - continue home atorvastatin 20mg     Normocytic anemia  - transfuse if Hgb<7  - continue to monitor twice weekly CBCs    Coronary artery disease involving native coronary artery of native heart without angina pectoris  S/p RCA stent placement  -continue home ASA 81mg  -continue home atorvastatin  -continue home Plavix 75 mg daily     Chronic combined systolic and diastolic heart failure  - Patient recently admitted to [facility] for CHF exacerbation. She does not have LE edema or SOB at this time.  - on Room Air   - Bumex 1mg BID      Essential hypertension  - continue home carvedilol 3.125mg, lisinopril 20mg, Bumex 1mg BID      Acquired hypothyroidism  - continue home levothyroxine 125mcg      Severe aortic stenosis s/p TAVR  - Continue to monitor    Debility   - Continue with PT/OT for gait training and strengthening and restoration of ADL's   - Encourage mobility, OOB in chair, and early ambulation as appropriate  - Fall precautions   - Monitor for bowel and bladder dysfunction  - Monitor for and prevent skin breakdown and pressure ulcers  - Continue DVT prophylaxis with frequent ambulation, patient also on aspirin and Plavix         Anticipate disposition:  Home with home  health      Follow-up needed during SNF stay-    Follow-up needed after discharge from SNF: PCP    No future appointments.        Lenka Salazar NP  Department of Hospital Medicine   [facility]     DOS: 8/16/2022      Patient note was created using MModal Dictation.  Any errors in syntax or even information may not have been identified and edited on initial review prior to signing this note.

## 2022-08-16 NOTE — PLAN OF CARE
Problem: Occupational Therapy  Goal: Occupational Therapy Goal  Description: Goals to be met by: 8/26/22     Patient will increase functional independence with ADLs by performing:    Feeding with Modified [place].  UE Dressing with Stand-by Assistance.-ongoing  LE Dressing with Stand-by Assistance.  Grooming while seated at sink with Supervision.=MET  Toileting from toilet or BSC with Minimal Assistance for hygiene and clothing management.   Bathing from  shower chair/bench with Contact Guard Assistance.  Supine to sit with Modified [place].  Step transfer with Stand-by Assistance with RW to steady.  Upper extremity exercise with assistance as needed.  Caregiver will be educated on level of assist required to safely perform self care tasks and functional transfers..     Outcome: Ongoing, Progressing

## 2022-08-16 NOTE — PLAN OF CARE
Problem: Adult Inpatient Plan of Care  Goal: Plan of Care Review  Outcome: Ongoing, Progressing  Goal: Patient-Specific Goal (Individualized)  Outcome: Ongoing, Progressing  Goal: Absence of Hospital-Acquired Illness or Injury  Outcome: Ongoing, Progressing  Goal: Optimal Comfort and Wellbeing  Outcome: Ongoing, Progressing     Problem: Glycemic Control Impaired (Sepsis/Septic Shock)  Goal: Blood Glucose Level Within Desired Range  Outcome: Ongoing, Progressing     Problem: Nutrition Impaired (Sepsis/Septic Shock)  Goal: Optimal Nutrition Intake  Outcome: Ongoing, Progressing     Problem: Fluid Imbalance (Pneumonia)  Goal: Fluid Balance  Outcome: Ongoing, Progressing     Problem: Fall Injury Risk  Goal: Absence of Fall and Fall-Related Injury  Outcome: Ongoing, Progressing

## 2022-08-16 NOTE — PT/OT/SLP PROGRESS
"Physical Therapy Treatment    Patient Name:  Pilar Michael   MRN:  [id number]  Admit Date: 8/9/2022  Admitting Diagnosis: Chronic combined systolic and diastolic heart failure  Recent Surgeries:     General Precautions: Standard, fall   Orthopedic Precautions:N/A   Braces:       Recommendations:     Discharge Recommendations:  home health PT   Level of Assistance Recommended at Discharge: 24 hours light assistance  Discharge Equipment Recommendations: wheelchair, walker, rolling, bath bench, bedside commode   Barriers to discharge: Decreased caregiver support    Assessment:     Pilar Michael is a 89 y.o. female admitted with a medical diagnosis of Chronic combined systolic and diastolic heart failure . Pt tolerated well, pt would continue to benefit from skilled PT services to improve overall functional mobility, strength and endurance.  .      Performance deficits affecting function:  weakness, impaired endurance, impaired self care skills, impaired functional mobility, gait instability, impaired balance, visual deficits, decreased upper extremity function, decreased lower extremity function, impaired cardiopulmonary response to activity .    Rehab Potential is good    Activity Tolerance: Fair    Plan:     Patient to be seen 5 x/week to address the above listed problems via gait training, therapeutic activities, therapeutic exercises, neuromuscular re-education, wheelchair management/training    · Plan of Care Expires: 09/09/22  · Plan of Care Reviewed with: patient    Subjective     "alright" agreeable to therapy.     Pain/Comfort:  · Pain Rating 1: 3/10 (2-3)  · Location - Side 1: Left  · Location - Orientation 1: generalized  · Location 1:  (buttock)  · Pain Addressed 1: Reposition, Distraction, Nurse notified (nsg aware per pt and ed on pressure relief in wc and bed pt verbalized understanding, able to in wc with vcs)  · Pain Rating Post-Intervention 1: 3/10    Patient's cultural, spiritual, Hinduism " "conflicts given the current situation:  · no    Objective:       Patient found with  (in wc) upon PT entry to room.     Therapeutic Activities and Exercises: 2x10 reps AP,GS,LAQ,hip flex,abd/add mini elliptical x 10 min    Functional Mobility:  · Transfers:     · Sit to Stand:  minimum assistance with rolling walker  · Bed to Chair: minimum assistance with  rolling walker  using  Stand Pivot and ~ 2 ft vcs for safety/tech/sequencing WC to BSC  · Gait: amb with RW min/CGA ~ 5 ft declined further gait distance/trials despite encouragement/education 2* to fatigue "tomorrow I'll do more"    AM-PAC 6 CLICK MOBILITY  16    Patient left up in chair with call button in reach and belonging sin reach.    GOALS:   Multidisciplinary Problems     Physical Therapy Goals        Problem: Physical Therapy    Goal Priority Disciplines Outcome Goal Variances Interventions   Physical Therapy Goal     PT, PT/OT Ongoing, Progressing     Description: Goals to be met by: 9/9/22    Patient will increase functional independence with mobility by performing:      . Supine to sit with Set-up [place]  . Sit to supine with Set-up [place]  . Rolling to Left and Right with Set-up Assistance.  . Sit to stand transfer with Supervision  . Bed to chair transfer with Supervision using Rolling Walker  . Gait  x 50 feet with Stand-by Assistance using Rolling Walker.   . Wheelchair propulsion x50 feet with Minimal Assistance using bilateral uppper extremities  . Ascend/Descend 2 inch curb step with Moderate Assistance using Rolling Walker.                        Time Tracking:     PT Received On: 08/16/22  PT Start Time: 1318  PT Stop Time: 1400  PT Total Time (min): 42 min    Billable Minutes: Gait Training 8, Therapeutic Activity 10 and Therapeutic Exercise 24    Treatment Type: Treatment  PT/PTA: PTA     PTA Visit Number: 1     08/16/2022  "

## 2022-08-16 NOTE — PT/OT/SLP PROGRESS
Occupational Therapy   Treatment    Name: Pilar Michael  MRN: [id number]  Admit Date: 8/9/2022  Admitting Diagnosis:  Chronic combined systolic and diastolic heart failure    General Precautions: Standard, fall   Orthopedic Precautions:N/A   Braces: N/A     Recommendations:     Discharge Recommendations: home health OT  Level of Assistance Recommended at Discharge: 24 hours physical assistance for all ADL's and home management tasks  Discharge Equipment Recommendations:   (TBD)  Barriers to discharge:  Decreased caregiver support    Assessment:     Pilar Michael is a 89 y.o. female with a medical diagnosis of Chronic combined systolic and diastolic heart failure .  She remains limited in performance of self-care , functional mobility and ADLs and currently not performing tasks at OF . Currently presenting with performance deficits including  weakness, impaired endurance, impaired self care skills, impaired functional mobility, gait instability, impaired balance, decreased upper extremity function, decreased coordination, decreased safety awareness, decreased lower extremity function, decreased ROM, impaired cardiopulmonary response to activity.   Pt tolerated Tx without incident and is making progress but continues to require significant assist to perform self care tasks, functional mobility and functional transfers .  She would continue to benefit from OT intervention to further her functional (I)ce and safety.    Rehab Potential is good    Activity tolerance:  Fair    Plan:     Patient to be seen 5 x/week to address the above listed problems via self-care/home management, therapeutic activities, therapeutic exercises    · Plan of Care Expires: 09/09/22  · Plan of Care Reviewed with: patient, daughter    Subjective     Communicated with: nurse prior to session. .    Pain/Comfort:  · Pain Rating 1: 0/10  · Pain Rating Post-Intervention 1: 0/10    Patient's cultural, spiritual, Quaker conflicts given the  current situation:  · no    Objective:     Patient found supine with [place] upon OT entry to room.    Bed Mobility:    · Patient completed Rolling/Turning to Right with contact guard assistance  · Patient completed Scooting/Bridging with minimum assistance  · Patient completed Supine to Sit with minimum assistance     Functional Mobility/Transfers:  · Patient completed Sit <> Stand Transfer with minimum assistance  with  rolling walker   · Patient completed Bed <> Chair Transfer using Stand Pivot technique with minimum assistance with rolling walker    Activities of Daily Living:  · Grooming: stand by assistance seated sinkside to perform grooming.  · Upper Body Dressing: minimum assistance with (A ) to pull shirt over head.  · Lower Body Dressing: maximal assistance with (A) to don socks, to stand and to pull pants over her bottom.  · Toileting: maximal assistance with (A) to pull clothing over her bottom, and to perfrom rosalva care.     [facility] 6 Click ADL: 15    Treatment & Education:  Pt edu on POC, safety when performing self care tasks, and safety when performing functional transfers and mobility.  - White board updated  - Self care tasks completed-- as noted above       Patient left up in chair with call button in reachEducation:      GOALS:   Multidisciplinary Problems     Occupational Therapy Goals        Problem: Occupational Therapy    Goal Priority Disciplines Outcome Interventions   Occupational Therapy Goal     OT, PT/OT Ongoing, Progressing    Description: Goals to be met by: 8/26/22     Patient will increase functional independence with ADLs by performing:    Feeding with Modified [place].  UE Dressing with Stand-by Assistance.-ongoing  LE Dressing with Stand-by Assistance.  Grooming while seated at sink with Supervision.=MET  Toileting from toilet or BSC with Minimal Assistance for hygiene and clothing management.   Bathing from  shower chair/bench with Contact Guard Assistance.  Supine to sit  with Modified [place].  Step transfer with Stand-by Assistance with RW to steady.  Upper extremity exercise with assistance as needed.  Caregiver will be educated on level of assist required to safely perform self care tasks and functional transfers..                      Time Tracking:     OT Date of Treatment: 08/16/22  OT Start Time: 1122    OT Stop Time: 1200  OT Total Time (min): 38 min    Billable Minutes:Self Care/Home Management 38    8/16/2022

## 2022-08-17 PROCEDURE — 97116 GAIT TRAINING THERAPY: CPT | Mod: CQ

## 2022-08-17 PROCEDURE — 25000003 PHARM REV CODE 250: Performed by: NURSE PRACTITIONER

## 2022-08-17 PROCEDURE — 97110 THERAPEUTIC EXERCISES: CPT

## 2022-08-17 PROCEDURE — 97530 THERAPEUTIC ACTIVITIES: CPT | Mod: CQ

## 2022-08-17 PROCEDURE — 25000003 PHARM REV CODE 250: Performed by: HOSPITALIST

## 2022-08-17 PROCEDURE — 11000004 HC SNF PRIVATE

## 2022-08-17 PROCEDURE — 97530 THERAPEUTIC ACTIVITIES: CPT

## 2022-08-17 RX ADMIN — ASPIRIN 81 MG CHEWABLE TABLET 81 MG: 81 TABLET CHEWABLE at 08:08

## 2022-08-17 RX ADMIN — ERGOCALCIFEROL 50000 UNITS: 1.25 CAPSULE ORAL at 08:08

## 2022-08-17 RX ADMIN — ATORVASTATIN CALCIUM 20 MG: 20 TABLET, FILM COATED ORAL at 09:08

## 2022-08-17 RX ADMIN — CLOPIDOGREL BISULFATE 75 MG: 75 TABLET, FILM COATED ORAL at 08:08

## 2022-08-17 RX ADMIN — LISINOPRIL 20 MG: 20 TABLET ORAL at 08:08

## 2022-08-17 RX ADMIN — CARVEDILOL 3.12 MG: 3.12 TABLET, FILM COATED ORAL at 08:08

## 2022-08-17 RX ADMIN — BUMETANIDE 1 MG: 1 TABLET ORAL at 08:08

## 2022-08-17 RX ADMIN — TIMOLOL MALEATE 1 DROP: 5 SOLUTION OPHTHALMIC at 08:08

## 2022-08-17 RX ADMIN — TIMOLOL MALEATE 1 DROP: 5 SOLUTION OPHTHALMIC at 09:08

## 2022-08-17 RX ADMIN — SENNOSIDES AND DOCUSATE SODIUM 1 TABLET: 50; 8.6 TABLET ORAL at 09:08

## 2022-08-17 RX ADMIN — BUMETANIDE 1 MG: 1 TABLET ORAL at 06:08

## 2022-08-17 RX ADMIN — SENNOSIDES AND DOCUSATE SODIUM 1 TABLET: 50; 8.6 TABLET ORAL at 08:08

## 2022-08-17 RX ADMIN — CARVEDILOL 3.12 MG: 3.12 TABLET, FILM COATED ORAL at 06:08

## 2022-08-17 RX ADMIN — THERA TABS 1 TABLET: TAB at 08:08

## 2022-08-17 RX ADMIN — MEGESTROL ACETATE 40 MG: 20 TABLET ORAL at 08:08

## 2022-08-17 RX ADMIN — Medication 400 MG: at 08:08

## 2022-08-17 RX ADMIN — LEVOTHYROXINE SODIUM 125 MCG: 25 TABLET ORAL at 06:08

## 2022-08-17 NOTE — PT/OT/SLP PROGRESS
"Physical Therapy Treatment    Patient Name:  Pilar Michael   MRN:  [id number]  Admit Date: 8/9/2022  Admitting Diagnosis: Chronic combined systolic and diastolic heart failure  Recent Surgeries: N/A    General Precautions: Standard, fall   Orthopedic Precautions:N/A   Braces: N/A     Recommendations:     Discharge Recommendations:  home health PT   Level of Assistance Recommended at Discharge: 24 hours light assistance  Discharge Equipment Recommendations: wheelchair, walker, rolling, bath bench, bedside commode   Barriers to discharge: Decreased caregiver support    Assessment:     Pilar Michael is a 89 y.o. female admitted with a medical diagnosis of Chronic combined systolic and diastolic heart failure .     Pt was agreeable and tolerated session fairly well. Pt ambulated total ~20 ft with RW and CGA-Deep and wheelchair following. 1 seated rest break and no LOB noted. Pt is progressing and continues to benefit from therapy to improve functional endurance, strength, and mobility.     Performance deficits affecting function:  weakness, impaired endurance, impaired self care skills, impaired functional mobility, gait instability, impaired balance, visual deficits, decreased upper extremity function, decreased lower extremity function, impaired cardiopulmonary response to activity .    Rehab Potential is good    Activity Tolerance: Fair    Plan:     Patient to be seen 5 x/week to address the above listed problems via gait training, therapeutic activities, therapeutic exercises, neuromuscular re-education, wheelchair management/training    · Plan of Care Expires: 09/09/22  · Plan of Care Reviewed with: patient    Subjective     "it hurts when I sit on the [wound]".     Pain/Comfort:  · Pain Rating 1: 0/10  · Pain Rating Post-Intervention 1: 0/10    Patient's cultural, spiritual, Baptism conflicts given the current situation:  · no    Objective:     Patient found up in wheelchair with  (no lines) upon PT entry " to room.     Therapeutic Activities and Exercises:   · Increase time assisting with toileting.   · Patient educated on role of therapy, goals of session, and benefits of out of bed mobility.   · Instructed on use of call button and importance of calling nursing staff for assistance with mobility   · Questions/concerns addressed within PTA scope of practice   Pt verbalized understanding.   Whiteboard Updated      Functional Mobility:  · Bed Mobility:     · Attempted, unable to complete d/t increase pain near/on buttock/sacrum   · Transfers:     · Sit to Stand:  minimum assistance with rolling walker  · Verbal cues for hand placement for safety   · Bed to Chair: contact guard assistance with  no AD  using  Stand Pivot  · Chair to bed: contact guard assistance and minimum assistance with  no AD  using  Stand Pivot  · Toilet Transfer: contact guard assistance with  grab bars  using  Stand Pivot  · Gait: pt ambulated ~6ft + ~14ft with RW and CGA-Deep and wheelchair following. Occasional unsteady gait with flexed posture, decrease step length, and decrease heel strike.   · Verbal cues for hand placement on RW.   · 1 seated rest break and no LOB noted.   · Deferred further trial d/t needing to use the bathroom     Balance:   · Static standing balance: good, CGA with RW or grab bars     AM-PAC 6 CLICK MOBILITY  16    Patient left up in chair with call button in reach.    GOALS:   Multidisciplinary Problems     Physical Therapy Goals        Problem: Physical Therapy    Goal Priority Disciplines Outcome Goal Variances Interventions   Physical Therapy Goal     PT, PT/OT Ongoing, Progressing     Description: Goals to be met by: 9/9/22    Patient will increase functional independence with mobility by performing:      . Supine to sit with Set-up [place]  . Sit to supine with Set-up [place]  . Rolling to Left and Right with Set-up Assistance.  . Sit to stand transfer with Supervision  . Bed to chair transfer with  Supervision using Rolling Walker  . Gait  x 50 feet with Stand-by Assistance using Rolling Walker.   . Wheelchair propulsion x50 feet with Minimal Assistance using bilateral uppper extremities  . Ascend/Descend 2 inch curb step with Moderate Assistance using Rolling Walker.                        Time Tracking:     PT Received On: 08/17/22  PT Start Time: 1356  PT Stop Time: 1424  PT Total Time (min): 28 min    Billable Minutes: Gait Training 14 and Therapeutic Activity 14    Treatment Type: Treatment  PT/PTA: PTA     PTA Visit Number: 2     08/17/2022

## 2022-08-17 NOTE — PLAN OF CARE
Problem: Adult Inpatient Plan of Care  Goal: Plan of Care Review  Outcome: Ongoing, Progressing  Goal: Patient-Specific Goal (Individualized)  Outcome: Ongoing, Progressing  Goal: Absence of Hospital-Acquired Illness or Injury  Outcome: Ongoing, Progressing  Goal: Optimal Comfort and Wellbeing  Outcome: Ongoing, Progressing     Problem: Adjustment to Illness (Sepsis/Septic Shock)  Goal: Optimal Coping  Outcome: Ongoing, Progressing     Problem: Glycemic Control Impaired (Sepsis/Septic Shock)  Goal: Blood Glucose Level Within Desired Range  Outcome: Ongoing, Progressing     Problem: Infection Progression (Sepsis/Septic Shock)  Goal: Absence of Infection Signs and Symptoms  Outcome: Ongoing, Progressing     Problem: Nutrition Impaired (Sepsis/Septic Shock)  Goal: Optimal Nutrition Intake  Outcome: Ongoing, Progressing     Problem: Fluid Imbalance (Pneumonia)  Goal: Fluid Balance  Outcome: Ongoing, Progressing     Problem: Infection (Pneumonia)  Goal: Resolution of Infection Signs and Symptoms  Outcome: Ongoing, Progressing     Problem: Fall Injury Risk  Goal: Absence of Fall and Fall-Related Injury  Outcome: Ongoing, Progressing     Problem: Skin Injury Risk Increased  Goal: Skin Health and Integrity  Outcome: Ongoing, Progressing

## 2022-08-17 NOTE — PLAN OF CARE
Problem: Occupational Therapy  Goal: Occupational Therapy Goal  Description: Goals to be met by: 8/26/22     Patient will increase functional independence with ADLs by performing:    Feeding with Modified [place].  UE Dressing with Stand-by Assistance.-ongoing  LE Dressing with Stand-by Assistance.  Grooming while seated at sink with Supervision.=MET  Toileting from toilet or BSC with Minimal Assistance for hygiene and clothing management.   Bathing from  shower chair/bench with Contact Guard Assistance.  Supine to sit with Modified [place].  Step transfer with Stand-by Assistance with RW to steady.  Upper extremity exercise with assistance as needed.  Caregiver will be educated on level of assist required to safely perform self care tasks and functional transfers..     Outcome: Ongoing, Progressing

## 2022-08-17 NOTE — PT/OT/SLP PROGRESS
Occupational Therapy   Treatment    Name: Pilar Michael  MRN: [id number]  Admit Date: 8/9/2022  Admitting Diagnosis:  Chronic combined systolic and diastolic heart failure    General Precautions: Standard, fall   Orthopedic Precautions:N/A   Braces: N/A     Recommendations:     Discharge Recommendations: home health OT  Level of Assistance Recommended at Discharge: 24 hours physical assistance for all ADL's and home management tasks  Discharge Equipment Recommendations:   (TBD)  Barriers to discharge:  Decreased caregiver support    Assessment:     Pilar Michael is a 89 y.o. female with a medical diagnosis of Chronic combined systolic and diastolic heart failure .  She remains limited in performance of self-care , functional mobility and ADLs and currently not performing tasks at [facility] . Currently presenting with performance deficits including impaired endurance, weakness, impaired self care skills, impaired functional mobility, gait instability, impaired balance, decreased upper extremity function, decreased coordination, decreased lower extremity function, decreased safety awareness, decreased ROM, impaired cardiopulmonary response to activity.    Pt tolerated Tx without incident and is making slow progress but continues to require assist to perform self care tasks, functional mobility and functional transfers .  She would continue to benefit from OT intervention to further her functional (I)ce and safety.    Rehab Potential is fair    Activity tolerance:  Fair    Plan:     Patient to be seen 5 x/week to address the above listed problems via self-care/home management, therapeutic activities, therapeutic exercises    · Plan of Care Expires: 09/09/22  · Plan of Care Reviewed with: patient, daughter    Subjective     Communicated with: nurse prior to session.     Pain/Comfort:  · Pain Rating 1: 0/10  · Pain Rating Post-Intervention 1: 0/10    Patient's cultural, spiritual, Denominational conflicts given the current  situation:  · no    Objective:     Patient found up in chair with  (no lines) upon OT entry to room.    Bed Mobility:    · Pt seated in W/C at onset of therapy session.    Functional Mobility/Transfers:  · Patient completed Sit <> Stand Transfer with minimum assistance  with  rolling walker   · Patient completed Bed <> Chair Transfer using Stand Pivot technique with minimum assistance with rolling walker    Pt worked on functional standing activity consisting of standing with RW while reaching in all planes , crossing of midline and reaching to varying heights to facilitate (B) wt shifting and stability in standing in prep for performance of self care tasks and functional ADLS in standing.  Pt tolerated up to 1 Min. 24 sec and   56 sec  in standing with CGA and RW to steady.    AMPA 6 Click ADL: 15    OT Exercises: UE Ergometer performed 10 minutes on UBE using (R) UE only. Pt unable to utilize (L) UE on UBE secondary to limited (L) UE ROM    Treatment & Education:  Pt edu on POC, safety when performing self care tasks , and safety when performing functional transfers and mobility.  - White board updated  - Self care tasks completed-- as noted above       Patient left up in chair with call button in reachEducation:      GOALS:   Multidisciplinary Problems     Occupational Therapy Goals        Problem: Occupational Therapy    Goal Priority Disciplines Outcome Interventions   Occupational Therapy Goal     OT, PT/OT Ongoing, Progressing    Description: Goals to be met by: 8/26/22     Patient will increase functional independence with ADLs by performing:    Feeding with Modified [place].  UE Dressing with Stand-by Assistance.-ongoing  LE Dressing with Stand-by Assistance.  Grooming while seated at sink with Supervision.=MET  Toileting from toilet or BSC with Minimal Assistance for hygiene and clothing management.   Bathing from  shower chair/bench with Contact Guard Assistance.  Supine to sit with Modified  [place].  Step transfer with Stand-by Assistance with RW to steady.  Upper extremity exercise with assistance as needed.  Caregiver will be educated on level of assist required to safely perform self care tasks and functional transfers..                      Time Tracking:     OT Date of Treatment: 08/17/22  OT Start Time: 1320    OT Stop Time: 1350  OT Total Time (min): 30 min    Billable Minutes:Therapeutic Activity 15  Therapeutic Exercise 15    8/17/2022

## 2022-08-18 LAB
ANION GAP SERPL CALC-SCNC: 12 MMOL/L (ref 8–16)
BASOPHILS # BLD AUTO: 0.01 K/UL (ref 0–0.2)
BASOPHILS NFR BLD: 0.3 % (ref 0–1.9)
BUN SERPL-MCNC: 20 MG/DL (ref 8–23)
CALCIUM SERPL-MCNC: 8.7 MG/DL (ref 8.7–10.5)
CHLORIDE SERPL-SCNC: 105 MMOL/L (ref 95–110)
CO2 SERPL-SCNC: 21 MMOL/L (ref 23–29)
CREAT SERPL-MCNC: 0.7 MG/DL (ref 0.5–1.4)
DIFFERENTIAL METHOD: ABNORMAL
EOSINOPHIL # BLD AUTO: 0.1 K/UL (ref 0–0.5)
EOSINOPHIL NFR BLD: 3.1 % (ref 0–8)
ERYTHROCYTE [DISTWIDTH] IN BLOOD BY AUTOMATED COUNT: 18.2 % (ref 11.5–14.5)
EST. GFR  (NO RACE VARIABLE): >60 ML/MIN/1.73 M^2
GLUCOSE SERPL-MCNC: 86 MG/DL (ref 70–110)
HCT VFR BLD AUTO: 29.1 % (ref 37–48.5)
HGB BLD-MCNC: 9.2 G/DL (ref 12–16)
IMM GRANULOCYTES # BLD AUTO: 0.01 K/UL (ref 0–0.04)
IMM GRANULOCYTES NFR BLD AUTO: 0.3 % (ref 0–0.5)
LYMPHOCYTES # BLD AUTO: 0.7 K/UL (ref 1–4.8)
LYMPHOCYTES NFR BLD: 19 % (ref 18–48)
MAGNESIUM SERPL-MCNC: 1.7 MG/DL (ref 1.6–2.6)
MCH RBC QN AUTO: 26.4 PG (ref 27–31)
MCHC RBC AUTO-ENTMCNC: 31.6 G/DL (ref 32–36)
MCV RBC AUTO: 83 FL (ref 82–98)
MONOCYTES # BLD AUTO: 0.3 K/UL (ref 0.3–1)
MONOCYTES NFR BLD: 7.9 % (ref 4–15)
NEUTROPHILS # BLD AUTO: 2.5 K/UL (ref 1.8–7.7)
NEUTROPHILS NFR BLD: 69.4 % (ref 38–73)
NRBC BLD-RTO: 0 /100 WBC
PHOSPHATE SERPL-MCNC: 2.8 MG/DL (ref 2.7–4.5)
PLATELET # BLD AUTO: 183 K/UL (ref 150–450)
PMV BLD AUTO: 11.2 FL (ref 9.2–12.9)
POTASSIUM SERPL-SCNC: 3.4 MMOL/L (ref 3.5–5.1)
RBC # BLD AUTO: 3.49 M/UL (ref 4–5.4)
SODIUM SERPL-SCNC: 138 MMOL/L (ref 136–145)
T4 SERPL-MCNC: 9.2 UG/DL (ref 4.5–11.5)
TSH SERPL DL<=0.005 MIU/L-ACNC: 2.82 UIU/ML (ref 0.4–4)
VIT B12 SERPL-MCNC: 352 PG/ML (ref 210–950)
WBC # BLD AUTO: 3.53 K/UL (ref 3.9–12.7)

## 2022-08-18 PROCEDURE — 82607 VITAMIN B-12: CPT | Performed by: NURSE PRACTITIONER

## 2022-08-18 PROCEDURE — 36415 COLL VENOUS BLD VENIPUNCTURE: CPT | Performed by: HOSPITALIST

## 2022-08-18 PROCEDURE — 25000003 PHARM REV CODE 250: Performed by: NURSE PRACTITIONER

## 2022-08-18 PROCEDURE — 97535 SELF CARE MNGMENT TRAINING: CPT

## 2022-08-18 PROCEDURE — 83735 ASSAY OF MAGNESIUM: CPT | Performed by: HOSPITALIST

## 2022-08-18 PROCEDURE — 84443 ASSAY THYROID STIM HORMONE: CPT | Performed by: NURSE PRACTITIONER

## 2022-08-18 PROCEDURE — 97110 THERAPEUTIC EXERCISES: CPT

## 2022-08-18 PROCEDURE — 97116 GAIT TRAINING THERAPY: CPT | Mod: CQ

## 2022-08-18 PROCEDURE — 84100 ASSAY OF PHOSPHORUS: CPT | Performed by: HOSPITALIST

## 2022-08-18 PROCEDURE — 25000003 PHARM REV CODE 250: Performed by: HOSPITALIST

## 2022-08-18 PROCEDURE — 97110 THERAPEUTIC EXERCISES: CPT | Mod: CQ

## 2022-08-18 PROCEDURE — 11000004 HC SNF PRIVATE

## 2022-08-18 PROCEDURE — 84436 ASSAY OF TOTAL THYROXINE: CPT | Performed by: NURSE PRACTITIONER

## 2022-08-18 PROCEDURE — 85025 COMPLETE CBC W/AUTO DIFF WBC: CPT | Performed by: HOSPITALIST

## 2022-08-18 PROCEDURE — 36415 COLL VENOUS BLD VENIPUNCTURE: CPT | Performed by: NURSE PRACTITIONER

## 2022-08-18 PROCEDURE — 80048 BASIC METABOLIC PNL TOTAL CA: CPT | Performed by: HOSPITALIST

## 2022-08-18 RX ORDER — SODIUM BICARBONATE 650 MG/1
650 TABLET ORAL ONCE
Status: COMPLETED | OUTPATIENT
Start: 2022-08-18 | End: 2022-08-18

## 2022-08-18 RX ORDER — POTASSIUM CHLORIDE 750 MG/1
30 CAPSULE, EXTENDED RELEASE ORAL ONCE
Status: COMPLETED | OUTPATIENT
Start: 2022-08-18 | End: 2022-08-18

## 2022-08-18 RX ADMIN — TIMOLOL MALEATE 1 DROP: 5 SOLUTION OPHTHALMIC at 10:08

## 2022-08-18 RX ADMIN — ATORVASTATIN CALCIUM 20 MG: 20 TABLET, FILM COATED ORAL at 09:08

## 2022-08-18 RX ADMIN — SODIUM BICARBONATE 650 MG TABLET 650 MG: at 02:08

## 2022-08-18 RX ADMIN — THERA TABS 1 TABLET: TAB at 09:08

## 2022-08-18 RX ADMIN — BUMETANIDE 1 MG: 1 TABLET ORAL at 09:08

## 2022-08-18 RX ADMIN — MEGESTROL ACETATE 40 MG: 20 TABLET ORAL at 09:08

## 2022-08-18 RX ADMIN — SENNOSIDES AND DOCUSATE SODIUM 1 TABLET: 50; 8.6 TABLET ORAL at 09:08

## 2022-08-18 RX ADMIN — BUMETANIDE 1 MG: 1 TABLET ORAL at 05:08

## 2022-08-18 RX ADMIN — ASPIRIN 81 MG CHEWABLE TABLET 81 MG: 81 TABLET CHEWABLE at 09:08

## 2022-08-18 RX ADMIN — CARVEDILOL 3.12 MG: 3.12 TABLET, FILM COATED ORAL at 09:08

## 2022-08-18 RX ADMIN — TIMOLOL MALEATE 1 DROP: 5 SOLUTION OPHTHALMIC at 09:08

## 2022-08-18 RX ADMIN — CARVEDILOL 3.12 MG: 3.12 TABLET, FILM COATED ORAL at 05:08

## 2022-08-18 RX ADMIN — POTASSIUM CHLORIDE 30 MEQ: 10 CAPSULE, COATED, EXTENDED RELEASE ORAL at 02:08

## 2022-08-18 RX ADMIN — CLOPIDOGREL BISULFATE 75 MG: 75 TABLET, FILM COATED ORAL at 09:08

## 2022-08-18 RX ADMIN — LEVOTHYROXINE SODIUM 125 MCG: 25 TABLET ORAL at 05:08

## 2022-08-18 RX ADMIN — LISINOPRIL 20 MG: 20 TABLET ORAL at 09:08

## 2022-08-18 NOTE — PROGRESS NOTES
[facility]                                  Skilled Nursing Facility                   Progress Note     Admit Date: 8/9/2022  CHARLIE 8/25/2022  Principal Problem:  Chronic combined systolic and diastolic heart failure   HPI obtained from patient interview and chart review     Chief Complaint:  Re-evaluation of medical treatment and therapy status:  Lab review Re-evaluation of lethargy    HPI:   Mrs. Michael is an 87 year old female PMHx of severe AS s/p TAVR (5/7/19), CAD s/p ostial RCA stent placement (4/19), IDBCA s/p resection and radiation (2003), thyroid cancer s/p resection (1992), HTN, PAD who presents to SNF following hospitalization for acute hypoxemic respiratory failure with acute on chronic diastolic heart failure who admitted back to [facility] for UTI.  Admission to SNF for secondary weakness and debility.    Interval history:  All of today's labs reviewed and are listed below.  24 hr vital sign ranges listed below.  Patient continues to have excessive daytime lethargy.  TSH and B12 checked, both are normal.  Patient is being replaced with vitamin D. unsure of cause however patient has presented this way since initial presentation to SNF for all 3 of her admissions.  Patient denies shortness of breath, abdominal discomfort, nausea, or vomiting.  Patient reports an *adequate appetite.  Patient denies dysuria.  Patient reports having regular bowel movements.  Patient progessing with PT/OT- Gait: pt ambulated ~6ft + ~14ft with RW and CGA-Deep and wheelchair following. Continuing to follow and treat all acute and chronic conditions.     Past Medical History: Patient has a past medical history of Arthritis, Cancer, CHF (congestive heart failure), Coronary artery disease, Hypertension, and Thyroid disease.    Past Surgical History: Patient has a past surgical history that includes Hysterectomy; Thyroidectomy; lymph nodes removal;  Cholecystectomy; Coronary angiography (N/A, 4/23/2019); Transcatheter aortic valve replacement (TAVR) (N/A, 5/7/2019); Cardiac valuve replacement; and Cardiac catheterization.    Social History: Patient reports that she has never smoked. She has never used smokeless tobacco. She reports previous alcohol use. She reports that she does not use drugs.    Family History: family history includes ALS in her sister; Cancer in her sister; Kidney disease in her father.    Allergies: Patient is allergic to penicillins and sulfa (sulfonamide antibiotics).    ROS  Constitutional: Negative for fever   Eyes: Negative for blurred vision, double vision   Respiratory: Negative for cough, shortness of breath   Cardiovascular: Negative for chest pain, palpitations, and leg swelling.   Gastrointestinal: Negative for abdominal pain, constipation, diarrhea, nausea, vomiting.   Genitourinary: Negative for dysuria, frequency   Musculoskeletal:  + generalized weakness. Negative for back pain and myalgias.   Skin: Negative for itching and rash.   Neurological: Negative for dizziness, headaches.   Psychiatric/Behavioral: Negative for depression. The patient is not nervous/anxious.      24 hour Vital Sign Range   Temp:  [97.8 °F (36.6 °C)-98 °F (36.7 °C)]   Pulse:  [60-63]   Resp:  [17-18]   BP: (122-145)/(60-67)   SpO2:  [94 %-95 %]     PEx  Constitutional: Patient appears debilitated.  No distress noted  HENT:   Head: Normocephalic and atraumatic.   Eyes: Pupils are equal, round  Neck: Normal range of motion. Neck supple.   Cardiovascular: Normal rate, regular rhythm and normal heart sounds.    Pulmonary/Chest: Effort normal and breath sounds are clear  Abdominal: Soft. Bowel sounds are normal.   Musculoskeletal: Normal range of motion.  LUE with chronic lymphedema  Neurological:  Lethargic and oriented to person, place, and time.    Psychiatric: Normal mood and affect. Behavior is normal.   Skin: Skin is warm and dry.     Recent Labs   Lab  08/18/22  0526      K 3.4*      CO2 21*   BUN 20   CREATININE 0.7   MG 1.7       Recent Labs   Lab 08/18/22  0526   WBC 3.53*   RBC 3.49*   HGB 9.2*   HCT 29.1*      MCV 83   MCH 26.4*   MCHC 31.6*         Assessment and Plan:    Metabolic acidosis, mild  - initiated sodium bicarbonate 650 mg x 1 dose    Hypokalemia   - initiated potassium 30 mEq x  Dose     Lethargy  - ordered TSH, T4, B12 for next lab draw; initiated vitamin-D supplement  - 8/18 TSH and B12 normal     CAP (community acquired pneumonia)  Pleural effusion  - Patient with CXR showing L pleural effusion, L basilar atelectasis v consolidation, requiring 2L NC. Daughter at bedside reports productive cough, though mostly in the mornings.  - Vanc/cefepime/azithromycin  Stopped for now. Low suspicion for infectious process at this time due to negative cultures   - Pleural effusion on CT chest, pulmonology consulted for possible thoracentesis  - Pulm declined performing para, believes the effusion to be a diuresis problem  - continue  Bumex to 1mg BID      Hyperlipidemia  - continue home atorvastatin 20mg     Normocytic anemia  - transfuse if Hgb<7  - continue to monitor twice weekly CBCs    Coronary artery disease involving native coronary artery of native heart without angina pectoris  S/p RCA stent placement  -continue home ASA 81mg  -continue home atorvastatin  -continue home Plavix 75 mg daily     Chronic combined systolic and diastolic heart failure  - Patient recently admitted to [facility] for CHF exacerbation. She does not have LE edema or SOB at this time.  - on Room Air   - Bumex 1mg BID      Essential hypertension  - continue home carvedilol 3.125mg, lisinopril 20mg, Bumex 1mg BID      Acquired hypothyroidism  - continue home levothyroxine 125mcg      Severe aortic stenosis s/p TAVR  - Continue to monitor    Debility   - Continue with PT/OT for gait training and strengthening and restoration of ADL's   - Encourage mobility,  OOB in chair, and early ambulation as appropriate  - Fall precautions   - Monitor for bowel and bladder dysfunction  - Monitor for and prevent skin breakdown and pressure ulcers  - Continue DVT prophylaxis with frequent ambulation, patient also on aspirin and Plavix         Anticipate disposition:  Home with home health      Follow-up needed during SNF stay-    Follow-up needed after discharge from SNF: PCP    No future appointments.        Lenka Salazar NP  Department of Hospital Medicine   [facility]     DOS: 8/18/2022      Patient note was created using MModal Dictation.  Any errors in syntax or even information may not have been identified and edited on initial review prior to signing this note.

## 2022-08-18 NOTE — PT/OT/SLP PROGRESS
Occupational Therapy   Treatment    Name: Pilar Michael  MRN: [id number]  Admit Date: 8/9/2022  Admitting Diagnosis:  Chronic combined systolic and diastolic heart failure    General Precautions: Standard, fall   Orthopedic Precautions:N/A   Braces: N/A     Recommendations:     Discharge Recommendations: home health OT  Level of Assistance Recommended at Discharge: 24 hours physical assistance for all ADL's and home management tasks  Discharge Equipment Recommendations:   (TBD)  Barriers to discharge:  Decreased caregiver support    Assessment:     Pilar Michael is a 89 y.o. female with a medical diagnosis of Chronic combined systolic and diastolic heart failure .  She remains limited in performance of self-care , functional mobility and ADLs and currently not performing tasks at [facility] . Currently presenting with performance deficits including weakness, impaired endurance, impaired self care skills, impaired functional mobility, gait instability, impaired balance, decreased coordination, decreased lower extremity function, decreased upper extremity function, decreased safety awareness, pain, decreased ROM, impaired cardiopulmonary response to activity, impaired muscle length.   Pt tolerated Tx without incident and is making slow progress but continues to require assist to perform self care tasks, functional mobility and functional transfers .  She would continue to benefit from OT intervention to further her functional (I)ce and safety.    Rehab Potential is good    Activity tolerance:  Good    Plan:     Patient to be seen 5 x/week to address the above listed problems via self-care/home management, therapeutic activities, therapeutic exercises    · Plan of Care Expires: 09/09/22  · Plan of Care Reviewed with: patient, daughter    Subjective     Communicated with: nurse prior to session. .    Pain/Comfort:  · Pain Rating 1: 5/10  · Location - Side 1: Bilateral  · Location - Orientation 1: generalized  · Location  1: gluteal (at site of skin tears.)  · Pain Addressed 1: Reposition, Distraction, Cessation of Activity  · Pain Rating Post-Intervention 1: 5/10    Patient's cultural, spiritual, Bahai conflicts given the current situation:  · no    Objective:     Patient found supine with PureWick upon OT entry to room.    Bed Mobility:    · Patient completed Rolling/Turning to Left with  stand by assistance  · Patient completed Rolling/Turning to Right with stand by assistance  · Patient completed Scooting/Bridging with contact guard assistance  · Patient completed Supine to Sit with minimum assistance     Functional Mobility/Transfers:  · Patient completed Sit <> Stand Transfer with contact guard assistance  with  rolling walker   · Patient completed Bed <> Chair Transfer using Step Transfer technique with contact guard assistance with rolling walker    Activities of Daily Living:  · Grooming: stand by assistance with grooming performed seated sinkside.  · Upper Body Dressing: stand by assistance with Pt donning pullover shirt with SBA after set up.   · Lower Body Dressing: moderate assistance donning pants and socks seated EOB unsupported.  · Toileting: maximal assistance with Pt only able to clean front rosalva area.     [facility] 6 Click ADL: 15    OT Exercises: Bilateral Kev performed 2 sets 10 reps of forward presses and lateral slides with 5 pound weight in kev.. Rest breaks takem between sets.     Treatment & Education:  Pt edu on POC, safety when performing self care tasks , benefit of performing OOB activity, and safety when performing functional transfers and mobility.  - White board updated  - Self care tasks completed-- as noted above       Patient left up in chair with call button in reachEducation:      GOALS:   Multidisciplinary Problems     Occupational Therapy Goals        Problem: Occupational Therapy    Goal Priority Disciplines Outcome Interventions   Occupational Therapy Goal     OT, PT/OT Ongoing,  Progressing    Description: Goals to be met by: 8/26/22     Patient will increase functional independence with ADLs by performing:    Feeding with Modified [place].  UE Dressing with Stand-by Assistance.-ongoing  LE Dressing with Stand-by Assistance.REVISED =LE dressing with Min (A) with A/E as needed.  Grooming while seated at sink with Supervision.=MET  Toileting from toilet or BSC with Minimal Assistance for hygiene and clothing management.   Bathing from  shower chair/bench with Contact Guard Assistance.  Supine to sit with Modified [place].  Step transfer with Stand-by Assistance with RW to steady.  Upper extremity exercise with assistance as needed.  Caregiver will be educated on level of assist required to safely perform self care tasks and functional transfers..                      Time Tracking:     OT Date of Treatment: 08/18/22  OT Start Time: 1130    OT Stop Time: 1215  OT Total Time (min): 45 min    Billable Minutes:Self Care/Home Management 35  Therapeutic Exercise 10    8/18/2022

## 2022-08-18 NOTE — PLAN OF CARE
Problem: Occupational Therapy  Goal: Occupational Therapy Goal  Description: Goals to be met by: 8/26/22     Patient will increase functional independence with ADLs by performing:    Feeding with Modified [place].  UE Dressing with Stand-by Assistance.-ongoing  LE Dressing with Stand-by Assistance.REVISED =LE dressing with Min (A) with A/E as needed.  Grooming while seated at sink with Supervision.=MET  Toileting from toilet or BSC with Minimal Assistance for hygiene and clothing management.   Bathing from  shower chair/bench with Contact Guard Assistance.  Supine to sit with Modified [place].  Step transfer with Stand-by Assistance with RW to steady.  Upper extremity exercise with assistance as needed.  Caregiver will be educated on level of assist required to safely perform self care tasks and functional transfers..     Outcome: Ongoing, Progressing

## 2022-08-18 NOTE — PLAN OF CARE
Problem: Adult Inpatient Plan of Care  Goal: Plan of Care Review  Outcome: Ongoing, Progressing  Goal: Patient-Specific Goal (Individualized)  Outcome: Ongoing, Progressing  Goal: Absence of Hospital-Acquired Illness or Injury  Outcome: Ongoing, Progressing  Goal: Optimal Comfort and Wellbeing  Outcome: Ongoing, Progressing     Problem: Adjustment to Illness (Sepsis/Septic Shock)  Goal: Optimal Coping  Outcome: Ongoing, Progressing     Problem: Bleeding (Sepsis/Septic Shock)  Goal: Absence of Bleeding  Outcome: Ongoing, Progressing     Problem: Glycemic Control Impaired (Sepsis/Septic Shock)  Goal: Blood Glucose Level Within Desired Range  Outcome: Ongoing, Progressing     Problem: Infection Progression (Sepsis/Septic Shock)  Goal: Absence of Infection Signs and Symptoms  Outcome: Ongoing, Progressing     Problem: Nutrition Impaired (Sepsis/Septic Shock)  Goal: Optimal Nutrition Intake  Outcome: Ongoing, Progressing     Problem: Fluid Imbalance (Pneumonia)  Goal: Fluid Balance  Outcome: Ongoing, Progressing     Problem: Infection (Pneumonia)  Goal: Resolution of Infection Signs and Symptoms  Outcome: Ongoing, Progressing     Problem: Respiratory Compromise (Pneumonia)  Goal: Effective Oxygenation and Ventilation  Outcome: Ongoing, Progressing     Problem: Impaired Wound Healing  Goal: Optimal Wound Healing  Outcome: Ongoing, Progressing     Problem: Fall Injury Risk  Goal: Absence of Fall and Fall-Related Injury  Outcome: Ongoing, Progressing

## 2022-08-18 NOTE — PLAN OF CARE
Family Training     Patient Name:  Pilar Michael   MRN:  [id number]  Admit Date: 8/9/2022      Rigo BRITT called to schedule family training this date. Pt's family/caregiver agreeable to attend training on Mon. 8/22 @ 9am.     8/18/2022

## 2022-08-18 NOTE — PROGRESS NOTES
[facility] - Skilled Nursing  Adult Nutrition  Progress Note    SUMMARY   Recommendations  Continue cardiac diet , boost plus BID, RD following  Goals: PO to meet 75% of needs with ONS by next RD follow-up  Nutrition Goal Status: progressing towards goal  Communication of RD Recs: other (comment) (POC)    Assessment and Plan   Inadequate oral intake related to AMS, SOB, loss of appetite as evidenced by PO < 75% > one month.     Continues     Plan  Collaboration with other providers  Commercial beverage( calories, protein) boost plus BID  Fat and mineral modified diet- cardiac  Multi-vitamin/mineral supplement therapy-MVI, Vit D    Malnutrition Assessment     Skin (Micronutrient): edema, pallor  Hair/Scalp (Micronutrient): dry  Eyes (Micronutrient): conjunctiva dull       Weight Loss (Malnutrition): greater than 7.5% in 3 months  Energy Intake (Malnutrition): less than 75% for greater than or equal to 1 month   Orbital Region (Subcutaneous Fat Loss): mild depletion  Upper Arm Region (Subcutaneous Fat Loss): well nourished  Thoracic and Lumbar Region: well nourished   [place] Region (Muscle Loss): mild depletion  Clavicle Bone Region (Muscle Loss): well nourished  Clavicle and Acromion Bone Region (Muscle Loss): well nourished  Dorsal Hand (Muscle Loss): mild depletion  Patellar Region (Muscle Loss): well nourished  Anterior Thigh Region (Muscle Loss): mild depletion   Edema (Fluid Accumulation): 2-->mild (ankles)             Reason for Assessment  Reason For Assessment: RD follow-up  Diagnosis:  (pneumonia)  Relevant Medical History: HF, CAD,  TAVR, HLD, HTN, Thyroid disease, cancer, lymphedema,  Interdisciplinary Rounds: did not attend  General Information Comments: PO 50-75% , taking boost plus vanilla but two behind today, did not eat supper Thursday night not hungry, outside food was also declined. Son in room, he ate her supper tonight.    Nutrition Discharge Planning: DC on cardiac diet, fluid per MD, ONS of  "choice    Nutrition/Diet History  Patient Reported Diet/Restrictions/Preferences: general  Spiritual, Cultural Beliefs, Confucianist Practices, Values that Affect Care: no  Food Allergies: NKFA  Factors Affecting Nutritional Intake: decreased appetite (SOB)    Anthropometrics  Temp: 97.8 °F (36.6 °C)  Height Method: Stated  Height: 5' 2" (157.5 cm)  Height (inches): 62 in  Weight Method: Standard Scale  Weight:  (pt refused standing weight, bedscale incoorect weight.)  Weight (lb): 170.2 lb  Ideal Body Weight (IBW), Female: 110 lb  % Ideal Body Weight, Female (lb): 154.73 %  BMI (Calculated): 31.1  Usual Body Weight (UBW), k.63 kg  Weight Change Amount:  (likely fluid weight mostly)  % Usual Body Weight: 87.29  % Weight Change From Usual Weight: -12.9 %     Lab/Procedures/Meds  Pertinent Labs Reviewed: reviewed  Pertinent Labs Comments: Hg 9.2, Hct 29.1, K 3.4  Pertinent Medications Reviewed: reviewed  Pertinent Medications Comments: MVI, Vit D    Estimated/Assessed Needs  Weight Used For Calorie Calculations: 77.2 kg (170 lb 3.1 oz)  Energy Calorie Requirements (kcal): 1374  Energy Need Method: Pierce-St Jeor (x 1.2 (PAL))  Protein Requirements: 75g-65g  Weight Used For Protein Calculations: 50 kg (110 lb 3.7 oz) (IBW 2/2 obesity x 1.5-1.3 g/kg)  Fluid Requirements (mL): 1500 or per MD     RDA Method (mL): 1374  CHO Requirement: -    Nutrition Prescription Ordered  Current Diet Order: Cardiac  Oral Nutrition Supplement: Boost plus BID, vanilla    Evaluation of Received Nutrient/Fluid Intake    I/O: -744  Energy Calories Required: meeting needs  Protein Required: meeting needs  Fluid Required: meeting needs  Comments: LBM   Tolerance: tolerating  % Intake of Estimated Energy Needs: 75 - 100 %  % Meal Intake: 75 - 100 %    Nutrition Risk    Level of Risk/Frequency of Follow-up: low (one time per week)     Monitor and Evaluation    Food and Nutrient Intake: food and beverage intake  Food and Nutrient " Adminstration: diet order  Physical Activity and Function: nutrition-related ADLs and IADLs  Anthropometric Measurements: weight change  Biochemical Data, Medical Tests and Procedures: gastrointestinal profile, electrolyte and renal panel  Nutrition-Focused Physical Findings: overall appearance     Nutrition Follow-Up    RD Follow-up?: Yes

## 2022-08-18 NOTE — PT/OT/SLP PROGRESS
"Physical Therapy Treatment    Patient Name:  Pilar Michael   MRN:  [id number]  Admit Date: 8/9/2022  Admitting Diagnosis: Chronic combined systolic and diastolic heart failure  Recent Surgeries:     General Precautions: Standard, fall   Orthopedic Precautions:N/A   Braces:       Recommendations:     Discharge Recommendations:  home health PT   Level of Assistance Recommended at Discharge: 24 hours light assistance  Discharge Equipment Recommendations: wheelchair, walker, rolling, bath bench, bedside commode   Barriers to discharge: Decreased caregiver support    Assessment:     Pilar Michael is a 89 y.o. female admitted with a medical diagnosis of Chronic combined systolic and diastolic heart failure . Pt tolerated well, pt would continue to benefit from skilled PT services to improve overall functional mobility, strength and endurance.  .      Performance deficits affecting function:  weakness, impaired endurance, impaired self care skills, impaired functional mobility, gait instability, impaired balance, visual deficits, decreased upper extremity function, decreased lower extremity function, impaired cardiopulmonary response to activity .    Rehab Potential is good    Activity Tolerance: Fair    Plan:     Patient to be seen 5 x/week to address the above listed problems via gait training, therapeutic activities, therapeutic exercises, neuromuscular re-education, wheelchair management/training    · Plan of Care Expires: 09/09/22  · Plan of Care Reviewed with: patient    Subjective     "just took a lot of pills" pt agreeable to therapy., some encouragement for gait     Pain/Comfort:  · Pain Rating 1:  (did not rate)  · Location - Side 1: Left  · Location 1: gluteal  · Pain Addressed 1: Pre-medicate for activity, Reposition, Distraction, Cessation of Activity, Nurse notified (camryn Avendano notified/aware, ed/reviewed pressure relief in wc)    Patient's cultural, spiritual, Methodist conflicts given the current " situation:  · no    Objective:     Communicated with nsg post session. Re pts c/o buttocks pain  Patient found with  (in wc) upon PT entry to room.     Therapeutic Activities and Exercises: 2x10 reps AP,GS,LAQ,hip flex,abd/add mini elliptical x 12 minutes    Functional Mobility:  · Transfers:     · Sit to Stand:  minimum assistance with rolling walker  · Gait: amb with RW min/CGA ~ 35 ft no LOB declined second trial however able to inc gait distance/tolerance today wc follow    AM-PAC 6 CLICK MOBILITY  16    Patient left up in chair with call button in reach, PCT notified and belonging sin reach.    GOALS:   Multidisciplinary Problems     Physical Therapy Goals        Problem: Physical Therapy    Goal Priority Disciplines Outcome Goal Variances Interventions   Physical Therapy Goal     PT, PT/OT Ongoing, Progressing     Description: Goals to be met by: 9/9/22    Patient will increase functional independence with mobility by performing:      . Supine to sit with Set-up [place]  . Sit to supine with Set-up [place]  . Rolling to Left and Right with Set-up Assistance.  . Sit to stand transfer with Supervision  . Bed to chair transfer with Supervision using Rolling Walker  . Gait  x 50 feet with Stand-by Assistance using Rolling Walker.   . Wheelchair propulsion x50 feet with Minimal Assistance using bilateral uppper extremities  . Ascend/Descend 2 inch curb step with Moderate Assistance using Rolling Walker.                        Time Tracking:     PT Received On: 08/18/22  PT Start Time: 1448  PT Stop Time: 1532  PT Total Time (min): 44 min    Billable Minutes: Gait Training 10 and Therapeutic Exercise 34    Treatment Type: Treatment  PT/PTA: PTA     PTA Visit Number: 3     08/18/2022

## 2022-08-19 PROCEDURE — 25000003 PHARM REV CODE 250: Performed by: HOSPITALIST

## 2022-08-19 PROCEDURE — 25000003 PHARM REV CODE 250: Performed by: NURSE PRACTITIONER

## 2022-08-19 PROCEDURE — 11000004 HC SNF PRIVATE

## 2022-08-19 RX ADMIN — THERA TABS 1 TABLET: TAB at 09:08

## 2022-08-19 RX ADMIN — CARVEDILOL 3.12 MG: 3.12 TABLET, FILM COATED ORAL at 05:08

## 2022-08-19 RX ADMIN — CARVEDILOL 3.12 MG: 3.12 TABLET, FILM COATED ORAL at 09:08

## 2022-08-19 RX ADMIN — MEGESTROL ACETATE 40 MG: 20 TABLET ORAL at 09:08

## 2022-08-19 RX ADMIN — CLOPIDOGREL BISULFATE 75 MG: 75 TABLET, FILM COATED ORAL at 09:08

## 2022-08-19 RX ADMIN — ASPIRIN 81 MG CHEWABLE TABLET 81 MG: 81 TABLET CHEWABLE at 09:08

## 2022-08-19 RX ADMIN — LISINOPRIL 20 MG: 20 TABLET ORAL at 09:08

## 2022-08-19 RX ADMIN — BUMETANIDE 1 MG: 1 TABLET ORAL at 09:08

## 2022-08-19 RX ADMIN — TIMOLOL MALEATE 1 DROP: 5 SOLUTION OPHTHALMIC at 08:08

## 2022-08-19 RX ADMIN — LEVOTHYROXINE SODIUM 125 MCG: 25 TABLET ORAL at 05:08

## 2022-08-19 RX ADMIN — ATORVASTATIN CALCIUM 20 MG: 20 TABLET, FILM COATED ORAL at 08:08

## 2022-08-19 RX ADMIN — TIMOLOL MALEATE 1 DROP: 5 SOLUTION OPHTHALMIC at 09:08

## 2022-08-19 RX ADMIN — BUMETANIDE 1 MG: 1 TABLET ORAL at 05:08

## 2022-08-19 NOTE — PROGRESS NOTES
Occupational Therapy    Pilar Michael  MRN: [id number]  Room/Bed: CXAA727/NPSF177 A    Pt not seen by OT this day secondary to Pt being too fatigues to participate. Attempted to convince Pt to come to therapy but Pt declined working with this session. Will follow up .     Ron Johnson, OTR/L  8/19/2022

## 2022-08-19 NOTE — PT/OT/SLP PROGRESS
Occupational Therapy      Patient Name:  Pilar Michael   MRN:  [id number]    A client care conference was performed between the LOTR and YAN, prior to treatment to discuss the patient's status, treatment plan and established goals.   8/19/2022

## 2022-08-19 NOTE — PT/OT/SLP PROGRESS
"Physical Therapy      Patient Name:  Pilar Michael   MRN:  [id number]    Patient not seen today secondary to reports of inc fatigue, per family present, "she's exhausted today"  . Will follow-up next PT session.        "

## 2022-08-19 NOTE — NURSING
Patient was offered bath on three occasions and refused. Patient reported feeling very tired and would like to try a bath tomorrow.

## 2022-08-20 PROCEDURE — 97110 THERAPEUTIC EXERCISES: CPT | Mod: CQ

## 2022-08-20 PROCEDURE — 25000003 PHARM REV CODE 250: Performed by: HOSPITALIST

## 2022-08-20 PROCEDURE — 25000003 PHARM REV CODE 250: Performed by: NURSE PRACTITIONER

## 2022-08-20 PROCEDURE — 97116 GAIT TRAINING THERAPY: CPT | Mod: CQ

## 2022-08-20 PROCEDURE — 11000004 HC SNF PRIVATE

## 2022-08-20 PROCEDURE — 97530 THERAPEUTIC ACTIVITIES: CPT | Mod: CQ

## 2022-08-20 RX ADMIN — SENNOSIDES AND DOCUSATE SODIUM 1 TABLET: 50; 8.6 TABLET ORAL at 09:08

## 2022-08-20 RX ADMIN — ATORVASTATIN CALCIUM 20 MG: 20 TABLET, FILM COATED ORAL at 09:08

## 2022-08-20 RX ADMIN — CLOPIDOGREL BISULFATE 75 MG: 75 TABLET, FILM COATED ORAL at 09:08

## 2022-08-20 RX ADMIN — LEVOTHYROXINE SODIUM 125 MCG: 25 TABLET ORAL at 06:08

## 2022-08-20 RX ADMIN — ASPIRIN 81 MG CHEWABLE TABLET 81 MG: 81 TABLET CHEWABLE at 09:08

## 2022-08-20 RX ADMIN — BUMETANIDE 1 MG: 1 TABLET ORAL at 09:08

## 2022-08-20 RX ADMIN — BUMETANIDE 1 MG: 1 TABLET ORAL at 05:08

## 2022-08-20 RX ADMIN — LISINOPRIL 20 MG: 20 TABLET ORAL at 09:08

## 2022-08-20 RX ADMIN — CARVEDILOL 3.12 MG: 3.12 TABLET, FILM COATED ORAL at 05:08

## 2022-08-20 RX ADMIN — TIMOLOL MALEATE 1 DROP: 5 SOLUTION OPHTHALMIC at 09:08

## 2022-08-20 RX ADMIN — CARVEDILOL 3.12 MG: 3.12 TABLET, FILM COATED ORAL at 09:08

## 2022-08-20 RX ADMIN — THERA TABS 1 TABLET: TAB at 09:08

## 2022-08-20 RX ADMIN — MEGESTROL ACETATE 40 MG: 20 TABLET ORAL at 09:08

## 2022-08-20 NOTE — PT/OT/SLP PROGRESS
"Physical Therapy Treatment    Patient Name:  Pilar Michael   MRN:  [id number]  Admit Date: 8/9/2022  Admitting Diagnosis: Chronic combined systolic and diastolic heart failure  Recent Surgeries:     General Precautions: Standard, fall   Orthopedic Precautions:N/A   Braces: N/A     Recommendations:     Discharge Recommendations:  home health PT   Level of Assistance Recommended at Discharge: 24 hours light assistance  Discharge Equipment Recommendations: bedside commode, bath bench, walker, rolling, wheelchair   Barriers to discharge: Decreased caregiver support    Assessment:     Pilar Michael is a 89 y.o. female admitted with a medical diagnosis of Chronic combined systolic and diastolic heart failure .  Patient initially required min encouragement to participate and fatigued very easily with gait training.    Performance deficits affecting function:  weakness, impaired endurance, impaired self care skills, impaired functional mobility, gait instability, impaired balance, decreased coordination, decreased upper extremity function, decreased lower extremity function, decreased ROM, edema, impaired cardiopulmonary response to activity, impaired muscle length .    Rehab Potential is good and fair    Activity Tolerance: Fair    Plan:     Patient to be seen 5 x/week to address the above listed problems via gait training, therapeutic activities, therapeutic exercises, neuromuscular re-education, wheelchair management/training    · Plan of Care Expires: 09/09/22  · Plan of Care Reviewed with: patient    Subjective     Patient syayes " Why are you coming this late,I thought you were not coming today".     Pain/Comfort:  · Pain Rating 1: 0/10  · Pain Rating Post-Intervention 1: 0/10    Patient's cultural, spiritual, Mormonism conflicts given the current situation:  · no    Objective:     Communicated with NSG prior to session.  Patient found HOB elevated with   upon PT entry to room.     Therapeutic Activities and " Exercises: Assisted from bed to wheelchair with RW and CGA.LE Ergometer x 12 minutes with multiple rest breaks.Ther ex in sitting: LAQ, HIP FLEX AND AP 2X10 REPS B LE.    Functional Mobility:  · Bed Mobility:     · Rolling Right: contact guard assistance  · Scooting: minimum assistance  · Supine to Sit: minimum assistance  · Transfers:     · Sit to Stand:  minimum assistance with rolling walker  · Bed to Chair: contact guard assistance with  rolling walker  using  Stand Pivot  · Gait: 22 ft x 2 trials with RW and CGA to min assistance, with wheelchair follow by tech and cues to correct posture.    AM-PAC 6 CLICK MOBILITY  16    Patient left up in chair with call button in reach.    GOALS:   Multidisciplinary Problems     Physical Therapy Goals        Problem: Physical Therapy    Goal Priority Disciplines Outcome Goal Variances Interventions   Physical Therapy Goal     PT, PT/OT Ongoing, Progressing     Description: Goals to be met by: 9/9/22    Patient will increase functional independence with mobility by performing:      . Supine to sit with Set-up [place]  . Sit to supine with Set-up [place]  . Rolling to Left and Right with Set-up Assistance.  . Sit to stand transfer with Supervision  . Bed to chair transfer with Supervision using Rolling Walker  . Gait  x 50 feet with Stand-by Assistance using Rolling Walker.   . Wheelchair propulsion x50 feet with Minimal Assistance using bilateral uppper extremities  . Ascend/Descend 2 inch curb step with Moderate Assistance using Rolling Walker.                        Time Tracking:     PT Received On: 08/20/22  PT Start Time: 1417  PT Stop Time: 1457  PT Total Time (min): 40 min    Billable Minutes: Gait Training 10, Therapeutic Activity 15 and Therapeutic Exercise 15    Treatment Type: Treatment  PT/PTA: PTA     PTA Visit Number: 4     08/20/2022

## 2022-08-21 PROCEDURE — 25000003 PHARM REV CODE 250: Performed by: NURSE PRACTITIONER

## 2022-08-21 PROCEDURE — 25000003 PHARM REV CODE 250: Performed by: HOSPITALIST

## 2022-08-21 PROCEDURE — 11000004 HC SNF PRIVATE

## 2022-08-21 RX ADMIN — CARVEDILOL 3.12 MG: 3.12 TABLET, FILM COATED ORAL at 09:08

## 2022-08-21 RX ADMIN — TIMOLOL MALEATE 1 DROP: 5 SOLUTION OPHTHALMIC at 09:08

## 2022-08-21 RX ADMIN — CARVEDILOL 3.12 MG: 3.12 TABLET, FILM COATED ORAL at 05:08

## 2022-08-21 RX ADMIN — SENNOSIDES AND DOCUSATE SODIUM 1 TABLET: 50; 8.6 TABLET ORAL at 09:08

## 2022-08-21 RX ADMIN — BUMETANIDE 1 MG: 1 TABLET ORAL at 09:08

## 2022-08-21 RX ADMIN — MEGESTROL ACETATE 40 MG: 20 TABLET ORAL at 09:08

## 2022-08-21 RX ADMIN — LISINOPRIL 20 MG: 20 TABLET ORAL at 09:08

## 2022-08-21 RX ADMIN — LEVOTHYROXINE SODIUM 125 MCG: 25 TABLET ORAL at 05:08

## 2022-08-21 RX ADMIN — CLOPIDOGREL BISULFATE 75 MG: 75 TABLET, FILM COATED ORAL at 09:08

## 2022-08-21 RX ADMIN — ASPIRIN 81 MG CHEWABLE TABLET 81 MG: 81 TABLET CHEWABLE at 09:08

## 2022-08-21 RX ADMIN — THERA TABS 1 TABLET: TAB at 09:08

## 2022-08-21 RX ADMIN — BUMETANIDE 1 MG: 1 TABLET ORAL at 05:08

## 2022-08-21 RX ADMIN — ATORVASTATIN CALCIUM 20 MG: 20 TABLET, FILM COATED ORAL at 09:08

## 2022-08-21 NOTE — PLAN OF CARE
Problem: Adult Inpatient Plan of Care  Goal: Plan of Care Review  Outcome: Ongoing, Progressing  Goal: Patient-Specific Goal (Individualized)  Outcome: Ongoing, Progressing  Goal: Absence of Hospital-Acquired Illness or Injury  Outcome: Ongoing, Progressing  Goal: Optimal Comfort and Wellbeing  Outcome: Ongoing, Progressing     Problem: Adjustment to Illness (Sepsis/Septic Shock)  Goal: Optimal Coping  Outcome: Ongoing, Progressing     Problem: Bleeding (Sepsis/Septic Shock)  Goal: Absence of Bleeding  Outcome: Ongoing, Progressing     Problem: Glycemic Control Impaired (Sepsis/Septic Shock)  Goal: Blood Glucose Level Within Desired Range  Outcome: Ongoing, Progressing     Problem: Infection Progression (Sepsis/Septic Shock)  Goal: Absence of Infection Signs and Symptoms  Outcome: Ongoing, Progressing

## 2022-08-22 LAB
ANION GAP SERPL CALC-SCNC: 8 MMOL/L (ref 8–16)
BASOPHILS # BLD AUTO: 0.01 K/UL (ref 0–0.2)
BASOPHILS NFR BLD: 0.3 % (ref 0–1.9)
BNP SERPL-MCNC: 631 PG/ML (ref 0–99)
BUN SERPL-MCNC: 16 MG/DL (ref 8–23)
CALCIUM SERPL-MCNC: 8.4 MG/DL (ref 8.7–10.5)
CHLORIDE SERPL-SCNC: 102 MMOL/L (ref 95–110)
CO2 SERPL-SCNC: 24 MMOL/L (ref 23–29)
CREAT SERPL-MCNC: 0.7 MG/DL (ref 0.5–1.4)
DIFFERENTIAL METHOD: ABNORMAL
EOSINOPHIL # BLD AUTO: 0.1 K/UL (ref 0–0.5)
EOSINOPHIL NFR BLD: 3.5 % (ref 0–8)
ERYTHROCYTE [DISTWIDTH] IN BLOOD BY AUTOMATED COUNT: 18.3 % (ref 11.5–14.5)
EST. GFR  (NO RACE VARIABLE): >60 ML/MIN/1.73 M^2
GLUCOSE SERPL-MCNC: 89 MG/DL (ref 70–110)
HCT VFR BLD AUTO: 27.9 % (ref 37–48.5)
HGB BLD-MCNC: 9 G/DL (ref 12–16)
IMM GRANULOCYTES # BLD AUTO: 0.01 K/UL (ref 0–0.04)
IMM GRANULOCYTES NFR BLD AUTO: 0.3 % (ref 0–0.5)
LYMPHOCYTES # BLD AUTO: 0.8 K/UL (ref 1–4.8)
LYMPHOCYTES NFR BLD: 22.3 % (ref 18–48)
MAGNESIUM SERPL-MCNC: 1.4 MG/DL (ref 1.6–2.6)
MCH RBC QN AUTO: 27.2 PG (ref 27–31)
MCHC RBC AUTO-ENTMCNC: 32.3 G/DL (ref 32–36)
MCV RBC AUTO: 84 FL (ref 82–98)
MONOCYTES # BLD AUTO: 0.3 K/UL (ref 0.3–1)
MONOCYTES NFR BLD: 9.2 % (ref 4–15)
NEUTROPHILS # BLD AUTO: 2.4 K/UL (ref 1.8–7.7)
NEUTROPHILS NFR BLD: 64.4 % (ref 38–73)
NRBC BLD-RTO: 0 /100 WBC
PHOSPHATE SERPL-MCNC: 3 MG/DL (ref 2.7–4.5)
PLATELET # BLD AUTO: 173 K/UL (ref 150–450)
PMV BLD AUTO: 10.7 FL (ref 9.2–12.9)
POTASSIUM SERPL-SCNC: 3.2 MMOL/L (ref 3.5–5.1)
RBC # BLD AUTO: 3.31 M/UL (ref 4–5.4)
SODIUM SERPL-SCNC: 134 MMOL/L (ref 136–145)
WBC # BLD AUTO: 3.68 K/UL (ref 3.9–12.7)

## 2022-08-22 PROCEDURE — 83735 ASSAY OF MAGNESIUM: CPT | Performed by: HOSPITALIST

## 2022-08-22 PROCEDURE — 97116 GAIT TRAINING THERAPY: CPT | Mod: CQ

## 2022-08-22 PROCEDURE — 11000004 HC SNF PRIVATE

## 2022-08-22 PROCEDURE — 80048 BASIC METABOLIC PNL TOTAL CA: CPT | Performed by: HOSPITALIST

## 2022-08-22 PROCEDURE — 36415 COLL VENOUS BLD VENIPUNCTURE: CPT | Performed by: HOSPITALIST

## 2022-08-22 PROCEDURE — 25000003 PHARM REV CODE 250: Performed by: HOSPITALIST

## 2022-08-22 PROCEDURE — 83880 ASSAY OF NATRIURETIC PEPTIDE: CPT | Performed by: HOSPITALIST

## 2022-08-22 PROCEDURE — 25000003 PHARM REV CODE 250: Performed by: NURSE PRACTITIONER

## 2022-08-22 PROCEDURE — 84100 ASSAY OF PHOSPHORUS: CPT | Performed by: HOSPITALIST

## 2022-08-22 PROCEDURE — 97530 THERAPEUTIC ACTIVITIES: CPT | Mod: CQ

## 2022-08-22 PROCEDURE — 85025 COMPLETE CBC W/AUTO DIFF WBC: CPT | Performed by: HOSPITALIST

## 2022-08-22 PROCEDURE — 97535 SELF CARE MNGMENT TRAINING: CPT | Mod: CO

## 2022-08-22 RX ORDER — POTASSIUM CHLORIDE 750 MG/1
30 CAPSULE, EXTENDED RELEASE ORAL 2 TIMES DAILY
Status: DISCONTINUED | OUTPATIENT
Start: 2022-08-22 | End: 2022-08-25 | Stop reason: HOSPADM

## 2022-08-22 RX ORDER — LANOLIN ALCOHOL/MO/W.PET/CERES
400 CREAM (GRAM) TOPICAL 2 TIMES DAILY
Status: COMPLETED | OUTPATIENT
Start: 2022-08-22 | End: 2022-08-24

## 2022-08-22 RX ORDER — LISINOPRIL 20 MG/1
40 TABLET ORAL DAILY
Status: DISCONTINUED | OUTPATIENT
Start: 2022-08-23 | End: 2022-08-22

## 2022-08-22 RX ADMIN — LEVOTHYROXINE SODIUM 125 MCG: 25 TABLET ORAL at 06:08

## 2022-08-22 RX ADMIN — THERA TABS 1 TABLET: TAB at 09:08

## 2022-08-22 RX ADMIN — BUMETANIDE 1 MG: 1 TABLET ORAL at 06:08

## 2022-08-22 RX ADMIN — CLOPIDOGREL BISULFATE 75 MG: 75 TABLET, FILM COATED ORAL at 09:08

## 2022-08-22 RX ADMIN — MEGESTROL ACETATE 40 MG: 20 TABLET ORAL at 09:08

## 2022-08-22 RX ADMIN — TIMOLOL MALEATE 1 DROP: 5 SOLUTION OPHTHALMIC at 09:08

## 2022-08-22 RX ADMIN — BUMETANIDE 1 MG: 1 TABLET ORAL at 09:08

## 2022-08-22 RX ADMIN — Medication 400 MG: at 09:08

## 2022-08-22 RX ADMIN — CARVEDILOL 3.12 MG: 3.12 TABLET, FILM COATED ORAL at 09:08

## 2022-08-22 RX ADMIN — ATORVASTATIN CALCIUM 20 MG: 20 TABLET, FILM COATED ORAL at 09:08

## 2022-08-22 RX ADMIN — ASPIRIN 81 MG CHEWABLE TABLET 81 MG: 81 TABLET CHEWABLE at 09:08

## 2022-08-22 RX ADMIN — CARVEDILOL 3.12 MG: 3.12 TABLET, FILM COATED ORAL at 06:08

## 2022-08-22 RX ADMIN — POTASSIUM CHLORIDE 30 MEQ: 10 CAPSULE, COATED, EXTENDED RELEASE ORAL at 09:08

## 2022-08-22 RX ADMIN — LISINOPRIL 20 MG: 20 TABLET ORAL at 09:08

## 2022-08-22 NOTE — PT/OT/SLP PROGRESS
Physical Therapy Treatment    Patient Name:  Pilar Michael   MRN:  [id number]  Admit Date: 8/9/2022  Admitting Diagnosis: Chronic combined systolic and diastolic heart failure    General Precautions: Standard, fall   Orthopedic Precautions:N/A   Braces: N/A     Recommendations:     Discharge Recommendations:  home health PT   Level of Assistance Recommended at Discharge: 24 hours light assistance  Discharge Equipment Recommendations: bedside commode, bath bench, walker, rolling, wheelchair   Barriers to discharge: Decreased caregiver support    Assessment:     Pilar Michael is a 89 y.o. female admitted with a medical diagnosis of Chronic combined systolic and diastolic heart failure .      Performance deficits affecting function:  weakness, impaired endurance, impaired self care skills, impaired functional mobility, gait instability, impaired balance, decreased coordination, decreased upper extremity function, decreased lower extremity function, decreased ROM, edema, impaired cardiopulmonary response to activity, impaired muscle length.    Pt tolerates session well with focus on bed mobility, transfers, and gait training. Session performed as family training with pts daughter, Katiana, who is present throughout. Reviewed bed mobility, transfers, and gait training with therapist providing pt assistance and pts daughter observing closely throughout. Pts daughter reports this is similar assistance to what pt was needing prior to most recent hospitaliization. Daughter reports feeling comfortable with providing assistance as needed to the pt. Pt continues to progress slowly towards goals but remains most limited by self-limiting tendencies. Daughter aware and reports will continue to motivate pt to participate fully with therapy each day. Pt will continue to benefit from therapy services to address impairments listed above.     Rehab Potential is good    Activity Tolerance: Fair    Plan:     Patient to be seen 5  x/week to address the above listed problems via gait training, therapeutic activities, therapeutic exercises, neuromuscular re-education, wheelchair management/training    · Plan of Care Expires: 09/09/22  · Plan of Care Reviewed with: patient, daughter    Subjective     Per daughter, the families goal remains for pt to continue to improve to requiring less assistance at home.     Pain/Comfort:  · Pain Rating 1: 0/10  · Pain Rating Post-Intervention 1: 0/10    Patient's cultural, spiritual, Restoration conflicts given the current situation:  · no    Objective:     Communicated with RN prior to session.  Patient found HOB elevated with  (with no lines attached) upon PTA entry to room. RN and daughter at bedside.     Therapeutic Activities and Exercises:   Increased time for all mobility d/t slow pacing and education provided to pt/daughter for family training with all mobility.     Functional Mobility: daughter closely observes all mobility  · Bed Mobility:     · Rolling Right: minimum assistance  · Supine to Sit: minimum assistance  · Transfers:     · Sit to Stand:  minimum assistance with no AD and rolling walker  · Bed to Chair: minimum assistance with  no AD  using  Stand Pivot  · Gait: Pt ambulates 40 ft with RW and CGA. Pt with flexed forward posture, slow annie, and decreased B step length/clearance. Close chair follow throughout.     AM-PAC 6 CLICK MOBILITY  16    Patient left seated up in wheelchair in therapy gym with OT and daughter.    GOALS:   Multidisciplinary Problems     Physical Therapy Goals        Problem: Physical Therapy    Goal Priority Disciplines Outcome Goal Variances Interventions   Physical Therapy Goal     PT, PT/OT Ongoing, Progressing     Description: Goals to be met by: 9/9/22    Patient will increase functional independence with mobility by performing:      . Supine to sit with Set-up [place]  . Sit to supine with Set-up [place]  . Rolling to Left and Right with Set-up  Assistance.  . Sit to stand transfer with Supervision  . Bed to chair transfer with Supervision using Rolling Walker  . Gait  x 50 feet with Stand-by Assistance using Rolling Walker.   . Wheelchair propulsion x50 feet with Minimal Assistance using bilateral uppper extremities  . Ascend/Descend 2 inch curb step with Moderate Assistance using Rolling Walker.                        Time Tracking:     PT Received On: 08/22/22  PT Start Time: 0910  PT Stop Time: 0940  PT Total Time (min): 30 min    Billable Minutes: Gait Training 15 and Therapeutic Activity 15    Treatment Type: Treatment  PT/PTA: PTA     PTA Visit Number: 5     08/22/2022

## 2022-08-22 NOTE — PLAN OF CARE
Problem: Occupational Therapy  Goal: Occupational Therapy Goal  Description: Goals to be met by: 8/26/22     Patient will increase functional independence with ADLs by performing:    Feeding with Modified [place].  UE Dressing with Stand-by Assistance.-ongoing  LE Dressing with Stand-by Assistance.REVISED =LE dressing with Min (A) with A/E as needed.  Grooming while seated at sink with Supervision.=MET  Toileting from toilet or BSC with Minimal Assistance for hygiene and clothing management.   Bathing from  shower chair/bench with Contact Guard Assistance.  Supine to sit with Modified [place].  Step transfer with Stand-by Assistance with RW to steady.  Upper extremity exercise with assistance as needed.  Caregiver will be educated on level of assist required to safely perform self care tasks and functional transfers..     Outcome: Ongoing, Progressing

## 2022-08-22 NOTE — PROGRESS NOTES
[facility]                                  Skilled Nursing Facility                   Progress Note     Admit Date: 8/9/2022  CHARLIE 8/25/2022  Principal Problem:  Chronic combined systolic and diastolic heart failure   HPI obtained from patient interview and chart review     Chief Complaint:  Re-evaluation of medical treatment and therapy status:  Lab review, HTN, hypokalemia    HPI:   Mrs. Michael is an 87 year old female PMHx of severe AS s/p TAVR (5/7/19), CAD s/p ostial RCA stent placement (4/19), IDBCA s/p resection and radiation (2003), thyroid cancer s/p resection (1992), HTN, PAD who presents to SNF following hospitalization for acute hypoxemic respiratory failure with acute on chronic diastolic heart failure who admitted back to [facility] for UTI.  Admission to SNF for secondary weakness and debility.    Interval history:  All of today's labs reviewed and are listed below.  K 3.2, Mag 1.4.  24 hr vital sign ranges listed below.  24 hour blood pressure range is 133/63 to 193/78.  Patient appeared more awake today during my assessment as compared to previous assessments.  Patient denies shortness of breath, abdominal discomfort, nausea, or vomiting.  Patient reports an adequate appetite.  Patient denies dysuria.  Patient reports having regular bowel movements.  Patient progessing with PT/OT- Gait: Pt ambulates 40 ft with RW and CGA. Pt with flexed forward posture, slow annie, and decreased B step length/clearance. Close chair follow throughout. Continuing to follow and treat all acute and chronic conditions.    Past Medical History: Patient has a past medical history of Arthritis, Cancer, CHF (congestive heart failure), Coronary artery disease, Hypertension, and Thyroid disease.    Past Surgical History: Patient has a past surgical history that includes Hysterectomy; Thyroidectomy; lymph nodes removal; Cholecystectomy; Coronary angiography  (N/A, 4/23/2019); Transcatheter aortic valve replacement (TAVR) (N/A, 5/7/2019); Cardiac valuve replacement; and Cardiac catheterization.    Social History: Patient reports that she has never smoked. She has never used smokeless tobacco. She reports previous alcohol use. She reports that she does not use drugs.    Family History: family history includes ALS in her sister; Cancer in her sister; Kidney disease in her father.    Allergies: Patient is allergic to penicillins and sulfa (sulfonamide antibiotics).    ROS  Constitutional: Negative for fever   Eyes: Negative for blurred vision, double vision   Respiratory: Negative for cough, shortness of breath   Cardiovascular: Negative for chest pain, palpitations, and leg swelling.   Gastrointestinal: Negative for abdominal pain, constipation, diarrhea, nausea, vomiting.   Genitourinary: Negative for dysuria, frequency   Musculoskeletal:  + generalized weakness. Negative for back pain and myalgias.   Skin: Negative for itching and rash.   Neurological: Negative for dizziness, headaches.   Psychiatric/Behavioral: Negative for depression. The patient is not nervous/anxious.      24 hour Vital Sign Range   Temp:  [98 °F (36.7 °C)-98.4 °F (36.9 °C)]   Pulse:  [60]   Resp:  [17-18]   BP: (133-193)/(63-78)   SpO2:  [95 %]     PEx  Constitutional: Patient appears debilitated.  No distress noted  HENT:   Head: Normocephalic and atraumatic.   Eyes: Pupils are equal, round  Neck: Normal range of motion. Neck supple.   Cardiovascular: Normal rate, regular rhythm and normal heart sounds.    Pulmonary/Chest: Effort normal and breath sounds are clear  Abdominal: Soft. Bowel sounds are normal.   Musculoskeletal: Normal range of motion.  LUE with chronic lymphedema  Neurological:  Lethargic and oriented to person, place, and time.    Psychiatric: Normal mood and affect. Behavior is normal.   Skin: Skin is warm and dry.     Recent Labs   Lab 08/22/22  0555   *   K 3.2*       CO2 24   BUN 16   CREATININE 0.7   MG 1.4*       Recent Labs   Lab 08/22/22  0555   WBC 3.68*   RBC 3.31*   HGB 9.0*   HCT 27.9*      MCV 84   MCH 27.2   MCHC 32.3         Assessment and Plan:       Essential hypertension  - 8/22 increase lisinopril to 30 mg daily, continue home carvedilol 3.125mg,  Bumex 1mg BID     Hypokalemia   - initiated potassium 30 mEq x  2 Doses    Hypomagnesemia  - initiated magnesium oxide 400 mg BID x2 days     Lethargy  - ordered TSH, T4, B12 for next lab draw; initiated vitamin-D supplement  - 8/18 TSH and B12 normal   - 8/22 improved today    CAP (community acquired pneumonia)  Pleural effusion  - Patient with CXR showing L pleural effusion, L basilar atelectasis v consolidation, requiring 2L NC. Daughter at bedside reports productive cough, though mostly in the mornings.  - Vanc/cefepime/azithromycin  Stopped for now. Low suspicion for infectious process at this time due to negative cultures   - Pleural effusion on CT chest, pulmonology consulted for possible thoracentesis  - Pulm declined performing para, believes the effusion to be a diuresis problem  - continue  Bumex to 1mg BID      Hyperlipidemia  - continue home atorvastatin 20mg     Normocytic anemia  - transfuse if Hgb<7  - continue to monitor twice weekly CBCs    Coronary artery disease involving native coronary artery of native heart without angina pectoris  S/p RCA stent placement  -continue home ASA 81mg  -continue home atorvastatin  -continue home Plavix 75 mg daily     Chronic combined systolic and diastolic heart failure  - Patient recently admitted to [facility] for CHF exacerbation. She does not have LE edema or SOB at this time.  - on Room Air   - Bumex 1mg BID      Acquired hypothyroidism  - continue home levothyroxine 125mcg      Severe aortic stenosis s/p TAVR  - Continue to monitor    Debility   - Continue with PT/OT for gait training and strengthening and restoration of ADL's   - Encourage mobility, OOB  in chair, and early ambulation as appropriate  - Fall precautions   - Monitor for bowel and bladder dysfunction  - Monitor for and prevent skin breakdown and pressure ulcers  - Continue DVT prophylaxis with frequent ambulation, patient also on aspirin and Plavix         Anticipate disposition:  Home with home health      Follow-up needed during SNF stay-    Follow-up needed after discharge from SNF: PCP    No future appointments.        Lenka Salazar NP  Department of Hospital Medicine   [facility]     DOS: 8/22/2022      Patient note was created using MModal Dictation.  Any errors in syntax or even information may not have been identified and edited on initial review prior to signing this note.

## 2022-08-22 NOTE — PLAN OF CARE
Problem: Adult Inpatient Plan of Care  Goal: Plan of Care Review  Outcome: Ongoing, Progressing  Goal: Absence of Hospital-Acquired Illness or Injury  Outcome: Ongoing, Progressing  Goal: Optimal Comfort and Wellbeing  Outcome: Ongoing, Progressing  Goal: Readiness for Transition of Care  Outcome: Ongoing, Progressing     Problem: Adjustment to Illness (Sepsis/Septic Shock)  Goal: Optimal Coping  Outcome: Ongoing, Progressing     Problem: Impaired Wound Healing  Goal: Optimal Wound Healing  Outcome: Ongoing, Progressing     Problem: Fall Injury Risk  Goal: Absence of Fall and Fall-Related Injury  Outcome: Ongoing, Progressing

## 2022-08-22 NOTE — TREATMENT PLAN
Rehab Services' DME recommendations    Pilar Michael  MRN: [id number]              [x] Wheelchair  Pt  requesting  transport chair     Cushion Basic    Justification for Cushion to prevent breakdown      [x] Home health PT, OT and Aide    YURIY Dumont/GAETANO 8/22/2022

## 2022-08-22 NOTE — PT/OT/SLP PROGRESS
Occupational Therapy   Treatment/Family Training     Name: Pilar Michael  MRN: [id number]  Admit Date: 8/9/2022  Admitting Diagnosis:  Chronic combined systolic and diastolic heart failure    General Precautions: Standard, fall   Orthopedic Precautions:N/A   Braces:       Recommendations:     Discharge Recommendations: home health OT  Level of Assistance Recommended at Discharge: 24 hours physical assistance for all ADL's and home management tasks  Discharge Equipment Recommendations:   (TBD)  Barriers to discharge:  Decreased caregiver support    Assessment:     Pilar Michael is a 89 y.o. female with a medical diagnosis of Chronic combined systolic and diastolic heart failure.  She presents with  Performance deficits affecting function are, impaired endurance, impaired self care skills, impaired functional mobility, gait instability, impaired balance, decreased coordination, decreased lower extremity function, decreased upper extremity function, decreased safety awareness, pain, decreased ROM, impaired cardiopulmonary response to activity, impaired muscle length.    .     Pt. Was cooperative and participated well with session on this day. Pt  continues to demonstrate levels of physical deficits with  functional indep with daily management activities tasks, selfcare skills with balance,  functional mobility, UB strength and endurance. Pt. Will continue to benefit from continued OT to progress towards goals     Rehab Potential is fair    Activity tolerance:  Fair    Plan:     Patient to be seen 5 x/week to address the above listed problems via self-care/home management, therapeutic activities, therapeutic exercises    · Plan of Care Expires: 09/09/22  · Plan of Care Reviewed with: patient    Subjective     Communicated with: PTA for hand off for family training  Pt. prior to session. I am doing well today    Pain/Comfort:  · Pain Rating 1: 0/10  · Pain Rating Post-Intervention 1: 0/10    Patient's cultural,  spiritual, Alevism conflicts given the current situation:  · no    Objective:     Patient found up in chair with   upon OT entry to room.    Functional Mobility/Transfers:  · Patient completed Sit <> Stand Transfer with contact guard assistance  with  rolling walker   · Patient completed Toilet Transfer Stand Pivot technique with contact guard assistance with  rolling walker  · X multiple trails with sit to stand and cues for safety for hand placement     Activities of Daily Living:  · Grooming: stand by assistance at sink level with grooming needs  · Lower Body Dressing: moderate assistance to lola pants seated and to mange over hips instace with RW for bal  · Toileting: maximal assistance with cleaning and diaper management from standard toilet and use of g gras and RW for safety Daughter performed cleaning portion during family train    [facility] 6 Click ADL: 15    Treatment & Education:    Pt. With family training held on this day with daughter Katiana  reviewed t/f's, selfcare skills and DME and level of (A) for home upon d/c.    Pt edu on role of OT, POC, safety when performing self care tasks , benefit of performing OOB activity, and safety when performing functional transfers and mobility management for preparation with goals to progress towards next level of care    Patient left up in chair with all lines intact and call button in reachEducation:      GOALS:   Multidisciplinary Problems     Occupational Therapy Goals        Problem: Occupational Therapy    Goal Priority Disciplines Outcome Interventions   Occupational Therapy Goal     OT, PT/OT Ongoing, Progressing    Description: Goals to be met by: 8/26/22     Patient will increase functional independence with ADLs by performing:    Feeding with Modified [place].  UE Dressing with Stand-by Assistance.-ongoing  LE Dressing with Stand-by Assistance.REVISED =LE dressing with Min (A) with A/E as needed.  Grooming while seated at sink with  Supervision.=MET  Toileting from toilet or BSC with Minimal Assistance for hygiene and clothing management.   Bathing from  shower chair/bench with Contact Guard Assistance.  Supine to sit with Modified [place].  Step transfer with Stand-by Assistance with RW to steady.  Upper extremity exercise with assistance as needed.  Caregiver will be educated on level of assist required to safely perform self care tasks and functional transfers..                      Time Tracking:     OT Date of Treatment: 08/22/22  OT Start Time: 0935    OT Stop Time: 1015  OT Total Time (min): 40 min    Billable Minutes:Self Care/Home Management 40    8/22/2022  A client care conference was performed between the MOHINDERR and YAN, prior to treatment to discuss the patient's status, treatment plan and established goals.

## 2022-08-23 PROCEDURE — 11000004 HC SNF PRIVATE

## 2022-08-23 PROCEDURE — 97110 THERAPEUTIC EXERCISES: CPT

## 2022-08-23 PROCEDURE — 25000003 PHARM REV CODE 250: Performed by: NURSE PRACTITIONER

## 2022-08-23 PROCEDURE — 97530 THERAPEUTIC ACTIVITIES: CPT

## 2022-08-23 PROCEDURE — 97116 GAIT TRAINING THERAPY: CPT

## 2022-08-23 PROCEDURE — 25000003 PHARM REV CODE 250: Performed by: HOSPITALIST

## 2022-08-23 RX ORDER — CARVEDILOL 3.12 MG/1
3.12 TABLET ORAL 2 TIMES DAILY WITH MEALS
Qty: 60 TABLET | Refills: 3 | Status: SHIPPED | OUTPATIENT
Start: 2022-08-23 | End: 2022-11-27

## 2022-08-23 RX ORDER — ATORVASTATIN CALCIUM 20 MG/1
20 TABLET, FILM COATED ORAL NIGHTLY
Qty: 30 TABLET | Refills: 3 | Status: ON HOLD | OUTPATIENT
Start: 2022-08-23 | End: 2023-03-30 | Stop reason: HOSPADM

## 2022-08-23 RX ORDER — BUMETANIDE 1 MG/1
1 TABLET ORAL 2 TIMES DAILY
Qty: 60 TABLET | Refills: 3 | Status: ON HOLD | OUTPATIENT
Start: 2022-08-23 | End: 2023-03-30 | Stop reason: HOSPADM

## 2022-08-23 RX ORDER — LISINOPRIL 20 MG/1
20 TABLET ORAL DAILY
Status: DISCONTINUED | OUTPATIENT
Start: 2022-08-24 | End: 2022-08-25 | Stop reason: HOSPADM

## 2022-08-23 RX ORDER — ERGOCALCIFEROL 1.25 MG/1
50000 CAPSULE ORAL
Qty: 4 CAPSULE | Refills: 3 | Status: SHIPPED | OUTPATIENT
Start: 2022-08-24 | End: 2022-11-27

## 2022-08-23 RX ADMIN — LEVOTHYROXINE SODIUM 125 MCG: 25 TABLET ORAL at 05:08

## 2022-08-23 RX ADMIN — ASPIRIN 81 MG CHEWABLE TABLET 81 MG: 81 TABLET CHEWABLE at 09:08

## 2022-08-23 RX ADMIN — LISINOPRIL 30 MG: 20 TABLET ORAL at 09:08

## 2022-08-23 RX ADMIN — TIMOLOL MALEATE 1 DROP: 5 SOLUTION OPHTHALMIC at 09:08

## 2022-08-23 RX ADMIN — CARVEDILOL 3.12 MG: 3.12 TABLET, FILM COATED ORAL at 09:08

## 2022-08-23 RX ADMIN — CARVEDILOL 3.12 MG: 3.12 TABLET, FILM COATED ORAL at 05:08

## 2022-08-23 RX ADMIN — POTASSIUM CHLORIDE 30 MEQ: 10 CAPSULE, COATED, EXTENDED RELEASE ORAL at 09:08

## 2022-08-23 RX ADMIN — Medication 400 MG: at 09:08

## 2022-08-23 RX ADMIN — THERA TABS 1 TABLET: TAB at 09:08

## 2022-08-23 RX ADMIN — BUMETANIDE 1 MG: 1 TABLET ORAL at 09:08

## 2022-08-23 RX ADMIN — BUMETANIDE 1 MG: 1 TABLET ORAL at 05:08

## 2022-08-23 RX ADMIN — MEGESTROL ACETATE 40 MG: 20 TABLET ORAL at 09:08

## 2022-08-23 RX ADMIN — ATORVASTATIN CALCIUM 20 MG: 20 TABLET, FILM COATED ORAL at 09:08

## 2022-08-23 RX ADMIN — CLOPIDOGREL BISULFATE 75 MG: 75 TABLET, FILM COATED ORAL at 09:08

## 2022-08-23 NOTE — PLAN OF CARE
Problem: Adult Inpatient Plan of Care  Goal: Plan of Care Review  Outcome: Ongoing, Progressing  Flowsheets (Taken 8/23/2022 0325)  Plan of Care Reviewed With:   patient   daughter  Goal: Patient-Specific Goal (Individualized)  Outcome: Ongoing, Progressing  Goal: Absence of Hospital-Acquired Illness or Injury  Outcome: Ongoing, Progressing  Intervention: Identify and Manage Fall Risk  Flowsheets (Taken 8/23/2022 0325)  Safety Promotion/Fall Prevention:   assistive device/personal item within reach   diversional activities provided   Fall Risk reviewed with patient/family   Fall Risk signage in place   family to remain at bedside   lighting adjusted   medications reviewed   nonskid shoes/socks when out of bed   room near unit station   side rails raised x 2  Goal: Optimal Comfort and Wellbeing  Outcome: Ongoing, Progressing  Intervention: Provide Person-Centered Care  Flowsheets (Taken 8/23/2022 0325)  Trust Relationship/Rapport:   care explained   questions encouraged   reassurance provided   emotional support provided   choices provided   thoughts/feelings acknowledged   empathic listening provided   questions answered     Problem: Adjustment to Illness (Sepsis/Septic Shock)  Goal: Optimal Coping  Outcome: Ongoing, Progressing     Problem: Bleeding (Sepsis/Septic Shock)  Goal: Absence of Bleeding  Outcome: Ongoing, Progressing     Problem: Glycemic Control Impaired (Sepsis/Septic Shock)  Goal: Blood Glucose Level Within Desired Range  Outcome: Ongoing, Progressing     Problem: Infection Progression (Sepsis/Septic Shock)  Goal: Absence of Infection Signs and Symptoms  Outcome: Ongoing, Progressing     Problem: Fluid Imbalance (Pneumonia)  Goal: Fluid Balance  Outcome: Ongoing, Progressing     Problem: Infection (Pneumonia)  Goal: Resolution of Infection Signs and Symptoms  Outcome: Ongoing, Progressing

## 2022-08-23 NOTE — PLAN OF CARE
Problem: Physical Therapy  Goal: Physical Therapy Goal  Description: Goals to be met by: 9/9/22    Patient will increase functional independence with mobility by performing:      . Supine to sit with Set-up [place]-not met  . Sit to supine with Set-up [place]  . Rolling to Left and Right with Set-up Assistance.  . Sit to stand transfer with Supervision-not met  . Bed to chair transfer with Supervision using Rolling Walker-not met  . Gait  x 50 feet with Stand-by Assistance using Rolling Walker. -not met  . Wheelchair propulsion x50 feet with Minimal Assistance using bilateral uppper extremities-met  Updated 8/23/2022 Wheelchair propulsion x50 feet with (S)using bilateral uppper extremities  . Ascend/Descend 2 inch curb step with Moderate Assistance using Rolling Walker.-not met       Outcome: Ongoing, Progressing

## 2022-08-23 NOTE — PLAN OF CARE
[facility] Skilled Nursing      HOME HEALTH ORDERS  FACE TO FACE ENCOUNTER    Patient Name: Pilar Michael  YOB: 1932    PCP: Joseph Noel MD   PCP Address: [address] [place] [address] [facility]  PCP Phone Number: [phone]  PCP Fax: [phone]    Encounter Date: 8/9/22    Admit to Home Health    Diagnoses:  Active Hospital Problems    Diagnosis  POA    *Chronic combined systolic and diastolic heart failure [I50.42]  Yes    Pleural effusion [J90]  Yes    Delirium [R41.0]  Yes    Acute hypoxemic respiratory failure [J96.01]  Yes    Debility [R53.81]  Yes    Hyperlipidemia [E78.5]  Yes     Chronic    Normocytic anemia [D64.9]  Yes     Chronic    Presence of permanent cardiac pacemaker [Z95.0]  Yes     Chronic    Coronary artery disease involving native coronary artery of native heart without angina pectoris [I25.10]  Yes     Chronic    Acquired hypothyroidism [E03.9]  Yes     Chronic    Essential hypertension [I10]  Yes     Chronic    Severe aortic stenosis s/p TAVR [I35.0]  Yes     Chronic      Resolved Hospital Problems   No resolved problems to display.       Follow Up Appointments:  No future appointments.    Allergies:  Review of patient's allergies indicates:   Allergen Reactions    Penicillins Swelling    Sulfa (sulfonamide antibiotics) Nausea Only       Medications: Review discharge medications with patient and family and provide education.      I have seen and examined this patient within the last 30 days. My clinical findings that support the need for the home health skilled services and home bound status are the following:no   Weakness/numbness causing balance and gait disturbance due to Infection making it taxing to leave home.     Referrals/ Consults  Physical Therapy to evaluate and treat. Evaluate for home safety and equipment needs; Establish/upgrade home exercise program. Perform / instruct on therapeutic exercises, gait training, transfer  training, and Range of Motion.  Occupational Therapy to evaluate and treat. Evaluate home environment for safety and equipment needs. Perform/Instruct on transfers, ADL training, ROM, and therapeutic exercises.   to evaluate for community resources/long-range planning.  Aide to provide assistance with personal care, ADLs, and vital signs.    Activities:   activity as tolerated    Nursing:   Agency to admit patient within 24 hours of hospital discharge unless specified on physician order or at patient request    SN to complete comprehensive assessment including routine vital signs. Instruct on disease process and s/s of complications to report to MD. Review/verify medication list sent home with the patient at time of discharge  and instruct patient/caregiver as needed. Frequency may be adjusted depending on start of care date.     Skilled nurse to perform up to 3 visits PRN for symptoms related to diagnosis    Notify MD if SBP > 160 or < 90; DBP > 90 or < 50; HR > 120 or < 50; Temp > 101; O2 < 88%    Ok to schedule additional visits based on staff availability and patient request on consecutive days within the home health episode.    Miscellaneous   Heart Failure:      SN to instruct on the following:    Instruct on the definition of CHF.   Instruct on the signs/sympoms of CHF to be reported.   Instruct on and monitor daily weights.   Instruct on factors that cause exacerbation.   Instruct on action, dose, schedule, and side effects of medications.   Instruct on diet as prescribed.   Instruct on activity allowed.   Instruct on life-style modifications for life long management of CHF   SN to assess compliance with daily weights, diet, medications, fluid retention,    safety precautions, activities permitted and life-style modifications.   Additional 1-2 SN visits per week as needed for signs and symptoms     of CHF exacerbation.      For Weight Gain > 2-3 lbs in 1 day or 4-6 lbs over 1 week notify  PCP    Home Health Aide:  Nursing Three times weekly, Physical Therapy Three times weekly, Occupational Therapy Three times weekly, Medical Social Work Three times weekly and Home Health Aide Three times weekly    Wound Care Orders  no    I certify that this patient is confined to her home and needs intermittent skilled nursing care, physical therapy and occupational therapy.

## 2022-08-23 NOTE — PLAN OF CARE
Pt has been issued NOMNC by [employer]. The NOMNC was issued for the same day Mrs. Michael was already scheduled to discharge. SW explained NOMNC and appeal process to patient who signed the document. Patient copy left in room, signed copy kept in office. Patient is in agreement with the NOMNC date.    Cheryl Saunders, [facility]  Case Management Department  [facility]  [email or web address]     08/22/22 1520   Medicare Message   Important Message from Medicare regarding Discharge Appeal Rights Given to patient/caregiver;Explained to patient/caregiver;Signed/date by patient/caregiver   Date IMM was signed 08/22/22   Time IMM was signed 1522

## 2022-08-23 NOTE — PROGRESS NOTES
[facility]                                  Skilled Nursing Facility                   Progress Note     Admit Date: 8/9/2022  CHARLIE 8/25/2022  Principal Problem:  Chronic combined systolic and diastolic heart failure   HPI obtained from patient interview and chart review     Chief Complaint:  Re-evaluation of medical treatment and therapy status:  Advance care planning    HPI:   Mrs. Michael is an 87 year old female PMHx of severe AS s/p TAVR (5/7/19), CAD s/p ostial RCA stent placement (4/19), IDBCA s/p resection and radiation (2003), thyroid cancer s/p resection (1992), HTN, PAD who presents to SNF following hospitalization for acute hypoxemic respiratory failure with acute on chronic diastolic heart failure who admitted back to [facility] for UTI.  Admission to SNF for secondary weakness and debility.    Interval history:   24 hr vital sign ranges listed below.  Continues to have daytime lethargy but overall improved from initial SNF presentation.  Phone conversation held with patient's daughter to provide care update in to discuss advance care planning.  See below.  Patient denies shortness of breath, abdominal discomfort, nausea, or vomiting.  Patient reports an adequate appetite.  Patient denies dysuria.  Patient reports having regular bowel movements.  Patient progessing with PT/OT. Continuing to follow and treat all acute and chronic conditions.      Past Medical History: Patient has a past medical history of Arthritis, Cancer, CHF (congestive heart failure), Coronary artery disease, Hypertension, and Thyroid disease.    Past Surgical History: Patient has a past surgical history that includes Hysterectomy; Thyroidectomy; lymph nodes removal; Cholecystectomy; Coronary angiography (N/A, 4/23/2019); Transcatheter aortic valve replacement (TAVR) (N/A, 5/7/2019); Cardiac valuve replacement; and Cardiac catheterization.    Social History: Patient  reports that she has never smoked. She has never used smokeless tobacco. She reports previous alcohol use. She reports that she does not use drugs.    Family History: family history includes ALS in her sister; Cancer in her sister; Kidney disease in her father.    Allergies: Patient is allergic to penicillins and sulfa (sulfonamide antibiotics).    ROS  Constitutional: Negative for fever   Eyes: Negative for blurred vision, double vision   Respiratory: Negative for cough, shortness of breath   Cardiovascular: Negative for chest pain, palpitations, and leg swelling.   Gastrointestinal: Negative for abdominal pain, constipation, diarrhea, nausea, vomiting.   Genitourinary: Negative for dysuria, frequency   Musculoskeletal:  + generalized weakness. Negative for back pain and myalgias.   Skin: Negative for itching and rash.   Neurological: Negative for dizziness, headaches.   Psychiatric/Behavioral: Negative for depression. The patient is not nervous/anxious.      24 hour Vital Sign Range   Temp:  [98 °F (36.7 °C)-98.2 °F (36.8 °C)]   Pulse:  [60]   Resp:  [18]   BP: (129-170)/(58-76)   SpO2:  [96 %-97 %]     PEx  Constitutional: Patient appears debilitated.  No distress noted  HENT:   Head: Normocephalic and atraumatic.   Eyes: Pupils are equal, round  Neck: Normal range of motion. Neck supple.   Cardiovascular: Normal rate, regular rhythm and normal heart sounds.    Pulmonary/Chest: Effort normal and breath sounds are clear  Abdominal: Soft. Bowel sounds are normal.   Musculoskeletal: Normal range of motion.  LUE with chronic lymphedema  Neurological:  Lethargic and oriented to person, place, and time.    Psychiatric: Normal mood and affect. Behavior is normal.   Skin: Skin is warm and dry.     No results for input(s): GLUCOSE, NA, K, CL, CO2, BUN, CREATININE, MG in the last 24 hours.    Invalid input(s):  CALCIUM    No results for input(s): WBC, RBC, HGB, HCT, PLT, MCV, MCH, MCHC in the last 24 hours.      Assessment  and Plan:    Essential hypertension  - 8/23 decrease lisinopril back to 20mg daily-  this AM, continue home carvedilol 3.125mg,  Bumex 1mg BID     Lethargy  - ordered TSH, T4, B12 for next lab draw; initiated vitamin-D supplement  - 8/18 TSH and B12 normal   - 8/22 improved today    CAP (community acquired pneumonia)  Pleural effusion  - Patient with CXR showing L pleural effusion, L basilar atelectasis v consolidation, requiring 2L NC. Daughter at bedside reports productive cough, though mostly in the mornings.  - Vanc/cefepime/azithromycin  Stopped for now. Low suspicion for infectious process at this time due to negative cultures   - Pleural effusion on CT chest, pulmonology consulted for possible thoracentesis  - Pulm declined performing para, believes the effusion to be a diuresis problem  - continue  Bumex to 1mg BID      Hyperlipidemia  - continue home atorvastatin 20mg     Normocytic anemia  - transfuse if Hgb<7  - continue to monitor twice weekly CBCs    Coronary artery disease involving native coronary artery of native heart without angina pectoris  S/p RCA stent placement  -continue home ASA 81mg  -continue home atorvastatin  -continue home Plavix 75 mg daily     Chronic combined systolic and diastolic heart failure  - Patient recently admitted to [facility] for CHF exacerbation. She does not have LE edema or SOB at this time.  - on Room Air   - Bumex 1mg BID      Acquired hypothyroidism  - continue home levothyroxine 125mcg      Severe aortic stenosis s/p TAVR  - Continue to monitor    Debility   - Continue with PT/OT for gait training and strengthening and restoration of ADL's   - Encourage mobility, OOB in chair, and early ambulation as appropriate  - Fall precautions   - Monitor for bowel and bladder dysfunction  - Monitor for and prevent skin breakdown and pressure ulcers  - Continue DVT prophylaxis with frequent ambulation, patient also on aspirin and Plavix         Anticipate disposition:   Home with home health      Follow-up needed during SNF stay-    Follow-up needed after discharge from SNF: PCP    No future appointments.    Advance care planning  Length of Conversation:  16 min  Topics Discussed: Disease process of advanced frailty, debility, decreased daytime wakefulness  Overview of Materials/Resources given(if applicable):  discussed home palliative care consult.  Outcome of Discussion:  patient and patient's daughter agreeable to home palliative care consult, hoping that patient's daughter will be more receptive to goals of care/code status in the home setting  Decision Maker: Pilar Michael, patient along with her daughter        Lenka M Marie, NP  Department of Hospital Medicine   [facility]     DOS: 8/23/2022      Patient note was created using MModal Dictation.  Any errors in syntax or even information may not have been identified and edited on initial review prior to signing this note.

## 2022-08-23 NOTE — PT/OT/SLP PROGRESS
Occupational Therapy      Patient Name:  Pilar Michael   MRN:  [id number]    Patient not seen today secondary to patient fatigue after having finished PT, writing OT unable to return. Will follow-up 8/24 or within POC.    8/23/2022

## 2022-08-23 NOTE — PT/OT/SLP PROGRESS
Physical Therapy Treatment    Patient Name:  Pilar Michael   MRN:  [id number]  Admit Date: 8/9/2022  Admitting Diagnosis: Chronic combined systolic and diastolic heart failure  Recent Surgeries: N/A    General Precautions: Standard, fall   Orthopedic Precautions:N/A   Braces: N/A   PT had a face-to-face encounter with regards to the patient's plan of care with the PTA who has been seeing this patient regularly.  Recommendations:     Discharge Recommendations:  home health PT   Level of Assistance Recommended at Discharge: 24 hours light assistance  Discharge Equipment Recommendations: bedside commode, bath bench, walker, rolling, wheelchair   Barriers to discharge: Decreased caregiver support    Assessment:     Pilar Michael is a 89 y.o. female admitted with a medical diagnosis of Chronic combined systolic and diastolic heart failure . Pt. Continues to make progress as she was able to ambulate 40ft x 2 trials today with RW and CGA. Pt however does require Minimal to moderate motivation and encouragement to participate with PT. Pt will benefit from continued snf rehab to help improve her overall functional mobility and safety.      Performance deficits affecting function:  weakness, impaired endurance, impaired self care skills, impaired functional mobility, gait instability, impaired balance, decreased upper extremity function, decreased lower extremity function, edema, impaired cardiopulmonary response to activity .    Rehab Potential is good    Activity Tolerance: Fair    Plan:     Patient to be seen 5 x/week to address the above listed problems via gait training, therapeutic activities, therapeutic exercises, neuromuscular re-education, wheelchair management/training    · Plan of Care Expires: 09/09/22  · Plan of Care Reviewed with: patient    Subjective     Pt. Agreeable to work with PT after Min/Mod encouragement.     Pain/Comfort:  Pain Rating 1: 0/10  Pain Rating Post-Intervention 1: 0/10    Patient's  cultural, spiritual, Evangelical conflicts given the current situation:  no    Objective:     Communicated with pt's nurse prior to session.  Patient found HOB elevated with   upon PT entry to room.     Therapeutic Activities and Exercises: Mini-eliptical x 15mins to help improve B L/E MMT and endurance.    Functional Mobility:  · Bed Mobility:     · Supine to Sit: stand by assistance  · Transfers:     · Sit to Stand x multiple trials:  stand by assistance with no AD and rolling walker  · Bed to Chair: stand by assistance with  no AD  using  Stand Pivot  · Gait: 40ftx 2 trials with RW and CGA for safety d.t pt with FFT, and decrease annie. Seated rest break needed d.t pt with fatigue.  · Wheelchair Propulsion:  Pt propelled Standard wheelchair x 50 feet on Level tile with  Bilateral upper extremity with Supervision or Set-up Assistance. Needed additional time d.t pt very slow to  Complete.    AM-PAC 6 CLICK MOBILITY  16    Patient left up in chair with call button in reach.    GOALS:   Multidisciplinary Problems     Physical Therapy Goals        Problem: Physical Therapy    Goal Priority Disciplines Outcome Goal Variances Interventions   Physical Therapy Goal     PT, PT/OT Ongoing, Progressing     Description: Goals to be met by: 9/9/22    Patient will increase functional independence with mobility by performing:      . Supine to sit with Set-up [place]-not met  . Sit to supine with Set-up [place]  . Rolling to Left and Right with Set-up Assistance.  . Sit to stand transfer with Supervision-not met  . Bed to chair transfer with Supervision using Rolling Walker-not met  . Gait  x 50 feet with Stand-by Assistance using Rolling Walker. -not met  . Wheelchair propulsion x50 feet with Minimal Assistance using bilateral uppper extremities-met  Updated 8/23/2022 Wheelchair propulsion x50 feet with (S)using bilateral uppper extremities  . Ascend/Descend 2 inch curb step with Moderate Assistance using Rolling  Walker.-not met                        Time Tracking:     PT Received On: 08/23/22  PT Start Time: 0900  PT Stop Time: 0939  PT Total Time (min): 39 min    Billable Minutes: Gait Training 9mins, Therapeutic Activity 15mins and Therapeutic Exercise 15mins    Treatment Type: Treatment  PT/PTA: PT     PTA Visit Number: 0     08/23/2022

## 2022-08-24 PROCEDURE — 97530 THERAPEUTIC ACTIVITIES: CPT

## 2022-08-24 PROCEDURE — 97530 THERAPEUTIC ACTIVITIES: CPT | Mod: CO

## 2022-08-24 PROCEDURE — 97110 THERAPEUTIC EXERCISES: CPT

## 2022-08-24 PROCEDURE — 25000003 PHARM REV CODE 250: Performed by: NURSE PRACTITIONER

## 2022-08-24 PROCEDURE — 11000004 HC SNF PRIVATE

## 2022-08-24 PROCEDURE — 25000003 PHARM REV CODE 250: Performed by: HOSPITALIST

## 2022-08-24 PROCEDURE — 97116 GAIT TRAINING THERAPY: CPT

## 2022-08-24 RX ADMIN — TIMOLOL MALEATE 1 DROP: 5 SOLUTION OPHTHALMIC at 08:08

## 2022-08-24 RX ADMIN — CARVEDILOL 3.12 MG: 3.12 TABLET, FILM COATED ORAL at 10:08

## 2022-08-24 RX ADMIN — SENNOSIDES AND DOCUSATE SODIUM 1 TABLET: 50; 8.6 TABLET ORAL at 10:08

## 2022-08-24 RX ADMIN — Medication 400 MG: at 10:08

## 2022-08-24 RX ADMIN — POTASSIUM CHLORIDE 30 MEQ: 10 CAPSULE, COATED, EXTENDED RELEASE ORAL at 08:08

## 2022-08-24 RX ADMIN — POTASSIUM CHLORIDE 30 MEQ: 10 CAPSULE, COATED, EXTENDED RELEASE ORAL at 10:08

## 2022-08-24 RX ADMIN — CARVEDILOL 3.12 MG: 3.12 TABLET, FILM COATED ORAL at 04:08

## 2022-08-24 RX ADMIN — TIMOLOL MALEATE 1 DROP: 5 SOLUTION OPHTHALMIC at 10:08

## 2022-08-24 RX ADMIN — ERGOCALCIFEROL 50000 UNITS: 1.25 CAPSULE ORAL at 10:08

## 2022-08-24 RX ADMIN — THERA TABS 1 TABLET: TAB at 10:08

## 2022-08-24 RX ADMIN — BUMETANIDE 1 MG: 1 TABLET ORAL at 10:08

## 2022-08-24 RX ADMIN — CLOPIDOGREL BISULFATE 75 MG: 75 TABLET, FILM COATED ORAL at 10:08

## 2022-08-24 RX ADMIN — MEGESTROL ACETATE 40 MG: 20 TABLET ORAL at 10:08

## 2022-08-24 RX ADMIN — LEVOTHYROXINE SODIUM 125 MCG: 25 TABLET ORAL at 05:08

## 2022-08-24 RX ADMIN — ASPIRIN 81 MG CHEWABLE TABLET 81 MG: 81 TABLET CHEWABLE at 10:08

## 2022-08-24 RX ADMIN — ATORVASTATIN CALCIUM 20 MG: 20 TABLET, FILM COATED ORAL at 08:08

## 2022-08-24 RX ADMIN — BUMETANIDE 1 MG: 1 TABLET ORAL at 04:08

## 2022-08-24 RX ADMIN — LISINOPRIL 20 MG: 20 TABLET ORAL at 10:08

## 2022-08-24 NOTE — PLAN OF CARE
SW met with pt and spoke to daughter, Katiana Conrad to confirm discharge needs. HME (transport wheelchair) has been authorized and delivered to Pt's room. Patient's daughter will transport pt home and provide care for her. [facility] and patient was accepted via [facility]. F/U PCP appt scheduled for 9/1. D/C set for 10 AM or sooner if possible.    Cheryl Saunders, [facility]  Case Management Department  [facility]  [email or web address]

## 2022-08-24 NOTE — PT/OT/SLP PROGRESS
Physical Therapy Treatment and Discharge Summary    Patient Name:  Pilar Michael   MRN:  [id number]  Admit Date: 8/9/2022  Admitting Diagnosis: Chronic combined systolic and diastolic heart failure  Recent Surgeries: N/A    General Precautions: Standard, fall   Orthopedic Precautions:N/A   Braces: N/A     Recommendations:     Discharge Recommendations:  home health PT   Level of Assistance Recommended at Discharge: 24 hours light assistance  Discharge Equipment Recommendations: bedside commode, bath bench, walker, rolling, wheelchair   Barriers to discharge: Decreased caregiver support    Assessment:     Pilar Michael is a 89 y.o. female admitted with a medical diagnosis of Chronic combined systolic and diastolic heart failure . Pt is appropriate for d/c home tomorrow with continued HHPT.      Performance deficits affecting function:  weakness, impaired endurance, impaired self care skills, impaired functional mobility, gait instability, impaired balance, decreased upper extremity function, decreased lower extremity function, edema, impaired cardiopulmonary response to activity .    Rehab Potential is fair    Activity Tolerance: Fair    Plan:     Patient to be seen 5 x/week to address the above listed problems via gait training, therapeutic activities, therapeutic exercises, neuromuscular re-education, wheelchair management/training    · Plan of Care Expires: 09/09/22  · Plan of Care Reviewed with: patient    Subjective     Pt agreeable to work with PT however did state she felt very tired after taking a shower this morning.     Pain/Comfort:  · Pain Rating 1: 0/10  · Pain Rating Post-Intervention 1: 0/10    Patient's cultural, spiritual, Mu-ism conflicts given the current situation:  · no    Objective:     Communicated with pt's nurse prior to session.  Patient found up in chair with   upon PT entry to room.     Therapeutic Activities and Exercises: Mini-eliptical x 20mins to help improve B L/E MMT and  endurance. Resistance set at medium.    Functional Mobility:  · Transfers:     · Sit to Stand from w/c x multiple trials:  contact guard assistance and minimum assistance with rolling walker  · Gait: 5ft+22ft with RW and CGA. pt neded seated rest breaks d.t fatigue.  · Wheelchair Propulsion:  Pt propelled Standard wheelchair x 50 feet on Level tile with  Bilateral upper extremity with Supervision or Set-up Assistance.     AM-PAC 6 CLICK MOBILITY  16    Patient left up in chair with call button in reach.    GOALS:   Multidisciplinary Problems     Physical Therapy Goals        Problem: Physical Therapy    Goal Priority Disciplines Outcome Goal Variances Interventions   Physical Therapy Goal     PT, PT/OT Ongoing, Progressing     Description: Goals to be met by: 9/9/22    Patient will increase functional independence with mobility by performing:      . Supine to sit with Set-up [place]-not met  . Sit to supine with Set-up [place]  . Rolling to Left and Right with Set-up Assistance.  . Sit to stand transfer with Supervision-not met  . Bed to chair transfer with Supervision using Rolling Walker-not met  . Gait  x 50 feet with Stand-by Assistance using Rolling Walker. -not met  . Wheelchair propulsion x50 feet with Minimal Assistance using bilateral uppper extremities-met  Updated 8/23/2022 Wheelchair propulsion x50 feet with (S)using bilateral uppper extremities-not met  . Ascend/Descend 2 inch curb step with Moderate Assistance using Rolling Walker.-not met                        Time Tracking:     PT Received On: 08/24/22  PT Start Time: 0900  PT Stop Time: 0941  PT Total Time (min): 41 min    Billable Minutes: Gait Training 11mins, Therapeutic Activity 10mins and Therapeutic Exercise 20mins    Treatment Type: Treatment  PT/PTA: PT     PTA Visit Number: 0     08/24/2022

## 2022-08-24 NOTE — PLAN OF CARE
Problem: Adult Inpatient Plan of Care  Goal: Plan of Care Review  Outcome: Ongoing, Progressing  Goal: Patient-Specific Goal (Individualized)  Outcome: Ongoing, Progressing     Problem: Adjustment to Illness (Sepsis/Septic Shock)  Goal: Optimal Coping  Outcome: Ongoing, Progressing     Problem: Bleeding (Sepsis/Septic Shock)  Goal: Absence of Bleeding  Outcome: Ongoing, Progressing     Problem: Infection Progression (Sepsis/Septic Shock)  Goal: Absence of Infection Signs and Symptoms  Outcome: Ongoing, Progressing     Problem: Impaired Wound Healing  Goal: Optimal Wound Healing  Outcome: Ongoing, Progressing     Problem: Fall Injury Risk  Goal: Absence of Fall and Fall-Related Injury  Outcome: Ongoing, Progressing     Problem: Skin Injury Risk Increased  Goal: Skin Health and Integrity  Outcome: Ongoing, Progressing

## 2022-08-24 NOTE — PLAN OF CARE
Problem: Physical Therapy  Goal: Physical Therapy Goal  Description: Goals to be met by: 9/9/22    Patient will increase functional independence with mobility by performing:      . Supine to sit with Set-up [place]-not met  . Sit to supine with Set-up [place]  . Rolling to Left and Right with Set-up Assistance.  . Sit to stand transfer with Supervision-not met  . Bed to chair transfer with Supervision using Rolling Walker-not met  . Gait  x 50 feet with Stand-by Assistance using Rolling Walker. -not met  . Wheelchair propulsion x50 feet with Minimal Assistance using bilateral uppper extremities-met  Updated 8/23/2022 Wheelchair propulsion x50 feet with (S)using bilateral uppper extremities-not met  . Ascend/Descend 2 inch curb step with Moderate Assistance using Rolling Walker.-not met       Outcome: Ongoing, Progressing

## 2022-08-24 NOTE — PLAN OF CARE
Family Training     Patient Name:  Pilar Michael   MRN:  [id number]  Admit Date: 8/9/2022      Family training completed on today; no concerns reported per family member.

## 2022-08-24 NOTE — PLAN OF CARE
Problem: Adult Inpatient Plan of Care  Goal: Plan of Care Review  Outcome: Ongoing, Progressing  Goal: Patient-Specific Goal (Individualized)  Outcome: Ongoing, Progressing     Problem: Adjustment to Illness (Sepsis/Septic Shock)  Goal: Optimal Coping  Outcome: Ongoing, Progressing     Problem: Bleeding (Sepsis/Septic Shock)  Goal: Absence of Bleeding  Outcome: Ongoing, Progressing     Problem: Glycemic Control Impaired (Sepsis/Septic Shock)  Goal: Blood Glucose Level Within Desired Range  Outcome: Ongoing, Progressing     Problem: Infection Progression (Sepsis/Septic Shock)  Goal: Absence of Infection Signs and Symptoms  Outcome: Ongoing, Progressing     Problem: Nutrition Impaired (Sepsis/Septic Shock)  Goal: Optimal Nutrition Intake  Outcome: Ongoing, Progressing     Problem: Fluid Imbalance (Pneumonia)  Goal: Fluid Balance  Outcome: Ongoing, Progressing     Problem: Infection (Pneumonia)  Goal: Resolution of Infection Signs and Symptoms  Outcome: Ongoing, Progressing

## 2022-08-24 NOTE — PT/OT/SLP PROGRESS
Occupational Therapy   Treatment     Name: Pilar Michael  MRN: [id number]  Admit Date: 8/9/2022  Admitting Diagnosis:  Chronic combined systolic and diastolic heart failure    General Precautions: Standard, fall   Orthopedic Precautions:N/A   Braces:       Recommendations:     Discharge Recommendations: home health OT  Level of Assistance Recommended at Discharge: 24 hours physical assistance for all ADL's and home management tasks  Discharge Equipment Recommendations:   (TBD)  Barriers to discharge:  Decreased caregiver support    Assessment:     Pilar Michael is a 89 y.o. female with a medical diagnosis of Chronic combined systolic and diastolic heart failure.  She presents with  Performance deficits affecting function are, impaired endurance, impaired self care skills, impaired functional mobility, gait instability, impaired balance, decreased coordination, decreased lower extremity function, decreased upper extremity function, decreased safety awareness, pain, decreased ROM, impaired cardiopulmonary response to activity, impaired muscle length.    .     Pt. Was cooperative and participated well with session on this day. Pt  continues to demonstrate levels of physical deficits with  functional indep with daily management activities tasks, selfcare skills with balance,  functional mobility, UB strength and endurance. Pt. Will continue to benefit from continued OT to progress towards goals     Rehab Potential is fair    Activity tolerance:  Fair    Plan:     Patient to be seen 5 x/week to address the above listed problems via self-care/home management, therapeutic activities, therapeutic exercises    · Plan of Care Expires: 09/09/22  · Plan of Care Reviewed with: patient    Subjective     Communicated with: nsg and Pt. prior to session. I am doing well today    Pain/Comfort:  Pain Rating 1: 0/10  Pain Rating Post-Intervention 1: 0/10    Patient's cultural, spiritual, Denominational conflicts given the current  situation:  no    Objective:     Patient found up in chair with   upon OT entry to room.    Functional Mobility/Transfers:  · Patient completed Sit <> Stand Transfer with contact guard assistance  with  rolling walker   · x3 trails with sit to stand and cues for safety for hand placement. Pt. With limited standing tolerance.    Activities of Daily Living:  · Grooming: stand by assistance at sink level with grooming needs    [facility] 6 Click ADL: 15    Treatment & Education:  Pt. With standing act on this day with task. Pt. With CGA/SBA for balance aspects with task with  AD at raised counter Pt with visual perception task with discrimination of various shapes and sizes x 40 sec 28 sec and then 32 sec with standing bal and min cues through out with weight shifting and use of BUE's incorporated and crossing mid line and facilitation with posture in prep for home management.     Pt. With 2# dowel activity with AAROM  2x10 reps with  shd flex, bicep curls and forward flex motion to increase BUE ROM and strength.    Pt. With therex performed to increase ROM, endurance selfcare task and fxl mobility for independence     Pt edu on role of OT, POC, safety when performing self care tasks , benefit of performing OOB activity, and safety when performing functional transfers and mobility management for preparation with goals to progress towards next level of care    Patient left up in chair with all lines intact and call button in reachEducation:      GOALS:   Multidisciplinary Problems     Occupational Therapy Goals        Problem: Occupational Therapy    Goal Priority Disciplines Outcome Interventions   Occupational Therapy Goal     OT, PT/OT Ongoing, Progressing    Description: Goals to be met by: 8/26/22     Patient will increase functional independence with ADLs by performing:    Feeding with Modified [place].  UE Dressing with Stand-by Assistance.-ongoing  LE Dressing with Stand-by Assistance.REVISED =LE dressing with  Min (A) with A/E as needed.  Grooming while seated at sink with Supervision.=MET  Toileting from toilet or BSC with Minimal Assistance for hygiene and clothing management.   Bathing from  shower chair/bench with Contact Guard Assistance.  Supine to sit with Modified [place].  Step transfer with Stand-by Assistance with RW to steady.  Upper extremity exercise with assistance as needed.  Caregiver will be educated on level of assist required to safely perform self care tasks and functional transfers..                      Time Tracking:     OT Date of Treatment: 08/24/22  OT Start Time: 1032    OT Stop Time: 1110  OT Total Time (min): 38 min    Billable Minutes:Therapeutic Activity 38    8/24/2022

## 2022-08-25 VITALS
WEIGHT: 169.56 LBS | BODY MASS INDEX: 31.2 KG/M2 | TEMPERATURE: 99 F | DIASTOLIC BLOOD PRESSURE: 60 MMHG | OXYGEN SATURATION: 97 % | SYSTOLIC BLOOD PRESSURE: 127 MMHG | RESPIRATION RATE: 18 BRPM | HEART RATE: 60 BPM | HEIGHT: 62 IN

## 2022-08-25 LAB — MAGNESIUM SERPL-MCNC: 1.6 MG/DL (ref 1.6–2.6)

## 2022-08-25 PROCEDURE — 83735 ASSAY OF MAGNESIUM: CPT | Performed by: HOSPITALIST

## 2022-08-25 PROCEDURE — 25000003 PHARM REV CODE 250: Performed by: NURSE PRACTITIONER

## 2022-08-25 PROCEDURE — 36415 COLL VENOUS BLD VENIPUNCTURE: CPT | Performed by: HOSPITALIST

## 2022-08-25 PROCEDURE — 25000003 PHARM REV CODE 250: Performed by: HOSPITALIST

## 2022-08-25 RX ADMIN — POTASSIUM CHLORIDE 30 MEQ: 10 CAPSULE, COATED, EXTENDED RELEASE ORAL at 08:08

## 2022-08-25 RX ADMIN — CARVEDILOL 3.12 MG: 3.12 TABLET, FILM COATED ORAL at 08:08

## 2022-08-25 RX ADMIN — LISINOPRIL 20 MG: 20 TABLET ORAL at 09:08

## 2022-08-25 RX ADMIN — THERA TABS 1 TABLET: TAB at 09:08

## 2022-08-25 RX ADMIN — BUMETANIDE 1 MG: 1 TABLET ORAL at 09:08

## 2022-08-25 RX ADMIN — ASPIRIN 81 MG CHEWABLE TABLET 81 MG: 81 TABLET CHEWABLE at 09:08

## 2022-08-25 RX ADMIN — MEGESTROL ACETATE 40 MG: 20 TABLET ORAL at 09:08

## 2022-08-25 RX ADMIN — CLOPIDOGREL BISULFATE 75 MG: 75 TABLET, FILM COATED ORAL at 09:08

## 2022-08-25 RX ADMIN — TIMOLOL MALEATE 1 DROP: 5 SOLUTION OPHTHALMIC at 09:08

## 2022-08-25 RX ADMIN — LEVOTHYROXINE SODIUM 125 MCG: 25 TABLET ORAL at 05:08

## 2022-08-25 NOTE — PLAN OF CARE
Problem: Adult Inpatient Plan of Care  Goal: Plan of Care Review  Outcome: Met  Goal: Patient-Specific Goal (Individualized)  Outcome: Met  Goal: Absence of Hospital-Acquired Illness or Injury  Outcome: Met  Goal: Optimal Comfort and Wellbeing  Outcome: Met  Goal: Readiness for Transition of Care  Outcome: Met     Problem: Adult Inpatient Plan of Care  Goal: Plan of Care Review  Outcome: Met  Goal: Patient-Specific Goal (Individualized)  Outcome: Met  Goal: Absence of Hospital-Acquired Illness or Injury  Outcome: Met  Goal: Optimal Comfort and Wellbeing  Outcome: Met  Goal: Readiness for Transition of Care  Outcome: Met     Problem: Adult Inpatient Plan of Care  Goal: Plan of Care Review  Outcome: Met  Goal: Patient-Specific Goal (Individualized)  Outcome: Met  Goal: Absence of Hospital-Acquired Illness or Injury  Outcome: Met  Goal: Optimal Comfort and Wellbeing  Outcome: Met  Goal: Readiness for Transition of Care  Outcome: Met     Problem: Adult Inpatient Plan of Care  Goal: Plan of Care Review  Outcome: Met  Goal: Patient-Specific Goal (Individualized)  Outcome: Met  Goal: Absence of Hospital-Acquired Illness or Injury  Outcome: Met  Goal: Optimal Comfort and Wellbeing  Outcome: Met  Goal: Readiness for Transition of Care  Outcome: Met     Problem: Adult Inpatient Plan of Care  Goal: Plan of Care Review  Outcome: Met  Goal: Patient-Specific Goal (Individualized)  Outcome: Met  Goal: Absence of Hospital-Acquired Illness or Injury  Outcome: Met  Goal: Optimal Comfort and Wellbeing  Outcome: Met  Goal: Readiness for Transition of Care  Outcome: Met     Problem: Adult Inpatient Plan of Care  Goal: Plan of Care Review  Outcome: Met  Goal: Patient-Specific Goal (Individualized)  Outcome: Met  Goal: Absence of Hospital-Acquired Illness or Injury  Outcome: Met  Goal: Optimal Comfort and Wellbeing  Outcome: Met  Goal: Readiness for Transition of Care  Outcome: Met     Problem: Adult Inpatient Plan of Care  Goal: Plan  of Care Review  Outcome: Met  Goal: Patient-Specific Goal (Individualized)  Outcome: Met  Goal: Absence of Hospital-Acquired Illness or Injury  Outcome: Met  Goal: Optimal Comfort and Wellbeing  Outcome: Met  Goal: Readiness for Transition of Care  Outcome: Met     Problem: Adult Inpatient Plan of Care  Goal: Plan of Care Review  Outcome: Met  Goal: Patient-Specific Goal (Individualized)  Outcome: Met  Goal: Absence of Hospital-Acquired Illness or Injury  Outcome: Met  Goal: Optimal Comfort and Wellbeing  Outcome: Met  Goal: Readiness for Transition of Care  Outcome: Met     Problem: Adult Inpatient Plan of Care  Goal: Plan of Care Review  Outcome: Met  Goal: Patient-Specific Goal (Individualized)  Outcome: Met  Goal: Absence of Hospital-Acquired Illness or Injury  Outcome: Met  Goal: Optimal Comfort and Wellbeing  Outcome: Met  Goal: Readiness for Transition of Care  Outcome: Met     Problem: Adult Inpatient Plan of Care  Goal: Plan of Care Review  Outcome: Met  Goal: Patient-Specific Goal (Individualized)  Outcome: Met  Goal: Absence of Hospital-Acquired Illness or Injury  Outcome: Met  Goal: Optimal Comfort and Wellbeing  Outcome: Met  Goal: Readiness for Transition of Care  Outcome: Met     Problem: Adult Inpatient Plan of Care  Goal: Plan of Care Review  Outcome: Met  Goal: Patient-Specific Goal (Individualized)  Outcome: Met  Goal: Absence of Hospital-Acquired Illness or Injury  Outcome: Met  Goal: Optimal Comfort and Wellbeing  Outcome: Met  Goal: Readiness for Transition of Care  Outcome: Met     Problem: Adult Inpatient Plan of Care  Goal: Plan of Care Review  Outcome: Met  Goal: Patient-Specific Goal (Individualized)  Outcome: Met  Goal: Absence of Hospital-Acquired Illness or Injury  Outcome: Met  Goal: Optimal Comfort and Wellbeing  Outcome: Met  Goal: Readiness for Transition of Care  Outcome: Met

## 2022-08-25 NOTE — NURSING
DISCHARGE INSTRUCTIONS GIVEN AND PATIENT PLUS DAUGHTER UNDERSTANDS.MILD REDNESS TO BUTTOCKS DAUGHTER INSTRUCTED TO CONTINUE TO USE BARRIER CREAM UNTIL AREA HEALED AND REDNESS RESOLVED.DISCHARGED WITH HOME HEALTH CARE.DISCHARGED TO HOME ACCOMPANIED BY PCT TO LOBBY ASSISTED INTO FAMILY VEHICLE ACCOMPANIED BY DAUGHTER TO HOME WITH PERSONAL BELONGINGS..

## 2022-08-25 NOTE — PLAN OF CARE
Problem: Adult Inpatient Plan of Care  Goal: Plan of Care Review  Outcome: Ongoing, Progressing  Flowsheets (Taken 8/25/2022 0239)  Plan of Care Reviewed With: patient  Goal: Patient-Specific Goal (Individualized)  Outcome: Ongoing, Progressing  Goal: Absence of Hospital-Acquired Illness or Injury  Outcome: Ongoing, Progressing  Intervention: Identify and Manage Fall Risk  Flowsheets (Taken 8/25/2022 0239)  Safety Promotion/Fall Prevention:   assistive device/personal item within reach   diversional activities provided   Fall Risk reviewed with patient/family   Fall Risk signage in place   nonskid shoes/socks when out of bed   instructed to call staff for mobility  Goal: Optimal Comfort and Wellbeing  Outcome: Ongoing, Progressing  Intervention: Provide Person-Centered Care  Flowsheets (Taken 8/25/2022 0239)  Trust Relationship/Rapport:   care explained   questions encouraged   choices provided   reassurance provided   emotional support provided   thoughts/feelings acknowledged   empathic listening provided   questions answered  Goal: Readiness for Transition of Care  Outcome: Ongoing, Progressing     Problem: Adjustment to Illness (Sepsis/Septic Shock)  Goal: Optimal Coping  Outcome: Ongoing, Progressing     Problem: Bleeding (Sepsis/Septic Shock)  Goal: Absence of Bleeding  Outcome: Ongoing, Progressing     Problem: Glycemic Control Impaired (Sepsis/Septic Shock)  Goal: Blood Glucose Level Within Desired Range  Outcome: Ongoing, Progressing     Problem: Infection Progression (Sepsis/Septic Shock)  Goal: Absence of Infection Signs and Symptoms  Outcome: Ongoing, Progressing     Problem: Nutrition Impaired (Sepsis/Septic Shock)  Goal: Optimal Nutrition Intake  Outcome: Ongoing, Progressing     Problem: Fluid Imbalance (Pneumonia)  Goal: Fluid Balance  Outcome: Ongoing, Progressing     Problem: Infection (Pneumonia)  Goal: Resolution of Infection Signs and Symptoms  Outcome: Ongoing, Progressing     Problem:  Respiratory Compromise (Pneumonia)  Goal: Effective Oxygenation and Ventilation  Outcome: Ongoing, Progressing     Problem: Impaired Wound Healing  Goal: Optimal Wound Healing  Outcome: Ongoing, Progressing

## 2022-08-26 RX ORDER — MEGESTROL ACETATE 40 MG/1
40 TABLET ORAL DAILY
Qty: 30 TABLET | Refills: 11 | Status: SHIPPED | OUTPATIENT
Start: 2022-08-26 | End: 2022-11-27

## 2022-08-26 NOTE — HOSPITAL COURSE
Patient progressed well with PT and OT- last PT note states that patient ambulated***. Patient had no significant events during their stay at SNF. Home health was set up. DME was ordered if needed. Follow up appointment to be made by patient within one week. All prescriptions and discharge instructions were ordered to be given to the patient prior to discharge.        PEx  Constitutional: Patient appears debilitated.  No distress noted  HENT:   Head: Normocephalic and atraumatic.   Eyes: Pupils are equal, round  Neck: Normal range of motion. Neck supple.   Cardiovascular: Normal rate, regular rhythm and normal heart sounds.    Pulmonary/Chest: Effort normal and breath sounds are clear  Abdominal: Soft. Bowel sounds are normal.   Musculoskeletal: Normal range of motion.  LUE with chronic lymphedema  Neurological:  Lethargic and oriented to person, place, and time.    Psychiatric: Normal mood and affect. Behavior is normal.   Skin: Skin is warm and dry.

## 2022-08-26 NOTE — DISCHARGE SUMMARY
[facility] Medicine  Discharge Summary      Patient Name: Pilar Michael  MRN: [id number]  Patient Class: IP- SNF  Admission Date: 8/9/2022  Hospital Length of Stay: 16 days  Discharge Date and Time: 8/25/2022 12:54 PM  Attending Physician: No att. providers found   Discharging Provider: Lenka Salazar NP  Primary Care Provider: Joseph Noel MD      HPI:   Mrs. Michael is an 87 year old female PMHx of severe AS s/p TAVR (5/7/19), CAD s/p ostial RCA stent placement (4/19), IDBCA s/p resection and radiation (2003), thyroid cancer s/p resection (1992), HTN, PAD who presents to SNF following hospitalization for acute hypoxemic respiratory failure with acute on chronic diastolic heart failure.       The patient presented to [facility] [place], [place] on 7/3/2022 with a primary complaint of SOB. SpO2 71% upon arrival that improved to to 92% with NC with patient remaining tachypneic. She endorsed subjective nightly fevers of around 101F for the past few days prior to admit and a congested cough. Patient treated for multiple UTI's over the last year with the most recent requiring a course of ciprofloxacin about 2-3 weeks prior to hospitalization.     Hospital Course:   Patient progressed well with PT and OT- last PT note states that patient [id number] with RW and CGA. pt neded seated rest breaks d.t fatigue.  Wheelchair Propulsion:  Pt propelled Standard wheelchair x 50 feet on Level tile with  Bilateral upper extremity with Supervision or Set-up Assistance. Patient had no significant events during their stay at SNF.  Patient continues to experience excessive daytime lethargy that patient's daughter states this started earlier this year.  B12 and TSH were normal, vitamin-D added with some improvement.  Home health was set up. DME was ordered if needed. Follow up appointment to be made by patient within one week. All prescriptions and discharge instructions were ordered to be given to the patient prior to  discharge.        PEx  Constitutional: Patient appears debilitated.  No distress noted  HENT:   Head: Normocephalic and atraumatic.   Eyes: Pupils are equal, round  Neck: Normal range of motion. Neck supple.   Cardiovascular: Normal rate, regular rhythm and normal heart sounds.    Pulmonary/Chest: Effort normal and breath sounds are clear  Abdominal: Soft. Bowel sounds are normal.   Musculoskeletal: Normal range of motion.  LUE with chronic lymphedema  Neurological:  Lethargic and oriented to person, place, and time.    Psychiatric: Normal mood and affect. Behavior is normal.   Skin: Skin is warm and dry.       Goals of Care Treatment Preferences:  Code Status: Full Code      Consults:   Consults (From admission, onward)        Status Ordering Provider     Inpatient consult to Registered Dietitian/Nutritionist  Once        Provider:  (Not yet assigned)    CELIA Clinton          No new Assessment & Plan notes have been filed under this hospital service since the last note was generated.  Service: Hospital Medicine    Final Active Diagnoses:    Diagnosis Date Noted POA    PRINCIPAL PROBLEM:  Chronic combined systolic and diastolic heart failure [I50.42] 05/08/2019 Yes    Pleural effusion [J90] 08/01/2022 Yes    Delirium [R41.0] 07/31/2022 Yes    Acute hypoxemic respiratory failure [J96.01] 07/20/2022 Yes    Debility [R53.81] 07/06/2022 Yes    Hyperlipidemia [E78.5] 07/03/2022 Yes     Chronic    Normocytic anemia [D64.9] 07/03/2022 Yes     Chronic    Presence of permanent cardiac pacemaker [Z95.0] 07/07/2020 Yes     Chronic    Coronary artery disease involving native coronary artery of native heart without angina pectoris [I25.10] 05/08/2019 Yes     Chronic    Acquired hypothyroidism [E03.9] 04/08/2019 Yes     Chronic    Essential hypertension [I10] 04/08/2019 Yes     Chronic    Severe aortic stenosis s/p TAVR [I35.0]  Yes     Chronic      Problems Resolved During this Admission:  "      Discharged Condition: good    Disposition: Home-Health Care [facility] [place]    Follow Up:   Follow-up Information     Joseph Noel MD. Go on 9/1/2022.    Specialty: Internal Medicine  Why: 9 AM  Contact information:  [address]  [facility]  [place] [id number]  [phone]             [facility] .    Contact information:  [address]  [address]  [facility] [address]  [phone]                     Patient Instructions:      WHEELCHAIR FOR HOME USE     Order Specific Question Answer Comments   Hours in W/C per day: 5    Type of Wheelchair: Custom transport   Size(Width): 18"(STD adult)    Leg Support: STD footrests    Lap Belt: Velcro    Accessories: None    Cushion: Basic    Justification for cushion: Prevent pressure ulcers    Height: 5' 2" (1.575 m)    Weight: 77.6 kg (171 lb 1.6 oz)    Length of need (1-99 months): 99    Please check all that apply: Caregiver is capable and willing to operate wheelchair safely.    Please check all that apply: The patient requires the use of a w/c for activities of daily living within the Home.    Please check all that apply: Patient mobility limitations cannot be sufficiently resolved by the use of other ambulatory therapies.      Ambulatory referral/consult to HOME Palliative Care   Standing Status: Future   Referral Priority: Routine Referral Type: Consultation   Requested Specialty: Hospice and Palliative Medicine   Number of Visits Requested: 1     Ambulatory referral/consult to Community Care   Standing Status: Future   Referral Priority: Routine Referral Type: Consultation   Referral Reason: Patient Preference Referral Location: [facility]   Requested Specialty: Community Care     No driving until:   Order Comments: Cleared by PCP     Notify your health care provider if you experience any of the following:  temperature >100.4     Notify your health care provider if you experience any of the following:  " persistent nausea and vomiting or diarrhea     Notify your health care provider if you experience any of the following:  severe uncontrolled pain     Notify your health care provider if you experience any of the following:  redness, tenderness, or signs of infection (pain, swelling, redness, odor or green/yellow discharge around incision site)     Notify your health care provider if you experience any of the following:  difficulty breathing or increased cough     Notify your health care provider if you experience any of the following:  severe persistent headache     Notify your health care provider if you experience any of the following:  worsening rash     Notify your health care provider if you experience any of the following:  persistent dizziness, light-headedness, or visual disturbances     Notify your health care provider if you experience any of the following:  increased confusion or weakness     Activity as tolerated     Ambulatory Request for [facility]   Standing Status: Standing Number of Occurrences: 12 Standing Exp. Date: 11/23/22     Order Specific Question Answer Comments   Program referred to: Other Overall     Ambulatory Request for [facility]   Standing Status: Future Standing Exp. Date: 08/23/23     Order Specific Question Answer Comments   Program referred to: COVID-19 Vaccine          Pending Diagnostic Studies:     None         Medications:  Reconciled Home Medications:      Medication List      START taking these medications    ergocalciferol 50,000 unit Cap  Commonly known as: ERGOCALCIFEROL  Take 1 capsule (50,000 Units total) by mouth every 7 days.     multivitamin Tab  Take 1 tablet by mouth once daily.        CHANGE how you take these medications    atorvastatin 20 MG tablet  Commonly known as: LIPITOR  Take 1 tablet (20 mg total) by mouth every evening.  What changed: medication strength     carvediloL 3.125 MG tablet  Commonly known as: COREG  Take 1 tablet  (3.125 mg total) by mouth 2 (two) times daily with meals.  What changed: when to take this        CONTINUE taking these medications    aspirin 81 MG Chew  Take 81 mg by mouth once daily.     bumetanide 1 MG tablet  Commonly known as: BUMEX  Take 1 tablet (1 mg total) by mouth 2 (two) times a day.     clopidogreL 75 mg tablet  Commonly known as: PLAVIX  Take 1 tablet (75 mg total) by mouth once daily.     lisinopriL 20 MG tablet  Commonly known as: PRINIVIL,ZESTRIL  Take 20 mg by mouth once daily.     megestroL 40 MG Tab  Commonly known as: MEGACE  Take 1 tablet (40 mg total) by mouth once daily.     SYNTHROID 125 MCG tablet  Generic drug: levothyroxine  Take 125 mcg by mouth once daily.     TIMOPTIC OCUDOSE (PF) 0.5 % Dpet  Generic drug: timoloL maleate (PF)  Place 1 drop into both eyes 2 (two) times a day.        STOP taking these medications    albuterol-ipratropium 2.5 mg-0.5 mg/3 mL nebulizer solution  Commonly known as: DUO-NEB     amLODIPine 10 MG tablet  Commonly known as: NORVASC     losartan 50 MG tablet  Commonly known as: COZAAR     traZODone 50 MG tablet  Commonly known as: DESYREL            Indwelling Lines/Drains at time of discharge:   Lines/Drains/Airways     Drain  Duration           Female External Urinary Catheter 08/14/22 2000 11 days                Time spent on the discharge of patient: 40 minutes         Lenka Salazar NP  Department of [facility] Medicine  [facility] - Sk[facility] Nursing

## 2022-11-14 PROBLEM — J96.01 ACUTE HYPOXEMIC RESPIRATORY FAILURE: Status: RESOLVED | Noted: 2022-07-20 | Resolved: 2022-11-14

## 2022-11-27 ENCOUNTER — HOSPITAL ENCOUNTER (INPATIENT)
Facility: HOSPITAL | Age: 87
LOS: 2 days | Discharge: HOME HEALTH | DRG: 189 | End: 2022-11-29
Attending: EMERGENCY MEDICINE | Admitting: INTERNAL MEDICINE
Payer: MEDICARE

## 2022-11-27 DIAGNOSIS — H40.9 GLAUCOMA, UNSPECIFIED GLAUCOMA TYPE, UNSPECIFIED LATERALITY: ICD-10-CM

## 2022-11-27 DIAGNOSIS — I50.9 CONGESTIVE HEART FAILURE, UNSPECIFIED HF CHRONICITY, UNSPECIFIED HEART FAILURE TYPE: Primary | ICD-10-CM

## 2022-11-27 DIAGNOSIS — R06.02 SHORTNESS OF BREATH: ICD-10-CM

## 2022-11-27 DIAGNOSIS — R06.02 SOB (SHORTNESS OF BREATH): ICD-10-CM

## 2022-11-27 DIAGNOSIS — R79.89 ELEVATED TROPONIN I LEVEL: ICD-10-CM

## 2022-11-27 DIAGNOSIS — I50.30 HEART FAILURE WITH PRESERVED EJECTION FRACTION: ICD-10-CM

## 2022-11-27 LAB
ALBUMIN SERPL BCP-MCNC: 2.7 G/DL (ref 3.5–5.2)
ALLENS TEST: ABNORMAL
ALP SERPL-CCNC: 127 U/L (ref 55–135)
ALT SERPL W/O P-5'-P-CCNC: 10 U/L (ref 10–44)
ANION GAP SERPL CALC-SCNC: 12 MMOL/L (ref 8–16)
AST SERPL-CCNC: 18 U/L (ref 10–40)
BACTERIA #/AREA URNS HPF: NORMAL /HPF
BASOPHILS # BLD AUTO: 0.01 K/UL (ref 0–0.2)
BASOPHILS NFR BLD: 0.2 % (ref 0–1.9)
BILIRUB SERPL-MCNC: 1 MG/DL (ref 0.1–1)
BILIRUB UR QL STRIP: NEGATIVE
BNP SERPL-MCNC: 489 PG/ML (ref 0–99)
BUN SERPL-MCNC: 20 MG/DL (ref 8–23)
CALCIUM SERPL-MCNC: 9.3 MG/DL (ref 8.7–10.5)
CHLORIDE SERPL-SCNC: 107 MMOL/L (ref 95–110)
CK SERPL-CCNC: 15 U/L (ref 20–180)
CLARITY UR: CLEAR
CO2 SERPL-SCNC: 18 MMOL/L (ref 23–29)
COLOR UR: COLORLESS
CREAT SERPL-MCNC: 0.8 MG/DL (ref 0.5–1.4)
CTP QC/QA: YES
CTP QC/QA: YES
DIFFERENTIAL METHOD: ABNORMAL
EOSINOPHIL # BLD AUTO: 0.1 K/UL (ref 0–0.5)
EOSINOPHIL NFR BLD: 1.1 % (ref 0–8)
ERYTHROCYTE [DISTWIDTH] IN BLOOD BY AUTOMATED COUNT: 16.8 % (ref 11.5–14.5)
EST. GFR  (NO RACE VARIABLE): >60 ML/MIN/1.73 M^2
GLUCOSE SERPL-MCNC: 176 MG/DL (ref 70–110)
GLUCOSE UR QL STRIP: NEGATIVE
HCO3 UR-SCNC: 29.2 MMOL/L (ref 24–28)
HCT VFR BLD AUTO: 29.9 % (ref 37–48.5)
HGB BLD-MCNC: 9.2 G/DL (ref 12–16)
HGB UR QL STRIP: NEGATIVE
HYALINE CASTS #/AREA URNS LPF: 1 /LPF
IMM GRANULOCYTES # BLD AUTO: 0.02 K/UL (ref 0–0.04)
IMM GRANULOCYTES NFR BLD AUTO: 0.3 % (ref 0–0.5)
INFLUENZA A, MOLECULAR: NEGATIVE
INFLUENZA B, MOLECULAR: NEGATIVE
KETONES UR QL STRIP: NEGATIVE
LEUKOCYTE ESTERASE UR QL STRIP: ABNORMAL
LYMPHOCYTES # BLD AUTO: 0.9 K/UL (ref 1–4.8)
LYMPHOCYTES NFR BLD: 13.9 % (ref 18–48)
MAGNESIUM SERPL-MCNC: 1.9 MG/DL (ref 1.6–2.6)
MCH RBC QN AUTO: 27.5 PG (ref 27–31)
MCHC RBC AUTO-ENTMCNC: 30.8 G/DL (ref 32–36)
MCV RBC AUTO: 90 FL (ref 82–98)
MICROSCOPIC COMMENT: NORMAL
MONOCYTES # BLD AUTO: 0.3 K/UL (ref 0.3–1)
MONOCYTES NFR BLD: 5.1 % (ref 4–15)
NEUTROPHILS # BLD AUTO: 5 K/UL (ref 1.8–7.7)
NEUTROPHILS NFR BLD: 79.4 % (ref 38–73)
NITRITE UR QL STRIP: NEGATIVE
NRBC BLD-RTO: 0 /100 WBC
PCO2 BLDA: 46.1 MMHG (ref 35–45)
PH SMN: 7.41 [PH] (ref 7.35–7.45)
PH UR STRIP: 6 [PH] (ref 5–8)
PHOSPHATE SERPL-MCNC: 4.4 MG/DL (ref 2.7–4.5)
PLATELET # BLD AUTO: 199 K/UL (ref 150–450)
PMV BLD AUTO: 11.5 FL (ref 9.2–12.9)
PO2 BLDA: 24 MMHG (ref 40–60)
POC BE: 5 MMOL/L
POC MOLECULAR INFLUENZA A AGN: NEGATIVE
POC MOLECULAR INFLUENZA B AGN: NEGATIVE
POC SATURATED O2: 43 % (ref 95–100)
POC TCO2: 31 MMOL/L (ref 24–29)
POTASSIUM SERPL-SCNC: 4.2 MMOL/L (ref 3.5–5.1)
PROT SERPL-MCNC: 8.2 G/DL (ref 6–8.4)
PROT UR QL STRIP: NEGATIVE
RBC # BLD AUTO: 3.34 M/UL (ref 4–5.4)
RBC #/AREA URNS HPF: 1 /HPF (ref 0–4)
SAMPLE: ABNORMAL
SARS-COV-2 RDRP RESP QL NAA+PROBE: NEGATIVE
SARS-COV-2 RDRP RESP QL NAA+PROBE: NEGATIVE
SITE: ABNORMAL
SODIUM SERPL-SCNC: 137 MMOL/L (ref 136–145)
SP GR UR STRIP: 1 (ref 1–1.03)
SPECIMEN SOURCE: NORMAL
SQUAMOUS #/AREA URNS HPF: 0 /HPF
TROPONIN I SERPL DL<=0.01 NG/ML-MCNC: 0.04 NG/ML (ref 0–0.03)
TROPONIN I SERPL DL<=0.01 NG/ML-MCNC: 0.13 NG/ML (ref 0–0.03)
URN SPEC COLLECT METH UR: ABNORMAL
UROBILINOGEN UR STRIP-ACNC: NEGATIVE EU/DL
WBC # BLD AUTO: 6.28 K/UL (ref 3.9–12.7)
WBC #/AREA URNS HPF: 5 /HPF (ref 0–5)

## 2022-11-27 PROCEDURE — 27100171 HC OXYGEN HIGH FLOW UP TO 24 HOURS

## 2022-11-27 PROCEDURE — 11000001 HC ACUTE MED/SURG PRIVATE ROOM

## 2022-11-27 PROCEDURE — 99900035 HC TECH TIME PER 15 MIN (STAT)

## 2022-11-27 PROCEDURE — U0002 COVID-19 LAB TEST NON-CDC: HCPCS

## 2022-11-27 PROCEDURE — 93010 EKG 12-LEAD: ICD-10-PCS | Mod: ,,, | Performed by: INTERNAL MEDICINE

## 2022-11-27 PROCEDURE — 93005 ELECTROCARDIOGRAM TRACING: CPT

## 2022-11-27 PROCEDURE — 99285 EMERGENCY DEPT VISIT HI MDM: CPT | Mod: 25

## 2022-11-27 PROCEDURE — 96374 THER/PROPH/DIAG INJ IV PUSH: CPT

## 2022-11-27 PROCEDURE — 85025 COMPLETE CBC W/AUTO DIFF WBC: CPT | Performed by: EMERGENCY MEDICINE

## 2022-11-27 PROCEDURE — 80053 COMPREHEN METABOLIC PANEL: CPT | Performed by: EMERGENCY MEDICINE

## 2022-11-27 PROCEDURE — 93010 ELECTROCARDIOGRAM REPORT: CPT | Mod: ,,, | Performed by: INTERNAL MEDICINE

## 2022-11-27 PROCEDURE — 83735 ASSAY OF MAGNESIUM: CPT | Performed by: STUDENT IN AN ORGANIZED HEALTH CARE EDUCATION/TRAINING PROGRAM

## 2022-11-27 PROCEDURE — 63600175 PHARM REV CODE 636 W HCPCS

## 2022-11-27 PROCEDURE — 63600175 PHARM REV CODE 636 W HCPCS: Performed by: EMERGENCY MEDICINE

## 2022-11-27 PROCEDURE — 82550 ASSAY OF CK (CPK): CPT | Performed by: EMERGENCY MEDICINE

## 2022-11-27 PROCEDURE — 94761 N-INVAS EAR/PLS OXIMETRY MLT: CPT

## 2022-11-27 PROCEDURE — 25000003 PHARM REV CODE 250: Performed by: STUDENT IN AN ORGANIZED HEALTH CARE EDUCATION/TRAINING PROGRAM

## 2022-11-27 PROCEDURE — 87502 INFLUENZA DNA AMP PROBE: CPT

## 2022-11-27 PROCEDURE — 84100 ASSAY OF PHOSPHORUS: CPT | Performed by: STUDENT IN AN ORGANIZED HEALTH CARE EDUCATION/TRAINING PROGRAM

## 2022-11-27 PROCEDURE — 83880 ASSAY OF NATRIURETIC PEPTIDE: CPT | Performed by: EMERGENCY MEDICINE

## 2022-11-27 PROCEDURE — 84484 ASSAY OF TROPONIN QUANT: CPT | Performed by: EMERGENCY MEDICINE

## 2022-11-27 PROCEDURE — 84484 ASSAY OF TROPONIN QUANT: CPT | Mod: 91 | Performed by: STUDENT IN AN ORGANIZED HEALTH CARE EDUCATION/TRAINING PROGRAM

## 2022-11-27 PROCEDURE — 82803 BLOOD GASES ANY COMBINATION: CPT

## 2022-11-27 PROCEDURE — 87635 SARS-COV-2 COVID-19 AMP PRB: CPT | Performed by: EMERGENCY MEDICINE

## 2022-11-27 PROCEDURE — 81000 URINALYSIS NONAUTO W/SCOPE: CPT | Performed by: STUDENT IN AN ORGANIZED HEALTH CARE EDUCATION/TRAINING PROGRAM

## 2022-11-27 RX ORDER — TIMOLOL MALEATE 5 MG/ML
1 SOLUTION/ DROPS OPHTHALMIC 2 TIMES DAILY
Status: DISCONTINUED | OUTPATIENT
Start: 2022-11-27 | End: 2022-11-29 | Stop reason: HOSPADM

## 2022-11-27 RX ORDER — CLOPIDOGREL BISULFATE 75 MG/1
75 TABLET ORAL DAILY
Status: DISCONTINUED | OUTPATIENT
Start: 2022-11-27 | End: 2022-11-29 | Stop reason: HOSPADM

## 2022-11-27 RX ORDER — METHENAMINE HIPPURATE 1000 MG/1
1 TABLET ORAL 2 TIMES DAILY
Status: ON HOLD | COMMUNITY
Start: 2022-11-21 | End: 2023-03-30 | Stop reason: HOSPADM

## 2022-11-27 RX ORDER — BRIMONIDINE TARTRATE 2 MG/ML
1 SOLUTION/ DROPS OPHTHALMIC 2 TIMES DAILY
Status: ON HOLD | COMMUNITY
Start: 2022-10-11 | End: 2023-03-30 | Stop reason: HOSPADM

## 2022-11-27 RX ORDER — TIMOLOL MALEATE 2.5 MG/ML
1 SOLUTION/ DROPS OPHTHALMIC
COMMUNITY
Start: 2022-10-11 | End: 2022-11-27 | Stop reason: SDUPTHER

## 2022-11-27 RX ORDER — CARVEDILOL 3.12 MG/1
3.12 TABLET ORAL 2 TIMES DAILY WITH MEALS
Status: ON HOLD | COMMUNITY
End: 2023-03-30 | Stop reason: HOSPADM

## 2022-11-27 RX ORDER — ATROPINE SULFATE 10 MG/ML
1 SOLUTION/ DROPS OPHTHALMIC
COMMUNITY
Start: 2022-10-11 | End: 2022-11-27 | Stop reason: SDUPTHER

## 2022-11-27 RX ORDER — PREDNISOLONE SODIUM PHOSPHATE 10 MG/ML
SOLUTION/ DROPS OPHTHALMIC
COMMUNITY
Start: 2022-10-20 | End: 2022-11-27

## 2022-11-27 RX ORDER — BRIMONIDINE TARTRATE 2 MG/ML
SOLUTION/ DROPS OPHTHALMIC
COMMUNITY
Start: 2022-11-21 | End: 2022-11-27 | Stop reason: SDUPTHER

## 2022-11-27 RX ORDER — SODIUM CHLORIDE 0.9 % (FLUSH) 0.9 %
10 SYRINGE (ML) INJECTION
Status: DISCONTINUED | OUTPATIENT
Start: 2022-11-27 | End: 2022-11-29 | Stop reason: HOSPADM

## 2022-11-27 RX ORDER — BRIMONIDINE TARTRATE 2 MG/ML
1 SOLUTION/ DROPS OPHTHALMIC 2 TIMES DAILY
Status: DISCONTINUED | OUTPATIENT
Start: 2022-11-27 | End: 2022-11-29 | Stop reason: HOSPADM

## 2022-11-27 RX ORDER — OFLOXACIN 3 MG/ML
SOLUTION/ DROPS OPHTHALMIC
COMMUNITY
Start: 2022-09-08 | End: 2022-11-27

## 2022-11-27 RX ORDER — CEFDINIR 300 MG/1
CAPSULE ORAL
COMMUNITY
Start: 2022-10-20 | End: 2022-11-27 | Stop reason: SDUPTHER

## 2022-11-27 RX ORDER — AMLODIPINE BESYLATE 5 MG/1
5 TABLET ORAL DAILY
Status: ON HOLD | COMMUNITY
Start: 2022-11-22 | End: 2022-11-29 | Stop reason: SDUPTHER

## 2022-11-27 RX ORDER — FUROSEMIDE 10 MG/ML
INJECTION INTRAMUSCULAR; INTRAVENOUS
Status: COMPLETED
Start: 2022-11-27 | End: 2022-11-27

## 2022-11-27 RX ORDER — NEOMYCIN SULFATE, POLYMYXIN B SULFATE, AND DEXAMETHASONE 3.5; 10000; 1 MG/G; [USP'U]/G; MG/G
OINTMENT OPHTHALMIC
COMMUNITY
Start: 2022-09-09 | End: 2022-11-27

## 2022-11-27 RX ORDER — MEGESTROL ACETATE 40 MG/1
40 TABLET ORAL DAILY
Status: DISCONTINUED | OUTPATIENT
Start: 2022-11-27 | End: 2022-11-29 | Stop reason: HOSPADM

## 2022-11-27 RX ORDER — LANOLIN ALCOHOL/MO/W.PET/CERES
400 CREAM (GRAM) TOPICAL DAILY
Status: ON HOLD | COMMUNITY
Start: 2022-10-11 | End: 2023-03-30 | Stop reason: HOSPADM

## 2022-11-27 RX ORDER — PREDNISOLONE ACETATE 10 MG/ML
1 SUSPENSION/ DROPS OPHTHALMIC 4 TIMES DAILY
Status: DISCONTINUED | OUTPATIENT
Start: 2022-11-28 | End: 2022-11-29 | Stop reason: HOSPADM

## 2022-11-27 RX ORDER — LORATADINE 10 MG/1
10 TABLET ORAL DAILY PRN
COMMUNITY
Start: 2022-10-20 | End: 2022-11-27

## 2022-11-27 RX ORDER — FUROSEMIDE 10 MG/ML
100 INJECTION INTRAMUSCULAR; INTRAVENOUS ONCE
Status: COMPLETED | OUTPATIENT
Start: 2022-11-27 | End: 2022-11-27

## 2022-11-27 RX ORDER — ATROPINE SULFATE 10 MG/ML
1 SOLUTION/ DROPS OPHTHALMIC 2 TIMES DAILY
Status: ON HOLD | COMMUNITY
Start: 2022-11-15 | End: 2023-03-30 | Stop reason: HOSPADM

## 2022-11-27 RX ORDER — ERGOCALCIFEROL 1.25 MG/1
50000 CAPSULE ORAL
Status: ON HOLD | COMMUNITY
End: 2023-03-30 | Stop reason: HOSPADM

## 2022-11-27 RX ORDER — CIPROFLOXACIN 250 MG/1
250 TABLET, FILM COATED ORAL 2 TIMES DAILY
COMMUNITY
Start: 2022-09-26 | End: 2022-11-27

## 2022-11-27 RX ORDER — TIMOLOL MALEATE 2.5 MG/ML
1 SOLUTION/ DROPS OPHTHALMIC 2 TIMES DAILY
COMMUNITY
Start: 2022-11-21

## 2022-11-27 RX ORDER — ALBUTEROL SULFATE 90 UG/1
2 AEROSOL, METERED RESPIRATORY (INHALATION) EVERY 6 HOURS PRN
Status: ON HOLD | COMMUNITY
Start: 2022-11-12 | End: 2023-03-30 | Stop reason: HOSPADM

## 2022-11-27 RX ORDER — PREDNISOLONE ACETATE 10 MG/ML
SUSPENSION/ DROPS OPHTHALMIC
COMMUNITY
Start: 2022-11-19 | End: 2022-11-27

## 2022-11-27 RX ORDER — APIXABAN 5 MG/1
5 TABLET, FILM COATED ORAL 2 TIMES DAILY
Status: ON HOLD | COMMUNITY
Start: 2022-11-22 | End: 2022-11-29 | Stop reason: SDUPTHER

## 2022-11-27 RX ORDER — LISINOPRIL 20 MG/1
20 TABLET ORAL DAILY
Status: DISCONTINUED | OUTPATIENT
Start: 2022-11-27 | End: 2022-11-29 | Stop reason: HOSPADM

## 2022-11-27 RX ORDER — POTASSIUM CHLORIDE 20 MEQ/1
20 TABLET, EXTENDED RELEASE ORAL DAILY
COMMUNITY
Start: 2022-10-20 | End: 2023-03-21

## 2022-11-27 RX ORDER — ATORVASTATIN CALCIUM 20 MG/1
20 TABLET, FILM COATED ORAL NIGHTLY
Status: DISCONTINUED | OUTPATIENT
Start: 2022-11-27 | End: 2022-11-29 | Stop reason: HOSPADM

## 2022-11-27 RX ORDER — ATROPINE SULFATE 10 MG/ML
1 SOLUTION/ DROPS OPHTHALMIC 2 TIMES DAILY
Status: DISCONTINUED | OUTPATIENT
Start: 2022-11-27 | End: 2022-11-29 | Stop reason: HOSPADM

## 2022-11-27 RX ORDER — FUROSEMIDE 10 MG/ML
80 INJECTION INTRAMUSCULAR; INTRAVENOUS
Status: COMPLETED | OUTPATIENT
Start: 2022-11-27 | End: 2022-11-27

## 2022-11-27 RX ADMIN — FUROSEMIDE 100 MG: 10 INJECTION INTRAMUSCULAR; INTRAVENOUS at 08:11

## 2022-11-27 RX ADMIN — LISINOPRIL 20 MG: 20 TABLET ORAL at 01:11

## 2022-11-27 RX ADMIN — THERA TABS 1 TABLET: TAB at 01:11

## 2022-11-27 RX ADMIN — FUROSEMIDE 100 MG: 10 INJECTION, SOLUTION INTRAMUSCULAR; INTRAVENOUS at 08:11

## 2022-11-27 RX ADMIN — MEGESTROL ACETATE 40 MG: 40 TABLET ORAL at 04:11

## 2022-11-27 RX ADMIN — FUROSEMIDE 80 MG: 10 INJECTION, SOLUTION INTRAMUSCULAR; INTRAVENOUS at 10:11

## 2022-11-27 RX ADMIN — CLOPIDOGREL BISULFATE 75 MG: 75 TABLET ORAL at 01:11

## 2022-11-27 RX ADMIN — LEVOTHYROXINE SODIUM 125 MCG: 25 TABLET ORAL at 01:11

## 2022-11-27 NOTE — PHARMACY MED REC
"Admission Medication History     The home medication history was taken by Nenita Torres CPhT.    Medication history obtained from, [facility] List Verified    You may go to "Admission" then "Reconcile Home Medications" tabs to review and/or act upon these items.     The home medication list has been updated by the Pharmacy department.   Please read ALL comments highlighted in yellow.   Please address this information as you see fit.    Feel free to contact us if you have any questions or require assistance.      The medications listed below were removed from the home medication list.  Please reorder if appropriate:  Patient reports no longer taking the following medication(s):  Megestrol 40 mg/ml  Aspirin 81 mg  Carvedilol 3.125 mg  Cefdinir 300 mg  Cipro 250 mg  Vitamin D 50,000 units  Duo-Neb 2.5 mg-0.5 mg/3ml  Loratadine 10 mg  Losartan 50 mg  Multivitamin   Dexacine 3.5 mg/g- 10,000 units/0.1% ointment  Ofloxacin 0.3% eye drops  Prednisolone 1% eye drops        Nenita Torres CPhT.  Ext [phone]               .        "

## 2022-11-27 NOTE — ED PROVIDER NOTES
Encounter Date: 11/27/2022    SCRIBE #1 NOTE: I, Paulette Peña, am scribing for, and in the presence of,  Davidson Daniels MD. I have scribed the entire note.     History     Chief Complaint   Patient presents with    Shortness of Breath     Nursing home reports SOB with RA sats in 70's. On arrival, awake, alert and responds appropriately to questions. Denies pain. No fever reported.     The patient is a 89 y/o female with a pmhx of essential hypertension and Covid presents to the emergency department for shortness of breath with sats in 70's. The patients daughter reports she was at [facility] when the onset of the sx began. Associated sx include cough and abdominal pain. She denies fever.  Daughter also reports that her mother was initially taking 2 mg of Bumex twice a day but that has recently been reduced to 1 mg twice a day.      Review of patient's allergies indicates:   Allergen Reactions    Penicillins Swelling    Sulfa (sulfonamide antibiotics) Nausea Only     Past Medical History:   Diagnosis Date    Arthritis     Cancer     CHF (congestive heart failure)     Coronary artery disease     Hypertension     Thyroid disease      Past Surgical History:   Procedure Laterality Date    CARDIAC CATHETERIZATION      CARDIAC VALVE SURGERY      CHOLECYSTECTOMY      CORONARY ANGIOGRAPHY N/A 4/23/2019    Procedure: ANGIOGRAM, CORONARY ARTERY;  Surgeon: Bakari Singletary MD;  Location: [facility] CATH LAB;  Service: Cardiology;  Laterality: N/A;    HYSTERECTOMY      lymph nodes removal      THYROIDECTOMY      TRANSCATHETER AORTIC VALVE REPLACEMENT (TAVR) N/A 5/7/2019    Procedure: REPLACEMENT, AORTIC VALVE, TRANSCATHETER (TAVR);  Surgeon: Bakari Singletary MD;  Location: [facility] CATH LAB;  Service: Cardiology;  Laterality: N/A;     Family History   Problem Relation Age of Onset    Kidney disease Father     Cancer Sister     ALS Sister      Social History     Tobacco Use    Smoking status: Never    Smokeless tobacco:  Never   Substance Use Topics    Alcohol use: Not Currently    Drug use: Never     Review of Systems   Constitutional:  Negative for chills and fever.   Respiratory:  Positive for shortness of breath. Negative for cough.    Cardiovascular:  Negative for chest pain.   Skin:  Negative for color change and rash.   Neurological:  Negative for syncope.     Physical Exam     Initial Vitals [11/27/22 0921]   BP Pulse Resp Temp SpO2   (!) 233/99 63 20 98.6 °F (37 °C) 96 %      MAP       --         Physical Exam    Nursing note and vitals reviewed.  HENT:   Head: Normocephalic.   Right Ear: Hearing and tympanic membrane normal.   Left Ear: Hearing and tympanic membrane normal.   Nose: Nose normal.   Mouth/Throat: Oropharynx is clear and moist.   Eyes: Lids are normal. Pupils are equal, round, and reactive to light.   Neck:   Normal range of motion.  Cardiovascular:  Normal rate, regular rhythm and normal heart sounds.           Pulmonary/Chest: Breath sounds normal. No respiratory distress. She has no wheezes. She has no rhonchi.   +diminished breath sounds   Abdominal: Abdomen is soft. There is no abdominal tenderness.   Musculoskeletal:         General: Normal range of motion.      Cervical back: Normal range of motion. No rigidity.      Right lower leg: No edema.      Left lower leg: No edema.      Comments: Left upper extremity edema.     Neurological: She is alert.   Skin: Skin is warm and dry. No rash noted.   Psychiatric: Thought content normal.       ED Course   Procedures  Labs Reviewed   CBC W/ AUTO DIFFERENTIAL - Abnormal; Notable for the following components:       Result Value    RBC 3.34 (*)     Hemoglobin 9.2 (*)     Hematocrit 29.9 (*)     MCHC 30.8 (*)     RDW 16.8 (*)     Lymph # 0.9 (*)     Gran % 79.4 (*)     Lymph % 13.9 (*)     All other components within normal limits   COMPREHENSIVE METABOLIC PANEL - Abnormal; Notable for the following components:    CO2 18 (*)     Glucose 176 (*)     Albumin 2.7 (*)      All other components within normal limits   TROPONIN I - Abnormal; Notable for the following components:    Troponin I 0.035 (*)     All other components within normal limits   B-TYPE NATRIURETIC PEPTIDE - Abnormal; Notable for the following components:     (*)     All other components within normal limits   CK - Abnormal; Notable for the following components:    CPK 15 (*)     All other components within normal limits   URINALYSIS, REFLEX TO URINE CULTURE - Abnormal; Notable for the following components:    Color, UA Colorless (*)     Leukocytes, UA 1+ (*)     All other components within normal limits    Narrative:     Specimen Source->Urine   MAGNESIUM   PHOSPHORUS   URINALYSIS MICROSCOPIC    Narrative:     Specimen Source->Urine   URINALYSIS   TROPONIN I   TROPONIN I   POCT INFLUENZA A/B MOLECULAR   SARS-COV-2 RDRP GENE          Imaging Results              X-Ray Chest 1 View (Final result)  Result time 11/27/22 10:41:36      Final result by Wisam Ornelas IV, MD (11/27/22 10:41:36)                   Impression:      Suspected pulmonary edema with prominent left-sided pleural fluid and/or atelectasis.  Additional small volume right-sided pleural fluid.  Recommend correlation with clinical history.      Electronically signed by: Wisam Ornelas  Date:    11/27/2022  Time:    10:41               Narrative:    EXAMINATION:  XR CHEST 1 VIEW    CLINICAL HISTORY:  Shortness of breath;    TECHNIQUE:  Single frontal view of the chest was performed.    COMPARISON:  Chest radiograph from 07/30/2022.    FINDINGS:  Stable positioning of right-sided dual lead pacer device.  Aortic valvular prosthesis remains in place.    Stable enlarged cardiac silhouette.  Central pulmonary vascular congestion.    Patchy perihilar opacities and coarsened interstitial attenuation.  Continued opacification of left lung base, likely represents combination of atelectasis and pleural fluid.  Probable trace right-sided pleural  fluid.  No pneumothorax.    Osseous structures appear intact.                                       Medications   atorvastatin tablet 20 mg (has no administration in time range)   clopidogreL tablet 75 mg (75 mg Oral Given 11/27/22 1345)   lisinopriL tablet 20 mg (20 mg Oral Given 11/27/22 1344)   megestroL tablet 40 mg (40 mg Oral Given 11/27/22 1617)   multivitamin tablet (1 tablet Oral Given 11/27/22 1344)   levothyroxine tablet 125 mcg (125 mcg Oral Given 11/27/22 1344)   timolol maleate 0.5% ophthalmic solution 1 drop (has no administration in time range)   sodium chloride 0.9% flush 10 mL (has no administration in time range)   furosemide injection 80 mg (80 mg Intravenous Given 11/27/22 1058)     Medical Decision Making:   Initial Assessment:   The patient is a 89 y/o female with a pmhx of essential hypertension and Covid presents to the emergency department for shortness of breath with sats in 70's.   Clinical Tests:   Lab Tests: Ordered and Reviewed  Radiological Study: Ordered and Reviewed  ED Management:  Chest x-ray shows pulmonary edema.  Patient was given IV Lasix here in the emergency department.  I feel she will require admission for further diuresis and will be admitted by [facility].        Scribe Attestation:   Scribe #1: I performed the above scribed service and the documentation accurately describes the services I performed. I attest to the accuracy of the note.                   Clinical Impression:   Final diagnoses:  [R06.02] Shortness of breath  [I50.9] Congestive heart failure, unspecified HF chronicity, unspecified heart failure type (Primary)        ED Disposition Condition    Admit         I, Dr. Davidson Patel, personally performed the services described in this documentation. All medical record entries made by the scribe were at my direction and in my presence. I have reviewed the chart and agree that the record reflects my personal performance and is accurate and complete.  Davidson Daniels MD.  4:26 PM 11/27/2022          Davidson Daniels MD  11/27/22 0215

## 2022-11-27 NOTE — H&P
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     Patient was recently hospitalized at [facility] on 10/5 - 10/20 for AHRF 2/2 covid19 and heart failure exacerbation and found to have bilateral pulmonary emboli and started on eliquis therapy. Had a left sided thoracentesis performed at that time. Studies suggestive of cardiac origin. Discharged to [facility]. Since that hospitalization patient has been having difficulty with mobility and regaining strength. She has been at the SNF since presenting for this admission.    Upon discussion with [facility], patient was taking 1 mg Bumex BID from 10/20 - 11/14. On 11/14 Bumex was reduced to 1 mg daily. On 11/27, patient was found to have labored breathing. VS at that time were the following: O2 88% 189/63, temp 97.6F, pulse 102, RR 22. EMS was called for hypoxia and was brought to [facility].    Patient reports that she suddenly started having trouble breathing at the facility. She states she felt her heart pounding in her chest. Denied any chest pain, lightheadedness, abdominal pain, nausea, vomiting, dysuria, or LE swelling. Patient's daughter at bedside and assisted in giving history.    ED workup:  CXR showed pulmonary edema with prominent left sided pleural effusion and small volume right sided pleural effusion. . Trop 0.035. CPK negative. Given 80 mg IV Lasix with 1.5L UO. Initially placed on 10L high flow NC.    Past  Medical History:  Past Medical History:   Diagnosis Date    Arthritis     Cancer     CHF (congestive heart failure)     Coronary artery disease     Hypertension     Thyroid disease        Past Surgical History:  Past Surgical History:   Procedure Laterality Date    CARDIAC CATHETERIZATION      CARDIAC VALVE SURGERY      CHOLECYSTECTOMY      CORONARY ANGIOGRAPHY N/A 4/23/2019    Procedure: ANGIOGRAM, CORONARY ARTERY;  Surgeon: Bakari Singletary MD;  Location: [facility] CATH LAB;  Service: Cardiology;  Laterality: N/A;    HYSTERECTOMY      lymph nodes removal      THYROIDECTOMY      TRANSCATHETER AORTIC VALVE REPLACEMENT (TAVR) N/A 5/7/2019    Procedure: REPLACEMENT, AORTIC VALVE, TRANSCATHETER (TAVR);  Surgeon: Bakari Singletary MD;  Location: [facility] CATH LAB;  Service: Cardiology;  Laterality: N/A;       Allergies:  Review of patient's allergies indicates:   Allergen Reactions    Penicillins Swelling    Sulfa (sulfonamide antibiotics) Nausea Only       Home Medications:  Current Outpatient Medications   Medication Instructions    albuterol (PROVENTIL/VENTOLIN HFA) 90 mcg/actuation inhaler 2 puffs, Inhalation, Every 6 hours PRN    amLODIPine (NORVASC) 5 mg, Oral, Daily    atorvastatin (LIPITOR) 20 mg, Oral, Nightly    atropine 1% (ISOPTO ATROPINE) 1 % Drop 1 drop, Right Eye, 2 times daily    brimonidine 0.2% (ALPHAGAN) 0.2 % Drop 1 drop, Both Eyes, 2 times daily    bumetanide (BUMEX) 1 mg, Oral, 2 times daily    clopidogreL (PLAVIX) 75 mg, Oral, Daily    ELIQUIS 5 mg, Oral, 2 times daily    lisinopriL (PRINIVIL,ZESTRIL) 20 mg, Oral, Daily    magnesium oxide (MAG-OX) 400 mg, Oral, Daily    methenamine (HIPREX) 1 g, Oral, 2 times daily    potassium chloride SA (K-DUR,KLOR-CON) 20 MEQ tablet 20 mEq, Oral, Daily, Take with Bumex 1 mg tablet    SYNTHROID 125 mcg, Oral, Before breakfast    timolol maleate 0.25% (TIMOPTIC) 0.25 % Drop 1 drop, Both Eyes, 2 times daily           Family History:  Family History   Problem Relation Age  "of Onset    Kidney disease Father     Cancer Sister     ALS Sister        Social History:  Social History     Tobacco Use    Smoking status: Never    Smokeless tobacco: Never   Substance Use Topics    Alcohol use: Not Currently    Drug use: Never       Review of Systems:  Pertinent items are noted in HPI. All other systems are reviewed and are negative.    Health Maintaince :   Primary Care Physician: Joseph Noel MD    Immunizations:   TDap Unknown    Flu 10/5/22   Pna Unknown  Covid: 21; 21; 1/15/21    Cancer Screening:  PAP: No longer indicated  MMG: Hx of invasive ductal carcinoma s/p left mastectomy   Colonoscopy: no longer indicated     Objective:   Last 24 Hour Vital Signs:  BP  Min: 148/64  Max: 233/99  Temp  Av.6 °F (37 °C)  Min: 98.6 °F (37 °C)  Max: 98.6 °F (37 °C)  Pulse  Av.4  Min: 60  Max: 63  Resp  Av.3  Min: 20  Max: 48  SpO2  Av.4 %  Min: 96 %  Max: 100 %  Height  Av' 2" (157.5 cm)  Min: 5' 2" (157.5 cm)  Max: 5' 2" (157.5 cm)  Weight  Av.5 kg (173 lb)  Min: 78.5 kg (173 lb)  Max: 78.5 kg (173 lb)  Body mass index is 31.64 kg/m².  No intake/output data recorded.    Physical Examination:   GEN- AOx3, NAD, laying comfortably in bed.   HEENT- Minimally reactive right pupil. Normal reactive left pupil with accomodation., EOMI, MMM, throat without erythema or exudates.  NECK- No thyromegaly or other masses  CARDIAC- RRR, with systolic murmur   RESP- Bibasilar crackles heard on exam with decreased left sided breath sounds. Initially on 10L HFNC but placed down on 4L and sating well.  ABD- Soft, NT, ND, +BS; no masses or HSM  EXT- No clubbing, cyanosis; 2+ DP/PT pulses. Trace pitting edema to the bilateral LE to mid shins. Lymphedema to the LUE s/p left mastectomy.  NEURO - Moves all four extremities spontaneously; light touch sensation intact and symmetric. See HEENT exam above.  SKIN -Warm and dry; no rashes    Laboratory:  Most Recent Data:  CBC:   Lab " Results   Component Value Date    WBC 6.28 11/27/2022    HGB 9.2 (L) 11/27/2022    HCT 29.9 (L) 11/27/2022     11/27/2022    MCV 90 11/27/2022    RDW 16.8 (H) 11/27/2022     WBC Differential: 79.4 % N, 0.3 % Bands, 13.9 % L, 5.1 % M, 1.1 % Eo, 0.2 % Baso    BMP:   Lab Results   Component Value Date     11/27/2022    K 4.2 11/27/2022     11/27/2022    CO2 18 (L) 11/27/2022    BUN 20 11/27/2022    CREATININE 0.8 11/27/2022     (H) 11/27/2022    CALCIUM 9.3 11/27/2022    MG 1.9 11/27/2022    PHOS 4.4 11/27/2022     LFTs:   Lab Results   Component Value Date    PROT 8.2 11/27/2022    ALBUMIN 2.7 (L) 11/27/2022    BILITOT 1.0 11/27/2022    AST 18 11/27/2022    ALKPHOS 127 11/27/2022    ALT 10 11/27/2022     Coags:   Lab Results   Component Value Date    INR 1.0 04/08/2019     FLP:   Lab Results   Component Value Date    CHOL 106 (L) 07/20/2022    HDL 22 (L) 07/20/2022    LDLCALC 68.0 07/20/2022    TRIG 80 07/20/2022    CHOLHDL 20.8 07/20/2022     DM:   Lab Results   Component Value Date    HGBA1C 5.9 (H) 04/06/2022    LDLCALC 68.0 07/20/2022    CREATININE 0.8 11/27/2022     Thyroid:   Lab Results   Component Value Date    TSH 2.819 08/18/2022    FREET4 1.14 07/03/2022    G1RIFKT 9.2 08/18/2022     Anemia:   Lab Results   Component Value Date    TXZKOIVJ59 352 08/18/2022     Cardiac:   Lab Results   Component Value Date    TROPONINI 0.035 (H) 11/27/2022     (H) 11/27/2022     Urinalysis:   Lab Results   Component Value Date    LABURIN No significant growth 07/30/2022    COLORU Colorless (A) 11/27/2022    SPECGRAV 1.005 11/27/2022    NITRITE Negative 11/27/2022    KETONESU Negative 11/27/2022    UROBILINOGEN Negative 11/27/2022    WBCUA 5 11/27/2022       Trended Lab Data:  Recent Labs   Lab 11/27/22  1005   WBC 6.28   HGB 9.2*   HCT 29.9*      MCV 90   RDW 16.8*      K 4.2      CO2 18*   BUN 20   CREATININE 0.8   *   PROT 8.2   ALBUMIN 2.7*   BILITOT 1.0   AST 18    ALKPHOS 127   ALT 10       Trended Cardiac Data:  Recent Labs   Lab 11/27/22  1006   TROPONINI 0.035*   *       Microbiology Data:  Flu and covid pending    Other Results:  EKG (my interpretation): Poor study EKG. Repeat ordered    Radiology:  X-Ray Chest 1 View   Final Result      Suspected pulmonary edema with prominent left-sided pleural fluid and/or atelectasis.  Additional small volume right-sided pleural fluid.  Recommend correlation with clinical history.         Electronically signed by: Wisam Ornelas   Date:    11/27/2022   Time:    10:41           Assessment:     Pilar Michael is a 89 year old female with PMH severe AS s/p TAVR (5/7/19), CAD s/p stent x 2, intraductral breast carcinoma s/p resection, 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 CXR showed pulmonary edema with prominent left sided pleural effusion and small volume right sided pleural effusion. . Trop 0.035. CPK negative. Given 80 mg IV Lasix with 1.5L UO. Initially placed on 10L high flow NC, weaned to 4L NC while taking history.     Plan:     Acute on chronic? hypoxic respiratory failure  Diastolic Heart Failure Exacerbation  Bilateral pleural effusions  Hx of bilateral pulmonary emboli  Patient presenting with 1 day history of shortness of breath at SNF. Patient was recently hospitalized at [facility] on 10/5 - 10/20 for AHRF 2/2 covid19 and heart failure exacerbation and found to have bilateral pulmonary emboli and started on eliquis therapy. Had a left sided thoracentesis performed at that time (800 mL). Studies suggestive of cardiac origin. Discharged to [facility]. Since that hospitalization patient has been having difficulty with mobility and regaining strength. Upon discussion with [facility], patient was taking 1 mg Bumex BID from 10/20 - 11/14. On 11/14 Bumex was reduced to 1 mg  daily. . Trop 0.035  -Given 80 mg IV lasix with 1750 mL urine output in ED over 6h. Given additional 100 mg IV Lasix  -Will continue diuresis with 100 mg IV Lasix BID, dosed at 6 hours apart. Goal 2L UO with each dose.  -Considering cards and pulm consult for rapidly re accumulating pleural effusion likely cardiac origin. Previously had thoracentesis performed to left chest with ~800 mL transudative fluid in Oct 2022.  -ABG ordered, venous gas obtained with showed 7.41/46/24  -Continue home 5mg Eliquis BID, no concern for PE at this time. Patient adherent to AC. Pleural effusions and heart failure exacerbations more likely to explain hypoxia.  -strict in and out    CAD s/p PCI with 2 stents  Elevated troponin  Likely demand related in setting of diastolic heart failure exacerbation. Initial trop 0.035.  -Trend trop q6h  -Continue home 75 mg clopidogrel  -Continue home 20 mg atorvastatin    Hypertension  -Home 5 amlodipine and 20 lisinopril  -Continuing home lisinopril    Hyperlipidemia  -Continue home 20 mg atorvastatin    Hx of Breast cancer s/p chemo and radiation (2003) and left mastectomy (2022)  Hx of thyroid cancer s/p thyroidectomy  -Continue home 125 mcg levothyroxine  -Continue home 40 mg Megestrol    CVA with only right eye deficits  -Continue home Timolol, atropine, and brimonidine drops  -Patient with minimally reactive right pupil. Normal reactive left pupil with accomodation.    Severe aortic stenosis s/p TAVR  -Continue home eliquis    Hx of multidrug resistant UTIs  -No concern for infection at this time      Diet: Cardiac diet  PPX: Eliquis    Dispo: pending clinical improvement    Yung Yo MD  [facility] Internal Medicine/Emergency Medicine HO-I    [facility] Medicine Hospitalist Pager numbers:   [facility] Hospitalist Medicine Team A (Edgard/Larisa): [phone]  [facility] Hospitalist Medicine Team B (Crystal/Hay):  [phone]

## 2022-11-27 NOTE — Clinical Note
Diagnosis: Congestive heart failure, unspecified HF chronicity, unspecified heart failure type [[id number]]   Future Attending Provider: ALPHONSO GILL [[id number]]   Is the patient being sent to ED Observation?: No   Admitting Provider:: ALPHONSO GILL [[id number]]

## 2022-11-28 LAB
ALBUMIN SERPL BCP-MCNC: 2.4 G/DL (ref 3.5–5.2)
ALP SERPL-CCNC: 105 U/L (ref 55–135)
ALT SERPL W/O P-5'-P-CCNC: 8 U/L (ref 10–44)
ANION GAP SERPL CALC-SCNC: 12 MMOL/L (ref 8–16)
AORTIC ROOT ANNULUS: 2.19 CM
AORTIC VALVE CUSP SEPERATION: 1.27 CM
AST SERPL-CCNC: 18 U/L (ref 10–40)
AV INDEX (PROSTH): 1
AV MEAN GRADIENT: 2 MMHG
AV PEAK GRADIENT: 3 MMHG
AV VALVE AREA: 2.52 CM2
AV VELOCITY RATIO: 0.95
BASOPHILS # BLD AUTO: 0.01 K/UL (ref 0–0.2)
BASOPHILS NFR BLD: 0.3 % (ref 0–1.9)
BILIRUB SERPL-MCNC: 0.8 MG/DL (ref 0.1–1)
BSA FOR ECHO PROCEDURE: 1.91 M2
BUN SERPL-MCNC: 21 MG/DL (ref 8–23)
CALCIUM SERPL-MCNC: 8.9 MG/DL (ref 8.7–10.5)
CHLORIDE SERPL-SCNC: 105 MMOL/L (ref 95–110)
CO2 SERPL-SCNC: 20 MMOL/L (ref 23–29)
CREAT SERPL-MCNC: 0.7 MG/DL (ref 0.5–1.4)
CV ECHO LV RWT: 0.49 CM
DIFFERENTIAL METHOD: ABNORMAL
DOP CALC AO PEAK VEL: 0.93 M/S
DOP CALC AO VTI: 20.2 CM
DOP CALC LVOT AREA: 2.5 CM2
DOP CALC LVOT DIAMETER: 1.79 CM
DOP CALC LVOT PEAK VEL: 0.88 M/S
DOP CALC LVOT STROKE VOLUME: 50.81 CM3
DOP CALC MV VTI: 58.6 CM
DOP CALCLVOT PEAK VEL VTI: 20.2 CM
E/A RATIO: 1.34
E/E' RATIO: 35.78 M/S
ECHO LV POSTERIOR WALL: 1.08 CM (ref 0.6–1.1)
EJECTION FRACTION: 60 %
EOSINOPHIL # BLD AUTO: 0.2 K/UL (ref 0–0.5)
EOSINOPHIL NFR BLD: 4.5 % (ref 0–8)
ERYTHROCYTE [DISTWIDTH] IN BLOOD BY AUTOMATED COUNT: 17.1 % (ref 11.5–14.5)
EST. GFR  (NO RACE VARIABLE): >60 ML/MIN/1.73 M^2
FRACTIONAL SHORTENING: 42 % (ref 28–44)
GLUCOSE SERPL-MCNC: 84 MG/DL (ref 70–110)
HCT VFR BLD AUTO: 27.6 % (ref 37–48.5)
HGB BLD-MCNC: 8.5 G/DL (ref 12–16)
IMM GRANULOCYTES # BLD AUTO: 0.01 K/UL (ref 0–0.04)
IMM GRANULOCYTES NFR BLD AUTO: 0.3 % (ref 0–0.5)
INTERVENTRICULAR SEPTUM: 1.13 CM (ref 0.6–1.1)
IVRT: 53.28 MSEC
LA MAJOR: 5.8 CM
LA MINOR: 5.36 CM
LA WIDTH: 4.1 CM
LEFT ATRIUM SIZE: 4.59 CM
LEFT ATRIUM VOLUME INDEX MOD: 26.5 ML/M2
LEFT ATRIUM VOLUME INDEX: 48.4 ML/M2
LEFT ATRIUM VOLUME MOD: 48.78 CM3
LEFT ATRIUM VOLUME: 89.12 CM3
LEFT INTERNAL DIMENSION IN SYSTOLE: 2.56 CM (ref 2.1–4)
LEFT VENTRICLE DIASTOLIC VOLUME INDEX: 47.8 ML/M2
LEFT VENTRICLE DIASTOLIC VOLUME: 87.96 ML
LEFT VENTRICLE MASS INDEX: 93 G/M2
LEFT VENTRICLE SYSTOLIC VOLUME INDEX: 12.9 ML/M2
LEFT VENTRICLE SYSTOLIC VOLUME: 23.69 ML
LEFT VENTRICULAR INTERNAL DIMENSION IN DIASTOLE: 4.41 CM (ref 3.5–6)
LEFT VENTRICULAR MASS: 170.62 G
LV LATERAL E/E' RATIO: 40.25 M/S
LV SEPTAL E/E' RATIO: 32.2 M/S
LVOT MG: 1.72 MMHG
LVOT MV: 0.62 CM/S
LYMPHOCYTES # BLD AUTO: 0.8 K/UL (ref 1–4.8)
LYMPHOCYTES NFR BLD: 21.8 % (ref 18–48)
MAGNESIUM SERPL-MCNC: 1.6 MG/DL (ref 1.6–2.6)
MCH RBC QN AUTO: 27.1 PG (ref 27–31)
MCHC RBC AUTO-ENTMCNC: 30.8 G/DL (ref 32–36)
MCV RBC AUTO: 88 FL (ref 82–98)
MONOCYTES # BLD AUTO: 0.6 K/UL (ref 0.3–1)
MONOCYTES NFR BLD: 14.6 % (ref 4–15)
MV MEAN GRADIENT: 4 MMHG
MV PEAK A VEL: 1.2 M/S
MV PEAK E VEL: 1.61 M/S
MV PEAK GRADIENT: 10 MMHG
MV STENOSIS PRESSURE HALF TIME: 128 MS
MV VALVE AREA BY CONTINUITY EQUATION: 0.87 CM2
MV VALVE AREA P 1/2 METHOD: 1.72 CM2
NEUTROPHILS # BLD AUTO: 2.2 K/UL (ref 1.8–7.7)
NEUTROPHILS NFR BLD: 58.5 % (ref 38–73)
NRBC BLD-RTO: 0 /100 WBC
PHOSPHATE SERPL-MCNC: 3.6 MG/DL (ref 2.7–4.5)
PISA TR MAX VEL: 1.65 M/S
PLATELET # BLD AUTO: 175 K/UL (ref 150–450)
PMV BLD AUTO: 10.7 FL (ref 9.2–12.9)
POTASSIUM SERPL-SCNC: 3.7 MMOL/L (ref 3.5–5.1)
PROT SERPL-MCNC: 7.3 G/DL (ref 6–8.4)
PV MV: 0.49 M/S
PV PEAK VELOCITY: 0.94 CM/S
RA PRESSURE: 15 MMHG
RBC # BLD AUTO: 3.14 M/UL (ref 4–5.4)
RIGHT VENTRICULAR END-DIASTOLIC DIMENSION: 1.65 CM
SODIUM SERPL-SCNC: 137 MMOL/L (ref 136–145)
TDI LATERAL: 0.04 M/S
TDI SEPTAL: 0.05 M/S
TDI: 0.05 M/S
TR MAX PG: 11 MMHG
TRICUSPID ANNULAR PLANE SYSTOLIC EXCURSION: 0.95 CM
TROPONIN I SERPL DL<=0.01 NG/ML-MCNC: 0.07 NG/ML (ref 0–0.03)
TV REST PULMONARY ARTERY PRESSURE: 26 MMHG
WBC # BLD AUTO: 3.77 K/UL (ref 3.9–12.7)

## 2022-11-28 PROCEDURE — 83735 ASSAY OF MAGNESIUM: CPT | Performed by: STUDENT IN AN ORGANIZED HEALTH CARE EDUCATION/TRAINING PROGRAM

## 2022-11-28 PROCEDURE — 84100 ASSAY OF PHOSPHORUS: CPT | Performed by: STUDENT IN AN ORGANIZED HEALTH CARE EDUCATION/TRAINING PROGRAM

## 2022-11-28 PROCEDURE — 93010 EKG 12-LEAD: ICD-10-PCS | Mod: ,,, | Performed by: INTERNAL MEDICINE

## 2022-11-28 PROCEDURE — 25500020 PHARM REV CODE 255: Performed by: INTERNAL MEDICINE

## 2022-11-28 PROCEDURE — 99900035 HC TECH TIME PER 15 MIN (STAT)

## 2022-11-28 PROCEDURE — 97166 OT EVAL MOD COMPLEX 45 MIN: CPT

## 2022-11-28 PROCEDURE — 84484 ASSAY OF TROPONIN QUANT: CPT

## 2022-11-28 PROCEDURE — 63600175 PHARM REV CODE 636 W HCPCS

## 2022-11-28 PROCEDURE — 36415 COLL VENOUS BLD VENIPUNCTURE: CPT | Performed by: STUDENT IN AN ORGANIZED HEALTH CARE EDUCATION/TRAINING PROGRAM

## 2022-11-28 PROCEDURE — 85025 COMPLETE CBC W/AUTO DIFF WBC: CPT | Performed by: STUDENT IN AN ORGANIZED HEALTH CARE EDUCATION/TRAINING PROGRAM

## 2022-11-28 PROCEDURE — 63600175 PHARM REV CODE 636 W HCPCS: Performed by: STUDENT IN AN ORGANIZED HEALTH CARE EDUCATION/TRAINING PROGRAM

## 2022-11-28 PROCEDURE — 80053 COMPREHEN METABOLIC PANEL: CPT | Performed by: STUDENT IN AN ORGANIZED HEALTH CARE EDUCATION/TRAINING PROGRAM

## 2022-11-28 PROCEDURE — 36415 COLL VENOUS BLD VENIPUNCTURE: CPT

## 2022-11-28 PROCEDURE — 25000003 PHARM REV CODE 250: Performed by: INTERNAL MEDICINE

## 2022-11-28 PROCEDURE — 93005 ELECTROCARDIOGRAM TRACING: CPT

## 2022-11-28 PROCEDURE — 11000001 HC ACUTE MED/SURG PRIVATE ROOM

## 2022-11-28 PROCEDURE — 97535 SELF CARE MNGMENT TRAINING: CPT

## 2022-11-28 PROCEDURE — 93010 ELECTROCARDIOGRAM REPORT: CPT | Mod: ,,, | Performed by: INTERNAL MEDICINE

## 2022-11-28 PROCEDURE — 25000003 PHARM REV CODE 250: Performed by: STUDENT IN AN ORGANIZED HEALTH CARE EDUCATION/TRAINING PROGRAM

## 2022-11-28 RX ORDER — FUROSEMIDE 10 MG/ML
100 INJECTION INTRAMUSCULAR; INTRAVENOUS ONCE
Status: COMPLETED | OUTPATIENT
Start: 2022-11-29 | End: 2022-11-29

## 2022-11-28 RX ORDER — MAGNESIUM SULFATE HEPTAHYDRATE 40 MG/ML
2 INJECTION, SOLUTION INTRAVENOUS ONCE
Status: COMPLETED | OUTPATIENT
Start: 2022-11-28 | End: 2022-11-28

## 2022-11-28 RX ORDER — FUROSEMIDE 10 MG/ML
100 INJECTION INTRAMUSCULAR; INTRAVENOUS ONCE
Status: COMPLETED | OUTPATIENT
Start: 2022-11-28 | End: 2022-11-28

## 2022-11-28 RX ADMIN — HUMAN ALBUMIN MICROSPHERES AND PERFLUTREN 0.11 MG: 10; .22 INJECTION, SOLUTION INTRAVENOUS at 12:11

## 2022-11-28 RX ADMIN — PREDNISOLONE ACETATE 1 DROP: 10 SUSPENSION/ DROPS OPHTHALMIC at 01:11

## 2022-11-28 RX ADMIN — BRIMONIDINE TARTRATE 1 DROP: 2 SOLUTION OPHTHALMIC at 09:11

## 2022-11-28 RX ADMIN — LEVOTHYROXINE SODIUM 125 MCG: 25 TABLET ORAL at 09:11

## 2022-11-28 RX ADMIN — ATROPINE SULFATE 1 DROP: 10 SOLUTION/ DROPS OPHTHALMIC at 12:11

## 2022-11-28 RX ADMIN — THERA TABS 1 TABLET: TAB at 09:11

## 2022-11-28 RX ADMIN — MAGNESIUM SULFATE 2 G: 2 INJECTION INTRAVENOUS at 09:11

## 2022-11-28 RX ADMIN — FUROSEMIDE 100 MG: 10 INJECTION, SOLUTION INTRAMUSCULAR; INTRAVENOUS at 09:11

## 2022-11-28 RX ADMIN — TIMOLOL MALEATE 1 DROP: 5 SOLUTION OPHTHALMIC at 12:11

## 2022-11-28 RX ADMIN — ATROPINE SULFATE 1 DROP: 10 SOLUTION/ DROPS OPHTHALMIC at 09:11

## 2022-11-28 RX ADMIN — ATORVASTATIN CALCIUM 20 MG: 20 TABLET, FILM COATED ORAL at 08:11

## 2022-11-28 RX ADMIN — TIMOLOL MALEATE 1 DROP: 5 SOLUTION OPHTHALMIC at 09:11

## 2022-11-28 RX ADMIN — PREDNISOLONE ACETATE 1 DROP: 10 SUSPENSION/ DROPS OPHTHALMIC at 12:11

## 2022-11-28 RX ADMIN — PREDNISOLONE ACETATE 1 DROP: 10 SUSPENSION/ DROPS OPHTHALMIC at 04:11

## 2022-11-28 RX ADMIN — MEGESTROL ACETATE 40 MG: 40 TABLET ORAL at 10:11

## 2022-11-28 RX ADMIN — LISINOPRIL 20 MG: 20 TABLET ORAL at 09:11

## 2022-11-28 RX ADMIN — PREDNISOLONE ACETATE 1 DROP: 10 SUSPENSION/ DROPS OPHTHALMIC at 09:11

## 2022-11-28 RX ADMIN — CLOPIDOGREL BISULFATE 75 MG: 75 TABLET ORAL at 09:11

## 2022-11-28 RX ADMIN — BRIMONIDINE TARTRATE 1 DROP: 2 SOLUTION OPHTHALMIC at 12:11

## 2022-11-28 RX ADMIN — FUROSEMIDE 100 MG: 10 INJECTION, SOLUTION INTRAMUSCULAR; INTRAVENOUS at 02:11

## 2022-11-28 NOTE — ED NOTES
Daughter at bedside.  Update provided on care plan.  Patient remains awake, alert, oriented x 4.  Speech clear and appropriate.  Speaking full sentences.  Remains 100% paced on CM without noted ectopy.  IV site WNL with 5cc NS flush.  No complaints or noted concerns.  SR up to maintain safety.  Bed locked in low position.  Remains on purewick.

## 2022-11-28 NOTE — PROGRESS NOTES
[facility] - Telemetry  Adult Nutrition  Progress Note    SUMMARY     Recommendations  1) Continue Cardiac diet, fluid restriction per MD   2) RD to complete NFPE at follow up   3) If PO < 50% meals, add Boost Glucose Control TID    Goals: Pt to consistently meet at least 75% EEN by RD follow up  Nutrition Goal Status: new  Communication of RD Recs: other (comment) (POC)    Assessment and Plan      Nutrition Problem  Inadequate oral intake    Related to (etiology):   Decreased appetite    Signs and Symptoms (as evidenced by):   Pt endorses decreased intake due to decreased appetite in MST screen, endorses wt loss  6% net wt loss since July 2022    Interventions(treatment strategy):  Collaboration with other providers  Fat and mineral modified diet  Fluid restricted diet    Nutrition Diagnosis Status:   New      Malnutrition Assessment  YOANDY NFPE due to remote assessment    Reason for Assessment  Reason For Assessment: identified at risk by screening criteria (MST 5)  Diagnosis: other (see comments) (no active principal problem)  Relevant Medical History: severe AS s/p TAVR (5/7/19), CAD s/p stent x 2, intraductral breast carcinoma s/p resection, radiation (2003) and mastectomy (01/2022), thyroid cancer s/p resection (1992), HTN, and PAD  Interdisciplinary Rounds: did not attend  General Information Comments: RD remote for coverage, called pt on room phone, no answer. Per chart, completed 75% breakfast this morning. MST = 5, pt endorses decreased appetite/intake and 34+ lbs of wt loss. Wt history showing wt fluctuations - difficult to confirm wt loss. Likely due to fluid imablances. YOANDY NFPE due to remote assessment.  Nutrition Discharge Planning: Discharge on Cardiac diet, fluid restriction per MD    Nutrition Risk Screen  Nutrition Risk Screen: no indicators present    Nutrition/Diet History  Spiritual, Cultural Beliefs, Congregational Practices, Values that Affect Care: no  Food Allergies: NKFA  Factors Affecting  "Nutritional Intake: decreased appetite    Anthropometrics  Temp: 97.8 °F (36.6 °C)  Height Method: Stated  Height: 5' 2" (157.5 cm)  Height (inches): 62 in  Weight Method: Bed Scale  Weight: 83.2 kg (183 lb 6.8 oz)  Weight (lb): 183.42 lb  Ideal Body Weight (IBW), Female: 110 lb  % Ideal Body Weight, Female (lb): 157.27 %  BMI (Calculated): 33.5       Lab/Procedures/Meds  Pertinent Labs Reviewed: reviewed  Pertinent Labs Comments: Alb 2.4  Pertinent Medications Reviewed: reviewed  Pertinent Medications Comments: atorvastatin, levothyroxine, lisinopril, magnesium sulfate, MVI, megestrol    Estimated/Assessed Needs  Weight Used For Calorie Calculations: 83 kg (183 lb)  Energy Calorie Requirements (kcal): 1203 kcal/day based on MSJ x no AF for BMI > 30  Energy Need Method: Gould-St Jeor  Protein Requirements:  g/day based on 1-1.2 g/kg  Weight Used For Protein Calculations: 83 kg (183 lb)  Fluid Requirements (mL): 1 mL/kcal or per MD  Estimated Fluid Requirement Method: RDA Method  RDA Method (mL): 1203       Nutrition Prescription Ordered  Current Diet Order: Cardiac, 1500 mL fluid restriction    Evaluation of Received Nutrient/Fluid Intake    Intake/Output Summary (Last 24 hours) at 11/28/2022 1056  Last data filed at 11/28/2022 0911  Gross per 24 hour   Intake 118 ml   Output 1600 ml   Net -1482 ml       I/O: -1.6 L since admit  Energy Calories Required: not meeting needs  Protein Required: not meeting needs  Fluid Required: not meeting needs  Comments: LBM 11/26  Tolerance: tolerating  % Intake of Estimated Energy Needs: 50 - 75 %  % Meal Intake: 50 - 75 %    Nutrition Risk  Level of Risk/Frequency of Follow-up:  (1-2x weekly)     Monitor and Evaluation  Food and Nutrient Intake: energy intake, food and beverage intake  Food and Nutrient Adminstration: diet order  Knowledge/Beliefs/Attitudes: beliefs and attitudes  Physical Activity and Function: nutrition-related ADLs and IADLs  Anthropometric " Measurements: weight change, body mass index, weight  Biochemical Data, Medical Tests and Procedures: electrolyte and renal panel, gastrointestinal profile, glucose/endocrine profile, inflammatory profile, lipid profile  Nutrition-Focused Physical Findings: extremities, muscles and bones, skin, overall appearance     Nutrition Follow-Up  RD Follow-up?: Yes

## 2022-11-28 NOTE — PLAN OF CARE
[facility] - Telemetry  Initial Discharge Assessment       Primary Care Provider: Joseph Noel MD    Admission Diagnosis: Shortness of breath [R06.02]  Elevated troponin I level [R77.8]  Heart failure with preserved ejection fraction [I50.30]  Congestive heart failure, unspecified HF chronicity, unspecified heart failure type [I50.9]    Admission Date: 11/27/2022  Expected Discharge Date:     Pt oriented. Pt under Covid isolation. DCA done via telephone with pt's daughter Katiana Nickerson (Daughter)   [phone] (Mobile) on telephone. Demographics/Ins/NextofKin/PCP verified with patient/family. Denies any DME needs at present from pt/family. Patient/family plans to return home with family. Educated on bedside RX. Patient consents for [place] to discuss discharge plan with next of kin. Preferences appointment times and location obtained. Patient reports they will have transportation home upon discharge.      Discharge Barriers Identified: (P) Other (see comments) (Patient SNF days ended on Saturday 11/26 and will be in copay days to SNF upon return. Daughter is agreeable to home with HH if facility cannot get another approval for therapy upon return. Pt also has a private caregiver for a few hrs a week per insurer)    Payor: [employer] / Plan: [employer] MEDICARE ADVANTAGE / Product Type: Medicare Advantage /     Extended Emergency Contact Information  Primary Emergency Contact: Katiana Nickerson  Address: [address] [place] [address] [place] [address]  Mobile Phone: [phone]  Relation: Daughter  Preferred language: English  Secondary Emergency Contact: Jack Michael           [place], [place]  Mobile Phone: [phone]  Relation: Son    Discharge Plan A: Skilled Nursing Facility  Discharge Plan B: Home, Home with family, Home Health      [id number] [place] - [address] [facility]  [address] [address]  Phone: [phone]  Fax: [phone]      Initial Assessment (most recent)       Adult Discharge Assessment - 11/28/22 1210          Discharge Assessment    Assessment Type Discharge Planning Assessment     Confirmed/corrected address, phone number and insurance Yes     Confirmed Demographics Correct on Facesheet     Source of Information family     Does patient/caregiver understand observation status No     Communicated CHARLIE with patient/caregiver Yes     Reason For Admission Volume overload     Lives With facility resident;child(alejandro), adult     Facility Arrived From: Arrived from SNF at [facility] but stay was ended on Saturday.  Daughter lives with patient in pt's home.     Do you expect to return to your current living situation? Other (see comments)   Daughter wants patient to return to [facility] for SNF and is aware of costs associated with copay days. Facility to discuss with daughter.    Do you have help at home or someone to help you manage your care at home? Yes     Who are your caregiver(s) and their phone number(s)? NickersonKatiana (Daughter)   [phone] (Mobile)     Prior to hospitilization cognitive status: Alert/Oriented;No Deficits     Current cognitive status: Alert/Oriented;No Deficits     Walking or Climbing Stairs Difficulty ambulation difficulty, requires equipment     Dressing/Bathing Difficulty bathing difficulty, requires equipment     Do you have any problems with: Errands/Grocery;Needs other help     Specify other help KrishanKatiana (Daughter)   [phone] (Mobile)     Home Accessibility wheelchair accessible     Home Layout Able to live on 1st floor     Equipment Currently Used at Home wheelchair;bedside commode;bath bench;raised toilet;rollator;walker, rolling;other (see comments)   Transport chair and Std WC    Readmission within 30 days? No     Patient currently being followed by outpatient case management? No     Do you currently have service(s) that help you manage your care at home? No     Do you  take prescription medications? Yes     Do you have prescription coverage? Yes     Coverage [facility] Medicare     Do you have any problems affording any of your prescribed medications? No     Is the patient taking medications as prescribed? yes     Who is going to help you get home at discharge? Katiana Nickerson (Daughter)   [phone] (Mobile)     How do you get to doctors appointments? family or friend will provide     Are you on dialysis? No     Discharge Plan A Skilled Nursing Facility     Discharge Plan B Home;Home with family;Home Health     DME Needed Upon Discharge  oxygen   Pt was on O2 at SNF but not at Home    Discharge Plan discussed with: Adult children   Katiana Nickerson (Daughter)   [phone] (Mobile)    Discharge Barriers Identified Other (see comments) (P)    Patient SNF days ended on Saturday 11/26 and will be in copay days to SNF upon return. Daughter is agreeable to home with HH if facility cannot get another approval for therapy upon return. Pt also has a private caregiver for a few hrs a week per insurer       Housing Stability    In the last 12 months, was there a time when you were not able to pay the mortgage or rent on time? No (P)      In the last 12 months, was there a time when you did not have a steady place to sleep or slept in a shelter (including now)? No (P)         Transportation Needs    In the past 12 months, has lack of transportation kept you from medical appointments or from getting medications? No (P)      In the past 12 months, has lack of transportation kept you from meetings, work, or from getting things needed for daily living? No (P)         Food Insecurity    Within the past 12 months, you worried that your food would run out before you got the money to buy more. Never true (P)      Within the past 12 months, the food you bought just didn't last and you didn't have money to get more. Never true (P)                    Future Appointments   Date Time Provider  "[facility]   1/3/2023  3:15 PM Bj Campoverde, DO [facility]     BP (!) 125/59 (BP Location: Right arm, Patient Position: Lying)   Pulse (!) 59   Temp 97.8 °F (36.6 °C) (Oral)   Resp 18   Ht 5' 2" (1.575 m)   Wt 83.2 kg (183 lb 6.8 oz)   SpO2 95%   Breastfeeding No   BMI 33.55 kg/m²      atorvastatin  20 mg Oral QHS    atropine 1%  1 drop Right Eye BID    brimonidine 0.2%  1 drop Both Eyes BID    clopidogreL  75 mg Oral Daily    levothyroxine  125 mcg Oral Daily    lisinopriL  20 mg Oral Daily    megestroL  40 mg Oral Daily    multivitamin  1 tablet Oral Daily    perflutren protein-a microsphr  0.5 mL Intravenous Once    prednisoLONE acetate  1 drop Right Eye QID    timolol maleate 0.5%  1 drop Both Eyes BID     Consults (From admission, onward)          Status Ordering Provider     IP consult to case management  Once        Provider:  (Not yet assigned)    Acknowledged ALPHONSO GILL                       "

## 2022-11-28 NOTE — PLAN OF CARE
Problem: Adult Inpatient Plan of Care  Goal: Plan of Care Review  Outcome: Ongoing, Progressing     VIRTUAL NURSE:  Cued into patient's room.  Permission received per patient's daughter to turn camera to view patient.  Patient is hard of hearing.  Introduced as VN for night shift that will be working with floor nurse and nursing assistant.  Educated patient's daughter on VN's role in patient care and  VIP model.  Plan of care reviewed with patient and patient's daughter.  Education per flowsheet.   Informed patient that staff will round on them every 2 hours but to use call light for any other needs they may have; informed of fall risk and fall precautions.  Patient verbalized understanding.  Call light within reach; bed siderails up x3.  Opportunity given for questions and questions answered.  Admission assessment questions answered.  Patient denies complaints or any needs at this time. Instructed to call for assistance.  Will cont to monitor and intervene as needed.    Labs, notes, orders, and careplan reviewed.       11/27/22 0443   Patient Request   Patient Requested daughter at bedside assisting with admission assessment questions; no complaints or needs currently   Admission   Initial VN Admission Questions Complete   Communication Issues? Patient Hearing;Technical Issue   Shift   Virtual Nurse - Rounding Complete   Pain Management Interventions pain management plan reviewed with patient/caregiver   Virtual Nurse - Patient Verbalized Approval Of Camera Use;VN Rounding   Type of Frequent Check   Type Patient Rounds   Safety/Activity   Patient Rounds bed in low position;placement of personal items at bedside;bed wheels locked;call light in patient/parent reach;clutter free environment maintained;visualized patient   Safety Promotion/Fall Prevention assistive device/personal item within reach;diversional activities provided;Fall Risk reviewed with patient/family;family to remain at bedside;high risk medications  identified;medications reviewed;nonskid shoes/socks when out of bed;room near unit station;side rails raised x 3;supervised activity;instructed to call staff for mobility   Safety Precautions emergency equipment at bedside   Positioning   Body Position neutral body alignment;neutral head position   Head of Bed (HOB) Positioning HOB at 60 degrees   Pain/Comfort/Sleep   Preferred Pain Scale number (Numeric Rating Pain Scale)   Comfort/Acceptable Pain Level 0   Pain Rating (0-10): Rest 0   Sleep/Rest/Relaxation no problem identified;awake

## 2022-11-28 NOTE — PROGRESS NOTES
"[facility] IM Resident HO-1 Progress Note    Subjective:      Pilar Michael is a 89 y.o. female who is being followed by the [facility] IM service at [facility] for AHRF 2/2 diastolic heart failure exacerbation.     Patient resting comfortably in bed. Reports that she is doing well. Denies shortness of breath. States that she is feeling better. Appeared tired this morning, but continues to be Aox3.     Objective:   Last 24 Hour Vital Signs:  BP  Min: 130/62  Max: 233/99  Temp  Av.8 °F (36.6 °C)  Min: 97.4 °F (36.3 °C)  Max: 98.6 °F (37 °C)  Pulse  Av.3  Min: 60  Max: 63  Resp  Av.2  Min: 16  Max: 48  SpO2  Av.9 %  Min: 96 %  Max: 100 %  Height  Av' 2" (157.5 cm)  Min: 5' 2" (157.5 cm)  Max: 5' 2" (157.5 cm)  Weight  Av.8 kg (178 lb 3.4 oz)  Min: 78.5 kg (173 lb)  Max: 83.2 kg (183 lb 6.8 oz)  I/O last 3 completed shifts:  In: -   Out: 1600 [Urine:1600]    Physical Examination:  GEN- AOx3, NAD, laying comfortably in bed.   HEENT- Minimally reactive right pupil. Normal reactive left pupil with accomodation., EOMI, MMM, throat without erythema or exudates.  NECK- No thyromegaly or other masses  CARDIAC- RRR, with systolic murmur   RESP- Bibasilar crackles heard on exam with decreased left sided breath sounds. 2L NC sating 95%.  ABD- Soft, NT, ND, +BS; no masses or HSM  EXT- No clubbing, cyanosis; 2+ DP/PT pulses. Trace pitting edema to the bilateral LE to mid shins. Lymphedema to the LUE s/p left mastectomy.  NEURO - Moves all four extremities spontaneously; light touch sensation intact and symmetric. See HEENT exam above.  SKIN -Warm and dry; no rashes      Laboratory:  Laboratory Data Reviewed: yes  Pertinent Findings:  Trended Lab Data:  Recent Labs   Lab 22  1005 22  0253   WBC 6.28 3.77*   HGB 9.2* 8.5*   HCT 29.9* 27.6*    175   MCV 90 88   RDW 16.8* 17.1*    137   K 4.2 3.7    105   CO2 18* 20*   BUN 20 21   CREATININE 0.8 0.7   * 84 "   PROT 8.2 7.3   ALBUMIN 2.7* 2.4*   BILITOT 1.0 0.8   AST 18 18   ALKPHOS 127 105   ALT 10 8*       Trended Cardiac Data:  Recent Labs   Lab 11/27/22  1006 11/27/22  2205   TROPONINI 0.035* 0.126*   *  --        Microbiology Data Reviewed: yes  Pertinent Findings:  Flu negative    Other Results:  EKG (my interpretation):none new   Radiology Data Reviewed: yes  Pertinent Findings:  X-Ray Chest 1 View   Final Result      Suspected pulmonary edema with prominent left-sided pleural fluid and/or atelectasis.  Additional small volume right-sided pleural fluid.  Recommend correlation with clinical history.         Electronically signed by: Wisam Ornelas   Date:    11/27/2022   Time:    10:41        Current Medications:     Infusions:       Scheduled:   atorvastatin  20 mg Oral QHS    atropine 1%  1 drop Right Eye BID    brimonidine 0.2%  1 drop Both Eyes BID    clopidogreL  75 mg Oral Daily    levothyroxine  125 mcg Oral Daily    lisinopriL  20 mg Oral Daily    megestroL  40 mg Oral Daily    multivitamin  1 tablet Oral Daily    prednisoLONE acetate  1 drop Right Eye QID    timolol maleate 0.5%  1 drop Both Eyes BID        PRN:  pneumoc 20-mohsen conj-dip cr(PF), sodium chloride 0.9%    Antibiotics and Day Number of Therapy:  None    Assessment:     Pilar Michael is a 89 year old female with PMH severe AS s/p TAVR (5/7/19), CAD s/p stent x 2, intraductral breast carcinoma s/p resection, 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 CXR showed pulmonary edema with prominent left sided pleural effusion and small volume right sided pleural effusion. . Trop 0.035. CPK negative. Given 80 mg IV Lasix with 1.5L UO. Initially placed on 10L high flow NC, weaned to 4L NC while taking history.    Plan:     Acute on chronic? hypoxic respiratory failure  Diastolic Heart Failure Exacerbation  Bilateral  pleural effusions  Hx of bilateral pulmonary emboli  Patient presenting with 1 day history of shortness of breath at SNF. Patient was recently hospitalized at [facility] on 10/5 - 10/20 for AHRF 2/2 covid19 and heart failure exacerbation and found to have bilateral pulmonary emboli and started on eliquis therapy. Had a left sided thoracentesis performed at that time (800 mL). Studies suggestive of cardiac origin. Dis[facility] to [facility] [place]. Since that hospitalization patient has been having difficulty with mobility and regaining strength. Upon discussion with [facility], patient was taking 1 mg Bumex BID from 10/20 - 11/14. On 11/14 Bumex was reduced to 1 mg daily. . Trop 0.035  -Given 80 mg IV lasix with 1750 mL urine output in ED over 6h. Given additional 100 mg IV Lasix with additional 1000 mL UO per nursing staff  -Will continue diuresis with 100 mg IV Lasix BID, goal 2L UO  -Considering cards and pulm consult for rapidly re accumulating pleural effusion likely cardiac origin. Previously had thoracentesis performed to left chest with ~800 mL transudative fluid in Oct 2022.  -ABG ordered, venous gas obtained with showed 7.41/46/24  -Continue home 5mg Eliquis BID, no concern for PE at this time. Patient adherent to AC. Pleural effusions and heart failure exacerbations more likely to explain hypoxia.  -strict in and out  -Weaned to 2L NC and sating well. Continue to wean as tolerated. Patient was not discharged with oxygen from [facility], but reports using it daily at the rehab center.     CAD s/p PCI with 2 stents  Elevated troponin  Likely demand related in setting of diastolic heart failure exacerbation. Initial trop 0.035.  -Repeat trop collected at 2205 on 11/27 elevated to 0.126.  -Repeat trop this AM 11/28  -Continue home 75 mg clopidogrel  -Continue home 20 mg atorvastatin     Hypertension  -Home 5 amlodipine and 20 lisinopril  -Continuing home lisinopril     Hyperlipidemia  -Continue  home 20 mg atorvastatin     Hx of Breast cancer s/p chemo and radiation (2003) and left mastectomy (2022)  Hx of thyroid cancer s/p thyroidectomy  -Continue home 125 mcg levothyroxine  -Continue home 40 mg Megestrol     CVA with only right eye deficits  -Continue home Timolol, atropine, and brimonidine drops  -Patient with minimally reactive right pupil. Normal reactive left pupil with accomodation.     Severe aortic stenosis s/p TAVR  -Continue home eliquis     Hx of multidrug resistant UTIs  -No concern for infection at this time        Diet: Cardiac diet  PPX: Eliquis     Dispo: pending clinical improvement    Yung Yo MD  [facility] Internal Medicine/Emergency Medicine HO-I  [facility] IM Service Team B    [facility] Medicine Hospitalist Pager numbers:   [facility] Hospitalist Medicine Team A (Edgard/Larisa): [phone]  [facility] Hospitalist Medicine Team B (Crystal/Hay):  [phone]

## 2022-11-28 NOTE — PLAN OF CARE
2119 Pt appeared to be in no distress. Reported no pain. No wounds, excoriated perineum and inner thighs    No accu ck.     Tele: Paced,  HR  60,  No alarms.     Bed in lowest position, wheels locked, non skid socks, ID band worn, personal items and call bell with in reach, bed alarm set.

## 2022-11-29 VITALS
RESPIRATION RATE: 18 BRPM | HEIGHT: 62 IN | TEMPERATURE: 98 F | BODY MASS INDEX: 31.03 KG/M2 | OXYGEN SATURATION: 97 % | DIASTOLIC BLOOD PRESSURE: 64 MMHG | HEART RATE: 59 BPM | SYSTOLIC BLOOD PRESSURE: 105 MMHG | WEIGHT: 168.63 LBS

## 2022-11-29 PROBLEM — I50.30 HEART FAILURE WITH PRESERVED EJECTION FRACTION: Status: ACTIVE | Noted: 2022-11-29

## 2022-11-29 PROBLEM — J96.01 ACUTE HYPOXEMIC RESPIRATORY FAILURE: Status: RESOLVED | Noted: 2022-07-20 | Resolved: 2022-11-29

## 2022-11-29 LAB
ALBUMIN SERPL BCP-MCNC: 2.6 G/DL (ref 3.5–5.2)
ALP SERPL-CCNC: 106 U/L (ref 55–135)
ALT SERPL W/O P-5'-P-CCNC: 5 U/L (ref 10–44)
ANION GAP SERPL CALC-SCNC: 13 MMOL/L (ref 8–16)
AST SERPL-CCNC: 18 U/L (ref 10–40)
BASOPHILS # BLD AUTO: 0.01 K/UL (ref 0–0.2)
BASOPHILS NFR BLD: 0.2 % (ref 0–1.9)
BILIRUB SERPL-MCNC: 0.8 MG/DL (ref 0.1–1)
BUN SERPL-MCNC: 24 MG/DL (ref 8–23)
CALCIUM SERPL-MCNC: 8.9 MG/DL (ref 8.7–10.5)
CHLORIDE SERPL-SCNC: 102 MMOL/L (ref 95–110)
CO2 SERPL-SCNC: 22 MMOL/L (ref 23–29)
CREAT SERPL-MCNC: 0.8 MG/DL (ref 0.5–1.4)
DIFFERENTIAL METHOD: ABNORMAL
EOSINOPHIL # BLD AUTO: 0.2 K/UL (ref 0–0.5)
EOSINOPHIL NFR BLD: 4.5 % (ref 0–8)
ERYTHROCYTE [DISTWIDTH] IN BLOOD BY AUTOMATED COUNT: 16.8 % (ref 11.5–14.5)
EST. GFR  (NO RACE VARIABLE): >60 ML/MIN/1.73 M^2
GLUCOSE SERPL-MCNC: 95 MG/DL (ref 70–110)
HCT VFR BLD AUTO: 29.6 % (ref 37–48.5)
HGB BLD-MCNC: 9.3 G/DL (ref 12–16)
IMM GRANULOCYTES # BLD AUTO: 0.01 K/UL (ref 0–0.04)
IMM GRANULOCYTES NFR BLD AUTO: 0.2 % (ref 0–0.5)
LYMPHOCYTES # BLD AUTO: 0.7 K/UL (ref 1–4.8)
LYMPHOCYTES NFR BLD: 14.7 % (ref 18–48)
MAGNESIUM SERPL-MCNC: 1.8 MG/DL (ref 1.6–2.6)
MCH RBC QN AUTO: 27.4 PG (ref 27–31)
MCHC RBC AUTO-ENTMCNC: 31.4 G/DL (ref 32–36)
MCV RBC AUTO: 87 FL (ref 82–98)
MONOCYTES # BLD AUTO: 0.5 K/UL (ref 0.3–1)
MONOCYTES NFR BLD: 10.4 % (ref 4–15)
NEUTROPHILS # BLD AUTO: 3.1 K/UL (ref 1.8–7.7)
NEUTROPHILS NFR BLD: 70 % (ref 38–73)
NRBC BLD-RTO: 0 /100 WBC
PHOSPHATE SERPL-MCNC: 3.6 MG/DL (ref 2.7–4.5)
PLATELET # BLD AUTO: 211 K/UL (ref 150–450)
PMV BLD AUTO: 11.3 FL (ref 9.2–12.9)
POTASSIUM SERPL-SCNC: 3.5 MMOL/L (ref 3.5–5.1)
PROT SERPL-MCNC: 7.9 G/DL (ref 6–8.4)
RBC # BLD AUTO: 3.39 M/UL (ref 4–5.4)
SODIUM SERPL-SCNC: 137 MMOL/L (ref 136–145)
WBC # BLD AUTO: 4.42 K/UL (ref 3.9–12.7)

## 2022-11-29 PROCEDURE — 93005 ELECTROCARDIOGRAM TRACING: CPT

## 2022-11-29 PROCEDURE — 85025 COMPLETE CBC W/AUTO DIFF WBC: CPT | Performed by: STUDENT IN AN ORGANIZED HEALTH CARE EDUCATION/TRAINING PROGRAM

## 2022-11-29 PROCEDURE — 83735 ASSAY OF MAGNESIUM: CPT | Performed by: STUDENT IN AN ORGANIZED HEALTH CARE EDUCATION/TRAINING PROGRAM

## 2022-11-29 PROCEDURE — 93010 ELECTROCARDIOGRAM REPORT: CPT | Mod: ,,, | Performed by: INTERNAL MEDICINE

## 2022-11-29 PROCEDURE — 36415 COLL VENOUS BLD VENIPUNCTURE: CPT | Performed by: STUDENT IN AN ORGANIZED HEALTH CARE EDUCATION/TRAINING PROGRAM

## 2022-11-29 PROCEDURE — 36415 COLL VENOUS BLD VENIPUNCTURE: CPT

## 2022-11-29 PROCEDURE — 90471 IMMUNIZATION ADMIN: CPT | Performed by: INTERNAL MEDICINE

## 2022-11-29 PROCEDURE — 99900035 HC TECH TIME PER 15 MIN (STAT)

## 2022-11-29 PROCEDURE — 25000003 PHARM REV CODE 250: Performed by: STUDENT IN AN ORGANIZED HEALTH CARE EDUCATION/TRAINING PROGRAM

## 2022-11-29 PROCEDURE — 63600175 PHARM REV CODE 636 W HCPCS

## 2022-11-29 PROCEDURE — 80053 COMPREHEN METABOLIC PANEL: CPT

## 2022-11-29 PROCEDURE — G0009 ADMIN PNEUMOCOCCAL VACCINE: HCPCS | Performed by: INTERNAL MEDICINE

## 2022-11-29 PROCEDURE — 90677 PCV20 VACCINE IM: CPT | Performed by: INTERNAL MEDICINE

## 2022-11-29 PROCEDURE — 97162 PT EVAL MOD COMPLEX 30 MIN: CPT

## 2022-11-29 PROCEDURE — 63600175 PHARM REV CODE 636 W HCPCS: Performed by: STUDENT IN AN ORGANIZED HEALTH CARE EDUCATION/TRAINING PROGRAM

## 2022-11-29 PROCEDURE — 84100 ASSAY OF PHOSPHORUS: CPT | Performed by: STUDENT IN AN ORGANIZED HEALTH CARE EDUCATION/TRAINING PROGRAM

## 2022-11-29 PROCEDURE — 93010 EKG 12-LEAD: ICD-10-PCS | Mod: ,,, | Performed by: INTERNAL MEDICINE

## 2022-11-29 PROCEDURE — 97116 GAIT TRAINING THERAPY: CPT

## 2022-11-29 PROCEDURE — 63600175 PHARM REV CODE 636 W HCPCS: Performed by: INTERNAL MEDICINE

## 2022-11-29 RX ORDER — APIXABAN 5 MG/1
5 TABLET, FILM COATED ORAL 2 TIMES DAILY
Qty: 60 TABLET | Refills: 11 | Status: ON HOLD | OUTPATIENT
Start: 2022-11-29 | End: 2023-03-30 | Stop reason: HOSPADM

## 2022-11-29 RX ORDER — AMLODIPINE BESYLATE 5 MG/1
5 TABLET ORAL DAILY
Qty: 30 TABLET | Refills: 0 | Status: ON HOLD | OUTPATIENT
Start: 2022-11-29 | End: 2023-03-30 | Stop reason: HOSPADM

## 2022-11-29 RX ORDER — MAGNESIUM SULFATE HEPTAHYDRATE 40 MG/ML
2 INJECTION, SOLUTION INTRAVENOUS ONCE
Status: COMPLETED | OUTPATIENT
Start: 2022-11-29 | End: 2022-11-29

## 2022-11-29 RX ADMIN — TIMOLOL MALEATE 1 DROP: 5 SOLUTION OPHTHALMIC at 08:11

## 2022-11-29 RX ADMIN — PREDNISOLONE ACETATE 1 DROP: 10 SUSPENSION/ DROPS OPHTHALMIC at 12:11

## 2022-11-29 RX ADMIN — MEGESTROL ACETATE 40 MG: 40 TABLET ORAL at 08:11

## 2022-11-29 RX ADMIN — PREDNISOLONE ACETATE 1 DROP: 10 SUSPENSION/ DROPS OPHTHALMIC at 08:11

## 2022-11-29 RX ADMIN — BRIMONIDINE TARTRATE 1 DROP: 2 SOLUTION OPHTHALMIC at 08:11

## 2022-11-29 RX ADMIN — PNEUMOCOCCAL 20-VALENT CONJUGATE VACCINE 0.5 ML
2.2; 2.2; 2.2; 2.2; 2.2; 2.2; 2.2; 2.2; 2.2; 2.2; 2.2; 2.2; 2.2; 2.2; 2.2; 2.2; 4.4; 2.2; 2.2; 2.2 INJECTION, SUSPENSION INTRAMUSCULAR at 03:11

## 2022-11-29 RX ADMIN — THERA TABS 1 TABLET: TAB at 08:11

## 2022-11-29 RX ADMIN — FUROSEMIDE 100 MG: 10 INJECTION, SOLUTION INTRAMUSCULAR; INTRAVENOUS at 08:11

## 2022-11-29 RX ADMIN — LEVOTHYROXINE SODIUM 125 MCG: 25 TABLET ORAL at 08:11

## 2022-11-29 RX ADMIN — LISINOPRIL 20 MG: 20 TABLET ORAL at 08:11

## 2022-11-29 RX ADMIN — APIXABAN 5 MG: 5 TABLET, FILM COATED ORAL at 08:11

## 2022-11-29 RX ADMIN — ATROPINE SULFATE 1 DROP: 10 SOLUTION/ DROPS OPHTHALMIC at 08:11

## 2022-11-29 RX ADMIN — MAGNESIUM SULFATE 2 G: 2 INJECTION INTRAVENOUS at 08:11

## 2022-11-29 RX ADMIN — CLOPIDOGREL BISULFATE 75 MG: 75 TABLET ORAL at 08:11

## 2022-11-29 NOTE — PLAN OF CARE
Problem: Adult Inpatient Plan of Care  Goal: Plan of Care Chart Review  Outcome: Ongoing, Progressing

## 2022-11-29 NOTE — PLAN OF CARE
Problem: Occupational Therapy  Goal: Occupational Therapy Goal  Description: Goals to be met by: 12/28/2022      Patient will increase functional independence with ADLs by performing:    UE Dressing with Stand-by Assistance.  LE Dressing with Moderate Assistance.  Grooming while standing or seated  with Stand-by Assistance.  Toileting from bedside commode with Contact Guard Assistance for hygiene and clothing management.   Toilet transfer to bedside commode with Contact Guard Assistance.  Increased functional strength to WFL for self care.  Upper extremity exercise program x10 reps per handout, with assistance as needed.     Outcome: Ongoing, Progressing   Pt would benefit from continued OT to address deficits in self care and functional mobility. Recommending SNF vs HHOT/PT pending progress with therapies; DME needs TBD

## 2022-11-29 NOTE — PT/OT/SLP EVAL
"Physical Therapy Evaluation    Patient Name:  Pilar Michael   MRN:  [id number]    Recommendations:     Discharge Recommendations:  home health PT, home health OT, home with home health, other (see comments) (with 24/7 supervision/assist)   Discharge Equipment Recommendations: none   Barriers to discharge: None    Assessment:     Pilar Michael is a 89 y.o. female admitted with a medical diagnosis of <principal problem not specified>.  She presents with the following impairments/functional limitations:  weakness, impaired functional mobility, decreased safety awareness, impaired endurance, gait instability, impaired balance, impaired self care skills, decreased lower extremity function, decreased ROM, edema Patient seen for physical therapy evaluation on this date.  Full report to follow.  Anticipate that patient will benefit from Home Health PT/OT and 24/7 supervision/assist at home.   .    Rehab Prognosis: Fair; patient would benefit from acute skilled PT services to address these deficits and reach maximum level of function.    Recent Surgery: * No surgery found *      Plan:     During this hospitalization, patient to be seen 5 x/week to address the identified rehab impairments via gait training, therapeutic activities, therapeutic exercises, neuromuscular re-education and progress toward the following goals:    Plan of Care Expires:  12/29/22    Subjective     Chief Complaint: "I need to get up ... yes, I'll try"  Patient/Family Comments/goals: To return home at [facility]  Pain/Comfort:  Pain Rating 1: 0/10  Pain Rating Post-Intervention 1: 0/10    Patients cultural, spiritual, Gnosticist conflicts given the current situation: no    Living Environment:  Patient lives with daughter in [facility], 1 [address] (carport side), 4 [address] (front), T/S with TTB, and GB.    Prior to admission, patients level of function was patient required PRN assist for ADLs and mobility.  Equipment used at home: wheelchair, bath bench, bedside " commode, rollator, raised toilet, other (see comments) (transport w/c).  DME owned (not currently used): none.  Upon discharge, patient will have assistance from daughter who works from home.      Objective:     Communicated with nurse Smith prior to session.  Patient found supine with bed alarm, peripheral IV, telemetry, PureWick, oxygen  upon PT entry to room.    General Precautions: Standard, fall   Orthopedic Precautions:N/A   Braces: N/A  Respiratory Status: Nasal cannula, flow 1 L/min    Exams:  Cognitive Exam:  Patient is oriented to Person, Place, Time, Situation, and follows commands  Fine Motor Coordination:    -       Intact  Left hand, finger to nose, Right hand, finger to nose, Left hand thumb/finger opposition skills, and Right hand thumb/finger opposition skills  Gross Motor Coordination:  WFL  Postural Exam:  Patient presented with the following abnormalities:    -       Rounded shoulders  -       Forward head  -       Kyphosis  Sensation:    -       Intact  light/touch B LEs  Skin Integrity/Edema:      -       Skin integrity: Visible skin intact  -       Edema: L UE lymphedema x several years  RLE ROM: WFL  RLE Strength: Deficits: 3+ to 4/5  grossly  LLE ROM: WFL  LLE Strength: Deficits: 3+ to 4/5 grossly    Functional Mobility:  Bed Mobility:     Rolling Right: stand by assistance  Scooting: stand by assistance  Supine to Sit: minimum assistance  Transfers:     Sit to Stand:  contact guard assistance with rolling walker  Gait: x 10' with RW and CGA, slow pace, complains of fatigue and lightheadedness, supplemental O2 x 1 L  Balance: Seated:  SBA at EOB   Standing:  CGA with use of RW      AM-PAC 6 CLICK MOBILITY  Total Score:16       Treatment & Education:  Patient educated on role of physical therapy and POC.  Patient educated on importance of OOB.      Patient left up in chair with all lines intact, call button in reach, chair alarm on, and nurse Smith notified.    GOALS:   Multidisciplinary  Problems       Physical Therapy Goals          Problem: Physical Therapy    Goal Priority Disciplines Outcome Goal Variances Interventions   Physical Therapy Goal     PT, PT/OT Ongoing, Progressing     Description: Goals to be met by: 2022     Patient will increase functional independence with mobility by performin. Supine to sit with [place]  2. Sit to supine with [place]  3. Rolling to Left and Right with [place].  4. Sit to stand transfer with Supervision  5. Bed to chair transfer with Supervision using Rolling Walker  6. Gait  x 100 feet with Supervision using Rolling Walker.   7. Ascend/descend 1 stair with  Stand-by Assistance using Rolling Walker.                          History:     Past Medical History:   Diagnosis Date    Anticoagulant long-term use     Arthritis     Breast cancer     radiation     Cancer     CHF (congestive heart failure)     Coronary artery disease     Glaucoma, right eye     Hypertension     Pulmonary emboli     Stroke     Thyroid cancer     Thyroid disease        Past Surgical History:   Procedure Laterality Date    CARDIAC CATHETERIZATION      CARDIAC VALVE SURGERY      CHOLECYSTECTOMY      CORONARY ANGIOGRAPHY N/A 2019    Procedure: ANGIOGRAM, CORONARY ARTERY;  Surgeon: Bakari Singletary MD;  Location: [facility] CATH LAB;  Service: Cardiology;  Laterality: N/A;    HYSTERECTOMY      lymph nodes removal      MASTECTOMY  2022    THYROIDECTOMY  1992    TRANSCATHETER AORTIC VALVE REPLACEMENT (TAVR) N/A 2019    Procedure: REPLACEMENT, AORTIC VALVE, TRANSCATHETER (TAVR);  Surgeon: Bakari Singletary MD;  Location: [facility] CATH LAB;  Service: Cardiology;  Laterality: N/A;       Time Tracking:     PT Received On: 22  PT Start Time: 941     PT Stop Time: 1007  PT Total Time (min): 26 min     Billable Minutes: Evaluation 12 and Gait Training 14      2022

## 2022-11-29 NOTE — PLAN OF CARE
[facility]      HOME HEALTH ORDERS  FACE TO FACE ENCOUNTER    Patient Name: Pilar Michael  YOB: 1932    PCP: Joseph Noel MD   PCP Address: [address] [place] [address] [facility]  PCP Phone Number: [phone]  PCP Fax: [phone]    Encounter Date: 11/27/22    Admit to Home Health    Diagnoses:  There are no hospital problems to display for this patient.      Follow Up Appointments:  Future Appointments   Date Time Provider Department Center   1/3/2023  3:15 PM Bj Campoverde DO [facility] DIANNE [place]       Allergies:  Review of patient's allergies indicates:   Allergen Reactions    Penicillins Swelling    Sulfa (sulfonamide antibiotics) Nausea Only       Medications: Review discharge medications with patient and family and provide education.    Current Facility-Administered Medications   Medication Dose Route Frequency Provider Last Rate Last Admin    apixaban tablet 5 mg  5 mg Oral BID Kevin Burden MD   5 mg at 11/29/22 0851    atorvastatin tablet 20 mg  20 mg Oral QHS Victor Manuel Renteria MD   20 mg at 11/28/22 2059    atropine 1% ophthalmic solution 1 drop  1 drop Right Eye BID Victor Manuel Renteria MD   1 drop at 11/29/22 0849    brimonidine 0.2% ophthalmic solution 1 drop  1 drop Both Eyes BID Victor Manuel Renteria MD   1 drop at 11/29/22 0847    clopidogreL tablet 75 mg  75 mg Oral Daily Victor Manuel Renteria MD   75 mg at 11/29/22 0851    levothyroxine tablet 125 mcg  125 mcg Oral Daily Victor Manuel Renteria MD   125 mcg at 11/29/22 0851    lisinopriL tablet 20 mg  20 mg Oral Daily Victor Manuel Renteria MD   20 mg at 11/29/22 0851    megestroL tablet 40 mg  40 mg Oral Daily Victor Manuel Renteria MD   40 mg at 11/29/22 0846    multivitamin tablet  1 tablet Oral Daily Victor Manuel Renteria MD   1 tablet at 11/29/22 0851    pneumoc 20-mohsen conj-dip cr(PF) (PREVNAR-20 (PF)) injection Syrg 0.5 mL  0.5 mL Intramuscular vaccine x 1 dose  Teddy Rojas MD        prednisoLONE acetate 1 % ophthalmic suspension 1 drop  1 drop Right Eye QID Teddy Rojas MD   1 drop at 11/29/22 1258    sodium chloride 0.9% flush 10 mL  10 mL Intravenous PRN Victor Manuel Renteria MD        timolol maleate 0.5% ophthalmic solution 1 drop  1 drop Both Eyes BID Victor Manuel Renteria MD   1 drop at 11/29/22 0882     Current Discharge Medication List        START taking these medications    Details   pneumoc 20-mohsen conj-dip cr,PF, (PREVNAR-20, PF,) 0.5 mL Syrg injection Inject 0.5 mLs into the muscle once. for 1 dose  Qty: 0.5 mL, Refills: 0           CONTINUE these medications which have CHANGED    Details   ELIQUIS 5 mg Tab Take 1 tablet (5 mg total) by mouth 2 (two) times daily.  Qty: 60 tablet, Refills: 11           CONTINUE these medications which have NOT CHANGED    Details   albuterol (PROVENTIL/VENTOLIN HFA) 90 mcg/actuation inhaler Inhale 2 puffs into the lungs every 6 (six) hours as needed for Wheezing.      amLODIPine (NORVASC) 5 MG tablet Take 5 mg by mouth once daily.      atorvastatin (LIPITOR) 20 MG tablet Take 1 tablet (20 mg total) by mouth every evening.  Qty: 30 tablet, Refills: 3      atropine 1% (ISOPTO ATROPINE) 1 % Drop Place 1 drop into the right eye 2 (two) times a day.      brimonidine 0.2% (ALPHAGAN) 0.2 % Drop Place 1 drop into both eyes 2 (two) times a day.      bumetanide (BUMEX) 1 MG tablet Take 1 tablet (1 mg total) by mouth 2 (two) times a day.  Qty: 60 tablet, Refills: 3      clopidogreL (PLAVIX) 75 mg tablet Take 1 tablet (75 mg total) by mouth once daily.  Qty: 90 tablet, Refills: 3      lisinopriL (PRINIVIL,ZESTRIL) 20 MG tablet Take 20 mg by mouth once daily.      magnesium oxide (MAG-OX) 400 mg (241.3 mg magnesium) tablet Take 400 mg by mouth once daily.      methenamine (HIPREX) 1 gram Tab Take 1 g by mouth 2 (two) times daily.      potassium chloride SA (K-DUR,KLOR-CON) 20 MEQ tablet Take 20 mEq by mouth once daily. Take with  Bumex 1 mg tablet      SYNTHROID 125 mcg tablet Take 125 mcg by mouth before breakfast.  Refills: 3      timolol maleate 0.25% (TIMOPTIC) 0.25 % Drop Place 1 drop into both eyes 2 (two) times daily.      carvediloL (COREG) 3.125 MG tablet Take 3.125 mg by mouth 2 (two) times daily with meals.      ergocalciferol (ERGOCALCIFEROL) 50,000 unit Cap Take 50,000 Units by mouth every 7 days. wednesdays           STOP taking these medications       megestroL (MEGACE) 40 MG Tab Comments:   Reason for Stopping:         multivitamin Tab Comments:   Reason for Stopping:         TIMOPTIC OCUDOSE, PF, 0.5 % Dpet Comments:   Reason for Stopping:                 I have seen and examined this patient within the last 30 days. My clinical findings that support the need for the home health skilled services and home bound status are the following:no   Weakness/numbness causing balance and gait disturbance due to Stroke, Heart Failure, and Weakness/Debility making it taxing to leave home.     Diet:   cardiac diet  1.5L fluid restriction    Labs:  None    Referrals/ Consults  Physical Therapy to evaluate and treat. Evaluate for home safety and equipment needs; Establish/upgrade home exercise program. Perform / instruct on therapeutic exercises, gait training, transfer training, and Range of Motion.  Occupational Therapy to evaluate and treat. Evaluate home environment for safety and equipment needs. Perform/Instruct on transfers, ADL training, ROM, and therapeutic exercises.  Aide to provide assistance with personal care, ADLs, and vital signs.    Activities:   ambulate in house with assistance    Nursing:   Agency to admit patient within 24 hours of hospital discharge unless specified on physician order or at patient request    SN to complete comprehensive assessment including routine vital signs. Instruct on disease process and s/s of complications to report to MD. Review/verify medication list sent home with the patient at time of  discharge  and instruct patient/caregiver as needed. Frequency may be adjusted depending on start of care date.     Skilled nurse to perform up to 3 visits PRN for symptoms related to diagnosis    Notify MD if SBP > 160 or < 90; DBP > 90 or < 50; HR > 120 or < 50; Temp > 101; O2 < 88%;    Ok to schedule additional visits based on staff availability and patient request on consecutive days within the home health episode.    When multiple disciplines ordered:    Start of Care occurs on Sunday - Wednesday schedule remaining discipline evaluations as ordered on separate consecutive days following the start of care.    Thursday SOC -schedule subsequent evaluations Friday and Monday the following week.     Friday - Saturday SOC - schedule subsequent discipline evaluations on consecutive days starting Monday of the following week.    For all post-discharge communication and subsequent orders please contact patient's primary care physician. If unable to reach primary care physician or do not receive response within 30 minutes, please contact [facility] for clinical staff order clarification    Miscellaneous   Heart Failure:      SN to instruct on the following:    Instruct on the definition of CHF.   Instruct on the signs/sympoms of CHF to be reported.   Instruct on and monitor daily weights.   Instruct on factors that cause exacerbation.   Instruct on action, dose, schedule, and side effects of medications.   Instruct on diet as prescribed.   Instruct on activity allowed.   Instruct on life-style modifications for life long management of CHF   SN to assess compliance with daily weights, diet, medications, fluid retention,    safety precautions, activities permitted and life-style modifications.   Additional 1-2 SN visits per week as needed for signs and symptoms     of CHF exacerbation.      For Weight Gain > 2-3 lbs in 1 day or 4-6 lbs over 1 week notify PCP:  CHF DIURETIC SLIDING SCALE     Skilled nurse  visits daily to instruct and monitor medication adherence until target weight. Then resume prior order frequency.     If weight gain exceeds 5 lbs over target weight in 2 days, call MD  If weight gain of 3-4 lbs over target weight in 1 day, then:     Take 2 Bumex pills in the morning.     If already on max oral daily dose, see IV diuretic instructions.    After 3 days of increased oral diuretic dose, get BMP. If patient is on increased oral diuretic dose greater than 5 days, repeat BMP at day 7 of increased dose.     Potassium supplementation   Scr > 1.5 mg/dl  Scr  1.5 mg/dl    K < 3.0 - NOTIFY MD and 40 mEq bid  40 mEq tid   K- 3.1-3.3  20 mEq bid  20 mEq tid    K 3.4-3.7  10 mEq bid  10 mEq tid      If target weight not reached after 5 days of increased oral diuretic, proceed to IV diuretic with daily patient contact to include face-to-face visit and telephone encounters.       Home Oxygen:  Oxygen at 2 L/min nasal canula to be used:  Continuously., Assess oxygen saturation via pulse oximeter as needed for increase in SOB., Notify physician if oxygen saturation less than 88%, and Consult respiratory therapy for instruction on home oxygen use    Home Health Aide:  Physical Therapy Three times weekly, Occupational Therapy Three times weekly, and Home Health Aide Three times weekly    Wound Care Orders  no    I certify that this patient is confined to her home and needs physical therapy and occupational therapy.    Yung Yo MD  [facility] Internal Medicine/Emergency Medicine HO-I

## 2022-11-29 NOTE — DISCHARGE SUMMARY
[facility] Hospital Medicine Discharge Summary    Primary Team: [facility] Hospitalist Team A  Attending Physician: Jean Carlos Guido MD  Resident: Dexter  Intern: Srinath    Date of Admit: 11/27/2022  Date of Discharge: 11/29/2022    Discharge to: Home  Condition: Stable    Discharge Diagnoses     Patient Active Problem List   Diagnosis    Severe aortic stenosis s/p TAVR    Acquired hypothyroidism    Essential hypertension    S/P TAVR (transcatheter aortic valve replacement)    Chronic combined systolic and diastolic heart failure    Coronary artery disease involving native coronary artery of native heart without angina pectoris    Presence of permanent cardiac pacemaker    Acute cystitis    Normocytic anemia    Increased anion gap metabolic acidosis    Hyperlipidemia    Acute on chronic combined systolic (congestive) and diastolic (congestive) heart failure    Debility    Palliative care encounter    Delirium    Pleural effusion    Heart failure with preserved ejection fraction       Consultants and Procedures     Consultants:  None    Procedures:   None    Imaging:  X-Ray Chest 1 View   Final Result      Suspected pulmonary edema with prominent left-sided pleural fluid and/or atelectasis.  Additional small volume right-sided pleural fluid.  Recommend correlation with clinical history.         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      Patient was recently hospitalized at [facility] on 10/5 - 10/20 for AHRF 2/2 covid19 and heart failure exacerbation and found to have bilateral pulmonary emboli and started on eliquis therapy. Had a left  sided thoracentesis performed at that time. Studies suggestive of cardiac origin. [facility]. Since that hospitalization patient has been having difficulty with mobility and regaining strength. She has been at the SNF since presenting for this admission.     [facility], patient was taking 1 mg Bumex BID from 10/20 - 11/14. On 11/14 Bumex was reduced to 1 mg daily. On 11/27, patient was found to have labored breathing. VS at that time were the following: O2 88% 189/63, temp 97.6F, pulse 102, RR 22. EMS was called for hypoxia and was brought to [facility].     Patient reports that she suddenly started having trouble breathing at the facility. She states she felt her heart pounding in her chest. Denied any chest pain, lightheadedness, abdominal pain, nausea, vomiting, dysuria, or LE swelling. Patient's daughter at bedside and assisted in giving history.     ED workup:  CXR showed pulmonary edema with prominent left sided pleural effusion and small volume right sided pleural effusion. . Trop 0.035. CPK negative. Given 80 mg IV Lasix with 1.5L UO. Initially placed on 10L high flow NC.    For the full HPI please refer to the History & Physical from this admission.    Hospital Course By Problem with Pertinent Findings     Acute on chronic hypoxic respiratory failure  Diastolic Heart Failure Exacerbation  Bilateral pleural effusions  Hx of bilateral pulmonary emboli  Patient presenting with 1 day history of shortness of breath at SNF. Patient was recently hospitalized at [facility] on 10/5 - 10/20 for AHRF 2/2 covid19 and heart failure exacerbation and found to have bilateral pulmonary emboli and started on eliquis therapy. Had a left sided thoracentesis performed at that time (800 mL). Studies suggestive of cardiac origin. [facility] to [facility]. Since that hospitalization patient has been having difficulty with mobility and regaining strength. Upon discussion  with [facility], patient was taking 1 mg Bumex BID from 10/20 - 11/14. On 11/14 Bumex was reduced to 1 mg daily. . Trop 0.035. Placed on 10L HFNC initially.  -Diuresed with IV lasix with good urine output and improvement in crackles to lungs and oxygen requirement.  -Oxygen was weaned as tolerated. Patient was not discharged on oxygen from previous hospitalization, but has been receiving 1L NC continuously since being at [facility]. Patient desaturating to 85% on RA at rest and improved to mid 90% on 1L NC.  -Patient and daughter elect with home health and oxygen will be ordered and delivered.  -Patient will receive home PT/OT  -Follow up with cardiologist, Dr. Gaston and PCP.  -Continue home Eliquis  -Increasing from 1 mg Bumex daily back to 1 mg Bumex BID. First two days after discharge instructed patient to take additional bumex dose in the morning (2 pills AM 1 pill PM), and then resume normal BID schedule.     CAD s/p PCI with 2 stents  Elevated troponin  Likely demand related in setting of diastolic heart failure exacerbation. Initial trop 0.035.  -Repeat trop initially increased but then trended down.  -Continue home 75 mg clopidogrel  -Continue home 20 mg atorvastatin     Hypertension  -Home 5 amlodipine and 20 lisinopril  -Continue home meds     Hyperlipidemia  -Continue home 20 mg atorvastatin     Hx of Breast cancer s/p chemo and radiation (2003) and left mastectomy (2022)  Hx of thyroid cancer s/p thyroidectomy  -Continue home 125 mcg levothyroxine  -No longer taking Megestrol     CVA with only right eye deficits  -Continue home Timolol, atropine, and brimonidine drops  -Patient with minimally reactive right pupil. Normal reactive left pupil with accomodation.     Severe aortic stenosis s/p TAVR  -Continue home eliquis    Discharge Medications        Medication List        START taking these medications      pneumoc 20-mohsen conj-dip cr(PF) 0.5 mL Syrg injection  Commonly known as: PREVNAR-20  (PF)  Inject 0.5 mLs into the muscle once. for 1 dose            CHANGE how you take these medications      atorvastatin 20 MG tablet  Commonly known as: LIPITOR  Take 1 tablet (20 mg total) by mouth every evening.  What changed: when to take this     bumetanide 1 MG tablet  Commonly known as: BUMEX  Take 1 tablet (1 mg total) by mouth 2 (two) times a day.  What changed:   when to take this  additional instructions            CONTINUE taking these medications      albuterol 90 mcg/actuation inhaler  Commonly known as: PROVENTIL/VENTOLIN HFA     amLODIPine 5 MG tablet  Commonly known as: NORVASC  Take 1 tablet (5 mg total) by mouth once daily.     atropine 1% 1 % Drop  Commonly known as: ISOPTO ATROPINE     brimonidine 0.2% 0.2 % Drop  Commonly known as: ALPHAGAN     carvediloL 3.125 MG tablet  Commonly known as: COREG     clopidogreL 75 mg tablet  Commonly known as: PLAVIX  Take 1 tablet (75 mg total) by mouth once daily.     ELIQUIS 5 mg Tab  Generic drug: apixaban  Take 1 tablet (5 mg total) by mouth 2 (two) times daily.     ergocalciferol 50,000 unit Cap  Commonly known as: ERGOCALCIFEROL     lisinopriL 20 MG tablet  Commonly known as: PRINIVIL,ZESTRIL     magnesium oxide 400 mg (241.3 mg magnesium) tablet  Commonly known as: MAG-OX     methenamine 1 gram Tab  Commonly known as: HIPREX     potassium chloride SA 20 MEQ tablet  Commonly known as: K-DUR,KLOR-CON     SYNTHROID 125 MCG tablet  Generic drug: levothyroxine     timolol maleate 0.25% 0.25 % Drop  Commonly known as: TIMOPTIC               Where to Get Your Medications        These medications were sent to [facility]  [address] [place] [id number]      Hours: Mon-Fri, 8a-5:30p Phone: [phone]   amLODIPine 5 MG tablet  ELIQUIS 5 mg Tab  pneumoc 20-mohsen conj-dip cr(PF) 0.5 mL Syrg injection       Discharge Information:   Diet:  Cardiac    Physical Activity:  As tolerated             Instructions:  1. Take all medications as  prescribed  2. Keep all follow-up appointments  3. Return to the hospital or call your primary care physicians if any worsening symptoms such as fever, chest pain, shortness of breath, return of symptoms, or any other concerns.    Follow-Up Appointments:  Future Appointments   Date Time Provider Department Center   1/3/2023  3:15 PM Bj Campoverde DO [facility] DI[facility]NE [facility] PCW     Follow up with PCP and Cardiology    Yung Yo MD  [facility] Internal Medicine/Emergency Medicine [facility]-I

## 2022-11-29 NOTE — PLAN OF CARE
11/29/22 1419   Post-Acute Status   Post-Acute Authorization HME   HME Status Set-up Complete/Auth obtained  (Pt approved for Etank delivery. [facility]SC aware.)   Discharge Delays None known at this time      Follow-up Information       Joseph Noel MD Follow up.    Specialty: Internal Medicine  Why: follow up  Contact information:  [address]  [facility]  [place] [address]  [phone]               [facility]. Call.    Specialty: Home Health Services  Why: As needed, HOME HEALTH, OFFICE TO CALL PATIENT/FAMILY TO SET UP APPT TIME  Contact information:  [address]  [address]  [place] [address]  [phone]               [facility]. Schedule an appointment as soon as possible for a visit.    Why: As needed, NURSE PRACTIONER AT HOME PROGRAM, OFFICE TO CALL PATIENT/FAMILY TO SET UP APPT TIME  Contact information:   to contact pt/family to schedule home visits             [facility]. Call.    Specialty: DME Provider  Why: As needed, DME, Call as needed for any issue or to get equipment  Contact information:  [address] [place] [address]  [phone]

## 2022-11-29 NOTE — PT/OT/SLP PROGRESS
Occupational Therapy  Visit Attempt    Patient Name:  Pilar Michael   MRN:  [id number]    Patient not seen today secondary to Patient fatigue (14:11 - Patient asleep on arrival; CG present. Patient easily awakened but declining OT participation at this time 2/2 fatigue and requesting to rest.).     11/29/2022

## 2022-11-29 NOTE — PT/OT/SLP EVAL
Occupational Therapy   Evaluation    Name: Pilar Michael  MRN: [id number]  Admitting Diagnosis:  <principal problem not specified>  Recent Surgery: * No surgery found *      Recommendations:     Discharge Recommendations:  (TBD, SNF vs HHOT/PT pending progress with therapies)  Discharge Equipment Recommendations:   (TBD, likely none)  Barriers to discharge:   (Pt requires increased assistance at this time)    Assessment:     Pilar Michael is a 89 y.o. female with a medical diagnosis of <principal problem not specified>.  She presents with deconditioning, edema LUE>RUE. Performance deficits affecting function: weakness, impaired endurance, impaired self care skills, impaired functional mobility, gait instability, decreased lower extremity function, decreased upper extremity function, decreased coordination, impaired balance, decreased ROM, impaired fine motor, impaired coordination, impaired cardiopulmonary response to activity, edema.      Rehab Prognosis: Good and Fair; patient would benefit from acute skilled OT services to address these deficits and reach maximum level of function.       Plan:     Patient to be seen 3 x/week to address the above listed problems via self-care/home management, therapeutic activities, therapeutic exercises  Plan of Care Expires: 12/28/22  Plan of Care Reviewed with: patient    Subjective     Chief Complaint: Pt reports fatigue but agreeable to attempt OOB activity, lightheaded with attempts to sit EOB and returned to supine with HOB elevated  Patient/Family Comments/goals: To return to PLOF, to sit up in chair    Occupational Profile:  Living Environment: Pt lives with daughter in [facility], 1 STEPHEN (from carport/side door) tub/sh with TTB and GBs  Previous level of function: PRN assist for ADLs and functional mobility   Roles and Routines: Caretaker to self, mother  Equipment Used at Home:  wheelchair, bath bench, bedside commode, rollator, raised toilet (transport w/c)  Assistance  upon Discharge: Possibly family and paid caregiver per chart review, though no family present in evaluation to ascertain level of assistance available    Pain/Comfort:  Pain Rating 1: 0/10    Patients cultural, spiritual, Voodoo conflicts given the current situation:      Objective:     Communicated with: camryn prior to session.  Patient found HOB elevated with bed alarm, peripheral IV, telemetry, PureWick upon OT entry to room.    General Precautions: Standard, fall   Orthopedic Precautions:N/A   Braces: N/A  Respiratory Status: Nasal cannula    Occupational Performance:    Bed Mobility:    Patient completed Rolling/Turning to Left with  minimum assistance and moderate assistance  Patient completed Rolling/Turning to Right with minimum assistance  Patient completed Scooting/Bridging with total assistance and with bed in Trendelenburg and use of drawsheet  Patient completed Supine to Sit with moderate assistance  Patient completed Sit to Supine with minimum assistance    Functional Mobility/Transfers:  Patient completed Sit <> Stand Transfer with ability to come to partial stand with CGA/min A with  hand-held assist   Functional Mobility: Pt with fair dynamic seated balance, did not come to full stance but able to laterally scoot EOB x2 attempts before limited by endurance    Activities of Daily Living:  Feeding:  set up A to self feed from meal tray  Toileting: total assistance for pericare with use of rolling technique    Cognitive/Visual Perceptual:  Cognitive/Psychosocial Skills:     -       Oriented to: Person, Place, Time and Situation, min v/c for day and year of birth and able to state current year   -       Follows Commands/attention: Follows one and two step  commands  -       Communication: clear/fluent  -       Memory: No Deficits noted  -       Safety awareness/insight to disability: intact   -       Mood/Affect/Coping skills/emotional control: Appropriate to situation    Physical Exam:  Postural  examination/scapula alignment:    -       Rounded shoulders, forward head  Skin integrity: Edema LUE>RUE  Motor Planning:    -       WFL  Upper Extremity Range of Motion: BUE WFL  except B shoulder flexion somewhat limited  Upper Extremity Strength:  BUE grossly 2+ to 3-/5   Strength:  B hands WFL  Fine Motor Coordination:    -      Minimally impaired in functional task practice, possibly 2/2 edema  Gross motor coordination:   WFL     AMPAC 6 Click ADL:  AMPAC Total Score: 14    Treatment & Education:  Pt educated on role of OT and POC.   Pt performing skills as listed above.     Patient left HOB elevated with all lines intact, bed alarm on, and nsg notified    GOALS:   Multidisciplinary Problems       Occupational Therapy Goals          Problem: Occupational Therapy    Goal Priority Disciplines Outcome Interventions   Occupational Therapy Goal     OT, PT/OT Ongoing, Progressing    Description: Goals to be met by: 12/28/2022      Patient will increase functional independence with ADLs by performing:    UE Dressing with Stand-by Assistance.  LE Dressing with Moderate Assistance.  Grooming while standing or seated  with Stand-by Assistance.  Toileting from bedside commode with Contact Guard Assistance for hygiene and clothing management.   Toilet transfer to bedside commode with Contact Guard Assistance.  Increased functional strength to WFL for self care.  Upper extremity exercise program x10 reps per handout, with assistance as needed.                          History:     Past Medical History:   Diagnosis Date    Anticoagulant long-term use     Arthritis     Breast cancer     radiation 2003    Cancer     CHF (congestive heart failure)     Coronary artery disease     Glaucoma, right eye     Hypertension     Pulmonary emboli     Stroke     Thyroid cancer     Thyroid disease          Past Surgical History:   Procedure Laterality Date    CARDIAC CATHETERIZATION      CARDIAC VALVE SURGERY      CHOLECYSTECTOMY       CORONARY ANGIOGRAPHY N/A 04/23/2019    Procedure: ANGIOGRAM, CORONARY ARTERY;  Surgeon: Bakari Singletary MD;  Location: [facility] CATH LAB;  Service: Cardiology;  Laterality: N/A;    HYSTERECTOMY      lymph nodes removal      MASTECTOMY  01/2022    THYROIDECTOMY  1992    TRANSCATHETER AORTIC VALVE REPLACEMENT (TAVR) N/A 05/07/2019    Procedure: REPLACEMENT, AORTIC VALVE, TRANSCATHETER (TAVR);  Surgeon: Bakari Singletary MD;  Location: [facility] CATH LAB;  Service: Cardiology;  Laterality: N/A;       Time Tracking:     OT Date of Treatment: 11/28/22  OT Start Time: 1739  OT Stop Time: 1803  OT Total Time (min): 24 min    Billable Minutes:Evaluation 12  Self Care/Home Management 12    11/28/2022

## 2022-11-29 NOTE — PLAN OF CARE
AVS and educational attachments shared with patient and daughter via [employer]. Discussed thoroughly. Patient and daughter verbalized clear understanding using teach back method. Notified bedside nurse of completion of education. At present no distress noted. Patient to be discharged via w/c with escort service and family with all of patient's belonings. Will cont to be available to patient and family and intervene prn.

## 2022-11-29 NOTE — PROGRESS NOTES
"[facility] IM Resident HO-1 Progress Note    Subjective:      Pilar Michael is a 89 y.o. female who is being followed by the [facility] IM service at [facility] for AHRF 2/2 diastolic heart failure exacerbation.     Resting comfortably in bed. Reports doing well. Denies shortness of breath or chest pain. Sating 85% on RA, turned oxygen on to 1L and sating mid-90s.     Objective:   Last 24 Hour Vital Signs:  BP  Min: 125/59  Max: 154/65  Temp  Av.6 °F (36.4 °C)  Min: 96.5 °F (35.8 °C)  Max: 98.6 °F (37 °C)  Pulse  Av.8  Min: 58  Max: 89  Resp  Av  Min: 18  Max: 18  SpO2  Av %  Min: 95 %  Max: 100 %  Height  Av' 2" (157.5 cm)  Min: 5' 2" (157.5 cm)  Max: 5' 2" (157.5 cm)  Weight  Av kg (183 lb)  Min: 83 kg (183 lb)  Max: 83 kg (183 lb)  I/O last 3 completed shifts:  In: 118 [P.O.:118]  Out: 2100 [Urine:2100]    Physical Examination:  GEN- AOx3, NAD, laying comfortably in bed.   HEENT- Minimally reactive right pupil. Normal reactive left pupil with accomodation., EOMI, MMM, throat without erythema or exudates.  NECK- No thyromegaly or other masses  CARDIAC- RRR, with systolic murmur   RESP- Bibasilar crackles heard on exam with decreased left sided breath sounds. 85% on RA, placed on 1L NC and sating 95%.  ABD- Soft, NT, ND, +BS; no masses or HSM  EXT- No clubbing, cyanosis; 2+ DP/PT pulses. Trace pitting edema to the bilateral LE to mid shins. Lymphedema to the LUE s/p left mastectomy.  NEURO - Moves all four extremities spontaneously; light touch sensation intact and symmetric. See HEENT exam above.  SKIN -Warm and dry; no rashes      Laboratory:  Laboratory Data Reviewed: yes  Pertinent Findings:  Trended Lab Data:  Recent Labs   Lab 22  1005 22  0253 22  0411   WBC 6.28 3.77* 4.42   HGB 9.2* 8.5* 9.3*   HCT 29.9* 27.6* 29.6*    175 211   MCV 90 88 87   RDW 16.8* 17.1* 16.8*    137  --    K 4.2 3.7  --     105  --    CO2 18* 20*  --    BUN 20 " 21  --    CREATININE 0.8 0.7  --    * 84  --    PROT 8.2 7.3  --    ALBUMIN 2.7* 2.4*  --    BILITOT 1.0 0.8  --    AST 18 18  --    ALKPHOS 127 105  --    ALT 10 8*  --          Trended Cardiac Data:  Recent Labs   Lab 11/27/22  1006 11/27/22  2205 11/28/22  0939   TROPONINI 0.035* 0.126* 0.074*   *  --   --          Microbiology Data Reviewed: yes  Pertinent Findings:  Flu negative    Other Results:  EKG (my interpretation):none new   Radiology Data Reviewed: yes  Pertinent Findings:  X-Ray Chest 1 View   Final Result      Suspected pulmonary edema with prominent left-sided pleural fluid and/or atelectasis.  Additional small volume right-sided pleural fluid.  Recommend correlation with clinical history.         Electronically signed by: Wisam Ornelas   Date:    11/27/2022   Time:    10:41        Current Medications:     Infusions:       Scheduled:   apixaban  5 mg Oral BID    atorvastatin  20 mg Oral QHS    atropine 1%  1 drop Right Eye BID    brimonidine 0.2%  1 drop Both Eyes BID    clopidogreL  75 mg Oral Daily    furosemide (LASIX) injection  100 mg Intravenous Once    levothyroxine  125 mcg Oral Daily    lisinopriL  20 mg Oral Daily    megestroL  40 mg Oral Daily    multivitamin  1 tablet Oral Daily    prednisoLONE acetate  1 drop Right Eye QID    timolol maleate 0.5%  1 drop Both Eyes BID        PRN:  pneumoc 20-mohsen conj-dip cr(PF), sodium chloride 0.9%    Antibiotics and Day Number of Therapy:  None    Assessment:     Pilar Michael is a 89 year old female with PMH severe AS s/p TAVR (5/7/19), CAD s/p stent x 2, intraductral breast carcinoma s/p resection, 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 CXR showed pulmonary edema with prominent left sided pleural effusion and small volume right sided pleural effusion. . Trop 0.035. CPK negative. Given 80 mg IV Lasix with  1.5L UO. Initially placed on 10L high flow NC, weaned to 4L NC while taking history.    Plan:     Acute on chronic? hypoxic respiratory failure  Diastolic Heart Failure Exacerbation  Bilateral pleural effusions  Hx of bilateral pulmonary emboli  Patient presenting with 1 day history of shortness of breath at SNF. Patient was recently hospitalized at [facility] on 10/5 - 10/20 for AHRF 2/2 covid19 and heart failure exacerbation and found to have bilateral pulmonary emboli and started on eliquis therapy. Had a left sided thoracentesis performed at that time (800 mL). Studies suggestive of cardiac origin. Discharged to [facility]. Since that hospitalization patient has been having difficulty with mobility and regaining strength. Upon discussion with [facility], patient was taking 1 mg Bumex BID from 10/20 - 11/14. On 11/14 Bumex was reduced to 1 mg daily. . Trop 0.035  -Given 80 mg IV lasix with 1750 mL urine output in ED over 6h. Given additional 100 mg IV Lasix with additional 1000 mL UO per nursing staff  -Considering cards and pulm consult for rapidly re accumulating pleural effusion likely cardiac origin. Previously had thoracentesis performed to left chest with ~800 mL transudative fluid in Oct 2022. After discussion, AHRF likely contributed to pulmonary edema from diastolic HF exacerbation. Will defer consults for now.  -ABG ordered, venous gas obtained with showed 7.41/46/24  -Continue home 5mg Eliquis BID, no concern for PE at this time. Patient adherent to AC. Pleural effusions and heart failure exacerbations more likely to explain hypoxia.  -strict in and out  -Weaned to 1L NC and sating well. Continue to wean as tolerated. Patient was not discharged with oxygen from [facility]. Per daughter, patient on 1L of continuous oxygen at rehab center. Appears to be more comfort related than chronic RF  -Home O2 eval  -Will continue diuresis with 100 mg IV Lasix BID, goal 2L UO.   -83 kg > 76.5  kg this AM consistent with approximately 6-7L UO     CAD s/p PCI with 2 stents  Elevated troponin  Likely demand related in setting of diastolic heart failure exacerbation. Initial trop 0.035.  -Repeat trop collected at 2205 on 11/27 elevated to 0.126.  -Repeat trop this AM 11/28  -Continue home 75 mg clopidogrel  -Continue home 20 mg atorvastatin     Hypertension  -Home 5 amlodipine and 20 lisinopril  -Continuing home lisinopril     Hyperlipidemia  -Continue home 20 mg atorvastatin     Hx of Breast cancer s/p chemo and radiation (2003) and left mastectomy (2022)  Hx of thyroid cancer s/p thyroidectomy  -Continue home 125 mcg levothyroxine  -Continue home 40 mg Megestrol     CVA with only right eye deficits  -Continue home Timolol, atropine, and brimonidine drops  -Patient with minimally reactive right pupil. Normal reactive left pupil with accomodation.     Severe aortic stenosis s/p TAVR  -Continue home eliquis     Hx of multidrug resistant UTIs  -No concern for infection at this time     Diet: Cardiac diet  PPX: Eliquis     Dispo: pending clinical improvement; plan for discharge with home health    Yung Yo MD  [facility] Internal Medicine/Emergency Medicine HO-I  [facility] IM Service Team B    [facility] Medicine Hospitalist Pager numbers:   [facility] Hospitalist Medicine Team A (Edgard/Larisa): [phone]  [facility] Hospitalist Medicine Team B (Crystal/Hay):  [phone]

## 2022-11-29 NOTE — PLAN OF CARE
Patient seen for physical therapy evaluation on this date.  Full report to follow.  Anticipate that patient will benefit from Home Health PT/OT and  supervision/assist at home.       Problem: Physical Therapy  Goal: Physical Therapy Goal  Description: Goals to be met by: 2022     Patient will increase functional independence with mobility by performin. Supine to sit with Modified [place]  2. Sit to supine with Modified [place]  3. Rolling to Left and Right with Modified [place].  4. Sit to stand transfer with Supervision  5. Bed to chair transfer with Supervision using Rolling Walker  6. Gait  x 100 feet with Supervision using Rolling Walker.   7. Ascend/descend 1 stair with  Stand-by Assistance using Rolling Walker.     Outcome: Ongoing, Progressing

## 2022-11-29 NOTE — PLAN OF CARE
Problem: Adult Inpatient Plan of Care  Goal: Plan of Care Review  Outcome: Ongoing, Progressing     Pt is alert and disoriented to time. Breathing on 3L O2 NC.    Pt is currently on fluid restriction 1500 mL. Pt currently does not have any pain, sob, chest pain, nausea/vomiting, dizziness, lightheadedness. IV site is clean, dry, and intact. However, when flushed with NS, pt c/o burning sensation. Lung sounds diminished on all lobes anteriorly and laterally. Left arm has 4+ pitting edema and bilateral legs have 2+ pitting edema. Right pupil response is absent and is fixated at 6mm in size. Left pupil response is round, brisk and equal 3mm in size.     Pt's daughter was concerned about Eliquis not on the med order which pt used to take it at home. Notified on-call doctor. Eliquis was ordered in starting 11/29 morning at 9. Notified the daughter.     Cardiac monitor showed no P-wave with afib. Notified on-call doctor for EKG prn. EKG done. Pt tolerated well. Vitals are stable. Will continue to monitor.

## 2022-11-29 NOTE — PLAN OF CARE
[facility] - Telemetry  Discharge Final Note    Primary Care Provider: Joseph Noel MD    Expected Discharge Date:     Pharmacist will go over home medications and reasons for medications. VN and bedside nurse to reiterate final discharge instructions.     NP at Home referral to be placed to follow up with family.      Final Discharge Note (most recent)       Final Note - 11/29/22 1316          Final Note    Assessment Type Final Discharge Note     Anticipated Discharge Disposition Home-Health Care [facility]     Hospital Resources/Appts/Education Provided Appointments scheduled and added to AVS        Post-Acute Status    Post-Acute Authorization HME;Home Health     HME Status Pending therapy documentation   Home O2 eval to be done. OHME reviewing for orders for home.    Home Health Status Pending Clinical Review (P)    Awaiting HH orders to sent. Referrals sent via [facility] to At Home HH per daughter.    Discharge Delays Home Medical Equipment (Insurance, Delivery) (P)    Pending oxygen approval and delivery.                    Important Message from Medicare  Important Message from Medicare regarding Discharge Appeal Rights: Given to patient/caregiver, Explained to patient/caregiver, Signed/date by patient/caregiver     Date IMM was signed: 11/29/22  Time IMM was signed: 1133     Follow-up Information       Joseph Noel MD Follow up.    Specialty: Internal Medicine  Why: follow up  Contact information:  [address]  [facility] [address]  [place] [address]  [phone]               [facility]. Call.    Specialty: Home Health Services  Why: As needed, HOME HEALTH, OFFICE TO CALL PATIENT/FAMILY TO SET UP APPT TIME  Contact information:  [address]  [address]  [place] [address]  [phone]               [facility]. Schedule an appointment as soon as possible for a visit.    Why: As needed, NURSE PRACTIONER AT HOME PROGRAM, OFFICE TO CALL PATIENT/FAMILY TO  "SET UP APPT TIME  Contact information:   to contact pt/family to schedule home visits                           Future Appointments   Date Time Provider Department Center   1/3/2023  3:15 PM [facility]       Contact Info       Joseph Noel MD   Specialty: Internal Medicine   Relationship: PCP - General    [address] [facility]  [facility]  [place] [address]   Phone: [phone]       Next Steps: Follow up    Instructions: follow up          /64 (BP Location: Right arm, Patient Position: Lying)   Pulse (!) 59   Temp 97.8 °F (36.6 °C) (Oral)   Resp 18   Ht 5' 2" (1.575 m)   Wt 76.5 kg (168 lb 10.4 oz)   SpO2 97%   Breastfeeding No   BMI 30.85 kg/m²      apixaban  5 mg Oral BID    atorvastatin  20 mg Oral QHS    atropine 1%  1 drop Right Eye BID    brimonidine 0.2%  1 drop Both Eyes BID    clopidogreL  75 mg Oral Daily    levothyroxine  125 mcg Oral Daily    lisinopriL  20 mg Oral Daily    megestroL  40 mg Oral Daily    multivitamin  1 tablet Oral Daily    prednisoLONE acetate  1 drop Right Eye QID    timolol maleate 0.5%  1 drop Both Eyes BID       "

## 2022-11-29 NOTE — PLAN OF CARE
Home Oxygen Evaluation     Date Performed: 11/29/22     1)         Patient's Home O2 Sat on room air, while at rest: 85%                               If O2 sats on room air at rest are 88% or below, patient qualifies. No additional testing needed. Document N/A in steps 2 and 3. If 89% or above, complete steps 2.        2)         Patient's O2 Sat on room air while exercising: N/A                               If O2 sats on room air while exercising remain 89% or above patient does not qualify, no further testing needed Document N/A in step 3. If O2 sats on room air while exercising are 88% or below, continue to step 3.        3)         Patient's O2 Sat while exercising on O2: N/A                                           (Must show improvement from #2 for patients to qualify)     If O2 sats improve on oxygen, patient qualifies for portable oxygen. If not, the patient does not qualify.      Yung Yo MD  [facility] Internal Medicine/Emergency Medicine HO-I

## 2022-11-30 ENCOUNTER — PATIENT OUTREACH (OUTPATIENT)
Dept: ADMINISTRATIVE | Facility: CLINIC | Age: 87
End: 2022-11-30
Payer: MEDICARE

## 2022-11-30 DIAGNOSIS — J96.21 ACUTE ON CHRONIC RESPIRATORY FAILURE WITH HYPOXIA: Primary | ICD-10-CM

## 2022-11-30 NOTE — PROGRESS NOTES
C3 nurse spoke with Pilar Michael's daughter for a TCC post hospital discharge follow up call. Nurse offered to scheduled TCC hospital follow-up appointment. The patient declined appointment at this time.

## 2022-12-02 DIAGNOSIS — R06.02 SOB (SHORTNESS OF BREATH): Primary | ICD-10-CM

## 2022-12-05 ENCOUNTER — PES CALL (OUTPATIENT)
Dept: ADMINISTRATIVE | Facility: CLINIC | Age: 87
End: 2022-12-05
Payer: MEDICARE

## 2022-12-12 ENCOUNTER — OFFICE VISIT (OUTPATIENT)
Dept: HOME HEALTH SERVICES | Facility: CLINIC | Age: 87
End: 2022-12-12
Payer: MEDICARE

## 2022-12-12 DIAGNOSIS — R53.81 DEBILITY: ICD-10-CM

## 2022-12-12 DIAGNOSIS — I50.30 HEART FAILURE WITH PRESERVED EJECTION FRACTION, UNSPECIFIED HF CHRONICITY: ICD-10-CM

## 2022-12-12 DIAGNOSIS — I50.32 CHRONIC DIASTOLIC CONGESTIVE HEART FAILURE: ICD-10-CM

## 2022-12-12 DIAGNOSIS — F41.9 ANXIETY: ICD-10-CM

## 2022-12-12 PROCEDURE — 99350 HOME/RES VST EST HIGH MDM 60: CPT | Mod: S$GLB,,, | Performed by: NURSE PRACTITIONER

## 2022-12-12 PROCEDURE — 99497 ADVNCD CARE PLAN 30 MIN: CPT | Mod: S$GLB,,, | Performed by: NURSE PRACTITIONER

## 2022-12-12 PROCEDURE — 99497 PR ADVNCD CARE PLAN 30 MIN: ICD-10-PCS | Mod: S$GLB,,, | Performed by: NURSE PRACTITIONER

## 2022-12-12 PROCEDURE — 99350 PR HOME VISIT,ESTAB PATIENT,LEVEL IV: ICD-10-PCS | Mod: S$GLB,,, | Performed by: NURSE PRACTITIONER

## 2022-12-12 NOTE — PROGRESS NOTES
[facility]  Transition of Care Home Visit    Visit Date: 12/14/2022  Encounter Provider: Lore Edmondson   PCP:  Joseph Noel MD    PRESENTING HISTORY      Patient ID: Pilar Michael is a 89 y.o. female.    Consult Requested By:  Dr. Jean Carlos Guido  Reason for Consult:  Hospital Follow Up.    Pilar is being seen at home due to being seen at home due to physical debility that presents a taxing effort to leave the home, to mitigate high risk of hospital readmission and/or due to the limited availability of reliable or safe options for transportation to the point of access to the provider. Prior to treatment on this visit the chart was reviewed and patient verbal consent was obtained.      Chief Complaint: No chief complaint on file.        History of Present Illness: Ms. Pilar Michael is a 89 y.o. female who was recently admitted to the hospital.  Admit 11/27/22  Discharge: 11/29/22  Acute on chronic hypoxic respiratory failure  Diastolic Heart Failure Exacerbation  Bilateral pleural effusions  Hx of bilateral pulmonary emboli  Patient presenting with 1 day history of shortness of breath at SNF. Patient was recently hospitalized at [facility] on 10/5 - 10/20 for AHRF 2/2 covid19 and heart failure exacerbation and found to have bilateral pulmonary emboli and started on eliquis therapy. Had a left sided thoracentesis performed at that time (800 mL). Studies suggestive of cardiac origin. Discharged to [facility]. Since that hospitalization patient has been having difficulty with mobility and regaining strength. [facility], patient was taking 1 mg Bumex BID from 10/20 - 11/14. On 11/14 Bumex was reduced to 1 mg daily. . Trop 0.035. Placed on 10L HFNC initially.  -Diuresed with IV lasix with good urine output and improvement in crackles to lungs and oxygen requirement.  -Oxygen was weaned as tolerated. Patient was not discharged on oxygen from previous  hospitalization, but has been receiving 1L NC continuously [facility]. Patient desaturating to 85% on RA at rest and improved to mid 90% on 1L NC.  -Patient and daughter elect with home health and oxygen will be ordered and delivered.  -Patient will receive home PT/OT  -Follow up with cardiologist, Dr. Gaston and PCP.  -Continue home Eliquis  -Increasing from 1 mg Bumex daily back to 1 mg Bumex BID. First two days after discharge instructed patient to take additional bumex dose in the morning (2 pills AM 1 pill PM), and then resume normal BID schedule.     CAD s/p PCI with 2 stents  Elevated troponin  Likely demand related in setting of diastolic heart failure exacerbation. Initial trop 0.035.  -Repeat trop initially increased but then trended down.  -Continue home 75 mg clopidogrel  -Continue home 20 mg atorvastatin     Hypertension  -Home 5 amlodipine and 20 lisinopril  -Continue home meds  ___________________________________________________________________    Today:    HPI:  Pt is being seen today for hospital follow up and to establish home based care  See hospital course    She is found in her home today with her daughter present. She reports she is returning to her usual state of health since recent CHF flare.  She has significant lymphedema to her left arm s/p mastectomy, daughter places compression wraps at times to assist. She also reports her feet are more swollen today, but she is staying in the recliner and not getting into bed since she cannot lie flat comfortably. Pt would benefit from a hospital bed, order placed.  Weight has increased by 3 lbs since yesterday, instructed to take 1/2 tablet prn dose of Bumex.  She has difficulty getting to clinic due to impaired mobility and debility. PT.OT working with her to regain her strength.  Daughter reports she is having problems with anxiety. She is nervous and especially so at night. Night time appears to make it worse, she can be more  confused at night when anxious, likely some Sundowning.    Discussion held with pt and/or family related to advanced care planning.  Discussed end of life goals and pt's wishes regarding end of care.  Encouraged designation of a HPOA and discussing wishes with them  Reviewed living will, [place] Post, and pt's wishes regarding life support and tube feeding.  Reviewed pt's current code status and prognosis.   Copy of [facility] advanced care planning packet and [facility]OST given to family for review and discussion.  All questions answered and paperwork completed or review at next appt  30 minutes spent in discussion of these services.      Daughter and pt will review and we will complete post at next visit         Review of Systems   Constitutional:  Negative for activity change, fatigue and fever.   HENT: Negative.     Eyes: Negative.    Respiratory:  Negative for chest tightness.    Cardiovascular:  Positive for leg swelling.   Gastrointestinal: Negative.    Endocrine: Negative.    Genitourinary: Negative.    Musculoskeletal:  Positive for gait problem.        Lymphedema to left arm   Skin: Negative.    Allergic/Immunologic: Negative.    Hematological:  Bruises/bleeds easily.   Psychiatric/Behavioral:  Positive for confusion (at night). Negative for agitation. The patient is nervous/anxious.    All other systems reviewed and are negative.    Assessments:  Environmental: lives with daughter, 3 steps to enter, adequate light and temp  Functional Status: requires some assistance  Safety: fall risk  Nutritional: adequate available  Home Health/DME/Supplies: [facility], [facility]    PAST HISTORY:     Past Medical History:   Diagnosis Date    Anticoagulant long-term use     Arthritis     Breast cancer     radiation 2003    Cancer     CHF (congestive heart failure)     Coronary artery disease     Glaucoma, right eye     Hypertension     Pulmonary emboli     Stroke     Thyroid cancer     Thyroid disease        Past Surgical History:    Procedure Laterality Date    CARDIAC CATHETERIZATION      CARDIAC VALVE SURGERY      CHOLECYSTECTOMY      CORONARY ANGIOGRAPHY N/A 04/23/2019    Procedure: ANGIOGRAM, CORONARY ARTERY;  Surgeon: Bakari Singletary MD;  Location: [facility] CATH LAB;  Service: Cardiology;  Laterality: N/A;    HYSTERECTOMY      lymph nodes removal      MASTECTOMY  01/2022    THYROIDECTOMY  1992    TRANSCATHETER AORTIC VALVE REPLACEMENT (TAVR) N/A 05/07/2019    Procedure: REPLACEMENT, AORTIC VALVE, TRANSCATHETER (TAVR);  Surgeon: Bakari Singletary MD;  Location: [facility] CATH LAB;  Service: Cardiology;  Laterality: N/A;       Family History   Problem Relation Age of Onset    Kidney disease Father     Cancer Sister     ALS Sister        Social History     Socioeconomic History    Marital status:    Tobacco Use    Smoking status: Never    Smokeless tobacco: Never   Substance and Sexual Activity    Alcohol use: Not Currently    Drug use: Never    Sexual activity: Not Currently     Social Determinants of Health     Food Insecurity: No Food Insecurity    Worried About Running Out of Food in the Last Year: Never true    Ran Out of Food in the Last Year: Never true   Transportation Needs: No Transportation Needs    Lack of Transportation (Medical): No    Lack of Transportation (Non-Medical): No   Housing Stability: Unknown    Unable to Pay for Housing in the Last Year: No    Unstable Housing in the Last Year: No       MEDICATIONS & ALLERGIES:     Current Outpatient Medications on File Prior to Visit   Medication Sig Dispense Refill    albuterol (PROVENTIL/VENTOLIN HFA) 90 mcg/actuation inhaler Inhale 2 puffs into the lungs every 6 (six) hours as needed for Wheezing.      amLODIPine (NORVASC) 5 MG tablet Take 1 tablet (5 mg total) by mouth once daily. 30 tablet 0    atorvastatin (LIPITOR) 20 MG tablet Take 1 tablet (20 mg total) by mouth every evening. (Patient taking differently: Take 20 mg by mouth once daily.) 30 tablet 3    atropine 1%  (ISOPTO ATROPINE) 1 % Drop Place 1 drop into the right eye 2 (two) times a day.      brimonidine 0.2% (ALPHAGAN) 0.2 % Drop Place 1 drop into both eyes 2 (two) times a day.      bumetanide (BUMEX) 1 MG tablet Take 1 tablet (1 mg total) by mouth 2 (two) times a day. 60 tablet 3    carvediloL (COREG) 3.125 MG tablet Take 3.125 mg by mouth 2 (two) times daily with meals.      clopidogreL (PLAVIX) 75 mg tablet Take 1 tablet (75 mg total) by mouth once daily. 90 tablet 3    ELIQUIS 5 mg Tab Take 1 tablet (5 mg total) by mouth 2 (two) times daily. 60 tablet 11    ergocalciferol (ERGOCALCIFEROL) 50,000 unit Cap Take 50,000 Units by mouth every 7 days. wednesdays      lisinopriL (PRINIVIL,ZESTRIL) 20 MG tablet Take 20 mg by mouth once daily.      magnesium oxide (MAG-OX) 400 mg (241.3 mg magnesium) tablet Take 400 mg by mouth once daily.      methenamine (HIPREX) 1 gram Tab Take 1 g by mouth 2 (two) times daily.      potassium chloride SA (K-DUR,KLOR-CON) 20 MEQ tablet Take 20 mEq by mouth once daily. Take with Bumex 1 mg tablet      SYNTHROID 125 mcg tablet Take 125 mcg by mouth before breakfast.  3    timolol maleate 0.25% (TIMOPTIC) 0.25 % Drop Place 1 drop into both eyes 2 (two) times daily.      [DISCONTINUED] albuterol-ipratropium (DUO-NEB) 2.5 mg-0.5 mg/3 mL nebulizer solution Take 3 mLs by nebulization every 6 (six) hours as needed for Wheezing. Rescue (Patient not taking: Reported on 7/3/2022) 180 mL 1    [DISCONTINUED] losartan (COZAAR) 50 MG tablet Take 50 mg by mouth once daily.      [DISCONTINUED] traZODone (DESYREL) 50 MG tablet Take 50 mg by mouth nightly.       No current facility-administered medications on file prior to visit.        Review of patient's allergies indicates:   Allergen Reactions    Penicillins Swelling    Sulfa (sulfonamide antibiotics) Nausea Only       OBJECTIVE:     Vital Signs:  There were no vitals filed for this visit.  There is no height or weight on file to calculate BMI.      Physical Exam:  Physical Exam  Vitals reviewed.   Constitutional:       General: She is not in acute distress.     Appearance: She is well-developed.   HENT:      Head: Normocephalic and atraumatic.      Nose: Nose normal.      Mouth/Throat:      Mouth: Mucous membranes are dry.   Eyes:      Pupils: Pupils are equal, round, and reactive to light.   Cardiovascular:      Rate and Rhythm: Normal rate and regular rhythm.      Heart sounds: Normal heart sounds.   Pulmonary:      Effort: Pulmonary effort is normal.      Breath sounds: Normal breath sounds.   Abdominal:      General: Bowel sounds are normal.      Palpations: Abdomen is soft.   Musculoskeletal:      Cervical back: Normal range of motion and neck supple.      Right lower leg: Edema (1+) present.      Left lower leg: Edema (1+) present.      Comments: Lymphedema to left arm   Skin:     General: Skin is warm and dry.   Neurological:      Mental Status: She is alert and oriented to person, place, and time.      Cranial Nerves: No cranial nerve deficit.   Psychiatric:         Behavior: Behavior normal.         Thought Content: Thought content normal.         Judgment: Judgment normal.       Laboratory  Lab Results   Component Value Date    WBC 4.42 11/29/2022    HGB 9.3 (L) 11/29/2022    HCT 29.6 (L) 11/29/2022    MCV 87 11/29/2022     11/29/2022     Lab Results   Component Value Date    INR 1.0 04/08/2019     Lab Results   Component Value Date    HGBA1C 5.9 (H) 04/06/2022     No results for input(s): POCTGLUCOSE in the last 72 hours.    Diagnostic Results:  Results for orders placed during the hospital encounter of 11/27/22    Echo    Interpretation Summary  · The left ventricle is normal in size with concentric remodeling and normal systolic function.  · The estimated ejection fraction is 60%.  · Grade II left ventricular diastolic dysfunction.  · With normal right ventricular systolic function.  · Severe left atrial enlargement.  · There is a 26 mm  Medtronic Evolut CoreValve transcutaneously-placed aortic bioprosthesis present. There is no aortic insufficiency present. Prosthetic aortic valve is normal.  · The aortic valve mean gradient is 2 mmHg with a dimensionless index of 1.00.  · The estimated PA systolic pressure is 26 mmHg.  · Elevated central venous pressure (15 mmHg).  · There is a left pleural effusion.      TRANSITION OF CARE:     [facility] Contact Note: 11/30/22    Family and/or Caretaker present at visit?  Yes.  Diagnostic tests reviewed/disposition: No diagnosic tests pending after this hospitalization.  Disease/illness education:   Home health/community services discussion/referrals: [facility]  .   Establishment or re-establishment of referral orders for community resources: No other necessary community resources.   Discussion with other health care providers: No discussion with other health care providers necessary.     Transition of Care Visit:  I have reviewed and updated the history and problem list.  I have reconciled the medication list.  I have discussed the hospitalization and current medical issues, prognosis and plans with the patient/family.  I  spent more than 50% of time discussing the care with the patient/family.  Total Face-to-Face Encounter: 60 minutes.    Medications Reconciliation:   I have reconciled the patient's home medications and discharge medications with the patient/family. I have updated all changes.  Refer to After-Visit Medication List.    ASSESSMENT & PLAN:     HIGH RISK CONDITION(S):      Problem List Items Addressed This Visit          Psychiatric    Anxiety    Current Assessment & Plan     Anxiety  Increases at night, pt reports very anxious about sleeping since she cannot lie flat and get into bed  Hosp bed ordered to assist  Buspar BID  Monitor            Cardiac/Vascular    Heart failure with preserved ejection fraction    Current Assessment & Plan      Chronic  Diastolic dysfunction on echo  Bumex and Coreg in place  Instructed to weigh daily and take  Prn dose of Bumex for wt gain >2lbs daily.  Low salt diet  Monitor         Relevant Orders    HOSPITAL BED FOR HOME USE       Other    Debility    Current Assessment & Plan     Related to health conditions  PT/OT in place  Fall precautions  Monitor         Relevant Orders    HOSPITAL BED FOR HOME USE     Other Visit Diagnoses       Chronic diastolic congestive heart failure        Relevant Orders    HOSPITAL BED FOR HOME USE             Were controlled substances prescribed?  No    Instructions for the patient:  - Continue all medications, treatments and therapies as ordered.   - Follow all instructions, recommendations as discussed.  - Maintain Safety Precautions at all times.  - Attend all medical appointments as scheduled.  - For worsening symptoms: call Primary Care Physician or Nurse Practitioner.  - For emergencies, call 911 or immediately report to the nearest emergency room.  - Limit Risks of environmental exposure to coronavirus/COVID-19 as discussed including: social distancing, hand hygiene, and limiting departures from the home for necessities only.     Scheduled Follow-up :  Future Appointments   Date Time Provider Department Center   1/3/2023  3:15 PM Bj Campoverde DO [facility]       After Visit Medication List :     Medication List            Accurate as of December 12, 2022 11:59 PM. If you have any questions, ask your nurse or doctor.                START taking these medications      busPIRone 5 MG Tab  Commonly known as: BUSPAR  Take 1 tablet (5 mg total) by mouth 2 (two) times daily.  Started by: Lore Edmondson NP            CHANGE how you take these medications      atorvastatin 20 MG tablet  Commonly known as: LIPITOR  Take 1 tablet (20 mg total) by mouth every evening.  What changed: when to take this            CONTINUE taking these medications      albuterol 90 mcg/actuation  inhaler  Commonly known as: PROVENTIL/VENTOLIN HFA     amLODIPine 5 MG tablet  Commonly known as: NORVASC  Take 1 tablet (5 mg total) by mouth once daily.     atropine 1% 1 % Drop  Commonly known as: ISOPTO ATROPINE     brimonidine 0.2% 0.2 % Drop  Commonly known as: ALPHAGAN     bumetanide 1 MG tablet  Commonly known as: BUMEX  Take 1 tablet (1 mg total) by mouth 2 (two) times a day.     carvediloL 3.125 MG tablet  Commonly known as: COREG     clopidogreL 75 mg tablet  Commonly known as: PLAVIX  Take 1 tablet (75 mg total) by mouth once daily.     ELIQUIS 5 mg Tab  Generic drug: apixaban  Take 1 tablet (5 mg total) by mouth 2 (two) times daily.     ergocalciferol 50,000 unit Cap  Commonly known as: ERGOCALCIFEROL     lisinopriL 20 MG tablet  Commonly known as: PRINIVIL,ZESTRIL     magnesium oxide 400 mg (241.3 mg magnesium) tablet  Commonly known as: MAG-OX     methenamine 1 gram Tab  Commonly known as: HIPREX     potassium chloride SA 20 MEQ tablet  Commonly known as: K-DUR,KLOR-CON     SYNTHROID 125 MCG tablet  Generic drug: levothyroxine     timolol maleate 0.25% 0.25 % Drop  Commonly known as: TIMOPTIC               Where to Get Your Medications        These medications were sent to [facility]/pharmacy [id number] - [place] - [address] AT [facility]  [address] [place] [address]      Phone: [phone]   busPIRone 5 MG Tab         Signature: Lore Edmondson NP

## 2022-12-13 RX ORDER — BUSPIRONE HYDROCHLORIDE 5 MG/1
5 TABLET ORAL 2 TIMES DAILY
Qty: 60 TABLET | Refills: 11 | Status: SHIPPED | OUTPATIENT
Start: 2022-12-13 | End: 2023-12-13

## 2022-12-14 PROBLEM — F41.9 ANXIETY: Status: ACTIVE | Noted: 2022-12-14

## 2022-12-14 NOTE — ASSESSMENT & PLAN NOTE
Anxiety  Increases at night, pt reports very anxious about sleeping since she cannot lie flat and get into bed  Hosp bed ordered to assist  Buspar BID  Monitor

## 2022-12-14 NOTE — ASSESSMENT & PLAN NOTE
Chronic  Diastolic dysfunction on echo  Bumex and Coreg in place  Instructed to weigh daily and take  Prn dose of Bumex for wt gain >2lbs daily.  Low salt diet  Monitor

## 2023-01-17 DIAGNOSIS — N30.00 ACUTE CYSTITIS WITHOUT HEMATURIA: Primary | ICD-10-CM

## 2023-01-19 ENCOUNTER — PATIENT MESSAGE (OUTPATIENT)
Dept: ADMINISTRATIVE | Facility: CLINIC | Age: 88
End: 2023-01-19
Payer: MEDICARE

## 2023-01-19 ENCOUNTER — TELEPHONE (OUTPATIENT)
Dept: ADMINISTRATIVE | Facility: CLINIC | Age: 88
End: 2023-01-19
Payer: MEDICARE

## 2023-01-19 DIAGNOSIS — B96.20 E. COLI UTI: Primary | ICD-10-CM

## 2023-01-19 DIAGNOSIS — N39.0 E. COLI UTI: Primary | ICD-10-CM

## 2023-01-19 RX ORDER — CIPROFLOXACIN 500 MG/1
500 TABLET ORAL 2 TIMES DAILY
Qty: 14 TABLET | Refills: 0 | Status: SHIPPED | OUTPATIENT
Start: 2023-01-19 | End: 2023-01-23

## 2023-01-19 NOTE — PROGRESS NOTES
Home health collected U/A as pt with increasing confusion and frequency. See results above. Med to pharmacy

## 2023-01-19 NOTE — TELEPHONE ENCOUNTER
Attempted to call patient's daughter regarding patient's results confirming UTI and medication orders. Unable to reach caregiver.  Left voicemail.

## 2023-01-23 DIAGNOSIS — B96.20 E. COLI UTI: Primary | ICD-10-CM

## 2023-01-23 DIAGNOSIS — N39.0 E. COLI UTI: Primary | ICD-10-CM

## 2023-01-23 RX ORDER — NITROFURANTOIN 25; 75 MG/1; MG/1
100 CAPSULE ORAL 2 TIMES DAILY
Qty: 14 CAPSULE | Refills: 0 | Status: SHIPPED | OUTPATIENT
Start: 2023-01-23 | End: 2023-01-30

## 2023-03-21 ENCOUNTER — HOSPITAL ENCOUNTER (INPATIENT)
Facility: HOSPITAL | Age: 88
LOS: 9 days | Discharge: HOSPICE | DRG: 291 | End: 2023-03-30
Attending: EMERGENCY MEDICINE | Admitting: INTERNAL MEDICINE
Payer: MEDICARE

## 2023-03-21 DIAGNOSIS — G93.40 ACUTE ENCEPHALOPATHY: Primary | ICD-10-CM

## 2023-03-21 DIAGNOSIS — I10 HYPERTENSION: ICD-10-CM

## 2023-03-21 DIAGNOSIS — J96.01 ACUTE RESPIRATORY FAILURE WITH HYPOXIA AND HYPERCAPNIA: ICD-10-CM

## 2023-03-21 DIAGNOSIS — J81.0 ACUTE PULMONARY EDEMA: ICD-10-CM

## 2023-03-21 DIAGNOSIS — I50.9 ACUTE DECOMPENSATED HEART FAILURE: ICD-10-CM

## 2023-03-21 DIAGNOSIS — I50.43 ACUTE ON CHRONIC COMBINED SYSTOLIC (CONGESTIVE) AND DIASTOLIC (CONGESTIVE) HEART FAILURE: ICD-10-CM

## 2023-03-21 DIAGNOSIS — E87.79 VOLUME OVERLOAD STATE OF HEART: ICD-10-CM

## 2023-03-21 DIAGNOSIS — I50.42 CHRONIC COMBINED SYSTOLIC AND DIASTOLIC HEART FAILURE: ICD-10-CM

## 2023-03-21 DIAGNOSIS — I49.9 ARRHYTHMIA: ICD-10-CM

## 2023-03-21 DIAGNOSIS — I24.9 ACS (ACUTE CORONARY SYNDROME): ICD-10-CM

## 2023-03-21 DIAGNOSIS — J96.02 ACUTE RESPIRATORY FAILURE WITH HYPOXIA AND HYPERCAPNIA: ICD-10-CM

## 2023-03-21 LAB
ALBUMIN SERPL BCP-MCNC: 3.3 G/DL (ref 3.5–5.2)
ALP SERPL-CCNC: 107 U/L (ref 55–135)
ALT SERPL W/O P-5'-P-CCNC: 25 U/L (ref 10–44)
ANION GAP SERPL CALC-SCNC: 11 MMOL/L (ref 8–16)
AST SERPL-CCNC: 39 U/L (ref 10–40)
BACTERIA #/AREA URNS HPF: ABNORMAL /HPF
BASOPHILS # BLD AUTO: 0.02 K/UL (ref 0–0.2)
BASOPHILS NFR BLD: 0.5 % (ref 0–1.9)
BILIRUB SERPL-MCNC: 0.9 MG/DL (ref 0.1–1)
BILIRUB UR QL STRIP: NEGATIVE
BNP SERPL-MCNC: 2929 PG/ML (ref 0–99)
BUN SERPL-MCNC: 51 MG/DL (ref 8–23)
CALCIUM SERPL-MCNC: 10 MG/DL (ref 8.7–10.5)
CHLORIDE SERPL-SCNC: 100 MMOL/L (ref 95–110)
CLARITY UR: CLEAR
CO2 SERPL-SCNC: 33 MMOL/L (ref 23–29)
COLOR UR: YELLOW
CREAT SERPL-MCNC: 0.9 MG/DL (ref 0.5–1.4)
DIFFERENTIAL METHOD: ABNORMAL
EOSINOPHIL # BLD AUTO: 0 K/UL (ref 0–0.5)
EOSINOPHIL NFR BLD: 0.5 % (ref 0–8)
ERYTHROCYTE [DISTWIDTH] IN BLOOD BY AUTOMATED COUNT: 19.4 % (ref 11.5–14.5)
EST. GFR  (NO RACE VARIABLE): >60 ML/MIN/1.73 M^2
GLUCOSE SERPL-MCNC: 110 MG/DL (ref 70–110)
GLUCOSE UR QL STRIP: NEGATIVE
HCT VFR BLD AUTO: 37.5 % (ref 37–48.5)
HGB BLD-MCNC: 10.7 G/DL (ref 12–16)
HGB UR QL STRIP: NEGATIVE
HYALINE CASTS #/AREA URNS LPF: 4 /LPF
IMM GRANULOCYTES # BLD AUTO: 0.03 K/UL (ref 0–0.04)
IMM GRANULOCYTES NFR BLD AUTO: 0.8 % (ref 0–0.5)
INFLUENZA A, MOLECULAR: NEGATIVE
INFLUENZA B, MOLECULAR: POSITIVE
KETONES UR QL STRIP: NEGATIVE
LACTATE SERPL-SCNC: 1.3 MMOL/L (ref 0.5–2.2)
LACTATE SERPL-SCNC: 1.5 MMOL/L (ref 0.5–2.2)
LEUKOCYTE ESTERASE UR QL STRIP: ABNORMAL
LYMPHOCYTES # BLD AUTO: 0.8 K/UL (ref 1–4.8)
LYMPHOCYTES NFR BLD: 21.1 % (ref 18–48)
MAGNESIUM SERPL-MCNC: 2.1 MG/DL (ref 1.6–2.6)
MCH RBC QN AUTO: 24.3 PG (ref 27–31)
MCHC RBC AUTO-ENTMCNC: 28.5 G/DL (ref 32–36)
MCV RBC AUTO: 85 FL (ref 82–98)
MICROSCOPIC COMMENT: ABNORMAL
MONOCYTES # BLD AUTO: 0.4 K/UL (ref 0.3–1)
MONOCYTES NFR BLD: 9 % (ref 4–15)
NEUTROPHILS # BLD AUTO: 2.6 K/UL (ref 1.8–7.7)
NEUTROPHILS NFR BLD: 68.1 % (ref 38–73)
NITRITE UR QL STRIP: NEGATIVE
NRBC BLD-RTO: 1 /100 WBC
PH UR STRIP: 5 [PH] (ref 5–8)
PLATELET # BLD AUTO: 166 K/UL (ref 150–450)
PMV BLD AUTO: 12.4 FL (ref 9.2–12.9)
POTASSIUM SERPL-SCNC: 4.5 MMOL/L (ref 3.5–5.1)
PROCALCITONIN SERPL IA-MCNC: 0.04 NG/ML
PROT SERPL-MCNC: 9.5 G/DL (ref 6–8.4)
PROT UR QL STRIP: ABNORMAL
RBC # BLD AUTO: 4.41 M/UL (ref 4–5.4)
RBC #/AREA URNS HPF: 2 /HPF (ref 0–4)
SODIUM SERPL-SCNC: 144 MMOL/L (ref 136–145)
SP GR UR STRIP: 1.01 (ref 1–1.03)
SPECIMEN SOURCE: ABNORMAL
SQUAMOUS #/AREA URNS HPF: 1 /HPF
TROPONIN I SERPL DL<=0.01 NG/ML-MCNC: 0.07 NG/ML (ref 0–0.03)
URN SPEC COLLECT METH UR: ABNORMAL
UROBILINOGEN UR STRIP-ACNC: NEGATIVE EU/DL
WBC # BLD AUTO: 3.88 K/UL (ref 3.9–12.7)
WBC #/AREA URNS HPF: 34 /HPF (ref 0–5)
WBC CLUMPS URNS QL MICRO: ABNORMAL

## 2023-03-21 PROCEDURE — P9612 CATHETERIZE FOR URINE SPEC: HCPCS | Mod: 59

## 2023-03-21 PROCEDURE — 96365 THER/PROPH/DIAG IV INF INIT: CPT

## 2023-03-21 PROCEDURE — 87040 BLOOD CULTURE FOR BACTERIA: CPT | Mod: 59 | Performed by: EMERGENCY MEDICINE

## 2023-03-21 PROCEDURE — 87077 CULTURE AEROBIC IDENTIFY: CPT | Performed by: EMERGENCY MEDICINE

## 2023-03-21 PROCEDURE — 87186 SC STD MICRODIL/AGAR DIL: CPT | Performed by: EMERGENCY MEDICINE

## 2023-03-21 PROCEDURE — 93010 ELECTROCARDIOGRAM REPORT: CPT | Mod: ,,, | Performed by: INTERNAL MEDICINE

## 2023-03-21 PROCEDURE — 83735 ASSAY OF MAGNESIUM: CPT | Performed by: EMERGENCY MEDICINE

## 2023-03-21 PROCEDURE — 81000 URINALYSIS NONAUTO W/SCOPE: CPT | Performed by: EMERGENCY MEDICINE

## 2023-03-21 PROCEDURE — 12000002 HC ACUTE/MED SURGE SEMI-PRIVATE ROOM

## 2023-03-21 PROCEDURE — 93010 EKG 12-LEAD: ICD-10-PCS | Mod: ,,, | Performed by: INTERNAL MEDICINE

## 2023-03-21 PROCEDURE — 84484 ASSAY OF TROPONIN QUANT: CPT | Performed by: EMERGENCY MEDICINE

## 2023-03-21 PROCEDURE — 25000003 PHARM REV CODE 250: Performed by: EMERGENCY MEDICINE

## 2023-03-21 PROCEDURE — 83880 ASSAY OF NATRIURETIC PEPTIDE: CPT | Performed by: EMERGENCY MEDICINE

## 2023-03-21 PROCEDURE — 63600175 PHARM REV CODE 636 W HCPCS: Performed by: EMERGENCY MEDICINE

## 2023-03-21 PROCEDURE — 83605 ASSAY OF LACTIC ACID: CPT | Mod: 91 | Performed by: EMERGENCY MEDICINE

## 2023-03-21 PROCEDURE — 96367 TX/PROPH/DG ADDL SEQ IV INF: CPT

## 2023-03-21 PROCEDURE — 84145 PROCALCITONIN (PCT): CPT | Performed by: EMERGENCY MEDICINE

## 2023-03-21 PROCEDURE — 93005 ELECTROCARDIOGRAM TRACING: CPT

## 2023-03-21 PROCEDURE — 87086 URINE CULTURE/COLONY COUNT: CPT | Performed by: EMERGENCY MEDICINE

## 2023-03-21 PROCEDURE — 99291 CRITICAL CARE FIRST HOUR: CPT

## 2023-03-21 PROCEDURE — 85025 COMPLETE CBC W/AUTO DIFF WBC: CPT | Performed by: EMERGENCY MEDICINE

## 2023-03-21 PROCEDURE — 80053 COMPREHEN METABOLIC PANEL: CPT | Performed by: EMERGENCY MEDICINE

## 2023-03-21 PROCEDURE — 96375 TX/PRO/DX INJ NEW DRUG ADDON: CPT

## 2023-03-21 PROCEDURE — 87088 URINE BACTERIA CULTURE: CPT | Performed by: EMERGENCY MEDICINE

## 2023-03-21 PROCEDURE — 87502 INFLUENZA DNA AMP PROBE: CPT | Performed by: EMERGENCY MEDICINE

## 2023-03-21 RX ORDER — FUROSEMIDE 10 MG/ML
40 INJECTION INTRAMUSCULAR; INTRAVENOUS
Status: COMPLETED | OUTPATIENT
Start: 2023-03-21 | End: 2023-03-21

## 2023-03-21 RX ORDER — LEVOTHYROXINE SODIUM 125 UG/1
125 TABLET ORAL
Status: DISCONTINUED | OUTPATIENT
Start: 2023-03-22 | End: 2023-03-26

## 2023-03-21 RX ORDER — PREDNISOLONE ACETATE 10 MG/ML
1 SUSPENSION/ DROPS OPHTHALMIC 4 TIMES DAILY
COMMUNITY

## 2023-03-21 RX ORDER — ATROPINE SULFATE 10 MG/ML
1 SOLUTION/ DROPS OPHTHALMIC 2 TIMES DAILY
Status: DISCONTINUED | OUTPATIENT
Start: 2023-03-22 | End: 2023-03-26

## 2023-03-21 RX ORDER — PREDNISOLONE ACETATE 10 MG/ML
1 SUSPENSION/ DROPS OPHTHALMIC 4 TIMES DAILY
Status: DISCONTINUED | OUTPATIENT
Start: 2023-03-22 | End: 2023-03-26

## 2023-03-21 RX ORDER — SODIUM CHLORIDE 0.9 % (FLUSH) 0.9 %
10 SYRINGE (ML) INJECTION
Status: DISCONTINUED | OUTPATIENT
Start: 2023-03-22 | End: 2023-03-30 | Stop reason: HOSPADM

## 2023-03-21 RX ORDER — FUROSEMIDE 10 MG/ML
80 INJECTION INTRAMUSCULAR; INTRAVENOUS
Status: COMPLETED | OUTPATIENT
Start: 2023-03-21 | End: 2023-03-21

## 2023-03-21 RX ORDER — BUSPIRONE HYDROCHLORIDE 5 MG/1
5 TABLET ORAL 2 TIMES DAILY
Status: DISCONTINUED | OUTPATIENT
Start: 2023-03-22 | End: 2023-03-30 | Stop reason: HOSPADM

## 2023-03-21 RX ORDER — TIMOLOL MALEATE 5 MG/ML
1 SOLUTION/ DROPS OPHTHALMIC 2 TIMES DAILY
Status: DISCONTINUED | OUTPATIENT
Start: 2023-03-22 | End: 2023-03-26

## 2023-03-21 RX ORDER — LORAZEPAM 0.5 MG/1
0.5 TABLET ORAL NIGHTLY
Status: ON HOLD | COMMUNITY
End: 2023-03-30 | Stop reason: HOSPADM

## 2023-03-21 RX ORDER — BRIMONIDINE TARTRATE 2 MG/ML
1 SOLUTION/ DROPS OPHTHALMIC 2 TIMES DAILY
Status: DISCONTINUED | OUTPATIENT
Start: 2023-03-22 | End: 2023-03-26

## 2023-03-21 RX ORDER — CARVEDILOL 3.12 MG/1
3.12 TABLET ORAL 2 TIMES DAILY WITH MEALS
Status: DISCONTINUED | OUTPATIENT
Start: 2023-03-22 | End: 2023-03-26

## 2023-03-21 RX ORDER — CLOPIDOGREL BISULFATE 75 MG/1
75 TABLET ORAL DAILY
Status: DISCONTINUED | OUTPATIENT
Start: 2023-03-22 | End: 2023-03-22

## 2023-03-21 RX ORDER — ATORVASTATIN CALCIUM 20 MG/1
20 TABLET, FILM COATED ORAL DAILY
Status: DISCONTINUED | OUTPATIENT
Start: 2023-03-22 | End: 2023-03-26

## 2023-03-21 RX ORDER — LISINOPRIL 20 MG/1
20 TABLET ORAL DAILY
Status: DISCONTINUED | OUTPATIENT
Start: 2023-03-22 | End: 2023-03-23

## 2023-03-21 RX ADMIN — FUROSEMIDE 80 MG: 10 INJECTION, SOLUTION INTRAMUSCULAR; INTRAVENOUS at 09:03

## 2023-03-21 RX ADMIN — FUROSEMIDE 40 MG: 10 INJECTION, SOLUTION INTRAMUSCULAR; INTRAVENOUS at 11:03

## 2023-03-21 RX ADMIN — AZITHROMYCIN MONOHYDRATE 500 MG: 500 INJECTION, POWDER, LYOPHILIZED, FOR SOLUTION INTRAVENOUS at 09:03

## 2023-03-21 RX ADMIN — NITROGLYCERIN 1 INCH: 20 OINTMENT TOPICAL at 09:03

## 2023-03-21 RX ADMIN — CEFTRIAXONE 1 G: 1 INJECTION, POWDER, FOR SOLUTION INTRAMUSCULAR; INTRAVENOUS at 09:03

## 2023-03-21 NOTE — Clinical Note
Diagnosis: Acute decompensated heart failure [[id number]]   Future Attending Provider: GAURI KIM [[id number]]   Admitting Provider:: GAURI KIM [[id number]]

## 2023-03-22 PROBLEM — J96.02 ACUTE RESPIRATORY FAILURE WITH HYPOXIA AND HYPERCAPNIA: Status: ACTIVE | Noted: 2022-07-20

## 2023-03-22 LAB
ALLENS TEST: ABNORMAL
AV INDEX (PROSTH): 0.94
AV MEAN GRADIENT: 3 MMHG
AV PEAK GRADIENT: 4 MMHG
AV VALVE AREA: 2.21 CM2
AV VELOCITY RATIO: 0.73
BSA FOR ECHO PROCEDURE: 1.76 M2
CV ECHO LV RWT: 0.58 CM
DELSYS: ABNORMAL
DOP CALC AO PEAK VEL: 1.01 M/S
DOP CALC AO VTI: 21.4 CM
DOP CALC LVOT AREA: 2.3 CM2
DOP CALC LVOT DIAMETER: 1.73 CM
DOP CALC LVOT PEAK VEL: 0.74 M/S
DOP CALC LVOT STROKE VOLUME: 47.22 CM3
DOP CALC MV VTI: 62.6 CM
DOP CALCLVOT PEAK VEL VTI: 20.1 CM
E WAVE DECELERATION TIME: 349.46 MSEC
E/A RATIO: 1.05
E/E' RATIO: 97 M/S
ECHO LV POSTERIOR WALL: 1.01 CM (ref 0.6–1.1)
EJECTION FRACTION: 60 %
ERYTHROCYTE [SEDIMENTATION RATE] IN BLOOD BY WESTERGREN METHOD: 20 MM/H
FIO2: 32
FLOW: 2
FRACTIONAL SHORTENING: 32 % (ref 28–44)
HCO3 UR-SCNC: 39.4 MMOL/L (ref 24–28)
HR MV ECHO: 60 BPM
INTERVENTRICULAR SEPTUM: 0.97 CM (ref 0.6–1.1)
IVC DIAMETER: 2.54 CM
LA MAJOR: 5.33 CM
LA MINOR: 6.13 CM
LA WIDTH: 3.8 CM
LEFT ATRIUM SIZE: 4.45 CM
LEFT ATRIUM VOLUME INDEX MOD: 27.3 ML/M2
LEFT ATRIUM VOLUME INDEX: 47.7 ML/M2
LEFT ATRIUM VOLUME MOD: 46.99 CM3
LEFT ATRIUM VOLUME: 81.96 CM3
LEFT INTERNAL DIMENSION IN SYSTOLE: 2.34 CM (ref 2.1–4)
LEFT VENTRICLE DIASTOLIC VOLUME INDEX: 28.7 ML/M2
LEFT VENTRICLE DIASTOLIC VOLUME: 49.36 ML
LEFT VENTRICLE MASS INDEX: 58 G/M2
LEFT VENTRICLE SYSTOLIC VOLUME INDEX: 11 ML/M2
LEFT VENTRICLE SYSTOLIC VOLUME: 18.9 ML
LEFT VENTRICULAR INTERNAL DIMENSION IN DIASTOLE: 3.46 CM (ref 3.5–6)
LEFT VENTRICULAR MASS: 100.08 G
LV LATERAL E/E' RATIO: 97 M/S
LV SEPTAL E/E' RATIO: 97 M/S
LVOT MG: 1.34 MMHG
LVOT MV: 0.54 CM/S
MODE: ABNORMAL
MV A" WAVE DURATION": 191.25 MSEC
MV MEAN GRADIENT: 8 MMHG
MV PEAK A VEL: 1.85 M/S
MV PEAK E VEL: 1.94 M/S
MV PEAK GRADIENT: 26 MMHG
MV STENOSIS PRESSURE HALF TIME: 101.34 MS
MV VALVE AREA BY CONTINUITY EQUATION: 0.75 CM2
MV VALVE AREA P 1/2 METHOD: 2.17 CM2
PCO2 BLDA: 63.5 MMHG (ref 35–45)
PH SMN: 7.4 [PH] (ref 7.35–7.45)
PISA TR MAX VEL: 3.58 M/S
PO2 BLDA: 74 MMHG (ref 80–100)
POC BE: 15 MMOL/L
POC SATURATED O2: 94 % (ref 95–100)
POC TCO2: 41 MMOL/L (ref 23–27)
POCT GLUCOSE: 151 MG/DL (ref 70–110)
RA MAJOR: 4.4 CM
RA PRESSURE: 15 MMHG
RA WIDTH: 2.5 CM
SAMPLE: ABNORMAL
SITE: ABNORMAL
SP02: 93
TDI LATERAL: 0.02 M/S
TDI SEPTAL: 0.02 M/S
TDI: 0.02 M/S
TR MAX PG: 51 MMHG
TV REST PULMONARY ARTERY PRESSURE: 66 MMHG

## 2023-03-22 PROCEDURE — 99900035 HC TECH TIME PER 15 MIN (STAT)

## 2023-03-22 PROCEDURE — 94761 N-INVAS EAR/PLS OXIMETRY MLT: CPT

## 2023-03-22 PROCEDURE — 51702 INSERT TEMP BLADDER CATH: CPT

## 2023-03-22 PROCEDURE — 27000190 HC CPAP FULL FACE MASK W/VALVE

## 2023-03-22 PROCEDURE — 25000003 PHARM REV CODE 250: Performed by: STUDENT IN AN ORGANIZED HEALTH CARE EDUCATION/TRAINING PROGRAM

## 2023-03-22 PROCEDURE — 27000221 HC OXYGEN, UP TO 24 HOURS

## 2023-03-22 PROCEDURE — 51798 US URINE CAPACITY MEASURE: CPT

## 2023-03-22 PROCEDURE — 82803 BLOOD GASES ANY COMBINATION: CPT

## 2023-03-22 PROCEDURE — 94660 CPAP INITIATION&MGMT: CPT

## 2023-03-22 PROCEDURE — 63600175 PHARM REV CODE 636 W HCPCS: Performed by: INTERNAL MEDICINE

## 2023-03-22 PROCEDURE — 36600 WITHDRAWAL OF ARTERIAL BLOOD: CPT

## 2023-03-22 PROCEDURE — 63600175 PHARM REV CODE 636 W HCPCS: Performed by: STUDENT IN AN ORGANIZED HEALTH CARE EDUCATION/TRAINING PROGRAM

## 2023-03-22 PROCEDURE — 20000000 HC ICU ROOM

## 2023-03-22 PROCEDURE — 25000003 PHARM REV CODE 250: Performed by: INTERNAL MEDICINE

## 2023-03-22 PROCEDURE — 27000207 HC ISOLATION

## 2023-03-22 RX ORDER — OSELTAMIVIR PHOSPHATE 30 MG/1
30 CAPSULE ORAL 2 TIMES DAILY
Status: DISCONTINUED | OUTPATIENT
Start: 2023-03-22 | End: 2023-03-22

## 2023-03-22 RX ORDER — MUPIROCIN 20 MG/G
OINTMENT TOPICAL 2 TIMES DAILY
Status: DISCONTINUED | OUTPATIENT
Start: 2023-03-22 | End: 2023-03-26

## 2023-03-22 RX ORDER — SODIUM CHLORIDE 9 MG/ML
INJECTION, SOLUTION INTRAVENOUS CONTINUOUS PRN
Status: DISCONTINUED | OUTPATIENT
Start: 2023-03-22 | End: 2023-03-30 | Stop reason: HOSPADM

## 2023-03-22 RX ORDER — OSELTAMIVIR PHOSPHATE 30 MG/1
30 CAPSULE ORAL 2 TIMES DAILY
Status: DISCONTINUED | OUTPATIENT
Start: 2023-03-22 | End: 2023-03-26

## 2023-03-22 RX ORDER — OSELTAMIVIR PHOSPHATE 30 MG/1
30 CAPSULE ORAL DAILY
Status: DISCONTINUED | OUTPATIENT
Start: 2023-03-22 | End: 2023-03-22

## 2023-03-22 RX ORDER — LABETALOL HYDROCHLORIDE 5 MG/ML
10 INJECTION, SOLUTION INTRAVENOUS EVERY 4 HOURS PRN
Status: DISCONTINUED | OUTPATIENT
Start: 2023-03-22 | End: 2023-03-26

## 2023-03-22 RX ORDER — VANCOMYCIN HCL IN 5 % DEXTROSE 1G/250ML
1000 PLASTIC BAG, INJECTION (ML) INTRAVENOUS
Status: DISCONTINUED | OUTPATIENT
Start: 2023-03-23 | End: 2023-03-23

## 2023-03-22 RX ADMIN — SODIUM CHLORIDE 200 ML: 9 INJECTION, SOLUTION INTRAVENOUS at 08:03

## 2023-03-22 RX ADMIN — PREDNISOLONE ACETATE 1 DROP: 10 SUSPENSION/ DROPS OPHTHALMIC at 12:03

## 2023-03-22 RX ADMIN — FUROSEMIDE 4 MG/HR: 10 INJECTION, SOLUTION INTRAMUSCULAR; INTRAVENOUS at 07:03

## 2023-03-22 RX ADMIN — LABETALOL HYDROCHLORIDE 10 MG: 5 INJECTION INTRAVENOUS at 09:03

## 2023-03-22 RX ADMIN — FUROSEMIDE 4 MG/HR: 10 INJECTION, SOLUTION INTRAMUSCULAR; INTRAVENOUS at 09:03

## 2023-03-22 RX ADMIN — VANCOMYCIN HYDROCHLORIDE 2000 MG: 500 INJECTION, POWDER, LYOPHILIZED, FOR SOLUTION INTRAVENOUS at 03:03

## 2023-03-22 RX ADMIN — BRIMONIDINE TARTRATE 1 DROP: 2 SOLUTION OPHTHALMIC at 09:03

## 2023-03-22 RX ADMIN — CEFTRIAXONE 1 G: 1 INJECTION, POWDER, FOR SOLUTION INTRAMUSCULAR; INTRAVENOUS at 08:03

## 2023-03-22 RX ADMIN — PREDNISOLONE ACETATE 1 DROP: 10 SUSPENSION/ DROPS OPHTHALMIC at 08:03

## 2023-03-22 RX ADMIN — TIMOLOL MALEATE 1 DROP: 5 SOLUTION/ DROPS OPHTHALMIC at 08:03

## 2023-03-22 RX ADMIN — APIXABAN 2.5 MG: 2.5 TABLET, FILM COATED ORAL at 08:03

## 2023-03-22 RX ADMIN — BUSPIRONE HYDROCHLORIDE 5 MG: 5 TABLET ORAL at 12:03

## 2023-03-22 RX ADMIN — BUSPIRONE HYDROCHLORIDE 5 MG: 5 TABLET ORAL at 08:03

## 2023-03-22 RX ADMIN — ATROPINE SULFATE 1 DROP: 10 SOLUTION/ DROPS OPHTHALMIC at 09:03

## 2023-03-22 RX ADMIN — FUROSEMIDE 160 MG: 10 INJECTION, SOLUTION INTRAMUSCULAR; INTRAVENOUS at 05:03

## 2023-03-22 RX ADMIN — PREDNISOLONE ACETATE 1 DROP: 10 SUSPENSION/ DROPS OPHTHALMIC at 04:03

## 2023-03-22 RX ADMIN — OSELTAMIVIR PHOSPHATE 30 MG: 30 CAPSULE ORAL at 10:03

## 2023-03-22 RX ADMIN — LISINOPRIL 20 MG: 20 TABLET ORAL at 12:03

## 2023-03-22 RX ADMIN — ATROPINE SULFATE 1 DROP: 10 SOLUTION/ DROPS OPHTHALMIC at 08:03

## 2023-03-22 RX ADMIN — BRIMONIDINE TARTRATE 1 DROP: 2 SOLUTION OPHTHALMIC at 08:03

## 2023-03-22 RX ADMIN — MUPIROCIN: 20 OINTMENT TOPICAL at 10:03

## 2023-03-22 RX ADMIN — CARVEDILOL 3.12 MG: 3.12 TABLET, FILM COATED ORAL at 04:03

## 2023-03-22 RX ADMIN — TIMOLOL MALEATE 1 DROP: 5 SOLUTION/ DROPS OPHTHALMIC at 09:03

## 2023-03-22 RX ADMIN — ATORVASTATIN CALCIUM 20 MG: 20 TABLET, FILM COATED ORAL at 10:03

## 2023-03-22 NOTE — NURSING
Received report from FARAZ Cruz in ICU. Pt transferred to room 559, arrived via stretcher, RT and nurse at the bedside, on Bipap. Pt not able to participate in assessment, unable to follow commands. Lasix drip brought from ED at 4mg/hr with titrating orders for goal of 125ml/hr output. Purwick in place.   Attempted to have pt do a swallow test at bedside, pt not following commands and unable to take sip of water at this time, ng tube placed and verified by xray.

## 2023-03-22 NOTE — ED PROVIDER NOTES
Encounter Date: 3/21/2023       History     Chief Complaint   Patient presents with    Altered Mental Status     Pt arrived via  [facility] form home, family states pt has foul smelling urine, and increased confused, pt has left arm edema. Pt is o2 dependent      Pilar Michael is a 89 y.o. female who  has a past medical history of Anticoagulant long-term use, Arthritis, Breast cancer, Cancer, CHF (congestive heart failure), Coronary artery disease, Glaucoma, right eye, Hypertension, Pulmonary emboli, Stroke, Thyroid cancer, and Thyroid disease.    The patient presents to the ED due to to mental status for the past 3 days.  Patient has been having increased oxygen demand during this time she oxygen normally 2L at baseline  however she is been using 3 L lately.  Patient's primary care physician saw her at home felt like she was having fluid in the lungs.  Patient also has been having a UTI and per daughter completed antibiotics last week.  She thinks this may be contributing to her AMS. Normally she is alert but has been more lethargic.  No recent falls, she normally sleeps in a recliner at home.     The history is provided by the patient and a relative.   Review of patient's allergies indicates:   Allergen Reactions    Penicillins Swelling    Sulfa (sulfonamide antibiotics) Nausea Only     Past Medical History:   Diagnosis Date    Anticoagulant long-term use     Arthritis     Breast cancer     radiation 2003    Cancer     CHF (congestive heart failure)     Coronary artery disease     Glaucoma, right eye     Hypertension     Pulmonary emboli     Stroke     Thyroid cancer     Thyroid disease      Past Surgical History:   Procedure Laterality Date    CARDIAC CATHETERIZATION      CARDIAC VALVE SURGERY      CHOLECYSTECTOMY      CORONARY ANGIOGRAPHY N/A 04/23/2019    Procedure: ANGIOGRAM, CORONARY ARTERY;  Surgeon: Bakari Singletary MD;  Location: [facility] CATH LAB;  Service: Cardiology;  Laterality: N/A;     HYSTERECTOMY      lymph nodes removal      MASTECTOMY  01/2022    THYROIDECTOMY  1992    TRANSCATHETER AORTIC VALVE REPLACEMENT (TAVR) N/A 05/07/2019    Procedure: REPLACEMENT, AORTIC VALVE, TRANSCATHETER (TAVR);  Surgeon: Bakari Singletary MD;  Location: [facility] CATH LAB;  Service: Cardiology;  Laterality: N/A;     Family History   Problem Relation Age of Onset    Kidney disease Father     Cancer Sister     ALS Sister      Social History     Tobacco Use    Smoking status: Never    Smokeless tobacco: Never   Substance Use Topics    Alcohol use: Not Currently    Drug use: Never     Review of Systems   Constitutional:  Negative for chills and fever.   HENT:  Negative for sore throat.    Respiratory:  Positive for cough and shortness of breath.    Cardiovascular:  Negative for chest pain.   Gastrointestinal:  Negative for nausea and vomiting.   Genitourinary:  Positive for frequency. Negative for dysuria and urgency.   Musculoskeletal:  Negative for back pain, neck pain and neck stiffness.   Skin:  Negative for rash and wound.   Neurological:  Negative for syncope and weakness.   Hematological:  Does not bruise/bleed easily.   Psychiatric/Behavioral:  Positive for confusion. Negative for agitation and behavioral problems.      Physical Exam     Initial Vitals [03/21/23 1741]   BP Pulse Resp Temp SpO2   (!) 193/86 60 (!) 24 99.7 °F (37.6 °C) (!) 94 %      MAP       --         Physical Exam    Constitutional: No distress.   HENT:   Head: Normocephalic and atraumatic.   Eyes: Conjunctivae are normal.   Cardiovascular:  Normal rate and intact distal pulses.           Pulmonary/Chest: No respiratory distress. She has rales.   Scars from previous masectomy    Abdominal: She exhibits no distension. There is no abdominal tenderness. There is no rebound and no guarding.   Musculoskeletal:         General: Edema present.     Neurological: No cranial nerve deficit.   Opens eyes to voice, answers questions, follows basic commands    Skin: Skin is warm and dry.   Psychiatric: She has a normal mood and affect.       ED Course   Procedures  Labs Reviewed   INFLUENZA A & B BY MOLECULAR - Abnormal; Notable for the following components:       Result Value    Influenza B, Molecular Positive (*)     All other components within normal limits   CBC W/ AUTO DIFFERENTIAL - Abnormal; Notable for the following components:    WBC 3.88 (*)     Hemoglobin 10.7 (*)     MCH 24.3 (*)     MCHC 28.5 (*)     RDW 19.4 (*)     Immature Granulocytes 0.8 (*)     Lymph # 0.8 (*)     nRBC 1 (*)     All other components within normal limits   COMPREHENSIVE METABOLIC PANEL - Abnormal; Notable for the following components:    CO2 33 (*)     BUN 51 (*)     Total Protein 9.5 (*)     Albumin 3.3 (*)     All other components within normal limits   URINALYSIS, REFLEX TO URINE CULTURE - Abnormal; Notable for the following components:    Protein, UA Trace (*)     Leukocytes, UA 2+ (*)     All other components within normal limits    Narrative:     Specimen Source->Urine   TROPONIN I - Abnormal; Notable for the following components:    Troponin I 0.074 (*)     All other components within normal limits   B-TYPE NATRIURETIC PEPTIDE - Abnormal; Notable for the following components:    BNP 2,929 (*)     All other components within normal limits   URINALYSIS MICROSCOPIC - Abnormal; Notable for the following components:    WBC, UA 34 (*)     Bacteria Many (*)     Hyaline Casts, UA 4 (*)     All other components within normal limits    Narrative:     Specimen Source->Urine   ISTAT PROCEDURE - Abnormal; Notable for the following components:    POC PCO2 63.5 (*)     POC PO2 74 (*)     POC HCO3 39.4 (*)     POC SATURATED O2 94 (*)     POC TCO2 41 (*)     All other components within normal limits   POCT GLUCOSE - Abnormal; Notable for the following components:    POCT Glucose 151 (*)     All other components within normal limits   CULTURE, BLOOD    Narrative:     Aerobic and anaerobic    CULTURE, BLOOD    Narrative:     Aerobic and anaerobic   CULTURE, URINE   LACTIC ACID, PLASMA   PROCALCITONIN   MAGNESIUM   LACTIC ACID, PLASMA          Imaging Results              CT Head Without Contrast (Final result)  Result time 03/21/23 21:27:49      Final result by Jose Gunderson DO (03/21/23 21:27:49)                   Impression:      No acute intracranial abnormality.    Remote left PCA territory infarction.    Mild chronic microvascular ischemic changes.      Electronically signed by: Jose Gunderson  Date:    03/21/2023  Time:    21:27               Narrative:    EXAMINATION:  CT HEAD WITHOUT CONTRAST    CLINICAL HISTORY:  Mental status change, unknown cause;    TECHNIQUE:  Low dose axial CT images obtained throughout the head without intravenous contrast. Sagittal and coronal reconstructions were performed.    COMPARISON:  None available.    FINDINGS:  There is age-related brain parenchymal volume loss and prominence of the CSF spaces.  No extra-axial blood or fluid collections.  There is encephalomalacia in the left medial occipital lobe and posteromedial temporal lobe compatible with a remote PCA territory infarction.  Mild chronic microvascular ischemic changes noted.  No parenchymal mass, hemorrhage, edema or major vascular distribution infarct.    No calvarial fracture.  The scalp is unremarkable.  Bilateral paranasal sinuses and mastoid air cells are clear.  There are bilateral prosthetic lenses.                                       X-Ray Chest AP Portable (Final result)  Result time 03/21/23 19:59:54      Final result by Jose Gunderson DO (03/21/23 19:59:54)                   Impression:      Pulmonary edema and bilateral effusions as above.      Electronically signed by: Jose Gunderson  Date:    03/21/2023  Time:    19:59               Narrative:    EXAMINATION:  XR CHEST AP PORTABLE    CLINICAL HISTORY:  Sepsis;    TECHNIQUE:  Single frontal view of the chest was  performed.    COMPARISON:  11/27/2022.    FINDINGS:  There is a cardiac pacer, unchanged in position.  There is an aortic valve stent.  There are moderate left and small right pleural effusion.  There are perihilar interstitial opacities with pulmonary vascular congestion compatible with pulmonary edema.  The extent of pulmonary edema is similar to prior.  Difficult to entirely exclude an overlying infectious process.  There is no pneumothorax.  The cardiac silhouette is partially obscured but appears enlarged.  There are atherosclerotic calcifications of the thoracic aorta.  The visualized osseous structures demonstrate degenerative changes.                                       Medications   atorvastatin tablet 20 mg (has no administration in time range)   atropine 1% ophthalmic solution 1 drop (1 drop Right Eye Not Given 3/22/23 0000)   brimonidine 0.2% ophthalmic solution 1 drop (1 drop Both Eyes Not Given 3/22/23 0000)   busPIRone tablet 5 mg (5 mg Oral Not Given 3/22/23 0100)   carvediloL tablet 3.125 mg (has no administration in time range)   lisinopriL tablet 20 mg (has no administration in time range)   prednisoLONE acetate 1 % ophthalmic suspension 1 drop (1 drop Right Eye Not Given 3/22/23 0000)   levothyroxine tablet 125 mcg (125 mcg Oral Not Given 3/22/23 0600)   timolol maleate 0.5% ophthalmic solution 1 drop (1 drop Both Eyes Not Given 3/22/23 0100)   sodium chloride 0.9% flush 10 mL (has no administration in time range)   vancomycin - pharmacy to dose (has no administration in time range)   vancomycin in dextrose 5 % 1 gram/250 mL IVPB 1,000 mg (has no administration in time range)   oseltamivir capsule 30 mg (has no administration in time range)   azithromycin (ZITHROMAX) 500 mg in dextrose 5 % (D5W) 250 mL IVPB (Vial-Mate) (has no administration in time range)   cefTRIAXone (ROCEPHIN) 1 g in dextrose 5 % in water (D5W) 5 % 50 mL IVPB (MB+) (has no administration in time range)   furosemide (LASIX)  200 mg in dextrose 5 % (D5W) SolP 100 mL continuous infusion (conc: 2 mg/mL) (4 mg/hr Intravenous New Bag 3/22/23 0706)   cefTRIAXone (ROCEPHIN) 1 g in dextrose 5 % in water (D5W) 5 % 50 mL IVPB (MB+) (0 g Intravenous Stopped 3/21/23 2137)   azithromycin (ZITHROMAX) 500 mg in dextrose 5 % (D5W) 250 mL IVPB (Vial-Mate) (0 mg Intravenous Stopped 3/21/23 2252)   furosemide injection 80 mg (80 mg Intravenous Given 3/21/23 2102)   nitroGLYCERIN 2% TD oint ointment 1 inch (1 inch Topical (Top) Given 3/21/23 2103)   furosemide injection 40 mg (40 mg Intravenous Given 3/21/23 2310)   vancomycin 2 g in dextrose 5 % 500 mL IVPB (2,000 mg Intravenous New Bag 3/22/23 0310)   furosemide (LASIX) 160 mg in dextrose 5 % (D5W) 50 mL IVPB (160 mg Intravenous New Bag 3/22/23 0543)     Medical Decision Making:   Differential Diagnosis:   Differential Diagnosis includes, but is not limited to:  CVA/TIA, seizure, status epilepticus, post-ictal state, meningitis/encephalitis, sepsis, MI/ACS, arrhythmia, syncope, intracranial mass/hemorrhage, head trauma, anaphylaxis, substance abuse, alcohol intoxication/withdrawal, medication reaction, intentional medication overdose, neuroleptic malignant syndrome, serotonin syndrome, CO poisoning, hypoxia/hypercapnea, hepatic encephalopathy, metabolic disturbance, thyroid disease, hypoglycemia.    ED Management:  Patient is a 89-year-old man presented today with increased lethargy and oxygen demand.  History given her IV furosemide for diuresis, empiric treatment for cap.  CT imaging of her head demonstrates no acute findings.  She will be admitted to [facility] for further management.          Attending Attestation:         Attending Critical Care:   Critical Care Times:   ==============================================================  Total Critical Care Time - exclusive of procedural time: 30 minutes.  ==============================================================  Critical care was  necessary to treat or prevent imminent or life-threatening deterioration of the following conditions: congestive heart failure.         ED Course as of 03/22/23 0813   Tue Mar 21, 2023   2001 X-Ray Chest AP Portable  Pulmonary edema, left-sided pleural effusion [RN]   2002 ECG Rate 60.  Paced rhythm.  Negative STEMI criteria. [RN]   2035 Influenza A & B by Molecular(!)  Positive for influenza   [RN]   2049 Brain natriuretic peptide(!)  Elevated BNP will give laisx  [RN]   2050 Troponin I(!)  elevated [RN]   2050 Procalcitonin  Negative less likely sepsis  [RN]   2050 Comprehensive metabolic panel(!)  Elevted BUn  [RN]   2050 CBC auto differential(!)  Leukopenia  [RN]   2050 Lactic acid, plasma #1  No significant abnormality.    [RN]   2050 Magnesium  No significant abnormality.    [RN]      ED Course User Index  [RN] Darryl Hassan Jr., MD                   Clinical Impression:   Final diagnoses:  [I10] Hypertension  [I50.9] Acute decompensated heart failure        ED Disposition Condition    Admit           Portions of this note were dictated using voice recognition software and may contain dictation related errors in spelling/grammar/syntax not found on text review          Darryl Hassan Jr., MD  03/22/23 2155       Darryl Hassan Jr., MD  03/22/23 2157

## 2023-03-22 NOTE — PROGRESS NOTES
Pharmacokinetic Initial Assessment: IV Vancomycin    Assessment/Plan:    Initiate intravenous vancomycin with loading dose of 2000 mg once followed by a maintenance dose of vancomycin 1000 mg IV every 24 hours  Desired empiric serum trough concentration is 15 to 20 mcg/mL  Draw vancomycin trough level 60 min prior to third dose on 03/24/23 at approximately 0200  Pharmacy will continue to follow and monitor vancomycin.      Please contact pharmacy at extension [phone] with any questions regarding this assessment.     Thank you for the consult,   Nasir Sawant       Patient brief summary:  Pilar Michael is a 89 y.o. female initiated on antimicrobial therapy with IV Vancomycin for treatment of suspected  Pneumonia    Drug Allergies:   Review of patient's allergies indicates:   Allergen Reactions    Penicillins Swelling    Sulfa (sulfonamide antibiotics) Nausea Only       Actual Body Weight:   76.2 kg    Renal Function:   Estimated Creatinine Clearance: 39.7 mL/min (based on SCr of 0.9 mg/dL).,     Dialysis Method (if applicable):  N/A    CBC (last 72 hours):  Recent Labs   Lab Result Units 03/21/23 1942   WBC K/uL 3.88*   Hemoglobin g/dL 10.7*   Hematocrit % 37.5   Platelets K/uL 166   Gran % % 68.1   Lymph % % 21.1   Mono % % 9.0   Eosinophil % % 0.5   Basophil % % 0.5   Differential Method  Automated       Metabolic Panel (last 72 hours):  Recent Labs   Lab Result Units 03/21/23 1942 03/21/23 1958   Sodium mmol/L 144  --    Potassium mmol/L 4.5  --    Chloride mmol/L 100  --    CO2 mmol/L 33*  --    Glucose mg/dL 110  --    Glucose, UA   --  Negative   BUN mg/dL 51*  --    Creatinine mg/dL 0.9  --    Albumin g/dL 3.3*  --    Total Bilirubin mg/dL 0.9  --    Alkaline Phosphatase U/L 107  --    AST U/L 39  --    ALT U/L 25  --    Magnesium mg/dL 2.1  --        Drug levels (last 3 results):  No results for input(s): VANCOMYCINRA, VANCORANDOM, VANCOMYCINPE, VANCOPEAK, VANCOMYCINTR, VANCOTROUGH in the last 72  hours.    Microbiologic Results:  Microbiology Results (last 7 days)       Procedure Component Value Units Date/Time    Blood culture x two cultures. Draw prior to antibiotics. [[id number]] Collected: 03/21/23 1942    Order Status: Sent Specimen: Blood from Peripheral, Antecubital, Right Updated: 03/21/23 2324    Blood culture x two cultures. Draw prior to antibiotics. [[id number]] Collected: 03/21/23 1943    Order Status: Sent Specimen: Blood from Peripheral, Hand, Right Updated: 03/21/23 2324    Urine culture [[id number]] Collected: 03/21/23 1958    Order Status: No result Specimen: Urine Updated: 03/21/23 2051    Influenza A & B by Molecular [[id number]]  (Abnormal) Collected: 03/21/23 1926    Order Status: Completed Specimen: Nasopharyngeal Swab Updated: 03/21/23 2021     Influenza A, Molecular Negative     Influenza B, Molecular Positive     Flu A & B Source Nasal swab

## 2023-03-22 NOTE — PROGRESS NOTES
Pharmacist Renal Dose Adjustment Note    Pilar Michael is a 89 y.o. female being treated with the medication Oseltamivir    Patient Data:    Vital Signs (Most Recent):  Temp: 98.3 °F (36.8 °C) (03/22/23 0358)  Pulse: 75 (03/22/23 0358)  Resp: 16 (03/22/23 0358)  BP: (!) 163/75 (03/22/23 0358)  SpO2: 96 % (03/22/23 0358)   Vital Signs (72h Range):  Temp:  [97.8 °F (36.6 °C)-99.7 °F (37.6 °C)]   Pulse:  [60-75]   Resp:  [13-35]   BP: (140-214)/(70-98)   SpO2:  [92 %-96 %]      Recent Labs   Lab 03/21/23 1942   CREATININE 0.9     Serum creatinine: 0.9 mg/dL 03/21/23 1942  Estimated creatinine clearance: 39.7 mL/min    Medication:Oseltamivir dose: 75 mg frequency daily will be changed to medication:Oseltamivir dose:30 mg frequency:daily    Pharmacist's Name: Nasir Sawant  Pharmacist's Extension: [phone]

## 2023-03-22 NOTE — H&P
[facility] Internal Medicine History and Physical - Resident Note    Admitting Team: [facility] Internal Medicine Team B  Attending Physician: Dr. Jean Carlos Guido  Resident: Dr. Foreman  Interns: Dr. Cid    Date of Admit: 3/21/2023    Chief Complaint     Altered Mental Status (Pt arrived via  [facility] form home, family states pt has foul smelling urine, and increased confused, pt has left arm edema. Pt is o2 dependent )   for 3-4 days    Subjective:      History of Present Illness:  Pilar Michael is a 89 y.o. female with a history of CAD s/p PCI, TAVR (2019) on chronic anticoagulant use, HFpEF s/p pacemaker, breast cancer s/p mastectomy, thyroid cancer s/p thyroidectomy, CVA without residual deficits (R eye symptoms), bilateral PEs in the setting of COVID infection and UTI (October 2022), HTN, glaucoma who presented to [facility] on 3/21/2023 with a primary complaint of altered mental status.    Patient somnolent on exam and moaning, minimally responsive to questions. History obtained from patient's daughter at bedside. Patient was exhibiting increased lethargy and malaise with intermittent confusion over the last 2 weeks. She went to PCP last week and wad diagnosed with UTI, sent out with Macrobid that ended 6 days PTA. Patient's daughter reports that over the past 3-4 days patient has acutely worsened to the point where she just moans and will barely respond to questions. Additionally has intermittently required more oxygen over the last few days. SpO2 would drop into low 80s so patient's oxygen was turned up to 3L from normal 2L with improvement to high 80s-low 90s. Patient's daughter found geriatric docter that will do house calls who told patient that her lungs may be filled with fluid. Patient additionally with increased shortness of breath with accessory muscle use. Endorses progressively decreasing PO intake. Possible subjective fevers over the last 2 weeks. She has a history of recurrent  UTIs, including reported MDR E.coli. She takes chronic methenamine. Also was recently hospitalized in October 2022 with COVID PNA complicated by bilateral PEs and MDR UTI. She was treated with meropenem which was de-escalated to CTX at that time. Patient also had recent outpatient thoracentesis in January for chronic pleural effusion. Cannot see fluid studies or notes in EHR but patient's daughter states they removed 1L pleural fluid at that time that was negative for infection. Patient's daughter unaware of any recent sick contacts but the daughter did say she was congested a couple weeks ago. Patient received flu shot this year (October) and has received 3 COVID vaccinations. Regularly taking medications as prescribed, but has not been able to get some recently due to somnolence. Only recent medication change was addition of lorazepam for sleep that she has had over the last 3 days. Denies nausea, vomiting, constipation, diarrhea. Last bowel movement ~48 hours ago which is normal for patient per daughter.    Past Medical History:  Past Medical History:   Diagnosis Date    Anticoagulant long-term use     Arthritis     Breast cancer     radiation 2003    Cancer     CHF (congestive heart failure)     Coronary artery disease     Glaucoma, right eye     Hypertension     Pulmonary emboli     Stroke     Thyroid cancer     Thyroid disease        Past Surgical History:  Past Surgical History:   Procedure Laterality Date    CARDIAC CATHETERIZATION      CARDIAC VALVE SURGERY      CHOLECYSTECTOMY      CORONARY ANGIOGRAPHY N/A 04/23/2019    Procedure: ANGIOGRAM, CORONARY ARTERY;  Surgeon: Bakari Singletary MD;  Location: [facility] CATH LAB;  Service: Cardiology;  Laterality: N/A;    HYSTERECTOMY      lymph nodes removal      MASTECTOMY  01/2022    THYROIDECTOMY  1992    TRANSCATHETER AORTIC VALVE REPLACEMENT (TAVR) N/A 05/07/2019    Procedure: REPLACEMENT, AORTIC VALVE, TRANSCATHETER (TAVR);  Surgeon: Bakari Singletary MD;   Location: [facility] CATH LAB;  Service: Cardiology;  Laterality: N/A;       Allergies:  Review of patient's allergies indicates:   Allergen Reactions    Penicillins Swelling    Sulfa (sulfonamide antibiotics) Nausea Only       Home Medications:  Prior to Admission medications    Medication Sig Start Date End Date Taking? Authorizing Provider                   atorvastatin (LIPITOR) 20 MG tablet Take 1 tablet (20 mg total) by mouth every evening.  Patient taking differently: Take 20 mg by mouth once daily. 8/23/22   Lenka Salazar NP   atropine 1% (ISOPTO ATROPINE) 1 % Drop Place 1 drop into the right eye 2 (two) times a day. 11/15/22   Historical Provider   brimonidine 0.2% (ALPHAGAN) 0.2 % Drop Place 1 drop into both eyes 2 (two) times a day. 10/11/22 10/11/23  Historical Provider   bumetanide (BUMEX) 1 MG tablet Take 1 tablet (1 mg total) by mouth 2 (two) times a day. 8/23/22   Lenka Salazar NP   busPIRone (BUSPAR) 5 MG Tab Take 1 tablet (5 mg total) by mouth 2 (two) times daily. 12/13/22 12/13/23  Lore Edmondson NP   carvediloL (COREG) 3.125 MG tablet Take 3.125 mg by mouth 2 (two) times daily with meals.    Historical Provider   clopidogreL (PLAVIX) 75 mg tablet Take 1 tablet (75 mg total) by mouth once daily. 11/29/21   Pippa Acharya MD   ELIQUIS 5 mg Tab Take 1 tablet (5 mg total) by mouth 2 (two) times daily. 11/29/22 11/29/23  Yung Yo MD   ergocalciferol (ERGOCALCIFEROL) 50,000 unit Cap Take 50,000 Units by mouth every 7 days. wednesdays    Historical Provider   lisinopriL (PRINIVIL,ZESTRIL) 20 MG tablet Take 20 mg by mouth once daily. 5/9/22   Historical Provider   LORazepam (ATIVAN) 0.5 MG tablet Take 0.5 mg by mouth every evening.    Historical Provider           methenamine (HIPREX) 1 gram Tab Take 1 g by mouth 2 (two) times daily. 11/21/22   Historical Provider   prednisoLONE acetate (PRED FORTE) 1 % DrpS Place 1 drop into the right eye 4 (four) times daily.    Historical Provider    SYNTHROID 125 mcg tablet Take 125 mcg by mouth before breakfast. 19   Historical Provider   timolol maleate 0.25% (TIMOPTIC) 0.25 % Drop Place 1 drop into both eyes 2 (two) times daily. 22   Historical Provider                               Family History:  Family History   Problem Relation Age of Onset    Kidney disease Father     Cancer Sister     ALS Sister        Social History:  Social History     Tobacco Use    Smoking status: Never    Smokeless tobacco: Never   Substance Use Topics    Alcohol use: Not Currently    Drug use: Never       Review of Systems:  Pertinent positives and negatives listed in HPI. All other systems are reviewed and are negative.    Health Maintaince :   Primary Care Physician: Joseph Noel (Inactive)  Immunizations:   TDap is up to date.  Influenza is up to date.  Pneumovax unknown if up to date.  COVID vaccine x 3 doses     Objective:   Last 24 Hour Vital Signs:  BP  Min: 140/76  Max: 214/98  Temp  Av.4 °F (36.9 °C)  Min: 97.6 °F (36.4 °C)  Max: 99.7 °F (37.6 °C)  Pulse  Av.7  Min: 60  Max: 75  Resp  Av.3  Min: 10  Max: 35  SpO2  Av %  Min: 92 %  Max: 100 %  Weight  Av.2 kg (168 lb)  Min: 76.2 kg (168 lb)  Max: 76.2 kg (168 lb)  Body mass index is 30.73 kg/m².  No intake/output data recorded.    Physical Examination:  General: Somnolent, moaning, occasionally will say yes or nod to very few questions, diaphoretic; ill-appearing, thin  Head:  Normocephalic and atraumatic  Eyes:  Dilated R pupil (chronic per daughter since stroke), EOM grossly intact (patient not following commands and keeping eyes closed,eyes move when they are opened), L pupil 1.5mm and reactive to light; anicteric sclera and clear conjunctivae  Mouth:  Oropharynx clear and without exudate  Neck: JVP difficult to assess due to patient cooperation but appears ~9-10 cmH2O; full ROM  Cardio:  Regular rate with rare intermittent skipped beats; otherwise regular rhythm with normal  S1 and S2; no murmurs or rubs  Resp:  Crackles at bilateral lung bases, up to mid-lung on L side, tachypnic, accessory muscle use; SpO2 mid-90s on 4L NC  Abdom: Epigastric TTP; soft, non-distended with normoactive bowel sounds  Extrem: BLE 2+ pitting edema that is TTP; Marked LUE edema (daughter states chronic since mastectomy but possible worsening); warm and well-perfused   Skin:  Pale, diaphoretic; No rashes or notable lesions  Pulses: 2+ and symmetric DP and radial pulses  Neuro:  Somnolent, not following commands, moving all extremities spontaneously, eye exam as above otherwise CNs grossly intact; remainder of exam limited due to patient's mental status    Laboratory:  Most Recent Data:  CBC:   Lab Results   Component Value Date    WBC 3.88 (L) 03/21/2023    HGB 10.7 (L) 03/21/2023    HCT 37.5 03/21/2023     03/21/2023    MCV 85 03/21/2023    RDW 19.4 (H) 03/21/2023     BMP:   Lab Results   Component Value Date     03/21/2023    K 4.5 03/21/2023     03/21/2023    CO2 33 (H) 03/21/2023    BUN 51 (H) 03/21/2023    CREATININE 0.9 03/21/2023     03/21/2023    CALCIUM 10.0 03/21/2023    MG 2.1 03/21/2023           LFTs:   Lab Results   Component Value Date    PROT 9.5 (H) 03/21/2023    ALBUMIN 3.3 (L) 03/21/2023    BILITOT 0.9 03/21/2023    AST 39 03/21/2023    ALKPHOS 107 03/21/2023    ALT 25 03/21/2023     Cardiac:   Lab Results   Component Value Date    TROPONINI 0.074 (H) 03/21/2023    BNP 2,929 (H) 03/21/2023     Urinalysis:   Lab Results   Component Value Date    LABURIN No significant growth 07/30/2022    COLORU Yellow 03/21/2023    SPECGRAV 1.015 03/21/2023    NITRITE Negative 03/21/2023    KETONESU Negative 03/21/2023    UROBILINOGEN Negative 03/21/2023    WBCUA 34 (H) 03/21/2023       Trended Cardiac Data:  Recent Labs   Lab 03/21/23 1942   TROPONINI 0.074*   BNP 2,929*       Microbiology Data:  Urine and blood cultures pending    Other Laboratory Data:  Influenza B  Positive  Lactate 1.3 -> 1.5  Procalcitonin 0.04    Other Results:  EKG (my interpretation): ventricular paced rhythm    Radiology:  Imaging Results              CT Head Without Contrast (Final result)  Result time 03/21/23 21:27:49      Final result by Jose Gunderson DO (03/21/23 21:27:49)                   Impression:      No acute intracranial abnormality.    Remote left PCA territory infarction.    Mild chronic microvascular ischemic changes.      Electronically signed by: Jose Gunderson  Date:    03/21/2023  Time:    21:27               Narrative:    EXAMINATION:  CT HEAD WITHOUT CONTRAST    CLINICAL HISTORY:  Mental status change, unknown cause;    TECHNIQUE:  Low dose axial CT images obtained throughout the head without intravenous contrast. Sagittal and coronal reconstructions were performed.    COMPARISON:  None available.    FINDINGS:  There is age-related brain parenchymal volume loss and prominence of the CSF spaces.  No extra-axial blood or fluid collections.  There is encephalomalacia in the left medial occipital lobe and posteromedial temporal lobe compatible with a remote PCA territory infarction.  Mild chronic microvascular ischemic changes noted.  No parenchymal mass, hemorrhage, edema or major vascular distribution infarct.    No calvarial fracture.  The scalp is unremarkable.  Bilateral paranasal sinuses and mastoid air cells are clear.  There are bilateral prosthetic lenses.                                       X-Ray Chest AP Portable (Final result)  Result time 03/21/23 19:59:54      Final result by Jose Gunderson DO (03/21/23 19:59:54)                   Impression:      Pulmonary edema and bilateral effusions as above.      Electronically signed by: Jose Gunderson  Date:    03/21/2023  Time:    19:59               Narrative:    EXAMINATION:  XR CHEST AP PORTABLE    CLINICAL HISTORY:  Sepsis;    TECHNIQUE:  Single frontal view of the chest was  performed.    COMPARISON:  11/27/2022.    FINDINGS:  There is a cardiac pacer, unchanged in position.  There is an aortic valve stent.  There are moderate left and small right pleural effusion.  There are perihilar interstitial opacities with pulmonary vascular congestion compatible with pulmonary edema.  The extent of pulmonary edema is similar to prior.  Difficult to entirely exclude an overlying infectious process.  There is no pneumothorax.  The cardiac silhouette is partially obscured but appears enlarged.  There are atherosclerotic calcifications of the thoracic aorta.  The visualized osseous structures demonstrate degenerative changes.                                         Assessment:     Pilar Michael is a 89 y.o. female with:  Patient Active Problem List    Diagnosis Date Noted    Acute decompensated heart failure 03/21/2023    Acute encephalopathy 03/21/2023    Anxiety 12/14/2022    Heart failure with preserved ejection fraction 11/29/2022    Pleural effusion 08/01/2022    Delirium 07/31/2022    Palliative care encounter 07/20/2022    Acute respiratory failure with hypoxia and hypercapnia 07/20/2022    Debility 07/06/2022    Acute on chronic combined systolic (congestive) and diastolic (congestive) heart failure     Normocytic anemia 07/03/2022    Increased anion gap metabolic acidosis 07/03/2022    Hyperlipidemia 07/03/2022    Acute cystitis 11/18/2021    Presence of permanent cardiac pacemaker 07/07/2020    Chronic combined systolic and diastolic heart failure 05/08/2019    Coronary artery disease involving native coronary artery of native heart without angina pectoris 05/08/2019    S/P TAVR (transcatheter aortic valve replacement) 05/07/2019    Acquired hypothyroidism 04/08/2019    Hypertension 04/08/2019    Severe aortic stenosis s/p TAVR         Plan:       Acute Encephalopathy  Acute Hypoxic and Hypercapnic Respiratory Failure  Acute Decompensated HFpEF (grade 2 DD)  Influenza B  Bilateral  Pleural Effusion and worsening LLL Consolidation c/f Pneumonia  Urinary Tract Infection  Bilateral Pleural Effusion and worsening of LLL Consolidation c/f Pneumonia  - Tmax 99.7, HR 60s, tachypneic, BP up to 214/98  - Tamiflu started in the setting of influenza B positive (symptoms over 2 weeks but patient severely ill and high risk for further decompensation)  - BNP 2929 (significantly higher than priors)  - ABG 7.4/63.5/74/39.4; started BiPAP in the setting of hypercapnea  - s/p IV Lasix 120mg in ED, ordered IV Lasix 160mg with minimal UOP; started Lasix gtt  - s/p azithro and CTX in ED; possible post-viral PNA in the setting of symptoms x 2 week, flu +, and consolidation on CXR; started vanc to cover for post-viral MRSA; will continue CTX and azithro for CAP coverage  - blood cultures collected  - recent removal of ~1L from pleural effusion in Jan 2023 outpatient, cannot see results of fluid analysis  - h/o breast CA and thyroid CA s/p mastectomy and thyroidectomy, possibility of malignant effusion  - SLP evaluation  - consulted pulm re: thoracentesis    Urinary Tract Infection  - may contribute to acute encephalopathy  - has recurrent UTIs, on chronic methenamine, recently finished course of nitrofuantoin 6 days ago  - h/o E.coli resistant to fluoroquinolones  - reports on treatment with meropenem when she was admitted in hospital with COVID which was later de-escalated to CTX  - continue CTX as stated above  - UCx pending    Pulmonary Embolism (Oct 2022)  - holding home Eliquis in the setting of possible thora    Breast Cancer (2002 with recurrence 2021) s/p mastectomy  Thyroid Cancer s/p thyroidectomy, hypothyroidism  - will evaluate for malignant effusion in the setting of chronic/recurrent pleural effusion pending pulmonology evaluation for thoracentesis    Remote CVA  - patient's daughter declines residual deficit except R eye dilation  - holding home Plavix and Eliquis pending pulmonology evaluation, will  resume as soon as appropriate    S/p TAVR 2019  - managing anticoagulation as above    CAD s/p oRCA PCI with BMS 2019  S/p Dual Chamber Pacemaker  - troponin consistent with priors    Essential HTN  - continue home medications (Lasix as above in place of home Bumex)    Glaucoma  - continue home eye drops        Code Status:     Full Code    Sharmila Garner MD  [facility] HO-2  [facility]

## 2023-03-22 NOTE — PROGRESS NOTES
"[facility] Internal Medicine Resident HO-2 Progress Note    Subjective:      Patient remains somnolent on exam. On BiPAP 10/5. Appears more comfortable.      Objective:   Last 24 Hour Vital Signs:  BP  Min: 93/55  Max: 214/98  Temp  Av.2 °F (36.8 °C)  Min: 97.6 °F (36.4 °C)  Max: 99.7 °F (37.6 °C)  Pulse  Av.7  Min: 60  Max: 75  Resp  Av.6  Min: 10  Max: 35  SpO2  Av.6 %  Min: 92 %  Max: 100 %  Height  Av' 2" (157.5 cm)  Min: 5' 2" (157.5 cm)  Max: 5' 2" (157.5 cm)  Weight  Av.9 kg (162 lb 13 oz)  Min: 71.5 kg (157 lb 10.1 oz)  Max: 76.2 kg (168 lb)  I/O last 3 completed shifts:  In: 250 [IV Piggyback:250]  Out: -     Physical Examination:  General:          Somnolent, ill-appearing, thin  Head:               Normocephalic and atraumatic  Eyes:               Dilated R pupil (chronic per daughter since stroke), EOM grossly intact (patient not following commands and keeping eyes closed,eyes move when they are opened), L pupil 1.5mm and reactive to light; anicteric sclera and clear conjunctivae  Mouth:             Oropharynx clear and without exudate  Neck:   full ROM  Cardio:             Regular rate and rhythm with normal S1 and S2; no murmurs or rubs  Resp:               Crackles at bilateral lung bases, up to mid-lung on L side; On BiPAP 10/5  Abdom:            soft, non-tender, non-distended with normoactive bowel sounds  Extrem:            BLE 2+ pitting edema that is TTP; Marked LUE edema (daughter states chronic since mastectomy but possible worsening); warm and well-perfused   Skin:                Pale; No rashes or notable lesions  Pulses:            2+ and symmetric DP and radial pulses  Neuro:             Somnolent, not following commands, moving all extremities spontaneously, eye exam as above otherwise CNs grossly intact; remainder of exam limited due to patient's mental status    Laboratory:  Laboratory Data Reviewed: yes  Pertinent Findings:  No new labs    Microbiology Data " Reviewed: yes  Pertinent Findings:  Blood Cx NGTD    Other Results:  EKG (my interpretation): No new EKG    Radiology Data Reviewed: yes  Pertinent Findings:  No new imaging.    Current Medications:     Infusions:   furosemide (LASIX) 2 mg/mL continuous infusion (titrating) 4 mg/hr (03/22/23 1005)        Scheduled:   apixaban  2.5 mg Oral BID    atorvastatin  20 mg Oral Daily    atropine 1%  1 drop Right Eye BID    azithromycin  500 mg Intravenous Q24H    brimonidine 0.2%  1 drop Both Eyes BID    busPIRone  5 mg Oral BID    carvediloL  3.125 mg Oral BID WM    cefTRIAXone (ROCEPHIN) IVPB  1 g Intravenous Q24H    levothyroxine  125 mcg Oral Before breakfast    lisinopriL  20 mg Oral Daily    mupirocin   Nasal BID    oseltamivir  30 mg Oral BID    prednisoLONE acetate  1 drop Right Eye QID    timolol maleate 0.5%  1 drop Both Eyes BID    [START ON 3/23/2023] vancomycin (VANCOCIN) IVPB  1,000 mg Intravenous Q24H        PRN:  sodium chloride 0.9%, Pharmacy to dose Vancomycin consult **AND** vancomycin - pharmacy to dose    Antibiotics and Day Number of Therapy:  CTX day 1  Azithro day 1  Vanc day 1    Lines and Day Number of Therapy:  PIV x 2    Assessment:     Pilar Michael is a 89 y.o.female with  Patient Active Problem List    Diagnosis Date Noted    Acute decompensated heart failure 03/21/2023    Acute encephalopathy 03/21/2023    Anxiety 12/14/2022    Heart failure with preserved ejection fraction 11/29/2022    Pleural effusion 08/01/2022    Delirium 07/31/2022    Palliative care encounter 07/20/2022    Acute respiratory failure with hypoxia and hypercapnia 07/20/2022    Debility 07/06/2022    Acute on chronic combined systolic (congestive) and diastolic (congestive) heart failure     Normocytic anemia 07/03/2022    Increased anion gap metabolic acidosis 07/03/2022    Hyperlipidemia 07/03/2022    Acute cystitis 11/18/2021    Presence of permanent cardiac pacemaker 07/07/2020    Chronic combined systolic and  diastolic heart failure 05/08/2019    Coronary artery disease involving native coronary artery of native heart without angina pectoris 05/08/2019    S/P TAVR (transcatheter aortic valve replacement) 05/07/2019    Acquired hypothyroidism 04/08/2019    Hypertension 04/08/2019    Severe aortic stenosis s/p TAVR         Plan:     Acute Encephalopathy  Acute Hypoxic and Hypercapnic Respiratory Failure  Acute Decompensated HFpEF (grade 2 DD)  Influenza B  Bilateral Pleural Effusion and worsening LLL Consolidation c/f Pneumonia  Urinary Tract Infection  - Tmax 99.7, HR 60s, tachypneic, BP up to 214/98 at admission  - Tamiflu started in the setting of influenza B positive (symptoms over 2 weeks but patient severely ill and high risk for further decompensation)  - BNP 2929 (significantly higher than priors)  - ABG 7.4/63.5/74/39.4; started BiPAP in the setting of hypercapnea  - s/p IV Lasix 120mg in ED, ordered IV Lasix 160mg with minimal UOP; started Lasix gtt  - s/p azithro and CTX in ED; possible post-viral PNA in the setting of symptoms x 2 week, flu +, and consolidation on CXR; started vanc to cover for post-viral MRSA; will continue CTX and azithro for CAP coverage  - blood cultures collected  - recent removal of ~1L from pleural effusion in Jan 2023 outpatient, cannot see results of fluid analysis  - h/o breast CA and thyroid CA s/p mastectomy and thyroidectomy, possibility of malignant effusion  - SLP evaluation  - consulted pulm re: thoracentesis     Urinary Tract Infection  - may contribute to acute encephalopathy  - has recurrent UTIs, on chronic methenamine, recently finished course of nitrofuantoin 6 days ago  - h/o E.coli resistant to fluoroquinolones  - reports on treatment with meropenem when she was admitted in hospital with COVID which was later de-escalated to CTX  - continue CTX as stated above  - UCx pending     Pulmonary Embolism (Oct 2022)  - continue home Eliquis     Breast Cancer (2002 with  recurrence 2021) s/p mastectomy  Thyroid Cancer s/p thyroidectomy, hypothyroidism  - will re-assess effusion s/p aggressive diuresis     Remote CVA  - patient's daughter declines residual deficit except R eye dilation  - continue home Plavix     S/p TAVR 2019  - managing anticoagulation as above     CAD s/p oRCA PCI with BMS 2019  S/p Dual Chamber Pacemaker  - troponin consistent with priors     Essential HTN  - continue home medications (Lasix as above in place of home Bumex)     Glaucoma  - continue home eye drops    Sharmila Garner MD  \[facility]\"" Internal Medicine HO-2  \[facility]\"" Internal Medicine Service Team B    \[facility]\"" Medicine Hospitalist Pager numbers:   \[facility]\"" Hospitalist Medicine Team A (Edgard/Larisa): [phone]  \[facility]\"" Hospitalist Medicine Team B (Crystal/Hay):  [phone]

## 2023-03-22 NOTE — ED NOTES
Patient identifiers for Pilar Michael checked and correct.  LOC: The patient opens eyes with verbal stimuli.  Drowsy, weak. Oriented to person.  APPEARANCE: Elderly. Patient resting quietly and in no acute distress.  SKIN: The skin is warm and dry. Stage 1 to left buttocks. Left arm and hand swollen.  Left mastectomy.  MUSKULOSKELETAL: Patient moving extremities  slightly.  RESPIRATORY: Slightly labored.  Pt on 3L/NC.  CARDIAC: Patient has a normal rate, pacemaker.  ABDOMEN: Soft and non tender to palpation, no distention noted.

## 2023-03-22 NOTE — CONSULTS
[facility] Pul[facility]/Critical Care Consult Note      Patient:Pilar Michael  Age:89 y.o.  MRN:[id number]  Admit date:3/21/2023  LOS:1 day(s)     Primary Team: [facility]  Primary Attending: Hay     HPI:       Pilar Michael is a 89 year old woman with PMH CAD, TAVR, HFpEF, breast cancer s/p L mastectomy with LN dissection and radiation, HTN who presented to [facility] 3/21/23 with SOB and fatigue. Per patient's family, she had been fatigued and less interactive for at least 2 weeks. Denied fevers, chills, chest pain, abd pain. Treated for UTI with macrobid about a week ago with no improvement in mentation or fatigue. Became SOB over past 2-3 days, prompting ER presentation. Noted to be flu B positive, UA with signs of infection. BNP > 2900, CXR with pulmonary edema and bilateral pleural effusions. Admitted to ICU for NIPPV and lasix gtt. Abx and tamiflu initiated for UTI and flu B.      MEDICAL HISTORY:      Past Medical History:  Past Medical History:   Diagnosis Date    Anticoagulant long-term use     Arthritis     Breast cancer     radiation 2003    Cancer     CHF (congestive heart failure)     Coronary artery disease     Glaucoma, right eye     Hypertension     Pulmonary emboli     Stroke     Thyroid cancer     Thyroid disease         Past Surgical History:  Past Surgical History:   Procedure Laterality Date    CARDIAC CATHETERIZATION      CARDIAC VALVE SURGERY      CHOLECYSTECTOMY      CORONARY ANGIOGRAPHY N/A 04/23/2019    Procedure: ANGIOGRAM, CORONARY ARTERY;  Surgeon: Bakari Singletary MD;  Location: [facility] CATH LAB;  Service: Cardiology;  Laterality: N/A;    HYSTERECTOMY      lymph nodes removal      MASTECTOMY  01/2022    THYROIDECTOMY  1992    TRANSCATHETER AORTIC VALVE REPLACEMENT (TAVR) N/A 05/07/2019    Procedure: REPLACEMENT, AORTIC VALVE, TRANSCATHETER (TAVR);  Surgeon: Bakari Singletary MD;  Location: [facility] CATH LAB;  Service: Cardiology;  Laterality: N/A;         Family History:   Family History    Problem Relation Age of Onset    Kidney disease Father     Cancer Sister     ALS Sister          Social History:  Social History     Socioeconomic History    Marital status:    Tobacco Use    Smoking status: Never    Smokeless tobacco: Never   Substance and Sexual Activity    Alcohol use: Not Currently    Drug use: Never    Sexual activity: Not Currently     Social Determinants of Health     Food Insecurity: No Food Insecurity    Worried About Running Out of Food in the Last Year: Never true    Ran Out of Food in the Last Year: Never true   Transportation Needs: No Transportation Needs    Lack of Transportation (Medical): No    Lack of Transportation (Non-Medical): No   Housing Stability: Unknown    Unable to Pay for Housing in the Last Year: No    Unstable Housing in the Last Year: No        Allergies:  Review of patient's allergies indicates:   Allergen Reactions    Penicillins Swelling    Sulfa (sulfonamide antibiotics) Nausea Only       Home Medications:  Current Outpatient Medications   Medication Instructions    albuterol (PROVENTIL/VENTOLIN HFA) 90 mcg/actuation inhaler 2 puffs, Inhalation, Every 6 hours PRN    amLODIPine (NORVASC) 5 mg, Oral, Daily    atorvastatin (LIPITOR) 20 mg, Oral, Nightly    atropine 1% (ISOPTO ATROPINE) 1 % Drop 1 drop, Right Eye, 2 times daily    brimonidine 0.2% (ALPHAGAN) 0.2 % Drop 1 drop, Both Eyes, 2 times daily    bumetanide (BUMEX) 1 mg, Oral, 2 times daily    busPIRone (BUSPAR) 5 mg, Oral, 2 times daily    carvediloL (COREG) 3.125 mg, Oral, 2 times daily with meals    clopidogreL (PLAVIX) 75 mg, Oral, Daily    ELIQUIS 5 mg, Oral, 2 times daily    ergocalciferol (ERGOCALCIFEROL) 50,000 Units, Oral, Every 7 days, wednesdays    lisinopriL (PRINIVIL,ZESTRIL) 20 mg, Oral, Daily    LORazepam (ATIVAN) 0.5 mg, Oral, Nightly    magnesium oxide (MAG-OX) 400 mg, Oral, Daily    methenamine (HIPREX) 1 g, Oral, 2 times daily    prednisoLONE acetate (PRED FORTE) 1 % DrpS 1 drop,  Right Eye, 4 times daily    SYNTHROID 125 mcg, Oral, Before breakfast    timolol maleate 0.25% (TIMOPTIC) 0.25 % Drop 1 drop, Both Eyes, 2 times daily        OBJECTIVE DATA:      Vital Signs:  Vitals:    03/22/23 0915   BP: (!) 176/82   Pulse: 60   Resp: 18   Temp:        Intake/Output:    Intake/Output Summary (Last 24 hours) at 3/22/2023 1035  Last data filed at 3/22/2023 1006  Gross per 24 hour   Intake 797.98 ml   Output --   Net 797.98 ml        Physical Exam:  Constitutional: calm, frail appearing  HEENT: NCAT, EOMI, MMM  Neck: Supple, normal ROM  Resp: crackles to lung fields bilaterally, no wheeze  Cardio: RRR, S1 and S2 normal  GI: Soft, non-tender, bowel sounds active  Extremities: dependent edema, peripheral pulses 2+ and symmetric  Skin: No rashes, bruises, or lesions  Neurologic: minimally interactive, moves extremities spontaneously     Laboratory/Imaging:  Recent Labs   Lab 03/21/23 1942   WBC 3.88*   HGB 10.7*   HCT 37.5         K 4.5      CREATININE 0.9   BUN 51*   CO2 33*   ALT 25   AST 39     Microbiology:  3/21/23 Flu: positive flu B  3/21/23 Blood Cx: NGTD  3/21/23 Urine Cx: in process     Imaging:  Reviewed    Medications:   atorvastatin  20 mg Oral Daily    atropine 1%  1 drop Right Eye BID    azithromycin  500 mg Intravenous Q24H    brimonidine 0.2%  1 drop Both Eyes BID    busPIRone  5 mg Oral BID    carvediloL  3.125 mg Oral BID WM    cefTRIAXone (ROCEPHIN) IVPB  1 g Intravenous Q24H    levothyroxine  125 mcg Oral Before breakfast    lisinopriL  20 mg Oral Daily    oseltamivir  30 mg Oral BID    prednisoLONE acetate  1 drop Right Eye QID    timolol maleate 0.5%  1 drop Both Eyes BID    [START ON 3/23/2023] vancomycin (VANCOCIN) IVPB  1,000 mg Intravenous Q24H         furosemide (LASIX) 2 mg/mL continuous infusion (titrating) 4 mg/hr (03/22/23 1005)        sodium chloride 0.9%, Pharmacy to dose Vancomycin consult **AND** vancomycin - pharmacy to dose     Assessment/Plan:      Neuro  Acute Encephalopathy  - in setting of UTI/flu and HF exacerbation  - CT head unremarkable     Cardiovascular  Acute on Chronic HFpEF Exacerbation  - BNP 3000, CXR with pulmonary edema and BL effusions  - minimal response to spot lasix dosing  - ICU for lasix gtt and NIPPV   - TTE pending    Respiratory  Acute on Chronic Hypoxic/Hypercapnic Respiratory Failure  - in setting of influenza and HF/volume overload with BNP 3000  - ABG on arrival 7.4/63/74/39 on NC  - on BIPAP for increased WOB, wean when able  - tamiflu for influenza B  - lasix gtt for aggressive diuresis    Bilateral Pleural Effusions  - noted since at least 7/2022  - thora 10/2022 800cc removed transudative and path negative, thora 1/27/23 1.3L removed no path or fluid studies obtained  - with BNP 3000 and bilateral in nature, suspect due to HF  - no indication for thoracentesis at present, continue diuresis     GI/FEN  - pending bedside swallow eval, may require NGT     F: diuresis with lasix gtt  E: replete prn  N: NPO     Renal   - no active issues, cr at baseline     Heme/Onc  RUE Lymphedema  - chronic 2/2 hx breast ca with L mastectomy with LN dissection  - uses compressive wrap at home, will order here    Hx Bilateral PE  - continue home eliquis     Endocrine  Hypothyroidism  - continue home synthroid     Infectious Disease  UTI  - recently treated with macrobid last week  - UA with signs of infection on admit  - on vanc/zosyn/azithro, can deescalate pending cx     Influenza B  - flu B positive on admission  - treatment with tamiflu (only available po)    Feeding: NPO  Analgesia: tylenol  Sedation: none  Thrombo PPX: eliquis  Head of Bed: > 30 degrees  Ulcer PPX: none  Glucose: goal 140-180s  SBT/SAT: N/A  Bowel Regimen: none  Indwelling Lines: PIV x2  Abx: vanc, rocephin, azithro, tamiflu    Code: Full     Drea Ferro MD  [facility] Pul[facility]/Critical Care Fellow  03/22/2023 10:35 AM    Pt seen and examined with Pulmonary/Critical Care team.  Critical Care time was spent validating the history and physical exam, reviewing the lab and imaging results, and discussing the care of the patient with the bedside nurse. The following additional comments are made:    Complex medical problems.  Current life threatening problem is volume overload, pulmonary edema, and large pleural effusions.  This heart failure exacerbation may have been precipitated by Influenza B.  On NIV now with good mechanics and gas exchange. We are aggressively diuresing.  To give oseltamivir we will need to place a feeding tube.  Discussed short and long term plan with nephew.  Pt's daughter is POA and is currently dealing with another ill family member.    Critical Care time 40 minutes    Omar Collins MD  Phone [phone]

## 2023-03-22 NOTE — PROGRESS NOTES
Pharmacist Renal Dose Adjustment Note    Pilar Mcihael is a 89 y.o. female being treated with the medication oseltamivir    Patient Data:    Vital Signs (Most Recent):  Temp: 97.6 °F (36.4 °C) (03/22/23 0554)  Pulse: 60 (03/22/23 0828)  Resp: 11 (03/22/23 0828)  BP: (!) 93/55 (03/22/23 0828)  SpO2: 95 % (03/22/23 0828)   Vital Signs (72h Range):  Temp:  [97.6 °F (36.4 °C)-99.7 °F (37.6 °C)]   Pulse:  [60-75]   Resp:  [10-35]   BP: ()/(55-98)   SpO2:  [92 %-100 %]      Recent Labs   Lab 03/21/23 1942   CREATININE 0.9     Serum creatinine: 0.9 mg/dL 03/21/23 1942  Estimated creatinine clearance: 39.7 mL/min    Medication:oseltamivir dose: 30mg frequency daily will be changed to medication:oseltamivir dose:30mg frequency:BID    Pharmacist's Name: Tootei Yarbrough  Pharmacist's Extension: [phone]

## 2023-03-22 NOTE — ED NOTES
Spoke to dr perdomo, pt difficult to wake up, unable to take po meds or drink water. she states pt needs bipap at this time. Rt notified. Dr perdomo also states no need for ngt at this time for po meds, bipap is the priority over pt receiving po medication. Pt is not in active distress. No further orders at this time.

## 2023-03-22 NOTE — ED NOTES
When  changing and turning pt daughter states she has had breakdown and redness on sacrum d/t poor care from a home health aid she hired

## 2023-03-22 NOTE — PROGRESS NOTES
Patel 6- spoke with nurse who reports scattered excoriated skin to buttocks related to friction/moisture.  Heels intact with no redness.  Recommend nursing to continue with pressure injury prevention interventions in place and Triad ointment BID to buttocks.  Notified Dr. Garner.

## 2023-03-22 NOTE — PLAN OF CARE
The pt's currently in the ICU,on continuous BIPAP. The sw spoke to the pt's niece Elvi Nicholson [phone] who was at bedside during the assessment b/c the pt's dtr Katiana Anamaria [phone] was at home asleep b/c she was at the hospital all night with the pt. The pt is current with [facility]. Katiana assist the pt with all her adls and she has a lot of dme(listed on list above). Elvi states the pt doesn't ambulate and spends most of her time in her recliner. The sw completed the white board in the pt's room and gave Elvi a d/c pamphlet with her name and contact info on it. The sw encouraged her to call if she has any further questions or concerns. The sw will continue to follow the pt throughout her transitions of care and will assist with any d/c needs.       03/22/23 1432   Discharge Assessment   Assessment Type Discharge Planning Assessment   Confirmed/corrected address, phone number and insurance Yes   Confirmed Demographics Correct on Facesheet   Source of Information family   If unable to respond/provide information was family/caregiver contacted? Yes   Contact Name/Number Elvi Nicholson(niece)[phone]   Communicated CHARLIE with patient/caregiver Date not available/Unable to determine   Reason For Admission Acute encephalopathy   People in Home child(alejandro), adult   Do you expect to return to your current living situation? Other (see comments)  (TBD)   Do you have help at home or someone to help you manage your care at home? Yes   Who are your caregiver(s) and their phone number(s)? Katiana Conrad(dtr)[phone]   Prior to hospitilization cognitive status: Unable to Assess   Current cognitive status: Unable to Assess  (pt's on BIPAP)   Walking or Climbing Stairs ambulation difficulty, dependent;stair climbing difficulty, dependent;transferring difficulty, dependent   Dressing/Bathing bathing difficulty, dependent;dressing difficulty, dependent   Home Accessibility wheelchair accessible   Home Layout  Able to live on 1st floor   Equipment Currently Used at Home wheelchair;bedside commode;bath bench;raised toilet;rollator;walker, rolling;oxygen  (transport chair)   Readmission within 30 days? No   Patient currently being followed by outpatient case management? No   Do you currently have service(s) that help you manage your care at home?   (the pt's current with [facility])   Do you have prescription coverage? Yes   Coverage [employer] Medicare   Do you have any problems affording any of your prescribed medications? No   Is the patient taking medications as prescribed? yes   Who is going to help you get home at discharge? Katiana Conrad(dtr)[phone]   How do you get to doctors appointments? family or friend will provide   Are you on dialysis? No   Do you take coumadin? No   Discharge Plan A Home Health   Discharge Plan B Other  (TBD)   DME Needed Upon Discharge  other (see comments)  (TBD)   Discharge Plan discussed with:   (Elvi Nicholson([facility])[phone])   Discharge Barriers Identified None   OTHER   Name(s) of People in Home Katiana Conrad(dtr)[phone]

## 2023-03-23 PROBLEM — I44.7 LEFT BUNDLE BRANCH BLOCK (LBBB): Status: ACTIVE | Noted: 2023-03-23

## 2023-03-23 PROBLEM — I26.99 BILATERAL PULMONARY EMBOLISM: Status: ACTIVE | Noted: 2022-10-18

## 2023-03-23 LAB
ALBUMIN SERPL BCP-MCNC: 2.6 G/DL (ref 3.5–5.2)
ALP SERPL-CCNC: 90 U/L (ref 55–135)
ALT SERPL W/O P-5'-P-CCNC: 26 U/L (ref 10–44)
ANION GAP SERPL CALC-SCNC: 9 MMOL/L (ref 8–16)
AST SERPL-CCNC: 37 U/L (ref 10–40)
BASOPHILS # BLD AUTO: 0.02 K/UL (ref 0–0.2)
BASOPHILS NFR BLD: 0.5 % (ref 0–1.9)
BILIRUB SERPL-MCNC: 0.8 MG/DL (ref 0.1–1)
BUN SERPL-MCNC: 42 MG/DL (ref 8–23)
CALCIUM SERPL-MCNC: 8.9 MG/DL (ref 8.7–10.5)
CHLORIDE SERPL-SCNC: 97 MMOL/L (ref 95–110)
CO2 SERPL-SCNC: 37 MMOL/L (ref 23–29)
CREAT SERPL-MCNC: 0.8 MG/DL (ref 0.5–1.4)
DIFFERENTIAL METHOD: ABNORMAL
EOSINOPHIL # BLD AUTO: 0.1 K/UL (ref 0–0.5)
EOSINOPHIL NFR BLD: 3.2 % (ref 0–8)
ERYTHROCYTE [DISTWIDTH] IN BLOOD BY AUTOMATED COUNT: 19.9 % (ref 11.5–14.5)
EST. GFR  (NO RACE VARIABLE): >60 ML/MIN/1.73 M^2
GLUCOSE SERPL-MCNC: 89 MG/DL (ref 70–110)
HCT VFR BLD AUTO: 43.1 % (ref 37–48.5)
HGB BLD-MCNC: 12.4 G/DL (ref 12–16)
IMM GRANULOCYTES # BLD AUTO: 0.02 K/UL (ref 0–0.04)
IMM GRANULOCYTES NFR BLD AUTO: 0.5 % (ref 0–0.5)
LYMPHOCYTES # BLD AUTO: 0.4 K/UL (ref 1–4.8)
LYMPHOCYTES NFR BLD: 10.2 % (ref 18–48)
MCH RBC QN AUTO: 24.1 PG (ref 27–31)
MCHC RBC AUTO-ENTMCNC: 28.8 G/DL (ref 32–36)
MCV RBC AUTO: 84 FL (ref 82–98)
MONOCYTES # BLD AUTO: 0.3 K/UL (ref 0.3–1)
MONOCYTES NFR BLD: 8.2 % (ref 4–15)
NEUTROPHILS # BLD AUTO: 3.1 K/UL (ref 1.8–7.7)
NEUTROPHILS NFR BLD: 77.4 % (ref 38–73)
NRBC BLD-RTO: 0 /100 WBC
PLATELET # BLD AUTO: 107 K/UL (ref 150–450)
PLATELET BLD QL SMEAR: ABNORMAL
PMV BLD AUTO: ABNORMAL FL (ref 9.2–12.9)
POTASSIUM SERPL-SCNC: 3.4 MMOL/L (ref 3.5–5.1)
PROT SERPL-MCNC: 7.5 G/DL (ref 6–8.4)
RBC # BLD AUTO: 5.14 M/UL (ref 4–5.4)
SODIUM SERPL-SCNC: 143 MMOL/L (ref 136–145)
TSH SERPL DL<=0.005 MIU/L-ACNC: 1.99 UIU/ML (ref 0.4–4)
WBC # BLD AUTO: 4.01 K/UL (ref 3.9–12.7)

## 2023-03-23 PROCEDURE — 92610 EVALUATE SWALLOWING FUNCTION: CPT

## 2023-03-23 PROCEDURE — 25000003 PHARM REV CODE 250: Performed by: STUDENT IN AN ORGANIZED HEALTH CARE EDUCATION/TRAINING PROGRAM

## 2023-03-23 PROCEDURE — 94761 N-INVAS EAR/PLS OXIMETRY MLT: CPT

## 2023-03-23 PROCEDURE — 80053 COMPREHEN METABOLIC PANEL: CPT | Performed by: STUDENT IN AN ORGANIZED HEALTH CARE EDUCATION/TRAINING PROGRAM

## 2023-03-23 PROCEDURE — 21400001 HC TELEMETRY ROOM

## 2023-03-23 PROCEDURE — A4216 STERILE WATER/SALINE, 10 ML: HCPCS | Performed by: INTERNAL MEDICINE

## 2023-03-23 PROCEDURE — 63600175 PHARM REV CODE 636 W HCPCS: Performed by: STUDENT IN AN ORGANIZED HEALTH CARE EDUCATION/TRAINING PROGRAM

## 2023-03-23 PROCEDURE — 84443 ASSAY THYROID STIM HORMONE: CPT | Performed by: STUDENT IN AN ORGANIZED HEALTH CARE EDUCATION/TRAINING PROGRAM

## 2023-03-23 PROCEDURE — 97535 SELF CARE MNGMENT TRAINING: CPT

## 2023-03-23 PROCEDURE — 99900035 HC TECH TIME PER 15 MIN (STAT)

## 2023-03-23 PROCEDURE — 27000221 HC OXYGEN, UP TO 24 HOURS

## 2023-03-23 PROCEDURE — 85025 COMPLETE CBC W/AUTO DIFF WBC: CPT | Performed by: STUDENT IN AN ORGANIZED HEALTH CARE EDUCATION/TRAINING PROGRAM

## 2023-03-23 PROCEDURE — 36410 VNPNXR 3YR/> PHY/QHP DX/THER: CPT

## 2023-03-23 PROCEDURE — 27000207 HC ISOLATION

## 2023-03-23 PROCEDURE — 63600175 PHARM REV CODE 636 W HCPCS: Performed by: INTERNAL MEDICINE

## 2023-03-23 PROCEDURE — C1751 CATH, INF, PER/CENT/MIDLINE: HCPCS

## 2023-03-23 PROCEDURE — 25000003 PHARM REV CODE 250: Performed by: INTERNAL MEDICINE

## 2023-03-23 PROCEDURE — 76937 US GUIDE VASCULAR ACCESS: CPT

## 2023-03-23 RX ORDER — BUMETANIDE 0.25 MG/ML
2 INJECTION INTRAMUSCULAR; INTRAVENOUS EVERY 12 HOURS
Status: DISCONTINUED | OUTPATIENT
Start: 2023-03-23 | End: 2023-03-24

## 2023-03-23 RX ORDER — SODIUM CHLORIDE 0.9 % (FLUSH) 0.9 %
10 SYRINGE (ML) INJECTION
Status: DISCONTINUED | OUTPATIENT
Start: 2023-03-23 | End: 2023-03-30 | Stop reason: HOSPADM

## 2023-03-23 RX ORDER — SODIUM CHLORIDE 0.9 % (FLUSH) 0.9 %
10 SYRINGE (ML) INJECTION EVERY 6 HOURS
Status: DISCONTINUED | OUTPATIENT
Start: 2023-03-23 | End: 2023-03-30 | Stop reason: HOSPADM

## 2023-03-23 RX ORDER — MAGNESIUM SULFATE HEPTAHYDRATE 40 MG/ML
2 INJECTION, SOLUTION INTRAVENOUS ONCE
Status: COMPLETED | OUTPATIENT
Start: 2023-03-23 | End: 2023-03-23

## 2023-03-23 RX ORDER — METOLAZONE 2.5 MG/1
5 TABLET ORAL ONCE
Status: COMPLETED | OUTPATIENT
Start: 2023-03-23 | End: 2023-03-23

## 2023-03-23 RX ORDER — POTASSIUM CHLORIDE 20 MEQ/1
20 TABLET, EXTENDED RELEASE ORAL
Status: COMPLETED | OUTPATIENT
Start: 2023-03-23 | End: 2023-03-23

## 2023-03-23 RX ORDER — FUROSEMIDE 10 MG/ML
120 INJECTION INTRAMUSCULAR; INTRAVENOUS ONCE
Status: COMPLETED | OUTPATIENT
Start: 2023-03-23 | End: 2023-03-23

## 2023-03-23 RX ORDER — LISINOPRIL 20 MG/1
40 TABLET ORAL DAILY
Status: DISCONTINUED | OUTPATIENT
Start: 2023-03-23 | End: 2023-03-26

## 2023-03-23 RX ADMIN — MUPIROCIN: 20 OINTMENT TOPICAL at 08:03

## 2023-03-23 RX ADMIN — CARVEDILOL 3.12 MG: 3.12 TABLET, FILM COATED ORAL at 08:03

## 2023-03-23 RX ADMIN — CARVEDILOL 3.12 MG: 3.12 TABLET, FILM COATED ORAL at 04:03

## 2023-03-23 RX ADMIN — MAGNESIUM SULFATE 2 G: 2 INJECTION INTRAVENOUS at 01:03

## 2023-03-23 RX ADMIN — SODIUM CHLORIDE, PRESERVATIVE FREE 10 ML: 5 INJECTION INTRAVENOUS at 12:03

## 2023-03-23 RX ADMIN — PREDNISOLONE ACETATE 1 DROP: 10 SUSPENSION/ DROPS OPHTHALMIC at 01:03

## 2023-03-23 RX ADMIN — TIMOLOL MALEATE 1 DROP: 5 SOLUTION/ DROPS OPHTHALMIC at 08:03

## 2023-03-23 RX ADMIN — PREDNISOLONE ACETATE 1 DROP: 10 SUSPENSION/ DROPS OPHTHALMIC at 08:03

## 2023-03-23 RX ADMIN — VANCOMYCIN HYDROCHLORIDE 1000 MG: 1 INJECTION, POWDER, LYOPHILIZED, FOR SOLUTION INTRAVENOUS at 03:03

## 2023-03-23 RX ADMIN — BRIMONIDINE TARTRATE 1 DROP: 2 SOLUTION OPHTHALMIC at 08:03

## 2023-03-23 RX ADMIN — OSELTAMIVIR PHOSPHATE 30 MG: 30 CAPSULE ORAL at 08:03

## 2023-03-23 RX ADMIN — ATORVASTATIN CALCIUM 20 MG: 20 TABLET, FILM COATED ORAL at 08:03

## 2023-03-23 RX ADMIN — POTASSIUM CHLORIDE 20 MEQ: 1500 TABLET, EXTENDED RELEASE ORAL at 04:03

## 2023-03-23 RX ADMIN — AZITHROMYCIN MONOHYDRATE 500 MG: 500 INJECTION, POWDER, LYOPHILIZED, FOR SOLUTION INTRAVENOUS at 12:03

## 2023-03-23 RX ADMIN — FUROSEMIDE 13 MG/HR: 10 INJECTION, SOLUTION INTRAMUSCULAR; INTRAVENOUS at 03:03

## 2023-03-23 RX ADMIN — CEFTRIAXONE 1 G: 1 INJECTION, POWDER, FOR SOLUTION INTRAMUSCULAR; INTRAVENOUS at 08:03

## 2023-03-23 RX ADMIN — LEVOTHYROXINE SODIUM 125 MCG: 25 TABLET ORAL at 05:03

## 2023-03-23 RX ADMIN — POTASSIUM CHLORIDE 20 MEQ: 1500 TABLET, EXTENDED RELEASE ORAL at 05:03

## 2023-03-23 RX ADMIN — LISINOPRIL 40 MG: 20 TABLET ORAL at 08:03

## 2023-03-23 RX ADMIN — BUMETANIDE 2 MG: 0.25 INJECTION INTRAMUSCULAR; INTRAVENOUS at 08:03

## 2023-03-23 RX ADMIN — APIXABAN 2.5 MG: 2.5 TABLET, FILM COATED ORAL at 08:03

## 2023-03-23 RX ADMIN — ATROPINE SULFATE 1 DROP: 10 SOLUTION/ DROPS OPHTHALMIC at 08:03

## 2023-03-23 RX ADMIN — BUSPIRONE HYDROCHLORIDE 5 MG: 5 TABLET ORAL at 08:03

## 2023-03-23 RX ADMIN — METOLAZONE 5 MG: 2.5 TABLET ORAL at 02:03

## 2023-03-23 RX ADMIN — SODIUM CHLORIDE, PRESERVATIVE FREE 10 ML: 5 INJECTION INTRAVENOUS at 06:03

## 2023-03-23 RX ADMIN — PREDNISOLONE ACETATE 1 DROP: 10 SUSPENSION/ DROPS OPHTHALMIC at 04:03

## 2023-03-23 RX ADMIN — FUROSEMIDE 120 MG: 10 INJECTION, SOLUTION INTRAMUSCULAR; INTRAVENOUS at 09:03

## 2023-03-23 RX ADMIN — POTASSIUM CHLORIDE 20 MEQ: 1500 TABLET, EXTENDED RELEASE ORAL at 01:03

## 2023-03-23 NOTE — EICU
Intervention Initiated From:  Bedside    Brittny intervened regarding:  Documentation and Time-Out    Nurse Notified:  No    Doctor Notified:  No    Comments: Call to pt's room by PICC RN Gaston Dubois, for documentation of timeout prior to midline RUE placement. See bedside procedural flowsheet for details.

## 2023-03-23 NOTE — PLAN OF CARE
Problem: Adult Inpatient Plan of Care  Goal: Plan of Care Review  Outcome: Ongoing, Progressing  Goal: Patient-Specific Goal (Individualized)  Outcome: Ongoing, Progressing  Goal: Absence of Hospital-Acquired Illness or Injury  Outcome: Ongoing, Progressing  Goal: Optimal Comfort and Wellbeing  Outcome: Ongoing, Progressing  Goal: Readiness for Transition of Care  Outcome: Ongoing, Progressing     Problem: Fall Injury Risk  Goal: Absence of Fall and Fall-Related Injury  Outcome: Ongoing, Progressing     Problem: Skin Injury Risk Increased  Goal: Skin Health and Integrity  Outcome: Ongoing, Progressing     Problem: Restraint, Nonbehavioral (Nonviolent)  Goal: Absence of Harm or Injury  Outcome: Ongoing, Progressing     Problem: Infection  Goal: Absence of Infection Signs and Symptoms  Outcome: Ongoing, Progressing

## 2023-03-23 NOTE — PLAN OF CARE
Pt transitioned from Bipap to 2L NC this morning maintaining O2 sats >95%. More alert and better able to participate, respond to questions, and follow commands compared to yesterday. ST seen pt today, full liquid diet ordered and Ngtube discontinued. IV lasix drip stopped this morning and order adjusted to IV push dose per MD. Diuresing good. Pt has transfer orders in place, tatum cath discontinued and placed purwick.     Problem: Adult Inpatient Plan of Care  Goal: Plan of Care Review  Outcome: Ongoing, Progressing     Problem: Adult Inpatient Plan of Care  Goal: Patient-Specific Goal (Individualized)  Outcome: Ongoing, Progressing     Problem: Adult Inpatient Plan of Care  Goal: Optimal Comfort and Wellbeing  Outcome: Ongoing, Progressing     Problem: Restraint, Nonbehavioral (Nonviolent)  Goal: Absence of Harm or Injury  Outcome: Met     Problem: Infection  Goal: Absence of Infection Signs and Symptoms  Outcome: Ongoing, Progressing

## 2023-03-23 NOTE — PLAN OF CARE
Swallow eval initiated, pt now on nasal cannula and satting at %. Pt needs constant interaction and verbal cues to stay engaged and alert for participation in swallow study. REC: FULL liquid diet for now with crushed oral meds (large, may swallow small meds), needs assist with all eating. Notified primary MD team of above.

## 2023-03-23 NOTE — PROGRESS NOTES
[facility]                   Pharmacy  Pharmacy Vancomycin/AG Sign-off    Therapy with vancomycin completed and/or consult discontinued by provider.  Pharmacy will sign off, please re-consult as needed. Thank you for allowing us to participate in this patient's care.     Jesica Giordano, PharmD    [phone]

## 2023-03-23 NOTE — PROGRESS NOTES
[facility] Medicine Progress Note    Primary Team: [facility] Hospitalist Team B  Attending Physician: Jean Carlos Guido MD  Resident: Ahsan  Intern: Jose Roberto    Subjective:      Pt this morning on 2L NC satting 100% on room air without any distress. Pt awake and conversant with mild amount of confusion. Denies any new or worsening symptoms. PT with 2L of UOP overnight and net negative 800cc. Afebrile and mildly hypertensive overnight.     Objective:     Last 24 Hour Vital Signs:  BP  Min: 77/39  Max: 192/83  Temp  Av °F (36.1 °C)  Min: 96.2 °F (35.7 °C)  Max: 97.6 °F (36.4 °C)  Pulse  Av  Min: 60  Max: 60  Resp  Av  Min: 14  Max: 37  SpO2  Av.4 %  Min: 95 %  Max: 100 %  Weight  Av.5 kg (159 lb 13.3 oz)  Min: 72.5 kg (159 lb 13.3 oz)  Max: 72.5 kg (159 lb 13.3 oz)  I/O last 3 completed shifts:  In: 1383.4 [I.V.:94.7; NG/GT:80; IV Piggyback:1208.7]  Out: 1985 [Urine:1985]    Physical Examination:  Physical Exam  Constitutional:       General: She is not in acute distress.     Appearance: She is not ill-appearing.   HENT:      Head: Normocephalic.      Nose: Nose normal.      Mouth/Throat:      Mouth: Mucous membranes are moist.   Eyes:      General: No scleral icterus.     Extraocular Movements: Extraocular movements intact.   Cardiovascular:      Rate and Rhythm: Normal rate and regular rhythm.      Pulses: Normal pulses.      Heart sounds: Normal heart sounds. No murmur heard.  Pulmonary:      Effort: Pulmonary effort is normal. No respiratory distress.      Breath sounds: Normal breath sounds. No rales.      Comments: Bedside US demonstrated diffuse bilateral B lines.   Abdominal:      General: Bowel sounds are normal. There is no distension.      Palpations: Abdomen is soft.      Tenderness: There is no abdominal tenderness.   Musculoskeletal:         General: Normal range of motion.      Cervical back: Normal range of motion.      Comments: 1+ pitting edema of bilateral lower  extremities, improved from prior day.   Skin:     General: Skin is warm and dry.   Neurological:      Mental Status: She is alert.      Comments: Oriented to person but not place or situation. Moving all four extremities spontaneously without apparent deficit.      Laboratory:  Recent Labs   Lab 03/21/23  1942 03/23/23  1125   WBC 3.88* 4.01   HGB 10.7* 12.4   HCT 37.5 43.1     --    MCV 85 84   RDW 19.4* 19.9*     --    K 4.5  --      --    CO2 33*  --    BUN 51*  --    CREATININE 0.9  --      --    PROT 9.5*  --    ALBUMIN 3.3*  --    BILITOT 0.9  --    AST 39  --    ALKPHOS 107  --    ALT 25  --      Laboratory Data Reviewed:  Pertinent Findings:  Pending morning labs    Microbiology Data Reviewed:  Pertinent Findings:  UCX GNR pending speciation and sensitivities    Other Results:  EKG (my interpretation): No new EKG    Radiology Data Reviewed:   Pertinent Findings:  No new imaging    Current Medications:     Infusions:   sodium chloride 0.9% Stopped (03/23/23 0707)        Scheduled:   apixaban  2.5 mg Oral BID    atorvastatin  20 mg Oral Daily    atropine 1%  1 drop Right Eye BID    brimonidine 0.2%  1 drop Both Eyes BID    busPIRone  5 mg Oral BID    carvediloL  3.125 mg Oral BID WM    cefTRIAXone (ROCEPHIN) IVPB  1 g Intravenous Q24H    levothyroxine  125 mcg Oral Before breakfast    lisinopriL  40 mg Oral Daily    mupirocin   Nasal BID    oseltamivir  30 mg Oral BID    prednisoLONE acetate  1 drop Right Eye QID    sodium chloride 0.9%  10 mL Intravenous Q6H    timolol maleate 0.5%  1 drop Both Eyes BID        PRN:  sodium chloride 0.9%, labetalol, sodium chloride 0.9%, Flushing PICC Protocol **AND** sodium chloride 0.9% **AND** sodium chloride 0.9%    Antibiotics and Day Number of Therapy:  Rocephin 3/21/23-->  Azithro 3/21-->3/23/23  Vanc 3/21/23-->3/23/23    Lines and Day Number of Therapy:  PIV  Midline 3/23/23    Assessment:     Pilar Michael is a 89 y.o.female with a PMH of  HFpEF, BL PE, CAD, HLD, HTN, and recurrent L Breast Cancer who presented with increased work of breathing and AMS x1 day. PT was admitted to [facility] internal medicine for further evaluation and monitoring of acute encephalopathy and hypercapnic and hypoxemic respiratory failure.      Plan:     Acute Hypoxic and Hypercapnic Respiratory Failure 2/2 Acute Decompensated diastolic heart failure and Influenza PNA  - Tmax 99.7, HR 60s, tachypneic, BP up to 214/98 at admission  - Tamiflu started in the setting of influenza B positive (symptoms over 2 weeks but patient severely ill and high risk for further decompensation)  - BNP 2929 (significantly higher than prior)  - ABG 7.4/63.5/74/39.4; started BiPAP in the setting of hypercapnea  - bilateral Pleural Effusion and worsening LLL Consolidation c/f   - initially placed on a Lasix gtt with appropriate UOP and transitioned to bolus dosing following diuresis and clinical improvement  - PT initiated on broad spectrum antibiotics initially and deescalated to rocephin on 3/23/23  - blood cultures NGTD @1 day  - recent removal of ~1L from pleural effusion in Jan 2023 outpatient  - given bilateral nature, effusions are likely 2/2 acute volume overload, will diurese and reassess need for thoracentesis    Acute encephalopathy, improving  - PT initiatally with decreased level of alertness with confusion  - CO2 74 on admission  - UA demonstrated GNR  - currently on rocephin   - clinically improved on day 2 of hospitalization  - will continue to monitor     Urinary Tract Infection  - may contribute to acute encephalopathy  - has recurrent UTIs, on chronic methenamine, recently finished course of nitrofuantoin 6 days ago  - h/o E.coli resistant to fluoroquinolones  - reports on treatment with meropenem when she was admitted in hospital with COVID which was later de-escalated to CTX  - continue CTX as stated above  - UCx GNR     Pulmonary Embolism (Oct 2022)  - continue home Eliquis      Breast Cancer (2002 with recurrence 2021) s/p mastectomy  Thyroid Cancer s/p thyroidectomy, hypothyroidism  - will re-assess effusion s/p aggressive diuresis     Stroke  - patient's daughter declines residual deficit except R eye dilation  - continue home Plavix     S/p TAVR 2019  - managing anticoagulation as above     CAD s/p oRCA PCI with BMS 2019  S/p Dual Chamber Pacemaker  - troponin consistent with priors     Essential HTN  - continue home coreg, lisinopril  - will continue to monitor and adjust accordingly     Glaucoma  - continue home eye drops    Diet: Full liquid  DVT Prophylaxis: eliquis  IVF: None  Code: Full    Dispo: Pending continued improvement in volume status and encephalopathy. Step down to the floor today.    Yuriy Foreman MD  [facility] Internal Medicine HO-3    [facility] Medicine Hospitalist Pager numbers:   [facility] Hospitalist Medicine Team A (Edgard/Larisa): [phone]  [facility] Hospitalist Medicine Team B (Crystal/Hay):  [phone]

## 2023-03-23 NOTE — PT/OT/SLP EVAL
Speech Language Pathology Evaluation  Bedside Swallow    Patient Name:  Pilar Michael   MRN:  [id number]  Admitting Diagnosis: Acute encephalopathy    Recommendations:                 General Recommendations:  monitor PO tolerance, ongoing assessment   Diet recommendations:  NPO, Full liquids   Aspiration Precautions: upright for PO, slow rate, alternate sips and bites, crush large meds   General Precautions: Standard, fall  Communication strategies:  reorient.     History per MD      Pilar Michael is a 89 y.o. female with a history of CAD s/p PCI, TAVR (2019) on chronic anticoagulant use, HFpEF s/p pacemaker, breast cancer s/p mastectomy, thyroid cancer s/p thyroidectomy, CVA without residual deficits (R eye symptoms), bilateral PEs in the setting of COVID infection and UTI (October 2022), HTN, glaucoma who presented to [facility] on 3/21/2023 with a primary complaint of altered mental status.     Patient somnolent on exam and moaning, minimally responsive to questions. History obtained from patient's daughter at bedside. Patient was exhibiting increased lethargy and malaise with intermittent confusion over the last 2 weeks. She went to PCP last week and wad diagnosed with UTI, sent out with Macrobid that ended 6 days PTA. Patient's daughter reports that over the past 3-4 days patient has acutely worsened to the point where she just moans and will barely respond to questions. Additionally has intermittently required more oxygen over the last few days. SpO2 would drop into low 80s so patient's oxygen was turned up to 3L from normal 2L with improvement to high 80s-low 90s. Patient's daughter found geriatric docter that will do house calls who told patient that her lungs may be filled with fluid. Patient additionally with increased shortness of breath with accessory muscle use. Endorses progressively decreasing PO intake. Possible subjective fevers over the last 2 weeks. She has a history of  recurrent UTIs, including reported MDR E.coli. She takes chronic methenamine. Also was recently hospitalized in October 2022 with COVID PNA complicated by bilateral PEs and MDR UTI. She was treated with meropenem which was de-escalated to CTX at that time. Patient also had recent outpatient thoracentesis in January for chronic pleural effusion. Cannot see fluid studies or notes in EHR but patient's daughter states they removed 1L pleural fluid at that time that was negative for infection. Patient's daughter unaware of any recent sick contacts but the daughter did say she was congested a couple weeks ago. Patient received flu shot this year (October) and has received 3 COVID vaccinations. Regularly taking medications as prescribed, but has not been able to get some recently due to somnolence. Only recent medication change was addition of lorazepam for sleep that she has had over the last 3 days. Denies nausea, vomiting, constipation, diarrhea. Last bowel movement ~48 hours ago which is normal for patient per daughter.        Past Medical History:   Diagnosis Date    Anticoagulant long-term use     Arthritis     Breast cancer     radiation 2003    Cancer     CHF (congestive heart failure)     Coronary artery disease     Glaucoma, right eye     Hypertension     Pulmonary emboli     Stroke     Thyroid cancer     Thyroid disease        Past Surgical History:   Procedure Laterality Date    CARDIAC CATHETERIZATION      CARDIAC VALVE SURGERY      CHOLECYSTECTOMY      CORONARY ANGIOGRAPHY N/A 04/23/2019    Procedure: ANGIOGRAM, CORONARY ARTERY;  Surgeon: Bakari Singletary MD;  Location: [facility] CATH LAB;  Service: Cardiology;  Laterality: N/A;    HYSTERECTOMY      lymph nodes removal      MASTECTOMY  01/2022    THYROIDECTOMY  1992    TRANSCATHETER AORTIC VALVE REPLACEMENT (TAVR) N/A 05/07/2019    Procedure: REPLACEMENT, AORTIC VALVE, TRANSCATHETER (TAVR);  Surgeon: Bakari Singletary MD;  Location: [facility] CATH LAB;  Service:  Cardiology;  Laterality: N/A;       Social History: Patient lives with dtr at home    Prior Intubation HX:  n/a    Modified Barium Swallow: none per EMR    CT: No acute intracranial abnormality.     Chest X-Rays: Pulmonary edema and bilateral effusions as above.     Prior diet: soft diet at home     Subjective     ST ordered for pt to assess swallowing. Pt seen in ICU, remains on flu+ precautions.   Patient goals: none stated, no family present     Pain/Comfort:  Pain Rating 1: 0/10    Respiratory Status: NC    Objective:   RN did ok for assessment.  Pt is awake and alert, oriented to self, delayed responses and noted to be closing eyes during assessment.   Pt needed continued verbal cues to stay attentive and engaged in swallow eval.    Oral Musculature Evaluation  Oral Musculature: general weakness  Dentition: present and adequate  Secretion Management: adequate  Mucosal Quality: dry  Mandibular Strength and Mobility:  (fair)  Oral Labial Strength and Mobility:  (fair)  Lingual Strength and Mobility:  (fair)  Buccal Strength and Mobility:  (fair)  Volitional Cough: elicited  Volitional Swallow: slight delay in swallow trigger  Voice Prior to PO Intake: clear voice    Bedside Swallow Eval:   Consistencies Assessed:  Ice chips  Water by tsp   Pinched straw  Applesauce bites      Oral Phase:   Decreased closure around utensil  Oral residue  Slow oral transit time    Pharyngeal Phase:   decreased hyolaryngeal excursion to palpation  delayed swallow initation  no overt clinical signs/symptoms of aspiration  no overt clinical signs/symptoms of pharyngeal dysphagia  multiple spontaneous swallows  No desaturation noted     Compensatory Strategies  Slow rate  Must be alert and awake for PO intake  Crush meds  Assist for all feeding    Treatment: Attempted education with pt with little recall noted, no family present. SLP did discuss diet recs for full liquids with RN and MD via secure chat.   Pt not safe for solids due to  limited wakeful state at this time and needs continued verbal cues to stay alert and engaged. Will continue to monitor her status.     Assessment:     Pilar Michael is a 89 y.o. female admitted with Acute encephalopathy who presents with an SLP diagnosis of mild oral dysphagia which is impacted by intermittent BRIDGETT which tends to fluctuate depending on how much external stimulation she requires to stay attentive.     Goals:   Multidisciplinary Problems       SLP Goals       Not on file                    Plan:     Patient to be seen:  3 x/week   Plan of Care expires:  04/21/23  Plan of Care reviewed with:  patient   SLP Follow-Up:  Yes       Discharge recommendations:   (TBD)   Barriers to Discharge:  None    Time Tracking:     SLP Treatment Date:   03/23/23  Speech Start Time:  0902  Speech Stop Time:  0923     Speech Total Time (min):  21 min    Billable Minutes: Eval Swallow and Oral Function 12 and Self Care/Home Management Training 9    03/23/2023

## 2023-03-24 LAB
ALBUMIN SERPL BCP-MCNC: 2.7 G/DL (ref 3.5–5.2)
ALP SERPL-CCNC: 95 U/L (ref 55–135)
ALT SERPL W/O P-5'-P-CCNC: 22 U/L (ref 10–44)
ANION GAP SERPL CALC-SCNC: 10 MMOL/L (ref 8–16)
AST SERPL-CCNC: 37 U/L (ref 10–40)
BACTERIA UR CULT: ABNORMAL
BASOPHILS # BLD AUTO: 0.01 K/UL (ref 0–0.2)
BASOPHILS NFR BLD: 0.2 % (ref 0–1.9)
BILIRUB SERPL-MCNC: 0.8 MG/DL (ref 0.1–1)
BUN SERPL-MCNC: 34 MG/DL (ref 8–23)
CALCIUM SERPL-MCNC: 9.4 MG/DL (ref 8.7–10.5)
CHLORIDE SERPL-SCNC: 96 MMOL/L (ref 95–110)
CO2 SERPL-SCNC: 36 MMOL/L (ref 23–29)
CREAT SERPL-MCNC: 0.8 MG/DL (ref 0.5–1.4)
DIFFERENTIAL METHOD: ABNORMAL
EOSINOPHIL # BLD AUTO: 0.2 K/UL (ref 0–0.5)
EOSINOPHIL NFR BLD: 3.4 % (ref 0–8)
ERYTHROCYTE [DISTWIDTH] IN BLOOD BY AUTOMATED COUNT: 19.3 % (ref 11.5–14.5)
EST. GFR  (NO RACE VARIABLE): >60 ML/MIN/1.73 M^2
GLUCOSE SERPL-MCNC: 94 MG/DL (ref 70–110)
HCT VFR BLD AUTO: 31.3 % (ref 37–48.5)
HGB BLD-MCNC: 9.2 G/DL (ref 12–16)
IMM GRANULOCYTES # BLD AUTO: 0.04 K/UL (ref 0–0.04)
IMM GRANULOCYTES NFR BLD AUTO: 0.9 % (ref 0–0.5)
LYMPHOCYTES # BLD AUTO: 0.7 K/UL (ref 1–4.8)
LYMPHOCYTES NFR BLD: 15.4 % (ref 18–48)
MCH RBC QN AUTO: 24.8 PG (ref 27–31)
MCHC RBC AUTO-ENTMCNC: 29.4 G/DL (ref 32–36)
MCV RBC AUTO: 84 FL (ref 82–98)
MONOCYTES # BLD AUTO: 0.5 K/UL (ref 0.3–1)
MONOCYTES NFR BLD: 11.6 % (ref 4–15)
NEUTROPHILS # BLD AUTO: 3.1 K/UL (ref 1.8–7.7)
NEUTROPHILS NFR BLD: 68.5 % (ref 38–73)
NRBC BLD-RTO: 0 /100 WBC
PLATELET # BLD AUTO: 169 K/UL (ref 150–450)
PMV BLD AUTO: 12.1 FL (ref 9.2–12.9)
POTASSIUM SERPL-SCNC: 4 MMOL/L (ref 3.5–5.1)
PROT SERPL-MCNC: 8 G/DL (ref 6–8.4)
RBC # BLD AUTO: 3.71 M/UL (ref 4–5.4)
SODIUM SERPL-SCNC: 142 MMOL/L (ref 136–145)
WBC # BLD AUTO: 4.47 K/UL (ref 3.9–12.7)

## 2023-03-24 PROCEDURE — 25000003 PHARM REV CODE 250: Performed by: INTERNAL MEDICINE

## 2023-03-24 PROCEDURE — 11000001 HC ACUTE MED/SURG PRIVATE ROOM

## 2023-03-24 PROCEDURE — 25000003 PHARM REV CODE 250: Performed by: STUDENT IN AN ORGANIZED HEALTH CARE EDUCATION/TRAINING PROGRAM

## 2023-03-24 PROCEDURE — 97530 THERAPEUTIC ACTIVITIES: CPT

## 2023-03-24 PROCEDURE — 85025 COMPLETE CBC W/AUTO DIFF WBC: CPT | Performed by: STUDENT IN AN ORGANIZED HEALTH CARE EDUCATION/TRAINING PROGRAM

## 2023-03-24 PROCEDURE — 93010 ELECTROCARDIOGRAM REPORT: CPT | Mod: ,,, | Performed by: INTERNAL MEDICINE

## 2023-03-24 PROCEDURE — 80053 COMPREHEN METABOLIC PANEL: CPT | Performed by: STUDENT IN AN ORGANIZED HEALTH CARE EDUCATION/TRAINING PROGRAM

## 2023-03-24 PROCEDURE — 94761 N-INVAS EAR/PLS OXIMETRY MLT: CPT

## 2023-03-24 PROCEDURE — 63600175 PHARM REV CODE 636 W HCPCS: Performed by: STUDENT IN AN ORGANIZED HEALTH CARE EDUCATION/TRAINING PROGRAM

## 2023-03-24 PROCEDURE — 27000207 HC ISOLATION

## 2023-03-24 PROCEDURE — 27000221 HC OXYGEN, UP TO 24 HOURS

## 2023-03-24 PROCEDURE — 97165 OT EVAL LOW COMPLEX 30 MIN: CPT

## 2023-03-24 PROCEDURE — A4216 STERILE WATER/SALINE, 10 ML: HCPCS | Performed by: INTERNAL MEDICINE

## 2023-03-24 PROCEDURE — 97162 PT EVAL MOD COMPLEX 30 MIN: CPT

## 2023-03-24 PROCEDURE — 93005 ELECTROCARDIOGRAM TRACING: CPT

## 2023-03-24 PROCEDURE — 99900035 HC TECH TIME PER 15 MIN (STAT)

## 2023-03-24 PROCEDURE — 93010 EKG 12-LEAD: ICD-10-PCS | Mod: ,,, | Performed by: INTERNAL MEDICINE

## 2023-03-24 PROCEDURE — 92526 ORAL FUNCTION THERAPY: CPT

## 2023-03-24 RX ORDER — BUMETANIDE 0.25 MG/ML
1 INJECTION INTRAMUSCULAR; INTRAVENOUS EVERY 12 HOURS
Status: DISCONTINUED | OUTPATIENT
Start: 2023-03-24 | End: 2023-03-24

## 2023-03-24 RX ORDER — BUMETANIDE 0.25 MG/ML
2 INJECTION INTRAMUSCULAR; INTRAVENOUS EVERY 12 HOURS
Status: DISCONTINUED | OUTPATIENT
Start: 2023-03-24 | End: 2023-03-26

## 2023-03-24 RX ADMIN — MUPIROCIN: 20 OINTMENT TOPICAL at 08:03

## 2023-03-24 RX ADMIN — APIXABAN 2.5 MG: 2.5 TABLET, FILM COATED ORAL at 08:03

## 2023-03-24 RX ADMIN — ATROPINE SULFATE 1 DROP: 10 SOLUTION/ DROPS OPHTHALMIC at 08:03

## 2023-03-24 RX ADMIN — BRIMONIDINE TARTRATE 1 DROP: 2 SOLUTION OPHTHALMIC at 09:03

## 2023-03-24 RX ADMIN — BUSPIRONE HYDROCHLORIDE 5 MG: 5 TABLET ORAL at 08:03

## 2023-03-24 RX ADMIN — CARVEDILOL 3.12 MG: 3.12 TABLET, FILM COATED ORAL at 09:03

## 2023-03-24 RX ADMIN — SODIUM CHLORIDE, PRESERVATIVE FREE 10 ML: 5 INJECTION INTRAVENOUS at 06:03

## 2023-03-24 RX ADMIN — TIMOLOL MALEATE 1 DROP: 5 SOLUTION/ DROPS OPHTHALMIC at 09:03

## 2023-03-24 RX ADMIN — PREDNISOLONE ACETATE 1 DROP: 10 SUSPENSION/ DROPS OPHTHALMIC at 09:03

## 2023-03-24 RX ADMIN — OSELTAMIVIR PHOSPHATE 30 MG: 30 CAPSULE ORAL at 08:03

## 2023-03-24 RX ADMIN — PREDNISOLONE ACETATE 1 DROP: 10 SUSPENSION/ DROPS OPHTHALMIC at 04:03

## 2023-03-24 RX ADMIN — BRIMONIDINE TARTRATE 1 DROP: 2 SOLUTION OPHTHALMIC at 08:03

## 2023-03-24 RX ADMIN — CEFTRIAXONE 1 G: 1 INJECTION, POWDER, FOR SOLUTION INTRAMUSCULAR; INTRAVENOUS at 08:03

## 2023-03-24 RX ADMIN — LEVOTHYROXINE SODIUM 125 MCG: 25 TABLET ORAL at 06:03

## 2023-03-24 RX ADMIN — BUMETANIDE 2 MG: 0.25 INJECTION INTRAMUSCULAR; INTRAVENOUS at 08:03

## 2023-03-24 RX ADMIN — CARVEDILOL 3.12 MG: 3.12 TABLET, FILM COATED ORAL at 04:03

## 2023-03-24 RX ADMIN — PREDNISOLONE ACETATE 1 DROP: 10 SUSPENSION/ DROPS OPHTHALMIC at 08:03

## 2023-03-24 RX ADMIN — ATORVASTATIN CALCIUM 20 MG: 20 TABLET, FILM COATED ORAL at 08:03

## 2023-03-24 RX ADMIN — PREDNISOLONE ACETATE 1 DROP: 10 SUSPENSION/ DROPS OPHTHALMIC at 01:03

## 2023-03-24 RX ADMIN — SODIUM CHLORIDE, PRESERVATIVE FREE 10 ML: 5 INJECTION INTRAVENOUS at 12:03

## 2023-03-24 RX ADMIN — LISINOPRIL 40 MG: 20 TABLET ORAL at 08:03

## 2023-03-24 RX ADMIN — ATROPINE SULFATE 1 DROP: 10 SOLUTION/ DROPS OPHTHALMIC at 09:03

## 2023-03-24 NOTE — PLAN OF CARE
Pt would benefit from cont OT services in order to maximize functional independence. Recommending 24/7 care/assistance upon d/c. Pt would benefit from post acute placement. Pt requires MaxA x2 for all bed mobility & sit<>stand. Will progress as able.     Problem: Occupational Therapy  Goal: Occupational Therapy Goal  Description: Goals to be met by: 4/24/2023     Patient will increase functional independence with ADLs by performing:    Feeding with Set-up Assistance.  Grooming while seated with Set-up Assistance.  Stand pivot transfers with Minimal Assistance & appropriate AD.  Toilet transfer to bedside commode with Moderate Assistance & appropriate AD.    Outcome: Ongoing, Progressing

## 2023-03-24 NOTE — PT/OT/SLP EVAL
Physical Therapy Evaluation    Patient Name:  Pilar Michael   MRN:  [id number]    Recommendations:     Discharge Recommendations:  (post acute placement vs. home with 24/7 supervision/assist)   Discharge Equipment Recommendations: none   Barriers to discharge:  current functional mobility status requires max assist x 2    Assessment:     Pilar Michael is a 89 y.o. female admitted with a medical diagnosis of Acute encephalopathy.  She presents with the following impairments/functional limitations: weakness, impaired functional mobility, impaired cognition, decreased safety awareness, impaired endurance, gait instability, impaired balance, decreased upper extremity function, impaired self care skills, decreased lower extremity function, decreased ROM, edema   Patient seen for physical therapy evaluation on this date, co-evaluation with occupational therapy.  Patient oriented to self, states she is in hospital.  Daughter present for beginning of evaluation.  Patient follows simple commands.  Full report to follow.  Anticipate patient will benefit from post acute placement for continued rehabilitation.  Should patient return home, she will likely require 24/7 supervision/assist.  Physical therapy will continue to follow.    Rehab Prognosis: Fair; patient would benefit from acute skilled PT services to address these deficits and reach maximum level of function.    Recent Surgery: * No surgery found *      Plan:     During this hospitalization, patient to be seen 3 x/week to address the identified rehab impairments via gait training, therapeutic activities, therapeutic exercises, neuromuscular re-education and progress toward the following goals:    Plan of Care Expires:  04/23/23    Subjective     Chief Complaint: does not verbalize, very lethargic throughout session  Patient/Family Comments/goals: To return home at [facility]  Pain/Comfort:  Pain Rating 1: 0/10  Pain Rating Post-Intervention 1: 0/10    Patients  cultural, spiritual, Yazdanism conflicts given the current situation: no    Living Environment:  Patient lives with daughter in a 1 SH, 3 STEPHEN, but has ramp, T/S with bath bench in bathroom.    Prior to admission, patients level of function was patient's daugher states that up to 1 week ago, patient was able to transfer and take a few steps with RW with little assist, but required assist with all ADLs.  Patient wears briefs and is incontinent, does self feed.  Equipment used at home: wheelchair, bedside commode, bath bench, raised toilet, rollator, walker, rolling, oxygen, other (see comments) (transport chair, adjustable chair).  DME owned (not currently used): none.  Upon discharge, patient will have assistance from daughter vs. Staff at next facility.    Objective:     Communicated with nurse prior to session.  Patient found supine with peripheral IV, telemetry, pulse ox (continuous), blood pressure cuff, PureWick  upon PT entry to room.    General Precautions: Standard, fall, droplet, respiratory  Orthopedic Precautions:N/A   Braces: N/A  Respiratory Status: Nasal cannula, flow 2 L/min    Exams:  Cognitive Exam:  Patient is oriented to person, , states she is in a hospital.  Cannot state date.  Follows simple commands, Very lethargic throughout evaluation.  Fine Motor Coordination:    -       Impaired  due to max edema L UE  Gross Motor Coordination:  limited due to edema, weakness  Postural Exam:  Patient presented with the following abnormalities:    -       Rounded shoulders  -       Forward head  -       Kyphosis  Sensation:  full sensory eval not performed due to lethargy and decr participation  Skin Integrity/Edema:      -       Skin integrity:  redness on B 1st toes  -       Edema: Moderate throughout entire body, L UE with MAX edema throughout  RLE ROM: WFL, dorsiflexion to neutral  RLE Strength: does not participate in MMT, moves R LE against gravity  LLE ROM: WFL except dorsiflexion to neutral  only  LLE Strength: does not participate in MMT, moves L LE against gravity    Functional Mobility:  Bed Mobility:     Rolling Left:  maximal assistance and of 2 persons  Scooting: maximal assistance and of 2 persons  Supine to Sit: maximal assistance and of 2 persons  Sit to Supine: maximal assistance and of 2 persons  Transfers:     Sit to Stand:  maximal assistance and of 2 persons with rolling walker - x 2 trials, 5 sec, then 45 sec -emphasis on upright posture  Balance: Seated:  tolerates sitting balance on EOB x ~ 12 minutes with CG/Min A, forward head, difficulty maintaining wakefulness.   Standing:  requires RW and Max Assist x 2      AM-PAC 6 CLICK MOBILITY  Total Score:9       Treatment & Education:  Patient's daughter present for beginning of evaluation, able to provide history.  Patient's daughter provides 24/7 assist to her mother at this time.  Daughter states patient has had a decline over the last few weeks and has been requiring more and more assistance.  At baseline, patient transfers and takes a few steps with her RW with little to no assist, daughter assists with all ADLs.  Patient returns back to supine after two standing trials and sitting balance as above.      Patient left  supine with L UE on pillows  with all lines intact, call button in reach, and nurse notified.    GOALS:   Multidisciplinary Problems       Physical Therapy Goals          Problem: Physical Therapy    Goal Priority Disciplines Outcome Goal Variances Interventions   Physical Therapy Goal     PT, PT/OT Ongoing, Progressing     Description: Goals to be met by: 2023     Patient will increase functional independence with mobility by performin. Supine to sit with MInimal Assistance  2. Sit to supine with MInimal Assistance  3. Rolling to Left and Right with Minimal Assistance.  4. Sit to stand transfer with Minimal Assistance  5. Bed to chair transfer with Minimal Assistance using Rolling Walker  6. Gait  x 10 feet  with Moderate Assistance using Rolling Walker.                          History:     Past Medical History:   Diagnosis Date    Anticoagulant long-term use     Arthritis     Breast cancer     radiation 2003    Cancer     CHF (congestive heart failure)     Coronary artery disease     Glaucoma, right eye     Hypertension     Pulmonary emboli     Stroke     Thyroid cancer     Thyroid disease        Past Surgical History:   Procedure Laterality Date    CARDIAC CATHETERIZATION      CARDIAC VALVE SURGERY      CHOLECYSTECTOMY      CORONARY ANGIOGRAPHY N/A 04/23/2019    Procedure: ANGIOGRAM, CORONARY ARTERY;  Surgeon: Bakari Singletary MD;  Location: [facility] CATH LAB;  Service: Cardiology;  Laterality: N/A;    HYSTERECTOMY      lymph nodes removal      MASTECTOMY  01/2022    THYROIDECTOMY  1992    TRANSCATHETER AORTIC VALVE REPLACEMENT (TAVR) N/A 05/07/2019    Procedure: REPLACEMENT, AORTIC VALVE, TRANSCATHETER (TAVR);  Surgeon: Bakari Singletary MD;  Location: [facility] CATH LAB;  Service: Cardiology;  Laterality: N/A;       Time Tracking:     PT Received On: 03/24/23  PT Start Time: 0934     PT Stop Time: 1006  PT Total Time (min): 32 min     Billable Minutes: Evaluation 9 and Therapeutic Activity 23 03/24/2023

## 2023-03-24 NOTE — PLAN OF CARE
Recommendation:  1. Add Boost pudding BID.   2. Encourage intake at meals as tolerated.   3. Monitor weight/labs.   4. RD to follow to monitor po intake    Goals:  Pt will tolerate diet with at least 50% intake at meals by RD follow up  Nutrition Goal Status: new

## 2023-03-24 NOTE — PROGRESS NOTES
[facility] Medicine Progress Note    Primary Team: [facility] Hospitalist Team B  Attending Physician: Jean Carlos Guido MD  Resident: Ahsan  Intern: Jose Roberto    Subjective:      The patient this morning reports no new or worsening complaints. Overnight she was afebrile and normotensive. 2.6 UOP. PT oriented to person but not place or situation.     Objective:     Last 24 Hour Vital Signs:  BP  Min: 77/39  Max: 166/70  Temp  Av.3 °F (36.3 °C)  Min: 96.2 °F (35.7 °C)  Max: 97.9 °F (36.6 °C)  Pulse  Av  Min: 59  Max: 60  Resp  Av  Min: 15  Max: 38  SpO2  Av.8 %  Min: 95 %  Max: 100 %  Weight  Av.5 kg (164 lb 3.9 oz)  Min: 74.5 kg (164 lb 3.9 oz)  Max: 74.5 kg (164 lb 3.9 oz)  I/O last 3 completed shifts:  In: 960.3 [P.O.:120; I.V.:166.6; NG/GT:80; IV Piggyback:593.7]  Out: 3880 [Urine:3880]    Physical Examination:  Physical Exam  Constitutional:       General: She is not in acute distress.     Appearance: She is not ill-appearing.   HENT:      Head: Normocephalic.      Nose: Nose normal.      Mouth/Throat:      Mouth: Mucous membranes are moist.   Eyes:      General: No scleral icterus.     Extraocular Movements: Extraocular movements intact.   Cardiovascular:      Rate and Rhythm: Normal rate and regular rhythm.      Pulses: Normal pulses.      Heart sounds: Normal heart sounds. No murmur heard.  Pulmonary:      Effort: Pulmonary effort is normal. No respiratory distress.      Breath sounds: Normal breath sounds. No rales.      Comments: Bedside US demonstrated diffuse bilateral B lines.   Abdominal:      General: Bowel sounds are normal. There is no distension.      Palpations: Abdomen is soft.      Tenderness: There is no abdominal tenderness.   Musculoskeletal:         General: Normal range of motion.      Cervical back: Normal range of motion.      Comments: 1+ pitting edema of bilateral lower extremities, improved from prior day.   Skin:     General: Skin is warm and dry.    Neurological:      Mental Status: She is alert.      Comments: Oriented to person but not place or situation. Moving all four extremities spontaneously without apparent deficit.      Laboratory:  Recent Labs   Lab 03/21/23  1942 03/23/23  1125 03/24/23  0434   WBC 3.88* 4.01 4.47   HGB 10.7* 12.4 9.2*   HCT 37.5 43.1 31.3*    107* 169   MCV 85 84 84   RDW 19.4* 19.9* 19.3*    143 142   K 4.5 3.4* 4.0    97 96   CO2 33* 37* 36*   BUN 51* 42* 34*   CREATININE 0.9 0.8 0.8    89 94   PROT 9.5* 7.5 8.0   ALBUMIN 3.3* 2.6* 2.7*   BILITOT 0.9 0.8 0.8   AST 39 37 37   ALKPHOS 107 90 95   ALT 25 26 22       Laboratory Data Reviewed:  Pertinent Findings:  Pending morning labs    Microbiology Data Reviewed:  Pertinent Findings:  UCX GNR pending speciation and sensitivities    Other Results:  EKG (my interpretation): No new EKG    Radiology Data Reviewed:   Pertinent Findings:  No new imaging    Current Medications:     Infusions:   sodium chloride 0.9% Stopped (03/23/23 0707)        Scheduled:   apixaban  2.5 mg Oral BID    atorvastatin  20 mg Oral Daily    atropine 1%  1 drop Right Eye BID    brimonidine 0.2%  1 drop Both Eyes BID    bumetanide  2 mg Intravenous Q12H    busPIRone  5 mg Oral BID    carvediloL  3.125 mg Oral BID WM    cefTRIAXone (ROCEPHIN) IVPB  1 g Intravenous Q24H    levothyroxine  125 mcg Oral Before breakfast    lisinopriL  40 mg Oral Daily    mupirocin   Nasal BID    oseltamivir  30 mg Oral BID    prednisoLONE acetate  1 drop Right Eye QID    sodium chloride 0.9%  10 mL Intravenous Q6H    timolol maleate 0.5%  1 drop Both Eyes BID        PRN:  sodium chloride 0.9%, labetalol, sodium chloride 0.9%, Flushing PICC Protocol **AND** sodium chloride 0.9% **AND** sodium chloride 0.9%    Antibiotics and Day Number of Therapy:  Rocephin 3/21/23-->  Azithro 3/21-->3/23/23  Vanc 3/21/23-->3/23/23    Lines and Day Number of Therapy:  PIV  Midline 3/23/23    Assessment:     Pilar EUGENE  Alec is a 89 y.o.female with a PMH of HFpEF, BL PE, CAD, HLD, HTN, and recurrent L Breast Cancer who presented with increased work of breathing and AMS x1 day. PT was admitted to [facility] internal medicine for further evaluation and monitoring of acute encephalopathy and hypercapnic and hypoxemic respiratory failure.      Plan:     Acute Hypoxic and Hypercapnic Respiratory Failure 2/2 Acute Decompensated diastolic heart failure and Influenza PNA  - Tmax 99.7, HR 60s, tachypneic, BP up to 214/98 at admission  - Tamiflu started in the setting of influenza B positive (symptoms over 2 weeks but patient severely ill and high risk for further decompensation)  - BNP 2929 (significantly higher than prior)  - ABG 7.4/63.5/74/39.4; started BiPAP in the setting of hypercapnea  - bilateral Pleural Effusion and worsening LLL Consolidation c/f   - initially placed on a Lasix gtt with appropriate UOP and transitioned to bolus dosing following diuresis and clinical improvement  - PT initiated on broad spectrum antibiotics initially and deescalated to rocephin on 3/23/23  - blood cultures NGTD @1 day  - recent removal of ~1L from pleural effusion in Jan 2023 outpatient  - given bilateral nature, effusions are likely 2/2 acute volume overload, will diurese and reassess need for thoracentesis  - net 2.6 UOP and 2L negative in the last 24 hours, HCO3 remained stable 37-->36  - will continue with bumex 2mg BID    Acute encephalopathy, improving  - PT initiatally with decreased level of alertness with confusion  - CO2 74 on admission  - UA demonstrated GNR  - currently on rocephin   - clinically improved on day 2 of hospitalization  - will continue to monitor     Urinary Tract Infection  - may contribute to acute encephalopathy  - has recurrent UTIs, on chronic methenamine, recently finished course of nitrofuantoin 6 days ago  - h/o E.coli resistant to fluoroquinolones  - reports on treatment with meropenem when she was admitted in hospital  with COVID which was later de-escalated to CTX  - continue CTX as stated above  - UCx GNR     Pulmonary Embolism (Oct 2022)  - continue home Eliquis     Breast Cancer (2002 with recurrence 2021) s/p mastectomy  Thyroid Cancer s/p thyroidectomy, hypothyroidism  - will re-assess effusion s/p aggressive diuresis     Stroke  - patient's daughter declines residual deficit except R eye dilation  - continue home Plavix     S/p TAVR 2019  - managing anticoagulation as above     CAD s/p oRCA PCI with BMS 2019  S/p Dual Chamber Pacemaker  - troponin consistent with priors     Essential HTN  - continue home coreg, lisinopril  - will continue to monitor and adjust accordingly     Glaucoma  - continue home eye drops    Diet: Full liquid  DVT Prophylaxis: eliquis  IVF: None  Code: Full    Dispo: Pending sensitivities and improvement in mental status.    Yuriy Foreman MD  [facility] Internal Medicine HO-3    [facility] Medicine Hospitalist Pager numbers:   [facility] Hospitalist Medicine Team A (Edgard/Larisa): [phone]  [facility] Hospitalist Medicine Team B (Crystal/Hay):  [phone]

## 2023-03-24 NOTE — PT/OT/SLP PROGRESS
Speech Language Pathology Treatment    Patient Name:  Pilar Michael   MRN:  [id number]  Admitting Diagnosis: Acute encephalopathy    Recommendations:                 Diet recommendations:  Pureed diet and thin liquids, boost in between meals               Aspiration Precautions: upright for PO, slow rate, alternate sips and bites, crush large meds   General Precautions: Standard, fall  Communication strategies:  reorient and maintain wakefulness    Subjective     Pt seen this date in room for direct dysphagia tx. No family present. Pt remains on droplet precautions.   Patient goals: no comment noted.      Pain/Comfort:  Pain Rating 1: 0/10    Respiratory Status: on nasal cannula     Objective:     Has the patient been evaluated by SLP for swallowing?   Yes  Keep patient NPO? No     Swallowing:  Pt seen at bedside, remains on NC. RN reported limited overall PO intake during meals. Pt with good tolerance of oral meds per RN.   Pt was found in bed, noted to be sleepy this AM as well. Pt needed tactile and verbal cues to stay alert and participate in PO trials. Pt agreed to PO trials with SLP. Pt tolerated straw sips of juice with no coughing or choking on liquids. Pt did tolerate applesauce container with slow oral motor control and fair ap transfer. No oral residue present with pureed. Pt did need verbal cues to swallow faster when taking multiple bites back to back. Pt does require encouragement for continued oral intake, added boost for additional nutritional support as pt taking in limited amts during meals. Limited intake is also related to her fluctuating alertness status.   Notified MD and RN of diet upgrade.       Assessment:     Pilar Michael is a 89 y.o. female admitted with Acute encephalopathy who is safe for diet upgrade to pureed and thin liquids, added boost to trays for increased caloric support.       Goals:   Multidisciplinary Problems       SLP Goals       Not on file                    Plan:        Plan of Care expires:  04/21/23  Plan of Care reviewed with:  patient   SLP Follow-Up:  No       Discharge recommendations:   (needs 24/7 care)   Barriers to Discharge:  none    Time Tracking:     SLP Treatment Date:   03/24/23  Speech Start Time:  1105  Speech Stop Time:  1118     Speech Total Time (min):  13 min    Billable Minutes: Treatment Swallowing Dysfunction 13    03/24/2023

## 2023-03-24 NOTE — PLAN OF CARE
Patient seen for physical therapy evaluation on this date, co-evaluation with occupational therapy.  Patient oriented to self, states she is in hospital.  Daughter present for beginning of evaluation.  Patient follows simple commands.  Full report to follow.  Anticipate patient will benefit from post acute placement for continued rehabilitation.  Should patient return home, she will likely require  supervision/assist.  Physical therapy will continue to follow.      Problem: Physical Therapy  Goal: Physical Therapy Goal  Description: Goals to be met by: 2023     Patient will increase functional independence with mobility by performin. Supine to sit with MInimal Assistance  2. Sit to supine with MInimal Assistance  3. Rolling to Left and Right with Minimal Assistance.  4. Sit to stand transfer with Minimal Assistance  5. Bed to chair transfer with Minimal Assistance using Rolling Walker  6. Gait  x 10 feet with Moderate Assistance using Rolling Walker.     3/24/2023 1303 by Sarah Kahn, PT  Outcome: Ongoing, Progressing

## 2023-03-24 NOTE — PLAN OF CARE
Pt seen this date in ICU, remains on flu precautions. Pt may be advanced to pureed diet and thin liquids, will add boost for additional PO nutrition.

## 2023-03-24 NOTE — PT/OT/SLP EVAL
Occupational Therapy   Evaluation    Name: Pilar Michael  MRN: [id number]  Admitting Diagnosis: Acute encephalopathy  Recent Surgery: * No surgery found *      Recommendations:     Discharge Recommendations: other (see comments) (post acute placement vs. home with 24/7 supervision/assist)  Discharge Equipment Recommendations:  none  Barriers to discharge:  Other (Comment) (Pt requires increased level of assistance)    Assessment:     Pilar Michael is a 89 y.o. female with a medical diagnosis of Acute encephalopathy.  She presents with The primary encounter diagnosis was Acute encephalopathy. Diagnoses of Hypertension, Acute decompensated heart failure, Acute respiratory failure with hypoxia and hypercapnia, Chronic combined systolic and diastolic heart failure, and Acute on chronic combined systolic (congestive) and diastolic (congestive) heart failure were also pertinent to this visit. Performance deficits affecting function: weakness, impaired functional mobility, gait instability, impaired endurance, impaired cognition, decreased safety awareness, decreased coordination, decreased upper extremity function, decreased lower extremity function, impaired self care skills, impaired balance, decreased ROM, impaired joint extensibility, impaired fine motor, impaired coordination, impaired cardiopulmonary response to activity, impaired muscle length.      Pt would benefit from cont OT services in order to maximize functional independence. Recommending 24/7 care/assistance upon d/c. Pt would benefit from post acute placement. Pt requires MaxA x2 for all bed mobility & sit<>stand. Will progress as able.     Rehab Prognosis: Fair; patient would benefit from acute skilled OT services to address these deficits and reach maximum level of function.       Plan:     Patient to be seen 3 x/week to address the above listed problems via self-care/home management, therapeutic activities, therapeutic exercises  Plan of Care  "Expires: 04/24/23  Plan of Care Reviewed with: patient, daughter    Subjective     Chief Complaint: Pt reporting she is "sleepy"  Patient/Family Comments/goals: Return home    Occupational Profile:  Living Environment: Pt lives w/daughter, SSH, ramp entrance, tub/shower combo with TTB  Previous level of function: Daughter assists with all ADLs, pt able to feed self with set-up until ~1 week ago needing increased assistance. Pt sleeps in recliner most of time. Was able to do stand/step t/fs prior to becoming weak ~the past week.   Equipment Used at Home: wheelchair, bedside commode, bath bench, raised toilet, walker, rolling, oxygen, other (see comments) (transport chair, adjustable bed)  Assistance upon Discharge: Daughter    Pain/Comfort:  Pain Rating 1: 0/10    Patients cultural, spiritual, Denominational conflicts given the current situation: no    Objective:     Communicated with: nsg prior to session.  Patient found HOB elevated with peripheral IV, telemetry, blood pressure cuff, pulse ox (continuous), PureWick upon OT entry to room.    General Precautions: Standard, fall, droplet, respiratory  Orthopedic Precautions: N/A  Braces: N/A    Occupational Performance:    Bed Mobility:    Patient completed Scooting/Bridging with maximal assistance and 2 persons  Patient completed Supine to Sit with maximal assistance and 2 persons  Patient completed Sit to Supine with maximal assistance and 2 persons    Functional Mobility/Transfers:  Patient completed Sit <> Stand Transfer with maximal assistance and of 2 persons  with  rolling walker x2 trials from EOB; first trial pt unable to achieve stand, returning self to sitting. 2nd trial pt standing with forward flexed posture & downward gaze. Pt standing ~45 seconds during second trial.    Activities of Daily Living:  Grooming: minimum assistance to wash face 2/2 thoroughness    Cognitive/Visual Perceptual:  Cognitive/Psychosocial Skills:     -       Oriented to: Patient is " oriented to person, , states she is in a hospital.  Cannot state date.   -       Memory: Impaired STM and Impaired LTM  -       Safety awareness/insight to disability: impaired   -       Mood/Affect/Coping skills/emotional control: Drowsy    Physical Exam:  Edema:  Severe LUE   Strength:    -       Right Upper Extremity: Fair-  -       Left Upper Extremity: Fair-    AMPAC 6 Click ADL:  AMPAC Total Score: 11    Treatment & Education:  Pt would benefit from cont OT services in order to maximize functional independence.   Pt educated on POC & role of OT.  Pt performing bed mobility as above.  O2 sats stable throughout session.   Pt requires MaxA x2 for all bed mobility & sit<>stand.   Will progress as able.     Patient left HOB elevated with all lines intact, call button in reach, nsg notified, and family present    GOALS:   Multidisciplinary Problems       Occupational Therapy Goals          Problem: Occupational Therapy    Goal Priority Disciplines Outcome Interventions   Occupational Therapy Goal     OT, PT/OT Ongoing, Progressing    Description: Goals to be met by: 2023     Patient will increase functional independence with ADLs by performing:    Feeding with Set-up Assistance.  Grooming while seated with Set-up Assistance.  Stand pivot transfers with Minimal Assistance & appropriate AD.  Toilet transfer to bedside commode with Moderate Assistance & appropriate AD.                         History:     Past Medical History:   Diagnosis Date    Anticoagulant long-term use     Arthritis     Breast cancer     radiation     Cancer     CHF (congestive heart failure)     Coronary artery disease     Glaucoma, right eye     Hypertension     Pulmonary emboli     Stroke     Thyroid cancer     Thyroid disease          Past Surgical History:   Procedure Laterality Date    CARDIAC CATHETERIZATION      CARDIAC VALVE SURGERY      CHOLECYSTECTOMY      CORONARY ANGIOGRAPHY N/A 2019    Procedure: ANGIOGRAM,  CORONARY ARTERY;  Surgeon: Bakari Singletary MD;  Location: [facility] CATH LAB;  Service: Cardiology;  Laterality: N/A;    HYSTERECTOMY      lymph nodes removal      MASTECTOMY  01/2022    THYROIDECTOMY  1992    TRANSCATHETER AORTIC VALVE REPLACEMENT (TAVR) N/A 05/07/2019    Procedure: REPLACEMENT, AORTIC VALVE, TRANSCATHETER (TAVR);  Surgeon: Bakari Singletary MD;  Location: [facility] CATH LAB;  Service: Cardiology;  Laterality: N/A;       Time Tracking:     OT Date of Treatment: 03/24/23  OT Start Time: 0935  OT Stop Time: 1006  OT Total Time (min): 31 min with PT    Billable Minutes:Evaluation 8  Therapeutic Activity 23    3/24/2023

## 2023-03-24 NOTE — PROGRESS NOTES
"[facility] - Intensive Care  Adult Nutrition  Progress Note    SUMMARY       Recommendations    Recommendation:  1. Add Boost pudding BID.   2. Encourage intake at meals as tolerated.   3. Monitor weight/labs.   4. RD to follow to monitor po intake    Goals:  Pt will tolerate diet with at least 50% intake at meals by RD follow up  Nutrition Goal Status: new  Communication of RD Recs: reviewed with RN    Assessment and Plan  Neuro  * Acute encephalopathy  Contributing Nutrition Diagnosis  Inadequate energy intake    Related to (etiology):   Advanced age/dx    Signs and Symptoms (as evidenced by):   Pureed diet, weight loss    Interventions:  Collaboration with other providers  Commercial beverage    Nutrition Diagnosis Status:   New      Malnutrition Assessment     Weight Loss (Malnutrition):  (5% x 4 months)       Reason for Assessment  Reason For Assessment: identified at risk by screening criteria ([facility])  Diagnosis:  (acute encephalopathy)  Relevant Medical History: arthritis, CHF, CAD, HTN, thyroid cancer, thyroidectomy, breast cancer, mastectomy, cholecystectomy, cardiac cath  General Information Comments: Pt on dysphagia pureed diet with Boost Plus BID. Patel 13-stg I buttocks. Noted 9lb weight loss x 4 months. Unable to assess NFPE 2/ 2pt on droplet isolation for influenza.  Nutrition Discharge Planning: d/c diet per SLP recs    Nutrition Risk Screen  Nutrition Risk Screen: difficulty chewing/swallowing    Nutrition/Diet History  Food Preferences: no Shinto or cultural food prefs identified  Spiritual, Cultural Beliefs, Judaism Practices, Values that Affect Care: no  Factors Affecting Nutritional Intake: difficulty/impaired swallowing    Anthropometrics  Temp: 97.8 °F (36.6 °C)  Height Method: Estimated  Height: 5' 2" (157.5 cm)  Height (inches): 62 in  Weight Method: Bed Scale  Weight: 74.5 kg (164 lb 3.9 oz)  Weight (lb): 164.24 lb  Ideal Body Weight (IBW), Female: 110 lb  % Ideal Body Weight, Female (lb): " 149.31 %  BMI (Calculated): 30  BMI Grade: 30 - 34.9- obesity - grade I  Usual Body Weight (UBW), k.5 kg ()  % Usual Body Weight: 95.1  % Weight Change From Usual Weight: -5.1 %     Lab/Procedures/Meds  Pertinent Labs Reviewed: reviewed  Pertinent Labs Comments: BUN 34H, Alb 2.7L  Pertinent Medications Reviewed: reviewed  Pertinent Medications Comments: rocephin    Estimated/Assessed Needs  Weight Used For Calorie Calculations: 74.5 kg (164 lb 3.9 oz)  Energy Calorie Requirements (kcal): 1453  Energy Need Method: [place]-St Jeor (x1.3)  Protein Requirements: 59g (0.8g/kg)  Weight Used For Protein Calculations: 74.5 kg (164 lb 3.9 oz)  Estimated Fluid Requirement Method: RDA Method  RDA Method (mL): 1453     Nutrition Prescription Ordered  Current Diet Order: dysphagia pureed  Oral Nutrition Supplement: Boost plus    Evaluation of Received Nutrient/Fluid Intake  I/O: 415/1050  Energy Calories Required: not meeting needs  Protein Required: not meeting needs  Fluid Required: not meeting needs  Comments: LBM 3/22  % Intake of Estimated Energy Needs: 25 - 50 %  % Meal Intake: 25 - 50 %    Nutrition Risk  Level of Risk/Frequency of Follow-up:  (2xweekly)     Monitor and Evaluation  Food and Nutrient Intake: food and beverage intake  Food and Nutrient Adminstration: diet order  Physical Activity and Function: nutrition-related ADLs and IADLs  Anthropometric Measurements: weight  Biochemical Data, Medical Tests and Procedures: electrolyte and renal panel  Nutrition-Focused Physical Findings: overall appearance     Nutrition Follow-Up  RD Follow-up?: Yes

## 2023-03-24 NOTE — ASSESSMENT & PLAN NOTE
Contributing Nutrition Diagnosis  Inadequate energy intake    Related to (etiology):   Advanced age/dx    Signs and Symptoms (as evidenced by):   Pureed diet, weight loss    Interventions:  Collaboration with other providers  Commercial beverage    Nutrition Diagnosis Status:   Continues

## 2023-03-25 LAB
ALBUMIN SERPL BCP-MCNC: 2.6 G/DL (ref 3.5–5.2)
ALP SERPL-CCNC: 93 U/L (ref 55–135)
ALT SERPL W/O P-5'-P-CCNC: 20 U/L (ref 10–44)
ANION GAP SERPL CALC-SCNC: 12 MMOL/L (ref 8–16)
AST SERPL-CCNC: 27 U/L (ref 10–40)
BASOPHILS # BLD AUTO: 0.01 K/UL (ref 0–0.2)
BASOPHILS NFR BLD: 0.2 % (ref 0–1.9)
BILIRUB SERPL-MCNC: 0.8 MG/DL (ref 0.1–1)
BUN SERPL-MCNC: 29 MG/DL (ref 8–23)
CALCIUM SERPL-MCNC: 9.2 MG/DL (ref 8.7–10.5)
CHLORIDE SERPL-SCNC: 94 MMOL/L (ref 95–110)
CO2 SERPL-SCNC: 37 MMOL/L (ref 23–29)
CREAT SERPL-MCNC: 0.8 MG/DL (ref 0.5–1.4)
DIFFERENTIAL METHOD: ABNORMAL
EOSINOPHIL # BLD AUTO: 0.1 K/UL (ref 0–0.5)
EOSINOPHIL NFR BLD: 2.2 % (ref 0–8)
ERYTHROCYTE [DISTWIDTH] IN BLOOD BY AUTOMATED COUNT: 19 % (ref 11.5–14.5)
EST. GFR  (NO RACE VARIABLE): >60 ML/MIN/1.73 M^2
GLUCOSE SERPL-MCNC: 80 MG/DL (ref 70–110)
HCT VFR BLD AUTO: 33.4 % (ref 37–48.5)
HGB BLD-MCNC: 9.5 G/DL (ref 12–16)
IMM GRANULOCYTES # BLD AUTO: 0.02 K/UL (ref 0–0.04)
IMM GRANULOCYTES NFR BLD AUTO: 0.4 % (ref 0–0.5)
LYMPHOCYTES # BLD AUTO: 0.8 K/UL (ref 1–4.8)
LYMPHOCYTES NFR BLD: 16.5 % (ref 18–48)
MAGNESIUM SERPL-MCNC: 1.9 MG/DL (ref 1.6–2.6)
MCH RBC QN AUTO: 23.8 PG (ref 27–31)
MCHC RBC AUTO-ENTMCNC: 28.4 G/DL (ref 32–36)
MCV RBC AUTO: 84 FL (ref 82–98)
MONOCYTES # BLD AUTO: 0.5 K/UL (ref 0.3–1)
MONOCYTES NFR BLD: 10.6 % (ref 4–15)
NEUTROPHILS # BLD AUTO: 3.2 K/UL (ref 1.8–7.7)
NEUTROPHILS NFR BLD: 70.1 % (ref 38–73)
NRBC BLD-RTO: 0 /100 WBC
PLATELET # BLD AUTO: 142 K/UL (ref 150–450)
PMV BLD AUTO: 11.8 FL (ref 9.2–12.9)
POTASSIUM SERPL-SCNC: 3.4 MMOL/L (ref 3.5–5.1)
PROT SERPL-MCNC: 7.8 G/DL (ref 6–8.4)
RBC # BLD AUTO: 4 M/UL (ref 4–5.4)
SODIUM SERPL-SCNC: 143 MMOL/L (ref 136–145)
WBC # BLD AUTO: 4.54 K/UL (ref 3.9–12.7)

## 2023-03-25 PROCEDURE — 25000003 PHARM REV CODE 250: Performed by: INTERNAL MEDICINE

## 2023-03-25 PROCEDURE — 36415 COLL VENOUS BLD VENIPUNCTURE: CPT | Performed by: STUDENT IN AN ORGANIZED HEALTH CARE EDUCATION/TRAINING PROGRAM

## 2023-03-25 PROCEDURE — 25000003 PHARM REV CODE 250: Performed by: STUDENT IN AN ORGANIZED HEALTH CARE EDUCATION/TRAINING PROGRAM

## 2023-03-25 PROCEDURE — 94761 N-INVAS EAR/PLS OXIMETRY MLT: CPT

## 2023-03-25 PROCEDURE — 99900035 HC TECH TIME PER 15 MIN (STAT)

## 2023-03-25 PROCEDURE — 83735 ASSAY OF MAGNESIUM: CPT | Performed by: STUDENT IN AN ORGANIZED HEALTH CARE EDUCATION/TRAINING PROGRAM

## 2023-03-25 PROCEDURE — 25000003 PHARM REV CODE 250

## 2023-03-25 PROCEDURE — 27000207 HC ISOLATION

## 2023-03-25 PROCEDURE — 80053 COMPREHEN METABOLIC PANEL: CPT | Performed by: STUDENT IN AN ORGANIZED HEALTH CARE EDUCATION/TRAINING PROGRAM

## 2023-03-25 PROCEDURE — 11000001 HC ACUTE MED/SURG PRIVATE ROOM

## 2023-03-25 PROCEDURE — 63600175 PHARM REV CODE 636 W HCPCS: Performed by: STUDENT IN AN ORGANIZED HEALTH CARE EDUCATION/TRAINING PROGRAM

## 2023-03-25 PROCEDURE — A4216 STERILE WATER/SALINE, 10 ML: HCPCS | Performed by: INTERNAL MEDICINE

## 2023-03-25 PROCEDURE — 85025 COMPLETE CBC W/AUTO DIFF WBC: CPT | Performed by: STUDENT IN AN ORGANIZED HEALTH CARE EDUCATION/TRAINING PROGRAM

## 2023-03-25 RX ORDER — ACETAMINOPHEN 325 MG/1
325 TABLET ORAL EVERY 6 HOURS PRN
Status: DISCONTINUED | OUTPATIENT
Start: 2023-03-25 | End: 2023-03-26

## 2023-03-25 RX ORDER — OXYCODONE AND ACETAMINOPHEN 5; 325 MG/1; MG/1
1 TABLET ORAL ONCE
Status: COMPLETED | OUTPATIENT
Start: 2023-03-25 | End: 2023-03-25

## 2023-03-25 RX ADMIN — TIMOLOL MALEATE 1 DROP: 5 SOLUTION/ DROPS OPHTHALMIC at 09:03

## 2023-03-25 RX ADMIN — BUSPIRONE HYDROCHLORIDE 5 MG: 5 TABLET ORAL at 09:03

## 2023-03-25 RX ADMIN — ATROPINE SULFATE 1 DROP: 10 SOLUTION/ DROPS OPHTHALMIC at 09:03

## 2023-03-25 RX ADMIN — BRIMONIDINE TARTRATE 1 DROP: 2 SOLUTION OPHTHALMIC at 09:03

## 2023-03-25 RX ADMIN — APIXABAN 2.5 MG: 2.5 TABLET, FILM COATED ORAL at 09:03

## 2023-03-25 RX ADMIN — LEVOTHYROXINE SODIUM 125 MCG: 25 TABLET ORAL at 05:03

## 2023-03-25 RX ADMIN — BUMETANIDE 2 MG: 0.25 INJECTION INTRAMUSCULAR; INTRAVENOUS at 09:03

## 2023-03-25 RX ADMIN — SODIUM CHLORIDE, PRESERVATIVE FREE 10 ML: 5 INJECTION INTRAVENOUS at 11:03

## 2023-03-25 RX ADMIN — CARVEDILOL 3.12 MG: 3.12 TABLET, FILM COATED ORAL at 04:03

## 2023-03-25 RX ADMIN — SODIUM CHLORIDE, PRESERVATIVE FREE 10 ML: 5 INJECTION INTRAVENOUS at 05:03

## 2023-03-25 RX ADMIN — SODIUM CHLORIDE, PRESERVATIVE FREE 10 ML: 5 INJECTION INTRAVENOUS at 06:03

## 2023-03-25 RX ADMIN — PREDNISOLONE ACETATE 1 DROP: 10 SUSPENSION/ DROPS OPHTHALMIC at 09:03

## 2023-03-25 RX ADMIN — MUPIROCIN: 20 OINTMENT TOPICAL at 09:03

## 2023-03-25 RX ADMIN — LISINOPRIL 40 MG: 20 TABLET ORAL at 09:03

## 2023-03-25 RX ADMIN — CEFTRIAXONE 1 G: 1 INJECTION, POWDER, FOR SOLUTION INTRAMUSCULAR; INTRAVENOUS at 10:03

## 2023-03-25 RX ADMIN — CARVEDILOL 3.12 MG: 3.12 TABLET, FILM COATED ORAL at 09:03

## 2023-03-25 RX ADMIN — ACETAMINOPHEN 325 MG: 325 TABLET ORAL at 12:03

## 2023-03-25 RX ADMIN — PREDNISOLONE ACETATE 1 DROP: 10 SUSPENSION/ DROPS OPHTHALMIC at 04:03

## 2023-03-25 RX ADMIN — OSELTAMIVIR PHOSPHATE 30 MG: 30 CAPSULE ORAL at 09:03

## 2023-03-25 RX ADMIN — SODIUM CHLORIDE, PRESERVATIVE FREE 10 ML: 5 INJECTION INTRAVENOUS at 12:03

## 2023-03-25 RX ADMIN — POTASSIUM BICARBONATE 50 MEQ: 978 TABLET, EFFERVESCENT ORAL at 09:03

## 2023-03-25 RX ADMIN — BUMETANIDE 2 MG: 0.25 INJECTION INTRAMUSCULAR; INTRAVENOUS at 10:03

## 2023-03-25 RX ADMIN — PREDNISOLONE ACETATE 1 DROP: 10 SUSPENSION/ DROPS OPHTHALMIC at 12:03

## 2023-03-25 RX ADMIN — ATORVASTATIN CALCIUM 20 MG: 20 TABLET, FILM COATED ORAL at 09:03

## 2023-03-25 NOTE — PROGRESS NOTES
"[facility] Medicine Progress Note    Primary Team: hospitals Hospitalist Team B  Attending Physician: Jean Carlos Guido MD  Resident: Libby  Intern: Jose Roberto    Subjective:      This morning, patient reports that she subjectively feels "much better." She is tolerating her nasal cannula oxygen well and cannot remember if she wears oxygen at home. Endorses good appetite. She denies any other complaints.     Objective:     Last 24 Hour Vital Signs:  BP  Min: 99/43  Max: 156/67  Temp  Av.5 °F (36.4 °C)  Min: 96.5 °F (35.8 °C)  Max: 98.6 °F (37 °C)  Pulse  Av.9  Min: 59  Max: 63  Resp  Av  Min: 15  Max: 44  SpO2  Av.7 %  Min: 97 %  Max: 100 %  Height  Av' 2" (157.5 cm)  Min: 5' 2" (157.5 cm)  Max: 5' 2" (157.5 cm)  Weight  Av.5 kg (164 lb 3.9 oz)  Min: 74.5 kg (164 lb 3.9 oz)  Max: 74.5 kg (164 lb 3.9 oz)  I/O last 3 completed shifts:  In: 464.6 [P.O.:350; I.V.:65; IV Piggyback:49.6]  Out: 3175 [Urine:3175]    Physical Examination:  Physical Exam  Constitutional:       General: She is not in acute distress.     Appearance: She is not ill-appearing.   HENT:      Head: Normocephalic.      Nose: Nose normal.      Mouth/Throat:      Mouth: Mucous membranes are moist.   Eyes:      General: No scleral icterus.     Extraocular Movements: Extraocular movements intact.   Cardiovascular:      Rate and Rhythm: Normal rate and regular rhythm.      Pulses: Normal pulses.      Heart sounds: Normal heart sounds. No murmur heard.  Pulmonary:      Effort: Pulmonary effort is normal. No respiratory distress.      Breath sounds: Normal breath sounds. No rales.      Comments: Bedside US demonstrated diffuse bilateral B lines.   Abdominal:      General: Bowel sounds are normal. There is no distension.      Palpations: Abdomen is soft.      Tenderness: There is no abdominal tenderness.   Musculoskeletal:         General: Normal range of motion.      Cervical back: Normal range of motion.      Comments: 1+ pitting " edema of bilateral lower extremities, improved from prior day.   Skin:     General: Skin is warm and dry.   Neurological:      Mental Status: She is alert.      Comments: Oriented to person but not place or situation. Moving all four extremities spontaneously without apparent deficit.      Laboratory:  Recent Labs   Lab 03/23/23  1125 03/24/23  0434 03/25/23  0515   WBC 4.01 4.47 4.54   HGB 12.4 9.2* 9.5*   HCT 43.1 31.3* 33.4*   * 169 142*   MCV 84 84 84   RDW 19.9* 19.3* 19.0*    142 143   K 3.4* 4.0 3.4*   CL 97 96 94*   CO2 37* 36* 37*   BUN 42* 34* 29*   CREATININE 0.8 0.8 0.8   GLU 89 94 80   PROT 7.5 8.0 7.8   ALBUMIN 2.6* 2.7* 2.6*   BILITOT 0.8 0.8 0.8   AST 37 37 27   ALKPHOS 90 95 93   ALT 26 22 20       Laboratory Data Reviewed:  Pertinent Findings:  Pending morning labs    Microbiology Data Reviewed:  Pertinent Findings:  UCX GNR pending speciation and sensitivities    Other Results:  EKG (my interpretation): No new EKG    Radiology Data Reviewed:   Pertinent Findings:  No new imaging    Current Medications:     Infusions:   sodium chloride 0.9% Stopped (03/23/23 0707)        Scheduled:   apixaban  2.5 mg Oral BID    atorvastatin  20 mg Oral Daily    atropine 1%  1 drop Right Eye BID    brimonidine 0.2%  1 drop Both Eyes BID    bumetanide  2 mg Intravenous Q12H    busPIRone  5 mg Oral BID    carvediloL  3.125 mg Oral BID WM    cefTRIAXone (ROCEPHIN) IVPB  1 g Intravenous Q24H    levothyroxine  125 mcg Oral Before breakfast    lisinopriL  40 mg Oral Daily    mupirocin   Nasal BID    oseltamivir  30 mg Oral BID    potassium bicarbonate  50 mEq Oral Once    prednisoLONE acetate  1 drop Right Eye QID    sodium chloride 0.9%  10 mL Intravenous Q6H    timolol maleate 0.5%  1 drop Both Eyes BID        PRN:  sodium chloride 0.9%, labetalol, sodium chloride 0.9%, Flushing PICC Protocol **AND** sodium chloride 0.9% **AND** sodium chloride 0.9%    Antibiotics and Day Number of Therapy:  Rocephin  3/21/23-->  Azithro 3/21-->3/23/23  Vanc 3/21/23-->3/23/23    Lines and Day Number of Therapy:  PIV  Midline 3/23/23    Assessment:     Pilar Michael is a 89 y.o.female with a PMH of HFpEF, BL PE, CAD, HLD, HTN, and recurrent L Breast Cancer who presented with increased work of breathing and AMS x1 day. PT was admitted to [facility] internal medicine for further evaluation and monitoring of acute encephalopathy and hypercapnic and hypoxemic respiratory failure.      Plan:     Acute Hypoxic and Hypercapnic Respiratory Failure 2/2 Acute Decompensated diastolic heart failure and Influenza PNA  - Tmax 99.7, HR 60s, tachypneic, BP up to 214/98 at admission  - Tamiflu started in the setting of influenza B positive (symptoms over 2 weeks but patient severely ill and high risk for further decompensation)  - BNP 2929 (significantly higher than prior)  - ABG 7.4/63.5/74/39.4; started BiPAP in the setting of hypercapnea  - bilateral Pleural Effusion and worsening LLL Consolidation c/f   - initially placed on a Lasix gtt with appropriate UOP and transitioned to bolus dosing following diuresis and clinical improvement  - PT initiated on broad spectrum antibiotics initially and deescalated to rocephin on 3/23/23  - blood cultures NGTD @1 day  - recent removal of ~1L from pleural effusion in Jan 2023 outpatient  - given bilateral nature, effusions are likely 2/2 acute volume overload, will diurese and reassess need for thoracentesis  - net negative 3.8L UOP with 1.4L UOP in past 24 hours  - HCO3 remained stable 37-->36 --> 37 today  - will continue with bumex 2mg IV BID for now; will consider transition to PO this evening vs tomorrow    Acute encephalopathy, improving  - PT initiatally with decreased level of alertness with confusion  - CO2 74 on admission  - UA demonstrated GNR  - currently on rocephin   - clinically improved on day 2 of hospitalization  - today, conversational and answering questions appropriately although some  memory deficits are noted  - will maintain delirium precautions  - will continue to monitor     Urinary Tract Infection  - may contribute to acute encephalopathy  - Bcx negative  - has recurrent UTIs, on chronic methenamine, recently finished course of nitrofuantoin 6 days ago  - h/o E.coli resistant to fluoroquinolones  - Ucx positive for E coli >100,000 CFUs, sensitive to CTX, Augmentin, et al  - continue CTX for now, will consider transition to Augmentin at discharge     Pulmonary Embolism (Oct 2022)  - continue home Eliquis     Breast Cancer (2002 with recurrence 2021) s/p mastectomy  Thyroid Cancer s/p thyroidectomy, hypothyroidism  - will re-assess effusion s/p aggressive diuresis     Stroke  - patient's daughter declines residual deficit except R eye dilation  - continue home Plavix     S/p TAVR 2019  - managing anticoagulation as above     CAD s/p oRCA PCI with BMS 2019  S/p Dual Chamber Pacemaker  - troponin consistent with priors     Essential HTN  - continue home coreg, lisinopril  - will continue to monitor and adjust accordingly     Glaucoma  - continue home eye drops    Diet: Full liquid  DVT Prophylaxis: eliquis  IVF: None  Code: Full    Dispo: Pending sensitivities and improvement in mental status.    Miguel Souza MD  [facility] Internal Medicine HO-2    [facility] Medicine Hospitalist Pager numbers:   [facility] Hospitalist Medicine Team A (Edgard/Larisa): [phone]  [facility] Hospitalist Medicine Team B (Crystal/Hay):  [phone]

## 2023-03-25 NOTE — PLAN OF CARE
\VN note: Patient chart, labs, and vitals reviewed. VN to continue to be available as needed.     Problem: Adult Inpatient Plan of Care  Goal: Plan of Care Review  Outcome: Ongoing, Progressing  Goal: Patient-Specific Goal (Individualized)  Outcome: Ongoing, Progressing  Goal: Absence of Hospital-Acquired Illness or Injury  Outcome: Ongoing, Progressing  Goal: Optimal Comfort and Wellbeing  Outcome: Ongoing, Progressing  Goal: Readiness for Transition of Care  Outcome: Ongoing, Progressing

## 2023-03-25 NOTE — PLAN OF CARE
Problem: Adult Inpatient Plan of Care  Goal: Plan of Care Review  Outcome: Ongoing, Progressing     Problem: Fall Injury Risk  Goal: Absence of Fall and Fall-Related Injury  Outcome: Ongoing, Progressing     Problem: Skin Injury Risk Increased  Goal: Skin Health and Integrity  Outcome: Ongoing, Progressing     Problem: UTI (Urinary Tract Infection)  Goal: Improved Infection Symptoms  Outcome: Ongoing, Progressing     Problem: Cognitive Impairment  Goal: Optimal Cognitive Function  Outcome: Ongoing, Progressing

## 2023-03-25 NOTE — PLAN OF CARE
Patient on oxygen with documented flow.  Will attempt to wean per O2 order protocol. Pt on BIPAP with documented settings. Alarms are set and working. Will continue to monitor.     Statement Selected

## 2023-03-26 PROBLEM — E87.79 VOLUME OVERLOAD STATE OF HEART: Status: ACTIVE | Noted: 2023-03-26

## 2023-03-26 PROBLEM — J81.0 ACUTE PULMONARY EDEMA: Status: ACTIVE | Noted: 2023-03-26

## 2023-03-26 PROBLEM — Z51.5 COMFORT MEASURES ONLY STATUS: Status: ACTIVE | Noted: 2023-03-26

## 2023-03-26 LAB
ALBUMIN SERPL BCP-MCNC: 2.5 G/DL (ref 3.5–5.2)
ALP SERPL-CCNC: 89 U/L (ref 55–135)
ALT SERPL W/O P-5'-P-CCNC: 18 U/L (ref 10–44)
ANION GAP SERPL CALC-SCNC: 11 MMOL/L (ref 8–16)
AST SERPL-CCNC: 23 U/L (ref 10–40)
BASOPHILS # BLD AUTO: 0.02 K/UL (ref 0–0.2)
BASOPHILS NFR BLD: 0.3 % (ref 0–1.9)
BILIRUB SERPL-MCNC: 0.8 MG/DL (ref 0.1–1)
BUN SERPL-MCNC: 29 MG/DL (ref 8–23)
CALCIUM SERPL-MCNC: 9.1 MG/DL (ref 8.7–10.5)
CHLORIDE SERPL-SCNC: 90 MMOL/L (ref 95–110)
CO2 SERPL-SCNC: 39 MMOL/L (ref 23–29)
CREAT SERPL-MCNC: 0.8 MG/DL (ref 0.5–1.4)
DIFFERENTIAL METHOD: ABNORMAL
EOSINOPHIL # BLD AUTO: 0.1 K/UL (ref 0–0.5)
EOSINOPHIL NFR BLD: 1.6 % (ref 0–8)
ERYTHROCYTE [DISTWIDTH] IN BLOOD BY AUTOMATED COUNT: 19.1 % (ref 11.5–14.5)
EST. GFR  (NO RACE VARIABLE): >60 ML/MIN/1.73 M^2
GLUCOSE SERPL-MCNC: 84 MG/DL (ref 70–110)
HCT VFR BLD AUTO: 32.5 % (ref 37–48.5)
HGB BLD-MCNC: 9.5 G/DL (ref 12–16)
IMM GRANULOCYTES # BLD AUTO: 0.02 K/UL (ref 0–0.04)
IMM GRANULOCYTES NFR BLD AUTO: 0.3 % (ref 0–0.5)
INFLUENZA A, MOLECULAR: NEGATIVE
INFLUENZA B, MOLECULAR: NEGATIVE
LYMPHOCYTES # BLD AUTO: 0.8 K/UL (ref 1–4.8)
LYMPHOCYTES NFR BLD: 14.6 % (ref 18–48)
MCH RBC QN AUTO: 24 PG (ref 27–31)
MCHC RBC AUTO-ENTMCNC: 29.2 G/DL (ref 32–36)
MCV RBC AUTO: 82 FL (ref 82–98)
MONOCYTES # BLD AUTO: 0.7 K/UL (ref 0.3–1)
MONOCYTES NFR BLD: 11.8 % (ref 4–15)
NEUTROPHILS # BLD AUTO: 4.1 K/UL (ref 1.8–7.7)
NEUTROPHILS NFR BLD: 71.4 % (ref 38–73)
NRBC BLD-RTO: 0 /100 WBC
PLATELET # BLD AUTO: 168 K/UL (ref 150–450)
PMV BLD AUTO: 11.7 FL (ref 9.2–12.9)
POCT GLUCOSE: 114 MG/DL (ref 70–110)
POTASSIUM SERPL-SCNC: 3.6 MMOL/L (ref 3.5–5.1)
PROT SERPL-MCNC: 7.4 G/DL (ref 6–8.4)
RBC # BLD AUTO: 3.96 M/UL (ref 4–5.4)
SODIUM SERPL-SCNC: 140 MMOL/L (ref 136–145)
SPECIMEN SOURCE: NORMAL
WBC # BLD AUTO: 5.75 K/UL (ref 3.9–12.7)

## 2023-03-26 PROCEDURE — 25000003 PHARM REV CODE 250: Performed by: STUDENT IN AN ORGANIZED HEALTH CARE EDUCATION/TRAINING PROGRAM

## 2023-03-26 PROCEDURE — 11000001 HC ACUTE MED/SURG PRIVATE ROOM

## 2023-03-26 PROCEDURE — 27000221 HC OXYGEN, UP TO 24 HOURS

## 2023-03-26 PROCEDURE — 36415 COLL VENOUS BLD VENIPUNCTURE: CPT | Performed by: STUDENT IN AN ORGANIZED HEALTH CARE EDUCATION/TRAINING PROGRAM

## 2023-03-26 PROCEDURE — 99900035 HC TECH TIME PER 15 MIN (STAT)

## 2023-03-26 PROCEDURE — 80053 COMPREHEN METABOLIC PANEL: CPT | Performed by: STUDENT IN AN ORGANIZED HEALTH CARE EDUCATION/TRAINING PROGRAM

## 2023-03-26 PROCEDURE — 94761 N-INVAS EAR/PLS OXIMETRY MLT: CPT

## 2023-03-26 PROCEDURE — 85025 COMPLETE CBC W/AUTO DIFF WBC: CPT | Performed by: STUDENT IN AN ORGANIZED HEALTH CARE EDUCATION/TRAINING PROGRAM

## 2023-03-26 PROCEDURE — 25000003 PHARM REV CODE 250: Performed by: INTERNAL MEDICINE

## 2023-03-26 PROCEDURE — A4216 STERILE WATER/SALINE, 10 ML: HCPCS | Performed by: INTERNAL MEDICINE

## 2023-03-26 PROCEDURE — 87502 INFLUENZA DNA AMP PROBE: CPT | Performed by: STUDENT IN AN ORGANIZED HEALTH CARE EDUCATION/TRAINING PROGRAM

## 2023-03-26 PROCEDURE — 63600175 PHARM REV CODE 636 W HCPCS: Performed by: STUDENT IN AN ORGANIZED HEALTH CARE EDUCATION/TRAINING PROGRAM

## 2023-03-26 RX ORDER — LORAZEPAM 0.5 MG/1
0.5 TABLET ORAL EVERY 30 MIN PRN
Status: DISCONTINUED | OUTPATIENT
Start: 2023-03-26 | End: 2023-03-26

## 2023-03-26 RX ORDER — ONDANSETRON 8 MG/1
8 TABLET, ORALLY DISINTEGRATING ORAL EVERY 8 HOURS PRN
Status: DISCONTINUED | OUTPATIENT
Start: 2023-03-26 | End: 2023-03-26

## 2023-03-26 RX ORDER — BUMETANIDE 1 MG/1
2 TABLET ORAL 2 TIMES DAILY
Status: DISCONTINUED | OUTPATIENT
Start: 2023-03-26 | End: 2023-03-26

## 2023-03-26 RX ORDER — BUMETANIDE 1 MG/1
1 TABLET ORAL 2 TIMES DAILY
Status: DISCONTINUED | OUTPATIENT
Start: 2023-03-26 | End: 2023-03-26

## 2023-03-26 RX ORDER — LORAZEPAM 2 MG/ML
0.5 INJECTION INTRAMUSCULAR EVERY 30 MIN PRN
Status: DISCONTINUED | OUTPATIENT
Start: 2023-03-26 | End: 2023-03-30 | Stop reason: HOSPADM

## 2023-03-26 RX ORDER — ONDANSETRON 2 MG/ML
4 INJECTION INTRAMUSCULAR; INTRAVENOUS EVERY 6 HOURS PRN
Status: DISCONTINUED | OUTPATIENT
Start: 2023-03-26 | End: 2023-03-30 | Stop reason: HOSPADM

## 2023-03-26 RX ORDER — MORPHINE SULFATE 2 MG/ML
2 INJECTION, SOLUTION INTRAMUSCULAR; INTRAVENOUS EVERY 4 HOURS PRN
Status: DISCONTINUED | OUTPATIENT
Start: 2023-03-26 | End: 2023-03-30 | Stop reason: HOSPADM

## 2023-03-26 RX ORDER — AMOXICILLIN AND CLAVULANATE POTASSIUM 875; 125 MG/1; MG/1
1 TABLET, FILM COATED ORAL EVERY 12 HOURS
Status: DISCONTINUED | OUTPATIENT
Start: 2023-03-26 | End: 2023-03-26

## 2023-03-26 RX ORDER — LORAZEPAM 1 MG/1
1 TABLET ORAL EVERY 30 MIN PRN
Status: DISCONTINUED | OUTPATIENT
Start: 2023-03-26 | End: 2023-03-26

## 2023-03-26 RX ADMIN — SODIUM CHLORIDE, PRESERVATIVE FREE 10 ML: 5 INJECTION INTRAVENOUS at 12:03

## 2023-03-26 RX ADMIN — ATROPINE SULFATE 1 DROP: 10 SOLUTION/ DROPS OPHTHALMIC at 08:03

## 2023-03-26 RX ADMIN — BRIMONIDINE TARTRATE 1 DROP: 2 SOLUTION OPHTHALMIC at 08:03

## 2023-03-26 RX ADMIN — POTASSIUM BICARBONATE 25 MEQ: 978 TABLET, EFFERVESCENT ORAL at 08:03

## 2023-03-26 RX ADMIN — BUSPIRONE HYDROCHLORIDE 5 MG: 5 TABLET ORAL at 08:03

## 2023-03-26 RX ADMIN — ATORVASTATIN CALCIUM 20 MG: 20 TABLET, FILM COATED ORAL at 08:03

## 2023-03-26 RX ADMIN — OSELTAMIVIR PHOSPHATE 30 MG: 30 CAPSULE ORAL at 08:03

## 2023-03-26 RX ADMIN — SODIUM CHLORIDE, SODIUM LACTATE, POTASSIUM CHLORIDE, AND CALCIUM CHLORIDE 250 ML: .6; .31; .03; .02 INJECTION, SOLUTION INTRAVENOUS at 12:03

## 2023-03-26 RX ADMIN — PREDNISOLONE ACETATE 1 DROP: 10 SUSPENSION/ DROPS OPHTHALMIC at 12:03

## 2023-03-26 RX ADMIN — TIMOLOL MALEATE 1 DROP: 5 SOLUTION/ DROPS OPHTHALMIC at 08:03

## 2023-03-26 RX ADMIN — LEVOTHYROXINE SODIUM 125 MCG: 25 TABLET ORAL at 05:03

## 2023-03-26 RX ADMIN — BUMETANIDE 1 MG: 1 TABLET ORAL at 08:03

## 2023-03-26 RX ADMIN — APIXABAN 2.5 MG: 2.5 TABLET, FILM COATED ORAL at 08:03

## 2023-03-26 RX ADMIN — MUPIROCIN: 20 OINTMENT TOPICAL at 08:03

## 2023-03-26 RX ADMIN — PREDNISOLONE ACETATE 1 DROP: 10 SUSPENSION/ DROPS OPHTHALMIC at 08:03

## 2023-03-26 RX ADMIN — SODIUM CHLORIDE, PRESERVATIVE FREE 10 ML: 5 INJECTION INTRAVENOUS at 05:03

## 2023-03-26 RX ADMIN — CARVEDILOL 3.12 MG: 3.12 TABLET, FILM COATED ORAL at 08:03

## 2023-03-26 NOTE — SIGNIFICANT EVENT
MET called to room 465. On arrival, alerted that MET had been called due to hypotension and decreased mentation. Patient with BP of 90s/30s and responsive but less quickly responding than day prior per primary team. Patient known to critical care team from recent ICU time. Dr Collins called patient's daughter and re-visited goals of care, which had been addressed during ICU stay. Unfortunately, patient's daughter is grieving the recent death of her . However, she agrees that the patient would not want invasive measures or to come back to the ICU for invasive care. Discussed transitioning our focus to comfort, and patient's daughter is agreeable to this. Recommend comfort-focused approach. Nursing to arrange transfer to a larger room per daughter's request. Primary team was at bedside for MET.    Ana Pérez MD  Pulmonary and Critical Care Fellow  03/26/2023  1:32 PM    I personally spoke to Mrs Conrad (pt's daughter). Given her mother's health trajectory, she is in agreement with a palliative care approach to care.  The primary team was privy to my conversation with Mrs. Conrad and they will execute the plan.  Omar Collins  [phone]

## 2023-03-26 NOTE — NURSING
MET called for hypotension and AMS. Please see Rapid Response Timeline and Flowsheets  documented by VN for details.

## 2023-03-26 NOTE — PLAN OF CARE
U Internal Medicine - Plan of Care Note    Patient noted to be persistently hypotensive beginning around 12:00 today. This is accompanied by decreased mental status and confusion.    On exam, patient is no longer able to coherently answer questions as she has in the past. BP remained low on multiple re-checks with MAPs in the rage of 55-61. IV fluids were started and a MET was called.    ICU team came to bedside and initiated discussion regarding goals of care with family. Family has elected to pursue comfort-focused measures.    Patient is now DNR with focus on comfort care. All medications have been discontinued with the exception of the patient's home Buspar and Bumex for symptom relief. PRN Ativan, morphine, and Zofran have been ordered for anxiety, air hunger, and nausea, respectively.    Miguel Souza MD  [facility] Internal Medicine, PGY-2

## 2023-03-26 NOTE — PROGRESS NOTES
RAPID RESPONSE NURSE PROACTIVE ROUNDING NOTE       Time of Visit: 1300    Admit Date: 3/21/2023  LOS: 5  Code Status: DNR   Date of Visit: 2023  : 4/15/1932  Age: 89 y.o.  Sex: female  Race: White  Bed: K465/K465 A:   MRN: [id number]  Was the patient discharged from an ICU this admission? No   Was the patient discharged from a PACU within last 24 hours? No   Did the patient receive conscious sedation/general anesthesia in last 24 hours? No   Was the patient in the ED within the past 24 hours? No   Was the patient on NIPPV within the past 24 hours? No   Attending Physician: Jean Carlos Guido MD  Primary Service: Internal Medicine   Time spent at the bedside: 15 -30 min    SITUATION    Notified by overhead page  Reason for alert: hypotension, AMS    Diagnosis: Acute encephalopathy   has a past medical history of Anticoagulant long-term use, Arthritis, Breast cancer, Cancer, CHF (congestive heart failure), Coronary artery disease, Glaucoma, right eye, Hypertension, Pulmonary emboli, Stroke, Thyroid cancer, and Thyroid disease.    Last Vitals:  Temp: 98.4 °F (36.9 °C) ( 1222)  Pulse: 60 ( 1301)  Resp: 18 ( 1222)  BP: 99/38 ( 1301)  SpO2: 95 % ( 1253)    24 Hour Vitals Range:  Temp:  [96.3 °F (35.7 °C)-98.4 °F (36.9 °C)]   Pulse:  [59-86]   Resp:  [17-20]   BP: ()/(38-58)   SpO2:  [93 %-100 %]     Clinical Issues: Circulatory    ASSESSMENT/INTERVENTIONS    MET called due to hypotension and AMS. Upon arrival, BP 94/40. Dr. Nolan, Dr. Gomes and primary team at bedside. Pt is well-known to ICU team. Dr. Cruz discussed current status and goals of care with pt's daughter. Daughter has made decision to pursue comfort measures only.       Discussed plan of care with bedside RN, Annel    PROVIDER ESCALATION    Physician escalation: Yes    Orders received and case discussed with Dr. Cruz .    Disposition:Remain in room 469    FOLLOW UP    Call back the Rapid Response  Nurse, Ellie Yost at [phone] for additional questions or concerns.

## 2023-03-26 NOTE — PROGRESS NOTES
[facility] Medicine Progress Note    Primary Team: [facility] Hospitalist Team B  Attending Physician: Jean Carlos Guido MD  Resident: Libby  Intern: Jose Roberto    Subjective:      Doing well this morning. Would like to be discharged home.     Objective:     Last 24 Hour Vital Signs:  BP  Min: 104/54  Max: 148/62  Temp  Av.4 °F (36.3 °C)  Min: 96.3 °F (35.7 °C)  Max: 98.6 °F (37 °C)  Pulse  Av  Min: 57  Max: 86  Resp  Av.6  Min: 17  Max: 20  SpO2  Av.5 %  Min: 94 %  Max: 100 %  I/O last 3 completed shifts:  In: 425 [P.O.:350; I.V.:75]  Out: 1450 [Urine:1450]    Physical Examination:  Physical Exam  Constitutional:       General: She is not in acute distress.     Appearance: She is not ill-appearing.   HENT:      Head: Normocephalic.      Nose: Nose normal.      Mouth/Throat:      Mouth: Mucous membranes are moist.   Eyes:      General: No scleral icterus.     Extraocular Movements: Extraocular movements intact.   Cardiovascular:      Rate and Rhythm: Normal rate and regular rhythm.      Pulses: Normal pulses.      Heart sounds: Normal heart sounds. No murmur heard.  Pulmonary:      Effort: Pulmonary effort is normal. No respiratory distress.      Breath sounds: Normal breath sounds. No rales.      Comments: Bedside US demonstrated diffuse bilateral B lines.   Abdominal:      General: Bowel sounds are normal. There is no distension.      Palpations: Abdomen is soft.      Tenderness: There is no abdominal tenderness.   Musculoskeletal:         General: Normal range of motion.      Cervical back: Normal range of motion.      Comments: 1+ pitting edema of bilateral lower extremities, improved from prior day.   Skin:     General: Skin is warm and dry.   Neurological:      Mental Status: She is alert.      Comments: Oriented to person but not place or situation. Moving all four extremities spontaneously without apparent deficit.      Laboratory:  Recent Labs   Lab 23  0434 23  7365  03/26/23  0325   WBC 4.47 4.54 5.75   HGB 9.2* 9.5* 9.5*   HCT 31.3* 33.4* 32.5*    142* 168   MCV 84 84 82   RDW 19.3* 19.0* 19.1*    143 140   K 4.0 3.4* 3.6   CL 96 94* 90*   CO2 36* 37* 39*   BUN 34* 29* 29*   CREATININE 0.8 0.8 0.8   GLU 94 80 84   PROT 8.0 7.8 7.4   ALBUMIN 2.7* 2.6* 2.5*   BILITOT 0.8 0.8 0.8   AST 37 27 23   ALKPHOS 95 93 89   ALT 22 20 18       Laboratory Data Reviewed:  Pertinent Findings:  Pending morning labs    Microbiology Data Reviewed:  Pertinent Findings:  UCX GNR pending speciation and sensitivities    Other Results:  EKG (my interpretation): No new EKG    Radiology Data Reviewed:   Pertinent Findings:  No new imaging    Current Medications:     Infusions:   sodium chloride 0.9% Stopped (03/23/23 0707)        Scheduled:   apixaban  2.5 mg Oral BID    atorvastatin  20 mg Oral Daily    atropine 1%  1 drop Right Eye BID    brimonidine 0.2%  1 drop Both Eyes BID    bumetanide  1 mg Oral BID loop    busPIRone  5 mg Oral BID    carvediloL  3.125 mg Oral BID WM    cefTRIAXone (ROCEPHIN) IVPB  1 g Intravenous Q24H    levothyroxine  125 mcg Oral Before breakfast    lisinopriL  40 mg Oral Daily    mupirocin   Nasal BID    oseltamivir  30 mg Oral BID    potassium bicarbonate  25 mEq Oral Once    prednisoLONE acetate  1 drop Right Eye QID    sodium chloride 0.9%  10 mL Intravenous Q6H    timolol maleate 0.5%  1 drop Both Eyes BID        PRN:  sodium chloride 0.9%, acetaminophen, labetalol, sodium chloride 0.9%, Flushing PICC Protocol **AND** sodium chloride 0.9% **AND** sodium chloride 0.9%    Antibiotics and Day Number of Therapy:  Rocephin 3/21/23-->  Azithro 3/21-->3/23/23  Vanc 3/21/23-->3/23/23    Lines and Day Number of Therapy:  PIV  Midline 3/23/23    Assessment:     Pilar Michael is a 89 y.o.female with a PMH of HFpEF, BL PE, CAD, HLD, HTN, and recurrent L Breast Cancer who presented with increased work of breathing and AMS x1 day. PT was admitted to [facility] internal  medicine for further evaluation and monitoring of acute encephalopathy and hypercapnic and hypoxemic respiratory failure.      Plan:     Acute Hypoxic and Hypercapnic Respiratory Failure 2/2 Acute Decompensated diastolic heart failure and Influenza PNA  - Tmax 99.7, HR 60s, tachypneic, BP up to 214/98 at admission  - Tamiflu started in the setting of influenza B positive (symptoms over 2 weeks but patient severely ill and high risk for further decompensation)  - BNP 2929 (significantly higher than prior)  - ABG 7.4/63.5/74/39.4; started BiPAP in the setting of hypercapnea  - bilateral Pleural Effusion and worsening LLL Consolidation c/f   - initially placed on a Lasix gtt with appropriate UOP and transitioned to bolus dosing following diuresis and clinical improvement  - PT initiated on broad spectrum antibiotics initially and deescalated to rocephin on 3/23/23  - blood cultures NGTD @1 day  - recent removal of ~1L from pleural effusion in Jan 2023 outpatient  - given bilateral nature, effusions are likely 2/2 acute volume overload, will diurese and reassess need for thoracentesis  - net negative 4.6L UOP with 800mL UOP in past 24 hours  - HCO3 remained stable 37-->36 --> 37 --> 39  - transitioned Bumex to oral dosing at 1mg BID, her home dose    Acute encephalopathy, improving  - PT initiatally with decreased level of alertness with confusion  - CO2 74 on admission  - UA demonstrated GNR  - currently on rocephin   - clinically improved on day 2 of hospitalization  - today, conversational and answering questions appropriately although some memory deficits are noted  - will maintain delirium precautions  - will continue to monitor     Urinary Tract Infection  - may contribute to acute encephalopathy  - Bcx negative  - has recurrent UTIs, on chronic methenamine, recently finished course of nitrofuantoin 6 days ago  - h/o E.coli resistant to fluoroquinolones  - Ucx positive for E coli >100,000 CFUs, sensitive to CTX,  Augmentin, et al  - has received five days of CTX as of 5/26, de-escalating to Augmentin BID for a total of 7 days' therapy     Pulmonary Embolism (Oct 2022)  - continue home Eliquis     Breast Cancer (2002 with recurrence 2021) s/p mastectomy  Thyroid Cancer s/p thyroidectomy, hypothyroidism  - will re-assess effusion s/p aggressive diuresis     Stroke  - patient's daughter declines residual deficit except R eye dilation  - continue home Plavix     S/p TAVR 2019  - managing anticoagulation as above     CAD s/p oRCA PCI with BMS 2019  S/p Dual Chamber Pacemaker  - troponin consistent with priors     Essential HTN  - continue home coreg, lisinopril  - will continue to monitor and adjust accordingly     Glaucoma  - continue home eye drops    Diet: Dysphagia pureed diet  DVT Prophylaxis: Eliquis  IVF: None  Code: Full    Dispo: Pending sensitivities and improvement in mental status.    Miguel Souza MD  [facility] Internal Medicine HO-2    [facility] Medicine Hospitalist Pager numbers:   [facility] Hospitalist Medicine Team A (Edgard/Larisa): [phone]  [facility] Hospitalist Medicine Team B (Crystal/Hay):  [phone]

## 2023-03-27 LAB
BACTERIA BLD CULT: NORMAL
BACTERIA BLD CULT: NORMAL

## 2023-03-27 PROCEDURE — 99497 ADVNCD CARE PLAN 30 MIN: CPT | Mod: 25,,,

## 2023-03-27 PROCEDURE — 25000003 PHARM REV CODE 250: Performed by: STUDENT IN AN ORGANIZED HEALTH CARE EDUCATION/TRAINING PROGRAM

## 2023-03-27 PROCEDURE — 25000003 PHARM REV CODE 250: Performed by: INTERNAL MEDICINE

## 2023-03-27 PROCEDURE — 99223 1ST HOSP IP/OBS HIGH 75: CPT | Mod: ,,,

## 2023-03-27 PROCEDURE — A4216 STERILE WATER/SALINE, 10 ML: HCPCS | Performed by: INTERNAL MEDICINE

## 2023-03-27 PROCEDURE — 63600175 PHARM REV CODE 636 W HCPCS: Performed by: STUDENT IN AN ORGANIZED HEALTH CARE EDUCATION/TRAINING PROGRAM

## 2023-03-27 PROCEDURE — 99223 PR INITIAL HOSPITAL CARE,LEVL III: ICD-10-PCS | Mod: ,,,

## 2023-03-27 PROCEDURE — 94761 N-INVAS EAR/PLS OXIMETRY MLT: CPT

## 2023-03-27 PROCEDURE — 11000001 HC ACUTE MED/SURG PRIVATE ROOM

## 2023-03-27 PROCEDURE — 99497 PR ADVNCD CARE PLAN 30 MIN: ICD-10-PCS | Mod: 25,,,

## 2023-03-27 PROCEDURE — 27000221 HC OXYGEN, UP TO 24 HOURS

## 2023-03-27 RX ORDER — PREDNISOLONE ACETATE 10 MG/ML
1 SUSPENSION/ DROPS OPHTHALMIC 4 TIMES DAILY
Status: DISCONTINUED | OUTPATIENT
Start: 2023-03-27 | End: 2023-03-30 | Stop reason: HOSPADM

## 2023-03-27 RX ORDER — TIMOLOL MALEATE 5 MG/ML
1 SOLUTION/ DROPS OPHTHALMIC 2 TIMES DAILY
Status: DISCONTINUED | OUTPATIENT
Start: 2023-03-27 | End: 2023-03-30 | Stop reason: HOSPADM

## 2023-03-27 RX ADMIN — TIMOLOL MALEATE 1 DROP: 5 SOLUTION/ DROPS OPHTHALMIC at 09:03

## 2023-03-27 RX ADMIN — PREDNISOLONE ACETATE 1 DROP: 10 SUSPENSION/ DROPS OPHTHALMIC at 09:03

## 2023-03-27 RX ADMIN — LORAZEPAM 0.5 MG: 2 INJECTION INTRAMUSCULAR; INTRAVENOUS at 05:03

## 2023-03-27 RX ADMIN — SODIUM CHLORIDE, PRESERVATIVE FREE 10 ML: 5 INJECTION INTRAVENOUS at 05:03

## 2023-03-27 RX ADMIN — PREDNISOLONE ACETATE 1 DROP: 10 SUSPENSION/ DROPS OPHTHALMIC at 12:03

## 2023-03-27 RX ADMIN — PREDNISOLONE ACETATE 1 DROP: 10 SUSPENSION/ DROPS OPHTHALMIC at 05:03

## 2023-03-27 RX ADMIN — SODIUM CHLORIDE, PRESERVATIVE FREE 10 ML: 5 INJECTION INTRAVENOUS at 12:03

## 2023-03-27 RX ADMIN — BUSPIRONE HYDROCHLORIDE 5 MG: 5 TABLET ORAL at 09:03

## 2023-03-27 RX ADMIN — LORAZEPAM 0.5 MG: 2 INJECTION INTRAMUSCULAR; INTRAVENOUS at 12:03

## 2023-03-27 NOTE — CONSULTS
[facility] - Telemetry  Palliative Medicine  Consult Note    Patient Name: Pilar Michael  MRN: [id number]  Admission Date: 3/21/2023  Hospital Length of Stay: 6 days  Code Status: DNR   Attending Provider: Jean Carlos Guido MD  Consulting Provider: Luisa Manuel NP  Primary Care Physician: Joseph Noel MD  Principal Problem:Acute encephalopathy    Patient information was obtained from patient, relative(s), past medical records and primary team.      Inpatient consult to Palliative Care  Consult performed by: Luisa Manuel NP  Consult ordered by: Garland Cid MD      Assessment/Plan:     Palliative Care  Palliative care encounter  Pilar Michael is a 89 y.o.female with a PMH of HFpEF, BL PE, CAD, HLD, HTN, and recurrent L Breast Cancer who presented with increased work of breathing and AMS x1 day. PT was admitted to [facility] internal medicine for further evaluation and monitoring of acute encephalopathy and hypercapnic and hypoxemic respiratory failure. Pt required a stay in the ICU, showed much improvement and was stepped down to the floor on 3/23/2023.  Pt with rapid deteriaoration and MET called on 3/26/2023.  Following conversation with ICU team and primary team, family has decided to shift focus to comfort focused care. Palliative care consulted for clarification of goals and further discussion of hospice options.    -At time of initial encounter, pt appears calm and in no distress.  Per family member at bedside, pt has been in and out of sleep all day.  Pt denies any pain or discomfort. Pt has received 2 doses of 0.5mg IV ativan within the past 24 hours.    -Pt remains pleasantly confused at this time and is oriented to self only.  Pt sipping liquids and taking a few small bites with meals.   -vitals stable throughout today, last MAP 75  -Pts daughter and legal MPOA, Katiana Mehta, is also dealing with the recent (2023 death of a close family member) and is currently arranging the .   "Per family member at the bedside, Katiana will be at the bedside tomorrow.  -Based off of notes, and in light of recent events, Katiana does not feel as though she will be able to care for the pt at home any longer.  We will need to have further discussion about hospice options; home hospice vs nursing home with hospice options.  At this time, given the pts clinical picture I do not believe that she meets inpatient hospice criteria.  However, pt has had a tumultuous admission thus far and if pt were to further deteriorate, that could be reexamined.  Referrals to hospice houses could still be sent in the event they will accept pt given her overall trajectory and decline if this is the wish of the family. We could also evaluate if pt would be a candidate for respite care at an inpatient hospice house, although they have their own requirements as well.   -Planning to meet with Katiana at bedside tomorrow to reevaluate pts needs as well as further explore options.   -updated primary team         Thank you for your consult. I will follow-up with patient. Please contact us if you have any additional questions.    Subjective:     HPI:   Per chart, "Pilar Michael is a 89 y.o. female with a history of CAD s/p PCI, TAVR (2019) on chronic anticoagulant use, HFpEF s/p pacemaker, breast cancer s/p mastectomy, thyroid cancer s/p thyroidectomy, CVA without residual deficits (R eye symptoms), bilateral PEs in the setting of COVID infection and UTI (October 2022), HTN, glaucoma who presented to [facility] on 3/21/2023 with a primary complaint of altered mental status.     Patient somnolent on exam and moaning, minimally responsive to questions. History obtained from patient's daughter at bedside. Patient was exhibiting increased lethargy and malaise with intermittent confusion over the last 2 weeks. She went to PCP last week and wad diagnosed with UTI, sent out with Macrobid that ended 6 days PTA. " "Patient's daughter reports that over the past 3-4 days patient has acutely worsened to the point where she just moans and will barely respond to questions. Additionally has intermittently required more oxygen over the last few days. SpO2 would drop into low 80s so patient's oxygen was turned up to 3L from normal 2L with improvement to high 80s-low 90s. Patient's daughter found geriatric jorge that will do house calls who told patient that her lungs may be filled with fluid. Patient additionally with increased shortness of breath with accessory muscle use. Endorses progressively decreasing PO intake. Possible subjective fevers over the last 2 weeks. She has a history of recurrent UTIs, including reported MDR E.coli. She takes chronic methenamine. Also was recently hospitalized in October 2022 with COVID PNA complicated by bilateral PEs and MDR UTI. She was treated with meropenem which was de-escalated to CTX at that time. Patient also had recent outpatient thoracentesis in January for chronic pleural effusion. Cannot see fluid studies or notes in EHR but patient's daughter states they removed 1L pleural fluid at that time that was negative for infection. Patient's daughter unaware of any recent sick contacts but the daughter did say she was congested a couple weeks ago. Patient received flu shot this year (October) and has received 3 COVID vaccinations. Regularly taking medications as prescribed, but has not been able to get some recently due to somnolence. Only recent medication change was addition of lorazepam for sleep that she has had over the last 3 days. Denies nausea, vomiting, constipation, diarrhea. Last bowel movement ~48 hours ago which is normal for patient per daughter."    Interval History: MET called 3/26, pt transitioned to comfort focused care.  Pt received ativan x2 doses overnight.     Past Medical History:   Diagnosis Date    Anticoagulant long-term use     Arthritis     Breast cancer     " radiation 2003    Cancer     CHF (congestive heart failure)     Coronary artery disease     Glaucoma, right eye     Hypertension     Pulmonary emboli     Stroke     Thyroid cancer     Thyroid disease        Past Surgical History:   Procedure Laterality Date    CARDIAC CATHETERIZATION      CARDIAC VALVE SURGERY      CHOLECYSTECTOMY      CORONARY ANGIOGRAPHY N/A 04/23/2019    Procedure: ANGIOGRAM, CORONARY ARTERY;  Surgeon: Bakari Singletary MD;  Location: [facility] CATH LAB;  Service: Cardiology;  Laterality: N/A;    HYSTERECTOMY      lymph nodes removal      MASTECTOMY  01/2022    THYROIDECTOMY  1992    TRANSCATHETER AORTIC VALVE REPLACEMENT (TAVR) N/A 05/07/2019    Procedure: REPLACEMENT, AORTIC VALVE, TRANSCATHETER (TAVR);  Surgeon: Bakari Singletary MD;  Location: [facility] CATH LAB;  Service: Cardiology;  Laterality: N/A;       Review of patient's allergies indicates:   Allergen Reactions    Penicillins Swelling    Sulfa (sulfonamide antibiotics) Nausea Only       Medications:  Continuous Infusions:   sodium chloride 0.9% Stopped (03/23/23 0707)     Scheduled Meds:   busPIRone  5 mg Oral BID    prednisoLONE acetate  1 drop Right Eye QID    sodium chloride 0.9%  10 mL Intravenous Q6H    timolol maleate 0.5%  1 drop Both Eyes BID     PRN Meds:sodium chloride 0.9%, lorazepam, morphine, ondansetron, sodium chloride 0.9%, Flushing PICC Protocol **AND** sodium chloride 0.9% **AND** sodium chloride 0.9%    Family History       Problem Relation (Age of Onset)    ALS Sister    Cancer Sister    Kidney disease Father          Tobacco Use    Smoking status: Never    Smokeless tobacco: Never   Substance and Sexual Activity    Alcohol use: Not Currently    Drug use: Never    Sexual activity: Not Currently       Review of Systems   Unable to perform ROS: Dementia   Objective:     Vital Signs (Most Recent):  Temp: 96.5 °F (35.8 °C) (03/27/23 0800)  Pulse: (!) 59 (03/27/23 1227)  Resp: 18 (03/27/23 0800)  BP: (!) 110/51 (03/27/23  0800)  SpO2: (!) 87 % (03/27/23 0800)   Vital Signs (24h Range):  Temp:  [96.5 °F (35.8 °C)-100 °F (37.8 °C)] 96.5 °F (35.8 °C)  Pulse:  [59-74] 59  Resp:  [16-18] 18  SpO2:  [85 %-93 %] 87 %  BP: (110-111)/(51-52) 110/51     Weight: 70.8 kg (156 lb 1.4 oz)  Body mass index is 28.55 kg/m².    Physical Exam  Vitals and nursing note reviewed. Exam conducted with a chaperone present.   Constitutional:       General: She is not in acute distress.     Appearance: Normal appearance. She is well-developed and well-groomed. She is ill-appearing (chronically). She is not toxic-appearing.      Interventions: Nasal cannula in place.   HENT:      Head: Normocephalic.      Nose: Nose normal.      Mouth/Throat:      Mouth: Mucous membranes are moist.   Cardiovascular:      Rate and Rhythm: Normal rate.      Pulses: Normal pulses.   Pulmonary:      Effort: Pulmonary effort is normal.   Abdominal:      General: Abdomen is flat.      Palpations: Abdomen is soft.   Musculoskeletal:         General: Swelling present.      Right lower leg: Edema present.      Left lower leg: Edema present.   Skin:     General: Skin is warm and dry.      Capillary Refill: Capillary refill takes less than 2 seconds.      Coloration: Skin is pale.   Neurological:      Mental Status: She is alert and easily aroused. She is disoriented.      Motor: Weakness present.   Psychiatric:         Behavior: Behavior normal.       Review of Symptoms      Symptom Assessment (ESAS 0-10 Scale)  Pain:  0  Dyspnea:  0  Anxiety:  0  Nausea:  0  Depression:  0  Anorexia:  0  Fatigue:  0  Insomnia:  0  Restlessness:  0  Agitation:  0         Performance Status:  30    Living Arrangements:  Lives with family and Lives in home    Psychosocial/Cultural:   See Palliative Psychosocial Note: No  Social Issues Identified: Coping deficit pt/family  Bereavement Risk: Yes: Identified: work/home, financial, intimacy, and caregiver concerns   Caregiver Needs Discussed. Caregiver  Distress: Yes: Need for respite, Intensity of family caregiving, Caregiver Burnout Risk, and Caregiver support and community resources discussed.    Cultural: none identified   **Primary  to Follow**  Palliative Care  Consult: No    Spiritual:  F - Ana and Belief:  Amish   I - Importance:  High   C - Community:  Family support   A - Address in Care:  Pastoral visits prn      Time-Based Charting:  Yes  Chart Review: 28 minutes  Face to Face: 15 minutes  Symptom Assessment: 10 minutes  Coordination of Care: 25 minutes  Discharge Planning: 10 minutes  Advance Care Planning: 10 minutes    Total Time Spent: 98 minutes      Advance Care Planning   Advance Directives:   Living Will: No    [place]: Yes    Do Not Resuscitate Status: Yes      Decision Making:  Family answered questions  Goals of Care: The family endorses that what is most important right now is to focus on symptom/pain control, quality of life, even if it means sacrificing a little time, improvement in condition but with limits to invasive therapies, and comfort and QOL     Accordingly, we have decided that the best plan to meet the patient's goals includes continuing with palliative care and comfort focused care.        Significant Labs: All pertinent labs within the past 24 hours have been reviewed.  CBC:   Recent Labs   Lab 03/26/23  0325   WBC 5.75   HGB 9.5*   HCT 32.5*   MCV 82        BMP:  No results for input(s): GLU, NA, K, CL, CO2, BUN, CREATININE, CALCIUM, MG in the last 24 hours.  LFT:  Lab Results   Component Value Date    AST 23 03/26/2023    ALKPHOS 89 03/26/2023    BILITOT 0.8 03/26/2023     Albumin:   Albumin   Date Value Ref Range Status   03/26/2023 2.5 (L) 3.5 - 5.2 g/dL Final     Protein:   Total Protein   Date Value Ref Range Status   03/26/2023 7.4 6.0 - 8.4 g/dL Final     Lactic acid:   Lab Results   Component Value Date    LACTATE 1.5 03/21/2023    LACTATE 1.3 03/21/2023       Significant Imaging:  I have reviewed all pertinent imaging results/findings within the past 24 hours.      Luisa Manuel, NP  Palliative Medicine  [place] - Telemetry

## 2023-03-27 NOTE — PROGRESS NOTES
[facility] Medicine Progress Note    Primary Team: [facility] Hospitalist Team B  Attending Physician: Jean Carlos Guido MD  Resident: Libby  Intern: Jose Roberto    Subjective:      Overnight, was calling out and restless per nursing staff so received two doses of IV Ativan. Family remained at bedside overnight and is with her this morning. Patient denies any complaints, including pain or dyspnea, when asked directly.     Objective:     Last 24 Hour Vital Signs:  BP  Min: 86/48  Max: 111/52  Temp  Av.9 °F (37.2 °C)  Min: 98.4 °F (36.9 °C)  Max: 100 °F (37.8 °C)  Pulse  Av.6  Min: 59  Max: 74  Resp  Av.3  Min: 16  Max: 18  SpO2  Av.2 %  Min: 85 %  Max: 100 %  Weight  Av.3 kg (154 lb 15.7 oz)  Min: 70.3 kg (154 lb 15.7 oz)  Max: 70.3 kg (154 lb 15.7 oz)  I/O last 3 completed shifts:  In: 63 [I.V.:10; IV Piggyback:53]  Out: 800 [Urine:800]    Physical Examination:  Physical Exam  Constitutional:       General: She is not in acute distress.     Appearance: She is not ill-appearing.   HENT:      Head: Normocephalic.      Right Ear: External ear normal.      Left Ear: External ear normal.      Nose: Nose normal.      Mouth/Throat:      Mouth: Mucous membranes are moist.   Eyes:      General: No scleral icterus.     Extraocular Movements: Extraocular movements intact.   Cardiovascular:      Rate and Rhythm: Normal rate and regular rhythm.      Pulses: Normal pulses.      Heart sounds: Normal heart sounds. No murmur heard.  Pulmonary:      Effort: Pulmonary effort is normal. No respiratory distress.      Comments: Bedside US demonstrated diffuse bilateral B lines.   Abdominal:      General: Abdomen is flat.   Musculoskeletal:         General: Swelling (proximal BLE dependent edema noted) present.      Cervical back: Normal range of motion.      Comments: 1+ pitting edema of bilateral lower extremities, improved from prior day.   Skin:     General: Skin is warm and dry.   Neurological:      Mental  Status: She is alert.      Comments: Oriented to person but not place or situation. Moving all four extremities spontaneously without apparent deficit.    Psychiatric:         Mood and Affect: Mood normal.         Behavior: Behavior normal.     Laboratory:  Recent Labs   Lab 03/24/23  0434 03/25/23  0515 03/26/23  0325   WBC 4.47 4.54 5.75   HGB 9.2* 9.5* 9.5*   HCT 31.3* 33.4* 32.5*    142* 168   MCV 84 84 82   RDW 19.3* 19.0* 19.1*    143 140   K 4.0 3.4* 3.6   CL 96 94* 90*   CO2 36* 37* 39*   BUN 34* 29* 29*   CREATININE 0.8 0.8 0.8   GLU 94 80 84   PROT 8.0 7.8 7.4   ALBUMIN 2.7* 2.6* 2.5*   BILITOT 0.8 0.8 0.8   AST 37 27 23   ALKPHOS 95 93 89   ALT 22 20 18       Laboratory Data Reviewed:  Pertinent Findings:  Pending morning labs    Microbiology Data Reviewed:  Pertinent Findings:  UCX GNR pending speciation and sensitivities    Other Results:  EKG (my interpretation): No new EKG    Radiology Data Reviewed:   Pertinent Findings:  No new imaging    Current Medications:     Infusions:   sodium chloride 0.9% Stopped (03/23/23 0707)        Scheduled:   busPIRone  5 mg Oral BID    sodium chloride 0.9%  10 mL Intravenous Q6H        PRN:  sodium chloride 0.9%, lorazepam, morphine, ondansetron, sodium chloride 0.9%, Flushing PICC Protocol **AND** sodium chloride 0.9% **AND** sodium chloride 0.9%    Antibiotics and Day Number of Therapy:  Rocephin 3/21/23-->  Azithro 3/21-->3/23/23  Vanc 3/21/23-->3/23/23    Lines and Day Number of Therapy:  PIV  Midline 3/23/23    Assessment:     Pilar Michael is a 89 y.o.female with a PMH of HFpEF, BL PE, CAD, HLD, HTN, and recurrent L Breast Cancer who presented with increased work of breathing and AMS x1 day. PT was admitted to [facility] internal medicine for further evaluation and monitoring of acute encephalopathy and hypercapnic and hypoxemic respiratory failure.      Plan:     Acute Hypoxic and Hypercapnic Respiratory Failure 2/2 Acute Decompensated diastolic heart  failure and Influenza PNA  - Tmax 99.7, HR 60s, tachypneic, BP up to 214/98 at admission  - Tamiflu started in the setting of influenza B positive (symptoms over 2 weeks but patient severely ill and high risk for further decompensation)  - BNP 2929 (significantly higher than prior)  - ABG 7.4/63.5/74/39.4; started BiPAP in the setting of hypercapnea  - bilateral Pleural Effusion and worsening LLL Consolidation c/f   - initially placed on a Lasix gtt with appropriate UOP and transitioned to bolus dosing following diuresis and clinical improvement  - PT initiated on broad spectrum antibiotics initially and deescalated to rocephin on 3/23/23  - recent removal of ~1L from pleural effusion in Jan 2023 outpatient  - given bilateral nature, effusions are likely 2/2 acute volume overload, will diurese and reassess need for thoracentesis  - net negative 4.6L UOP with 800mL UOP in past 24 hours  - HCO3 remained stable 37-->36 --> 37 --> 39  - transitioned Bumex to oral dosing at 1mg BID, her home dose on 3/26  - became acutely hypotensive on 3/26 with decreased responsiveness; MET called and ICU team discussed comfort care with family  - family elected to pursue comfort care on 3/26 over further intensive interventions; final dispo TBD but family considering IP hospice    Acute encephalopathy, improving  - PT initiatally with decreased level of alertness with confusion  - CO2 74 on admission  - UA demonstrated GNR  - currently on rocephin   - clinically improved on day 2 of hospitalization  - today, conversational and answering questions appropriately although some memory deficits are noted  - will maintain delirium precautions  - will continue to monitor     Urinary Tract Infection  - may contribute to acute encephalopathy  - Bcx negative  - has recurrent UTIs, on chronic methenamine, recently finished course of nitrofuantoin 6 days ago  - h/o E.coli resistant to fluoroquinolones  - Ucx positive for E coli >100,000 CFUs,  sensitive to CTX, Augmentin, et al  - has received five days of CTX as of 5/26  - abx discontinued in favor of comfort measures     Pulmonary Embolism (Oct 2022)  - home Eliquis discontinued given focus on comfort     Breast Cancer (2002 with recurrence 2021) s/p mastectomy  Thyroid Cancer s/p thyroidectomy, hypothyroidism  - no acute issues    Stroke  - patient's daughter declines residual deficit except R eye dilation  - discontinued home Plavix given comfort measures     S/p TAVR 2019  - managing anticoagulation as above     CAD s/p oRCA PCI with BMS 2019  S/p Dual Chamber Pacemaker  - troponin consistent with priors     Essential HTN  - continue home coreg, lisinopril  - will continue to monitor and adjust accordingly     Glaucoma  - continuing home timolol, Pred-Forte eye drops for comfort    Diet: Dysphagia pureed diet  DVT Prophylaxis: deferred given comfort care  IVF: None  Code: Full    Dispo: comfort care; inpatient vs outpatient hospice    Miguel Souza MD  [facility] HO-2    [facility] Medicine Hospitalist Pager numbers:   [facility] Hospitalist Medicine Team A (Edgard/Larisa): [phone]  [facility] Hospitalist Medicine Team B (Crystal/Hay):  [phone]

## 2023-03-27 NOTE — ASSESSMENT & PLAN NOTE
Pilar Michael is a 89 y.o.female with a PMH of HFpEF, BL PE, CAD, HLD, HTN, and recurrent L Breast Cancer who presented with increased work of breathing and AMS x1 day. PT was admitted to [facility] medicine for further evaluation and monitoring of acute encephalopathy and hypercapnic and hypoxemic respiratory failure. Pt required a stay in the ICU, showed much improvement and was stepped down to the floor on 3/23/2023.  Pt with rapid deteriaoration and MET called on 3/26/2023.  Following conversation with ICU team and primary team, family has decided to shift focus to comfort focused care. Palliative care consulted for clarification of goals and further discussion of hospice options.    -At time of initial encounter, pt appears calm and in no distress.  Per family member at bedside, pt has been in and out of sleep all day.  Pt denies any pain or discomfort. Pt has received 2 doses of 0.5mg IV ativan within the past 24 hours.    -Pt remains pleasantly confused at this time and is oriented to self only.  Pt sipping liquids and taking a few small bites with meals.   -vitals stable throughout today, last MAP 75  -Pts daughter and legal MPOA, Katiana Mehta, is also dealing with the recent (2023 death of a close family member) and is currently arranging the .  Per family member at the bedside, Katiana will be at the bedside tomorrow.  -Based off of notes, and in light of recent events, Katiana does not feel as though she will be able to care for the pt at home any longer.  We will need to have further discussion about hospice options; home hospice vs nursing home with hospice options.  At this time, given the pts clinical picture I do not believe that she meets inpatient hospice criteria.  However, pt has had a tumultuous admission thus far and if pt were to further deteriorate, that could be reexamined.  Referrals to hospice houses could still be sent in the event they will accept pt given her  overall trajectory and decline if this is the wish of the family. We could also evaluate if pt would be a candidate for respite care at an inpatient hospice house, although they have their own requirements as well.   -Planning to meet with Katiana at bedside tomorrow to reevaluate pts needs as well as further explore options.   -updated primary team

## 2023-03-27 NOTE — PLAN OF CARE
The sw called the pt's LOCET in to [facility] and faxed the PASRR to [facility].        03/27/23 9251   Post-Acute Status   Post-Acute Authorization Placement   Post-Acute Placement Status Pending state direction/certification   Discharge Plan   Discharge Plan A New Nursing Home placement - group home care facility   Discharge Plan B Hospice/home

## 2023-03-27 NOTE — PROGRESS NOTES
The sw spoke to the pt's dtr Katiana [phone] and she just left from making the  arrangements for her  who  here at the hospital Friday. The sw didn't rush her and she states she will be at the hospital tomorrow. The sw offered her emotional support and informed her she will speak to her tomorrow when she come to the hospital. She states she has so much on her plates right now.

## 2023-03-27 NOTE — HPI
"Per chart, "Pilar Michael is a 89 y.o. female with a history of CAD s/p PCI, TAVR (2019) on chronic anticoagulant use, HFpEF s/p pacemaker, breast cancer s/p mastectomy, thyroid cancer s/p thyroidectomy, CVA without residual deficits (R eye symptoms), bilateral PEs in the setting of COVID infection and UTI (October 2022), HTN, glaucoma who presented to [facility] on 3/21/2023 with a primary complaint of altered mental status.     Patient somnolent on exam and moaning, minimally responsive to questions. History obtained from patient's daughter at bedside. Patient was exhibiting increased lethargy and malaise with intermittent confusion over the last 2 weeks. She went to PCP last week and wad diagnosed with UTI, sent out with Macrobid that ended 6 days PTA. Patient's daughter reports that over the past 3-4 days patient has acutely worsened to the point where she just moans and will barely respond to questions. Additionally has intermittently required more oxygen over the last few days. SpO2 would drop into low 80s so patient's oxygen was turned up to 3L from normal 2L with improvement to high 80s-low 90s. Patient's daughter found geriatric docter that will do house calls who told patient that her lungs may be filled with fluid. Patient additionally with increased shortness of breath with accessory muscle use. Endorses progressively decreasing PO intake. Possible subjective fevers over the last 2 weeks. She has a history of recurrent UTIs, including reported MDR E.coli. She takes chronic methenamine. Also was recently hospitalized in October 2022 with COVID PNA complicated by bilateral PEs and MDR UTI. She was treated with meropenem which was de-escalated to CTX at that time. Patient also had recent outpatient thoracentesis in January for chronic pleural effusion. Cannot see fluid studies or notes in EHR but patient's daughter states they removed 1L pleural fluid at that time that was negative " "for infection. Patient's daughter unaware of any recent sick contacts but the daughter did say she was congested a couple weeks ago. Patient received flu shot this year (October) and has received 3 COVID vaccinations. Regularly taking medications as prescribed, but has not been able to get some recently due to somnolence. Only recent medication change was addition of lorazepam for sleep that she has had over the last 3 days. Denies nausea, vomiting, constipation, diarrhea. Last bowel movement ~48 hours ago which is normal for patient per daughter."  "

## 2023-03-27 NOTE — SUBJECTIVE & OBJECTIVE
Interval History: MET called 3/26, pt transitioned to comfort focused care.  Pt received ativan x2 doses overnight.     Past Medical History:   Diagnosis Date    Anticoagulant long-term use     Arthritis     Breast cancer     radiation 2003    Cancer     CHF (congestive heart failure)     Coronary artery disease     Glaucoma, right eye     Hypertension     Pulmonary emboli     Stroke     Thyroid cancer     Thyroid disease        Past Surgical History:   Procedure Laterality Date    CARDIAC CATHETERIZATION      CARDIAC VALVE SURGERY      CHOLECYSTECTOMY      CORONARY ANGIOGRAPHY N/A 04/23/2019    Procedure: ANGIOGRAM, CORONARY ARTERY;  Surgeon: Bakari Singletary MD;  Location: [facility] CATH LAB;  Service: Cardiology;  Laterality: N/A;    HYSTERECTOMY      lymph nodes removal      MASTECTOMY  01/2022    THYROIDECTOMY  1992    TRANSCATHETER AORTIC VALVE REPLACEMENT (TAVR) N/A 05/07/2019    Procedure: REPLACEMENT, AORTIC VALVE, TRANSCATHETER (TAVR);  Surgeon: Bakari Singletary MD;  Location: [facility] CATH LAB;  Service: Cardiology;  Laterality: N/A;       Review of patient's allergies indicates:   Allergen Reactions    Penicillins Swelling    Sulfa (sulfonamide antibiotics) Nausea Only       Medications:  Continuous Infusions:   sodium chloride 0.9% Stopped (03/23/23 0707)     Scheduled Meds:   busPIRone  5 mg Oral BID    prednisoLONE acetate  1 drop Right Eye QID    sodium chloride 0.9%  10 mL Intravenous Q6H    timolol maleate 0.5%  1 drop Both Eyes BID     PRN Meds:sodium chloride 0.9%, lorazepam, morphine, ondansetron, sodium chloride 0.9%, Flushing PICC Protocol **AND** sodium chloride 0.9% **AND** sodium chloride 0.9%    Family History       Problem Relation (Age of Onset)    ALS Sister    Cancer Sister    Kidney disease Father          Tobacco Use    Smoking status: Never    Smokeless tobacco: Never   Substance and Sexual Activity    Alcohol use: Not Currently    Drug use: Never    Sexual activity: Not Currently        Review of Systems   Unable to perform ROS: Dementia   Objective:     Vital Signs (Most Recent):  Temp: 96.5 °F (35.8 °C) (03/27/23 0800)  Pulse: (!) 59 (03/27/23 1227)  Resp: 18 (03/27/23 0800)  BP: (!) 110/51 (03/27/23 0800)  SpO2: (!) 87 % (03/27/23 0800)   Vital Signs (24h Range):  Temp:  [96.5 °F (35.8 °C)-100 °F (37.8 °C)] 96.5 °F (35.8 °C)  Pulse:  [59-74] 59  Resp:  [16-18] 18  SpO2:  [85 %-93 %] 87 %  BP: (110-111)/(51-52) 110/51     Weight: 70.8 kg (156 lb 1.4 oz)  Body mass index is 28.55 kg/m².    Physical Exam  Vitals and nursing note reviewed. Exam conducted with a chaperone present.   Constitutional:       General: She is not in acute distress.     Appearance: Normal appearance. She is well-developed and well-groomed. She is ill-appearing (chronically). She is not toxic-appearing.      Interventions: Nasal cannula in place.   HENT:      Head: Normocephalic.      Nose: Nose normal.      Mouth/Throat:      Mouth: Mucous membranes are moist.   Cardiovascular:      Rate and Rhythm: Normal rate.      Pulses: Normal pulses.   Pulmonary:      Effort: Pulmonary effort is normal.   Abdominal:      General: Abdomen is flat.      Palpations: Abdomen is soft.   Musculoskeletal:         General: Swelling present.      Right lower leg: Edema present.      Left lower leg: Edema present.   Skin:     General: Skin is warm and dry.      Capillary Refill: Capillary refill takes less than 2 seconds.      Coloration: Skin is pale.   Neurological:      Mental Status: She is alert and easily aroused. She is disoriented.      Motor: Weakness present.   Psychiatric:         Behavior: Behavior normal.       Review of Symptoms      Symptom Assessment (ESAS 0-10 Scale)  Pain:  0  Dyspnea:  0  Anxiety:  0  Nausea:  0  Depression:  0  Anorexia:  0  Fatigue:  0  Insomnia:  0  Restlessness:  0  Agitation:  0         Performance Status:  30    Living Arrangements:  Lives with family and Lives in home    Psychosocial/Cultural:    See Palliative Psychosocial Note: No  Social Issues Identified: Coping deficit pt/family  Bereavement Risk: Yes: Identified: work/home, financial, intimacy, and caregiver concerns   Caregiver Needs Discussed. Caregiver Distress: Yes: Need for respite, Intensity of family caregiving, Caregiver Burnout Risk, and Caregiver support and community resources discussed.    Cultural: none identified   **Primary  to Follow**  Palliative Care  Consult: No    Spiritual:  F - Ana and Belief:  Mormonism   I - Importance:  High   C - Community:  Family support   A - Address in Care:  Pastoral visits prn      Time-Based Charting:  Yes  Chart Review: 28 minutes  Face to Face: 15 minutes  Symptom Assessment: 10 minutes  Coordination of Care: 25 minutes  Discharge Planning: 10 minutes  Advance Care Planning: 10 minutes    Total Time Spent: 98 minutes      Advance Care Planning   Advance Directives:   Living Will: No    [place]: Yes    Do Not Resuscitate Status: Yes      Decision Making:  Family answered questions  Goals of Care: The family endorses that what is most important right now is to focus on symptom/pain control, quality of life, even if it means sacrificing a little time, improvement in condition but with limits to invasive therapies, and comfort and QOL     Accordingly, we have decided that the best plan to meet the patient's goals includes continuing with palliative care and comfort focused care.        Significant Labs: All pertinent labs within the past 24 hours have been reviewed.  CBC:   Recent Labs   Lab 03/26/23  0325   WBC 5.75   HGB 9.5*   HCT 32.5*   MCV 82        BMP:  No results for input(s): GLU, NA, K, CL, CO2, BUN, CREATININE, CALCIUM, MG in the last 24 hours.  LFT:  Lab Results   Component Value Date    AST 23 03/26/2023    ALKPHOS 89 03/26/2023    BILITOT 0.8 03/26/2023     Albumin:   Albumin   Date Value Ref Range Status   03/26/2023 2.5 (L) 3.5 - 5.2 g/dL Final      Protein:   Total Protein   Date Value Ref Range Status   03/26/2023 7.4 6.0 - 8.4 g/dL Final     Lactic acid:   Lab Results   Component Value Date    LACTATE 1.5 03/21/2023    LACTATE 1.3 03/21/2023       Significant Imaging: I have reviewed all pertinent imaging results/findings within the past 24 hours.

## 2023-03-28 PROCEDURE — A4216 STERILE WATER/SALINE, 10 ML: HCPCS | Performed by: INTERNAL MEDICINE

## 2023-03-28 PROCEDURE — 11000001 HC ACUTE MED/SURG PRIVATE ROOM

## 2023-03-28 PROCEDURE — 25000003 PHARM REV CODE 250: Performed by: INTERNAL MEDICINE

## 2023-03-28 PROCEDURE — 25000003 PHARM REV CODE 250: Performed by: STUDENT IN AN ORGANIZED HEALTH CARE EDUCATION/TRAINING PROGRAM

## 2023-03-28 PROCEDURE — 27000221 HC OXYGEN, UP TO 24 HOURS

## 2023-03-28 PROCEDURE — 94761 N-INVAS EAR/PLS OXIMETRY MLT: CPT

## 2023-03-28 PROCEDURE — 99233 PR SUBSEQUENT HOSPITAL CARE,LEVL III: ICD-10-PCS | Mod: ,,,

## 2023-03-28 PROCEDURE — 99233 SBSQ HOSP IP/OBS HIGH 50: CPT | Mod: ,,,

## 2023-03-28 PROCEDURE — 99900035 HC TECH TIME PER 15 MIN (STAT)

## 2023-03-28 RX ORDER — BALSAM PERU/CASTOR OIL
OINTMENT (GRAM) TOPICAL 2 TIMES DAILY
Status: DISCONTINUED | OUTPATIENT
Start: 2023-03-28 | End: 2023-03-30 | Stop reason: HOSPADM

## 2023-03-28 RX ADMIN — TIMOLOL MALEATE 1 DROP: 5 SOLUTION/ DROPS OPHTHALMIC at 10:03

## 2023-03-28 RX ADMIN — PREDNISOLONE ACETATE 1 DROP: 10 SUSPENSION/ DROPS OPHTHALMIC at 03:03

## 2023-03-28 RX ADMIN — BUSPIRONE HYDROCHLORIDE 5 MG: 5 TABLET ORAL at 08:03

## 2023-03-28 RX ADMIN — SODIUM CHLORIDE, PRESERVATIVE FREE 10 ML: 5 INJECTION INTRAVENOUS at 12:03

## 2023-03-28 RX ADMIN — PREDNISOLONE ACETATE 1 DROP: 10 SUSPENSION/ DROPS OPHTHALMIC at 06:03

## 2023-03-28 RX ADMIN — PREDNISOLONE ACETATE 1 DROP: 10 SUSPENSION/ DROPS OPHTHALMIC at 10:03

## 2023-03-28 RX ADMIN — SODIUM CHLORIDE, PRESERVATIVE FREE 10 ML: 5 INJECTION INTRAVENOUS at 05:03

## 2023-03-28 RX ADMIN — TIMOLOL MALEATE 1 DROP: 5 SOLUTION/ DROPS OPHTHALMIC at 08:03

## 2023-03-28 RX ADMIN — SODIUM CHLORIDE, PRESERVATIVE FREE 10 ML: 5 INJECTION INTRAVENOUS at 06:03

## 2023-03-28 RX ADMIN — BUSPIRONE HYDROCHLORIDE 5 MG: 5 TABLET ORAL at 10:03

## 2023-03-28 RX ADMIN — PREDNISOLONE ACETATE 1 DROP: 10 SUSPENSION/ DROPS OPHTHALMIC at 08:03

## 2023-03-28 RX ADMIN — Medication: at 09:03

## 2023-03-28 NOTE — PROGRESS NOTES
[facility] Medicine Progress Note    Primary Team: [facility] Hospitalist Team B  Attending Physician: Jean Carlos Guido MD  Resident: Libby  Intern: Jose Roberto    Subjective:      Overnight, slept well per family at discharge. Does not indicate any discomfort or pain upon evaluation this morning.     Objective:     Last 24 Hour Vital Signs:  BP  Min: 114/75  Max: 118/58  Temp  Av.4 °F (36.3 °C)  Min: 96.5 °F (35.8 °C)  Max: 98.3 °F (36.8 °C)  Pulse  Av.5  Min: 59  Max: 71  Resp  Av  Min: 16  Max: 20  SpO2  Av.5 %  Min: 94 %  Max: 100 %  I/O last 3 completed shifts:  In: -   Out: 500 [Urine:500]    Physical Examination:  Physical Exam  Constitutional:       General: She is not in acute distress.     Appearance: She is not ill-appearing.   HENT:      Head: Normocephalic.      Right Ear: External ear normal.      Left Ear: External ear normal.      Nose: Nose normal.      Mouth/Throat:      Mouth: Mucous membranes are moist.   Eyes:      General: No scleral icterus.     Extraocular Movements: Extraocular movements intact.   Cardiovascular:      Rate and Rhythm: Normal rate and regular rhythm.      Pulses: Normal pulses.      Heart sounds: Normal heart sounds. No murmur heard.  Pulmonary:      Effort: Pulmonary effort is normal. No respiratory distress.      Comments: Bedside US demonstrated diffuse bilateral B lines.   Abdominal:      General: Abdomen is flat.   Musculoskeletal:         General: Swelling (proximal BLE dependent edema noted) present.      Cervical back: Normal range of motion.      Comments: 1+ pitting edema of bilateral lower extremities, improved from prior day.   Skin:     General: Skin is warm and dry.   Neurological:      Mental Status: She is alert.      Comments: Oriented to person but not place or situation. Moving all four extremities spontaneously without apparent deficit.    Psychiatric:         Mood and Affect: Mood normal.         Behavior: Behavior normal.      Laboratory:  Recent Labs   Lab 03/24/23  0434 03/25/23  0515 03/26/23  0325   WBC 4.47 4.54 5.75   HGB 9.2* 9.5* 9.5*   HCT 31.3* 33.4* 32.5*    142* 168   MCV 84 84 82   RDW 19.3* 19.0* 19.1*    143 140   K 4.0 3.4* 3.6   CL 96 94* 90*   CO2 36* 37* 39*   BUN 34* 29* 29*   CREATININE 0.8 0.8 0.8   GLU 94 80 84   PROT 8.0 7.8 7.4   ALBUMIN 2.7* 2.6* 2.5*   BILITOT 0.8 0.8 0.8   AST 37 27 23   ALKPHOS 95 93 89   ALT 22 20 18       Laboratory Data Reviewed:  Pertinent Findings:  Pending morning labs    Microbiology Data Reviewed:  Pertinent Findings:  UCX GNR pending speciation and sensitivities    Other Results:  EKG (my interpretation): No new EKG    Radiology Data Reviewed:   Pertinent Findings:  No new imaging    Current Medications:     Infusions:   sodium chloride 0.9% Stopped (03/23/23 0707)        Scheduled:   busPIRone  5 mg Oral BID    prednisoLONE acetate  1 drop Right Eye QID    sodium chloride 0.9%  10 mL Intravenous Q6H    timolol maleate 0.5%  1 drop Both Eyes BID        PRN:  sodium chloride 0.9%, lorazepam, morphine, ondansetron, sodium chloride 0.9%, Flushing PICC Protocol **AND** sodium chloride 0.9% **AND** sodium chloride 0.9%    Antibiotics and Day Number of Therapy:  Rocephin 3/21/23-->  Azithro 3/21-->3/23/23  Vanc 3/21/23-->3/23/23    Lines and Day Number of Therapy:  PIV  Midline 3/23/23    Assessment:     Pilar Michael is a 89 y.o.female with a PMH of HFpEF, BL PE, CAD, HLD, HTN, and recurrent L Breast Cancer who presented with increased work of breathing and AMS x1 day. PT was admitted to [facility] internal medicine for further evaluation and monitoring of acute encephalopathy and hypercapnic and hypoxemic respiratory failure. Now, pending placement with hospice.     Plan:     Acute Hypoxic and Hypercapnic Respiratory Failure 2/2 Acute Decompensated diastolic heart failure and Influenza PNA  - Tmax 99.7, HR 60s, tachypneic, BP up to 214/98 at admission  - Tamiflu started in  the setting of influenza B positive (symptoms over 2 weeks but patient severely ill and high risk for further decompensation)  - BNP 2929 (significantly higher than prior)  - ABG 7.4/63.5/74/39.4; started BiPAP in the setting of hypercapnea  - bilateral Pleural Effusion and worsening LLL Consolidation c/f   - initially placed on a Lasix gtt with appropriate UOP and transitioned to bolus dosing following diuresis and clinical improvement  - PT initiated on broad spectrum antibiotics initially and deescalated to rocephin on 3/23/23  - recent removal of ~1L from pleural effusion in Jan 2023 outpatient  - given bilateral nature, effusions are likely 2/2 acute volume overload, will diurese and reassess need for thoracentesis  - net negative 4.6L UOP with 800mL UOP in past 24 hours  - HCO3 remained stable 37-->36 --> 37 --> 39  - transitioned Bumex to oral dosing at 1mg BID, her home dose on 3/26  - became acutely hypotensive on 3/26 with decreased responsiveness; MET called and ICU team discussed comfort care with family  - family elected to pursue comfort care on 3/26 over further intensive interventions; final dispo TBD but family considering hospice  - Palliative care consulted, appreciate recs    Acute encephalopathy, improving  - PT initiatally with decreased level of alertness with confusion  - CO2 74 on admission  - UA demonstrated GNR  - currently on rocephin   - clinically improved on day 2 of hospitalization  - today, conversational and answering questions appropriately although some memory deficits are noted  - will maintain delirium precautions  - will continue to monitor     Urinary Tract Infection  - may contribute to acute encephalopathy  - Bcx negative  - has recurrent UTIs, on chronic methenamine, recently finished course of nitrofuantoin 6 days ago  - h/o E.coli resistant to fluoroquinolones  - Ucx positive for E coli >100,000 CFUs, sensitive to CTX, Augmentin, et al  - has received five days of CTX as  of 5/26  - abx discontinued in favor of comfort measures     Pulmonary Embolism (Oct 2022)  - home Eliquis discontinued given focus on comfort     Breast Cancer (2002 with recurrence 2021) s/p mastectomy  Thyroid Cancer s/p thyroidectomy, hypothyroidism  - no acute issues    Stroke  - patient's daughter declines residual deficit except R eye dilation  - discontinued home Plavix given comfort measures     S/p TAVR 2019  - managing anticoagulation as above     CAD s/p oRCA PCI with BMS 2019  S/p Dual Chamber Pacemaker  - troponin consistent with priors     Essential HTN  - continue home coreg, lisinopril  - will continue to monitor and adjust accordingly     Glaucoma  - continuing home timolol, Pred-Forte eye drops for comfort    Diet: Dysphagia pureed diet  DVT Prophylaxis: deferred given comfort care  IVF: None  Code: Full    Dispo: comfort care; pending hospice placement    Miguel Souza MD  [facility] -2    [facility] Medicine Hospitalist Pager numbers:   [facility] Hospitalist Medicine Team A (Edgard/Larisa): [phone]  [facility] Hospitalist Medicine Team B (Crystal/Hay):  [phone]

## 2023-03-28 NOTE — PLAN OF CARE
The sw reached out to [facility] and [facility] and both are currently reviewing the pt. The sw notified both agencies the pt will not be ready until Thursday. The pt's dispo is currently pending.        03/28/23 1500   Discharge Reassessment   Assessment Type Discharge Planning Reassessment   Did the patient's condition or plan change since previous assessment? No   Discharge Plan discussed with: Adult children   Communicated CHARLIE with patient/caregiver Date not available/Unable to determine   Discharge Plan A Inpatient Hospice   Discharge Plan B Hospice/home;New Nursing Home placement - halfway care facility   DME Needed Upon Discharge  none   Discharge Barriers Identified None   Why the patient remains in the hospital Requires continued medical care   Post-Acute Status   Post-Acute Authorization Hospice   Hospice Status Referrals Sent   Discharge Delays (!) Patient and Family Barriers  (family meeting Thursday)

## 2023-03-28 NOTE — PLAN OF CARE
The sw spoke to Luisa with Palliative Care and the family meeting is scheduled for Thursday when the pt's son Chito will arrive,so he can be included in the meeting. The sw spoke to Getachew at [facility] [phone] and gave her the pt's MRN and name. She states they will review the pt's info in the chart and Lisy will call the sw back. The sw informed her the pt will not be ready for d/c until Thursday. The sw spoke to Shannan at [facility] [phone] and she states to fax the pt's info and she will be added to a waiting list. The sw informed her the pt will not be ready until Thursday. The sw faxed her the pt's info via [employer].        03/28/23 1450   Post-Acute Status   Post-Acute Authorization Hospice   Post-Acute Placement Status Referrals Sent   Discharge Delays (!) Patient and Family Barriers  (family meeting scheduled for Thursday)   Discharge Plan   Discharge Plan A Inpatient Hospice   Discharge Plan B Hospice/home;New Nursing Home placement - intermediate care facility

## 2023-03-28 NOTE — PROGRESS NOTES
[place] - Telemetry  Palliative Medicine  Progress Note    Patient Name: Pilar Michael  MRN: [id number]  Admission Date: 3/21/2023  Hospital Length of Stay: 7 days  Code Status: DNR   Attending Provider: Jean Carlos Guido MD  Consulting Provider: Luisa Manuel NP  Primary Care Physician: Joseph Noel MD  Principal Problem:Acute encephalopathy    Patient information was obtained from patient, relative(s), past medical records and primary team.      Assessment/Plan:     Palliative Care  Palliative care encounter  Pilar Michael is a 89 y.o.female with a PMH of HFpEF, BL PE, CAD, HLD, HTN, and recurrent L Breast Cancer who presented with increased work of breathing and AMS x1 day. PT was admitted to [facility] internal medicine for further evaluation and monitoring of acute encephalopathy and hypercapnic and hypoxemic respiratory failure. Pt required a stay in the ICU, showed much improvement and was stepped down to the floor on 3/23/2023.  Pt with rapid deteriaoration and MET called on 3/26/2023.  Following conversation with ICU team and primary team, family has decided to shift focus to comfort focused care. Palliative care consulted for clarification of goals and further discussion of hospice options.    3/28/2023  -At time of follow up encounter, pt resting in bed with no apparent signs of distress.  Family reports that pt has been less interactive since yesterday evening.  Pt will open her eyes to her name, but remains non verbal at this time.  Family reports that pt was praying the Rosary this morning.  Family reports pt with minimal sips of water today and no food intake.   -Met with pts daughter Katiana, and niece Elvi in the family meeting room.  Pts daughter expresses being overwhelmed currently.  Her  unexpectedly passed away 3/24/2023 and she is trying to plan his  while continuing to care for mother and now is making end of life decisions for her mother as well.  Emotional support  offered. Stressed to patient that my goal is to provide information and options to the pt and family at this time.    -Katiana again expresses that she wishes for her mother to remain on comfort focused care.  However, she is struggling with how to bring her mother home at this time as she does not feel as though she can provide adequate care to her mother while grieving the death of her .  Elvi wanting information on all d/c options for pt.  Options such as home hospice and nursing home with hospice discussed.  Family requesting information about hospice houses as well.   -Pts son will be arriving from out of state on Thursday, and Katiana has requested that we have a family meeting with him present to continue to discuss d/c options.  Primary team notified.   -Overall, Poor prognosis- inpatient hospice referral sent.    -Discharge planning is difficult due to severe psycho-social stress due to recent death of  and inability for Katiana to care for pt at home at this time.     3/27/2023  -At time of initial encounter, pt appears calm and in no distress.  Per family member at bedside, pt has been in and out of sleep all day.  Pt denies any pain or discomfort. Pt has received 2 doses of 0.5mg IV ativan within the past 24 hours.    -Pt remains pleasantly confused at this time and is oriented to self only.  Pt sipping liquids and taking a few small bites with meals.   -vitals stable throughout today, last MAP 75  -Pts daughter and legal MPOA, Katiana Mehta, is also dealing with the recent (2023 death of a close family member) and is currently arranging the .  Per family member at the bedside, Katiana will be at the bedside tomorrow.  -Based off of notes, and in light of recent events, Katiana does not feel as though she will be able to care for the pt at home any longer.  We will need to have further discussion about hospice options; home hospice vs nursing home with hospice  "options.  At this time, given the pts clinical picture I do not believe that she meets inpatient hospice criteria.  However, pt has had a tumultuous admission thus far and if pt were to further deteriorate, that could be reexamined.  Referrals to hospice houses could still be sent in the event they will accept pt given her overall trajectory and decline if this is the wish of the family. We could also evaluate if pt would be a candidate for respite care at an inpatient hospice house, although they have their own requirements as well.   -Planning to meet with Katiana at bedside tomorrow to reevaluate pts needs as well as further explore options.   -updated primary team         I will follow-up with patient. Please contact us if you have any additional questions.    Subjective:     Chief Complaint:   Chief Complaint   Patient presents with    Altered Mental Status     Pt arrived via  [facility] form home, family states pt has foul smelling urine, and increased confused, pt has left arm edema. Pt is o2 dependent        HPI:   Per chart, "Pilar Michael is a 89 y.o. female with a history of CAD s/p PCI, TAVR (2019) on chronic anticoagulant use, HFpEF s/p pacemaker, breast cancer s/p mastectomy, thyroid cancer s/p thyroidectomy, CVA without residual deficits (R eye symptoms), bilateral PEs in the setting of COVID infection and UTI (October 2022), HTN, glaucoma who presented to [facility] on 3/21/2023 with a primary complaint of altered mental status.     Patient somnolent on exam and moaning, minimally responsive to questions. History obtained from patient's daughter at bedside. Patient was exhibiting increased lethargy and malaise with intermittent confusion over the last 2 weeks. She went to PCP last week and wad diagnosed with UTI, sent out with Macrobid that ended 6 days PTA. Patient's daughter reports that over the past 3-4 days patient has acutely worsened to the point where she just " "moans and will barely respond to questions. Additionally has intermittently required more oxygen over the last few days. SpO2 would drop into low 80s so patient's oxygen was turned up to 3L from normal 2L with improvement to high 80s-low 90s. Patient's daughter found geriatric jorge that will do house calls who told patient that her lungs may be filled with fluid. Patient additionally with increased shortness of breath with accessory muscle use. Endorses progressively decreasing PO intake. Possible subjective fevers over the last 2 weeks. She has a history of recurrent UTIs, including reported MDR E.coli. She takes chronic methenamine. Also was recently hospitalized in October 2022 with COVID PNA complicated by bilateral PEs and MDR UTI. She was treated with meropenem which was de-escalated to CTX at that time. Patient also had recent outpatient thoracentesis in January for chronic pleural effusion. Cannot see fluid studies or notes in EHR but patient's daughter states they removed 1L pleural fluid at that time that was negative for infection. Patient's daughter unaware of any recent sick contacts but the daughter did say she was congested a couple weeks ago. Patient received flu shot this year (October) and has received 3 COVID vaccinations. Regularly taking medications as prescribed, but has not been able to get some recently due to somnolence. Only recent medication change was addition of lorazepam for sleep that she has had over the last 3 days. Denies nausea, vomiting, constipation, diarrhea. Last bowel movement ~48 hours ago which is normal for patient per daughter."    Interval History: No acute events reported overnight.  Family remains at the bedside. Pt with decreased interactions.     Medications:  Continuous Infusions:   sodium chloride 0.9% Stopped (03/23/23 0707)     Scheduled Meds:   balsam peru-castor oiL   Topical (Top) BID    busPIRone  5 mg Oral BID    prednisoLONE acetate  1 drop Right Eye " QID    sodium chloride 0.9%  10 mL Intravenous Q6H    timolol maleate 0.5%  1 drop Both Eyes BID     PRN Meds:sodium chloride 0.9%, lorazepam, morphine, ondansetron, sodium chloride 0.9%, Flushing PICC Protocol **AND** sodium chloride 0.9% **AND** sodium chloride 0.9%    Objective:     Vital Signs (Most Recent):  Temp: 98.3 °F (36.8 °C) (03/28/23 0740)  Pulse: (!) 59 (03/28/23 1606)  Resp: 20 (03/28/23 0740)  BP: (!) 118/58 (03/28/23 0740)  SpO2: 100 % (03/28/23 0756)   Vital Signs (24h Range):  Temp:  [96.5 °F (35.8 °C)-98.3 °F (36.8 °C)] 98.3 °F (36.8 °C)  Pulse:  [59-71] 59  Resp:  [16-20] 20  SpO2:  [94 %-100 %] 100 %  BP: (114-118)/(58-75) 118/58     Weight: 70.8 kg (156 lb 1.4 oz)  Body mass index is 28.55 kg/m².    Physical Exam  Vitals and nursing note reviewed. Exam conducted with a chaperone present.   Constitutional:       General: She is not in acute distress.     Appearance: Normal appearance. She is well-developed and well-groomed. She is ill-appearing (chronically). She is not toxic-appearing.      Interventions: Nasal cannula in place.   HENT:      Head: Normocephalic.      Nose: Nose normal.      Mouth/Throat:      Mouth: Mucous membranes are dry   Cardiovascular:      Rate and Rhythm: Normal rate.      Pulses: Normal pulses.   Pulmonary:      Effort: Pulmonary effort is normal.   Abdominal:      General: Abdomen is flat.      Palpations: Abdomen is soft.   Musculoskeletal:         General: Swelling present.      Right lower leg: Edema present.      Left lower leg: Edema present.   Skin:     General: Skin is warm and dry.      Capillary Refill: Capillary refill takes less than 2 seconds.      Coloration: Skin is pale.   Neurological:      Mental Status: She is lethargic but easily aroused. She is disoriented.      Motor: Weakness present.   Psychiatric:         Behavior: Behavior normal.         Review of Symptoms        Symptom Assessment (ESAS 0-10 Scale)  Pain:  0  Dyspnea:  0  Anxiety:   0  Nausea:  0  Depression:  0  Anorexia:  0  Fatigue:  0  Insomnia:  0  Restlessness:  0  Agitation:  0            Performance Status:  30     Living Arrangements:  Lives with family and Lives in home     Psychosocial/Cultural:   See Palliative Psychosocial Note: No  Social Issues Identified: Coping deficit pt/family  Bereavement Risk: Yes: Identified: work/home, financial, intimacy, and caregiver concerns   Caregiver Needs Discussed. Caregiver Distress: Yes: Need for respite, Intensity of family caregiving, Caregiver Burnout Risk, and Caregiver support and community resources discussed.    Cultural: none identified   **Primary  to Follow**  Palliative Care  Consult: No     Spiritual:  F - Ana and Belief:  Episcopal   I - Importance:  High   C - Community:  Family support   A - Address in Care:  Pastoral visits prn      Time-Based Charting:  Yes  Chart Review: 20 minutes  Face to Face: 25 minutes  Symptom Assessment: 5 minutes  Coordination of Care: 10 minutes  Discharge Plannin minutes     Total Time Spent: 65 minutes    Advance Care Planning   Advance Directives:   Living Will: No    LaPOST: No (voided)    Do Not Resuscitate Status: Yes      Decision Making:  Family answered questions and Patient unable to communicate due to disease severity/cognitive impairment  Goals of Care: The family endorses that what is most important right now is to focus on remaining as independent as possible, symptom/pain control, and quality of life, even if it means sacrificing a little time    Accordingly, we have decided that the best plan to meet the patient's goals includes enrolling in hospice care         Significant Labs: All pertinent labs within the past 24 hours have been reviewed.  CBC:   Recent Labs   Lab 23  0325   WBC 5.75   HGB 9.5*   HCT 32.5*   MCV 82        BMP:  No results for input(s): GLU, NA, K, CL, CO2, BUN, CREATININE, CALCIUM, MG in the last 24 hours.  LFT:  Lab  Results   Component Value Date    AST 23 03/26/2023    ALKPHOS 89 03/26/2023    BILITOT 0.8 03/26/2023     Albumin:   Albumin   Date Value Ref Range Status   03/26/2023 2.5 (L) 3.5 - 5.2 g/dL Final     Protein:   Total Protein   Date Value Ref Range Status   03/26/2023 7.4 6.0 - 8.4 g/dL Final     Lactic acid:   Lab Results   Component Value Date    LACTATE 1.5 03/21/2023    LACTATE 1.3 03/21/2023       Significant Imaging: I have reviewed all pertinent imaging results/findings within the past 24 hours.      Luisa Manuel NP  Palliative Medicine  [place] [facility]

## 2023-03-28 NOTE — ASSESSMENT & PLAN NOTE
Pilar Michael is a 89 y.o.female with a PMH of HFpEF, BL PE, CAD, HLD, HTN, and recurrent L Breast Cancer who presented with increased work of breathing and AMS x1 day. PT was admitted to [facility] internal medicine for further evaluation and monitoring of acute encephalopathy and hypercapnic and hypoxemic respiratory failure. Pt required a stay in the ICU, showed much improvement and was stepped down to the floor on 3/23/2023.  Pt with rapid deteriaoration and MET called on 3/26/2023.  Following conversation with ICU team and primary team, family has decided to shift focus to comfort focused care. Palliative care consulted for clarification of goals and further discussion of hospice options.    3/28/2023  -At time of follow up encounter, pt resting in bed with no apparent signs of distress.  Family reports that pt has been less interactive since yesterday evening.  Pt will open her eyes to her name, but remains non verbal at this time.  Family reports that pt was praying the Rosary this morning.  Family reports pt with minimal sips of water today and no food intake.   -Met with pts daughter Katiana, and niece Elvi in the family meeting room.  Pts daughter expresses being overwhelmed currently.  Her  unexpectedly passed away 3/24/2023 and she is trying to plan his  while continuing to care for mother and now is making end of life decisions for her mother as well.  Emotional support offered. Stressed to patient that my goal is to provide information and options to the pt and family at this time.    -Katiana again expresses that she wishes for her mother to remain on comfort focused care.  However, she is struggling with how to bring her mother home at this time as she does not feel as though she can provide adequate care to her mother while grieving the death of her .  Elvi wanting information on all d/c options for pt.  Options such as home hospice and nursing home with hospice discussed.   Family requesting information about hospice houses as well.   -Pts son will be arriving from out of state on Thursday, and Katiana has requested that we have a family meeting with him present to continue to discuss d/c options.  Primary team notified.   -Overall, Poor prognosis- inpatient hospice referral sent.    -Discharge planning is difficult due to severe psycho-social stress due to recent death of  and inability for Katiana to care for pt at home at this time.     3/27/2023  -At time of initial encounter, pt appears calm and in no distress.  Per family member at bedside, pt has been in and out of sleep all day.  Pt denies any pain or discomfort. Pt has received 2 doses of 0.5mg IV ativan within the past 24 hours.    -Pt remains pleasantly confused at this time and is oriented to self only.  Pt sipping liquids and taking a few small bites with meals.   -vitals stable throughout today, last MAP 75  -Pts daughter and legal MPOA, Katiana Mehta, is also dealing with the recent (2023 death of a close family member) and is currently arranging the .  Per family member at the bedside, Katiana will be at the bedside tomorrow.  -Based off of notes, and in light of recent events, Katiana does not feel as though she will be able to care for the pt at home any longer.  We will need to have further discussion about hospice options; home hospice vs nursing home with hospice options.  At this time, given the pts clinical picture I do not believe that she meets inpatient hospice criteria.  However, pt has had a tumultuous admission thus far and if pt were to further deteriorate, that could be reexamined.  Referrals to hospice houses could still be sent in the event they will accept pt given her overall trajectory and decline if this is the wish of the family. We could also evaluate if pt would be a candidate for respite care at an inpatient hospice house, although they have their own requirements  as well.   -Planning to meet with Katiana at bedside tomorrow to reevaluate pts needs as well as further explore options.   -updated primary team

## 2023-03-28 NOTE — SUBJECTIVE & OBJECTIVE
Interval History: No acute events reported overnight.  Family remains at the bedside. Pt with decreased interactions.     Medications:  Continuous Infusions:   sodium chloride 0.9% Stopped (03/23/23 0707)     Scheduled Meds:   balsam peru-castor oiL   Topical (Top) BID    busPIRone  5 mg Oral BID    prednisoLONE acetate  1 drop Right Eye QID    sodium chloride 0.9%  10 mL Intravenous Q6H    timolol maleate 0.5%  1 drop Both Eyes BID     PRN Meds:sodium chloride 0.9%, lorazepam, morphine, ondansetron, sodium chloride 0.9%, Flushing PICC Protocol **AND** sodium chloride 0.9% **AND** sodium chloride 0.9%    Objective:     Vital Signs (Most Recent):  Temp: 98.3 °F (36.8 °C) (03/28/23 0740)  Pulse: (!) 59 (03/28/23 1606)  Resp: 20 (03/28/23 0740)  BP: (!) 118/58 (03/28/23 0740)  SpO2: 100 % (03/28/23 0756)   Vital Signs (24h Range):  Temp:  [96.5 °F (35.8 °C)-98.3 °F (36.8 °C)] 98.3 °F (36.8 °C)  Pulse:  [59-71] 59  Resp:  [16-20] 20  SpO2:  [94 %-100 %] 100 %  BP: (114-118)/(58-75) 118/58     Weight: 70.8 kg (156 lb 1.4 oz)  Body mass index is 28.55 kg/m².    Physical Exam  Vitals and nursing note reviewed. Exam conducted with a chaperone present.   Constitutional:       General: She is not in acute distress.     Appearance: Normal appearance. She is well-developed and well-groomed. She is ill-appearing (chronically). She is not toxic-appearing.      Interventions: Nasal cannula in place.   HENT:      Head: Normocephalic.      Nose: Nose normal.      Mouth/Throat:      Mouth: Mucous membranes are dry   Cardiovascular:      Rate and Rhythm: Normal rate.      Pulses: Normal pulses.   Pulmonary:      Effort: Pulmonary effort is normal.   Abdominal:      General: Abdomen is flat.      Palpations: Abdomen is soft.   Musculoskeletal:         General: Swelling present.      Right lower leg: Edema present.      Left lower leg: Edema present.   Skin:     General: Skin is warm and dry.      Capillary Refill: Capillary refill  takes less than 2 seconds.      Coloration: Skin is pale.   Neurological:      Mental Status: She is lethargic but easily aroused. She is disoriented.      Motor: Weakness present.   Psychiatric:         Behavior: Behavior normal.         Review of Symptoms        Symptom Assessment (ESAS 0-10 Scale)  Pain:  0  Dyspnea:  0  Anxiety:  0  Nausea:  0  Depression:  0  Anorexia:  0  Fatigue:  0  Insomnia:  0  Restlessness:  0  Agitation:  0            Performance Status:  30     Living Arrangements:  Lives with family and Lives in home     Psychosocial/Cultural:   See Palliative Psychosocial Note: No  Social Issues Identified: Coping deficit pt/family  Bereavement Risk: Yes: Identified: work/home, financial, intimacy, and caregiver concerns   Caregiver Needs Discussed. Caregiver Distress: Yes: Need for respite, Intensity of family caregiving, Caregiver Burnout Risk, and Caregiver support and community resources discussed.    Cultural: none identified   **Primary  to Follow**  Palliative Care  Consult: No     Spiritual:  F - Ana and Belief:  Gnosticist   I - Importance:  High   C - Community:  Family support   A - Address in Care:  Pastoral visits prn      Time-Based Charting:  Yes  Chart Review: 20 minutes  Face to Face: 25 minutes  Symptom Assessment: 5 minutes  Coordination of Care: 10 minutes  Discharge Plannin minutes     Total Time Spent: 65 minutes    Advance Care Planning   Advance Directives:   Living Will: No    LaPOST: No (voided)    Do Not Resuscitate Status: Yes      Decision Making:  Family answered questions and Patient unable to communicate due to disease severity/cognitive impairment  Goals of Care: The family endorses that what is most important right now is to focus on remaining as independent as possible, symptom/pain control, and quality of life, even if it means sacrificing a little time    Accordingly, we have decided that the best plan to meet the patient's goals  includes enrolling in hospice care         Significant Labs: All pertinent labs within the past 24 hours have been reviewed.  CBC:   Recent Labs   Lab 03/26/23  0325   WBC 5.75   HGB 9.5*   HCT 32.5*   MCV 82        BMP:  No results for input(s): GLU, NA, K, CL, CO2, BUN, CREATININE, CALCIUM, MG in the last 24 hours.  LFT:  Lab Results   Component Value Date    AST 23 03/26/2023    ALKPHOS 89 03/26/2023    BILITOT 0.8 03/26/2023     Albumin:   Albumin   Date Value Ref Range Status   03/26/2023 2.5 (L) 3.5 - 5.2 g/dL Final     Protein:   Total Protein   Date Value Ref Range Status   03/26/2023 7.4 6.0 - 8.4 g/dL Final     Lactic acid:   Lab Results   Component Value Date    LACTATE 1.5 03/21/2023    LACTATE 1.3 03/21/2023       Significant Imaging: I have reviewed all pertinent imaging results/findings within the past 24 hours.

## 2023-03-28 NOTE — PROGRESS NOTES
[facility] - Telemetry  Wound Care    Patient Name:  Pilar Michael   MRN:  [id number]  Date: 3/28/2023  Diagnosis: Acute encephalopathy    History:     Past Medical History:   Diagnosis Date    Anticoagulant long-term use     Arthritis     Breast cancer     radiation 2003    Cancer     CHF (congestive heart failure)     Coronary artery disease     Glaucoma, right eye     Hypertension     Pulmonary emboli     Stroke     Thyroid cancer     Thyroid disease        Social History     Socioeconomic History    Marital status:    Tobacco Use    Smoking status: Never    Smokeless tobacco: Never   Substance and Sexual Activity    Alcohol use: Not Currently    Drug use: Never    Sexual activity: Not Currently     Social Determinants of Health     Food Insecurity: No Food Insecurity    Worried About Running Out of Food in the Last Year: Never true    Ran Out of Food in the Last Year: Never true   Transportation Needs: No Transportation Needs    Lack of Transportation (Medical): No    Lack of Transportation (Non-Medical): No   Housing Stability: Unknown    Unable to Pay for Housing in the Last Year: No    Unstable Housing in the Last Year: No       Precautions:     Allergies as of 03/21/2023 - Reviewed 03/21/2023   Allergen Reaction Noted    Penicillins Swelling 08/13/2018    Sulfa (sulfonamide antibiotics) Nausea Only 04/23/2019       WOC Assessment Details/Treatment     Pt on comfort care.    L heel- intact blister- appears to be clear filled with small amount of blood- non-blanchable redness to rosalva-blister  R heel intact with no redness       Recommendations discussed with pt family member at bedside, nurse and Dr. Escamilla  - Nursing to apply waffle overlay to bed surface and heel boots to BLE to maintain pressure injury prevention interventions  - Venelex ointment BID to bilateral heels BID  - Continue Triad to buttocks BID    03/28/2023

## 2023-03-29 PROCEDURE — A4216 STERILE WATER/SALINE, 10 ML: HCPCS | Performed by: INTERNAL MEDICINE

## 2023-03-29 PROCEDURE — 25000003 PHARM REV CODE 250: Performed by: STUDENT IN AN ORGANIZED HEALTH CARE EDUCATION/TRAINING PROGRAM

## 2023-03-29 PROCEDURE — 99900035 HC TECH TIME PER 15 MIN (STAT)

## 2023-03-29 PROCEDURE — 27000221 HC OXYGEN, UP TO 24 HOURS

## 2023-03-29 PROCEDURE — 11000001 HC ACUTE MED/SURG PRIVATE ROOM

## 2023-03-29 PROCEDURE — 25000003 PHARM REV CODE 250: Performed by: INTERNAL MEDICINE

## 2023-03-29 PROCEDURE — 94761 N-INVAS EAR/PLS OXIMETRY MLT: CPT

## 2023-03-29 PROCEDURE — 63600175 PHARM REV CODE 636 W HCPCS: Performed by: STUDENT IN AN ORGANIZED HEALTH CARE EDUCATION/TRAINING PROGRAM

## 2023-03-29 RX ADMIN — PREDNISOLONE ACETATE 1 DROP: 10 SUSPENSION/ DROPS OPHTHALMIC at 09:03

## 2023-03-29 RX ADMIN — SODIUM CHLORIDE, PRESERVATIVE FREE 10 ML: 5 INJECTION INTRAVENOUS at 12:03

## 2023-03-29 RX ADMIN — BUSPIRONE HYDROCHLORIDE 5 MG: 5 TABLET ORAL at 11:03

## 2023-03-29 RX ADMIN — Medication: at 08:03

## 2023-03-29 RX ADMIN — LORAZEPAM 0.5 MG: 2 INJECTION INTRAMUSCULAR; INTRAVENOUS at 07:03

## 2023-03-29 RX ADMIN — Medication: at 09:03

## 2023-03-29 RX ADMIN — PREDNISOLONE ACETATE 1 DROP: 10 SUSPENSION/ DROPS OPHTHALMIC at 06:03

## 2023-03-29 RX ADMIN — TIMOLOL MALEATE 1 DROP: 5 SOLUTION/ DROPS OPHTHALMIC at 09:03

## 2023-03-29 RX ADMIN — SODIUM CHLORIDE, PRESERVATIVE FREE 10 ML: 5 INJECTION INTRAVENOUS at 06:03

## 2023-03-29 RX ADMIN — SODIUM CHLORIDE, PRESERVATIVE FREE 10 ML: 5 INJECTION INTRAVENOUS at 11:03

## 2023-03-29 RX ADMIN — PREDNISOLONE ACETATE 1 DROP: 10 SUSPENSION/ DROPS OPHTHALMIC at 02:03

## 2023-03-29 RX ADMIN — TIMOLOL MALEATE 1 DROP: 5 SOLUTION/ DROPS OPHTHALMIC at 08:03

## 2023-03-29 RX ADMIN — BUSPIRONE HYDROCHLORIDE 5 MG: 5 TABLET ORAL at 09:03

## 2023-03-29 NOTE — PROGRESS NOTES
"[facility] - Telemetry  Adult Nutrition  Progress Note    SUMMARY       Recommendations    Recommendation:  1. Encourage intake of meals & supplements as tolerated.   2. Monitor weight/labs.   3. RD to continue to follow to monitor po intake    Goals:  Pt will tolerate diet with at least 50% intake at meals by RD follow up  Nutrition Goal Status: progressing towards goal  Communication of RD Recs: reviewed with RN    Assessment and Plan  Neuro  * Acute encephalopathy  Contributing Nutrition Diagnosis  Inadequate energy intake    Related to (etiology):   Advanced age/dx    Signs and Symptoms (as evidenced by):   Pureed diet, weight loss    Interventions:  Collaboration with other providers  [employer] beverage    Nutrition Diagnosis Status:   Continues      Malnutrition Assessment  Weight Loss (Malnutrition):  (5% x 4 months)       Reason for Assessment  Reason For Assessment: RD follow-up  Diagnosis:  (acute encephalopathy)  Relevant Medical History: arthritis, CHF, CAD, HTN, thyroid cancer, thyroidectomy, breast cancer, mastectomy, cholecystectomy, cardiac cath  General Information Comments: Pt on dysphagia pureed diet with Boost Plus and Boost pudding. Pt with poor intake at meals. Pt was asleep at visit. Spoke with family member at bedside. Encouraged her to feed pt supplements. Patel 11-skin intact. Noted 17lb weight loss x 4 months. Unable to asses NFPE at visit  Nutrition Discharge Planning: d/c diet per SLP recs    Nutrition Risk Screen  Nutrition Risk Screen: difficulty chewing/swallowing    Nutrition/Diet History  Food Preferences: no Church or cultural food prefs identified  Spiritual, Cultural Beliefs, Evangelical Practices, Values that Affect Care: no  Factors Affecting Nutritional Intake: difficulty/impaired swallowing    Anthropometrics  Temp: 99.1 °F (37.3 °C)  Height Method: Estimated  Height: 5' 2" (157.5 cm)  Height (inches): 62 in  Weight Method: Bed Scale  Weight: 70.8 kg (156 lb 1.4 oz)  Weight " (lb): 156.09 lb  Ideal Body Weight (IBW), Female: 110 lb  % Ideal Body Weight, Female (lb): 141.9 %  BMI (Calculated): 28.5  BMI Grade: 25 - 29.9 - overweight  Usual Body Weight (UBW), k.5 kg ()  % Usual Body Weight: 90.38  % Weight Change From Usual Weight: -9.81 %     Lab/Procedures/Meds  Pertinent Labs Reviewed: reviewed  Pertinent Labs Comments: BUN 29H, Alb 2.5L  Pertinent Medications Reviewed: reviewed  Pertinent Medications Comments: reviewed    Estimated/Assessed Needs  Weight Used For Calorie Calculations: 70.8 kg (156 lb 1.4 oz)  Energy Calorie Requirements (kcal): 1405  Energy Need Method: [place]-St Jeor (x1.3)  Protein Requirements: 56g (0.8 g/kg)  Weight Used For Protein Calculations: 70.8 kg (156 lb 1.4 oz)  Estimated Fluid Requirement Method: RDA Method  RDA Method (mL): 1405     Nutrition Prescription Ordered  Current Diet Order: dysphagia pureed  Oral Nutrition Supplement: Boost plus    Evaluation of Received Nutrient/Fluid Intake  I/O: 10/850  Energy Calories Required: not meeting needs  Protein Required: not meeting needs  Fluid Required: not meeting needs  Comments: LBM 3/25  % Intake of Estimated Energy Needs: 0 - 25 %  % Meal Intake: 0 - 25 %    Nutrition Risk  Level of Risk/Frequency of Follow-up:  (2xweekly)     Monitor and Evaluation  Food and Nutrient Intake: food and beverage intake  Food and Nutrient Adminstration: diet order  Physical Activity and Function: nutrition-related ADLs and IADLs  Anthropometric Measurements: weight  Biochemical Data, Medical Tests and Procedures: electrolyte and renal panel  Nutrition-Focused Physical Findings: overall appearance     Nutrition Follow-Up    RD Follow-up?: Yes

## 2023-03-29 NOTE — PROGRESS NOTES
Wound care follow up:  L heel blister remains intact- more darker colored to center. Pt moans when leg is lifted. Spoke with Dr. Escamilla and will continue venelex ointment to R heel but will modify orders to betadine BID to L heel to dry blistering.  Will continue Triad ointment to sacrum- which remains intact with no redness.  Wound care nurse to monitor L heel frequently.       Sacrum- intact with no redness

## 2023-03-29 NOTE — PLAN OF CARE
Recommendation:  1. Encourage intake of meals & supplements as tolerated.   2. Monitor weight/labs.   3. RD to continue to follow to monitor po intake    Goals:  Pt will tolerate diet with at least 50% intake at meals by RD follow up  Nutrition Goal Status: progressing towards goal

## 2023-03-29 NOTE — PROGRESS NOTES
[facility] Medicine Progress Note    Primary Team: hospitals Hospitalist Team B  Attending Physician: Jean Carlos Guido MD  Resident: Libby  Intern: Jose Roberto    Subjective:      Slept overnight per family at bedside. Per family, patient complaining of heel pain and bone pain.     Objective:     Last 24 Hour Vital Signs:  BP  Min: 106/46  Max: 106/46  Temp  Av.4 °F (36.9 °C)  Min: 98.4 °F (36.9 °C)  Max: 98.4 °F (36.9 °C)  Pulse  Av.5  Min: 56  Max: 71  Resp  Av  Min: 18  Max: 18  SpO2  Av.3 %  Min: 96 %  Max: 100 %  I/O last 3 completed shifts:  In: 10 [I.V.:10]  Out: 850 [Urine:850]    Physical Examination:  Physical Exam  Constitutional:       General: She is not in acute distress.     Appearance: She is not ill-appearing.   HENT:      Head: Normocephalic.      Right Ear: External ear normal.      Left Ear: External ear normal.      Nose: Nose normal.      Mouth/Throat:      Mouth: Mucous membranes are moist.   Eyes:      General: No scleral icterus.     Extraocular Movements: Extraocular movements intact.   Cardiovascular:      Rate and Rhythm: Normal rate and regular rhythm.      Pulses: Normal pulses.      Heart sounds: Normal heart sounds. No murmur heard.  Pulmonary:      Effort: Pulmonary effort is normal. No respiratory distress.      Breath sounds: Wheezing (biphasic wheezes noted on auscultation) present.      Comments: Bedside US demonstrated diffuse bilateral B lines.   Abdominal:      General: Abdomen is flat.   Musculoskeletal:         General: Swelling (proximal BLE dependent edema noted) present.      Cervical back: Normal range of motion.      Comments: 1+ pitting edema of bilateral lower extremities, improved from prior day.   Skin:     General: Skin is warm and dry.   Neurological:      Mental Status: She is alert.      Comments: Oriented to person but not place or situation. Moving all four extremities spontaneously without apparent deficit.    Psychiatric:         Mood and  Affect: Mood normal.     Laboratory:  Recent Labs   Lab 03/24/23  0434 03/25/23  0515 03/26/23  0325   WBC 4.47 4.54 5.75   HGB 9.2* 9.5* 9.5*   HCT 31.3* 33.4* 32.5*    142* 168   MCV 84 84 82   RDW 19.3* 19.0* 19.1*    143 140   K 4.0 3.4* 3.6   CL 96 94* 90*   CO2 36* 37* 39*   BUN 34* 29* 29*   CREATININE 0.8 0.8 0.8   GLU 94 80 84   PROT 8.0 7.8 7.4   ALBUMIN 2.7* 2.6* 2.5*   BILITOT 0.8 0.8 0.8   AST 37 27 23   ALKPHOS 95 93 89   ALT 22 20 18       Laboratory Data Reviewed:  Pertinent Findings:  Pending morning labs    Microbiology Data Reviewed:  Pertinent Findings:  UCX GNR pending speciation and sensitivities    Other Results:  EKG (my interpretation): No new EKG    Radiology Data Reviewed:   Pertinent Findings:  No new imaging    Current Medications:     Infusions:   sodium chloride 0.9% Stopped (03/23/23 0707)        Scheduled:   balsam peru-castor oiL   Topical (Top) BID    busPIRone  5 mg Oral BID    prednisoLONE acetate  1 drop Right Eye QID    sodium chloride 0.9%  10 mL Intravenous Q6H    timolol maleate 0.5%  1 drop Both Eyes BID        PRN:  sodium chloride 0.9%, lorazepam, morphine, ondansetron, sodium chloride 0.9%, Flushing PICC Protocol **AND** sodium chloride 0.9% **AND** sodium chloride 0.9%    Antibiotics and Day Number of Therapy:  Rocephin 3/21/23-->3/25/23  Azithro 3/21-->3/23/23  Vanc 3/21/23-->3/23/23    Lines and Day Number of Therapy:  PIV  Midline 3/23/23    Assessment:     Pilar Michael is a 89 y.o.female with a PMH of HFpEF, BL PE, CAD, HLD, HTN, and recurrent L Breast Cancer who presented with increased work of breathing and AMS x1 day. PT was admitted to [facility] internal medicine for further evaluation and monitoring of acute encephalopathy and hypercapnic and hypoxemic respiratory failure. Now, pending placement with hospice.     Plan:     Acute Hypoxic and Hypercapnic Respiratory Failure 2/2 Acute Decompensated diastolic heart failure and Influenza PNA  - Tmax  99.7, HR 60s, tachypneic, BP up to 214/98 at admission  - Tamiflu started in the setting of influenza B positive (symptoms over 2 weeks but patient severely ill and high risk for further decompensation)  - BNP 2929 (significantly higher than prior)  - ABG 7.4/63.5/74/39.4; started BiPAP in the setting of hypercapnea  - bilateral Pleural Effusion and worsening LLL Consolidation c/f   - initially placed on a Lasix gtt with appropriate UOP and transitioned to bolus dosing following diuresis and clinical improvement  - PT initiated on broad spectrum antibiotics initially and deescalated to rocephin on 3/23/23  - recent removal of ~1L from pleural effusion in Jan 2023 outpatient  - given bilateral nature, effusions are likely 2/2 acute volume overload, will diurese and reassess need for thoracentesis  - net negative 4.6L UOP with 800mL UOP in past 24 hours  - HCO3 remained stable 37-->36 --> 37 --> 39  - transitioned Bumex to oral dosing at 1mg BID, her home dose on 3/26  - became acutely hypotensive on 3/26 with decreased responsiveness; MET called and ICU team discussed comfort care with family  - family elected to pursue comfort care on 3/26 over further intensive interventions; final dispo TBD but family considering hospice  - Palliative care consulted, appreciate recs    Acute encephalopathy, improving  - PT initiatally with decreased level of alertness with confusion  - CO2 74 on admission  - UA demonstrated GNR  - currently on rocephin   - clinically improved on day 2 of hospitalization  - today, conversational and answering questions appropriately although some memory deficits are noted  - will maintain delirium precautions  - will continue to monitor     Urinary Tract Infection  - may contribute to acute encephalopathy  - Bcx negative  - has recurrent UTIs, on chronic methenamine, recently finished course of nitrofuantoin 6 days ago  - h/o E.coli resistant to fluoroquinolones  - Ucx positive for E coli >100,000  CFUs, sensitive to CTX, Augmentin, et al  - has received five days of CTX as of 5/26  - abx discontinued in favor of comfort measures     Pulmonary Embolism (Oct 2022)  - home Eliquis discontinued given focus on comfort  - PE was provoked in the setting of COVID-19 infection; patient has completed six months of therapy    Breast Cancer (2002 with recurrence 2021) s/p mastectomy  Thyroid Cancer s/p thyroidectomy, hypothyroidism  - no acute issues    Stroke  - patient's daughter declines residual deficit except R eye dilation  - discontinued home Plavix given comfort measures     S/p TAVR 2019  - managing anticoagulation as above     CAD s/p oRCA PCI with BMS 2019  S/p Dual Chamber Pacemaker  - troponin consistent with priors     Essential HTN  - continue home coreg, lisinopril  - will continue to monitor and adjust accordingly     Glaucoma  - continuing home timolol, Pred-Forte eye drops for comfort    Diet: Dysphagia pureed diet  DVT Prophylaxis: deferred given comfort care  IVF: None  Code: Full    Dispo: comfort care; pending joint meeting between family, palliative, and primary team on 3/30/23    Miguel Souza MD  [facility] Internal Medicine HO-2    [facility] Medicine Hospitalist Pager numbers:   [facility] Hospitalist Medicine Team A (Edgard/Larisa): [phone]  [facility] Hospitalist Medicine Team B (Crystal/Hay):  [phone]

## 2023-03-30 VITALS
WEIGHT: 156.06 LBS | HEART RATE: 60 BPM | SYSTOLIC BLOOD PRESSURE: 114 MMHG | OXYGEN SATURATION: 95 % | BODY MASS INDEX: 28.72 KG/M2 | TEMPERATURE: 97 F | RESPIRATION RATE: 18 BRPM | DIASTOLIC BLOOD PRESSURE: 51 MMHG | HEIGHT: 62 IN

## 2023-03-30 PROCEDURE — 99233 PR SUBSEQUENT HOSPITAL CARE,LEVL III: ICD-10-PCS | Mod: GW,HPC,,

## 2023-03-30 PROCEDURE — 25000003 PHARM REV CODE 250: Performed by: INTERNAL MEDICINE

## 2023-03-30 PROCEDURE — 27000221 HC OXYGEN, UP TO 24 HOURS

## 2023-03-30 PROCEDURE — A4216 STERILE WATER/SALINE, 10 ML: HCPCS | Performed by: INTERNAL MEDICINE

## 2023-03-30 PROCEDURE — 99233 SBSQ HOSP IP/OBS HIGH 50: CPT | Mod: GW,HPC,,

## 2023-03-30 PROCEDURE — 99900035 HC TECH TIME PER 15 MIN (STAT)

## 2023-03-30 PROCEDURE — 25000003 PHARM REV CODE 250: Performed by: STUDENT IN AN ORGANIZED HEALTH CARE EDUCATION/TRAINING PROGRAM

## 2023-03-30 PROCEDURE — 94761 N-INVAS EAR/PLS OXIMETRY MLT: CPT

## 2023-03-30 RX ORDER — ACETAMINOPHEN 650 MG/20.3ML
650 LIQUID ORAL EVERY 6 HOURS PRN
Status: DISCONTINUED | OUTPATIENT
Start: 2023-03-30 | End: 2023-03-30 | Stop reason: HOSPADM

## 2023-03-30 RX ADMIN — ACETAMINOPHEN 650 MG: 650 SOLUTION ORAL at 04:03

## 2023-03-30 RX ADMIN — Medication: at 10:03

## 2023-03-30 RX ADMIN — SODIUM CHLORIDE, PRESERVATIVE FREE 10 ML: 5 INJECTION INTRAVENOUS at 06:03

## 2023-03-30 RX ADMIN — BUSPIRONE HYDROCHLORIDE 5 MG: 5 TABLET ORAL at 09:03

## 2023-03-30 RX ADMIN — PREDNISOLONE ACETATE 1 DROP: 10 SUSPENSION/ DROPS OPHTHALMIC at 12:03

## 2023-03-30 RX ADMIN — TIMOLOL MALEATE 1 DROP: 5 SOLUTION/ DROPS OPHTHALMIC at 10:03

## 2023-03-30 RX ADMIN — SODIUM CHLORIDE, PRESERVATIVE FREE 10 ML: 5 INJECTION INTRAVENOUS at 12:03

## 2023-03-30 RX ADMIN — PREDNISOLONE ACETATE 1 DROP: 10 SUSPENSION/ DROPS OPHTHALMIC at 10:03

## 2023-03-30 NOTE — PROGRESS NOTES
"[facility] Medicine Progress Note    Primary Team: [facility] Hospitalist Team B  Attending Physician: Jean Carlos Guido MD  Resident: Libby  Intern: Jose Roberto    Subjective:      No events overnight noted in documentation. Patient's son, Jack, is at bedside this morning. Patient explicitly denies any pain or discomfort.     Objective:     Last 24 Hour Vital Signs:  BP  Min: 92/39  Max: 92/39  Temp  Av.9 °F (36.1 °C)  Min: 96.9 °F (36.1 °C)  Max: 96.9 °F (36.1 °C)  Pulse  Av.5  Min: 57  Max: 60  Resp  Av.5  Min: 16  Max: 17  SpO2  Av %  Min: 93 %  Max: 96 %  Height  Av' 2" (157.5 cm)  Min: 5' 2" (157.5 cm)  Max: 5' 2" (157.5 cm)  Weight  Av.8 kg (156 lb 1.4 oz)  Min: 70.8 kg (156 lb 1.4 oz)  Max: 70.8 kg (156 lb 1.4 oz)  I/O last 3 completed shifts:  In: 210 [P.O.:200; I.V.:10]  Out: 100 [Urine:100]    Physical Examination:  Physical Exam  Constitutional:       General: She is not in acute distress.     Appearance: She is not ill-appearing.   HENT:      Head: Normocephalic.      Right Ear: External ear normal.      Left Ear: External ear normal.      Nose: Nose normal.      Mouth/Throat:      Mouth: Mucous membranes are moist.   Eyes:      General: No scleral icterus.     Extraocular Movements: Extraocular movements intact.   Cardiovascular:      Rate and Rhythm: Normal rate and regular rhythm.      Pulses: Normal pulses.      Heart sounds: Murmur (systolic ejection murmur noted in LUSB) heard.   Pulmonary:      Effort: Pulmonary effort is normal. No respiratory distress.      Breath sounds: Wheezing (faint bilateral) present.      Comments: Bedside US demonstrated diffuse bilateral B lines.   Abdominal:      General: Abdomen is flat.   Musculoskeletal:         General: Swelling (proximal BLE dependent edema noted) present.      Cervical back: Normal range of motion.      Comments: 1+ pitting edema of bilateral lower extremities, improved from prior day.   Skin:     General: Skin is " warm and dry.   Neurological:      Mental Status: She is alert.      Comments: Oriented to person but not place or situation. Moving all four extremities spontaneously without apparent deficit.    Psychiatric:         Mood and Affect: Mood normal.         Behavior: Behavior normal.     Laboratory:  Recent Labs   Lab 03/24/23  0434 03/25/23  0515 03/26/23  0325   WBC 4.47 4.54 5.75   HGB 9.2* 9.5* 9.5*   HCT 31.3* 33.4* 32.5*    142* 168   MCV 84 84 82   RDW 19.3* 19.0* 19.1*    143 140   K 4.0 3.4* 3.6   CL 96 94* 90*   CO2 36* 37* 39*   BUN 34* 29* 29*   CREATININE 0.8 0.8 0.8   GLU 94 80 84   PROT 8.0 7.8 7.4   ALBUMIN 2.7* 2.6* 2.5*   BILITOT 0.8 0.8 0.8   AST 37 27 23   ALKPHOS 95 93 89   ALT 22 20 18       Laboratory Data Reviewed:  Pertinent Findings:  Pending morning labs    Microbiology Data Reviewed:  Pertinent Findings:  UCX GNR pending speciation and sensitivities    Other Results:  EKG (my interpretation): No new EKG    Radiology Data Reviewed:   Pertinent Findings:  No new imaging    Current Medications:     Infusions:   sodium chloride 0.9% Stopped (03/23/23 0707)        Scheduled:   balsam peru-castor oiL   Topical (Top) BID    busPIRone  5 mg Oral BID    prednisoLONE acetate  1 drop Right Eye QID    sodium chloride 0.9%  10 mL Intravenous Q6H    timolol maleate 0.5%  1 drop Both Eyes BID        PRN:  sodium chloride 0.9%, lorazepam, morphine, ondansetron, sodium chloride 0.9%, Flushing PICC Protocol **AND** sodium chloride 0.9% **AND** sodium chloride 0.9%    Antibiotics and Day Number of Therapy:  Rocephin 3/21/23-->  Azithro 3/21-->3/23/23  Vanc 3/21/23-->3/23/23    Lines and Day Number of Therapy:  PIV  Midline 3/23/23    Assessment:     Pilar Michael is a 89 y.o.female with a PMH of HFpEF, BL PE, CAD, HLD, HTN, and recurrent L Breast Cancer who presented with increased work of breathing and AMS x1 day. PT was admitted to [facility] internal medicine for further evaluation and monitoring  of acute encephalopathy and hypercapnic and hypoxemic respiratory failure. Now, pending placement with hospice.     Plan:     Acute Hypoxic and Hypercapnic Respiratory Failure 2/2 Acute Decompensated diastolic heart failure and Influenza PNA  - Tmax 99.7, HR 60s, tachypneic, BP up to 214/98 at admission  - Tamiflu started in the setting of influenza B positive (symptoms over 2 weeks but patient severely ill and high risk for further decompensation)  - BNP 2929 (significantly higher than prior)  - ABG 7.4/63.5/74/39.4; started BiPAP in the setting of hypercapnea  - bilateral Pleural Effusion and worsening LLL Consolidation c/f   - initially placed on a Lasix gtt with appropriate UOP and transitioned to bolus dosing following diuresis and clinical improvement  - PT initiated on broad spectrum antibiotics initially and deescalated to rocephin on 3/23/23  - recent removal of ~1L from pleural effusion in Jan 2023 outpatient  - given bilateral nature, effusions are likely 2/2 acute volume overload, will diurese and reassess need for thoracentesis  - net negative 4.6L UOP with 800mL UOP in past 24 hours  - HCO3 remained stable 37-->36 --> 37 --> 39  - transitioned Bumex to oral dosing at 1mg BID, her home dose on 3/26  - became acutely hypotensive on 3/26 with decreased responsiveness; MET called and ICU team discussed comfort care with family  - family elected to pursue comfort care on 3/26 over further intensive interventions; final dispo TBD but family considering hospice  - Palliative care consulted, appreciate recs  - joint meeting with palliative care, primary team, patient, and family today to determine further direction of patient's care    Acute encephalopathy, improving  - PT initiatally with decreased level of alertness with confusion  - CO2 74 on admission  - UA demonstrated GNR  - currently on rocephin   - clinically improved on day 2 of hospitalization  - today, conversational and answering questions  appropriately although some memory deficits are noted  - will maintain delirium precautions  - will continue to monitor     Urinary Tract Infection  - may contribute to acute encephalopathy  - Bcx negative  - has recurrent UTIs, on chronic methenamine, recently finished course of nitrofuantoin 6 days ago  - h/o E.coli resistant to fluoroquinolones  - Ucx positive for E coli >100,000 CFUs, sensitive to CTX, Augmentin, et al  - has received five days of CTX as of 5/26  - abx discontinued in favor of comfort measures     Pulmonary Embolism (Oct 2022)  - home Eliquis discontinued given focus on comfort     Breast Cancer (2002 with recurrence 2021) s/p mastectomy  Thyroid Cancer s/p thyroidectomy, hypothyroidism  - no acute issues    Stroke  - patient's daughter declines residual deficit except R eye dilation  - discontinued home Plavix given comfort measures     S/p TAVR 2019  - managing anticoagulation as above     CAD s/p oRCA PCI with BMS 2019  S/p Dual Chamber Pacemaker  - troponin consistent with priors     Essential HTN  - continue home coreg, lisinopril  - will continue to monitor and adjust accordingly     Glaucoma  - continuing home timolol, Pred-Forte eye drops for comfort    Diet: Dysphagia pureed diet  DVT Prophylaxis: deferred given comfort care  IVF: None  Code: Full    Dispo: comfort care; pending family meeting today on further care    Miguel Souza MD  [facility] Internal Medicine HO-2    [facility] Medicine Hospitalist Pager numbers:   [facility] Hospitalist Medicine Team A (Edgard/Larisa): [phone]  [facility] Hospitalist Medicine Team B (Crystal/Hay):  [phone]

## 2023-03-30 NOTE — PLAN OF CARE
[facility]  Department of Hospital Medicine  [address] [place] [address]  [phone] after hours  [phone] fax    HOSPICE  ORDERS    03/30/2023    Admit to Hospice:  Inpatient Hospice     Diagnoses:   Active Hospital Problems    Diagnosis  POA    *Acute encephalopathy [G93.40]  Yes    Comfort measures only status [Z51.5]  Not Applicable    Acute pulmonary edema [J81.0]  Yes    Volume overload state of heart [E87.79]  Yes    Acute decompensated heart failure [I50.9]  Yes    Acute respiratory failure with hypoxia and hypercapnia [J96.01, J96.02]  Yes    Palliative care encounter [Z51.5]  Not Applicable    Hypertension [I10]  Yes      Resolved Hospital Problems   No resolved problems to display.       Hospice Qualifying Diagnoses: Chronic Diastolic Heart Failure, Frailty       Patient has a life expectancy < 6 months due to:  Primary Hospice Diagnosis: chronic diastolic heart failure  Comorbid Conditions Contributing to Decline: frailty, chronic hypoxic and hypercapnic respiratory failure secondary to chronic diastolic heart failure  Vital Signs: Routine per Hospice Protocol.    Code Status: DNR/DNI    Allergies:   Review of patient's allergies indicates:   Allergen Reactions    Penicillins Swelling    Sulfa (sulfonamide antibiotics) Nausea Only       Diet: regular diet    Activities: As tolerated    Goals of Care Treatment Preferences:  Code Status: DNR       LaPOST: Yes  What is most important right now is to focus on remaining as independent as possible, symptom/pain control, quality of life, even if it means sacrificing a little time.  Accordingly, we have decided that the best plan to meet the patient's goals includes enrolling in hospice care.      Nursing: Per Hospice Routine.      PICC Care:   Scrub the Hub: Prior to accessing the line, always perform a 30 second alcohol scrub  Each lumen of the central line is to be flushed at least daily with 10 mL Normal  Saline and 3 mL Heparin flush (100 units/mL)  Skilled Nurse (SN) may draw blood from IV access  Date of removal: per hospice protocol    Oxygen: 2L    Other Miscellaneous Care: Wound Care    A) Nursing to cleanse excoriated skin to buttocks with cleansing cloths and apply thin layer of Triad ointment BID and prn cleansing of incontinent episode. Do not apply cover dressing.  B) Apply Betadine to L heel blister BID- don heel boot.  Apply Venelex ointment to R heel BID- don heel boot.    Medications:        Medication List        CONTINUE taking these medications      busPIRone 5 MG Tab  Commonly known as: BUSPAR  Take 1 tablet (5 mg total) by mouth 2 (two) times daily.     prednisoLONE acetate 1 % Drps  Commonly known as: PRED FORTE  Place 1 drop into the right eye 4 (four) times daily.     timolol maleate 0.25% 0.25 % Drop  Commonly known as: TIMOPTIC  Place 1 drop into both eyes 2 (two) times daily.            STOP taking these medications      albuterol 90 mcg/actuation inhaler  Commonly known as: PROVENTIL/VENTOLIN HFA     amLODIPine 5 MG tablet  Commonly known as: NORVASC     atorvastatin 20 MG tablet  Commonly known as: LIPITOR     atropine 1% 1 % Drop  Commonly known as: ISOPTO ATROPINE     brimonidine 0.2% 0.2 % Drop  Commonly known as: ALPHAGAN     bumetanide 1 MG tablet  Commonly known as: BUMEX     carvediloL 3.125 MG tablet  Commonly known as: COREG     clopidogreL 75 mg tablet  Commonly known as: PLAVIX     ELIQUIS 5 mg Tab  Generic drug: apixaban     ergocalciferol 50,000 unit Cap  Commonly known as: ERGOCALCIFEROL     lisinopriL 20 MG tablet  Commonly known as: PRINIVIL,ZESTRIL     LORazepam 0.5 MG tablet  Commonly known as: ATIVAN     magnesium oxide 400 mg (241.3 mg magnesium) tablet  Commonly known as: MAG-OX     methenamine 1 gram Tab  Commonly known as: HIPREX     SYNTHROID 125 MCG tablet  Generic drug: levothyroxine              Future Orders:  Hospice Medical Director may dictate new orders for  comfortable care measures & sign death certificate.        _________________________________  Miguel Escamilla MD  03/30/2023

## 2023-03-30 NOTE — PROGRESS NOTES
Wound care follow up: See below- recommend to continue with pressure injury prevention interventions in place and wound care orders.      L heel- less redness and less darker colored blister that remains intact- pt reports less pain      Sacrum remains intact with no redness

## 2023-03-30 NOTE — SUBJECTIVE & OBJECTIVE
Interval History: No acute events reported overnight.  Family remains at the pts bedside.     Medications:  Continuous Infusions:   sodium chloride 0.9% Stopped (03/23/23 0707)     Scheduled Meds:   balsam peru-castor oiL   Topical (Top) BID    busPIRone  5 mg Oral BID    prednisoLONE acetate  1 drop Right Eye QID    sodium chloride 0.9%  10 mL Intravenous Q6H    timolol maleate 0.5%  1 drop Both Eyes BID     PRN Meds:sodium chloride 0.9%, lorazepam, morphine, ondansetron, sodium chloride 0.9%, Flushing PICC Protocol **AND** sodium chloride 0.9% **AND** sodium chloride 0.9%    Objective:     Vital Signs (Most Recent):  Temp: 97.3 °F (36.3 °C) (03/30/23 0809)  Pulse: 60 (03/30/23 0809)  Resp: 18 (03/30/23 0809)  BP: (!) 114/51 (03/30/23 0809)  SpO2: 95 % (03/30/23 0843)   Vital Signs (24h Range):  Temp:  [96.9 °F (36.1 °C)-97.3 °F (36.3 °C)] 97.3 °F (36.3 °C)  Pulse:  [57-60] 60  Resp:  [16-18] 18  SpO2:  [93 %-97 %] 95 %  BP: ()/(39-51) 114/51     Weight: 70.8 kg (156 lb 1.4 oz)  Body mass index is 28.55 kg/m².    Physical Exam  Vitals and nursing note reviewed. Exam conducted with a chaperone present.   Constitutional:       General: She is not in acute distress.     Appearance: Normal appearance. She is well-developed and well-groomed. She is ill-appearing (chronically). Pleasantly confused.    Interventions: Nasal cannula in place.   HENT:      Head: Normocephalic.      Nose: Nose normal.      Mouth/Throat:      Mouth: Mucous membranes are dry   Cardiovascular:      Rate and Rhythm: Normal rate.      Pulses: Normal pulses.   Pulmonary:      Effort: Pulmonary effort is normal.   Abdominal:      General: Abdomen is flat.      Palpations: Abdomen is soft.   Musculoskeletal:         General: Swelling present.      Right lower leg: Edema present.      Left lower leg: Edema present.   Skin:     General: Skin is warm and dry.      Capillary Refill: Capillary refill takes less than 2 seconds.      Coloration: Skin  is pale.   Neurological:      Mental Status: She is lethargic but easily aroused. She is disoriented.      Motor: Weakness present.   Psychiatric:         Behavior: Behavior normal.         Review of Symptoms        Symptom Assessment (ESAS 0-10 Scale)  Pain:  0  Dyspnea:  0  Anxiety:  4  Nausea:  0  Depression:  0  Anorexia:  0  Fatigue:  3  Insomnia:  0  Restlessness:  0  Agitation:  0            Performance Status:  30     Living Arrangements:  Lives with family and Lives in home     Psychosocial/Cultural:   See Palliative Psychosocial Note: No  Social Issues Identified: Coping deficit pt/family  Bereavement Risk: Yes: Identified: work/home, financial, intimacy, and caregiver concerns   Caregiver Needs Discussed. Caregiver Distress: Yes: Need for respite, Intensity of family caregiving, Caregiver Burnout Risk, and Caregiver support and community resources discussed.    Cultural: none identified   **Primary  to Follow**  Palliative Care  Consult: No     Spiritual:  F - Ana and Belief:  Taoist   I - Importance:  High   C - Community:  Family support   A - Address in Care:  Pastoral visits prn      Time-Based Charting:  Yes  Chart Review: 15 minutes  Face to Face: 20 minutes  Symptom Assessment: 5 minutes  Coordination of Care: 10 minutes  Discharge Plannin minutes     Total Time Spent: 55 minutes     Advance Care Planning   Advance Directives:   Living Will: No    LaPOST: No (voided)    Do Not Resuscitate Status: Yes       Decision Making:  Family answered questions and Patient unable to communicate due to disease severity/cognitive impairment  Goals of Care: The family endorses that what is most important right now is to focus on remaining as independent as possible, symptom/pain control, and quality of life, even if it means sacrificing a little time     Accordingly, we have decided that the best plan to meet the patient's goals includes enrolling in hospice care     Significant  Labs: All pertinent labs within the past 24 hours have been reviewed.  CBC:   Recent Labs   Lab 03/26/23  0325   WBC 5.75   HGB 9.5*   HCT 32.5*   MCV 82        BMP:  No results for input(s): GLU, NA, K, CL, CO2, BUN, CREATININE, CALCIUM, MG in the last 24 hours.  LFT:  Lab Results   Component Value Date    AST 23 03/26/2023    ALKPHOS 89 03/26/2023    BILITOT 0.8 03/26/2023     Albumin:   Albumin   Date Value Ref Range Status   03/26/2023 2.5 (L) 3.5 - 5.2 g/dL Final     Protein:   Total Protein   Date Value Ref Range Status   03/26/2023 7.4 6.0 - 8.4 g/dL Final     Lactic acid:   Lab Results   Component Value Date    LACTATE 1.5 03/21/2023    LACTATE 1.3 03/21/2023       Significant Imaging: I have reviewed all pertinent imaging results/findings within the past 24 hours.

## 2023-03-30 NOTE — PLAN OF CARE
"[facility] - Telemetry  Discharge Final Note    Primary Care Provider: Joseph Noel MD    Expected Discharge Date:     Pharmacist will go over home medications and reasons for medications. VN and bedside nurse to reiterate final discharge instructions.     Pending bed availability. Cleared from CM once report info recieved . Bedside Nurse and VN notified.    DC late this afternoon.      Final Discharge Note (most recent)       Final Note - 03/30/23 1024          Final Note    Hospital Resources/Appts/Education Provided Post-Acute resouces added to AVS (P)         Post-Acute Status    Post-Acute Authorization Hospice;Placement     Post-Acute Placement Status Pending Bed Availability   Pt has been accepted by [facility] for bed avialable later today. Currently having family meeting.  Intake forms to be done for transfer for today.    Hospice Status Set-up Complete/Auth obtained   Awaiting bed at [facility] for later today.    Discharge Delays Post-Acute Set-up (P)                  No future appointments.  BP (!) 114/51 (BP Location: Right leg, Patient Position: Lying)   Pulse 60   Temp 97.3 °F (36.3 °C) (Oral)   Resp 18   Ht 5' 2" (1.575 m)   Wt 70.8 kg (156 lb 1.4 oz)   SpO2 95%   BMI 28.55 kg/m²        Medication List        ASK your doctor about these medications      albuterol 90 mcg/actuation inhaler  Commonly known as: PROVENTIL/VENTOLIN HFA     amLODIPine 5 MG tablet  Commonly known as: NORVASC  Take 1 tablet (5 mg total) by mouth once daily.     atorvastatin 20 MG tablet  Commonly known as: LIPITOR  Take 1 tablet (20 mg total) by mouth every evening.     atropine 1% 1 % Drop  Commonly known as: ISOPTO ATROPINE     brimonidine 0.2% 0.2 % Drop  Commonly known as: ALPHAGAN     bumetanide 1 MG tablet  Commonly known as: BUMEX  Take 1 tablet (1 mg total) by mouth 2 (two) times a day.     busPIRone 5 MG Tab  Commonly known as: BUSPAR  Take 1 tablet (5 mg total) by mouth 2 (two) times daily.   "   carvediloL 3.125 MG tablet  Commonly known as: COREG     clopidogreL 75 mg tablet  Commonly known as: PLAVIX  Take 1 tablet (75 mg total) by mouth once daily.     ELIQUIS 5 mg Tab  Generic drug: apixaban  Take 1 tablet (5 mg total) by mouth 2 (two) times daily.     ergocalciferol 50,000 unit Cap  Commonly known as: ERGOCALCIFEROL     lisinopriL 20 MG tablet  Commonly known as: PRINIVIL,ZESTRIL     LORazepam 0.5 MG tablet  Commonly known as: ATIVAN     magnesium oxide 400 mg (241.3 mg magnesium) tablet  Commonly known as: MAG-OX     methenamine 1 gram Tab  Commonly known as: HIPREX     prednisoLONE acetate 1 % Drps  Commonly known as: PRED FORTE     SYNTHROID 125 MCG tablet  Generic drug: levothyroxine     timolol maleate 0.25% 0.25 % Drop  Commonly known as: TIMOPTIC

## 2023-03-30 NOTE — ASSESSMENT & PLAN NOTE
Pilar Michael is a 89 y.o.female with a PMH of HFpEF, BL PE, CAD, HLD, HTN, and recurrent L Breast Cancer who presented with increased work of breathing and AMS x1 day. PT was admitted to [facility] medicine for further evaluation and monitoring of acute encephalopathy and hypercapnic and hypoxemic respiratory failure. Pt required a stay in the ICU, showed much improvement and was stepped down to the floor on 3/23/2023.  Pt with rapid deteriaoration and MET called on 3/26/2023.  Following conversation with ICU team and primary team, family has decided to shift focus to comfort focused care. Palliative care consulted for clarification of goals and further discussion of hospice options.    3/30/2023  -Family meeting held with pts son Jack and daughter Katiana, primary team, CM and palliative team in family meeting room to discuss plan of care.  Family again expresses their desire to focus on comfort and dignity for the pt at this time.  Daughter reports that pt was agitated last night and required prn ativan which eased pts symptoms.   Notified during meeting that pt was accepted to [facility], inpatient hospice house.  Discussed with family.  Family asked appropriate questions, and expressed appropriate understanding.  Family expressed gratitude to entire medical team.  Family in agreement for pt to transfer to [facility] today.      3/28/2023  -At time of follow up encounter, pt resting in bed with no apparent signs of distress.  Family reports that pt has been less interactive since yesterday evening.  Pt will open her eyes to her name, but remains non verbal at this time.  Family reports that pt was praying the Rosary this morning.  Family reports pt with minimal sips of water today and no food intake.   -Met with pts daughter Katiana, and niece Elvi in the family meeting room.  Pts daughter expresses being overwhelmed currently.  Her  unexpectedly passed away 3/24/2023 and she is  trying to plan his  while continuing to care for mother and now is making end of life decisions for her mother as well.  Emotional support offered. Stressed to patient that my goal is to provide information and options to the pt and family at this time.    -Katiana again expresses that she wishes for her mother to remain on comfort focused care.  However, she is struggling with how to bring her mother home at this time as she does not feel as though she can provide adequate care to her mother while grieving the death of her .  Elvi wanting information on all d/c options for pt.  Options such as home hospice and nursing home with hospice discussed.  Family requesting information about hospice houses as well.   -Pts son will be arriving from out of state on Thursday, and Katiana has requested that we have a family meeting with him present to continue to discuss d/c options.  Primary team notified.   -Overall, Poor prognosis- inpatient hospice referral sent.    -Discharge planning is difficult due to severe psycho-social stress due to recent death of  and inability for Katiana to care for pt at home at this time.     3/27/2023  -At time of initial encounter, pt appears calm and in no distress.  Per family member at bedside, pt has been in and out of sleep all day.  Pt denies any pain or discomfort. Pt has received 2 doses of 0.5mg IV ativan within the past 24 hours.    -Pt remains pleasantly confused at this time and is oriented to self only.  Pt sipping liquids and taking a few small bites with meals.   -vitals stable throughout today, last MAP 75  -Pts daughter and legal MPOA, Katiana Mehta, is also dealing with the recent (2023 death of a close family member) and is currently arranging the .  Per family member at the bedside, Katiana will be at the bedside tomorrow.  -Based off of notes, and in light of recent events, Katiana does not feel as though she will be able to  care for the pt at home any longer.  We will need to have further discussion about hospice options; home hospice vs nursing home with hospice options.  At this time, given the pts clinical picture I do not believe that she meets inpatient hospice criteria.  However, pt has had a tumultuous admission thus far and if pt were to further deteriorate, that could be reexamined.  Referrals to hospice houses could still be sent in the event they will accept pt given her overall trajectory and decline if this is the wish of the family. We could also evaluate if pt would be a candidate for respite care at an inpatient hospice house, although they have their own requirements as well.   -Planning to meet with Katiana at bedside tomorrow to reevaluate pts needs as well as further explore options.   -updated primary team

## 2023-03-30 NOTE — PROGRESS NOTES
[place] - Telemetry  Palliative Medicine  Progress Note    Patient Name: Pilar Michael  MRN: [id number]  Admission Date: 3/21/2023  Hospital Length of Stay: 9 days  Code Status: DNR   Attending Provider: Jean Carlos Guido MD  Consulting Provider: Luisa Manuel NP  Primary Care Physician: Joseph Noel MD  Principal Problem:Acute encephalopathy    Patient information was obtained from patient, relative(s), past medical records and primary team.      Assessment/Plan:     Palliative Care  Palliative care encounter  Pilar Michael is a 89 y.o.female with a PMH of HFpEF, BL PE, CAD, HLD, HTN, and recurrent L Breast Cancer who presented with increased work of breathing and AMS x1 day. PT was admitted to [facility] for further evaluation and monitoring of acute encephalopathy and hypercapnic and hypoxemic respiratory failure. Pt required a stay in the ICU, showed much improvement and was stepped down to the floor on 3/23/2023.  Pt with rapid deteriaoration and MET called on 3/26/2023.  Following conversation with ICU team and primary team, family has decided to shift focus to comfort focused care. Palliative care consulted for clarification of goals and further discussion of hospice options.    3/30/2023  -Family meeting held with pts son Jack and daughter Katiana, primary team, CM and palliative team in family meeting room to discuss plan of care.  Family again expresses their desire to focus on comfort and dignity for the pt at this time.  Daughter reports that pt was agitated last night and required prn ativan which eased pts symptoms.   Notified during meeting that pt was accepted to [facility], inpatient hospice house.  Discussed with family.  Family asked appropriate questions, and expressed appropriate understanding.  Family expressed gratitude to entire medical team.  Family in agreement for pt to transfer to [facility] today.      3/28/2023  -At time of follow up encounter, pt  resting in bed with no apparent signs of distress.  Family reports that pt has been less interactive since yesterday evening.  Pt will open her eyes to her name, but remains non verbal at this time.  Family reports that pt was praying the Rosary this morning.  Family reports pt with minimal sips of water today and no food intake.   -Met with pts daughter Katiana, and niece Elvi in the family meeting room.  Pts daughter expresses being overwhelmed currently.  Her  unexpectedly passed away 3/24/2023 and she is trying to plan his  while continuing to care for mother and now is making end of life decisions for her mother as well.  Emotional support offered. Stressed to patient that my goal is to provide information and options to the pt and family at this time.    -Katiana again expresses that she wishes for her mother to remain on comfort focused care.  However, she is struggling with how to bring her mother home at this time as she does not feel as though she can provide adequate care to her mother while grieving the death of her .  Elvi wanting information on all d/c options for pt.  Options such as home hospice and nursing home with hospice discussed.  Family requesting information about hospice houses as well.   -Pts son will be arriving from out of state on Thursday, and Katiana has requested that we have a family meeting with him present to continue to discuss d/c options.  Primary team notified.   -Overall, Poor prognosis- inpatient hospice referral sent.    -Discharge planning is difficult due to severe psycho-social stress due to recent death of  and inability for Katiana to care for pt at home at this time.     3/27/2023  -At time of initial encounter, pt appears calm and in no distress.  Per family member at bedside, pt has been in and out of sleep all day.  Pt denies any pain or discomfort. Pt has received 2 doses of 0.5mg IV ativan within the past 24 hours.    -Pt  "remains pleasantly confused at this time and is oriented to self only.  Pt sipping liquids and taking a few small bites with meals.   -vitals stable throughout today, last MAP 75  -Pts daughter and legal MPOA, Katiana Mehta, is also dealing with the recent (2023 death of a close family member) and is currently arranging the .  Per family member at the bedside, Katiana will be at the bedside tomorrow.  -Based off of notes, and in light of recent events, Katiana does not feel as though she will be able to care for the pt at home any longer.  We will need to have further discussion about hospice options; home hospice vs nursing home with hospice options.  At this time, given the pts clinical picture I do not believe that she meets inpatient hospice criteria.  However, pt has had a tumultuous admission thus far and if pt were to further deteriorate, that could be reexamined.  Referrals to hospice houses could still be sent in the event they will accept pt given her overall trajectory and decline if this is the wish of the family. We could also evaluate if pt would be a candidate for respite care at an inpatient hospice house, although they have their own requirements as well.   -Planning to meet with Katiana at bedside tomorrow to reevaluate pts needs as well as further explore options.   -updated primary team         Subjective:     Chief Complaint:   Chief Complaint   Patient presents with    Altered Mental Status     Pt arrived via  [facility] form home, family states pt has foul smelling urine, and increased confused, pt has left arm edema. Pt is o2 dependent        HPI:   Per chart, "Pilar Michael is a 89 y.o. female with a history of CAD s/p PCI, TAVR () on chronic anticoagulant use, HFpEF s/p pacemaker, breast cancer s/p mastectomy, thyroid cancer s/p thyroidectomy, CVA without residual deficits (R eye symptoms), bilateral PEs in the setting of COVID infection and UTI (October " 2022), HTN, glaucoma who presented to [facility] on 3/21/2023 with a primary complaint of altered mental status.     Patient somnolent on exam and moaning, minimally responsive to questions. History obtained from patient's daughter at bedside. Patient was exhibiting increased lethargy and malaise with intermittent confusion over the last 2 weeks. She went to PCP last week and wad diagnosed with UTI, sent out with Macrobid that ended 6 days PTA. Patient's daughter reports that over the past 3-4 days patient has acutely worsened to the point where she just moans and will barely respond to questions. Additionally has intermittently required more oxygen over the last few days. SpO2 would drop into low 80s so patient's oxygen was turned up to 3L from normal 2L with improvement to high 80s-low 90s. Patient's daughter found geriatric docter that will do house calls who told patient that her lungs may be filled with fluid. Patient additionally with increased shortness of breath with accessory muscle use. Endorses progressively decreasing PO intake. Possible subjective fevers over the last 2 weeks. She has a history of recurrent UTIs, including reported MDR E.coli. She takes chronic methenamine. Also was recently hospitalized in October 2022 with COVID PNA complicated by bilateral PEs and MDR UTI. She was treated with meropenem which was de-escalated to CTX at that time. Patient also had recent outpatient thoracentesis in January for chronic pleural effusion. Cannot see fluid studies or notes in EHR but patient's daughter states they removed 1L pleural fluid at that time that was negative for infection. Patient's daughter unaware of any recent sick contacts but the daughter did say she was congested a couple weeks ago. Patient received flu shot this year (October) and has received 3 COVID vaccinations. Regularly taking medications as prescribed, but has not been able to get some recently due to somnolence.  "Only recent medication change was addition of lorazepam for sleep that she has had over the last 3 days. Denies nausea, vomiting, constipation, diarrhea. Last bowel movement ~48 hours ago which is normal for patient per daughter."    Interval History: No acute events reported overnight.  Family remains at the pts bedside.     Medications:  Continuous Infusions:   sodium chloride 0.9% Stopped (03/23/23 0707)     Scheduled Meds:   balsam peru-castor oiL   Topical (Top) BID    busPIRone  5 mg Oral BID    prednisoLONE acetate  1 drop Right Eye QID    sodium chloride 0.9%  10 mL Intravenous Q6H    timolol maleate 0.5%  1 drop Both Eyes BID     PRN Meds:sodium chloride 0.9%, lorazepam, morphine, ondansetron, sodium chloride 0.9%, Flushing PICC Protocol **AND** sodium chloride 0.9% **AND** sodium chloride 0.9%    Objective:     Vital Signs (Most Recent):  Temp: 97.3 °F (36.3 °C) (03/30/23 0809)  Pulse: 60 (03/30/23 0809)  Resp: 18 (03/30/23 0809)  BP: (!) 114/51 (03/30/23 0809)  SpO2: 95 % (03/30/23 0843)   Vital Signs (24h Range):  Temp:  [96.9 °F (36.1 °C)-97.3 °F (36.3 °C)] 97.3 °F (36.3 °C)  Pulse:  [57-60] 60  Resp:  [16-18] 18  SpO2:  [93 %-97 %] 95 %  BP: ()/(39-51) 114/51     Weight: 70.8 kg (156 lb 1.4 oz)  Body mass index is 28.55 kg/m².    Physical Exam  Vitals and nursing note reviewed. Exam conducted with a chaperone present.   Constitutional:       General: She is not in acute distress.     Appearance: Normal appearance. She is well-developed and well-groomed. She is ill-appearing (chronically). Pleasantly confused.    Interventions: Nasal cannula in place.   HENT:      Head: Normocephalic.      Nose: Nose normal.      Mouth/Throat:      Mouth: Mucous membranes are dry   Cardiovascular:      Rate and Rhythm: Normal rate.      Pulses: Normal pulses.   Pulmonary:      Effort: Pulmonary effort is normal.   Abdominal:      General: Abdomen is flat.      Palpations: Abdomen is soft.   Musculoskeletal:  "        General: Swelling present.      Right lower leg: Edema present.      Left lower leg: Edema present.   Skin:     General: Skin is warm and dry.      Capillary Refill: Capillary refill takes less than 2 seconds.      Coloration: Skin is pale.   Neurological:      Mental Status: She is lethargic but easily aroused. She is disoriented.      Motor: Weakness present.   Psychiatric:         Behavior: Behavior normal.         Review of Symptoms        Symptom Assessment (ESAS 0-10 Scale)  Pain:  0  Dyspnea:  0  Anxiety:  4  Nausea:  0  Depression:  0  Anorexia:  0  Fatigue:  3  Insomnia:  0  Restlessness:  0  Agitation:  0            Performance Status:  30     Living Arrangements:  Lives with family and Lives in home     Psychosocial/Cultural:   See Palliative Psychosocial Note: No  Social Issues Identified: Coping deficit pt/family  Bereavement Risk: Yes: Identified: work/home, financial, intimacy, and caregiver concerns   Caregiver Needs Discussed. Caregiver Distress: Yes: Need for respite, Intensity of family caregiving, Caregiver Burnout Risk, and Caregiver support and community resources discussed.    Cultural: none identified   **Primary  to Follow**  Palliative Care  Consult: No     Spiritual:  F - Ana and Belief:  [employer]   I - Importance:  High   C - Community:  Family support   A - Address in Care:  Pastoral visits prn      Time-Based Charting:  Yes  Chart Review: 15 minutes  Face to Face: 20 minutes  Symptom Assessment: 5 minutes  Coordination of Care: 10 minutes  Discharge Plannin minutes     Total Time Spent: 55 minutes     Advance Care Planning   Advance Directives:   Living Will: No    LaPOST: No (voided)    Do Not Resuscitate Status: Yes       Decision Making:  Family answered questions and Patient unable to communicate due to disease severity/cognitive impairment  Goals of Care: The family endorses that what is most important right now is to focus on remaining as  independent as possible, symptom/pain control, and quality of life, even if it means sacrificing a little time     Accordingly, we have decided that the best plan to meet the patient's goals includes enrolling in hospice care     Significant Labs: All pertinent labs within the past 24 hours have been reviewed.  CBC:   Recent Labs   Lab 03/26/23  0325   WBC 5.75   HGB 9.5*   HCT 32.5*   MCV 82        BMP:  No results for input(s): GLU, NA, K, CL, CO2, BUN, CREATININE, CALCIUM, MG in the last 24 hours.  LFT:  Lab Results   Component Value Date    AST 23 03/26/2023    ALKPHOS 89 03/26/2023    BILITOT 0.8 03/26/2023     Albumin:   Albumin   Date Value Ref Range Status   03/26/2023 2.5 (L) 3.5 - 5.2 g/dL Final     Protein:   Total Protein   Date Value Ref Range Status   03/26/2023 7.4 6.0 - 8.4 g/dL Final     Lactic acid:   Lab Results   Component Value Date    LACTATE 1.5 03/21/2023    LACTATE 1.3 03/21/2023       Significant Imaging: I have reviewed all pertinent imaging results/findings within the past 24 hours.      Luisa Manuel NP  Palliative Medicine  [facility]

## 2023-04-26 NOTE — PLAN OF CARE
Hospital Medicine ICU Acceptance Note    Date of Admit: 7/18/2022  Date of Transfer / Stepdown: 7/19/2022  Gautam, C/J, L, Onc (IV chemo w/in 1 month), Gyn/Onc, or other special case?: no   ICU team stepping patient down: CCU   ICU team member giving verbal handoff: Mario Ochoa MD  Accepting  team: D    Brief History of Present Illness:      Pilar Michael is an 86 y/o female with PMH severe AS s/p TAVR (5/7/19), CAD s/p ostial RCA stent placement (4/19), IDBCA s/p resection and radiation (2003), thyroid cancer s/p resection (1992), HTN, PAD presents to the hospital secondary to dyspnea. Patient was at rehab and found to be short of breath that have been progressively worsening over the morning.  She was recently admitted to the hospital secondary to acute on chronic diastolic heart failure. She was diuresed with Lasix which was eventually switched to Bumex.  She was discharged on Bumex 1 mg b.i.d. and this was decreased to 1 mg daily per her outpatient cardiologist. She presents to the ED hypoxic with O2 sat in the 70s. She was placed on BiPAP with improvement in her O2 saturations. She is hemodynamically stable. Chest x-ray notable for large left pleural effusion pulmonary edema pulmonary vascular congestion.  BNP elevated. Trop mildly elevated. CBC appears at baseline with microcytic anemia. CMP grossly unremarkable. Lactate 1.3. Given patient's acute on chronic diastolic heart failure requiring BiPAP, she will be admitted to CCU for diuresis.    Hospital/ICU Course:     Admitted for acute hypoxemic respiratory failure requiring 15L non re-breather and BiPAP, likely 2/2 acute on chronic diastolic heart failure. Lasix 20 mg/hr ggt w/ moderate UOP and improvement of symptoms, now comfortably back on low flow NC. Stable for step down to hospital medicine.    Consultants and Procedures:     Consultants:  - Not applicable     Procedures:    - Not applicable     Transfer Information:     Diet:  - Cardiac  diet, 1.5L fluid restriction     Physical Activity:  - PT/OT/SLP consulted    To Do / Pending Studies / Follow ups:    - Continue lasix drip  - F/u on BCx- de-escalate antibiotics accordingly   - Palliative care consulted for further GOC discussion  - F/u on PT/OT recommendations  - Bipap changed to qhs    Patient has been accepted by Hospital Medicine Team D, who will assume care of the patient upon arrival to the floor from the ICU. Please contact ICU team with any concerns prior to arrival. Please contact [facility] at [phone] or [phone] (please do NOT leave a voicemail) when patient arrives to the floor.          Bridgette Díaz MD  Department of Hospital Medicine  Department of Pediatrics  7/19/2022     forehead

## 2023-10-12 NOTE — NURSING
Date: 10/12/23   Phone #: [phone]  Dietitian Name: Alee Qiugeni IRVIN, CD    Estimated body mass index is 38.15 kg/m² as calculated from the following:    Height as of this encounter: 5' 1\" (1.549 m).    Weight as of this encounter: 91.6 kg (201 lb 14.4 oz).    Total Weight Loss: 12 pounds  Percent Excess Body Weight Loss 15%      Bariatric Nutrition and Activity Plan (after surgery)    1.Eat 3 small meals per day  2. Drink 48-64 ounces of liquids per day   3. No carbonated beverages  4. Choose foods low in sugar and fat  5. Take vitamins as directed  6. Aim for 60-80 grams of protein per day  7. Eat meals very slowly   8. Be physically active    Personal Goals:  1. Continue pureed diet until 10/18/23.  If tolerating pureed diet, start soft diet on 10/19/23 and follow until 11/1/23.             - Slowly increase portions at meals as tolerated to 1/3 - 1/2 cup (3-4 ounces)             - Aim for 600-800 calories on pureed and soft diet          2.  If tolerating soft diet, start general diet on 11/2/23.    - Slowly increase portions as tolerated, not to exceed 1 to 1.5 cups per meal.  - Eventually aim for 1189-8008 calories on general diet.  3.  Eat 3 meals and 2-3 snacks per day  - Have a meal or snack every 2-3 hours.             - Eat breakfast within 1-2 hours of waking up.   - Do not skip meals and snacks   - Eat your protein first, then your fruit or vegetable and lastly starch if still room  4.  Aim for at least 60-80 grams of protein per day.   - Try clear liquid protein shake (example: [id number], Ready ).   - More concentrated version such as Elite Series 42   - Try lactose free shake (Faiflife).    - Unflavored protein powder mixed with water or SF juice or  Failife Skim Milk   - Try ultra-filtered milk (Faiflife Skim).  5. Aim for 2 protein shakes/sosa per day.    6. Continue to practice \"Key Points Following Bariatric Surgery”:             - Set aside 30 minutes to eat meals             - Take small  Pt is being discharged to [facility]. Report called to [facility]. avs placed in transfer packet. Pt waiting for transport for . Daughter is at bedside.    bites, consider using baby spoons/forks             - Set down your utensil/food between each bite             - Chew foods well - ? to the consistency of applesauce             - Do not drink liquids with meals.  Wait at least 30 minutes after meals to drink  7. Continue to take vitamins as directed.     - Once ~1 month post-op, consider switching to tablet/capsule Bariatric Multivitamin: should contain 45-60 mg of iron.   - If bariatric multivitamin does not contain calcium, recommend  total 3393-1997 mg per day of calcium              -  Do NOT take calcium with Multivitamin.               -  Separate each calcium dose by 4 hours  8. Recommend keep food records daily and bring to your next nutrition visit for review.  9.  Increase activity as tolerated with goal of 150 minutes per week.  10. Aim for at least 48-64 oz fluid per day.   - 3 (16 oz) sosa per day    - Protein shakes count towards daily fluid goals

## (undated) DEVICE — FLEXSHEATH DRYSEAL 14FR 33CM

## (undated) DEVICE — INFLATOR ENCORE 26 BLLN INFL

## (undated) DEVICE — COVER SURG TABLE BACK 44X76IN

## (undated) DEVICE — CATH TEMP PACER 5.0FR

## (undated) DEVICE — SHEATH INTRODUCER 6FR 11CM

## (undated) DEVICE — CABLE PACER

## (undated) DEVICE — WIRE WHISPER MS 014 X 190

## (undated) DEVICE — GUIDEWIRE STF .035X260CM STR

## (undated) DEVICE — SYS LOADING ENVEO PRO 14
Type: IMPLANTABLE DEVICE | Site: HEART | Status: NON-FUNCTIONAL
Removed: 2019-05-07

## (undated) DEVICE — SEE MEDLINE ITEM 156894

## (undated) DEVICE — CATH GUIDE LINER  V3 6F

## (undated) DEVICE — CATH DXTERITY AL20 100CM 6FR

## (undated) DEVICE — BLLN LOMA VISTA TRUE 20MM

## (undated) DEVICE — CATH INFINITI JUDKINS JR4

## (undated) DEVICE — OMNIPAQUE 350 200ML

## (undated) DEVICE — CATH BLLN FG APEX MR 2.50X20MM

## (undated) DEVICE — CATH ALII 4FR INFINITY

## (undated) DEVICE — TUBING HPCIL ROT M/F ADPT 10IN

## (undated) DEVICE — CATH DXTERITY PG145 110CM 6FR

## (undated) DEVICE — KIT MICROINTRO 4F .018X40X7CM

## (undated) DEVICE — KIT CO-PILOT

## (undated) DEVICE — SPIKE CONTRAST CONTROLLER

## (undated) DEVICE — KIT CUSTOM MANIFOLD

## (undated) DEVICE — DEVICE PERCLOSE SUT CLSR 6FR

## (undated) DEVICE — SYS DELIVERY ENVEO PRO 18FR 14
Type: IMPLANTABLE DEVICE | Site: HEART | Status: NON-FUNCTIONAL
Removed: 2019-05-07

## (undated) DEVICE — CATH JL5 4FR INFINITY

## (undated) DEVICE — GUIDEWIRE CONFNZA PRO .014X300

## (undated) DEVICE — CATH DXTERITY JL50 100CM 6FR

## (undated) DEVICE — GUIDEWIRE X SPORT .014IN 190CM

## (undated) DEVICE — GUIDE VISTA 6FR JR 4

## (undated) DEVICE — GUIDEWIRE EMERALD 150CM PTFE

## (undated) DEVICE — MULTI-LINK RX VISION CORONARY STENT SYSTEM 3.00 MM X 23 MM
Type: IMPLANTABLE DEVICE | Site: HEART | Status: NON-FUNCTIONAL
Brand: MULTI-LINK VISION
Removed: 2019-04-23

## (undated) DEVICE — DRAPE OPTIMA MAJOR PEDIATRIC

## (undated) DEVICE — KIT PERCUTANEOUS SHEATH

## (undated) DEVICE — STOPCOCK 3-WAY

## (undated) DEVICE — CATH EMERGE MR 20 X 2.50

## (undated) DEVICE — SYR MED RAD 150ML

## (undated) DEVICE — LINE INJECTION 30IN 25/BX

## (undated) DEVICE — CATH MPA2 INFINITI 4FR 100CM

## (undated) DEVICE — GUIDEWIRE SUPRA CORE 035 190CM

## (undated) DEVICE — SHEATH PINNACLE 8FR